# Patient Record
Sex: FEMALE | Race: WHITE | NOT HISPANIC OR LATINO | Employment: OTHER | URBAN - METROPOLITAN AREA
[De-identification: names, ages, dates, MRNs, and addresses within clinical notes are randomized per-mention and may not be internally consistent; named-entity substitution may affect disease eponyms.]

---

## 2017-01-03 ENCOUNTER — OFFICE VISIT (OUTPATIENT)
Dept: MEDICAL GROUP | Facility: MEDICAL CENTER | Age: 77
End: 2017-01-03
Payer: MEDICARE

## 2017-01-03 VITALS
WEIGHT: 168 LBS | DIASTOLIC BLOOD PRESSURE: 78 MMHG | SYSTOLIC BLOOD PRESSURE: 124 MMHG | BODY MASS INDEX: 31.72 KG/M2 | OXYGEN SATURATION: 92 % | HEIGHT: 61 IN | HEART RATE: 85 BPM | TEMPERATURE: 98.2 F

## 2017-01-03 DIAGNOSIS — J06.9 URI WITH COUGH AND CONGESTION: ICD-10-CM

## 2017-01-03 PROCEDURE — 99213 OFFICE O/P EST LOW 20 MIN: CPT | Performed by: NURSE PRACTITIONER

## 2017-01-03 RX ORDER — AZITHROMYCIN 250 MG/1
TABLET, FILM COATED ORAL
Qty: 6 TAB | Refills: 0 | Status: SHIPPED | OUTPATIENT
Start: 2017-01-03 | End: 2017-01-08

## 2017-01-03 NOTE — MR AVS SNAPSHOT
"        Sheyla Gauthier   1/3/2017 4:15 PM   Office Visit   MRN: [id number]    Department:  [facility]   Dept Phone:  [phone]    Description:  Female : 1940   Provider:  JANET Ford           Allergies as of 1/3/2017     Allergen Noted Reactions    Amoxicillin 2016   Rash    Facial rash    Nkda [No Known Drug Allergy] 10/21/2009         You were diagnosed with     URI with cough and congestion   [[id number]]   use MBX prn.  take zpak if not improved in next 4-5 days.       Vital Signs     Blood Pressure Pulse Temperature Height Weight Body Mass Index    124/78 mmHg 85 36.8 °C (98.2 °F) 1.549 m (5' 1\") 76.204 kg (168 lb) 31.76 kg/m2    Oxygen Saturation Last Menstrual Period Breastfeeding? 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   Unknown - Present    Acquired hypothyroidism E03.9   3/8/2011 - Present    Migraine syndrome G43.909   2013 - Present    Sinusitis J32.9   2013 - Present    White matter abnormality on MRI of brain R93.0   2013 - Present    Spinal stenosis of lumbar region M48.06   2016 - Present    Weight gain R63.5   2016 - Present    Prediabetes R73.03   2016 - Present    Abdominal bloating R14.0   2016 - Present    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   2016 - Present    Hx of colonic polyps Z86.010   2016 - Present    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   2016 - Present      Health Maintenance        Date Due Completion Dates    BONE DENSITY " 6/18/2015 6/18/2010    MAMMOGRAM 10/5/2016 10/5/2015, 8/20/2014, 8/20/2014, 7/18/2013, 7/2/2012, 8/10/2011, 6/23/2011, 6/20/2011, 6/18/2010, 6/18/2010, 5/23/2005, 5/22/2004    IMM PNEUMOCOCCAL 65+ (ADULT) LOW/MEDIUM RISK SERIES (2 of 2 - PPSV23) 12/14/2016 12/14/2015    PAP SMEAR 2/3/2019 2/3/2016, 6/8/2010    IMM DTaP/Tdap/Td Vaccine (2 - Td) 7/7/2024 7/7/2014    COLONOSCOPY 8/19/2026 8/19/2016, 1/12/2010            Current Immunizations     13-VALENT PCV PREVNAR 12/14/2015    Influenza Vaccine Adult HD 10/25/2016    SHINGLES VACCINE 10/26/2010  3:20 PM    Tdap Vaccine 7/7/2014 10:14 PM      Below and/or attached are the medications your provider expects you to take. Review all of your home medications and newly ordered medications with your provider and/or pharmacist. Follow medication instructions as directed by your provider and/or pharmacist. Please keep your medication list with you and share with your provider. Update the information when medications are discontinued, doses are changed, or new medications (including over-the-counter products) are added; and carry medication information at all times in the event of emergency situations     Allergies:  AMOXICILLIN - Rash     NKDA - (reactions not documented)               Medications  Valid as of: January 03, 2017 -  5:47 PM    Generic Name Brand Name Tablet Size Instructions for use    Alum & Mag Hydroxide-Simeth (MBX) Suspension (Suspension) MBX  Take 5 mL by mouth every 6 hours as needed.        AmLODIPine Besylate (Tab) NORVASC 5 MG Take 5 mg by mouth every day.        Aspirin (Chew Tab) ASA 81 MG Take 81 mg by mouth every day.        Azithromycin (Tab) ZITHROMAX 250 MG 2 tabs by mouth day 1, 1 tab by mouth days 2-5        Escitalopram Oxalate (Tab) LEXAPRO 10 MG Take 1 Tab by mouth every day.        Ezetimibe (Tab) ZETIA 10 MG Take 10 mg by mouth every day.        Irbesartan (Tab) AVAPRO 150 MG Take 1 Tab by mouth every day.        Levothyroxine Sodium  (Tab) SYNTHROID 75 MCG Take 75 mcg by mouth Every morning on an empty stomach.        .                 Medicines prescribed today were sent to:     [facility], [place] - [address] AT [facility] [address]    [address] [place] [address]    Phone: [phone] Fax: [phone]    Open 24 Hours?: No      Medication refill instructions:       If your prescription bottle indicates you have medication refills left, it is not necessary to call your provider’s office. Please contact your pharmacy and they will refill your medication.    If your prescription bottle indicates you do not have any refills left, you may request refills at any time through one of the following ways: The online [employer] system (except Urgent Care), by calling your provider’s office, or by asking your pharmacy to contact your provider’s office with a refill request. Medication refills are processed only during regular business hours and may not be available until the next business day. Your provider may request additional information or to have a follow-up visit with you prior to refilling your medication.   *Please Note: Medication refills are assigned a new Rx number when refilled electronically. Your pharmacy may indicate that no refills were authorized even though a new prescription for the same medication is available at the pharmacy. Please request the medicine by name with the pharmacy before contacting your provider for a refill.           [employer] Access Code: Activation code not generated  Current [employer] Status: Active

## 2017-01-04 ENCOUNTER — HOSPITAL ENCOUNTER (OUTPATIENT)
Dept: LAB | Facility: MEDICAL CENTER | Age: 77
End: 2017-01-04
Attending: INTERNAL MEDICINE
Payer: MEDICARE

## 2017-01-04 LAB
T4 FREE SERPL-MCNC: 0.93 NG/DL (ref 0.53–1.43)
TSH SERPL DL<=0.005 MIU/L-ACNC: 1.31 UIU/ML (ref 0.3–3.7)

## 2017-01-04 PROCEDURE — 84443 ASSAY THYROID STIM HORMONE: CPT

## 2017-01-04 PROCEDURE — 84439 ASSAY OF FREE THYROXINE: CPT

## 2017-01-04 PROCEDURE — 36415 COLL VENOUS BLD VENIPUNCTURE: CPT

## 2017-01-04 NOTE — PROGRESS NOTES
Subjective:      Sheyla Gauthier is a 76 y.o. female who presents with No chief complaint on file.            HPI  Seen in f/u for URI.  Sx started 14 days ago.  She is having wheezing in her lungs.  It has resolved but gotten better now.    But having rt ear pain.  Having sores in her mouth.    Nasal secretionis are clear.  Coughing up green secetions.  + headache but that is chronic.  No sinus pressure.    + sore throat - feels likes sores in hter throat.    No n/v,d.  + chills but no fever or sweating.    Just came home from [facility].  Today she used cough expectorant and pain meds for body aches that is not nsaid.  Over the last 3 days she has used sudafed.      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   • 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/08/2016   • Hx of colonic polyps 12/08/2016   • Spinal stenosis of lumbar region 11/14/2016   • Weight gain 11/14/2016   • Prediabetes 11/14/2016   • Abdominal bloating 11/14/2016   • Sinusitis 12/12/2013   • Migraine syndrome 02/11/2013   • White matter abnormality on MRI of brain 02/11/2013   • Acquired hypothyroidism 03/08/2011   • HTN (hypertension) 10/21/2009   • Postmenopausal hormone therapy 10/21/2009   • Osteopathia    • Mixed hyperlipidemia    • 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HCC)      Current Outpatient Prescriptions   Medication Sig Dispense Refill   • escitalopram (LEXAPRO) 10 MG Tab Take 1 Tab by mouth every day. 90 Tab 3   • levothyroxine (SYNTHROID) 75 MCG Tab Take 75 mcg by mouth Every morning on an empty stomach.     • ezetimibe (ZETIA) 10 MG Tab Take 10 mg by mouth every day.     • amlodipine (NORVASC) 5 MG Tab Take 5 mg by mouth every day.     • irbesartan (AVAPRO) 150 MG TABS Take 1 Tab by mouth every day. 90 Each 3   • aspirin (BABY ASPIRIN) 81 MG CHEW Take 81 mg by mouth every day.       No current facility-administered medications for this visit.     Allergies   Allergen Reactions   •  "Amoxicillin Rash     Facial rash   • Nkda [No Known Drug Allergy]        ROS    Review of Systems   Constitutional: Negative.  Negative for weight loss, malaise/fatigue and diaphoresis.   HENT: Negative.  Negative for hearing loss, ear pain, nosebleeds, neck pain, tinnitus and ear discharge.    Respiratory: Negative.  Negative for hemoptysis, and stridor.    Cardiovascular: Negative.  Negative for chest pain, palpitations, orthopnea, claudication, leg swelling and PND.   Gastrointestinal: denies nausea, vomiting, diarrhea, constipation, heartburn, melena or hematochezia.  Genitourinary: Denies dysuria, hematuria, urinary incontinence, frequency or urgency.           Objective:     /78 mmHg  Pulse 85  Temp(Src) 36.8 °C (98.2 °F)  Ht 1.549 m (5' 1\")  Wt 76.204 kg (168 lb)  BMI 31.76 kg/m2  SpO2 92%  LMP 11/01/1986  Breastfeeding? No     Physical Exam      Physical Exam   Vitals reviewed.  Constitutional: oriented to person, place, and time. appears well-developed and well-nourished. No distress.   HENT:  Head: Normocephalic and atraumatic. Right Ear: External ear normal. Left Ear: External ear normal. Nose: Nose normal. Mouth/Throat: Oropharynx is clear and moist. No oropharyngeal exudate.  alex tm wnl.  Eyes: Right eye exhibits no discharge. Left eye exhibits no discharge. No scleral icterus.  Neck: No JVD present.    Cardiovascular: Normal rate, regular rhythm, normal heart sounds and intact distal pulses.  Exam reveals no gallop and no friction rub.  No murmur heard.  No carotid bruits.   Pulmonary/Chest: Effort normal and breath sounds normal. No stridor. No respiratory distress. no wheezes or rales. exhibits no tenderness.   Musculoskeletal: Normal range of motion. exhibits no edema. alex pedal pulses 2+.  Lymphadenopathy: no cervical or supraclavicular adenopathy.   Neurological: alert and oriented to person, place, and time. exhibits normal muscle tone. Coordination normal.   Skin: Skin is warm and " dry. no diaphoresis.   Psychiatric: normal mood and affect. behavior is normal.            Assessment/Plan:     1. URI with cough and congestion  Alum & Mag Hydroxide-Simeth (MBX) Suspension    azithromycin (ZITHROMAX) 250 MG Tab    use MBX prn.  take zpak if not improved in next 4-5 days.      2.  F/u if sx not imrpoved with tx

## 2017-02-10 RX ORDER — EZETIMIBE 10 MG/1
10 TABLET ORAL DAILY
Qty: 30 TAB | Refills: 6 | Status: SHIPPED | OUTPATIENT
Start: 2017-02-10 | End: 2017-09-11 | Stop reason: SDUPTHER

## 2017-02-10 NOTE — TELEPHONE ENCOUNTER
1. Caller Name: Sheyla                      Call Back Number: [phone]    2. Message: Pt left message stating her Cardiologist retired and she needs a new refill for Zetia.     3. Patient approves office to leave a detailed voicemail/MyChart message: N\A        Was the patient seen in the last year in this department? Yes     Does patient have an active prescription for medications requested? No     Received Request Via: Patient

## 2017-04-24 ENCOUNTER — HOSPITAL ENCOUNTER (OUTPATIENT)
Dept: LAB | Facility: MEDICAL CENTER | Age: 77
End: 2017-04-24
Attending: INTERNAL MEDICINE
Payer: MEDICARE

## 2017-04-24 LAB — TSH SERPL DL<=0.005 MIU/L-ACNC: 0.59 UIU/ML (ref 0.3–3.7)

## 2017-04-24 PROCEDURE — 84443 ASSAY THYROID STIM HORMONE: CPT

## 2017-04-24 PROCEDURE — 84432 ASSAY OF THYROGLOBULIN: CPT

## 2017-04-24 PROCEDURE — 86800 THYROGLOBULIN ANTIBODY: CPT

## 2017-04-24 PROCEDURE — 36415 COLL VENOUS BLD VENIPUNCTURE: CPT

## 2017-04-26 LAB
THYROGLOB AB SERPL-ACNC: <0.9 IU/ML (ref 0–4)
THYROGLOB SERPL-MCNC: 6.9 NG/ML (ref 1.3–31.8)
THYROGLOB SERPL-MCNC: NORMAL NG/ML (ref 1.3–31.8)

## 2017-05-18 PROBLEM — C44.519 BASAL CELL CARCINOMA OF SKIN OF OTHER PART OF TRUNK: Status: ACTIVE | Noted: 2017-05-18

## 2017-06-01 PROBLEM — D49.2 NEOPLASM OF UNSPECIFIED BEHAVIOR OF BONE, SOFT TISSUE, AND SKIN: Status: RESOLVED | Noted: 2017-05-18 | Resolved: 2017-06-01

## 2017-06-05 ENCOUNTER — HOSPITAL ENCOUNTER (OUTPATIENT)
Dept: LAB | Facility: MEDICAL CENTER | Age: 77
End: 2017-06-05
Attending: INTERNAL MEDICINE
Payer: MEDICARE

## 2017-06-05 LAB
ALBUMIN SERPL BCP-MCNC: 4.2 G/DL (ref 3.2–4.9)
ALBUMIN/GLOB SERPL: 1.5 G/DL
ALP SERPL-CCNC: 69 U/L (ref 30–99)
ALT SERPL-CCNC: 13 U/L (ref 2–50)
ANION GAP SERPL CALC-SCNC: 7 MMOL/L (ref 0–11.9)
AST SERPL-CCNC: 15 U/L (ref 12–45)
BILIRUB SERPL-MCNC: 0.6 MG/DL (ref 0.1–1.5)
BUN SERPL-MCNC: 27 MG/DL (ref 8–22)
CALCIUM SERPL-MCNC: 9.4 MG/DL (ref 8.5–10.5)
CHLORIDE SERPL-SCNC: 108 MMOL/L (ref 96–112)
CHOLEST SERPL-MCNC: 187 MG/DL (ref 100–199)
CO2 SERPL-SCNC: 27 MMOL/L (ref 20–33)
CREAT SERPL-MCNC: 0.84 MG/DL (ref 0.5–1.4)
GFR SERPL CREATININE-BSD FRML MDRD: >60 ML/MIN/1.73 M 2
GLOBULIN SER CALC-MCNC: 2.8 G/DL (ref 1.9–3.5)
GLUCOSE SERPL-MCNC: 104 MG/DL (ref 65–99)
HDLC SERPL-MCNC: 54 MG/DL
LDLC SERPL CALC-MCNC: 113 MG/DL
POTASSIUM SERPL-SCNC: 4.3 MMOL/L (ref 3.6–5.5)
PROT SERPL-MCNC: 7 G/DL (ref 6–8.2)
SODIUM SERPL-SCNC: 142 MMOL/L (ref 135–145)
TRIGL SERPL-MCNC: 99 MG/DL (ref 0–149)

## 2017-06-05 PROCEDURE — 80053 COMPREHEN METABOLIC PANEL: CPT

## 2017-06-05 PROCEDURE — 36415 COLL VENOUS BLD VENIPUNCTURE: CPT

## 2017-06-05 PROCEDURE — 80061 LIPID PANEL: CPT

## 2017-06-21 ENCOUNTER — HOSPITAL ENCOUNTER (OUTPATIENT)
Dept: PAIN MANAGEMENT | Facility: REHABILITATION | Age: 77
End: 2017-06-21
Attending: PAIN MEDICINE
Payer: MEDICARE

## 2017-06-21 ENCOUNTER — APPOINTMENT (OUTPATIENT)
Dept: RADIOLOGY | Facility: REHABILITATION | Age: 77
End: 2017-06-21
Attending: PAIN MEDICINE
Payer: MEDICARE

## 2017-06-21 VITALS
TEMPERATURE: 99 F | RESPIRATION RATE: 18 BRPM | DIASTOLIC BLOOD PRESSURE: 89 MMHG | WEIGHT: 156.53 LBS | OXYGEN SATURATION: 91 % | SYSTOLIC BLOOD PRESSURE: 181 MMHG | HEART RATE: 59 BPM | HEIGHT: 62 IN | BODY MASS INDEX: 28.8 KG/M2

## 2017-06-21 PROCEDURE — 64633 DESTROY CERV/THOR FACET JNT: CPT

## 2017-06-21 PROCEDURE — 700111 HCHG RX REV CODE 636 W/ 250 OVERRIDE (IP)

## 2017-06-21 PROCEDURE — 64634 DESTROY C/TH FACET JNT ADDL: CPT

## 2017-06-21 PROCEDURE — 99152 MOD SED SAME PHYS/QHP 5/>YRS: CPT

## 2017-06-21 PROCEDURE — 99153 MOD SED SAME PHYS/QHP EA: CPT

## 2017-06-21 RX ORDER — TRIAMCINOLONE ACETONIDE 40 MG/ML
INJECTION, SUSPENSION INTRA-ARTICULAR; INTRAMUSCULAR
Status: COMPLETED
Start: 2017-06-21 | End: 2017-06-21

## 2017-06-21 RX ORDER — MIDAZOLAM HYDROCHLORIDE 1 MG/ML
INJECTION INTRAMUSCULAR; INTRAVENOUS
Status: COMPLETED
Start: 2017-06-21 | End: 2017-06-21

## 2017-06-21 RX ORDER — LIDOCAINE HYDROCHLORIDE 10 MG/ML
INJECTION, SOLUTION EPIDURAL; INFILTRATION; INTRACAUDAL; PERINEURAL
Status: COMPLETED
Start: 2017-06-21 | End: 2017-06-21

## 2017-06-21 RX ORDER — BUPIVACAINE HYDROCHLORIDE 2.5 MG/ML
INJECTION, SOLUTION EPIDURAL; INFILTRATION; INTRACAUDAL
Status: COMPLETED
Start: 2017-06-21 | End: 2017-06-21

## 2017-06-21 RX ADMIN — FENTANYL CITRATE 50 MCG: 50 INJECTION, SOLUTION INTRAMUSCULAR; INTRAVENOUS at 15:46

## 2017-06-21 RX ADMIN — LIDOCAINE HYDROCHLORIDE 20 ML: 10 INJECTION, SOLUTION EPIDURAL; INFILTRATION; INTRACAUDAL; PERINEURAL at 15:45

## 2017-06-21 RX ADMIN — MIDAZOLAM HYDROCHLORIDE 2 MG: 1 INJECTION, SOLUTION INTRAMUSCULAR; INTRAVENOUS at 15:43

## 2017-06-21 RX ADMIN — MIDAZOLAM HYDROCHLORIDE 1 MG: 1 INJECTION, SOLUTION INTRAMUSCULAR; INTRAVENOUS at 15:51

## 2017-06-21 RX ADMIN — FENTANYL CITRATE 50 MCG: 50 INJECTION, SOLUTION INTRAMUSCULAR; INTRAVENOUS at 15:52

## 2017-06-21 ASSESSMENT — PAIN SCALES - GENERAL
PAINLEVEL_OUTOF10: 0
PAINLEVEL_OUTOF10: 5

## 2017-06-21 NOTE — PROGRESS NOTES
Medication reconciliation reviewed with patient.Patient stated that she had Naproxen yesterday and Aspirin 81 mg. last night Discharge instruction given to patient and verbalized understanding. Patient  has ride home ( Justyn /friend  ). Dr. Benitez notified.

## 2017-06-28 NOTE — PROCEDURES
Surgeon:   Patricio Benitez MD  Patient name:   Sheyla Gauthier  YOB: 1940  Date Seen:   06/21/2017    Assistants: None    TON-C5 RF NEUROTOMY    ATTENDING:  Patricio Benitez MD    ASSISTANT:  None    SITE:  [facility]     Anesthesia time: 25 minutes    PREOPERATIVE DIAGNOSIS:  Cervical spondylosis with cervical facet arthropathy.    POSTOPERATIVE DIAGNOSIS:  Cervical spondylosis with cervical facet arthropathy.    PROCEDURE PERFORMED:  1. Radiofrequency ablation of the TON medial branch nerve on the right side.  2. Additional levels, medial branches of the C3, C4 and C5 on the right side.  3. Fluoroscopy for precise needle placement.    ANESTHESIA:  Local infiltration with monitored anesthesia care and conscious sedation.    MONITORS:   Automatic blood pressure cuff and pulse oximetry.    INDICATIONS:  2 consecutive successful blocks, right side.    REVIEW OF SYSTEMS:  Negative for fever, chills, chest pain, SOB, bleeding abnormalities, nausea, vomiting, diarrhea, worsening edema, or new rashes.    FOCUSED PHYSICAL EXAMINATION:  The patient is awake, alert and oriented, and is in no acute distress.  Vital signs are stable.  The patient is afebrile.  The rest of the PE is essentially unchanged from the patient's recent visit to our office.    We explained the procedure to the patient including the risks, benefits and alternatives to the procedure.  The patient verbalized understanding and was willing to proceed.    PROCEDURE IN DETAIL:  An informed consent was obtained.  The patient was taken to the procedure room and was positively identified by the staff and the attending physician.  The patient was positioned Left Lateral on the procedure bed.  Vital signs were monitored as above and remained stable throughout the procedure.  The skin was prepped and draped in the standard sterile fashion.  A surgical pause (time-out) was performed and was agreed upon by the members of the team. A fluoroscopic view of  the lumbar spine was obtained, and the area of interest was identified.  The skin was anesthetized using 1% lidocaine and 25-gauge 1-1/2-inch needle.    After that, a 17-gauge -5 cm conventional RF cannula with 2-mm active tip was advanced onto the facet line of C2-C3 lateral masses on the right side.  Full contact with the bone was established.  Additional RF cannulae were placed at the interception of 2 imaginary diagonal lines over the lateral masses of C3, C4 and C5 on the right.  (Each level was stimulated for sensory fibers at 50 Hz, and the patient's pain was reproduced at approximately 1.2 V.)  (The motor fiber recruitment was ruled out by stimulation at 2 Hz in the range between 1.5 to 2.5 V.)  (Only localized twitching of the multifidus muscle was elicited.)  Prior to lesioning, each nerve was anesthetized with 0.5 mL of Lidocaine 1%.  At each level, we then performed one lesion at 60 degrees centigrade with thermal tissue over 80 degrees ,  2.5 minutes in duration.      All needles were withdrawn.  The patient tolerated the procedure well.  The patient was transferred in stable condition to the recovery room.    COMPLICATIONS:  None.    PAIN SCORE BEFORE THE PROCEDURE:     PAIN SCORE AFTER THE PROCEDURE:      DISPOSITON:  1. Discharge to home when the discharge criteria are met.  2. Follow up in two weeks in the Pain Clinic.  3. (The patient will resume their medications.

## 2017-06-29 ENCOUNTER — TELEPHONE (OUTPATIENT)
Dept: MEDICAL GROUP | Age: 77
End: 2017-06-29

## 2017-06-29 ENCOUNTER — OFFICE VISIT (OUTPATIENT)
Dept: MEDICAL GROUP | Age: 77
End: 2017-06-29
Payer: MEDICARE

## 2017-06-29 VITALS
DIASTOLIC BLOOD PRESSURE: 80 MMHG | HEART RATE: 70 BPM | HEIGHT: 62 IN | SYSTOLIC BLOOD PRESSURE: 152 MMHG | TEMPERATURE: 99.3 F | BODY MASS INDEX: 29.15 KG/M2 | WEIGHT: 158.4 LBS | OXYGEN SATURATION: 97 %

## 2017-06-29 DIAGNOSIS — Z78.0 POSTMENOPAUSAL: ICD-10-CM

## 2017-06-29 DIAGNOSIS — E78.2 MIXED HYPERLIPIDEMIA: ICD-10-CM

## 2017-06-29 DIAGNOSIS — Z00.00 PREVENTATIVE HEALTH CARE: ICD-10-CM

## 2017-06-29 DIAGNOSIS — Z12.31 VISIT FOR SCREENING MAMMOGRAM: ICD-10-CM

## 2017-06-29 DIAGNOSIS — L57.0 AK (ACTINIC KERATOSIS): ICD-10-CM

## 2017-06-29 DIAGNOSIS — E03.9 ACQUIRED HYPOTHYROIDISM: ICD-10-CM

## 2017-06-29 DIAGNOSIS — F32.0 MILD SINGLE CURRENT EPISODE OF MAJOR DEPRESSIVE DISORDER (HCC): ICD-10-CM

## 2017-06-29 DIAGNOSIS — I10 ESSENTIAL HYPERTENSION: ICD-10-CM

## 2017-06-29 DIAGNOSIS — R63.5 WEIGHT GAIN: ICD-10-CM

## 2017-06-29 DIAGNOSIS — Z23 NEED FOR VACCINATION: ICD-10-CM

## 2017-06-29 DIAGNOSIS — Q76.49 CONGENITAL CERVICAL SPINE STENOSIS: ICD-10-CM

## 2017-06-29 PROBLEM — F32.9 MAJOR DEPRESSIVE DISORDER: Status: ACTIVE | Noted: 2017-06-29

## 2017-06-29 PROCEDURE — 17003 DESTRUCT PREMALG LES 2-14: CPT | Performed by: FAMILY MEDICINE

## 2017-06-29 PROCEDURE — G0009 ADMIN PNEUMOCOCCAL VACCINE: HCPCS | Performed by: FAMILY MEDICINE

## 2017-06-29 PROCEDURE — 90732 PPSV23 VACC 2 YRS+ SUBQ/IM: CPT | Performed by: FAMILY MEDICINE

## 2017-06-29 PROCEDURE — 99214 OFFICE O/P EST MOD 30 MIN: CPT | Mod: 25 | Performed by: FAMILY MEDICINE

## 2017-06-29 PROCEDURE — 17000 DESTRUCT PREMALG LESION: CPT | Mod: 59 | Performed by: FAMILY MEDICINE

## 2017-06-29 RX ORDER — LOSARTAN POTASSIUM 25 MG/1
TABLET ORAL
Refills: 3 | COMMUNITY
Start: 2017-06-13 | End: 2017-07-26

## 2017-06-29 RX ORDER — CALCIUM CARBONATE 300MG(750)
200 TABLET,CHEWABLE ORAL NIGHTLY
COMMUNITY
End: 2023-10-26

## 2017-06-29 RX ORDER — IRBESARTAN 150 MG/1
TABLET ORAL
Refills: 0 | COMMUNITY
Start: 2017-05-30 | End: 2017-07-26

## 2017-06-29 NOTE — ASSESSMENT & PLAN NOTE
Patient states that she's been gaining weight because she has not been as active. She has not been walking at all because of neck pain. She knows she can do better with diet and exercise.

## 2017-06-29 NOTE — PROGRESS NOTES
This medical record contains text that has been entered with the assistance of computer voice recognition and dictation software.  Therefore, it may contain unintended errors in text, spelling, punctuation, or grammar    Chief Complaint   Patient presents with   • Other     see reason for visit       Sheyla Gauthier is a 76 y.o. female here evaluation and management of: Hypertension, hyperlipidemia, establish care, depression, weight gain, DEXA scan, mammogram      HPI:     HTN (Hypertension)  Now only taking losartan    Patient has been diagnosed with essential hypertension for years. Has been compliant with medications and tolerating them with no mention of any adverse events.  The patient is  on a Baby ASPIRIN and a  STATIN.  The patient has been tollerating the BP meds without any issues. No tunnel vision, no cough, no changes in vision, no lightheadedness, no fatigue, no syncopal or presyncopal episodes, no edema, no new rashes. Patient also  denied chest pain, headache, change in vision, dyspnea on exertion, shortness of breath, PND, back pain, numbness or tingling anywhere. He is tolerating his medication well, he denies any lightheadedness, no tunnel vision, no syncopal episodes, no fatigue, no leg weakness no falls.      Mixed hyperlipidemia  Could not tollerate 3 different statins  Now taking zetia 10 mg by mouth daily    Results for SHEYLA GAUTHIER (MRN [id number]) as of 6/29/2017 16:47   Ref. Range 6/5/2017 09:47   Cholesterol,Tot Latest Ref Range: 100-199 mg/dL 187   Triglycerides Latest Ref Range: 0-149 mg/dL 99   HDL Latest Ref Range: >=40 mg/dL 54   LDL Latest Ref Range: <100 mg/dL 113 (H)       Acquired hypothyroidism  Patient states she's being managed by her endocrinologist  Currently on Levoxyl 75 mg by mouth daily    Results for SHEYLA GAUTHIER (MRN [id number]) as of 6/29/2017 16:29   Ref. Range 4/24/2017 12:53   TSH Latest Ref Range: 0.300-3.700 uIU/mL 0.590   Thyroglobulin by LC-MC/MS-S/P Latest Ref  Range: 1.3-31.8 ng/mL Not Applicable   Thyroglobulin Latest Ref Range: 1.3-31.8 ng/mL 6.9       Congenital cervical spine stenosis  Refused surgery as recomended by Dr. Salinas  Now is in [facility] managment clinic ([facility]  S/s cervical ablation and block 1 wk ago  She states she had some worsening pain and now it's improving      Major depressive disorder (CMS-HCC)  Since son  in  of which she states was acute bronchopneumonia  She became depressed and had prolonged grief  She was placed on Lexapro  Been stable on lexapro 10mg  She has good friends nearby who are very supportive  She denies any suicidal or homicidal ideations    Weight gain  Patient states that she's been gaining weight because she has not been as active. She has not been walking at all because of neck pain. She knows she can do better with diet and exercise.    Preventative health care  The patient is a very pleasant 76-year-old female who presents to clinic to establish care. She has a significant past medical history of cervical spinal stenosis, prolonged grief/depression, recent weight gain, hypertension, hyperlipidemia.   The patient denied any chest pain, no sob, no lewis, no  pnd, no orthopnea, no headache, no changes in vision, no numbness or tingling, no nausea, no diarrhea, no abdominal pain, no fevers, no chills, no bright red blood per rectum, no  difficulty urinating, no burning during micturition, no depressed mood, no other concerns.         Current medicines (including changes today)  Current Outpatient Prescriptions   Medication Sig Dispense Refill   • irbesartan (AVAPRO) 150 MG Tab TK 1 T PO D  0   • losartan (COZAAR) 25 MG Tab TK 1 T PO D  3   • Magnesium 400 MG Tab Take  by mouth.     • ezetimibe (ZETIA) 10 MG Tab Take 1 Tab by mouth every day. 30 Tab 6   • escitalopram (LEXAPRO) 10 MG Tab Take 1 Tab by mouth every day. 90 Tab 3   • levothyroxine (SYNTHROID) 75 MCG Tab Take 75 mcg by mouth Every morning on an  "empty stomach.     • aspirin (BABY ASPIRIN) 81 MG CHEW Take 81 mg by mouth every day.       No current facility-administered medications for this visit.     She  has a past medical history of HTN (hypertension); Osteopathia; Dyslipidemia; Post-menopause on HRT (hormone replacement therapy); MHA (microangiopathic hemolytic anemia); White matter abnormality on MRI of brain (2013); Migraine syndrome (2013); Herpes zoster (10/27/06); Anxiety; Arthritis; Cataract; Hypertension; Osteoporosis; and Thyroid disease.  She  has past surgical history that includes cholecystectomy (); abdominal hysterectomy total (1986); reduction of large breast (); foot surgery (); enlarge breast with implant; mammoplasty augmentation; bunionectomy (Left, ); and eye surgery.  Social History   Substance Use Topics   • Smoking status: Former Smoker   • Smokeless tobacco: Never Used   • Alcohol Use: 0.0 oz/week     0 Standard drinks or equivalent per week     Social History     Social History Narrative     Family History   Problem Relation Age of Onset   • Stroke Father    • Arthritis Father    • Hyperlipidemia Father    • Hypertension Father    • Cancer Sister      breast   • Arthritis Sister    • Cancer Daughter      breast   • Arthritis Mother    • Hypertension Mother    • Hyperlipidemia Mother    • Stroke Mother    • Arthritis Brother    • Cancer Brother    • Heart Disease Brother    • Hypertension Brother    • Hyperlipidemia Brother      Family Status   Relation Status Death Age   • Father     • Sister Alive    • Mother       tia   • Brother Alive          ROS   please see hpi    All other systems reviewed and are negative     Objective:     Blood pressure 152/80, pulse 70, temperature 37.4 °C (99.3 °F), height 1.575 m (5' 2.01\"), weight 71.85 kg (158 lb 6.4 oz), last menstrual period 1986, SpO2 97 %. Body mass index is 28.96 kg/(m^2).  Physical Exam:    Constitutional: Alert, no " distress.  Skin: Warm, dry, good turgor, no rashes in visible areas.    multiple lesions on bilateral forearms, hands, and chest, with evidence of of solar damage present , spotty hyperpigmentation, scattered telangiectasias, and  Xerosis      PROCEDURE: CRYOTHERAPY  Discussed risks and benefits of cryotherapy. Patient verbally agreed. 3 applications of cryotherapy were applied to all five lesions. Patient tolerated procedure well. Aftercare instructions given.      Eye: Equal, round and reactive, conjunctiva clear, lids normal.  ENMT: Lips without lesions, good dentition, oropharynx clear.  Neck: Trachea midline, no masses, no thyromegaly. No cervical or supraclavicular lymphadenopathy.  Respiratory: Unlabored respiratory effort, lungs clear to auscultation, no wheezes, no ronchi.  Cardiovascular: Normal S1, S2, no murmur, no edema.  Abdomen: Soft, non-tender, no masses, no hepatosplenomegaly.  Psych: Alert and oriented x3, normal affect and mood.          Assessment and Plan:   The following treatment plan was discussed      1. Essential hypertension  Patient was given instructions to make a two-week blood pressure log  To measure his blood pressure morning and evening same time  Then send results back to us  We will consider specific management at that time      2. Mixed hyperlipidemia  Patient has been stable with current management  We will make no changes for now      3. Acquired hypothyroidism  Managed by her endocrinologist    4. Congenital cervical spine stenosis  Managed by pain managment  No pain meds    5. Need for vaccination  Given today    - Pneumococal Polysaccharide Vaccine 23-Valent =>3yo SQ/IM    6. Postmenopausal    - DS-BONE DENSITY STUDY (DEXA); Future    7. Visit for screening mammogram    - MA-SCREEN MAMMO W/CAD-BILAT; Future    8. Mild single current episode of major depressive disorder (CMS-HCC)  Patient has been stable with current management  We will make no changes for now      9. Weight  gain    We discussed agressive lifestyle modifications with weight loss, aerobic exercise, and eating a prudent diet, which  included avoiding fried foods, using low-fat milk and avoiding simple carbohydrate, making a food diary. If you can't run because of pain do the stationary bike/elipticle or swim 30minutes 3 times per wk, in the beginning and then gradually increase.      10. Preventative health care  Care has been established  We need baseline labs to establish a clinical profile    We reviewed USPSTF guidelines      The current evidence is insufficient to assess the balance of benefits and harms of initiating aspirin use for the primary prevention of CVD and CRC in adults aged 70 years or older.    This patient is due for mammogram  Up to date on colonoscopy   Requested Medical records to be sent to us  Denies intimate partner viloence          HEALTH MAINTENANCE:    Instructed to Follow up in clinic or ER for worsening symptoms, difficulty breathing, lack of expected recovery, or should new symptoms or problems arise.    Followup: Return in about 4 weeks (around 7/27/2017) for Cryotherapy.       Over 40 minutes spent with patient face to face, greater than 50% time spent with plan/cordination of care as above in my A/P.     Once again this medical record contains text that has been entered with the assistance of computer voice recognition and dictation software.  Therefore, it may contain unintended errors in text, spelling, punctuation, or grammar

## 2017-06-29 NOTE — ASSESSMENT & PLAN NOTE
Refused surgery as recomended by Dr. Salinas  Now is in [facility] managment clinic ([facility])  S/s cervical ablation and block 1 wk ago  She states she had some worsening pain and now it's improving

## 2017-06-29 NOTE — ASSESSMENT & PLAN NOTE
Now only taking losartan    Patient has been diagnosed with essential hypertension for years. Has been compliant with medications and tolerating them with no mention of any adverse events.  The patient is  on a Baby ASPIRIN and a  STATIN.  The patient has been tollerating the BP meds without any issues. No tunnel vision, no cough, no changes in vision, no lightheadedness, no fatigue, no syncopal or presyncopal episodes, no edema, no new rashes. Patient also  denied chest pain, headache, change in vision, dyspnea on exertion, shortness of breath, PND, back pain, numbness or tingling anywhere. He is tolerating his medication well, he denies any lightheadedness, no tunnel vision, no syncopal episodes, no fatigue, no leg weakness no falls.

## 2017-06-29 NOTE — ASSESSMENT & PLAN NOTE
The patient is a very pleasant 76-year-old female who presents to clinic to establish care. She has a significant past medical history of cervical spinal stenosis, prolonged grief/depression, recent weight gain, hypertension, hyperlipidemia.   The patient denied any chest pain, no sob, no lewis, no  pnd, no orthopnea, no headache, no changes in vision, no numbness or tingling, no nausea, no diarrhea, no abdominal pain, no fevers, no chills, no bright red blood per rectum, no  difficulty urinating, no burning during micturition, no depressed mood, no other concerns.

## 2017-06-29 NOTE — MR AVS SNAPSHOT
"        Sheyla Gauthier   2017 4:20 PM   Office Visit   MRN: [id number]    Department:  [address] [place] [address]   Dept Phone:  [phone]    Description:  Female : 1940   Provider:  Jumana Morataya M.D.           Reason for Visit     Other see reason for visit      Allergies as of 2017     Allergen Noted Reactions    Amoxicillin 2016   Rash    Facial rash    Latex 2017   Hives      You were diagnosed with     Essential hypertension   [6031539]       Mixed hyperlipidemia   [272.2.ICD-9-CM]       Acquired hypothyroidism   [5305935]       Congenital cervical spine stenosis   [095750]       Need for vaccination   [382846]       Postmenopausal   [037100]       Visit for screening mammogram   [515293]       Mild single current episode of major depressive disorder (CMS-HCC)   [6399683]       Weight gain   [683874]       Preventative health care   [415535]       AK (actinic keratosis)   [334029]         Vital Signs     Blood Pressure Pulse Temperature Height Weight Body Mass Index    152/80 mmHg 70 37.4 °C (99.3 °F) 1.575 m (5' 2.01\") 71.85 kg (158 lb 6.4 oz) 28.96 kg/m2    Oxygen Saturation Last Menstrual Period Smoking Status             97% 1986 Former Smoker         Basic Information     Date Of Birth Sex Race Ethnicity Preferred Language    1940 Female White Non- English      Your appointments     2017  4:20 PM   Established Patient with Jumana Morataya M.D.   [address] [id number] [phone]           You will be receiving a confirmation call a few days before your appointment from our automated call confirmation system.              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   Unknown - Present    Acquired hypothyroidism E03.9   3/8/2011 - Present    Migraine syndrome G43.909   2/11/2013 - Present    Sinusitis J32.9   12/12/2013 - Present    White matter abnormality on MRI of brain R93.0   2/11/2013 - Present    Spinal stenosis of lumbar region M48.06   11/14/2016 - Present    Weight gain R63.5   11/14/2016 - Present    Prediabetes R73.03   11/14/2016 - Present    Abdominal bloating R14.0   11/14/2016 - Present    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   12/8/2016 - Present    Hx of colonic polyps Z86.010   12/8/2016 - Present    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   12/20/2016 - Present    Postmenopausal Z78.0   6/29/2017 - Present    Visit for screening mammogram Z12.31   6/29/2017 - Present    Major depressive disorder (CMS-HCC) F32.9   6/29/2017 - Present    Need for vaccination Z23   6/29/2017 - Present    Preventative health care Z00.00   6/29/2017 - Present    AK (actinic keratosis) L57.0   6/29/2017 - Present      Health Maintenance        Date Due Completion Dates    BONE DENSITY 6/18/2015 6/18/2010    MAMMOGRAM 10/5/2016 10/5/2015, 8/20/2014, 7/18/2013, 7/2/2012, 6/23/2011, 6/20/2011, 6/18/2010, 6/18/2010, 5/23/2005, 5/22/2004    PAP SMEAR 2/3/2019 2/3/2016, 6/8/2010    IMM DTaP/Tdap/Td Vaccine (2 - Td) 7/7/2024 7/7/2014    COLONOSCOPY 8/19/2026 8/19/2016, 1/12/2010            Current Immunizations     13-VALENT PCV PREVNAR 12/14/2015    Influenza Vaccine Adult HD 10/25/2016    Pneumococcal polysaccharide vaccine (PPSV-23) 6/29/2017    SHINGLES VACCINE 10/26/2010  3:20 PM    Tdap Vaccine 7/7/2014 10:14 PM      Below and/or attached are the medications your provider expects you to take. Review all of your home medications and newly ordered medications with your provider and/or pharmacist. Follow medication instructions as directed by your provider and/or pharmacist. Please keep your medication  list with you and share with your provider. Update the information when medications are discontinued, doses are changed, or new medications (including over-the-counter products) are added; and carry medication information at all times in the event of emergency situations     Allergies:  AMOXICILLIN - Rash     LATEX - Hives               Medications  Valid as of: June 29, 2017 -  4:57 PM    Generic Name Brand Name Tablet Size Instructions for use    Aspirin (Chew Tab) ASA 81 MG Take 81 mg by mouth every day.        Escitalopram Oxalate (Tab) LEXAPRO 10 MG Take 1 Tab by mouth every day.        Ezetimibe (Tab) ZETIA 10 MG Take 1 Tab by mouth every day.        Irbesartan (Tab) AVAPRO 150 MG TK 1 T PO D        Levothyroxine Sodium (Tab) SYNTHROID 75 MCG Take 75 mcg by mouth Every morning on an empty stomach.        Losartan Potassium (Tab) COZAAR 25 MG TK 1 T PO D        Magnesium (Tab) Magnesium 400 MG Take  by mouth.        .                 Medicines prescribed today were sent to:     [facility] [address] [place] [address] & [address] [address]    Phone: [phone] Fax: [phone]    Open 24 Hours?: No      Medication refill instructions:       If your prescription bottle indicates you have medication refills left, it is not necessary to call your provider’s office. Please contact your pharmacy and they will refill your medication.    If your prescription bottle indicates you do not have any refills left, you may request refills at any time through one of the following ways: The online [employer] system (except Urgent Care), by calling your provider’s office, or by asking your pharmacy to contact your provider’s office with a refill request. Medication refills are processed only during regular business hours and may not be available until the next business day. Your provider may request additional information or to have a follow-up visit with you prior  to refilling your medication.   *Please Note: Medication refills are assigned a new Rx number when refilled electronically. Your pharmacy may indicate that no refills were authorized even though a new prescription for the same medication is available at the pharmacy. Please request the medicine by name with the pharmacy before contacting your provider for a refill.        Your To Do List     Future Labs/Procedures Complete By Expires    DS-BONE DENSITY STUDY (DEXA)  As directed 6/29/2018    MA-SCREEN MAMMO W/CAD-BILAT  As directed 7/31/2018         [employer] Access Code: Activation code not generated  Current [employer] Status: Active

## 2017-06-29 NOTE — ASSESSMENT & PLAN NOTE
Could not tollerate 3 different statins  Now taking zetia 10 mg by mouth daily    Results for MELISSA PATEL (MRN [id number]) as of 6/29/2017 16:47   Ref. Range 6/5/2017 09:47   Cholesterol,Tot Latest Ref Range: 100-199 mg/dL 187   Triglycerides Latest Ref Range: 0-149 mg/dL 99   HDL Latest Ref Range: >=40 mg/dL 54   LDL Latest Ref Range: <100 mg/dL 113 (H)

## 2017-06-29 NOTE — ASSESSMENT & PLAN NOTE
Patient states she's being managed by her endocrinologist  Currently on Levoxyl 75 mg by mouth daily    Results for MELISSA PATEL (MRN [id number]) as of 6/29/2017 16:29   Ref. Range 4/24/2017 12:53   TSH Latest Ref Range: 0.300-3.700 uIU/mL 0.590   Thyroglobulin by LC-MC/MS-S/P Latest Ref Range: 1.3-31.8 ng/mL Not Applicable   Thyroglobulin Latest Ref Range: 1.3-31.8 ng/mL 6.9

## 2017-06-29 NOTE — TELEPHONE ENCOUNTER
ESTABLISHED PATIENT PRE-VISIT PLANNING     Note: Patient will not be contacted if there is no indication to call.     1.  Reviewed notes from the last few office visits within the medical group: Yes    2.  If any orders were placed at last visit or intended to be done for this visit (i.e. 6 mos follow-up), do we have Results/Consult Notes?        •  Labs - Labs ordered, completed and results are in chart.       •  Imaging - Imaging ordered, completed and results are in chart.       •  Referrals - No referrals were ordered at last office visit.    3. Is this appointment scheduled as a Hospital Follow-Up? No    4.  Immunizations were updated in Epic using WebIZ?: Epic matches WebIZ       •  Web Iz Recommendations: PNEUMOVAX (PPSV23) TD    5.  Patient is due for the following Health Maintenance Topics:   Health Maintenance Due   Topic Date Due   • Annual Wellness Visit  08/21/2014   • BONE DENSITY  06/18/2015   • MAMMOGRAM  10/05/2016   • IMM PNEUMOCOCCAL 65+ (ADULT) LOW/MEDIUM RISK SERIES (2 of 2 - PPSV23) 12/14/2016           6.  Patient was NOT informed to arrive 15 min prior to their scheduled appointment and bring in their medication bottles.

## 2017-06-29 NOTE — ASSESSMENT & PLAN NOTE
Since son  in  of which she states was acute bronchopneumonia  She became depressed and had prolonged grief  She was placed on Lexapro  Been stable on lexapro 10mg  She has good friends nearby who are very supportive  She denies any suicidal or homicidal ideations

## 2017-07-18 ENCOUNTER — HOSPITAL ENCOUNTER (OUTPATIENT)
Dept: RADIOLOGY | Facility: MEDICAL CENTER | Age: 77
End: 2017-07-18
Attending: FAMILY MEDICINE
Payer: MEDICARE

## 2017-07-18 DIAGNOSIS — Z78.0 POSTMENOPAUSAL: ICD-10-CM

## 2017-07-18 PROCEDURE — 77080 DXA BONE DENSITY AXIAL: CPT

## 2017-07-25 ENCOUNTER — TELEPHONE (OUTPATIENT)
Dept: MEDICAL GROUP | Age: 77
End: 2017-07-25

## 2017-07-26 ENCOUNTER — OFFICE VISIT (OUTPATIENT)
Dept: MEDICAL GROUP | Age: 77
End: 2017-07-26
Payer: MEDICARE

## 2017-07-26 VITALS
DIASTOLIC BLOOD PRESSURE: 90 MMHG | BODY MASS INDEX: 30.14 KG/M2 | WEIGHT: 163.8 LBS | HEIGHT: 62 IN | OXYGEN SATURATION: 98 % | SYSTOLIC BLOOD PRESSURE: 162 MMHG | TEMPERATURE: 99 F | HEART RATE: 64 BPM

## 2017-07-26 DIAGNOSIS — M80.00XA AGE-RELATED OSTEOPOROSIS WITH CURRENT PATHOLOGICAL FRACTURE, INITIAL ENCOUNTER: ICD-10-CM

## 2017-07-26 DIAGNOSIS — I10 ESSENTIAL HYPERTENSION: ICD-10-CM

## 2017-07-26 DIAGNOSIS — R29.6 FALLING: ICD-10-CM

## 2017-07-26 DIAGNOSIS — F32.0 MILD SINGLE CURRENT EPISODE OF MAJOR DEPRESSIVE DISORDER (HCC): ICD-10-CM

## 2017-07-26 DIAGNOSIS — R06.83 SNORING: ICD-10-CM

## 2017-07-26 PROBLEM — M81.0 AGE RELATED OSTEOPOROSIS: Status: ACTIVE | Noted: 2017-07-26

## 2017-07-26 PROCEDURE — 99214 OFFICE O/P EST MOD 30 MIN: CPT | Performed by: FAMILY MEDICINE

## 2017-07-26 RX ORDER — ALENDRONATE SODIUM 70 MG/1
70 TABLET ORAL
Qty: 4 TAB | Refills: 0 | Status: SHIPPED | OUTPATIENT
Start: 2017-07-26 | End: 2017-08-21 | Stop reason: SDUPTHER

## 2017-07-26 RX ORDER — ESCITALOPRAM OXALATE 20 MG/1
20 TABLET ORAL DAILY
Qty: 60 TAB | Refills: 0 | Status: SHIPPED | OUTPATIENT
Start: 2017-07-26 | End: 2017-09-23 | Stop reason: SDUPTHER

## 2017-07-26 RX ORDER — LOSARTAN POTASSIUM 50 MG/1
50 TABLET ORAL DAILY
Qty: 60 TAB | Refills: 0 | Status: SHIPPED | OUTPATIENT
Start: 2017-07-26 | End: 2017-08-23 | Stop reason: SDUPTHER

## 2017-07-26 NOTE — PROGRESS NOTES
This medical record contains text that has been entered with the assistance of computer voice recognition and dictation software.  Therefore, it may contain unintended errors in text, spelling, punctuation, or grammar    Chief Complaint   Patient presents with   • Other     see reason for visit       Sheyla Gauthier is a 76 y.o. female here evaluation and management of: HTN, snoring, osteoporosis, MDD      HPI:     Major depressive disorder (CMS-HCC)  She is having a hard time recently with cervical ablation  She feels like she is not concerned with things as much anymore  She is in a book club, she plays bridge  She enjoys it, not dating anyone yet  She denies any SI, no HI  She does have good support from friends and family    Snoring  Daughter from [place] came to visit with granddaughter--  They said she snores like a cartoon character  She has been having difficulty losing weight as well, and blood pressure has been hard to control    HTN (Hypertension)  Only taking Losartan 25mg daily  The patient has been tollerating the BP meds without any issues. No tunnel vision, no cough, no changes in vision, no lightheadedness, no fatigue, no syncopal or presyncopal episodes, no edema, no new rashes.     Age related osteoporosis  Patient states this is a new diagnosis for her. She has never taken medication for osteoporosis. She has not started the vitamin D that we recommended. She is not doing weightbearing exercises. She does not smoke cigarettes.          Current medicines (including changes today)  Current Outpatient Prescriptions   Medication Sig Dispense Refill   • losartan (COZAAR) 50 MG Tab Take 1 Tab by mouth every day. 60 Tab 0   • alendronate (FOSAMAX) 70 MG Tab Take 1 Tab by mouth every 7 days. 4 Tab 0   • escitalopram (LEXAPRO) 20 MG tablet Take 1 Tab by mouth every day. 60 Tab 0   • Magnesium 400 MG Tab Take  by mouth.     • ezetimibe (ZETIA) 10 MG Tab Take 1 Tab by mouth every day. 30 Tab 6   •  "levothyroxine (SYNTHROID) 75 MCG Tab Take 75 mcg by mouth Every morning on an empty stomach.     • aspirin (BABY ASPIRIN) 81 MG CHEW Take 81 mg by mouth every day.       No current facility-administered medications for this visit.     She  has a past medical history of HTN (hypertension); Osteopathia; Dyslipidemia; Post-menopause on HRT (hormone replacement therapy); MHA (microangiopathic hemolytic anemia); White matter abnormality on MRI of brain (2013); Migraine syndrome (2013); Herpes zoster (10/27/06); Anxiety; Arthritis; Cataract; Hypertension; Osteoporosis; and Thyroid disease.  She  has past surgical history that includes cholecystectomy (); abdominal hysterectomy total (1986); reduction of large breast (); foot surgery (); enlarge breast with implant; mammoplasty augmentation; bunionectomy (Left, ); and eye surgery.  Social History   Substance Use Topics   • Smoking status: Former Smoker   • Smokeless tobacco: Never Used   • Alcohol Use: 0.0 oz/week     0 Standard drinks or equivalent per week     Social History     Social History Narrative     Family History   Problem Relation Age of Onset   • Stroke Father    • Arthritis Father    • Hyperlipidemia Father    • Hypertension Father    • Cancer Sister      breast   • Arthritis Sister    • Cancer Daughter      breast   • Arthritis Mother    • Hypertension Mother    • Hyperlipidemia Mother    • Stroke Mother    • Arthritis Brother    • Cancer Brother    • Heart Disease Brother    • Hypertension Brother    • Hyperlipidemia Brother      Family Status   Relation Status Death Age   • Father     • Sister Alive    • Mother       tia   • Brother Alive          ROS    Please see hpi     All other systems reviewed and are negative     Objective:     Blood pressure 162/90, pulse 64, temperature 37.2 °C (99 °F), height 1.575 m (5' 2.01\"), weight 74.299 kg (163 lb 12.8 oz), last menstrual period 1986, SpO2 98 %. Body mass " index is 29.95 kg/(m^2).  Physical Exam:    Constitutional: Alert, no distress.  Skin: Warm, dry, good turgor, no rashes in visible areas.  Eye: Equal, round and reactive, conjunctiva clear, lids normal.  ENMT: Lips without lesions, good dentition, oropharynx clear.  Neck: Trachea midline, no masses, no thyromegaly. No cervical or supraclavicular lymphadenopathy.  Respiratory: Unlabored respiratory effort, lungs clear to auscultation, no wheezes, no ronchi.  Cardiovascular: Normal S1, S2, no murmur, no edema.  Abdomen: Soft, non-tender, no masses, no hepatosplenomegaly.  Psych: Alert and oriented x3, normal affect and mood.          Assessment and Plan:   The following treatment plan was discussed      1. Essential hypertension  Increase losartan to 50 mg by mouth daily  Return to clinic with two-week BP log    - losartan (COZAAR) 50 MG Tab; Take 1 Tab by mouth every day.  Dispense: 60 Tab; Refill: 0    2. Age-related osteoporosis with current pathological fracture, initial encounter    - alendronate (FOSAMAX) 70 MG Tab; Take 1 Tab by mouth every 7 days.  Dispense: 4 Tab; Refill: 0    3. Mild single current episode of major depressive disorder (CMS-HCC)  Increase lexapro to 20mg  RTC in 2wks  All side effects discussed    - escitalopram (LEXAPRO) 20 MG tablet; Take 1 Tab by mouth every day.  Dispense: 60 Tab; Refill: 0    4. Snoring  Clinically suspicious for obstructive sleep apnea  We will need to obtain a polysomnography  We discussed all pathologies related to untreated sleep apnea    - REFERRAL TO PULMONOLOGY    5. Falling  We need physical therapy for balance and coordination exercises  Also needs a home safety evaluation    - REFERRAL TO HOME HEALTH  - REFERRAL TO PHYSICAL THERAPY Reason for Therapy: Eval/Treat/Report        Instructed to Follow up in clinic or ER for worsening symptoms, difficulty breathing, lack of expected recovery, or should new symptoms or problems arise.    Followup: Return in about 2  weeks (around 8/9/2017) for BP Check.       Once again this medical record contains text that has been entered with the assistance of computer voice recognition and dictation software.  Therefore, it may contain unintended errors in text, spelling, punctuation, or grammar

## 2017-07-26 NOTE — ASSESSMENT & PLAN NOTE
Patient states this is a new diagnosis for her. She has never taken medication for osteoporosis. She has not started the vitamin D that we recommended. She is not doing weightbearing exercises. She does not smoke cigarettes.

## 2017-07-26 NOTE — ASSESSMENT & PLAN NOTE
She is having a hard time recently with cervical ablation  She feels like she is not concerned with things as much anymore  She is in a book club, she plays bridge  She enjoys it, not dating anyone yet  She denies any SI, no HI  She does have good support from friends and family

## 2017-07-26 NOTE — TELEPHONE ENCOUNTER
ESTABLISHED PATIENT PRE-VISIT PLANNING     Note: Patient will not be contacted if there is no indication to call.     1.  Reviewed notes from the last few office visits within the medical group: Yes    2.  If any orders were placed at last visit or intended to be done for this visit (i.e. 6 mos follow-up), do we have Results/Consult Notes?        •  Labs - Labs were not ordered at last office visit.       •  Imaging - Imaging ordered, completed and results are in chart.       •  Referrals - No referrals were ordered at last office visit.    3. Is this appointment scheduled as a Hospital Follow-Up? No    4.  Immunizations were updated in Epic using Web[employer]?: Epic matches Web[employer]       •  Web [employer] Recommendations:     5.  Patient is due for the following Health Maintenance Topics:   Health Maintenance Due   Topic Date Due   • Annual Wellness Visit  08/21/2014   • MAMMOGRAM  10/05/2016           6.  Patient was NOT informed to arrive 15 min prior to their scheduled appointment and bring in their medication bottles.

## 2017-07-26 NOTE — MR AVS SNAPSHOT
"        Sheyla Gauthier   2017 4:20 PM   Office Visit   MRN: [id number]    Department:  [address] [place] [address]   Dept Phone:  [phone]    Description:  Female : 1940   Provider:  Jumana Morataya M.D.           Reason for Visit     Other see reason for visit      Allergies as of 2017     Allergen Noted Reactions    Amoxicillin 2016   Rash    Facial rash    Latex 2017   Hives      You were diagnosed with     Essential hypertension   [4758485]       Age-related osteoporosis with current pathological fracture, initial encounter   [794604]       Mild single current episode of major depressive disorder (CMS-HCC)   [1296293]       Snoring   [163990]       Falling   [610826]         Vital Signs     Blood Pressure Pulse Temperature Height Weight Body Mass Index    162/90 mmHg 64 37.2 °C (99 °F) 1.575 m (5' 2.01\") 74.299 kg (163 lb 12.8 oz) 29.95 kg/m2    Oxygen Saturation Last Menstrual Period Smoking Status             98% 1986 Former Smoker         Basic Information     Date Of Birth Sex Race Ethnicity Preferred Language    1940 Female White Non- English      Your appointments     Aug 17, 2017  2:40 PM   Established Patient with Jumana Morataya M.D.   [address] [id number] [phone]           You will be receiving a confirmation call a few days before your appointment from our automated call confirmation system.              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   Unknown - Present    Acquired hypothyroidism E03.9   3/8/2011 - Present    Migraine syndrome G43.909   2013 - Present    Sinusitis " J32.9   12/12/2013 - Present    White matter abnormality on MRI of brain R93.0   2/11/2013 - Present    Spinal stenosis of lumbar region M48.06   11/14/2016 - Present    Weight gain R63.5   11/14/2016 - Present    Prediabetes R73.03   11/14/2016 - Present    Abdominal bloating R14.0   11/14/2016 - Present    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   12/8/2016 - Present    Hx of colonic polyps Z86.010   12/8/2016 - Present    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   12/20/2016 - Present    Postmenopausal Z78.0   6/29/2017 - Present    Visit for screening mammogram Z12.31   6/29/2017 - Present    Major depressive disorder (CMS-HCC) F32.9   6/29/2017 - Present    Need for vaccination Z23   6/29/2017 - Present    Preventative health care Z00.00   6/29/2017 - Present    AK (actinic keratosis) L57.0   6/29/2017 - Present    Age related osteoporosis M81.0   7/26/2017 - Present    Snoring R06.83   7/26/2017 - Present    Falling R29.6   7/26/2017 - Present      Health Maintenance        Date Due Completion Dates    MAMMOGRAM 10/5/2016 10/5/2015, 8/20/2014, 7/18/2013, 7/2/2012, 6/23/2011, 6/20/2011, 6/18/2010, 6/18/2010, 5/23/2005, 5/22/2004    IMM INFLUENZA (1) 9/1/2017 10/25/2016    PAP SMEAR 2/3/2019 2/3/2016, 6/8/2010    BONE DENSITY 7/18/2022 7/18/2017, 6/18/2010    IMM DTaP/Tdap/Td Vaccine (2 - Td) 7/7/2024 7/7/2014    COLONOSCOPY 8/19/2026 8/19/2016, 1/12/2010            Current Immunizations     13-VALENT PCV PREVNAR 12/14/2015    Influenza Vaccine Adult HD 10/25/2016    Pneumococcal polysaccharide vaccine (PPSV-23) 6/29/2017    SHINGLES VACCINE 10/26/2010  3:20 PM    Tdap Vaccine 7/7/2014 10:14 PM      Below and/or attached are the medications your provider expects you to take. Review all of your home medications and newly ordered medications with your provider and/or pharmacist. Follow medication instructions as directed by your provider and/or pharmacist. Please keep your medication list with you and share with your  provider. Update the information when medications are discontinued, doses are changed, or new medications (including over-the-counter products) are added; and carry medication information at all times in the event of emergency situations     Allergies:  AMOXICILLIN - Rash     LATEX - Hives               Medications  Valid as of: July 26, 2017 -  4:49 PM    Generic Name Brand Name Tablet Size Instructions for use    Alendronate Sodium (Tab) FOSAMAX 70 MG Take 1 Tab by mouth every 7 days.        Aspirin (Chew Tab) ASA 81 MG Take 81 mg by mouth every day.        Escitalopram Oxalate (Tab) LEXAPRO 20 MG Take 1 Tab by mouth every day.        Ezetimibe (Tab) ZETIA 10 MG Take 1 Tab by mouth every day.        Levothyroxine Sodium (Tab) SYNTHROID 75 MCG Take 75 mcg by mouth Every morning on an empty stomach.        Losartan Potassium (Tab) COZAAR 50 MG Take 1 Tab by mouth every day.        Magnesium (Tab) Magnesium 400 MG Take  by mouth.        .                 Medicines prescribed today were sent to:     [facility] [address] [place] [address] & [address] [address]    Phone: [phone] Fax: [phone]    Open 24 Hours?: No      Medication refill instructions:       If your prescription bottle indicates you have medication refills left, it is not necessary to call your provider’s office. Please contact your pharmacy and they will refill your medication.    If your prescription bottle indicates you do not have any refills left, you may request refills at any time through one of the following ways: The online [employer] system (except Urgent Care), by calling your provider’s office, or by asking your pharmacy to contact your provider’s office with a refill request. Medication refills are processed only during regular business hours and may not be available until the next business day. Your provider may request additional information or to have a follow-up visit  with you prior to refilling your medication.   *Please Note: Medication refills are assigned a new Rx number when refilled electronically. Your pharmacy may indicate that no refills were authorized even though a new prescription for the same medication is available at the pharmacy. Please request the medicine by name with the pharmacy before contacting your provider for a refill.        Referral     A referral request has been sent to our patient care coordination department. Please allow 3-5 business days for us to process this request and contact you either by phone or mail. If you do not hear from us by the 5th business day, please call us at [phone].           [email or web address] Access Code: Activation code not generated  Current [email or web address] Status: Active

## 2017-07-26 NOTE — ASSESSMENT & PLAN NOTE
Only taking Losartan 25mg daily  The patient has been tollerating the BP meds without any issues. No tunnel vision, no cough, no changes in vision, no lightheadedness, no fatigue, no syncopal or presyncopal episodes, no edema, no new rashes.

## 2017-07-26 NOTE — ASSESSMENT & PLAN NOTE
Daughter from [place] came to visit with granddaughter--  They said she snores like a cartoon character  She has been having difficulty losing weight as well, and blood pressure has been hard to control

## 2017-07-27 ENCOUNTER — HOME HEALTH ADMISSION (OUTPATIENT)
Dept: HOME HEALTH SERVICES | Facility: HOME HEALTHCARE | Age: 77
End: 2017-07-27
Payer: MEDICARE

## 2017-07-28 ENCOUNTER — HOME CARE VISIT (OUTPATIENT)
Dept: HOME HEALTH SERVICES | Facility: HOME HEALTHCARE | Age: 77
End: 2017-07-28

## 2017-07-29 ENCOUNTER — HOME CARE VISIT (OUTPATIENT)
Dept: HOME HEALTH SERVICES | Facility: HOME HEALTHCARE | Age: 77
End: 2017-07-29

## 2017-08-02 ENCOUNTER — OFFICE VISIT (OUTPATIENT)
Dept: MEDICAL GROUP | Age: 77
End: 2017-08-02
Payer: MEDICARE

## 2017-08-02 VITALS
BODY MASS INDEX: 30.43 KG/M2 | OXYGEN SATURATION: 92 % | DIASTOLIC BLOOD PRESSURE: 84 MMHG | TEMPERATURE: 99.3 F | HEART RATE: 73 BPM | SYSTOLIC BLOOD PRESSURE: 156 MMHG | HEIGHT: 61 IN | WEIGHT: 161.2 LBS

## 2017-08-02 DIAGNOSIS — E66.9 OBESITY (BMI 30.0-34.9): ICD-10-CM

## 2017-08-02 DIAGNOSIS — I10 ESSENTIAL HYPERTENSION: ICD-10-CM

## 2017-08-02 DIAGNOSIS — E78.2 MIXED HYPERLIPIDEMIA: ICD-10-CM

## 2017-08-02 DIAGNOSIS — E03.9 ACQUIRED HYPOTHYROIDISM: ICD-10-CM

## 2017-08-02 DIAGNOSIS — R73.01 IFG (IMPAIRED FASTING GLUCOSE): ICD-10-CM

## 2017-08-02 PROCEDURE — 99204 OFFICE O/P NEW MOD 45 MIN: CPT | Performed by: INTERNAL MEDICINE

## 2017-08-02 RX ORDER — LOSARTAN POTASSIUM 50 MG/1
50 TABLET ORAL DAILY
Qty: 90 TAB | Refills: 1 | Status: SHIPPED | OUTPATIENT
Start: 2017-08-02 | End: 2018-06-22 | Stop reason: SDUPTHER

## 2017-08-02 RX ORDER — AMLODIPINE BESYLATE 5 MG/1
5 TABLET ORAL DAILY
Qty: 90 TAB | Refills: 1 | Status: SHIPPED | OUTPATIENT
Start: 2017-08-02 | End: 2018-01-26 | Stop reason: SDUPTHER

## 2017-08-02 ASSESSMENT — ENCOUNTER SYMPTOMS
PSYCHIATRIC NEGATIVE: 1
MUSCULOSKELETAL NEGATIVE: 1
EYES NEGATIVE: 1
CONSTITUTIONAL NEGATIVE: 1
GASTROINTESTINAL NEGATIVE: 1
RESPIRATORY NEGATIVE: 1
HYPERTENSION: 1
HEADACHES: 1
CARDIOVASCULAR NEGATIVE: 1

## 2017-08-03 NOTE — PROGRESS NOTES
Subjective:      Sheyla Gauthier is a 76 y.o. female who presents with Hypertension and Headache   this is a new patient and they have comes in for valuation of BP out of control unable see her PCP today. And also because of worsening headaches she says are different from her usual ones. She recently had an ablation done on her back and has had some residual neck pain from that. Her home BPs are all consistently over 150 systolic. She is instructed to increase her losartan to 50 mg a day which has helped only slightly. She is not sure whether her headaches or due to the losartan or high blood pressure or the chronic neck pain she has status post ablation therapy. She's had no nausea or vomiting or seizures (visual disturbance of gait disturbance or speech disturbance.    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   • 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/08/2016   • Hx of colonic polyps 12/08/2016   • Spinal stenosis of lumbar region 11/14/2016   • Obesity (BMI 30.0-34.9) 11/14/2016   • IFG (impaired fasting glucose) 11/14/2016   • Abdominal bloating 11/14/2016   • Sinusitis 12/12/2013   • Migraine syndrome 02/11/2013   • White matter abnormality on MRI of brain 02/11/2013   • Acquired hypothyroidism 03/08/2011   • HTN (hypertension) 10/21/2009   • Postmenopausal hormone therapy 10/21/2009   • Osteopathia    • Mixed hyperlipidemia    • 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CMS-HCC)      Allergies   Allergen Reactions   • Amoxicillin Rash     Facial rash   • Latex Hives     Outpatient Prescriptions Prior to Visit   Medication Sig Dispense  "Refill   • alendronate (FOSAMAX) 70 MG Tab Take 1 Tab by mouth every 7 days. 4 Tab 0   • escitalopram (LEXAPRO) 20 MG tablet Take 1 Tab by mouth every day. 60 Tab 0   • Magnesium 400 MG Tab Take  by mouth.     • ezetimibe (ZETIA) 10 MG Tab Take 1 Tab by mouth every day. 30 Tab 6   • levothyroxine (SYNTHROID) 75 MCG Tab Take 75 mcg by mouth Every morning on an empty stomach.     • aspirin (BABY ASPIRIN) 81 MG CHEW Take 81 mg by mouth every day.     • losartan (COZAAR) 50 MG Tab Take 1 Tab by mouth every day. 60 Tab 0     No facility-administered medications prior to visit.     The patient is here for followup of chronic medical problems listed below. The patient is compliant with medications and having no side effects from them. Denies chest pain, abdominal pain, dyspnea, myalgias, or cough.             Hypertension  Associated symptoms include headaches.   Headache         Review of Systems   Constitutional: Negative.    Eyes: Negative.    Respiratory: Negative.    Cardiovascular: Negative.    Gastrointestinal: Negative.    Genitourinary: Negative.    Musculoskeletal: Negative.    Skin: Negative.    Neurological: Positive for headaches.   Endo/Heme/Allergies: Negative.    Psychiatric/Behavioral: Negative.           Objective:     /84 mmHg  Pulse 73  Temp(Src) 37.4 °C (99.3 °F)  Ht 1.556 m (5' 1.26\")  Wt 73.12 kg (161 lb 3.2 oz)  BMI 30.20 kg/m2  SpO2 92%  LMP 11/01/1986     Physical Exam   Constitutional: She is oriented to person, place, and time. She appears well-developed and well-nourished.   HENT:   Head: Normocephalic and atraumatic.   Right Ear: External ear normal.   Left Ear: External ear normal.   Nose: Nose normal.   Mouth/Throat: Oropharynx is clear and moist.   Eyes: Conjunctivae and EOM are normal. Pupils are equal, round, and reactive to light. Right eye exhibits no discharge. Left eye exhibits no discharge. No scleral icterus.   Neck: Normal range of motion. Neck supple. No JVD present. " No tracheal deviation present. No thyromegaly present.   Cardiovascular: Normal rate, regular rhythm, normal heart sounds and intact distal pulses.  Exam reveals no gallop and no friction rub.    Pulmonary/Chest: Effort normal and breath sounds normal. No stridor. No respiratory distress. She has no wheezes. She has no rales. She exhibits no tenderness.   Abdominal: Soft. Bowel sounds are normal. She exhibits no distension and no mass. There is no tenderness. There is no rebound and no guarding. No hernia.   Musculoskeletal: Normal range of motion. She exhibits no edema or tenderness.   Lymphadenopathy:     She has no cervical adenopathy.   Neurological: She is alert and oriented to person, place, and time. She has normal reflexes. She displays normal reflexes. No cranial nerve deficit. Coordination normal.   Skin: Skin is warm and dry. No rash noted. No erythema. No pallor.   Psychiatric: She has a normal mood and affect. Her behavior is normal. Judgment and thought content normal.   Nursing note and vitals reviewed.    No visits with results within 1 Month(s) from this visit.  Latest known visit with results is:    Hospital Outpatient Visit on 06/05/2017   Component Date Value   • Cholesterol,Tot 06/05/2017 187    • Triglycerides 06/05/2017 99    • HDL 06/05/2017 54    • LDL 06/05/2017 113*   • Sodium 06/05/2017 142    • Potassium 06/05/2017 4.3    • Chloride 06/05/2017 108    • Co2 06/05/2017 27    • Anion Gap 06/05/2017 7.0    • Glucose 06/05/2017 104*   • Bun 06/05/2017 27*   • Creatinine 06/05/2017 0.84    • Calcium 06/05/2017 9.4    • AST(SGOT) 06/05/2017 15    • ALT(SGPT) 06/05/2017 13    • Alkaline Phosphatase 06/05/2017 69    • Total Bilirubin 06/05/2017 0.6    • Albumin 06/05/2017 4.2    • Total Protein 06/05/2017 7.0    • Globulin 06/05/2017 2.8    • A-G Ratio 06/05/2017 1.5    • GFR If  06/05/2017 >60    • GFR If Non  Ameri* 06/05/2017 >60       Lab Results   Component Value  Date/Time    GLYCOHEMOGLOBIN 5.6 09/23/2016 10:56 AM    GLYCOHEMOGLOBIN 5.8* 02/11/2016 11:53 AM     Lab Results   Component Value Date/Time    SODIUM 142 06/05/2017 09:47 AM    POTASSIUM 4.3 06/05/2017 09:47 AM    CHLORIDE 108 06/05/2017 09:47 AM    CO2 27 06/05/2017 09:47 AM    GLUCOSE 104* 06/05/2017 09:47 AM    BUN 27* 06/05/2017 09:47 AM    CREATININE 0.84 06/05/2017 09:47 AM    ALKALINE PHOSPHATASE 69 06/05/2017 09:47 AM    AST(SGOT) 15 06/05/2017 09:47 AM    ALT(SGPT) 13 06/05/2017 09:47 AM    TOTAL BILIRUBIN 0.6 06/05/2017 09:47 AM     No results found for: INR  Lab Results   Component Value Date/Time    CHOLESTEROL, 06/05/2017 09:47 AM    * 06/05/2017 09:47 AM    HDL 54 06/05/2017 09:47 AM    TRIGLYCERIDES 99 06/05/2017 09:47 AM       No results found for: TESTOSTERONE  No results found for: TSH  Lab Results   Component Value Date/Time    FREE T-4 0.93 01/04/2017 01:10 PM    FREE T-4 0.92 09/23/2016 10:56 AM     Lab Results   Component Value Date/Time    URIC ACID 5.4 11/15/2013 11:42 AM     No components found for: VITB12  Lab Results   Component Value Date/Time    25-HYDROXY   VITAMIN D 25 40 07/22/2016 07:56 AM    25-HYDROXY   VITAMIN D 25 49 09/24/2010 07:27 AM                 Assessment/Plan:     1. Essential hypertension -not at goal. Begin amlodipine 5 mg daily in addition to her losartan 50 mg daily and continue to monitor her BP at home twice a day Humalog. Return in 2 weeks for follow-up with her PCP to discuss any further potential changes. Low-salt diet.       - amlodipine (NORVASC) 5 MG Tab; Take 1 Tab by mouth every day.  Dispense: 90 Tab; Refill: 1  - losartan (COZAAR) 50 MG Tab; Take 1 Tab by mouth every day.  Dispense: 90 Tab; Refill: 1    2. Mixed hyperlipidemiaUnder borderline control. Continue same regimen. Consider further adjustments in therapy once her BPs controlled    3. Acquired hypothyroidismUnder good control. Continue same regimen.       4. Obesity (BMI  30.0-34.9)   diet/exercise/lose 15 lbs.; patient counseled         5. IFG (impaired fasting glucose)   diet/exercise/lose 15 lbs.; patient counseled         45 minute face-to-face encounter took place today.  More than half of this time was spent in the coordination of care of the above problems, as well as counseling.

## 2017-08-08 ENCOUNTER — HOSPITAL ENCOUNTER (OUTPATIENT)
Dept: LAB | Facility: MEDICAL CENTER | Age: 77
End: 2017-08-08
Attending: INTERNAL MEDICINE
Payer: MEDICARE

## 2017-08-08 LAB
EST. AVERAGE GLUCOSE BLD GHB EST-MCNC: 117 MG/DL
HBA1C MFR BLD: 5.7 % (ref 0–5.6)
TSH SERPL DL<=0.005 MIU/L-ACNC: 0.54 UIU/ML (ref 0.3–3.7)

## 2017-08-08 PROCEDURE — 36415 COLL VENOUS BLD VENIPUNCTURE: CPT

## 2017-08-08 PROCEDURE — 84443 ASSAY THYROID STIM HORMONE: CPT

## 2017-08-08 PROCEDURE — 83036 HEMOGLOBIN GLYCOSYLATED A1C: CPT

## 2017-08-16 ENCOUNTER — APPOINTMENT (OUTPATIENT)
Dept: RADIOLOGY | Facility: REHABILITATION | Age: 77
End: 2017-08-16
Attending: PAIN MEDICINE
Payer: MEDICARE

## 2017-08-16 ENCOUNTER — HOSPITAL ENCOUNTER (OUTPATIENT)
Dept: PAIN MANAGEMENT | Facility: REHABILITATION | Age: 77
End: 2017-08-16
Attending: PAIN MEDICINE
Payer: MEDICARE

## 2017-08-16 VITALS
DIASTOLIC BLOOD PRESSURE: 70 MMHG | WEIGHT: 158.73 LBS | BODY MASS INDEX: 29.21 KG/M2 | HEIGHT: 62 IN | HEART RATE: 59 BPM | TEMPERATURE: 98.7 F | RESPIRATION RATE: 16 BRPM | OXYGEN SATURATION: 92 % | SYSTOLIC BLOOD PRESSURE: 121 MMHG

## 2017-08-16 PROCEDURE — 64633 DESTROY CERV/THOR FACET JNT: CPT

## 2017-08-16 PROCEDURE — 700111 HCHG RX REV CODE 636 W/ 250 OVERRIDE (IP)

## 2017-08-16 PROCEDURE — 99153 MOD SED SAME PHYS/QHP EA: CPT

## 2017-08-16 PROCEDURE — 64634 DESTROY C/TH FACET JNT ADDL: CPT

## 2017-08-16 PROCEDURE — 99152 MOD SED SAME PHYS/QHP 5/>YRS: CPT

## 2017-08-16 RX ORDER — MIDAZOLAM HYDROCHLORIDE 1 MG/ML
INJECTION INTRAMUSCULAR; INTRAVENOUS
Status: COMPLETED
Start: 2017-08-16 | End: 2017-08-16

## 2017-08-16 RX ORDER — LIDOCAINE HYDROCHLORIDE 10 MG/ML
INJECTION, SOLUTION EPIDURAL; INFILTRATION; INTRACAUDAL; PERINEURAL
Status: COMPLETED
Start: 2017-08-16 | End: 2017-08-16

## 2017-08-16 RX ADMIN — FENTANYL CITRATE 50 MCG: 50 INJECTION, SOLUTION INTRAMUSCULAR; INTRAVENOUS at 15:57

## 2017-08-16 RX ADMIN — MIDAZOLAM HYDROCHLORIDE 2 MG: 1 INJECTION, SOLUTION INTRAMUSCULAR; INTRAVENOUS at 15:45

## 2017-08-16 RX ADMIN — FENTANYL CITRATE 50 MCG: 50 INJECTION, SOLUTION INTRAMUSCULAR; INTRAVENOUS at 15:54

## 2017-08-16 RX ADMIN — LIDOCAINE HYDROCHLORIDE 15 ML: 10 INJECTION, SOLUTION EPIDURAL; INFILTRATION; INTRACAUDAL; PERINEURAL at 15:45

## 2017-08-16 RX ADMIN — FENTANYL CITRATE 50 MCG: 50 INJECTION, SOLUTION INTRAMUSCULAR; INTRAVENOUS at 15:50

## 2017-08-16 ASSESSMENT — PAIN SCALES - GENERAL
PAINLEVEL_OUTOF10: 0
PAINLEVEL_OUTOF10: 0

## 2017-08-16 NOTE — PROGRESS NOTES
Current meds. See medication reconciliation form. Reviewed with pt.Pt took ASA 81 mg last PM. Pt denies taking other blood thinners or anti-inflammatories. made aware pre-procedure. Pt has a ride post-procedure (friend is ). Printed and verbal discharge instructions given to pt who verbalized understanding.

## 2017-08-16 NOTE — OP REPORT
TON-C5 RF NEUROTOMY    ATTENDING:  Patricio Benitez MD    ASSISTANT:  None    Site:  [facility]     PREOPERATIVE DIAGNOSIS:  Cervical spondylosis with cervical facet arthropathy.    POSTOPERATIVE DIAGNOSIS:  Cervical spondylosis with cervical facet arthropathy.    PROCEDURE PERFORMED:  1. Radiofrequency ablation of the TON medial branch nerve on the left side.  2. Additional levels,  medial branches of the C3, C4 and C5 on the left side.  3. Fluoroscopy for precise needle placement.    ANESTHESIA:  Local infiltration with monitored anesthesia care and conscious sedation.    MONITORS:   Automatic blood pressure cuff and pulse oximetry.    INDICATIONS:  2 consecutive successful blocks, left side.    REVIEW OF SYSTEMS:  Negative for fever, chills, chest pain, SOB, bleeding abnormalities, nausea, vomiting, diarrhea, worsening edema, or new rashes.    FOCUSED PHYSICAL EXAMINATION:  The patient is awake, alert and oriented, and is in no acute distress.  Vital signs are stable.  The patient is afebrile.  The rest of the PE is essentially unchanged from the patient's recent visit to our office.    We explained the procedure to the patient including the risks, benefits and alternatives to the procedure.  The patient verbalized understanding and was willing to proceed.    PROCEDURE IN DETAIL:  An informed consent was obtained.  The patient was taken to the procedure room and was positively identified by the staff and the attending physician.  The patient was positioned Left Lateral on the procedure bed.  Vital signs were monitored as above and remained stable throughout the procedure.  The skin was prepped and draped in the standard sterile fashion.  A surgical pause (time-out) was performed and was agreed upon by the members of the team. A fluoroscopic view of the lumbar spine was obtained, and the area of interest was identified.  The skin was anesthetized using 1% lidocaine and 25-gauge 1-1/2-inch needle.    After that, a  17-gauge -5 cm conventional RF cannula with 2-mm active tip was advanced onto the facet line of C2-C3 lateral masses on the left side.  Full contact with the bone was established.  Additional RF cannulae were placed at the interception of 2 imaginary diagonal lines over the lateral masses of C3, C4 and C5 on the left.  (Each level was stimulated for sensory fibers at 50 Hz, and the patient's pain was reproduced at approximately 1.2 V.)  (The motor fiber recruitment was ruled out by stimulation at 2 Hz in the range between 1.5 to 2.5 V.)  (Only localized twitching of the multifidus muscle was elicited.)  Prior to lesioning, each nerve was anesthetized with 0.5 mL of Lidocaine 1%.  At each level, we then performed one lesion at 60 degrees centigrade, 2.5 minutes in duration.      All needles were withdrawn.  The patient tolerated the procedure well.  The patient was transferred in stable condition to the recovery room.    COMPLICATIONS:  None.    PAIN SCORE BEFORE THE PROCEDURE:     PAIN SCORE AFTER THE PROCEDURE:      DISPOSITON:  1. Discharge to home when the discharge criteria are met.  2. Follow up in two weeks in the Pain Clinic.  3. (The patient will resume their medications.

## 2017-08-17 ENCOUNTER — OFFICE VISIT (OUTPATIENT)
Dept: MEDICAL GROUP | Age: 77
End: 2017-08-17
Payer: MEDICARE

## 2017-08-17 VITALS
HEIGHT: 62 IN | OXYGEN SATURATION: 94 % | SYSTOLIC BLOOD PRESSURE: 136 MMHG | TEMPERATURE: 98.2 F | HEART RATE: 80 BPM | DIASTOLIC BLOOD PRESSURE: 80 MMHG | BODY MASS INDEX: 30.05 KG/M2 | WEIGHT: 163.3 LBS

## 2017-08-17 DIAGNOSIS — I10 ESSENTIAL HYPERTENSION: ICD-10-CM

## 2017-08-17 DIAGNOSIS — L82.0 INFLAMED SEBORRHEIC KERATOSIS: ICD-10-CM

## 2017-08-17 DIAGNOSIS — F32.1 MODERATE SINGLE CURRENT EPISODE OF MAJOR DEPRESSIVE DISORDER (HCC): ICD-10-CM

## 2017-08-17 PROCEDURE — 17000 DESTRUCT PREMALG LESION: CPT | Performed by: FAMILY MEDICINE

## 2017-08-17 PROCEDURE — 99214 OFFICE O/P EST MOD 30 MIN: CPT | Mod: 25 | Performed by: FAMILY MEDICINE

## 2017-08-17 NOTE — ASSESSMENT & PLAN NOTE
Patient has been complaining that these oval lesions have been irritating, flaky, causing discomfort

## 2017-08-17 NOTE — MR AVS SNAPSHOT
"        Sheyla Gauthier   2017 2:40 PM   Office Visit   MRN: [id number]    Department:  [address] [place] [address]   Dept Phone:  [phone]    Description:  Female : 1940   Provider:  Jumana Morataya M.D.           Reason for Visit     Other see reason for visit      Allergies as of 2017     Allergen Noted Reactions    Amoxicillin 2016   Rash    Facial rash    Latex 2017   Hives      You were diagnosed with     Essential hypertension   [0518222]       Moderate single current episode of major depressive disorder (CMS-HCC)   [7601200]         Vital Signs     Blood Pressure Pulse Temperature Height Weight Body Mass Index    136/80 mmHg 80 36.8 °C (98.2 °F) 1.575 m (5' 2.01\") 74.072 kg (163 lb 4.8 oz) 29.86 kg/m2    Oxygen Saturation Last Menstrual Period Smoking Status             94% 1986 Former Smoker         Basic Information     Date Of Birth Sex Race Ethnicity Preferred Language    1940 Female White Non- English      Your appointments     Sep 15, 2017  1:00 PM   Established Patient with Jumana Morataya M.D.   [address] [address] [phone]           You will be receiving a confirmation call a few days before your appointment from our automated call confirmation system.            Oct 26, 2017  2:20 PM   New Patient with Naomi Weathers M.D.   [facility] (--)    [address] [place] [address]  [employer] [place] [address] [phone]           Please bring enclosed paperwork completed along with your insurance card and photo ID.              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 "   Unknown - Present    Acquired hypothyroidism E03.9   3/8/2011 - Present    Migraine syndrome G43.909   2/11/2013 - Present    Sinusitis J32.9   12/12/2013 - Present    White matter abnormality on MRI of brain R93.0   2/11/2013 - Present    Spinal stenosis of lumbar region M48.06   11/14/2016 - Present    Obesity (BMI 30.0-34.9) E66.9   11/14/2016 - Present    IFG (impaired fasting glucose) R73.01   11/14/2016 - Present    Abdominal bloating R14.0   11/14/2016 - Present    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   12/8/2016 - Present    Hx of colonic polyps Z86.010   12/8/2016 - Present    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   12/20/2016 - Present    Postmenopausal Z78.0   6/29/2017 - Present    Visit for screening mammogram Z12.31   6/29/2017 - Present    Major depressive disorder (CMS-HCC) F32.9   6/29/2017 - Present    Need for vaccination Z23   6/29/2017 - Present    Preventative health care Z00.00   6/29/2017 - Present    AK (actinic keratosis) L57.0   6/29/2017 - Present    Age related osteoporosis M81.0   7/26/2017 - Present    Snoring R06.83   7/26/2017 - Present    Falling R29.6   7/26/2017 - Present      Health Maintenance        Date Due Completion Dates    MAMMOGRAM 10/5/2016 10/5/2015, 8/20/2014, 7/18/2013, 7/2/2012, 6/23/2011, 6/20/2011, 6/18/2010, 6/18/2010, 5/23/2005, 5/22/2004    IMM INFLUENZA (1) 9/1/2017 10/25/2016    PAP SMEAR 2/3/2019 2/3/2016, 6/8/2010    BONE DENSITY 7/18/2022 7/18/2017, 6/18/2010    IMM DTaP/Tdap/Td Vaccine (2 - Td) 7/7/2024 7/7/2014    COLONOSCOPY 8/19/2026 8/19/2016, 1/12/2010            Current Immunizations     13-VALENT PCV PREVNAR 12/14/2015    Influenza Vaccine Adult HD 10/25/2016    Pneumococcal polysaccharide vaccine (PPSV-23) 6/29/2017    SHINGLES VACCINE 10/26/2010  3:20 PM    Tdap Vaccine 7/7/2014 10:14 PM      Below and/or attached are the medications your provider expects you to take. Review all of your home medications and newly ordered medications with your  provider and/or pharmacist. Follow medication instructions as directed by your provider and/or pharmacist. Please keep your medication list with you and share with your provider. Update the information when medications are discontinued, doses are changed, or new medications (including over-the-counter products) are added; and carry medication information at all times in the event of emergency situations     Allergies:  AMOXICILLIN - Rash     LATEX - Hives               Medications  Valid as of: August 17, 2017 -  3:00 PM    Generic Name Brand Name Tablet Size Instructions for use    Alendronate Sodium (Tab) FOSAMAX 70 MG Take 1 Tab by mouth every 7 days.        AmLODIPine Besylate (Tab) NORVASC 5 MG Take 1 Tab by mouth every day.        Aspirin (Chew Tab) ASA 81 MG Take 81 mg by mouth every day.        Escitalopram Oxalate (Tab) LEXAPRO 20 MG Take 1 Tab by mouth every day.        Ezetimibe (Tab) ZETIA 10 MG Take 1 Tab by mouth every day.        Levothyroxine Sodium (Tab) SYNTHROID 75 MCG Take 75 mcg by mouth Every morning on an empty stomach.        Losartan Potassium (Tab) COZAAR 50 MG Take 1 Tab by mouth every day.        Magnesium (Tab) Magnesium 400 MG Take  by mouth.        .                 Medicines prescribed today were sent to:     [facility] [address] [place] [address] & [address] [address]    Phone: [phone] Fax: [phone]    Open 24 Hours?: No      Medication refill instructions:       If your prescription bottle indicates you have medication refills left, it is not necessary to call your provider’s office. Please contact your pharmacy and they will refill your medication.    If your prescription bottle indicates you do not have any refills left, you may request refills at any time through one of the following ways: The online Packetzoom system (except Urgent Care), by calling your provider’s office, or by asking your pharmacy to  contact your provider’s office with a refill request. Medication refills are processed only during regular business hours and may not be available until the next business day. Your provider may request additional information or to have a follow-up visit with you prior to refilling your medication.   *Please Note: Medication refills are assigned a new Rx number when refilled electronically. Your pharmacy may indicate that no refills were authorized even though a new prescription for the same medication is available at the pharmacy. Please request the medicine by name with the pharmacy before contacting your provider for a refill.           [employer] Access Code: Activation code not generated  Current [employer] Status: Active

## 2017-08-17 NOTE — ASSESSMENT & PLAN NOTE
She feels fine overall  She fells that she does not feel like herself on the medications  However, she doesn't feel depressed. Overall, she is more energetic  She continues to be social  She denies any suicidal or homicidal ideations.

## 2017-08-17 NOTE — ASSESSMENT & PLAN NOTE
We increase losartan to 50 mg by mouth daily because BP was not ago.  She returns to clinic and amlodipine was added 5 mg. Daily  Her home blood pressure logs were all in normal range.  She is on a statin as well as a baby aspirin.    The patient has been tollerating the BP meds without any issues. No tunnel vision, no cough, no changes in vision, no lightheadedness, no fatigue, no syncopal or presyncopal episodes, no edema, no new rashes.

## 2017-08-17 NOTE — PROGRESS NOTES
This medical record contains text that has been entered with the assistance of computer voice recognition and dictation software.  Therefore, it may contain unintended errors in text, spelling, punctuation, or grammar    Chief Complaint   Patient presents with   • Other     see reason for visit       Sheyla Gauthier is a 76 y.o. female here evaluation and management of: HTN, MDD, ISK      HPI:     Major depressive disorder (CMS-HCC)  She feels fine overall  She fells that she does not feel like herself on the medications  However, she doesn't feel depressed. Overall, she is more energetic  She continues to be social  She denies any suicidal or homicidal ideations.      HTN (Hypertension)  We increase losartan to 50 mg by mouth daily because BP was not ago.  She returns to clinic and amlodipine was added 5 mg. Daily  Her home blood pressure logs were all in normal range.  She is on a statin as well as a baby aspirin.    The patient has been tollerating the BP meds without any issues. No tunnel vision, no cough, no changes in vision, no lightheadedness, no fatigue, no syncopal or presyncopal episodes, no edema, no new rashes.     Inflamed seborrheic keratosis  Patient has been complaining that these oval lesions have been irritating, flaky, causing discomfort        Current medicines (including changes today)  Current Outpatient Prescriptions   Medication Sig Dispense Refill   • amlodipine (NORVASC) 5 MG Tab Take 1 Tab by mouth every day. 90 Tab 1   • losartan (COZAAR) 50 MG Tab Take 1 Tab by mouth every day. 90 Tab 1   • alendronate (FOSAMAX) 70 MG Tab Take 1 Tab by mouth every 7 days. 4 Tab 0   • escitalopram (LEXAPRO) 20 MG tablet Take 1 Tab by mouth every day. 60 Tab 0   • Magnesium 400 MG Tab Take  by mouth.     • ezetimibe (ZETIA) 10 MG Tab Take 1 Tab by mouth every day. 30 Tab 6   • levothyroxine (SYNTHROID) 75 MCG Tab Take 75 mcg by mouth Every morning on an empty stomach.     • aspirin (BABY ASPIRIN) 81 MG  "CHEW Take 81 mg by mouth every day.       No current facility-administered medications for this visit.     She  has a past medical history of HTN (hypertension); Osteopathia; Dyslipidemia; Post-menopause on HRT (hormone replacement therapy); MHA (microangiopathic hemolytic anemia); White matter abnormality on MRI of brain (2013); Migraine syndrome (2013); Herpes zoster (10/27/06); Anxiety; Arthritis; Cataract; Hypertension; Osteoporosis; and Thyroid disease.  She  has past surgical history that includes cholecystectomy (); abdominal hysterectomy total (1986); reduction of large breast (); foot surgery (); enlarge breast with implant; mammoplasty augmentation; bunionectomy (Left, ); and eye surgery.  Social History   Substance Use Topics   • Smoking status: Former Smoker   • Smokeless tobacco: Never Used   • Alcohol Use: 0.0 oz/week     0 Standard drinks or equivalent per week      Comment: occassional     Social History     Social History Narrative     Family History   Problem Relation Age of Onset   • Stroke Father    • Arthritis Father    • Hyperlipidemia Father    • Hypertension Father    • Cancer Sister      breast   • Arthritis Sister    • Cancer Daughter      breast   • Arthritis Mother    • Hypertension Mother    • Hyperlipidemia Mother    • Stroke Mother    • Arthritis Brother    • Cancer Brother    • Heart Disease Brother    • Hypertension Brother    • Hyperlipidemia Brother      Family Status   Relation Status Death Age   • Father     • Sister Alive    • Mother       tia   • Brother Alive          ROS    Please see hpi     All other systems reviewed and are negative     Objective:     Blood pressure 136/80, pulse 80, temperature 36.8 °C (98.2 °F), height 1.575 m (5' 2.01\"), weight 74.072 kg (163 lb 4.8 oz), last menstrual period 1986, SpO2 94 %. Body mass index is 29.86 kg/(m^2).  Physical Exam:        SKIN EXAM  1 well-demarcated, round/oval lesions with " a dull, verrucous surface and a typical stuck-on appearance on back         PROCEDURE: CRYOTHERAPY  Discussed risks and benefits of cryotherapy. Patient verbally agreed. 2  applications of cryotherapy were applied to all lesions1 SK's. Patient tolerated procedure well. Aftercare instructions given.      Eye: Equal, round and reactive, conjunctiva clear, lids normal.  ENMT: Lips without lesions, good dentition, oropharynx clear.  Neck: Trachea midline, no masses, no thyromegaly. No cervical or supraclavicular lymphadenopathy.  Respiratory: Unlabored respiratory effort, lungs clear to auscultation, no wheezes, no ronchi.  Cardiovascular: Normal S1, S2, no murmur, no edema.  Abdomen: Soft, non-tender, no masses, no hepatosplenomegaly.  Psych: Alert and oriented x3, normal affect and mood.          Assessment and Plan:   The following treatment plan was discussed      1. Essential hypertension  Stable with Losartan 50mg and amlodipine 5mg  Continue statin and baby aspirin    2. Moderate single current episode of major depressive disorder (CMS-HCC)  Patient has been stable with current management  We will make no changes for now      3. Inflamed seborrheic keratosis  Patient tollerrated procedure well  There were no adverse events  Patient was given post procedure precautions         HEALTH MAINTENANCE:     Instructed to Follow up in clinic or ER for worsening symptoms, difficulty breathing, lack of expected recovery, or should new symptoms or problems arise.    Followup: Return in about 4 weeks (around 9/14/2017) for punch biopsy.       Once again this medical record contains text that has been entered with the assistance of computer voice recognition and dictation software.  Therefore, it may contain unintended errors in text, spelling, punctuation, or grammar

## 2017-08-20 NOTE — PROCEDURES
Surgeon:   Patricio Benitez MD  Patient name:   Sheyla Gauthier  YOB: 1940  Date Seen:   08/16/2017    Assistants: None    TON-C5 RF NEUROTOMY    ATTENDING:  Patricio Benitez MD    ASSISTANT:  None    Site:  [facility]     PREOPERATIVE DIAGNOSIS:  Cervical spondylosis with cervical facet arthropathy.    POSTOPERATIVE DIAGNOSIS:  Cervical spondylosis with cervical facet arthropathy.    PROCEDURE PERFORMED:  1. Radiofrequency ablation of the TON medial branch nerve on the left side.  2. Additional levels,  medial branches of the C3, C4 and C5 on the left side.  3. Fluoroscopy for precise needle placement.    ANESTHESIA:  Local infiltration with monitored anesthesia care and conscious sedation.    MONITORS:   Automatic blood pressure cuff and pulse oximetry.    INDICATIONS:  2 consecutive successful blocks, left side.    REVIEW OF SYSTEMS:  Negative for fever, chills, chest pain, SOB, bleeding abnormalities, nausea, vomiting, diarrhea, worsening edema, or new rashes.    FOCUSED PHYSICAL EXAMINATION:  The patient is awake, alert and oriented, and is in no acute distress.  Vital signs are stable.  The patient is afebrile.  The rest of the PE is essentially unchanged from the patient's recent visit to our office.    We explained the procedure to the patient including the risks, benefits and alternatives to the procedure.  The patient verbalized understanding and was willing to proceed.    PROCEDURE IN DETAIL:  An informed consent was obtained.  The patient was taken to the procedure room and was positively identified by the staff and the attending physician.  The patient was positioned prone on the procedure bed.  Vital signs were monitored as above and remained stable throughout the procedure.  The skin was prepped and draped in the standard sterile fashion.  A surgical pause (time-out) was performed and was agreed upon by the members of the team. A fluoroscopic view of the lumbar spine was obtained, and the  area of interest was identified.  The skin was anesthetized using 1% lidocaine and 25-gauge 1-1/2-inch needle.    After that, a 20-gauge - 10 cm conventional RF cannula with 2-mm active tip was advanced onto the facet line of C2-C3 lateral masses on the left side.  Full contact with the bone was established.  Additional RF cannulae were placed at the interception of 2 imaginary diagonal lines over the lateral masses of C3, C4 and C5 on the left.  (Each level was stimulated for sensory fibers at 50 Hz, and the patient's pain was reproduced at approximately 1.2 V.)  (The motor fiber recruitment was ruled out by stimulation at 2 Hz in the range between 1.5 to 2.5 V.)  (Only localized twitching of the multifidus muscle was elicited.)  Prior to lesioning, each nerve was anesthetized with 0.5 mL of Lidocaine 1%.  At each level, we then performed one lesion at  80 degrees centigrade, 90 seconds  in duration.      All needles were withdrawn.  The patient tolerated the procedure well.  The patient was transferred in stable condition to the recovery room.    COMPLICATIONS:  None.    PAIN SCORE BEFORE THE PROCEDURE:     PAIN SCORE AFTER THE PROCEDURE:      DISPOSITON:  1. Discharge to home when the discharge criteria are met.  2. Follow up in two weeks in the Pain Clinic.  3. (The patient will resume their medications.

## 2017-08-21 ENCOUNTER — PATIENT MESSAGE (OUTPATIENT)
Dept: MEDICAL GROUP | Age: 77
End: 2017-08-21

## 2017-08-22 RX ORDER — ALENDRONATE SODIUM 70 MG/1
TABLET ORAL
Qty: 4 TAB | Refills: 0 | Status: SHIPPED | OUTPATIENT
Start: 2017-08-22 | End: 2017-09-16 | Stop reason: SDUPTHER

## 2017-08-24 RX ORDER — LOSARTAN POTASSIUM 50 MG/1
TABLET ORAL
Qty: 90 TAB | Refills: 0 | Status: SHIPPED | OUTPATIENT
Start: 2017-08-24 | End: 2018-03-12 | Stop reason: SDUPTHER

## 2017-09-19 RX ORDER — ALENDRONATE SODIUM 70 MG/1
TABLET ORAL
Qty: 12 TAB | Refills: 0 | Status: SHIPPED | OUTPATIENT
Start: 2017-09-19 | End: 2017-12-13 | Stop reason: SDUPTHER

## 2017-09-23 DIAGNOSIS — F32.0 MILD SINGLE CURRENT EPISODE OF MAJOR DEPRESSIVE DISORDER (HCC): ICD-10-CM

## 2017-09-26 RX ORDER — ESCITALOPRAM OXALATE 20 MG/1
TABLET ORAL
Qty: 90 TAB | Refills: 0 | Status: SHIPPED | OUTPATIENT
Start: 2017-09-26 | End: 2018-02-22

## 2017-09-27 ENCOUNTER — HOSPITAL ENCOUNTER (OUTPATIENT)
Dept: LAB | Facility: MEDICAL CENTER | Age: 77
End: 2017-09-27
Attending: OBSTETRICS & GYNECOLOGY
Payer: MEDICARE

## 2017-09-27 LAB
APPEARANCE UR: CLEAR
BILIRUB UR QL STRIP.AUTO: NEGATIVE
COLOR UR: YELLOW
GLUCOSE UR STRIP.AUTO-MCNC: NEGATIVE MG/DL
KETONES UR STRIP.AUTO-MCNC: NEGATIVE MG/DL
LEUKOCYTE ESTERASE UR QL STRIP.AUTO: NEGATIVE
MICRO URNS: NORMAL
NITRITE UR QL STRIP.AUTO: NEGATIVE
PH UR STRIP.AUTO: 5.5 [PH]
PROT UR QL STRIP: NEGATIVE MG/DL
RBC UR QL AUTO: NEGATIVE
SP GR UR STRIP.AUTO: 1.02
UROBILINOGEN UR STRIP.AUTO-MCNC: 0.2 MG/DL

## 2017-09-27 PROCEDURE — 87086 URINE CULTURE/COLONY COUNT: CPT

## 2017-09-27 PROCEDURE — 81003 URINALYSIS AUTO W/O SCOPE: CPT

## 2017-09-29 LAB
BACTERIA UR CULT: NORMAL
SIGNIFICANT IND 70042: NORMAL
SOURCE SOURCE: NORMAL

## 2017-10-25 RX ORDER — EZETIMIBE 10 MG/1
10 TABLET ORAL
Qty: 30 TAB | Refills: 0 | Status: SHIPPED | OUTPATIENT
Start: 2017-10-25 | End: 2017-11-13 | Stop reason: SDUPTHER

## 2017-10-26 ENCOUNTER — SLEEP CENTER VISIT (OUTPATIENT)
Dept: SLEEP MEDICINE | Facility: MEDICAL CENTER | Age: 77
End: 2017-10-26
Payer: MEDICARE

## 2017-10-26 VITALS
DIASTOLIC BLOOD PRESSURE: 70 MMHG | RESPIRATION RATE: 15 BRPM | HEART RATE: 77 BPM | TEMPERATURE: 98.1 F | HEIGHT: 62 IN | WEIGHT: 163 LBS | SYSTOLIC BLOOD PRESSURE: 112 MMHG | OXYGEN SATURATION: 95 % | BODY MASS INDEX: 30 KG/M2

## 2017-10-26 DIAGNOSIS — G47.33 OSA (OBSTRUCTIVE SLEEP APNEA): ICD-10-CM

## 2017-10-26 DIAGNOSIS — I10 ESSENTIAL HYPERTENSION: ICD-10-CM

## 2017-10-26 PROCEDURE — 99203 OFFICE O/P NEW LOW 30 MIN: CPT | Performed by: INTERNAL MEDICINE

## 2017-10-26 NOTE — PROGRESS NOTES
Chief Complaint   Patient presents with   • New Patient     REF BY DR VILLARREAL       HPI: This patient is a 76 y.o. Female who is referred for obstructive sleep apnea evaluation. Her daughter was recently visiting her and noted loud snoring and apneas. The patient herself has been unaware of symptoms however recorded herself at night and was alarmed to hear the extent of snoring. In terms of sleeping habits, she goes to bed by 10 PM, falling asleep within 20 minutes. She denies any significant awakenings, and gets up at 7 AM, frequently with a headache. She awakens feeling tired. Her Campbell sleepiness score is 7. Her BMI is 29. Denies tobacco or alcohol use. She wears a mouth guard for bruxism.  Her brother has JOHN and uses CPAP.      Past Medical History:   Diagnosis Date   • Anxiety    • Arthritis    • Cataract    • Dyslipidemia    • Herpes zoster 10/27/06   • HTN (hypertension)    • Hypertension    • MHA (microangiopathic hemolytic anemia)    • Migraine syndrome 2/11/2013   • Osteopathia    • Osteoporosis    • Post-menopause on HRT (hormone replacement therapy)    • Thyroid disease    • White matter abnormality on MRI of brain 2/11/2013       Social History     Social History   • Marital status:      Spouse name: N/A   • Number of children: N/A   • Years of education: N/A     Occupational History   • Not on file.     Social History Main Topics   • Smoking status: Former Smoker   • Smokeless tobacco: Never Used      Comment: 30 YRS AGO   • Alcohol use 0.0 oz/week      Comment: occassional   • Drug use: No   • Sexual activity: Not on file      Comment: , 3 kids, retired     Other Topics Concern   • Not on file     Social History Narrative   • No narrative on file       Family History   Problem Relation Age of Onset   • Stroke Father    • Arthritis Father    • Hyperlipidemia Father    • Hypertension Father    • Cancer Sister      breast   • Arthritis Sister    • Arthritis Mother    • Hypertension Mother  "   • Hyperlipidemia Mother    • Stroke Mother    • Arthritis Brother    • Cancer Brother    • Heart Disease Brother    • Hypertension Brother    • Hyperlipidemia Brother    • Cancer Daughter      breast       Current Outpatient Prescriptions on File Prior to Visit   Medication Sig Dispense Refill   • ezetimibe (ZETIA) 10 MG Tab Take 1 Tab by mouth every day. 30 Tab 0   • escitalopram (LEXAPRO) 20 MG tablet TAKE 1 TABLET BY MOUTH EVERY DAY 90 Tab 0   • losartan (COZAAR) 50 MG Tab TAKE 1 TABLET BY MOUTH EVERY DAY 90 Tab 0   • amlodipine (NORVASC) 5 MG Tab Take 1 Tab by mouth every day. 90 Tab 1   • Magnesium 400 MG Tab Take  by mouth.     • levothyroxine (SYNTHROID) 75 MCG Tab Take 75 mcg by mouth Every morning on an empty stomach.     • aspirin (BABY ASPIRIN) 81 MG CHEW Take 81 mg by mouth every day.     • alendronate (FOSAMAX) 70 MG Tab TAKE 1 TABLET BY MOUTH EVERY 7 DAYS 12 Tab 0   • losartan (COZAAR) 50 MG Tab Take 1 Tab by mouth every day. 90 Tab 1     No current facility-administered medications on file prior to visit.        Allergies: Amoxicillin and Latex    ROS:   Constitutional: Denies fevers, chills, night sweats, fatigue or weight loss  Eyes: Denies vision loss, pain, drainage, double vision  Ears, Nose, Throat: Denies earache, difficulty hearing, tinnitus, nasal congestion, hoarseness  Cardiovascular: Denies chest pain, tightness, palpitations, orthopnea or edema  Respiratory: Denies shortness of breath, cough, wheezing, hemoptysis  Sleep:As in history of present illness  GI: Denies heartburn, dysphagia, nausea, abdominal pain, diarrhea or constipation  : Denies frequent urination, hematuria, discharge or painful urination  Musculoskeletal: Denies back pain, painful joints, sore muscles  Neurological: Denies weakness or headaches  Skin: No rashes    Blood pressure 112/70, pulse 77, temperature 36.7 °C (98.1 °F), resp. rate 15, height 1.575 m (5' 2\"), weight 73.9 kg (163 lb), last menstrual period " 11/01/1986, SpO2 95 %.    Physical Exam:  Appearance: Well-nourished, well-developed, in no acute distress  HEENT: Normocephalic, atraumatic, white sclera, PERRLA, oropharynx clear, Mallampati 3  Neck: No adenopathy or masses  Respiratory: no intercostal retractions or accessory muscle use  Lungs auscultation: Clear to auscultation bilaterally  Cardiovascular: Regular rate rhythm. No murmurs, rubs or gallops.  No LE edema  Abdomen: soft, nondistended  Gait: Normal  Digits: No clubbing, cyanosis  Motor: No focal deficits  Orientation: Oriented to time, person and place    Diagnosis:  1. JOHN (obstructive sleep apnea)  OVERNIGHT HOME SLEEP STUDY   2. BMI 29.0-29.9,adult     3. Essential hypertension         Plan:  The patient has loud snoring, witnessed apneas, morning headaches and nonrestorative sleep, in the setting of medically significant excessive weight and a crowded airway, with high clinical suspicion for obstructive sleep apnea syndrome. We discussed the pathophysiology of sleep apnea, the correlation of sleep apnea with other medical comorbidities including cardiovascular disease, and benefits of treatment.  We will arrange for home polysomnography for assessment with close follow-up to discuss appropriate treatment options.  Return for after sleep study.

## 2017-11-07 ENCOUNTER — PATIENT OUTREACH (OUTPATIENT)
Dept: HEALTH INFORMATION MANAGEMENT | Facility: OTHER | Age: 77
End: 2017-11-07

## 2017-11-07 NOTE — PROGRESS NOTES
Attempt #:1    WebIZ Checked & Epic Updated: Yes  · WebIZ Recommendations: FLU  · Is patient due for Tdap? NO  · Is patient due for Shingles? NO  [facility] Verified: yes  Verify PCP: yes    Communication Preference Obtained: yes     Review Care Team: yes    Annual Wellness Visit Scheduling  1. Scheduling Status:Scheduled       Care Gap Scheduling (Attempt to Schedule EACH Overdue Care Gap!)     Health Maintenance Due   Topic Date Due   • Annual Wellness Visit  08/21/2014   • IMM INFLUENZA (1) 09/01/2017        Scheduled patient for Annual Wellness Visit and Immunizations: FLU       [employer] Activation: already active  Bizzby Radha: no  Virtual Visits: no  Opt In to Text Messages: no

## 2017-11-13 ENCOUNTER — HOME STUDY (OUTPATIENT)
Dept: SLEEP MEDICINE | Facility: MEDICAL CENTER | Age: 77
End: 2017-11-13
Attending: INTERNAL MEDICINE
Payer: MEDICARE

## 2017-11-13 DIAGNOSIS — G47.33 OSA (OBSTRUCTIVE SLEEP APNEA): ICD-10-CM

## 2017-11-13 PROCEDURE — G0399 HOME SLEEP TEST/TYPE 3 PORTA: HCPCS | Performed by: INTERNAL MEDICINE

## 2017-11-14 RX ORDER — EZETIMIBE 10 MG/1
TABLET ORAL
Qty: 90 TAB | Refills: 0 | Status: SHIPPED | OUTPATIENT
Start: 2017-11-14 | End: 2018-02-22 | Stop reason: SDUPTHER

## 2017-11-29 ENCOUNTER — SLEEP CENTER VISIT (OUTPATIENT)
Dept: SLEEP MEDICINE | Facility: MEDICAL CENTER | Age: 77
End: 2017-11-29
Payer: MEDICARE

## 2017-11-29 VITALS
WEIGHT: 163 LBS | RESPIRATION RATE: 16 BRPM | HEART RATE: 67 BPM | SYSTOLIC BLOOD PRESSURE: 124 MMHG | DIASTOLIC BLOOD PRESSURE: 84 MMHG | HEIGHT: 62 IN | OXYGEN SATURATION: 91 % | BODY MASS INDEX: 30 KG/M2

## 2017-11-29 DIAGNOSIS — G47.33 OBSTRUCTIVE SLEEP APNEA HYPOPNEA, SEVERE: ICD-10-CM

## 2017-11-29 PROCEDURE — 99213 OFFICE O/P EST LOW 20 MIN: CPT | Performed by: INTERNAL MEDICINE

## 2017-11-29 NOTE — PROGRESS NOTES
Sheyla Gauthier is a 77 y.o. female here for obstructive sleep apnea.    History of Present Illness:    The patient's a 77-year-old female who has hypertension and a history of headaches. She has had daytime somnolence associated with snoring she underwent a home sleep study which showed an apnea hypopnea index of 30 and an hour and a low oxygen saturation of 75%.    Constitutional:  Negative for fever, chills, sweats, and fatigue.  Eyes:  Negative for eye pain and visual changes.  HENT:  Negative for tinnitus and hoarse voice.  Cardiovascular:  Negative for chest pain, leg swelling, syncope and orthopnea.  Respiratory:  See HPI for pertinent negatives  Sleep:  Negative for somnolence, loud snoring, sleep disturbance due to breathing, insomnia.  Gastrointestinal:  Negative for dysphagia, nausea and abdominal pain.  Heme/lymph:  Denies easy bruising, blood clots.  Musculoskeletal:  Negative for arthralgias, sore muscles and back pain.  Skin:  Negative for rash and color change.  Neurological:  Negative for headaches, lightheadedness and weakness.  Psychiatric:  Denies depression.    Current Outpatient Prescriptions   Medication Sig Dispense Refill   • ezetimibe (ZETIA) 10 MG Tab TAKE 1 TABLET BY MOUTH EVERY DAY 90 Tab 0   • escitalopram (LEXAPRO) 20 MG tablet TAKE 1 TABLET BY MOUTH EVERY DAY 90 Tab 0   • amlodipine (NORVASC) 5 MG Tab Take 1 Tab by mouth every day. 90 Tab 1   • losartan (COZAAR) 50 MG Tab Take 1 Tab by mouth every day. 90 Tab 1   • Magnesium 400 MG Tab Take  by mouth.     • levothyroxine (SYNTHROID) 75 MCG Tab Take 75 mcg by mouth Every morning on an empty stomach.     • aspirin (BABY ASPIRIN) 81 MG CHEW Take 81 mg by mouth every day.     • alendronate (FOSAMAX) 70 MG Tab TAKE 1 TABLET BY MOUTH EVERY 7 DAYS 12 Tab 0   • losartan (COZAAR) 50 MG Tab TAKE 1 TABLET BY MOUTH EVERY DAY 90 Tab 0     No current facility-administered medications for this visit.        Social History   Substance Use Topics   •  "Smoking status: Former Smoker   • Smokeless tobacco: Never Used      Comment: 30 YRS AGO   • Alcohol use 0.0 oz/week      Comment: occassional       Past Medical History:   Diagnosis Date   • Anxiety    • Arthritis    • Cataract    • Dyslipidemia    • Herpes zoster 10/27/06   • HTN (hypertension)    • Hypertension    • MHA (microangiopathic hemolytic anemia)    • Migraine syndrome 2/11/2013   • Osteopathia    • Osteoporosis    • Post-menopause on HRT (hormone replacement therapy)    • Thyroid disease    • White matter abnormality on MRI of brain 2/11/2013       Past Surgical History:   Procedure Laterality Date   • BUNIONECTOMY Left 2003   • FOOT SURGERY  2002   • PB REDUCTION OF LARGE BREAST  1997   • CHOLECYSTECTOMY  1996   • ABDOMINAL HYSTERECTOMY TOTAL  11/1986   • EYE SURGERY      cataracts   • MAMMOPLASTY AUGMENTATION     • PB ENLARGE BREAST WITH IMPLANT         Allergies:  Amoxicillin and Latex    Family History   Problem Relation Age of Onset   • Stroke Father    • Arthritis Father    • Hyperlipidemia Father    • Hypertension Father    • Cancer Sister      breast   • Arthritis Sister    • Arthritis Mother    • Hypertension Mother    • Hyperlipidemia Mother    • Stroke Mother    • Arthritis Brother    • Cancer Brother    • Heart Disease Brother    • Hypertension Brother    • Hyperlipidemia Brother    • Cancer Daughter      breast       Physical Examination    Vitals:    11/29/17 1029   Height: 1.575 m (5' 2\")   Weight: 73.9 kg (163 lb)   Weight % change since last entry.: 0 %   BP: 124/84   Pulse: 67   BMI (Calculated): 29.81   Resp: 16   O2 sat % room air: 91 %       Physical Exam:  Constitutional:  Well developed and well nourished.  Head:  Normocephalic and atraumatic.  Nose:  Nose normal.  Mouth/Throat:  Oropharynx is clear and moist, no lesions.    Neck:  Normal range of motion.  Supple.  No JVD.  Cardiovascular:  Normal rate, regular rhythm, normal heart sounds. No edema  Pulmonary/Chest: No wheezing, " rales or rhonchi.  Respiratory effort non labored  Musculoskeletal.  No muscular atrophy.  Lymphadenopathy:  No cervical or supraclavicular adenopathy  Neurological:  Alert and oriented.  Cranial nerves intact.  No focal deficits  Skin:  No rashes or ulcers.  Psyciatric:  Normal mood and affect.      Assessment and Plan:  1. Obstructive sleep apnea hypopnea, severe  I have reviewed the home sleep study with the patient. She would like to get started on treatment as soon as possible. For this reason we will start her on AutoPap therapy between 5 and 15 cm of water. We'll plan to see her back in 2-3 months she is doing clinically. I've also ordered an overnight oximetry to be done on CPAP therapy.  - DME CPAP      Followup Return in about 3 months (around 2/28/2018) for follow up visit with MICAELA.

## 2017-12-06 NOTE — PROCEDURES
Interpretation:    This was a home sleep study performed on 11/13/2017 using an apnea link air type III device. The recording duration was 6 hours and 11 minutes, the monitored  time for flow duration was 5 hours and 57 minutes, and the oxygen saturation evaluation duration was 6 hours.    The respiratory event index was elevated at 26.2 consistent with moderate obstructive sleep apnea hypopnea. The patient did not experience any central events. The desaturation index was 26.9 and the patient experienced 162 oxygen desaturations. The oxygen saturation baseline was 91%, the oxygen saturation averages 88%, and the oxygen saturation minimum saturation was 74%. Saturations were less than or equal to 90% for 90% of the recording. The patient spent 3 hours and 20 minutes with saturations less than or equal to 88%. The minimum heart rate was 54 bpm and the maximum heart rate 93 bpm. The patient slept primarily in the supine position. Cheyne-Levin respirations were noted near the end of the recording. Snoring was noted throughout.    Recommendation:    Recommend a positive airway pressure titration.

## 2017-12-12 ENCOUNTER — PATIENT MESSAGE (OUTPATIENT)
Dept: MEDICAL GROUP | Age: 77
End: 2017-12-12

## 2017-12-14 RX ORDER — ALENDRONATE SODIUM 70 MG/1
TABLET ORAL
Qty: 12 TAB | Refills: 0 | Status: SHIPPED | OUTPATIENT
Start: 2017-12-14 | End: 2018-03-05 | Stop reason: SDUPTHER

## 2017-12-19 ENCOUNTER — TELEPHONE (OUTPATIENT)
Dept: MEDICAL GROUP | Age: 77
End: 2017-12-19

## 2017-12-19 NOTE — TELEPHONE ENCOUNTER
ESTABLISHED PATIENT PRE-VISIT PLANNING     Note: Patient will not be contacted if there is no indication to call.     1.  Reviewed notes from the last few office visits within the medical group: Yes    2.  If any orders were placed at last visit or intended to be done for this visit (i.e. 6 mos follow-up), do we have Results/Consult Notes?        •  Labs - Labs were not ordered at last office visit.   Note: If patient appointment is for lab review and patient did not complete labs, check with provider if OK to reschedule patient until labs completed.       •  Imaging - Imaging was not ordered at last office visit.       •  Referrals - No referrals were ordered at last office visit.    3. Is this appointment scheduled as a Hospital Follow-Up? No    4.  Immunizations were updated in Epic using WebIZ?: Epic matches WebIZ       •  Web Iz Recommendations: Patient is up to date on all vaccines    5.  Patient is due for the following Health Maintenance Topics:   Health Maintenance Due   Topic Date Due   • Annual Wellness Visit  08/21/2014       - Patient is up-to-date on all Health Maintenance topics. No records have been requested at this time.    6.  Patient was NOT informed to arrive 15 min prior to their scheduled appointment and bring in their medication bottles.

## 2017-12-20 ENCOUNTER — OFFICE VISIT (OUTPATIENT)
Dept: MEDICAL GROUP | Age: 77
End: 2017-12-20
Payer: MEDICARE

## 2017-12-20 VITALS
TEMPERATURE: 98.6 F | HEART RATE: 86 BPM | BODY MASS INDEX: 30.88 KG/M2 | HEIGHT: 62 IN | SYSTOLIC BLOOD PRESSURE: 120 MMHG | DIASTOLIC BLOOD PRESSURE: 72 MMHG | OXYGEN SATURATION: 95 % | WEIGHT: 167.8 LBS

## 2017-12-20 DIAGNOSIS — M65.4 DE QUERVAIN'S TENOSYNOVITIS, RIGHT: ICD-10-CM

## 2017-12-20 PROCEDURE — 20605 DRAIN/INJ JOINT/BURSA W/O US: CPT | Performed by: FAMILY MEDICINE

## 2017-12-20 RX ORDER — IBUPROFEN 600 MG/1
600 TABLET ORAL EVERY 8 HOURS PRN
Qty: 30 TAB | Refills: 0 | Status: SHIPPED | OUTPATIENT
Start: 2017-12-20 | End: 2018-07-18 | Stop reason: SDUPTHER

## 2017-12-20 RX ORDER — TRIAMCINOLONE ACETONIDE 40 MG/ML
40 INJECTION, SUSPENSION INTRA-ARTICULAR; INTRAMUSCULAR ONCE
Status: COMPLETED | OUTPATIENT
Start: 2017-12-20 | End: 2017-12-20

## 2017-12-20 RX ADMIN — TRIAMCINOLONE ACETONIDE 40 MG: 40 INJECTION, SUSPENSION INTRA-ARTICULAR; INTRAMUSCULAR at 14:50

## 2017-12-20 NOTE — LETTER
[facility]  Darius Denney M.D.  [address] [place] [address] [place] [address]  Fax: [phone]   Authorization for Release/Disclosure of   Protected Health Information   Name: SHEYLA PATEL : 1940 SSN: [id number]   Address: [address] [place] [address] [place] [address] Phone:    [phone] (home)    I authorize the entity listed below to release/disclose the PHI below to:   [facility]/Darius Denney M.D. and Darius Denney M.D.   Provider or Entity Name:  Jose Roberto Nunes M.D.   Address   City, State, [facility]   Phone:      Fax:     Reason for request: continuity of care   Information to be released:    [  ] LAST COLONOSCOPY,  including any PATH REPORT and follow-up  [  ] LAST FIT/COLOGUARD RESULT [  ] LAST DEXA  [ XXXX ] LAST MAMMOGRAM  [  ] LAST PAP  [  ] LAST LABS [  ] RETINA EXAM REPORT  [  ] IMMUNIZATION RECORDS  [  ] Release all info  ( XXXX) 3D      [  ] Check here and initial the line next to each item to release ALL health information INCLUDING  _____ Care and treatment for drug and / or alcohol abuse  _____ HIV testing, infection status, or AIDS  _____ Genetic Testing    DATES OF SERVICE OR TIME PERIOD TO BE DISCLOSED: _____________  I understand and acknowledge that:  * This Authorization may be revoked at any time by you in writing, except if your health information has already been used or disclosed.  * Your health information that will be used or disclosed as a result of you signing this authorization could be re-disclosed by the recipient. If this occurs, your re-disclosed health information may no longer be protected by State or Federal laws.  * You may refuse to sign this Authorization. Your refusal will not affect your ability to obtain treatment.  * This Authorization becomes effective upon signing and will  on (date) __________.      If no date is indicated, this Authorization will  one (1) year from the signature date.    Name: Sheyla  Disha    Signature:   Date:     12/20/2017       PLEASE FAX REQUESTED RECORDS BACK TO: [phone]

## 2017-12-20 NOTE — ASSESSMENT & PLAN NOTE
The patient is a very pleasant 77-year-old female who presents to clinic with a chief complaint of right wrist pain which started about 3 weeks ago. She states that she's been using a wrist brace that she bought at the pharmacy with little improvement. She wants to know if she has gout or arthritis. She denies any weakness in any extremity. She denies any new rashes. She denies any fevers.

## 2017-12-20 NOTE — PROGRESS NOTES
This medical record contains text that has been entered with the assistance of computer voice recognition and dictation software.  Therefore, it may contain unintended errors in text, spelling, punctuation, or grammar    Chief Complaint   Patient presents with   • Other     see reason for visit       Sheyla Gauthier is a 77 y.o. female here evaluation and management of: wrist  pain      HPI:     De Quervain's tenosynovitis, right  The patient is a very pleasant 77-year-old female who presents to clinic with a chief complaint of right wrist pain which started about 3 weeks ago. She states that she's been using a wrist brace that she bought at the pharmacy with little improvement. She wants to know if she has gout or arthritis. She denies any weakness in any extremity. She denies any new rashes. She denies any fevers.    Current medicines (including changes today)  Current Outpatient Prescriptions   Medication Sig Dispense Refill   • Cholecalciferol (VITAMIN D PO) Take  by mouth.     • alendronate (FOSAMAX) 70 MG Tab TAKE 1 TABLET BY MOUTH ONCE EVERY 7 DAYS 12 Tab 0   • ezetimibe (ZETIA) 10 MG Tab TAKE 1 TABLET BY MOUTH EVERY DAY 90 Tab 0   • amlodipine (NORVASC) 5 MG Tab Take 1 Tab by mouth every day. 90 Tab 1   • losartan (COZAAR) 50 MG Tab Take 1 Tab by mouth every day. 90 Tab 1   • Magnesium 400 MG Tab Take  by mouth.     • levothyroxine (SYNTHROID) 75 MCG Tab Take 75 mcg by mouth Every morning on an empty stomach.     • aspirin (BABY ASPIRIN) 81 MG CHEW Take 81 mg by mouth every day.     • escitalopram (LEXAPRO) 20 MG tablet TAKE 1 TABLET BY MOUTH EVERY DAY (Patient not taking: Reported on 12/20/2017) 90 Tab 0   • losartan (COZAAR) 50 MG Tab TAKE 1 TABLET BY MOUTH EVERY DAY 90 Tab 0     No current facility-administered medications for this visit.      She  has a past medical history of Anxiety; Arthritis; Cataract; Dyslipidemia; Herpes zoster (10/27/06); HTN (hypertension); Hypertension; MHA (microangiopathic  "hemolytic anemia); Migraine syndrome (2013); Osteopathia; Osteoporosis; Post-menopause on HRT (hormone replacement therapy); Thyroid disease; and White matter abnormality on MRI of brain (2013).  She  has a past surgical history that includes cholecystectomy (); abdominal hysterectomy total (1986); pr reduction of large breast (); foot surgery (); pr enlarge breast with implant; mammoplasty augmentation; bunionectomy (Left, ); and eye surgery.  Social History   Substance Use Topics   • Smoking status: Former Smoker   • Smokeless tobacco: Never Used      Comment: 30 YRS AGO   • Alcohol use 0.0 oz/week      Comment: occassional     Social History     Social History Narrative   • No narrative on file     Family History   Problem Relation Age of Onset   • Stroke Father    • Arthritis Father    • Hyperlipidemia Father    • Hypertension Father    • Cancer Sister      breast   • Arthritis Sister    • Arthritis Mother    • Hypertension Mother    • Hyperlipidemia Mother    • Stroke Mother    • Arthritis Brother    • Cancer Brother    • Heart Disease Brother    • Hypertension Brother    • Hyperlipidemia Brother    • Cancer Daughter      breast     Family Status   Relation Status   • Father    • Sister Alive   • Mother     tia   • Brother Alive   • Daughter          ROS    Please see hpi     All other systems reviewed and are negative     Objective:     Blood pressure 120/72, pulse 86, temperature 37 °C (98.6 °F), height 1.575 m (5' 2.01\"), weight 76.1 kg (167 lb 12.8 oz), last menstrual period 1986, SpO2 95 %. Body mass index is 30.68 kg/m².  Physical Exam:    Constitutional: Alert, no distress.  Skin: Warm, dry, good turgor, no rashes in visible areas.  Eye: Equal, round and reactive, conjunctiva clear, lids normal.  ENMT: Lips without lesions, good dentition, oropharynx clear.  Neck: Trachea midline, no masses, no thyromegaly. No cervical or supraclavicular " lymphadenopathy.  Respiratory: Unlabored respiratory effort, lungs clear to auscultation, no wheezes, no ronchi.  Cardiovascular: Normal S1, S2, no murmur, no edema.  Abdomen: Soft, non-tender, no masses, no hepatosplenomegaly.  Psych: Alert and oriented x3, normal affect and mood.  Wrist--Positive Finkelstein test, normal squeeze,              Normal flexion/extension, normal ROM,               Negative tinnel, negative phalen    A steroid injection was performed at right wrist using 2% plain Lidocaine and 40 mg of Kenalog. This was well tolerated.          Assessment and Plan:   The following treatment plan was discussed      1. De Quervain's tenosynovitis, right    Patient was instructed on activity modification ×2 weeks  Use the brace that  was given in clinic for 2 weeks with activity  NSAIDs when necessary  Ice when necessary and compression    Patient tollerrated procedure well  There were no adverse events  Patient was given post procedure precautions     - triamcinolone acetonide (KENALOG-40) injection 40 mg; 1 mL by Intramuscular route Once.        HEALTH MAINTENANCE:    Instructed to Follow up in clinic or ER for worsening symptoms, difficulty breathing, lack of expected recovery, or should new symptoms or problems arise.    Followup: Return in about 2 weeks (around 1/3/2018) for Reevaluation.       Once again this medical record contains text that has been entered with the assistance of computer voice recognition and dictation software.  Therefore, it may contain unintended errors in text, spelling, punctuation, or grammar

## 2018-01-04 ENCOUNTER — HOSPITAL ENCOUNTER (OUTPATIENT)
Dept: LAB | Facility: MEDICAL CENTER | Age: 78
End: 2018-01-04
Attending: EMERGENCY MEDICINE
Payer: MEDICARE

## 2018-01-04 LAB
25(OH)D3 SERPL-MCNC: 34 NG/ML (ref 30–100)
ALBUMIN SERPL BCP-MCNC: 3.9 G/DL (ref 3.2–4.9)
ALBUMIN/GLOB SERPL: 1.4 G/DL
ALP SERPL-CCNC: 37 U/L (ref 30–99)
ALT SERPL-CCNC: 15 U/L (ref 2–50)
ANION GAP SERPL CALC-SCNC: 6 MMOL/L (ref 0–11.9)
APPEARANCE UR: ABNORMAL
AST SERPL-CCNC: 14 U/L (ref 12–45)
BACTERIA #/AREA URNS HPF: ABNORMAL /HPF
BASOPHILS # BLD AUTO: 1.1 % (ref 0–1.8)
BASOPHILS # BLD: 0.05 K/UL (ref 0–0.12)
BILIRUB SERPL-MCNC: 0.6 MG/DL (ref 0.1–1.5)
BILIRUB UR QL STRIP.AUTO: NEGATIVE
BUN SERPL-MCNC: 23 MG/DL (ref 8–22)
CALCIUM SERPL-MCNC: 9.3 MG/DL (ref 8.5–10.5)
CHLORIDE SERPL-SCNC: 111 MMOL/L (ref 96–112)
CHOLEST SERPL-MCNC: 209 MG/DL (ref 100–199)
CO2 SERPL-SCNC: 23 MMOL/L (ref 20–33)
COLOR UR: YELLOW
CORTIS SERPL-MCNC: 7.6 UG/DL (ref 0–23)
CREAT SERPL-MCNC: 0.59 MG/DL (ref 0.5–1.4)
CRP SERPL HS-MCNC: 1.5 MG/L (ref 0–7.5)
DHEA-S SERPL-MCNC: 5.6 UG/DL (ref 12–154)
EOSINOPHIL # BLD AUTO: 0.13 K/UL (ref 0–0.51)
EOSINOPHIL NFR BLD: 2.8 % (ref 0–6.9)
EPI CELLS #/AREA URNS HPF: NEGATIVE /HPF
ERYTHROCYTE [DISTWIDTH] IN BLOOD BY AUTOMATED COUNT: 43.7 FL (ref 35.9–50)
EST. AVERAGE GLUCOSE BLD GHB EST-MCNC: 111 MG/DL
ESTRADIOL SERPL-MCNC: <20 PG/ML
FSH SERPL-ACNC: 95.3 MIU/ML
GFR SERPL CREATININE-BSD FRML MDRD: >60 ML/MIN/1.73 M 2
GGT SERPL-CCNC: 24 U/L (ref 7–34)
GLOBULIN SER CALC-MCNC: 2.7 G/DL (ref 1.9–3.5)
GLUCOSE SERPL-MCNC: 90 MG/DL (ref 65–99)
GLUCOSE UR STRIP.AUTO-MCNC: NEGATIVE MG/DL
HBA1C MFR BLD: 5.5 % (ref 0–5.6)
HCT VFR BLD AUTO: 42.9 % (ref 37–47)
HCYS SERPL-SCNC: 12.41 UMOL/L
HDLC SERPL-MCNC: 70 MG/DL
HGB BLD-MCNC: 14.1 G/DL (ref 12–16)
HYALINE CASTS #/AREA URNS LPF: ABNORMAL /LPF
IMM GRANULOCYTES # BLD AUTO: 0.02 K/UL (ref 0–0.11)
IMM GRANULOCYTES NFR BLD AUTO: 0.4 % (ref 0–0.9)
KETONES UR STRIP.AUTO-MCNC: NEGATIVE MG/DL
LDH SERPL L TO P-CCNC: 163 U/L (ref 107–266)
LDLC SERPL CALC-MCNC: 122 MG/DL
LEUKOCYTE ESTERASE UR QL STRIP.AUTO: ABNORMAL
LH SERPL-ACNC: 29 IU/L
LYMPHOCYTES # BLD AUTO: 1.32 K/UL (ref 1–4.8)
LYMPHOCYTES NFR BLD: 28.6 % (ref 22–41)
MCH RBC QN AUTO: 29.3 PG (ref 27–33)
MCHC RBC AUTO-ENTMCNC: 32.9 G/DL (ref 33.6–35)
MCV RBC AUTO: 89 FL (ref 81.4–97.8)
MICRO URNS: ABNORMAL
MONOCYTES # BLD AUTO: 0.33 K/UL (ref 0–0.85)
MONOCYTES NFR BLD AUTO: 7.2 % (ref 0–13.4)
NEUTROPHILS # BLD AUTO: 2.76 K/UL (ref 2–7.15)
NEUTROPHILS NFR BLD: 59.9 % (ref 44–72)
NITRITE UR QL STRIP.AUTO: POSITIVE
NRBC # BLD AUTO: 0 K/UL
NRBC BLD-RTO: 0 /100 WBC
PH UR STRIP.AUTO: 5.5 [PH]
PHOSPHATE SERPL-MCNC: 2.5 MG/DL (ref 2.5–4.5)
PLATELET # BLD AUTO: 248 K/UL (ref 164–446)
PMV BLD AUTO: 10.4 FL (ref 9–12.9)
POTASSIUM SERPL-SCNC: 4 MMOL/L (ref 3.6–5.5)
PROGEST SERPL-MCNC: 0.15 NG/ML
PROT SERPL-MCNC: 6.6 G/DL (ref 6–8.2)
PROT UR QL STRIP: NEGATIVE MG/DL
RBC # BLD AUTO: 4.82 M/UL (ref 4.2–5.4)
RBC # URNS HPF: ABNORMAL /HPF
RBC UR QL AUTO: NEGATIVE
SODIUM SERPL-SCNC: 140 MMOL/L (ref 135–145)
SP GR UR STRIP.AUTO: 1.01
T3FREE SERPL-MCNC: 2.83 PG/ML (ref 2.4–4.2)
T4 FREE SERPL-MCNC: 1.12 NG/DL (ref 0.53–1.43)
TRIGL SERPL-MCNC: 87 MG/DL (ref 0–149)
TSH SERPL DL<=0.005 MIU/L-ACNC: 0.87 UIU/ML (ref 0.38–5.33)
URATE SERPL-MCNC: 4.4 MG/DL (ref 1.9–8.2)
UROBILINOGEN UR STRIP.AUTO-MCNC: 0.2 MG/DL
WBC # BLD AUTO: 4.6 K/UL (ref 4.8–10.8)
WBC #/AREA URNS HPF: ABNORMAL /HPF

## 2018-01-04 PROCEDURE — 83036 HEMOGLOBIN GLYCOSYLATED A1C: CPT | Mod: GA

## 2018-01-04 PROCEDURE — 84305 ASSAY OF SOMATOMEDIN: CPT

## 2018-01-04 PROCEDURE — 83525 ASSAY OF INSULIN: CPT

## 2018-01-04 PROCEDURE — 81001 URINALYSIS AUTO W/SCOPE: CPT

## 2018-01-04 PROCEDURE — 80061 LIPID PANEL: CPT | Mod: GA

## 2018-01-04 PROCEDURE — 83615 LACTATE (LD) (LDH) ENZYME: CPT

## 2018-01-04 PROCEDURE — 83001 ASSAY OF GONADOTROPIN (FSH): CPT

## 2018-01-04 PROCEDURE — 83002 ASSAY OF GONADOTROPIN (LH): CPT

## 2018-01-04 PROCEDURE — 84144 ASSAY OF PROGESTERONE: CPT

## 2018-01-04 PROCEDURE — 83090 ASSAY OF HOMOCYSTEINE: CPT | Mod: GA

## 2018-01-04 PROCEDURE — 80053 COMPREHEN METABOLIC PANEL: CPT

## 2018-01-04 PROCEDURE — 85025 COMPLETE CBC W/AUTO DIFF WBC: CPT

## 2018-01-04 PROCEDURE — 84439 ASSAY OF FREE THYROXINE: CPT

## 2018-01-04 PROCEDURE — 84481 FREE ASSAY (FT-3): CPT

## 2018-01-04 PROCEDURE — 84550 ASSAY OF BLOOD/URIC ACID: CPT

## 2018-01-04 PROCEDURE — 82670 ASSAY OF TOTAL ESTRADIOL: CPT

## 2018-01-04 PROCEDURE — 84100 ASSAY OF PHOSPHORUS: CPT

## 2018-01-04 PROCEDURE — 82627 DEHYDROEPIANDROSTERONE: CPT

## 2018-01-04 PROCEDURE — 82977 ASSAY OF GGT: CPT | Mod: GA

## 2018-01-04 PROCEDURE — 82533 TOTAL CORTISOL: CPT

## 2018-01-04 PROCEDURE — 84403 ASSAY OF TOTAL TESTOSTERONE: CPT

## 2018-01-04 PROCEDURE — 84270 ASSAY OF SEX HORMONE GLOBUL: CPT

## 2018-01-04 PROCEDURE — 82306 VITAMIN D 25 HYDROXY: CPT | Mod: GA

## 2018-01-04 PROCEDURE — 36415 COLL VENOUS BLD VENIPUNCTURE: CPT

## 2018-01-04 PROCEDURE — 84443 ASSAY THYROID STIM HORMONE: CPT

## 2018-01-04 PROCEDURE — 86141 C-REACTIVE PROTEIN HS: CPT | Mod: GA

## 2018-01-05 LAB
IGF-I SERPL-MCNC: 138 NG/ML (ref 20–216)
IGF-I Z-SCORE SERPL: 0.9

## 2018-01-06 LAB — INSULIN P FAST SERPL-ACNC: 10 UIU/ML (ref 3–19)

## 2018-01-07 LAB
SHBG SERPL-SCNC: 40 NMOL/L (ref 30–135)
TESTOST FREE SERPL-MCNC: 2.8 PG/ML (ref 0.6–3.8)
TESTOST SERPL-MCNC: 19 NG/DL (ref 5–32)

## 2018-01-26 DIAGNOSIS — I10 ESSENTIAL HYPERTENSION: ICD-10-CM

## 2018-01-26 RX ORDER — AMLODIPINE BESYLATE 5 MG/1
TABLET ORAL
Qty: 90 TAB | Refills: 0 | Status: SHIPPED | OUTPATIENT
Start: 2018-01-26 | End: 2018-04-30 | Stop reason: SDUPTHER

## 2018-02-01 ENCOUNTER — TELEPHONE (OUTPATIENT)
Dept: PULMONOLOGY | Facility: HOSPICE | Age: 78
End: 2018-02-01

## 2018-02-01 DIAGNOSIS — G47.33 OSA (OBSTRUCTIVE SLEEP APNEA): ICD-10-CM

## 2018-02-01 NOTE — TELEPHONE ENCOUNTER
Patient called and states the pressure is way to high for her to sleep.  She states it starts out that way immediately and she is unable to sleep at all and finds it very hard to use the machine.  Patient was set up on [employer] 5-15    Please advise    [place] patient

## 2018-02-22 ENCOUNTER — SLEEP CENTER VISIT (OUTPATIENT)
Dept: SLEEP MEDICINE | Facility: MEDICAL CENTER | Age: 78
End: 2018-02-22
Payer: MEDICARE

## 2018-02-22 VITALS
DIASTOLIC BLOOD PRESSURE: 70 MMHG | HEART RATE: 70 BPM | HEIGHT: 62 IN | WEIGHT: 167 LBS | RESPIRATION RATE: 15 BRPM | BODY MASS INDEX: 30.73 KG/M2 | OXYGEN SATURATION: 94 % | SYSTOLIC BLOOD PRESSURE: 117 MMHG

## 2018-02-22 DIAGNOSIS — G47.33 OSA (OBSTRUCTIVE SLEEP APNEA): ICD-10-CM

## 2018-02-22 PROCEDURE — 99213 OFFICE O/P EST LOW 20 MIN: CPT | Performed by: NURSE PRACTITIONER

## 2018-02-22 NOTE — PROGRESS NOTES
Chief Complaint   Patient presents with   • Follow-Up     3 M          HPI: This patient is a 77 y.o. female, who presents for follow-up of obstructive sleep apnea with first compliance check.     She was last seen with Dr. Ramirez in November, at which time CPAP was ordered.    Sleep symptoms included daytime somnolence, loud snoring, a.m. headache. Home sleep study indicates severe obstructive sleep apnea with an AHI of 30, minimum oxygen saturation of 75 %. She was initiated on auto CPAP 5-10 cm H2O. Overnight oximetry on auto CPAP showed adequate nocturnal saturation. Compliance download over the past 30 days indicates 77 % compliance, average use of 6 hours 58 minutes per night, AHI of 0.6. Patient reports she has been intolerant to CPAP. She reports her sleep is more disturbed now than it was before. She reports worsening fatigue. She's tried several different masks. Mask continues to leak. She would like to discuss alternative options.    Past Medical History:   Diagnosis Date   • Anxiety    • Arthritis    • Cataract    • Dyslipidemia    • Herpes zoster 10/27/06   • HTN (hypertension)    • Hypertension    • MHA (microangiopathic hemolytic anemia)    • Migraine syndrome 2/11/2013   • Osteopathia    • Osteoporosis    • Post-menopause on HRT (hormone replacement therapy)    • Thyroid disease    • White matter abnormality on MRI of brain 2/11/2013       Social History   Substance Use Topics   • Smoking status: Former Smoker   • Smokeless tobacco: Never Used      Comment: 30 YRS AGO   • Alcohol use 0.0 oz/week      Comment: occassional       Family History   Problem Relation Age of Onset   • Stroke Father    • Arthritis Father    • Hyperlipidemia Father    • Hypertension Father    • Cancer Sister      breast   • Arthritis Sister    • Arthritis Mother    • Hypertension Mother    • Hyperlipidemia Mother    • Stroke Mother    • Arthritis Brother    • Cancer Brother    • Heart Disease Brother    • Hypertension Brother   "  • Hyperlipidemia Brother    • Cancer Daughter      breast       Current medications as of today   Current Outpatient Prescriptions   Medication Sig Dispense Refill   • amlodipine (NORVASC) 5 MG Tab TAKE 1 TABLET BY MOUTH EVERY DAY 90 Tab 0   • Cholecalciferol (VITAMIN D PO) Take  by mouth.     • ibuprofen (MOTRIN) 600 MG Tab Take 1 Tab by mouth every 8 hours as needed. 30 Tab 0   • alendronate (FOSAMAX) 70 MG Tab TAKE 1 TABLET BY MOUTH ONCE EVERY 7 DAYS 12 Tab 0   • ezetimibe (ZETIA) 10 MG Tab TAKE 1 TABLET BY MOUTH EVERY DAY 90 Tab 0   • losartan (COZAAR) 50 MG Tab TAKE 1 TABLET BY MOUTH EVERY DAY 90 Tab 0   • losartan (COZAAR) 50 MG Tab Take 1 Tab by mouth every day. 90 Tab 1   • Magnesium 400 MG Tab Take  by mouth.     • levothyroxine (SYNTHROID) 75 MCG Tab Take 75 mcg by mouth Every morning on an empty stomach.     • aspirin (BABY ASPIRIN) 81 MG CHEW Take 81 mg by mouth every day.       No current facility-administered medications for this visit.        Allergies: Amoxicillin and Latex    Blood pressure 117/70, pulse 70, resp. rate 15, height 1.575 m (5' 2\"), weight 75.8 kg (167 lb), last menstrual period 11/01/1986, SpO2 94 %.      ROS:   Constitutional: Denies fevers, chills, night sweats, weight loss. Positive fatigue.  Eyes: Denies pain, discharge/drainage  ENT: Denies hearing loss, sinusitis, hoarseness.  Allergic: Denies Allergic rhinitis or hayfever  Respiratory: Denies cough, wheeze, dyspnea, hemoptysis  Cardiovascular: Denies chest pain, tightness, palpitations, orthopnea or edema  Sleep: See HPI  GI/: Denies abdominal pain, heartburn, nausea, vomiting  Musculoskeletal: Denies back pain, painful joints, sore muscles  Neurological: Denies vertigo or headaches  Skin: Denies rashes, lesions  Psychiatric: Denies depression or anxiety    Physical exam:   Constitutional: Well-nourished, well-developed, in no acute distress  Eyes: PERRL  Neck: supple, trachea midline  Respiratory: no intercostal " retractions or accessory muscle use   Lungs auscultation: Clear to auscultation bilaterally  Cardiovascular: Regular rate rhythm no murmurs, rubs or gallops  Musculoskeletal: no clubbing or cyanosis  Skin: No rashes or lesions noted on exposed skin  Neuro: No focal deficit noted  Psychiatric: Oriented to time, person and place.     Diagnosis:  1. JOHN (obstructive sleep apnea)  REFERRAL TO OTHER   2. BMI 30.0-30.9,adult         Plan:    I reviewed with the patient the pathophysiology of obstructive sleep apnea, as well as potential cardiac and neurologic risks associated with untreated sleep apnea. We reviewed treatment options including CPAP/BiPAP therapy, ENT referral, dental appliance. We also discussed referral for consultation for new inspire implantable device for JOHN. Patient would be a candidate for this. Would like to pursue this option and dental appliance. Will refer to Dr. Elizabeth in [place] for dental appliance and [facility] for evaluation for inspire device. Patient would like to return her machine to [facility].

## 2018-02-23 RX ORDER — EZETIMIBE 10 MG/1
TABLET ORAL
Qty: 90 TAB | Refills: 0 | Status: SHIPPED | OUTPATIENT
Start: 2018-02-23 | End: 2018-03-07

## 2018-03-06 RX ORDER — ALENDRONATE SODIUM 70 MG/1
TABLET ORAL
Qty: 12 TAB | Refills: 0 | Status: SHIPPED | OUTPATIENT
Start: 2018-03-06 | End: 2018-05-28 | Stop reason: SDUPTHER

## 2018-03-07 ENCOUNTER — OFFICE VISIT (OUTPATIENT)
Dept: MEDICAL GROUP | Age: 78
End: 2018-03-07
Payer: MEDICARE

## 2018-03-07 VITALS
OXYGEN SATURATION: 98 % | DIASTOLIC BLOOD PRESSURE: 70 MMHG | RESPIRATION RATE: 14 BRPM | WEIGHT: 171.4 LBS | HEART RATE: 78 BPM | HEIGHT: 62 IN | BODY MASS INDEX: 31.54 KG/M2 | SYSTOLIC BLOOD PRESSURE: 120 MMHG | TEMPERATURE: 98 F

## 2018-03-07 DIAGNOSIS — G47.33 OSA (OBSTRUCTIVE SLEEP APNEA): ICD-10-CM

## 2018-03-07 DIAGNOSIS — E78.2 MIXED HYPERLIPIDEMIA: ICD-10-CM

## 2018-03-07 DIAGNOSIS — N81.10 BLADDER PROLAPSE, FEMALE, ACQUIRED: ICD-10-CM

## 2018-03-07 DIAGNOSIS — E66.9 OBESITY (BMI 30-39.9): ICD-10-CM

## 2018-03-07 DIAGNOSIS — E78.00 PURE HYPERCHOLESTEROLEMIA: ICD-10-CM

## 2018-03-07 DIAGNOSIS — I10 ESSENTIAL HYPERTENSION: ICD-10-CM

## 2018-03-07 PROCEDURE — 99214 OFFICE O/P EST MOD 30 MIN: CPT | Performed by: FAMILY MEDICINE

## 2018-03-07 RX ORDER — PRAVASTATIN SODIUM 40 MG
40 TABLET ORAL
Qty: 90 TAB | Refills: 1 | Status: SHIPPED | OUTPATIENT
Start: 2018-03-07 | End: 2018-08-28 | Stop reason: SDUPTHER

## 2018-03-07 ASSESSMENT — PAIN SCALES - GENERAL: PAINLEVEL: NO PAIN

## 2018-03-08 NOTE — PROGRESS NOTES
This medical record contains text that has been entered with the assistance of computer voice recognition and dictation software.  Therefore, it may contain unintended errors in text, spelling, punctuation, or grammar    Chief Complaint   Patient presents with   • Follow-Up     sleep apnea       Sheyla Gauthier is a 77 y.o. female here evaluation and management of: lei, bladder prolapse, hld, htn      HPI:     LEI (obstructive sleep apnea)  They are considering a  dental mouthpiece since she could not tollerrate any masks. She experienced 30 apneic episodes during her sleep study.    Bladder prolapse, female, acquired      Pessary placed  Her symptoms have improved decreases urgency and fullness     Mixed hyperlipidemia  The patient states that after she received her calcium score she would like to start another statin and stop Zetia.      Results for SHEYLA GAUTHIER (MRN [id number]) as of 3/8/2018 07:46   Ref. Range 1/4/2018 09:48   Cholesterol,Tot Latest Ref Range: 100 - 199 mg/dL 209 (H)   Triglycerides Latest Ref Range: 0 - 149 mg/dL 87   HDL Latest Ref Range: >=40 mg/dL 70   LDL Latest Ref Range: <100 mg/dL 122 (H)         HTN (Hypertension)  Losartan 50 mg by mouth daily  Amlodipine 5 mg by mouth daily  She is on a baby aspirin but not taking a statin.    The patient has been tollerating the BP meds without any issues. No tunnel vision, no cough, no changes in vision, no lightheadedness, no fatigue, no syncopal or presyncopal episodes, no edema, no new rashes.       Current medicines (including changes today)  Current Outpatient Prescriptions   Medication Sig Dispense Refill   • pravastatin (PRAVACHOL) 40 MG tablet Take 1 Tab by mouth every bedtime. 90 Tab 1   • alendronate (FOSAMAX) 70 MG Tab TAKE 1 TABLET BY MOUTH 1 TIME EVERY 7 DAYS 12 Tab 0   • amlodipine (NORVASC) 5 MG Tab TAKE 1 TABLET BY MOUTH EVERY DAY 90 Tab 0   • Cholecalciferol (VITAMIN D PO) Take  by mouth.     • ibuprofen (MOTRIN) 600 MG Tab Take 1  Tab by mouth every 8 hours as needed. 30 Tab 0   • losartan (COZAAR) 50 MG Tab TAKE 1 TABLET BY MOUTH EVERY DAY 90 Tab 0   • losartan (COZAAR) 50 MG Tab Take 1 Tab by mouth every day. 90 Tab 1   • Magnesium 400 MG Tab Take  by mouth.     • levothyroxine (SYNTHROID) 75 MCG Tab Take 75 mcg by mouth Every morning on an empty stomach.     • aspirin (BABY ASPIRIN) 81 MG CHEW Take 81 mg by mouth every day.       No current facility-administered medications for this visit.      She  has a past medical history of Anxiety; Arthritis; Cataract; Dyslipidemia; Herpes zoster (10/27/06); HTN (hypertension); Hypertension; MHA (microangiopathic hemolytic anemia); Migraine syndrome (2013); Osteopathia; Osteoporosis; Post-menopause on HRT (hormone replacement therapy); Thyroid disease; and White matter abnormality on MRI of brain (2013).  She  has a past surgical history that includes cholecystectomy (); abdominal hysterectomy total (1986); pr reduction of large breast (); foot surgery (); pr enlarge breast with implant; mammoplasty augmentation; bunionectomy (Left, ); and eye surgery.  Social History   Substance Use Topics   • Smoking status: Former Smoker   • Smokeless tobacco: Never Used      Comment: 30 YRS AGO   • Alcohol use 0.0 oz/week      Comment: occassional     Social History     Social History Narrative   • No narrative on file     Family History   Problem Relation Age of Onset   • Stroke Father    • Arthritis Father    • Hyperlipidemia Father    • Hypertension Father    • Cancer Sister      breast   • Arthritis Sister    • Arthritis Mother    • Hypertension Mother    • Hyperlipidemia Mother    • Stroke Mother    • Arthritis Brother    • Cancer Brother    • Heart Disease Brother    • Hypertension Brother    • Hyperlipidemia Brother    • Cancer Daughter      breast     Family Status   Relation Status   • Father    • Sister Alive   • Mother     tia   • Brother Alive   • Daughter   "        ROS    Please see hpi     All other systems reviewed and are negative     Objective:     Blood pressure 120/70, pulse 78, temperature 36.7 °C (98 °F), resp. rate 14, height 1.575 m (5' 2\"), weight 77.7 kg (171 lb 6.4 oz), last menstrual period 11/01/1986, SpO2 98 %. Body mass index is 31.35 kg/m².  Physical Exam:    Constitutional: Alert, no distress.  Skin: Warm, dry, good turgor, no rashes in visible areas.  Eye: Equal, round and reactive, conjunctiva clear, lids normal.  ENMT: Lips without lesions, good dentition, oropharynx clear.  Neck: Trachea midline, no masses, no thyromegaly. No cervical or supraclavicular lymphadenopathy.  Respiratory: Unlabored respiratory effort, lungs clear to auscultation, no wheezes, no ronchi.  Cardiovascular: Normal S1, S2, no murmur, no edema.  Abdomen: Soft, non-tender, no masses, no hepatosplenomegaly.  Psych: Alert and oriented x3, normal affect and mood.          Assessment and Plan:   The following treatment plan was discussed      1. JOHN (obstructive sleep apnea)    We discussed all pathologies related to untreated sleep apnea  Including but not limited to pulmonary hypertension, stroke, cardiovascular disease, difficulty losing weight, headaches, daytime somnolence, etc...      2. Essential hypertension  Patient has been stable with current management  We will make no changes for now      3. Obesity (BMI 30-39.9)    - REFERRAL TO [facility] IMPROVEMENT PROGRAMS (HIP) Services Requested: Registered Dietitian for Medical Nutrition Therapy; Reason for Visit: Overweight/Obesity, Medical Condition Requiring Nutrition Counseling    4. Bladder prolapse, female, acquired  Symptoms improved    5. Pure hypercholesterolemia  Change zetia to pravastatin (which she has not tried)  Repeat labs in 2 months    - pravastatin (PRAVACHOL) 40 MG tablet; Take 1 Tab by mouth every bedtime.  Dispense: 90 Tab; Refill: 1            HEALTH MAINTENANCE:    Instructed to Follow up in clinic " or ER for worsening symptoms, difficulty breathing, lack of expected recovery, or should new symptoms or problems arise.    Followup: Return in about 2 months (around 5/7/2018) for labs.       Once again this medical record contains text that has been entered with the assistance of computer voice recognition and dictation software.  Therefore, it may contain unintended errors in text, spelling, punctuation, or grammar

## 2018-03-08 NOTE — ASSESSMENT & PLAN NOTE
They are considering a  dental mouthpiece since she could not tollerrate any masks. She experienced 30 apneic episodes during her sleep study.

## 2018-03-08 NOTE — ASSESSMENT & PLAN NOTE
The patient states that after she received her calcium score she would like to start another statin and stop Zetia.      Results for MELISSA PATEL (MRN [id number]) as of 3/8/2018 07:46   Ref. Range 1/4/2018 09:48   Cholesterol,Tot Latest Ref Range: 100 - 199 mg/dL 209 (H)   Triglycerides Latest Ref Range: 0 - 149 mg/dL 87   HDL Latest Ref Range: >=40 mg/dL 70   LDL Latest Ref Range: <100 mg/dL 122 (H)

## 2018-03-08 NOTE — ASSESSMENT & PLAN NOTE
Losartan 50 mg by mouth daily  Amlodipine 5 mg by mouth daily  She is on a baby aspirin but not taking a statin.    The patient has been tollerating the BP meds without any issues. No tunnel vision, no cough, no changes in vision, no lightheadedness, no fatigue, no syncopal or presyncopal episodes, no edema, no new rashes.

## 2018-03-13 RX ORDER — LOSARTAN POTASSIUM 50 MG/1
TABLET ORAL
Qty: 90 TAB | Refills: 0 | Status: SHIPPED | OUTPATIENT
Start: 2018-03-13 | End: 2018-04-24

## 2018-03-22 ENCOUNTER — OFFICE VISIT (OUTPATIENT)
Dept: HEALTH INFORMATION MANAGEMENT | Facility: MEDICAL CENTER | Age: 78
End: 2018-03-22
Payer: MEDICARE

## 2018-03-22 VITALS
HEIGHT: 62 IN | DIASTOLIC BLOOD PRESSURE: 65 MMHG | BODY MASS INDEX: 31.76 KG/M2 | OXYGEN SATURATION: 95 % | SYSTOLIC BLOOD PRESSURE: 125 MMHG | WEIGHT: 172.6 LBS | HEART RATE: 65 BPM

## 2018-03-22 DIAGNOSIS — R73.01 IFG (IMPAIRED FASTING GLUCOSE): ICD-10-CM

## 2018-03-22 DIAGNOSIS — I10 ESSENTIAL HYPERTENSION: ICD-10-CM

## 2018-03-22 DIAGNOSIS — G47.33 OSA (OBSTRUCTIVE SLEEP APNEA): ICD-10-CM

## 2018-03-22 DIAGNOSIS — E66.9 OBESITY (BMI 30-39.9): ICD-10-CM

## 2018-03-22 DIAGNOSIS — E78.2 MIXED HYPERLIPIDEMIA: ICD-10-CM

## 2018-03-22 PROCEDURE — 99205 OFFICE O/P NEW HI 60 MIN: CPT | Performed by: INTERNAL MEDICINE

## 2018-03-22 NOTE — LETTER
Medical Weight Management  Patient Consent Form For Contrave (Naltrexone/buproprion)    I hereby authorize the physician and their staff to assist me in my weight reduction efforts. I understand that my treatment program consists of a balanced diet, a regular exercise program, instruction in behavior carlos?cation techniques, meeting with a registered dietician, and the use of the appetite suppressant medication Contrave. I also understand that regular medical visits will be necessary while on the medication and that Contrave must be used with caution and under direct supervision of the physician.    Risk of Proposed Treatment: I understand that any medical treatment may involve risks as well as the proposed bene?ts of weight loss. I understand that this authorization is given with the knowledge that the use of Contrave involves risk. Risks of Contrave include but are not limited to suicidal thoughts, sudden decrease in vision, glaucoma, birth defects in your unborn child if you take this medication while pregnant, hypoglycemia.  Risks can also include nervousness, diarrhea, constipation, sleeplessness, headache, tremor, fever, fainting, dry mouth, rash, change in libido, difficulty urinating, shortness of breath, swelling of feet or ankles, tiredness, dizziness, weakness, allergic reactions, psychological imbalances, hallucinations, stomach cramps, high blood pressure, palpitations, arrhythmias, rapid heart rate, hepatitis, arabella, depression, insomnia. In case of serious side effects or if you become pregnant, stop taking the Contrave and seek immediate medical assistance. I also understand that there are certain health risks associated with remaining overweight or obese, including high blood pressure, diabetes, heart attack and heart disease, arthritis of the joints, sleep apnea, and sudden death. Do not use with alcohol.    I further understand that Contrave should not be used by people who suffer from  glaucoma, seizures, anorexia or bulimia, those with a history of drug dependency, opioid dependency, alcoholism, psychotic illness, uncontrolled hypertension, pregnancy, on MAOI’s, serotonin migraine medications, Wellbutrin or other buproprion-containing medication, or lithium.    While taking Contrave, avoid taking the following medications: Decongestant medications (Sudafed/Pseudoephedrine, Tylenol Sinus, Claritin D, Zyrtec D and Allegra D), Stimulant medications, high doses of caffeine, other weight loss medications, ephedrine MAO inhibitions and alcohol.    Patient Responsibility:   As the patient, I understand it is my responsibility to follow instructions carefully, and to report to the physician any significant medical problems that I think may be related to my weight control program as soon as possible. I agree to notify the physician of any medical problems that I may have or any results of labs/tests ordered and reviewed by any other physician. I further acknowledge that I enter into this program in full knowledge and understanding that no physician, provider, or staff of the weight loss physician has prior knowledge as to whether I would or would not have adverse effects due to the fact that each individual has a different biological and chemical make -up. I understand the purpose of this treatment is to assist me in my desire to decrease my body weight and to maintain this weight loss. I understand my continuing to receive Contrave will be dependent on my progress in weight reduction and weight maintenance.  If I am female, I will need to have a monthly negative pregnancy test in order to receive the next month’s prescription. I understand that a balanced caloric counting program combined with regular exercise without the use of Contrave may likely prove successful if followed, even though I would be hungrier than without the suppressant.    I am also in full understanding that Contrave will be used no  longer than 3 consecutive months. After 3 months of use the medication will be discontinued.     If you and the physician agree to use the medication longer than 3 months or if your BMI has decreased below the Federal Drug Administration's recommended value you will be using the medication in an off-label manner.  Contrave may result in lethargy or depression with abrupt discontinuation and I understand that during the program, medications will be discontinued if:  1.) I become pregnant, try to become pregnant, or suspect that I am pregnant.  2.) I develop a contraindication or serious side effect of the medication.  3.) I do not comply with medical requirements, i.e. visits, med doses, etc.  4.) I fail to lose and/or maintain weight appropriately.  5.) I have a planned surgery. Medications are to be stopped at least 2 weeks prior to any surgical procedure requiring general anesthesia.    Women Only: I understand Contrave should not be taken during pregnancy, due to the chance of damage to the fetus. This has been explained to me fully, and I am aware of the risks involved. To the best of my knowledge, I am not pregnant. I am aware of the precautions that should be taken to avoid pregnancy while I am on the medication. If I become pregnant, I will advise both the physician and my OB/GYN immediately. In addition, Contrave is not to be used while breast feeding.    No Guarantee:  I understand that much of the success of the program will depend on my effort, and that there is no guarantee that the program will be successful.  I understand that I will have to continue with sensible and nutritional eating habits and regular exercise all my life, if I am able to be successful long-term.     Patient Consent/Waiver:   I have read and fully understand this document and authorize and accept the proposed care regardless of the risk. I affirm that my questions have been satisfactorily answered at this time. I realize that I  should not sign this form if all items are not understood by me or if questions have not been answered to my satisfaction. I hereby release the physician and [facility] from any liability associated and connected with my participation in this weight loss program.  I accept the risks as discussed above, in hopes of obtaining desired bene?cial results of weight loss treatment. I understand it is my responsibility to give the physician the name of my primary care physician where labs and/or EKG can be obtained for follow through and interpretation, if need be.     WARNING: If you have any questions as to the risks or hazards of the proposed treatment, or any questions whatsoever concerning the proposed treatment or other possible treatments, ask the physician now before signing this consent form. To conclude, by signing this document you are agreeing to the risks associated with Contrave. You are agreeing that to be successful in your weight loss goals you must alter your lifestyle and adopt healthy eating and exercise patterns. You are agreeing that you are not pregnant or breast feeding. You are agreeing that you understand Contrave may, in rare instances, be addictive. You are agreeing that you must notify the physician of any medical conditions current or that develop while taking Contrave and you are agreeing that this document has been adequately explained to you and that you understand the document in its entirety.    Alternatives to treatment, and no treatment, including the risks and benefits thereof have been discussed with me.      _____________________________________  Patient / Authorized Legal Representative    _____________________________________  Relationship to Patient (if not patient)    _____________________________________  Date/Time    Provider Declaration:   I have explained the contents of this document to the patient and have answered all the patient’s related questions. To the best of my  knowledge, I feel the patient has been adequately informed concerning the bene?ts and risks associated with the use of Contrave, the bene?ts and risks associated with alternative therapies, and the risks concerning an overweight status. After being adequately informed, the patient has consented.      _____________________________________  Physician Signature    _____________________________________  Physician Name (printed)     _____________________________________  Date/Time

## 2018-03-22 NOTE — PROGRESS NOTES
Bariatric Medicine H&P  Chief Complaint   Patient presents with   • Weight Gain       Referred by:  Dane Gunter*    History of Present Illness:   Sheyla Gauthier is a 77 y.o.  female who presents for weight management and to help address co-morbidities related to overweight, including JOHN, hypertension, impaired fasting glucose, hypercholesterolemia.    The patient is frustrated by ongoing obesity.  She was diagnosed with JOHN late last year, was told to lose weight.  She struggles with the CPAP mask.  She thinks she eats out of boredom, needs structure in a program, which has worked in the past.  Paleo diet has helped keep her on track but found it boring after a while, especially for one person.  She lives alone.  Has been depressed since  and son , would consider Wellbutrin in Contrave.  Thinks meal replacement shake in am, learning again what to eat and what to avoid and weight loss medication would help her most with weight loss.    Did not feel effect of SSRI in the past for depression, stopped taking it.    Blood pressure controlled on current antihypertensives.  Not requiring statin or metformin at this time.    Currently having grains, yogurt for breakfast or a bagel with coffee and creamer. Lunch includes a sandwich or salad, often eating out. For dinner she has a protein, veggies with pasta. She snacks on crackers a lot. She has popcorn, nuts and sweets and snacks as well. She tries to eat small portion sizes. She mostly drinks water. She does not know her current calorie intake but thinks it is higher than 1400 gabriela per day.     Behavior-Related History:  Binge eating screen: Positive  Overeating, does not feel she always binges however.     Exercise:   None.    Life Style Changes:   and son passed away several years ago, lives alone, tries to socialize with others but often feels bored that she is no longer working and lives by herself.     Review of Systems  "  Fatigue, headaches, shortness of breath at times with exertion. Anxiety. Intermittent constipation. Fatigue and sleepy during waking hours at times.  Sleep apnea screen: Positive, considering mouthpiece b/c she does not tolerate the mask  All other ROS were reviewed with patient today and are negative.      PMH/PSH:  I have reviewed the patient's medical, social and family history, allergies, and medications today.  Prior records reviewed.  Personal Hx of Bariatric Surgery: No  Used to work in sales and marketing      Physical Exam:   /65   Pulse 65   Ht 1.575 m (5' 2\")   Wt 78.3 kg (172 lb 9.6 oz)   LMP 11/01/1986   SpO2 95%   BMI 31.57 kg/m²    Waist: 42 in  Body fat % 50  REE 1370 kcal/day    Constitutional: Oriented to person, place, and time and well-developed, well-nourished, and in no distress.    HENT: No facial plethora.  No Cushingoid features.  No scalloped tongue.  No dental erosions.  No swollen parotids.  Head: Normocephalic.   Eyes: EOM are normal. Pupils are equal, round, and reactive to light. No periorbital edema.  No lateral thinning of eyebrows.  No vertical nystagmus.  Neck: Normal range of motion. Neck supple. No thyromegaly present. No buffalo hump.  Cardiovascular: Normal rate and regular rhythm.  No murmur heard.  Pulmonary/Chest: Effort normal and breath sounds normal. No wheezes.   Abdominal: Soft. Bowel sounds are normal. No pannus.  No ascites.  No hepatosplenomegaly.  No red striae.  Musculoskeletal: Normal range of motion. No edema.   Neurological: Alert and oriented to person, place, and time. Normal reflexes. No cranial nerve deficit. No muscle weakness.  Gait normal.   Skin: Warm and dry. Not diaphoretic. No hirsuitism.  No acanthosis nigricans.  Not excessively dry, scaly.  No acne.  No bruising/ecchymosis.  No hyperpigmentation.  No xanthomas or acrochordon.  No violaceous striae.  No keratosis pilaris.  Psychiatric: Mood, memory, affect and judgment normal. "     Laboratory:   Prior labs reviewed. 1/4/18 ,   EKG: 3/13/18 Pickett's sinus rhythm, left atrial enlargement, rate 64, corrected QT 0.414      ASSESSMENT/PLAN:  Body mass index is 31.57 kg/m².   Obesity Stage ([place]) 2; Class 1    1. Obesity (BMI 30-39.9)  Naltrexone-Bupropion HCl ER 8-90 MG TABLET SR 12 HR   2. IFG (impaired fasting glucose)     3. Essential hypertension     4. Mixed hyperlipidemia     5. JOHN (obstructive sleep apnea)  Naltrexone-Bupropion HCl ER 8-90 MG TABLET SR 12 HR     The patient has struggled with obesity, now has significant obstructive sleep apnea. Stimulus narrowing with meal replacement should help, along with weight loss medication. Wellbutrin may help with depressive symptoms.  We will continue to monitor her blood glucoses, lipids via lab testing. Hypertension has been controlled.    The patient and I have discussed at length and agree to the following recommendations, which are all addressing the above diagnoses:    Weight Goal: 5% wt loss at one month after start (pt goal weight is 130 lb)  Diet: One Robard meal replacement for breakfast, consider 2  High-protein/low-carb snacks and meals as described in handout  Review carbs to avoid  64+ ounces of water per day  Physical Activity: Plans to join a gym and get a   Risk level for moderate/vigorous exercise program: Moderate  New Rx:   Contrave  Side Effects: Consent reviewed, signed, in chart.  Behavior change: Stimulus narrowing, mindful eating  Consider counseling for depression and emotional eating  Follow-up: 2 weeks    Face to face time spent 60 minutes,  with >50% of time devoted to one on one counseling on weight management issues, as documented above.      Thank you for your referral!

## 2018-03-26 ENCOUNTER — NON-PROVIDER VISIT (OUTPATIENT)
Dept: HEALTH INFORMATION MANAGEMENT | Facility: MEDICAL CENTER | Age: 78
End: 2018-03-26
Payer: MEDICARE

## 2018-03-26 VITALS — HEIGHT: 62 IN | WEIGHT: 168.2 LBS | BODY MASS INDEX: 30.95 KG/M2

## 2018-03-26 DIAGNOSIS — E66.9 OBESITY (BMI 30-39.9): ICD-10-CM

## 2018-03-26 PROCEDURE — 97804 MEDICAL NUTRITION GROUP: CPT | Performed by: DIETITIAN, REGISTERED

## 2018-03-26 NOTE — PROGRESS NOTES
"3/26/2018   Referring Provider: Darius Denney M.D.       Time in/out: 10:15-10:50am     Anthropometrics/Objective  Vitals:    03/26/18 1320   Weight: 76.3 kg (168 lb 3.2 oz)   Height: 1.575 m (5' 2\")     BMI: Body mass index is 30.76 kg/m².  Stated Goal Weight:  130lb   Previous weight: 172.6lb   Weight change: -4.4lb  See comprehensive patient history form for further information     Subjective:  Pt is here today for the initial nutrition class as part of the medical weight management program.   Pt states she did not tolerate the lemon pudding or the snack bars; causing incredible GI discomfort in which she couldn't leave the house or eat anything for a couple days.   She reports that in her past she was been told to avoid whey and soy due to allergy vs intolerance.   She is keeping a written food journal   Has been measuring her food and using a food scale  Is avoiding carbs from the carbs to avoid list given to her by Dr. Martinez   See Medical Questionnaire for more detailed diet history     Nutrition Diagnosis (PES Statement)  · Obesity related to excessive energy intake and inadequate energy expenditure as evidenced by BMI >30     Client history:  Condition(s) associated with a diagnosis or treatment (specify) IFG, HTN, HLD, JOHN    Biochemical data, medical test and procedures  Lab Results   Component Value Date/Time    HBA1C 5.5 01/04/2018 09:48 AM   @  No results found for: POCGLUCOSE  Lab Results   Component Value Date/Time    CHOLSTRLTOT 209 (H) 01/04/2018 09:48 AM     (H) 01/04/2018 09:48 AM    HDL 70 01/04/2018 09:48 AM    TRIGLYCERIDE 87 01/04/2018 09:48 AM         Nutrition Intervention  Nutrition Prescription  Recommended Daily Kcals:  <1400Kcal based on REE of 1370kcal    Recommended Daily Protein: ~100g      Meal Plan Recommendation   Low Calorie Diet with 1 meal replacements per day   Try a plant based protein powder for meal replacement (Orgain, Evolve or Wyatt One) "     Comprehensive Nutrition Education Instruction or training leading to in-depth nutrition related knowledge about:  Benefits to following meal plan, Combine carb, protein and fat at each meal, Meal timing and spacing, Portion control/Plate method, Healthy Snack guidelines, Sweets and alcohol in moderation   Handouts provided regarding topics discussed     Monitoring & Evaluation Plan    Behavioral-Environmental:  Behavior: Keep a food journal and bring to next appointment   Physical activity: As discussed with Dr. Martinez at previous visit     Food / Nutrient Intake:  Food intake: Follow meal plan as discussed (see above). Avoid concentrated sweets and processed carbs.  Use the plate method to balance macronutrient intake. Limit carbohydrate portion to up to 1/2 cup per meal. Consume a healthy high protein snack between meals as needed or if going >4 hours between meals.   Fluid intake: Consume at least 64 oz water per day. Avoid all unsweetened beverages. Limit coffee to 1 cup a day (ideally no sweetened creamer or sugar). Avoid alcohol.     Physical Signs / Symptoms:  Weight change: -1-2lb per week; 5% within 3 months ideally.       Assessment Notes:  Pt will need to find a new protein drink to use as a meal replacement. She either has an intolerance to whey or soy, or she did not tolerate the sugar alcohols in the bars and the inulin in the lemon pudding. Recommended she stick with the vanilla Robard and if still not tolerating that then to find a plant based protein powder as an alternative. Pt does not tolerate eggs either so breakfast is challenging for her.     She will bring her food journal in to show Dr. Martinez . Also recommended she try My Fitness Pal     Pt's referral from Dr. Morataya was for a dietitian only. She qualifies for IBT so I am not sure why she was scheduled in the medical weight management program. However, pt is happy to meet with Dr. Martinez again, and I am assuming she was  given the option to see her or not. She qualifies for IBT up until her BMI is <30    Follow up class to be scheduled after next visit with Dr. Martinez in ~2-4 weeks.

## 2018-04-05 ENCOUNTER — OFFICE VISIT (OUTPATIENT)
Dept: HEALTH INFORMATION MANAGEMENT | Facility: MEDICAL CENTER | Age: 78
End: 2018-04-05
Payer: MEDICARE

## 2018-04-05 VITALS
HEART RATE: 68 BPM | DIASTOLIC BLOOD PRESSURE: 82 MMHG | WEIGHT: 168.9 LBS | SYSTOLIC BLOOD PRESSURE: 122 MMHG | HEIGHT: 62 IN | BODY MASS INDEX: 31.08 KG/M2 | OXYGEN SATURATION: 97 %

## 2018-04-05 DIAGNOSIS — G47.33 OSA (OBSTRUCTIVE SLEEP APNEA): ICD-10-CM

## 2018-04-05 DIAGNOSIS — R73.01 IFG (IMPAIRED FASTING GLUCOSE): ICD-10-CM

## 2018-04-05 DIAGNOSIS — E66.9 OBESITY (BMI 30-39.9): ICD-10-CM

## 2018-04-05 DIAGNOSIS — E78.00 PURE HYPERCHOLESTEROLEMIA: ICD-10-CM

## 2018-04-05 DIAGNOSIS — I10 ESSENTIAL HYPERTENSION: ICD-10-CM

## 2018-04-05 PROCEDURE — 99213 OFFICE O/P EST LOW 20 MIN: CPT | Performed by: INTERNAL MEDICINE

## 2018-04-05 NOTE — PROGRESS NOTES
"Bariatric Medicine Follow Up  Chief Complaint   Patient presents with   • Hyperglycemia   • Weight Gain       History of Present Illness:   Sheyla Gauthier is a 77 y.o. female who presents for weight management follow-up.    During the patient's last visit, the following were discussed and recommended:  Weight Goal: 5% wt loss at one month after start (pt goal weight is 130 lb)  Diet: One Robard meal replacement for breakfast, consider 2  High-protein/low-carb snacks and meals as described in handout  Review carbs to avoid  64+ ounces of water per day  Physical Activity: Plans to join a gym and get a   Risk level for moderate/vigorous exercise program: Moderate  New Rx:   Contrave  Side Effects: Consent reviewed, signed, in chart.  Behavior change: Stimulus narrowing, mindful eating  Consider counseling for depression and emotional eating  Follow-up: 2 weeks    The patient is doing well. She has lost 4 pounds in 2 inches off her waist since starting the program. She has been on Contrave about one week. She is using a meal replacement shake, not with soy. She finds the MNT class helpful, has learned some new things. She does find breakfast a challenge, would love to have other recipes for what to make for breakfast.    Exercise:   Still considering joining a gym and getting a      Review of Systems   Denies lightheadedness, fatigue. Did feel week one day, but had a small amount of honey and felt much better.  All other ROS were reviewed and are otherwise unchanged from my previous visit with patient.    Physical Exam:    /82   Pulse 68   Ht 1.575 m (5' 2\")   Wt 76.6 kg (168 lb 14.4 oz)   LMP 11/01/1986   SpO2 97%   BMI 30.89 kg/m²    Waist: 40 in  Body fat % 51.4  REE 1354 kcal/day    Weight change since last visit: -4 lb   Waist Circum change since last visit: -2 in     Constitutional: Oriented to person, place, and time and well-developed, well-nourished, and in no " distress.    Head: Normocephalic.   Musculoskeletal: Normal range of motion. No edema.   Neurological: Alert and oriented to person, place, and time. No muscle weakness.  Gait normal.   Skin: Warm and dry. Not diaphoretic.   Psychiatric: Mood, memory, affect and judgment normal.     Laboratory:   None new.         ASSESSMENT/PLAN:  Body mass index is 30.89 kg/m².    Obesity Stage (Lamar):  2; Class 1    1. JOHN (obstructive sleep apnea)     2. Pure hypercholesterolemia     3. Essential hypertension     4. Obesity (BMI 30-39.9)     5. IFG (impaired fasting glucose)       The patient has made some great first changes. She is monitoring her intake, reducing carbohydrates, measuring her food. She has been tolerating the Contrave well. Meal replacement for dinner has helped. Will continue to monitor her sleep, cholesterol, hypertension and glucose levels as she progresses through the program.    The patient and I have discussed at length and agree to the following recommendations, which are all addressing the above diagnoses:    Weight Goal: 3-5% wt loss each month (pt goal weight is 130 lb)  Diet: One high-protein/low-carb meal replacement shake per day  Breast high-protein/low-carb meals and snacks as previously prescribed  64+ ounces of day of water encouraged  Physical Activity: Strongly consider exercise program, joining a gym,  as previously discussed  Risk level for moderate/vigorous exercise program: Low  New Rx: Continue Contrave month one  Side Effects: Consent in chart.  Behavior change: Stimulus narrowing, mindful eating  Consider counseling for depression, emotional eating  Follow-up: 3 weeks  MNT class next week

## 2018-04-10 DIAGNOSIS — R53.81 PHYSICAL DECONDITIONING: ICD-10-CM

## 2018-04-12 ENCOUNTER — NON-PROVIDER VISIT (OUTPATIENT)
Dept: HEALTH INFORMATION MANAGEMENT | Facility: MEDICAL CENTER | Age: 78
End: 2018-04-12
Payer: MEDICARE

## 2018-04-12 VITALS — HEIGHT: 62 IN | BODY MASS INDEX: 30.57 KG/M2 | WEIGHT: 166.1 LBS

## 2018-04-12 DIAGNOSIS — E66.9 OBESITY (BMI 30-39.9): ICD-10-CM

## 2018-04-12 PROCEDURE — 97804 MEDICAL NUTRITION GROUP: CPT | Performed by: DIETITIAN, REGISTERED

## 2018-04-12 NOTE — PROGRESS NOTES
"Nutrition Reassess: Medical Weight Management   Today's date: 4/12/2018  Referring Provider: Hannah Gunter      Time: in/out 1005 - 9920    Subjective:  -Here for MNT class #2 for medical weight management program     Anthropometrics/Objective  Today's weight:    Vitals:    04/12/18 1124   Weight: 75.3 kg (166 lb 1.6 oz)   Height: 1.575 m (5' 2\")     BMI:  Body mass index is 30.38 kg/m².  Starting weight/date 172.6#     Total weight change : - 6.5#            Meal Plan:   LCD with 1 meal replacements per day     ReAssesment/Notes:    We reviewed low CHO meal ideas today as well as the food label.  Sheyla has agreed to stay under their “budget” of < 100 grams CHO daily.  We also discussed where Sheyla is struggling with her eating; she states she is mostly struggling with craving bread at this time and is working on improving her habits.  She knows that she cannot limit herself to just one slice of bread so she is going to continue to eliminate bread from her diet    I wanted to discuss where Sheyla feels that she is successful at this time and she states that tracking her food intake is going very well for her.  She likes keeping a food journal and is finding it quite helpful for her as well.  She will contact me with any questions she may have.      Follow-up: F/u class after next appointment with Dr. Martinez.    "

## 2018-04-24 ENCOUNTER — OFFICE VISIT (OUTPATIENT)
Dept: MEDICAL GROUP | Age: 78
End: 2018-04-24
Payer: MEDICARE

## 2018-04-24 VITALS
WEIGHT: 170 LBS | SYSTOLIC BLOOD PRESSURE: 138 MMHG | OXYGEN SATURATION: 91 % | TEMPERATURE: 101 F | RESPIRATION RATE: 16 BRPM | HEART RATE: 87 BPM | HEIGHT: 62 IN | BODY MASS INDEX: 31.28 KG/M2 | DIASTOLIC BLOOD PRESSURE: 76 MMHG

## 2018-04-24 DIAGNOSIS — R05.9 COUGH: ICD-10-CM

## 2018-04-24 DIAGNOSIS — R50.9 FEVER AND CHILLS: ICD-10-CM

## 2018-04-24 DIAGNOSIS — J00 ACUTE NASOPHARYNGITIS: ICD-10-CM

## 2018-04-24 LAB
FLUAV+FLUBV AG SPEC QL IA: NORMAL
INT CON NEG: NEGATIVE
INT CON POS: POSITIVE

## 2018-04-24 PROCEDURE — 99214 OFFICE O/P EST MOD 30 MIN: CPT | Performed by: INTERNAL MEDICINE

## 2018-04-24 PROCEDURE — 87804 INFLUENZA ASSAY W/OPTIC: CPT | Performed by: INTERNAL MEDICINE

## 2018-04-24 RX ORDER — DOXYCYCLINE HYCLATE 100 MG
100 TABLET ORAL 2 TIMES DAILY
Qty: 14 TAB | Refills: 0 | Status: SHIPPED | OUTPATIENT
Start: 2018-04-24 | End: 2018-05-01

## 2018-04-24 RX ORDER — FLUTICASONE PROPIONATE 50 MCG
2 SPRAY, SUSPENSION (ML) NASAL DAILY
Qty: 16 G | Refills: 3 | Status: SHIPPED | OUTPATIENT
Start: 2018-04-24 | End: 2019-03-26

## 2018-04-25 NOTE — ASSESSMENT & PLAN NOTE
This is a new problem. Patient stated that she has sick contact in the airplane when she flew from [place] back to [place]. She has mild runny nose, sinus congestion, postnasal drip, sore throat and cough for 2 days. She reported that she had fever at home but she did not check her temperature. She has fever with body temperature 101° Fahrenheit in clinic today. She denied shortness of breath or chest pain or wheezing.  Patient reported having generalized body ache as well. She concerned that she might have influenza and requested to test for it.

## 2018-04-25 NOTE — PROGRESS NOTES
Subjective:   Sheyla Gauthier is a 77 y.o. female here today for evaluation and management of:      Acute nasopharyngitis  This is a new problem. Patient stated that she has sick contact in the airplane when she flew from [place] back to [place]. She has mild runny nose, sinus congestion, postnasal drip, sore throat and cough for 2 days. She reported that she had fever at home but she did not check her temperature. She has fever with body temperature 101° Fahrenheit in clinic today. She denied shortness of breath or chest pain or wheezing.  Patient reported having generalized body ache as well. She concerned that she might have influenza and requested to test for it.         Current medicines (including changes today)  Current Outpatient Prescriptions   Medication Sig Dispense Refill   • doxycycline (VIBRAMYCIN) 100 MG Tab Take 1 Tab by mouth 2 times a day for 7 days. 14 Tab 0   • fluticasone (FLONASE) 50 MCG/ACT nasal spray Spray 2 Sprays in nose every day. 16 g 3   • Naltrexone-Bupropion HCl ER 8-90 MG TABLET SR 12 HR Take 8-90 mg by mouth See Admin Instructions. Week 1 one tab po QD  Week 2 one tab po BID  Week 3 two tab po QAM, one tab po QPM  Week 4 two tab po BID 70 Tab 0   • pravastatin (PRAVACHOL) 40 MG tablet Take 1 Tab by mouth every bedtime. 90 Tab 1   • alendronate (FOSAMAX) 70 MG Tab TAKE 1 TABLET BY MOUTH 1 TIME EVERY 7 DAYS 12 Tab 0   • amlodipine (NORVASC) 5 MG Tab TAKE 1 TABLET BY MOUTH EVERY DAY 90 Tab 0   • Cholecalciferol (VITAMIN D PO) Take  by mouth.     • losartan (COZAAR) 50 MG Tab Take 1 Tab by mouth every day. 90 Tab 1   • Magnesium 400 MG Tab Take  by mouth.     • levothyroxine (SYNTHROID) 75 MCG Tab Take 75 mcg by mouth Every morning on an empty stomach.     • aspirin (BABY ASPIRIN) 81 MG CHEW Take 81 mg by mouth every day.     • ibuprofen (MOTRIN) 600 MG Tab Take 1 Tab by mouth every 8 hours as needed. 30 Tab 0     No current facility-administered medications for this visit.      She  has  "a past medical history of Anxiety; Arthritis; Cataract; Dyslipidemia; Herpes zoster (10/27/06); HTN (hypertension); Hypertension; MHA (microangiopathic hemolytic anemia); Migraine syndrome (2/11/2013); Osteopathia; Osteoporosis; Post-menopause on HRT (hormone replacement therapy); Thyroid disease; and White matter abnormality on MRI of brain (2/11/2013).    ROS   No chest pain, no shortness of breath, no abdominal pain       Objective:     Blood pressure 138/76, pulse 87, temperature (!) 38.3 °C (101 °F), resp. rate 16, height 1.575 m (5' 2\"), weight 77.1 kg (170 lb), last menstrual period 11/01/1986, SpO2 91 %. Body mass index is 31.09 kg/m².   Physical Exam:  General: Alert, oriented and no acute distress.  Eye contact is good, speech goal directed, affect calm  HEENT: conjunctiva non-injected, sclera non-icteric.  Oral mucous membranes pink and moist with no lesions.  Pinna normal. TM pearly gray.   Neck No supraclavicular, submandibular, submental lymphadenopathy or masses in the neck or supraclavicular regions.  Lungs: Normal respiratory effort, clear to auscultation bilaterally with good excursion.  CV: regular rate and rhythm. No murmurs.   Abdomen: soft, non distended, nontender, Bowel sound normal.  Ext: no edema, color normal, vascularity normal, temperature normal        Assessment and Plan:   The following treatment plan was discussed     1. Acute nasopharyngitis  - POCT influenza test was negative in clinic.  - Prescribed doxycycline to take 100 mg one tablet twice a day for 7 days. Reviewed potential side effects of doxycycline with patient. Advised to take probiotics to prevent diarrhea. Advised patient to stop taking doxycycline if she has any side effects. Patient is allergic to amoxicillin.  - Discussed at length to rinse sinuses with over the counter nasal wash or Netti pot once or twice a day and then use flonase nasal spray once or twice a day after rinsing sinuses  - Advised to try nasal steam " therapy. Showed picture and discussed with patient how to do nasal steam therapy.   - POCT Influenza A/B  - doxycycline (VIBRAMYCIN) 100 MG Tab; Take 1 Tab by mouth 2 times a day for 7 days.  Dispense: 14 Tab; Refill: 0  - fluticasone (FLONASE) 50 MCG/ACT nasal spray; Spray 2 Sprays in nose every day.  Dispense: 16 g; Refill: 3    2. Cough  - Discussed to take over-the-counter Robitussin, cough drop, discussed to increase water intake and to gargle her throat with warm salt water twice a day.  - Order chest x-ray for further evaluation. Will advise for chest x-ray.   - DX-CHEST-2 VIEWS; Future    3. Fever and chills  - Influenza test was negative. Prescribed doxycycline and Flonase nasal spray. See above discussion for details.  - POCT Influenza A/B  - doxycycline (VIBRAMYCIN) 100 MG Tab; Take 1 Tab by mouth 2 times a day for 7 days.  Dispense: 14 Tab; Refill: 0  - fluticasone (FLONASE) 50 MCG/ACT nasal spray; Spray 2 Sprays in nose every day.  Dispense: 16 g; Refill: 3  - DX-CHEST-2 VIEWS; Future    - Discussed ED precautions with patient: seek emergency evaluation for symptoms including but not limited to: worsening symptoms or developing any new symptoms, crushing chest pain, chest pain associated with difficulty breathing, nausea, or sweats, heart rate irregular or too fast to count.   - Any change or worsening of signs or symptoms, patient encouraged to follow-up or report to emergency room for further evaluation. Patient understands and agrees    Followup: Return if symptoms worsen or fail to improve.      Please note that this dictation was created using voice recognition software. I have made every reasonable attempt to correct obvious errors, but I expect that there may have unintended errors in text, spelling, punctuation, or grammar that I did not discover.

## 2018-04-26 ENCOUNTER — APPOINTMENT (OUTPATIENT)
Dept: HEALTH INFORMATION MANAGEMENT | Facility: MEDICAL CENTER | Age: 78
End: 2018-04-26
Payer: MEDICARE

## 2018-04-30 ENCOUNTER — HOSPITAL ENCOUNTER (OUTPATIENT)
Dept: RADIOLOGY | Facility: MEDICAL CENTER | Age: 78
End: 2018-04-30
Attending: INTERNAL MEDICINE
Payer: MEDICARE

## 2018-04-30 DIAGNOSIS — R50.9 FEVER AND CHILLS: ICD-10-CM

## 2018-04-30 DIAGNOSIS — R05.9 COUGH: ICD-10-CM

## 2018-04-30 DIAGNOSIS — I10 ESSENTIAL HYPERTENSION: ICD-10-CM

## 2018-04-30 PROCEDURE — 71046 X-RAY EXAM CHEST 2 VIEWS: CPT

## 2018-05-01 ENCOUNTER — TELEPHONE (OUTPATIENT)
Dept: MEDICAL GROUP | Age: 78
End: 2018-05-01

## 2018-05-01 RX ORDER — AMLODIPINE BESYLATE 5 MG/1
TABLET ORAL
Qty: 90 TAB | Refills: 0 | Status: SHIPPED | OUTPATIENT
Start: 2018-05-01 | End: 2018-07-31 | Stop reason: SDUPTHER

## 2018-05-01 NOTE — TELEPHONE ENCOUNTER
Phone Number Called: [phone] (home)     Message: Left message for the patient to call us back regarding the note below.    Left Message for patient to call back: yes

## 2018-05-01 NOTE — TELEPHONE ENCOUNTER
----- Message from Katarzyna Ghosh M.D. sent at 4/30/2018 12:59 PM PDT -----  Please call to inform patient that her recent chest x-ray does not show pneumonia.  Please advise patient to continue to follow the instructions as we discussed in office visit on 4/24/18.    Thanks!  Katarzyna Ghosh M.D.

## 2018-05-02 ENCOUNTER — PHYSICAL THERAPY (OUTPATIENT)
Dept: PHYSICAL THERAPY | Facility: MEDICAL CENTER | Age: 78
End: 2018-05-02
Attending: FAMILY MEDICINE
Payer: MEDICARE

## 2018-05-02 ENCOUNTER — TELEPHONE (OUTPATIENT)
Dept: SLEEP MEDICINE | Facility: MEDICAL CENTER | Age: 78
End: 2018-05-02

## 2018-05-02 DIAGNOSIS — R53.81 DEBILITY: ICD-10-CM

## 2018-05-02 PROCEDURE — G8979 MOBILITY GOAL STATUS: HCPCS | Mod: CI

## 2018-05-02 PROCEDURE — G8978 MOBILITY CURRENT STATUS: HCPCS | Mod: CJ

## 2018-05-02 PROCEDURE — 97162 PT EVAL MOD COMPLEX 30 MIN: CPT

## 2018-05-02 NOTE — TELEPHONE ENCOUNTER
"[facility]. She was inquiring about the status of her referral to [facility] for the \"Inspire device\". Please advise.   "

## 2018-05-02 NOTE — TELEPHONE ENCOUNTER
Phone Number Called: [phone] (home)     Message: Pt informed of Dr. Ghosh's note.    Left Message for patient to call back: no

## 2018-05-03 PROCEDURE — G8978 MOBILITY CURRENT STATUS: HCPCS | Mod: CJ

## 2018-05-03 PROCEDURE — G8979 MOBILITY GOAL STATUS: HCPCS | Mod: CI

## 2018-05-03 NOTE — OP THERAPY EVALUATION
Outpatient Physical Therapy  INITIAL EVALUATION    [facility]  [address]  [place] [address]  Phone:  [phone]  Fax:  [phone]    Date of Evaluation: 05/02/2018    Patient: Sheyla Gauthier  YOB: 1940  MRN: [id number]     Referring Provider: [place] [address] [address]   Referring Diagnosis Physical deconditioning [R53.81]     Time Calculation  Start time: 0200  Stop time: 0300 Time Calculation (min): 60 minutes     Physical Therapy Occurrence Codes    Date of onset of impairment:  5/3/17   Date physical therapy care plan established or reviewed:  5/3/18   Date physical therapy treatment started:  5/3/18          Chief Complaint: Back Problem    Visit Diagnoses     ICD-10-CM   1. Debility R53.81         Subjective  Pt has a chronic history of cervical and lumbar pain,facet joint ablation cervical spine in June 2017  Pt feels like she her functional level is decreasing and balance is decreasing  Pain across lower lumbar spine and into lat hips,occasional tingling in feet  Sitting is limited to 30 mins and ambulation 1 mile   Pain decreased with ice or stretching  Her goals for therapy is to get back to the gym and increase activity  Past Medical History:   Diagnosis Date   • Anxiety    • Arthritis    • Cataract    • Dyslipidemia    • Herpes zoster 10/27/06   • HTN (hypertension)    • Hypertension    • MHA (microangiopathic hemolytic anemia)    • Migraine syndrome 2/11/2013   • Osteopathia    • Osteoporosis    • Post-menopause on HRT (hormone replacement therapy)    • Thyroid disease    • White matter abnormality on MRI of brain 2/11/2013     Past Surgical History:   Procedure Laterality Date   • BUNIONECTOMY Left 2003   • FOOT SURGERY  2002   • PB REDUCTION OF LARGE BREAST  1997   • CHOLECYSTECTOMY  1996   • ABDOMINAL HYSTERECTOMY TOTAL  11/1986   • EYE SURGERY      cataracts   • MAMMOPLASTY  AUGMENTATION     • PB ENLARGE BREAST WITH IMPLANT       Social History   Substance Use Topics   • Smoking status: Former Smoker   • Smokeless tobacco: Never Used      Comment: 30 YRS AGO   • Alcohol use 0.0 oz/week      Comment: occassional     Family and Occupational History     Social History   • Marital status:      Spouse name: N/A   • Number of children: N/A   • Years of education: N/A       Objective  Alignment is normal,increased BMI  Lumbar ROM is WNL 5/5 strength all groups  Hip ROM normal Bi lat  On palaption very tender Bi lat gluteals  Pt demonstrates poor stabilisation  Gait is normal    Exercises/Treatment  Time-based treatments/modalities:  Manual therapy minutes (CPT 84852): 15 minutesHEP for stabilisation  DN bi lat gluteals       Assessment, Response and Plan:   Prognosis: good    Goals:   Short Term Goals:   Increase sitting [place] > 1 hr  Return to gym  Increase ambulation to 2 miles  Short term goal time span:  4-6 weeks      Long Term Goals:    Same as STG  Long term goal time span:  4-6 weeks    Plan:   Planned therapy interventions:  Gait Training (CPT 87554), Manual Therapy (CPT 47305), Therapeutic Activities (CPT 24989) and Therapeutic Exercise (CPT 39302)  Frequency:  2x week  Duration in weeks:  6  Duration in visits:  12      Functional Limitation G-Codes and Severity Modifiers      Current: Mobility: Current (): CJ   Goal: Mobility: Goal (): CI     Referring provider co-signature:  I have reviewed this plan of care and my co-signature certifies the need for services.  Certification Dates:   From 05/03/18     To 06/17/18    Physician Signature: ________________________________ Date: ______________

## 2018-05-07 ENCOUNTER — PHYSICAL THERAPY (OUTPATIENT)
Dept: PHYSICAL THERAPY | Facility: MEDICAL CENTER | Age: 78
End: 2018-05-07
Attending: FAMILY MEDICINE
Payer: MEDICARE

## 2018-05-07 DIAGNOSIS — R53.81 DEBILITY: ICD-10-CM

## 2018-05-07 PROCEDURE — 97110 THERAPEUTIC EXERCISES: CPT

## 2018-05-07 PROCEDURE — 97140 MANUAL THERAPY 1/> REGIONS: CPT

## 2018-05-07 NOTE — OP THERAPY DAILY TREATMENT
Outpatient Physical Therapy  DAILY TREATMENT     [facility] Therapy  [address]  [place] [address]  Phone:  [phone]  Fax:  [phone]    Date: 05/07/2018    Patient: Sheyla Gauthier  YOB: 1940  MRN: [id number]     Time Calculation  Start time: 0130  Stop time: 0200 Time Calculation (min): 30 minutes     Chief Complaint: No chief complaint on file.    Visit #: 2    SUBJECTIVE:  Less pain this week    OBJECTIVE:  Current objective measures:         Exercises/Treatment  Time-based treatments/modalities:  Manual therapy minutes (CPT 02968): 15 minutes  Therapeutic exercise minutes (CPT 98775): 15 minutes    STM gluteals  Sacral release  P/A L1 -5  Stabilisation exc  nustep x 5mins level 5    Pain rating before treatment: 1  Pain rating after treatment: 0    ASSESSMENT:   Response to treatment:   Improved STM gluteals    PLAN/RECOMMENDATIONS:   Plan for treatment: therapy treatment to continue next visit.  Planned interventions for next visit: continue with current treatment.

## 2018-05-09 ENCOUNTER — APPOINTMENT (OUTPATIENT)
Dept: PHYSICAL THERAPY | Facility: MEDICAL CENTER | Age: 78
End: 2018-05-09
Attending: FAMILY MEDICINE
Payer: MEDICARE

## 2018-05-09 DIAGNOSIS — R53.81 DEBILITY: ICD-10-CM

## 2018-05-09 PROCEDURE — 97110 THERAPEUTIC EXERCISES: CPT

## 2018-05-09 PROCEDURE — 97140 MANUAL THERAPY 1/> REGIONS: CPT

## 2018-05-09 NOTE — OP THERAPY DAILY TREATMENT
Outpatient Physical Therapy  DAILY TREATMENT     [facility] Out[facility] Therapy  [address]  [place] [address]  Phone:  [phone]  Fax:  [phone]    Date: 05/09/2018    Patient: Sheyla Gauthier  YOB: 1940  MRN: [id number]     Time Calculation  Start time: 1100  Stop time: 1130 Time Calculation (min): 30 minutes     Chief Complaint: No chief complaint on file.    Visit #: 3    SUBJECTIVE:  Back is feeling a lot better    OBJECTIVE:  Current objective measures:         Exercises/Treatment  Time-based treatments/modalities:  Manual therapy minutes (CPT 23275): 15 minutes  Therapeutic exercise minutes (CPT 16130): 15 minutes   STM and stretches bi lat gluteals  stabilisation exc  Nustep 5/5    Pain rating before treatment: 0  Pain rating after treatment: 0    ASSESSMENT:   Response to treatment:   Improved STM gluteals    PLAN/RECOMMENDATIONS:   Plan for treatment: therapy treatment to continue next visit.  Planned interventions for next visit: continue with current treatment.

## 2018-05-14 ENCOUNTER — APPOINTMENT (OUTPATIENT)
Dept: PHYSICAL THERAPY | Facility: MEDICAL CENTER | Age: 78
End: 2018-05-14
Attending: FAMILY MEDICINE
Payer: MEDICARE

## 2018-05-16 ENCOUNTER — APPOINTMENT (OUTPATIENT)
Dept: PHYSICAL THERAPY | Facility: MEDICAL CENTER | Age: 78
End: 2018-05-16
Attending: FAMILY MEDICINE
Payer: MEDICARE

## 2018-05-21 ENCOUNTER — PHYSICAL THERAPY (OUTPATIENT)
Dept: PHYSICAL THERAPY | Facility: MEDICAL CENTER | Age: 78
End: 2018-05-21
Attending: FAMILY MEDICINE
Payer: MEDICARE

## 2018-05-21 DIAGNOSIS — R53.81 DEBILITY: ICD-10-CM

## 2018-05-21 PROCEDURE — 97110 THERAPEUTIC EXERCISES: CPT

## 2018-05-21 PROCEDURE — 97140 MANUAL THERAPY 1/> REGIONS: CPT

## 2018-05-21 NOTE — OP THERAPY DAILY TREATMENT
Outpatient Physical Therapy  DAILY TREATMENT     [facility] Out[facility] Therapy  [address]  [place] [address]  Phone:  [phone]  Fax:  [phone]    Date: 05/21/2018    Patient: Sheyla Gauthier  YOB: 1940  MRN: [id number]     Time Calculation  Start time: 1030  Stop time: 1100 Time Calculation (min): 30 minutes     Chief Complaint: No chief complaint on file.    Visit #: 4    SUBJECTIVE:  Feels like she has no endurance stress test tomorrow,back is feeling a lot better    OBJECTIVE:  Current objective measures:         Exercises/Treatment  Time-based treatments/modalities:  Manual therapy minutes (CPT 43559): 15 minutes  Therapeutic exercise minutes (CPT 57360): 15 minutes   Posture correction   scs ant thorasic 6  STM gluteals  stabilisation exc    Pain rating before treatment: 1  Pain rating after treatment: 0    ASSESSMENT:   Response to treatment:   Improved STM gluteals     PLAN/RECOMMENDATIONS:   Plan for treatment: therapy treatment to continue next visit.  Planned interventions for next visit: continue with current treatment.

## 2018-05-23 ENCOUNTER — PHYSICAL THERAPY (OUTPATIENT)
Dept: PHYSICAL THERAPY | Facility: MEDICAL CENTER | Age: 78
End: 2018-05-23
Attending: FAMILY MEDICINE
Payer: MEDICARE

## 2018-05-23 DIAGNOSIS — R53.81 DEBILITY: ICD-10-CM

## 2018-05-23 PROCEDURE — 97140 MANUAL THERAPY 1/> REGIONS: CPT

## 2018-05-23 PROCEDURE — 97110 THERAPEUTIC EXERCISES: CPT

## 2018-05-23 NOTE — OP THERAPY DAILY TREATMENT
Outpatient Physical Therapy  DAILY TREATMENT     [facility] Outpatient Physical Therapy  [address]  [place] [address]  Phone:  [phone]  Fax:  [phone]    Date: 05/23/2018    Patient: Sheyla Gauthier  YOB: 1940  MRN: [id number]     Time Calculation  Start time: 1100  Stop time: 1130 Time Calculation (min): 30 minutes     Chief Complaint: No chief complaint on file.    Visit #: 5    SUBJECTIVE:  C/o headache today Back is doing better    OBJECTIVE:  Current objective measures:         Exercises/Treatment  Time-based treatments/modalities:  Manual therapy minutes (CPT 08915): 15 minutes  Therapeutic exercise minutes (CPT 44760): 15 minutes   Stabilisation exc  stm gluteals  Suboccipital telease   Headache snags    Pain rating before treatment: 2  Pain rating after treatment: 1    ASSESSMENT:   Response to treatment:   No headache following treatment    PLAN/RECOMMENDATIONS:   Plan for treatment: therapy treatment to continue next visit.  Planned interventions for next visit: continue with current treatment.

## 2018-05-28 DIAGNOSIS — M89.9 OSTEOPATHIA: ICD-10-CM

## 2018-05-29 RX ORDER — ALENDRONATE SODIUM 70 MG/1
TABLET ORAL
Qty: 12 TAB | Refills: 0 | Status: SHIPPED | OUTPATIENT
Start: 2018-05-29 | End: 2018-08-24 | Stop reason: SDUPTHER

## 2018-05-30 ENCOUNTER — PHYSICAL THERAPY (OUTPATIENT)
Dept: PHYSICAL THERAPY | Facility: MEDICAL CENTER | Age: 78
End: 2018-05-30
Attending: FAMILY MEDICINE
Payer: MEDICARE

## 2018-05-30 DIAGNOSIS — R53.81 DEBILITY: ICD-10-CM

## 2018-05-30 PROCEDURE — 97110 THERAPEUTIC EXERCISES: CPT

## 2018-05-30 PROCEDURE — 97140 MANUAL THERAPY 1/> REGIONS: CPT

## 2018-05-30 NOTE — OP THERAPY DAILY TREATMENT
Outpatient Physical Therapy  DAILY TREATMENT     [facility] Outpatient Physical Therapy  [address]  [place] [address]  Phone:  [phone]  Fax:  [phone]    Date: 05/30/2018    Patient: Sheyla Gauthier  YOB: 1940  MRN: [id number]     Time Calculation  Start time: 0100  Stop time: 0130 Time Calculation (min): 30 minutes     Chief Complaint: No chief complaint on file.    Visit #: 6    SUBJECTIVE:  Doing quite well Pt joined the gym     OBJECTIVE:  Current objective measures:         Exercises/Treatment  Time-based treatments/modalities:  Manual therapy minutes (CPT 43696): 15 minutes  Therapeutic exercise minutes (CPT 17744): 15 minutes   STM and stretches bi lat gluteals  stabilisation exc  Sacral release    Pain rating before treatment: 0  Pain rating after treatment: 0    ASSESSMENT:   Response to treatment:   Continues to show progress    PLAN/RECOMMENDATIONS:   Plan for treatment: therapy treatment to continue next visit.  Planned interventions for next visit: continue with current treatment.

## 2018-06-01 ENCOUNTER — OFFICE VISIT (OUTPATIENT)
Dept: MEDICAL GROUP | Age: 78
End: 2018-06-01
Payer: MEDICARE

## 2018-06-01 VITALS
TEMPERATURE: 98.4 F | HEIGHT: 62 IN | DIASTOLIC BLOOD PRESSURE: 80 MMHG | OXYGEN SATURATION: 92 % | HEART RATE: 84 BPM | BODY MASS INDEX: 31.65 KG/M2 | SYSTOLIC BLOOD PRESSURE: 140 MMHG | WEIGHT: 172 LBS

## 2018-06-01 DIAGNOSIS — R06.02 SOB (SHORTNESS OF BREATH): ICD-10-CM

## 2018-06-01 PROCEDURE — 99214 OFFICE O/P EST MOD 30 MIN: CPT | Performed by: FAMILY MEDICINE

## 2018-06-01 RX ORDER — LEVOFLOXACIN 500 MG/1
TABLET, FILM COATED ORAL
COMMUNITY
End: 2019-03-26

## 2018-06-01 RX ORDER — LORAZEPAM 0.5 MG/1
TABLET ORAL
COMMUNITY
End: 2019-03-26

## 2018-06-01 ASSESSMENT — PATIENT HEALTH QUESTIONNAIRE - PHQ9: CLINICAL INTERPRETATION OF PHQ2 SCORE: 0

## 2018-06-02 NOTE — ASSESSMENT & PLAN NOTE
NEW PROBLEM    The patient is a 77-year-old female who presents to clinic with chief complaint of shortness of breath.  She states that she recently had a stress echocardiogram at [facility] which was considered normal.  No records available yet.  She states that she still does not know why she still gets short of breath denies any chest pain he does have mild dyspnea on exertion.  She denies any recent long travel, no prolonged periods of immobilization, no trauma.

## 2018-06-02 NOTE — PROGRESS NOTES
This medical record contains text that has been entered with the assistance of computer voice recognition and dictation software.  Therefore, it may contain unintended errors in text, spelling, punctuation, or grammar    Chief Complaint   Patient presents with   • Follow-Up         Sheyla Gauthier is a 77 y.o. female here evaluation and management of: sob      HPI:     SOB (shortness of breath)  NEW PROBLEM    The patient is a 77-year-old female who presents to clinic with chief complaint of shortness of breath.  She states that she recently had a stress echocardiogram at [facility] which was considered normal.  No records available yet.  She states that she still does not know why she still gets short of breath denies any chest pain he does have mild dyspnea on exertion.  She denies any recent long travel, no prolonged periods of immobilization, no trauma.    Current medicines (including changes today)  Current Outpatient Prescriptions   Medication Sig Dispense Refill   • alendronate (FOSAMAX) 70 MG Tab TAKE 1 TABLET BY MOUTH 1 TIME EVERY 7 DAYS 12 Tab 0   • amLODIPine (NORVASC) 5 MG Tab TAKE 1 TABLET BY MOUTH EVERY DAY 90 Tab 0   • pravastatin (PRAVACHOL) 40 MG tablet Take 1 Tab by mouth every bedtime. 90 Tab 1   • Cholecalciferol (VITAMIN D PO) Take  by mouth.     • losartan (COZAAR) 50 MG Tab Take 1 Tab by mouth every day. 90 Tab 1   • aspirin (BABY ASPIRIN) 81 MG CHEW Take 81 mg by mouth every day.     • levoFLOXacin (LEVAQUIN) 500 MG tablet levofloxacin 500 mg tablet   TAKE 1 TAB BY MOUTH EVERY DAY.     • LORazepam (ATIVAN) 0.5 MG Tab lorazepam 0.5 mg tablet     • fluticasone (FLONASE) 50 MCG/ACT nasal spray Spray 2 Sprays in nose every day. 16 g 3   • ibuprofen (MOTRIN) 600 MG Tab Take 1 Tab by mouth every 8 hours as needed. 30 Tab 0   • Magnesium 400 MG Tab Take  by mouth.     • levothyroxine (SYNTHROID) 75 MCG Tab Take 75 mcg by mouth Every morning on an empty stomach.       No current  "facility-administered medications for this visit.      She  has a past medical history of Anxiety; Arthritis; Cataract; Dyslipidemia; Herpes zoster (10/27/06); HTN (hypertension); Hypertension; MHA (microangiopathic hemolytic anemia); Migraine syndrome (2013); Osteopathia; Osteoporosis; Post-menopause on HRT (hormone replacement therapy); Thyroid disease; and White matter abnormality on MRI of brain (2013).  She  has a past surgical history that includes cholecystectomy (); abdominal hysterectomy total (1986); pr reduction of large breast (); foot surgery (); pr enlarge breast with implant; mammoplasty augmentation; bunionectomy (Left, ); and eye surgery.  Social History   Substance Use Topics   • Smoking status: Former Smoker   • Smokeless tobacco: Never Used      Comment: 30 YRS AGO   • Alcohol use 0.0 oz/week      Comment: occassional     Social History     Social History Narrative   • No narrative on file     Family History   Problem Relation Age of Onset   • Stroke Father    • Arthritis Father    • Hyperlipidemia Father    • Hypertension Father    • Cancer Sister      breast   • Arthritis Sister    • Arthritis Mother    • Hypertension Mother    • Hyperlipidemia Mother    • Stroke Mother    • Arthritis Brother    • Cancer Brother    • Heart Disease Brother    • Hypertension Brother    • Hyperlipidemia Brother    • Cancer Daughter      breast     Family Status   Relation Status   • Father    • Sister Alive   • Mother     tia   • Brother Alive   • Daughter          ROS    Please see hpi     All other systems reviewed and are negative     Objective:     Blood pressure 140/80, pulse 84, temperature 36.9 °C (98.4 °F), height 1.575 m (5' 2\"), weight 78 kg (172 lb), last menstrual period 1986, SpO2 92 %, not currently breastfeeding. Body mass index is 31.46 kg/m².  Physical Exam:    Constitutional: Alert, no distress.  Skin: Warm, dry, good turgor, no rashes in visible " areas.  Eye: Equal, round and reactive, conjunctiva clear, lids normal.  ENMT: Lips without lesions, good dentition, oropharynx clear.  Neck: Trachea midline, no masses, no thyromegaly. No cervical or supraclavicular lymphadenopathy.  Respiratory: Unlabored respiratory effort, lungs clear to auscultation, no wheezes, no ronchi.  Cardiovascular: Normal S1, S2, no murmur, no edema.  Abdomen: Soft, non-tender, no masses, no hepatosplenomegaly.  Psych: Alert and oriented x3, normal affect and mood.          Assessment and Plan:   The following treatment plan was discussed      1. SOB (shortness of breath)    Will obtain CTA to evaluate for PE  As stress echo was normal at [facility]  perpatient    - CT-CTA CHEST PULMONARY ARTERY W/ RECONS; Future        HEALTH MAINTENANCE:    Instructed to Follow up in clinic or ER for worsening symptoms, difficulty breathing, lack of expected recovery, or should new symptoms or problems arise.    Followup: Return in about 2 months (around 8/1/2018) for Reevaluation.       Once again this medical record contains text that has been entered with the assistance of computer voice recognition and dictation software.  Therefore, it may contain unintended errors in text, spelling, punctuation, or grammar

## 2018-06-06 ENCOUNTER — HOSPITAL ENCOUNTER (OUTPATIENT)
Dept: LAB | Facility: MEDICAL CENTER | Age: 78
End: 2018-06-06
Attending: FAMILY MEDICINE
Payer: MEDICARE

## 2018-06-06 DIAGNOSIS — I10 ESSENTIAL HYPERTENSION: ICD-10-CM

## 2018-06-06 LAB
ANION GAP SERPL CALC-SCNC: 11 MMOL/L (ref 0–11.9)
BUN SERPL-MCNC: 22 MG/DL (ref 8–22)
CALCIUM SERPL-MCNC: 10 MG/DL (ref 8.5–10.5)
CHLORIDE SERPL-SCNC: 107 MMOL/L (ref 96–112)
CO2 SERPL-SCNC: 24 MMOL/L (ref 20–33)
CREAT SERPL-MCNC: 0.79 MG/DL (ref 0.5–1.4)
GLUCOSE SERPL-MCNC: 81 MG/DL (ref 65–99)
POTASSIUM SERPL-SCNC: 4.1 MMOL/L (ref 3.6–5.5)
SODIUM SERPL-SCNC: 142 MMOL/L (ref 135–145)

## 2018-06-06 PROCEDURE — 80048 BASIC METABOLIC PNL TOTAL CA: CPT

## 2018-06-06 PROCEDURE — 36415 COLL VENOUS BLD VENIPUNCTURE: CPT

## 2018-06-08 ENCOUNTER — HOSPITAL ENCOUNTER (OUTPATIENT)
Dept: RADIOLOGY | Facility: MEDICAL CENTER | Age: 78
End: 2018-06-08
Attending: FAMILY MEDICINE
Payer: MEDICARE

## 2018-06-08 DIAGNOSIS — R06.02 SOB (SHORTNESS OF BREATH): ICD-10-CM

## 2018-06-08 PROCEDURE — 700117 HCHG RX CONTRAST REV CODE 255: Performed by: FAMILY MEDICINE

## 2018-06-08 PROCEDURE — 71275 CT ANGIOGRAPHY CHEST: CPT

## 2018-06-08 RX ADMIN — IOHEXOL 80 ML: 350 INJECTION, SOLUTION INTRAVENOUS at 17:57

## 2018-06-11 ENCOUNTER — PHYSICAL THERAPY (OUTPATIENT)
Dept: PHYSICAL THERAPY | Facility: MEDICAL CENTER | Age: 78
End: 2018-06-11
Attending: FAMILY MEDICINE
Payer: MEDICARE

## 2018-06-11 DIAGNOSIS — R53.81 DEBILITY: ICD-10-CM

## 2018-06-11 PROCEDURE — 97110 THERAPEUTIC EXERCISES: CPT

## 2018-06-11 PROCEDURE — 97140 MANUAL THERAPY 1/> REGIONS: CPT

## 2018-06-11 NOTE — OP THERAPY DAILY TREATMENT
Outpatient Physical Therapy  DAILY TREATMENT     [facility] Outpatient Physical Therapy  [address]  [place] [address]  Phone:  [phone]  Fax:  [phone]    Date: 06/11/2018    Patient: Sheyla Gauthier  YOB: 1940  MRN: [id number]     Time Calculation  Start time: 0100  Stop time: 0130 Time Calculation (min): 30 minutes     Chief Complaint: No chief complaint on file.    Visit #: 7    SUBJECTIVE:  Pt was on feet a lot with increased pain    OBJECTIVE:  Current objective measures:     alignment looks good    Exercises/Treatment  Time-based treatments/modalities:   Stretches gluteals  stabilisation exc  STM glut medius  Reviewed HEP   sacral release    Pain rating before treatment: 1  Pain rating after treatment: 0    ASSESSMENT:   Response to treatment:   Improving    PLAN/RECOMMENDATIONS:   Plan for treatment: therapy treatment to continue next visit.  Planned interventions for next visit: continue with current treatment.

## 2018-06-14 NOTE — PROGRESS NOTES
I discussed results and plan with patient, who stated understanding.       Darius Morataya MD  [employer], [address] [address]

## 2018-06-20 ENCOUNTER — APPOINTMENT (OUTPATIENT)
Dept: PHYSICAL THERAPY | Facility: MEDICAL CENTER | Age: 78
End: 2018-06-20
Attending: FAMILY MEDICINE
Payer: MEDICARE

## 2018-06-22 ENCOUNTER — SLEEP CENTER VISIT (OUTPATIENT)
Dept: SLEEP MEDICINE | Facility: MEDICAL CENTER | Age: 78
End: 2018-06-22
Payer: MEDICARE

## 2018-06-22 VITALS
BODY MASS INDEX: 30 KG/M2 | HEIGHT: 62 IN | DIASTOLIC BLOOD PRESSURE: 70 MMHG | RESPIRATION RATE: 15 BRPM | OXYGEN SATURATION: 97 % | HEART RATE: 67 BPM | WEIGHT: 163 LBS | SYSTOLIC BLOOD PRESSURE: 116 MMHG

## 2018-06-22 DIAGNOSIS — I10 ESSENTIAL HYPERTENSION: ICD-10-CM

## 2018-06-22 DIAGNOSIS — G47.33 OSA (OBSTRUCTIVE SLEEP APNEA): ICD-10-CM

## 2018-06-22 DIAGNOSIS — R06.83 SNORING: ICD-10-CM

## 2018-06-22 PROCEDURE — 99213 OFFICE O/P EST LOW 20 MIN: CPT | Performed by: NURSE PRACTITIONER

## 2018-06-22 NOTE — PROGRESS NOTES
Chief Complaint   Patient presents with   • Follow-Up     Got a dental appliance   • Apnea         HPI: This patient is a 77 y.o. female, who presents for follow-up of JOHN.      Sleep symptoms included daytime somnolence, loud snoring, a.m. headache. Home sleep study indicates severe obstructive sleep apnea with an AHI of 30, minimum oxygen saturation of 75 %. She was initiated on auto CPAP 5-10 cm H2O with excellent compliance but she says she was unable to tolerate mask or pressure.  She reported it was more disruptive to her sleep.  When she was seen in February alternatives were discussed including dental appliance.  She requested a referral to be evaluated for the new inspire device.  Unfortunately she is not heard anything regarding this referral yet.  She has obtained a dental appliance with improvement in her symptoms.  Her snoring is greatly improved, she has more energy during the day.      She reports dyspnea but says her primary care physician has been doing a thorough cardiopulmonary workup which thus far has been unrevealing per patient report.  She has a follow-up appointment with her PCP.    Past Medical History:   Diagnosis Date   • Anxiety    • Arthritis    • Cataract    • Dyslipidemia    • Herpes zoster 10/27/06   • HTN (hypertension)    • Hypertension    • MHA (microangiopathic hemolytic anemia)    • Migraine syndrome 2/11/2013   • Osteopathia    • Osteoporosis    • Post-menopause on HRT (hormone replacement therapy)    • Thyroid disease    • White matter abnormality on MRI of brain 2/11/2013       Social History   Substance Use Topics   • Smoking status: Former Smoker   • Smokeless tobacco: Never Used      Comment: 30 YRS AGO   • Alcohol use 0.0 oz/week      Comment: occassional       Family History   Problem Relation Age of Onset   • Stroke Father    • Arthritis Father    • Hyperlipidemia Father    • Hypertension Father    • Cancer Sister      breast   • Arthritis Sister    • Arthritis Mother   "  • Hypertension Mother    • Hyperlipidemia Mother    • Stroke Mother    • Arthritis Brother    • Cancer Brother    • Heart Disease Brother    • Hypertension Brother    • Hyperlipidemia Brother    • Cancer Daughter      breast       Current medications as of today   Current Outpatient Prescriptions   Medication Sig Dispense Refill   • levoFLOXacin (LEVAQUIN) 500 MG tablet levofloxacin 500 mg tablet   TAKE 1 TAB BY MOUTH EVERY DAY.     • LORazepam (ATIVAN) 0.5 MG Tab lorazepam 0.5 mg tablet     • alendronate (FOSAMAX) 70 MG Tab TAKE 1 TABLET BY MOUTH 1 TIME EVERY 7 DAYS 12 Tab 0   • amLODIPine (NORVASC) 5 MG Tab TAKE 1 TABLET BY MOUTH EVERY DAY 90 Tab 0   • fluticasone (FLONASE) 50 MCG/ACT nasal spray Spray 2 Sprays in nose every day. 16 g 3   • pravastatin (PRAVACHOL) 40 MG tablet Take 1 Tab by mouth every bedtime. 90 Tab 1   • Cholecalciferol (VITAMIN D PO) Take  by mouth.     • ibuprofen (MOTRIN) 600 MG Tab Take 1 Tab by mouth every 8 hours as needed. 30 Tab 0   • losartan (COZAAR) 50 MG Tab Take 1 Tab by mouth every day. 90 Tab 1   • Magnesium 400 MG Tab Take  by mouth.     • levothyroxine (SYNTHROID) 75 MCG Tab Take 75 mcg by mouth Every morning on an empty stomach.     • aspirin (BABY ASPIRIN) 81 MG CHEW Take 81 mg by mouth every day.       No current facility-administered medications for this visit.        Allergies: Amoxicillin and Latex    Blood pressure 116/70, pulse 67, resp. rate 15, height 1.575 m (5' 2\"), weight 73.9 kg (163 lb), last menstrual period 11/01/1986, SpO2 97 %.      ROS: As per HPI and otherwise negative if not stated.      Physical exam:   Constitutional: Well-nourished, well-developed, in no acute distress  Eyes: PERRL  Neck: supple, trachea midline  Respiratory: no intercostal retractions or accessory muscle use   Lungs auscultation: Clear to auscultation bilaterally  Cardiovascular: Regular rate rhythm no murmurs, rubs or gallops  Musculoskeletal: no clubbing or cyanosis  Skin: No " rashes or lesions noted on exposed skin  Neuro: No focal deficit noted  Psychiatric: Oriented to time, person and place.     Diagnosis:  1. Snoring  OVERNIGHT HOME SLEEP STUDY   2. JOHN (obstructive sleep apnea)  OVERNIGHT HOME SLEEP STUDY    REFERRAL TO OTHER       Plan:  1.  Referral to [facility] for evaluation of inspired device  2.  Continue dental appliance nightly.  Patient is scheduled for a few more adjustments, after which we will obtain a home sleep study.  This is ordered for approximately 6 weeks from now.    3.  Patient will follow-up in 3-4 months to review compliance, sooner if needed

## 2018-06-26 DIAGNOSIS — R91.1 PULMONARY NODULE: ICD-10-CM

## 2018-06-26 RX ORDER — LOSARTAN POTASSIUM 50 MG/1
TABLET ORAL
Qty: 90 TAB | Refills: 0 | Status: SHIPPED | OUTPATIENT
Start: 2018-06-26 | End: 2018-09-18 | Stop reason: SDUPTHER

## 2018-06-26 RX ORDER — LOSARTAN POTASSIUM 50 MG/1
50 TABLET ORAL DAILY
Qty: 90 TAB | Refills: 0 | Status: SHIPPED | OUTPATIENT
Start: 2018-06-26 | End: 2019-03-20

## 2018-06-28 ENCOUNTER — TELEPHONE (OUTPATIENT)
Dept: MEDICAL GROUP | Age: 78
End: 2018-06-28

## 2018-06-28 NOTE — TELEPHONE ENCOUNTER
Phone Number Called: [phone] (home)       Message: LVM for patient regarding fluticasone recall to check if it work well for her.     Left Message for patient to call back: no

## 2018-07-02 ENCOUNTER — APPOINTMENT (OUTPATIENT)
Dept: PHYSICAL THERAPY | Facility: MEDICAL CENTER | Age: 78
End: 2018-07-02
Attending: FAMILY MEDICINE
Payer: MEDICARE

## 2018-07-17 ENCOUNTER — OFFICE VISIT (OUTPATIENT)
Dept: PULMONOLOGY | Facility: HOSPICE | Age: 78
End: 2018-07-17
Payer: MEDICARE

## 2018-07-17 VITALS
TEMPERATURE: 98 F | HEIGHT: 62 IN | BODY MASS INDEX: 30.36 KG/M2 | DIASTOLIC BLOOD PRESSURE: 78 MMHG | HEART RATE: 70 BPM | RESPIRATION RATE: 14 BRPM | SYSTOLIC BLOOD PRESSURE: 110 MMHG | WEIGHT: 165 LBS | OXYGEN SATURATION: 97 %

## 2018-07-17 DIAGNOSIS — R06.02 SOB (SHORTNESS OF BREATH): ICD-10-CM

## 2018-07-17 DIAGNOSIS — R91.8 PULMONARY NODULES: ICD-10-CM

## 2018-07-17 DIAGNOSIS — K44.9 DIAPHRAGMATIC HERNIA WITHOUT OBSTRUCTION AND WITHOUT GANGRENE: ICD-10-CM

## 2018-07-17 DIAGNOSIS — G47.33 OSA (OBSTRUCTIVE SLEEP APNEA): ICD-10-CM

## 2018-07-17 PROCEDURE — 99214 OFFICE O/P EST MOD 30 MIN: CPT | Performed by: INTERNAL MEDICINE

## 2018-07-17 RX ORDER — HYDROCODONE BITARTRATE AND ACETAMINOPHEN 10; 325 MG/1; MG/1
TABLET ORAL
COMMUNITY
End: 2019-01-04

## 2018-07-17 RX ORDER — TRETINOIN 0.5 MG/G
CREAM TOPICAL
COMMUNITY
End: 2019-03-26

## 2018-07-17 RX ORDER — MINOCYCLINE HYDROCHLORIDE 100 MG/1
CAPSULE ORAL
COMMUNITY
End: 2019-03-26

## 2018-07-17 RX ORDER — LEVOTHYROXINE SODIUM 0.07 MG/1
TABLET ORAL
COMMUNITY
End: 2019-03-26

## 2018-07-17 RX ORDER — LEVOTHYROXINE SODIUM 0.05 MG/1
TABLET ORAL
COMMUNITY
End: 2019-03-26

## 2018-07-17 RX ORDER — CHLORTHALIDONE 25 MG/1
TABLET ORAL
COMMUNITY
End: 2019-03-26

## 2018-07-17 RX ORDER — IBUPROFEN 800 MG/1
TABLET ORAL
COMMUNITY
End: 2019-03-26

## 2018-07-17 RX ORDER — ASPIRIN 81 MG/1
TABLET, CHEWABLE ORAL
COMMUNITY
End: 2019-08-26 | Stop reason: SDUPTHER

## 2018-07-17 RX ORDER — EZETIMIBE 10 MG/1
TABLET ORAL
COMMUNITY
End: 2019-03-26

## 2018-07-17 RX ORDER — ROSUVASTATIN CALCIUM 5 MG/1
TABLET, COATED ORAL
COMMUNITY
End: 2019-03-26

## 2018-07-17 RX ORDER — AZITHROMYCIN 250 MG/1
TABLET, FILM COATED ORAL
COMMUNITY
End: 2018-08-09

## 2018-07-17 RX ORDER — LOSARTAN POTASSIUM 50 MG/1
TABLET ORAL
COMMUNITY
End: 2019-03-20

## 2018-07-17 RX ORDER — METHYLPREDNISOLONE 4 MG/1
TABLET ORAL
COMMUNITY
End: 2019-03-26

## 2018-07-17 RX ORDER — OXYCODONE HYDROCHLORIDE AND ACETAMINOPHEN 5; 325 MG/1; MG/1
TABLET ORAL
COMMUNITY
End: 2019-01-04

## 2018-07-17 RX ORDER — AMLODIPINE BESYLATE 5 MG/1
TABLET ORAL
COMMUNITY
End: 2019-02-14 | Stop reason: SDUPTHER

## 2018-07-17 RX ORDER — AMLODIPINE BESYLATE 5 MG/1
TABLET ORAL
COMMUNITY
End: 2019-01-31 | Stop reason: SDUPTHER

## 2018-07-17 RX ORDER — CITALOPRAM 20 MG/1
TABLET ORAL
COMMUNITY
End: 2019-03-26

## 2018-07-17 RX ORDER — CEPHALEXIN 500 MG/1
CAPSULE ORAL
COMMUNITY
End: 2018-08-09

## 2018-07-17 RX ORDER — IRBESARTAN 150 MG/1
TABLET ORAL
COMMUNITY
End: 2019-03-20

## 2018-07-17 RX ORDER — ALBUTEROL SULFATE 90 UG/1
AEROSOL, METERED RESPIRATORY (INHALATION)
COMMUNITY
End: 2019-03-26

## 2018-07-17 RX ORDER — ESCITALOPRAM OXALATE 20 MG/1
TABLET ORAL
COMMUNITY
End: 2019-03-26

## 2018-07-17 RX ORDER — ALENDRONATE SODIUM 70 MG/1
TABLET ORAL
COMMUNITY
End: 2018-08-09

## 2018-07-17 RX ORDER — LEVOTHYROXINE SODIUM 0.12 MG/1
TABLET ORAL
COMMUNITY
End: 2019-03-26

## 2018-07-17 RX ORDER — DOXYCYCLINE HYCLATE 100 MG/1
CAPSULE ORAL
COMMUNITY
End: 2019-03-26

## 2018-07-17 RX ORDER — SULFAMETHOXAZOLE AND TRIMETHOPRIM 800; 160 MG/1; MG/1
TABLET ORAL
COMMUNITY
End: 2019-03-26

## 2018-07-17 RX ORDER — TOPIRAMATE 25 MG/1
TABLET ORAL
COMMUNITY
End: 2019-03-26

## 2018-07-17 RX ORDER — FLUTICASONE PROPIONATE 50 MCG
SPRAY, SUSPENSION (ML) NASAL
COMMUNITY
End: 2019-03-26

## 2018-07-17 NOTE — PROGRESS NOTES
"Chief Complaint   Patient presents with   • New Patient     Pulmonary Nodule       HPI: This patient is a 77 y.o. Female who is referred for lung nodules.  She had a chest CAT scan with contrast on June 8, 2018 for workup of shortness of breath.  Images were personally reviewed and show a noncalcified, 3.3 mm pulmonary nodule in the left lower lobe and a groundglass opacity in the left lower lobe.  Of incidental finding was a large hernia.  She was an \"on and off\" smoker for 10 years, quit in 1981.  She denies cough, wheezing, hemoptysis, or weight loss.  She has in fact gained 30 pounds over the past year.  She denies history of pneumonia or TB exposure as well as any occupational exposures to pulmonary toxins.      Past Medical History:   Diagnosis Date   • Anxiety    • Arthritis    • Back pain    • Cataract    • Daytime sleepiness    • Depression    • Dyslipidemia    • Fatigue    • Frequent headaches    • Gasping for breath    • Herpes zoster 10/27/06   • HTN (hypertension)    • Hypertension    • MHA (microangiopathic hemolytic anemia)    • Migraine syndrome 2/11/2013   • Morning headache    • Mumps    • Nasal drainage    • Osteopathia    • Osteoporosis    • Painful joint    • Post-menopause on HRT (hormone replacement therapy)    • Shortness of breath    • Sleep apnea    • Sore muscles    • Swelling of lower extremity    • Thyroid disease    • Tonsillitis    • White matter abnormality on MRI of brain 2/11/2013       Social History     Social History   • Marital status:      Spouse name: N/A   • Number of children: N/A   • Years of education: N/A     Occupational History   • Not on file.     Social History Main Topics   • Smoking status: Former Smoker   • Smokeless tobacco: Never Used      Comment: smoked off & on for 10 yrs   • Alcohol use 0.0 oz/week      Comment: occassional   • Drug use: No   • Sexual activity: Not Currently      Comment: , 3 kids, retired     Other Topics Concern   • Not on " file     Social History Narrative   • No narrative on file       Family History   Problem Relation Age of Onset   • Stroke Father    • Arthritis Father    • Hyperlipidemia Father    • Hypertension Father    • Cancer Sister      breast   • Arthritis Sister    • Arthritis Mother    • Hypertension Mother    • Hyperlipidemia Mother    • Stroke Mother    • Arthritis Brother    • Cancer Brother    • Heart Disease Brother    • Hypertension Brother    • Hyperlipidemia Brother    • Cancer Daughter      breast       Current Outpatient Prescriptions on File Prior to Visit   Medication Sig Dispense Refill   • losartan (COZAAR) 50 MG Tab Take 1 Tab by mouth every day. 90 Tab 0   • alendronate (FOSAMAX) 70 MG Tab TAKE 1 TABLET BY MOUTH 1 TIME EVERY 7 DAYS 12 Tab 0   • amLODIPine (NORVASC) 5 MG Tab TAKE 1 TABLET BY MOUTH EVERY DAY 90 Tab 0   • pravastatin (PRAVACHOL) 40 MG tablet Take 1 Tab by mouth every bedtime. 90 Tab 1   • ibuprofen (MOTRIN) 600 MG Tab Take 1 Tab by mouth every 8 hours as needed. 30 Tab 0   • losartan (COZAAR) 50 MG Tab TAKE 1 TABLET BY MOUTH EVERY DAY 90 Tab 0   • levoFLOXacin (LEVAQUIN) 500 MG tablet levofloxacin 500 mg tablet   TAKE 1 TAB BY MOUTH EVERY DAY.     • LORazepam (ATIVAN) 0.5 MG Tab lorazepam 0.5 mg tablet     • fluticasone (FLONASE) 50 MCG/ACT nasal spray Spray 2 Sprays in nose every day. 16 g 3   • Cholecalciferol (VITAMIN D PO) Take  by mouth.     • Magnesium 400 MG Tab Take  by mouth.     • levothyroxine (SYNTHROID) 75 MCG Tab Take 75 mcg by mouth Every morning on an empty stomach.     • aspirin (BABY ASPIRIN) 81 MG CHEW Take 81 mg by mouth every day.       No current facility-administered medications on file prior to visit.        Allergies: Amoxicillin and Latex    ROS:   Constitutional: Denies fevers, chills, night sweats, +fatigue, denies weight loss  Eyes: Denies vision loss, pain, drainage, double vision  Ears, Nose, Throat: Denies earache, difficulty hearing, tinnitus, nasal  "congestion, hoarseness  Cardiovascular: Denies chest pain, tightness, palpitations, orthopnea or edema  Respiratory: As in HPI  Sleep: Denies daytime sleepiness, snoring, apneas, insomnia, morning headaches  GI: Denies heartburn, dysphagia, nausea, abdominal pain, diarrhea or constipation  : Denies frequent urination, hematuria, discharge or painful urination  Musculoskeletal: Denies back pain, painful joints, sore muscles  Neurological: Denies weakness or headaches  Skin: No rashes    Blood pressure 110/78, pulse 70, temperature 36.7 °C (98 °F), resp. rate 14, height 1.575 m (5' 2\"), weight 74.8 kg (165 lb), last menstrual period 11/01/1986, SpO2 97 %.    Physical Exam:  Appearance: Well-nourished, well-developed, in no acute distress  HEENT: Normocephalic, atraumatic, white sclera, PERRLA, oropharynx clear  Neck: No adenopathy or masses  Respiratory: no intercostal retractions or accessory muscle use  Lungs auscultation: Clear to auscultation bilaterally  Cardiovascular: Regular rate rhythm. No murmurs, rubs or gallops.  No LE edema  Abdomen: soft, nondistended  Gait: Normal  Digits: No clubbing, cyanosis  Motor: No focal deficits  Orientation: Oriented to time, person and place    Diagnosis:  1. Pulmonary nodules  CT-CHEST (THORAX) W/O   2. SOB (shortness of breath)     3. Diaphragmatic hernia without obstruction and without gangrene  REFERRAL TO GENERAL SURGERY   4. JOHN (obstructive sleep apnea)     5. BMI 30.0-30.9,adult  OBESITY COUNSELING (No Charge): Patient identified as having weight management issue.  Appropriate orders and counseling given.       Plan:  The patient has a pulmonary micronodule noted on chest imaging, of clinical insignificance.  She has minimal smoking history.  We discussed that the nodule is likely postinflammatory, however follow-up chest CAT scan without contrast could be considered in 1 year to document stability.  She was interested in surveillance of the nodule and we will " arrange for follow-up chest CT in 1 year.  In terms of her shortness of breath, this may be exacerbated by her large diaphragmatic hernia/atelectasis and weight gain.  She would like to speak to a surgeon regarding treatment of her hernia, and is referred to Dr. Ganser accordingly.  Patient's body mass index is 30.18 kg/m². Exercise and nutrition counseling were performed at this visit.  Return in about 1 year (around 7/17/2019) for after CT scan, at lung nodule clinic.

## 2018-07-18 ENCOUNTER — HOSPITAL ENCOUNTER (OUTPATIENT)
Dept: LAB | Facility: MEDICAL CENTER | Age: 78
End: 2018-07-18
Attending: INTERNAL MEDICINE
Payer: MEDICARE

## 2018-07-18 LAB
T4 FREE SERPL-MCNC: 1.03 NG/DL (ref 0.53–1.43)
TSH SERPL DL<=0.005 MIU/L-ACNC: 0.7 UIU/ML (ref 0.38–5.33)

## 2018-07-18 PROCEDURE — 84439 ASSAY OF FREE THYROXINE: CPT

## 2018-07-18 PROCEDURE — 84443 ASSAY THYROID STIM HORMONE: CPT

## 2018-07-18 PROCEDURE — 36415 COLL VENOUS BLD VENIPUNCTURE: CPT

## 2018-07-19 RX ORDER — IBUPROFEN 600 MG/1
600 TABLET ORAL EVERY 8 HOURS PRN
Qty: 30 TAB | Refills: 0 | Status: SHIPPED | OUTPATIENT
Start: 2018-07-19 | End: 2018-08-23 | Stop reason: SDUPTHER

## 2018-07-19 NOTE — TELEPHONE ENCOUNTER
From: Sheyla Gauthier  Sent: 7/18/2018 12:51 PM PDT  Subject: Medication Renewal Request    Sheyla Gauthier would like a refill of the following medications:     ibuprofen (MOTRIN) 600 MG Tab [Darius Denney M.D.]   Patient Comment: need doctor approval    Preferred pharmacy: [facility] [address] [place] [address] - [address] [place] [address] AT [email or web address]

## 2018-07-31 DIAGNOSIS — I10 ESSENTIAL HYPERTENSION: ICD-10-CM

## 2018-07-31 RX ORDER — AMLODIPINE BESYLATE 5 MG/1
TABLET ORAL
Qty: 90 TAB | Refills: 1 | Status: SHIPPED | OUTPATIENT
Start: 2018-07-31 | End: 2019-03-20

## 2018-08-08 ENCOUNTER — TELEPHONE (OUTPATIENT)
Dept: MEDICAL GROUP | Age: 78
End: 2018-08-08

## 2018-08-08 NOTE — TELEPHONE ENCOUNTER
Future Appointments       Provider Department Center    8/9/2018 2:20 PM Darius Denney M.D. [facility] [address]    8/13/2018 2:10 PM SLEEP CLINIC [facility] Sleep Medicine     11/1/2018 1:40 PM RINA Soto [facility] Sleep Medicine         ESTABLISHED PATIENT PRE-VISIT PLANNING     Note: Patient will not be contacted if there is no indication to call.     1.  Reviewed notes from the last few office visits within the medical group: Yes    2.  If any orders were placed at last visit or intended to be done for this visit (i.e. 6 mos follow-up), do we have Results/Consult Notes?        •  Labs - Labs ordered, completed on 6/6/18 and results are in chart.   Note: If patient appointment is for lab review and patient did not complete labs, check with provider if OK to reschedule patient until labs completed.       •  Imaging - Imaging was not ordered at last office visit.       •  Referrals - Referral ordered, patient has NOT been seen.    3. Is this appointment scheduled as a Hospital Follow-Up? No    4.  Immunizations were updated in Epic using WebIZ?: Epic matches WebIZ       •  Web Iz Recommendations: Patient is up to date on all vaccines    5.  Patient is due for the following Health Maintenance Topics:   Health Maintenance Due   Topic Date Due   • IMM ZOSTER VACCINES (2 of 3) 12/21/2010   • Annual Wellness Visit  08/21/2014       - Patient is up-to-date on all Health Maintenance topics. No records have been requested at this time.    6.  MDX printed for Provider? NO    7.  Patient was NOT informed to arrive 15 min prior to their scheduled appointment and bring in their medication bottles.

## 2018-08-09 ENCOUNTER — OFFICE VISIT (OUTPATIENT)
Dept: MEDICAL GROUP | Age: 78
End: 2018-08-09
Payer: MEDICARE

## 2018-08-09 VITALS
HEIGHT: 62 IN | BODY MASS INDEX: 30.55 KG/M2 | HEART RATE: 78 BPM | SYSTOLIC BLOOD PRESSURE: 120 MMHG | DIASTOLIC BLOOD PRESSURE: 78 MMHG | TEMPERATURE: 99.2 F | OXYGEN SATURATION: 95 % | WEIGHT: 166 LBS

## 2018-08-09 DIAGNOSIS — K44.9 HIATAL HERNIA: ICD-10-CM

## 2018-08-09 DIAGNOSIS — R06.02 SOB (SHORTNESS OF BREATH): ICD-10-CM

## 2018-08-09 PROCEDURE — 99214 OFFICE O/P EST MOD 30 MIN: CPT | Performed by: FAMILY MEDICINE

## 2018-08-09 RX ORDER — THYROID 30 MG/1
30 TABLET ORAL DAILY
COMMUNITY
End: 2020-10-01

## 2018-08-09 NOTE — PROGRESS NOTES
This medical record contains text that has been entered with the assistance of computer voice recognition and dictation software.  Therefore, it may contain unintended errors in text, spelling, punctuation, or grammar    Chief Complaint   Patient presents with   • Results     lab results         Sheyla Gauthier is a 77 y.o. female here evaluation and management of: Shortness of breath      HPI:     SOB (shortness of breath)  The patient continues to complain of shortness of breath.  She was evaluated stress echocardiogram at [facility] which was considered normal.  She was also evaluated in pulmonology and believed that her hiatal hernia could be contributing to the shortness of breath.  Overall she does not complain of chest pain, she does have dyspnea on exertion or recent prolonged travel.  No periods of immobility per patient.    CT angiogram revealed the below findings on 6/8/2018    Minimal ground glass opacity in the left lower lobe may be infectious or inflammatory.    3.3 mm left lower lobe pulmonary nodule.    Current medicines (including changes today)  Current Outpatient Prescriptions   Medication Sig Dispense Refill   • thyroid (ARMOUR THYROID) 30 MG Tab Take 30 mg by mouth every day.     • coenzyme Q-10 30 MG capsule Take 60 mg by mouth every day.     • Estriol 10 % Cream c-estriol 0.5mg gm cream     • ESTERIFIED ESTROGENS PO      • albuterol (PROAIR HFA) 108 (90 Base) MCG/ACT Aero Soln inhalation aerosol ProAir HFA 90 mcg/actuation aerosol inhaler   INHALE 2 PUFFS BY MOUTH EVERY 6 HOURS AS NEEDED FOR SHORTNESS OF BREATH.     • aspirin (ASA) 81 MG Chew Tab chewable tablet 1     • losartan (COZAAR) 50 MG Tab Take 1 Tab by mouth every day. 90 Tab 0   • levoFLOXacin (LEVAQUIN) 500 MG tablet levofloxacin 500 mg tablet   TAKE 1 TAB BY MOUTH EVERY DAY.     • LORazepam (ATIVAN) 0.5 MG Tab lorazepam 0.5 mg tablet     • alendronate (FOSAMAX) 70 MG Tab TAKE 1 TABLET BY MOUTH 1 TIME EVERY 7 DAYS 12 Tab 0   •  fluticasone (FLONASE) 50 MCG/ACT nasal spray Spray 2 Sprays in nose every day. 16 g 3   • pravastatin (PRAVACHOL) 40 MG tablet Take 1 Tab by mouth every bedtime. 90 Tab 1   • Cholecalciferol (VITAMIN D PO) Take  by mouth.     • Magnesium 400 MG Tab Take  by mouth.     • amLODIPine (NORVASC) 5 MG Tab TAKE 1 TABLET BY MOUTH EVERY DAY 90 Tab 1   • ibuprofen (MOTRIN) 600 MG Tab Take 1 Tab by mouth every 8 hours as needed. 30 Tab 0   • Estriol 10 % Cream   6   • Estriol 10 % Cream c-estriol 0.5mg/gm cream     • chlorthalidone (HYGROTON) 25 MG Tab chlorthalidone 25 mg tablet   TAKE 1 TABLET ORALLY DAILY     • citalopram (CELEXA) 20 MG Tab citalopram 20 mg tablet   TAKE 1 TABLET ORALLY DAILY     • rosuvastatin (CRESTOR) 5 MG Tab Crestor 5 mg tablet   TAKE 1 TABLET BY MOUTH ONCE NIGHTLY     • doxycycline (VIBRAMYCIN) 100 MG Cap doxycycline hyclate 100 mg capsule     • escitalopram (LEXAPRO) 20 MG tablet escitalopram 20 mg tablet   TAKE 1 TABLET BY MOUTH EVERY DAY     • Influenza Vac Typ A&B Surf Ant (FLUVIRIN IM) Fluvirin 5018-0034(PF) 45 mcg (15 mcg x3)/0.5 mL intramuscular syringe   TO BE ADMINISTERED BY PHARMACIST FOR IMMUNIZATION     • Influenza Vaccine High-Dose pf 0.5 ML Suspension Prefilled Syringe injection Fluzone High-Dose 2015-16 (PF) 180 mcg/0.5 mL intramuscular syringe   TO BE ADMINISTERED BY PHARMACIST FOR IMMUNIZATION     • HYDROcodone/acetaminophen (NORCO)  MG Tab hydrocodone 10 mg-acetaminophen 325 mg tablet     • irbesartan (AVAPRO) 150 MG Tab irbesartan 150 mg tablet     • levothyroxine (LEVOXYL) 75 MCG Tab 1 FOR 6 DAYS AND 1 1/2 FOR 1 DAY Q WEEK ORALLY     • methylPREDNISolone (MEDROL) 4 MG Tab methylprednisolone 4 mg tablets in a dose pack     • metronidazole (METROCREAM) 0.75 % cream metronidazole 0.75 % topical cream     • minocycline (MINOCIN) 100 MG Cap minocycline 100 mg capsule     • pneumococcal 13-Zeina Conj Vacc (PREVNAR 13) syringe Prevnar 13 (PF) 0.5 mL intramuscular syringe   TO BE  ADMINISTERED BY PHARMACIST FOR IMMUNIZATION     • oxyCODONE-acetaminophen (ROXICET) 5-325 MG Tab Roxicet 5 mg-325 mg tablet   TAKE 1 TO 2 TABS BY MOUTH EVERY 6 HOURS AS NEEDED     • sulfamethoxazole-trimethoprim (BACTRIM DS) 800-160 MG tablet sulfamethoxazole 800 mg-trimethoprim 160 mg tablet   TAKE 1 TAB BY MOUTH 2 TIMES A DAY FOR 7 DAYS. WITH FOOD     • topiramate (TOPAMAX) 25 MG Tab topiramate 25 mg tablet   TAKE 2 TABS BY MOUTH EVERY DAY.     • tretinoin (RETIN-A) 0.05 % cream tretinoin 0.05 % topical cream   APPLY AT BEDTIME     • ezetimibe (ZETIA) 10 MG Tab 1     • ezetimibe (ZETIA) 10 MG Tab Zetia 10 mg tablet     • amLODIPine (NORVASC) 5 MG Tab amlodipine 5 mg tablet     • amLODIPine (NORVASC) 5 MG Tab      • fluticasone (FLONASE) 50 MCG/ACT nasal spray fluticasone 50 mcg/actuation nasal spray,suspension     • ibuprofen (MOTRIN) 800 MG Tab ibuprofen 800 mg tablet   TAKE 1 TABLET BY MOUTH 3 TIMES A DAY     • levothyroxine (LEVOXYL) 50 MCG Tab Levoxyl 50 mcg tablet     • levothyroxine (LEVOXYL) 75 MCG Tab Levoxyl 75 mcg tablet     • losartan (COZAAR) 50 MG Tab 1 tablet     • levothyroxine (SYNTHROID) 125 MCG Tab Synthroid 125 mcg tablet     • losartan (COZAAR) 50 MG Tab TAKE 1 TABLET BY MOUTH EVERY DAY 90 Tab 0   • levothyroxine (SYNTHROID) 75 MCG Tab Take 75 mcg by mouth Every morning on an empty stomach.       No current facility-administered medications for this visit.      She  has a past medical history of Anxiety; Arthritis; Back pain; Cataract; Daytime sleepiness; Depression; Dyslipidemia; Fatigue; Frequent headaches; Gasping for breath; Herpes zoster (10/27/06); HTN (hypertension); Hypertension; MHA (microangiopathic hemolytic anemia); Migraine syndrome (2/11/2013); Morning headache; Mumps; Nasal drainage; Osteopathia; Osteoporosis; Painful joint; Post-menopause on HRT (hormone replacement therapy); Shortness of breath; Sleep apnea; Sore muscles; Swelling of lower extremity; Thyroid disease;  "Tonsillitis; and White matter abnormality on MRI of brain (2013).  She  has a past surgical history that includes cholecystectomy (); abdominal hysterectomy total (1986); pr reduction of large breast (); foot surgery (); pr enlarge breast with implant; mammoplasty augmentation; bunionectomy (Left, ); eye surgery; hysterectomy laparoscopy; and arthroscopy, knee.  Social History   Substance Use Topics   • Smoking status: Former Smoker   • Smokeless tobacco: Never Used      Comment: smoked off & on for 10 yrs   • Alcohol use 0.0 oz/week      Comment: occassional     Social History     Social History Narrative   • No narrative on file     Family History   Problem Relation Age of Onset   • Stroke Father    • Arthritis Father    • Hyperlipidemia Father    • Hypertension Father    • Cancer Sister         breast   • Arthritis Sister    • Arthritis Mother    • Hypertension Mother    • Hyperlipidemia Mother    • Stroke Mother    • Arthritis Brother    • Cancer Brother    • Heart Disease Brother    • Hypertension Brother    • Hyperlipidemia Brother    • Cancer Daughter         breast     Family Status   Relation Status   • Fa    • Sis Alive   • Mo         tia   • Bro Alive   • Ayesha (Not Specified)         ROS    Please see hpi     All other systems reviewed and are negative     Objective:     Blood pressure 120/78, pulse 78, temperature 37.3 °C (99.2 °F), height 1.575 m (5' 2\"), weight 75.3 kg (166 lb), last menstrual period 1986, SpO2 95 %, not currently breastfeeding. Body mass index is 30.36 kg/m².  Physical Exam:    Constitutional: Alert, no distress.  Skin: Warm, dry, good turgor, no rashes in visible areas.  Eye: Equal, round and reactive, conjunctiva clear, lids normal.  ENMT: Lips without lesions, good dentition, oropharynx clear.  Neck: Trachea midline, no masses, no thyromegaly. No cervical or supraclavicular lymphadenopathy.  Respiratory: Unlabored respiratory effort, " lungs clear to auscultation, no wheezes, no ronchi.  Cardiovascular: Normal S1, S2, no murmur, no edema.  Abdomen: Soft, non-tender, no masses, no hepatosplenomegaly.  Psych: Alert and oriented x3, normal affect and mood.          Assessment and Plan:   The following treatment plan was discussed      1. Hiatal hernia  The patient would like to get a second opinion from another surgeon  - REFERRAL TO GENERAL SURGERY    2. SOB (shortness of breath)  The patient would also like to get a second cardiology opinion from [facility]    - REFERRAL TO CARDIOLOGY  - ECHO-REST/STRESS W/ CONTRAST; Future        HEALTH MAINTENANCE:    Instructed to Follow up in clinic or ER for worsening symptoms, difficulty breathing, lack of expected recovery, or should new symptoms or problems arise.    Followup: Return in about 3 months (around 11/9/2018) for Reevaluation, labs.       Once again this medical record contains text that has been entered with the assistance of computer voice recognition and dictation software.  Therefore, it may contain unintended errors in text, spelling, punctuation, or grammar

## 2018-08-09 NOTE — ASSESSMENT & PLAN NOTE
The patient continues to complain of shortness of breath.  She was evaluated stress echocardiogram at [place] which was considered normal.  She was also evaluated in pulmonology and believed that her hiatal hernia could be contributing to the shortness of breath.  Overall she does not complain of chest pain, she does have dyspnea on exertion or recent prolonged travel.  No periods of immobility per patient.    CT angiogram revealed the below findings on 6/8/2018    Minimal ground glass opacity in the left lower lobe may be infectious or inflammatory.    3.3 mm left lower lobe pulmonary nodule.

## 2018-08-13 ENCOUNTER — APPOINTMENT (OUTPATIENT)
Dept: SLEEP MEDICINE | Facility: MEDICAL CENTER | Age: 78
End: 2018-08-13
Attending: NURSE PRACTITIONER
Payer: MEDICARE

## 2018-08-24 DIAGNOSIS — M89.9 OSTEOPATHIA: ICD-10-CM

## 2018-08-24 RX ORDER — ALENDRONATE SODIUM 70 MG/1
TABLET ORAL
Qty: 12 TAB | Refills: 0 | Status: SHIPPED | OUTPATIENT
Start: 2018-08-24 | End: 2018-09-11 | Stop reason: SDUPTHER

## 2018-08-28 ENCOUNTER — HOSPITAL ENCOUNTER (OUTPATIENT)
Dept: LAB | Facility: MEDICAL CENTER | Age: 78
End: 2018-08-28
Attending: INTERNAL MEDICINE
Payer: MEDICARE

## 2018-08-28 DIAGNOSIS — E78.00 PURE HYPERCHOLESTEROLEMIA: ICD-10-CM

## 2018-08-28 LAB
ALBUMIN SERPL BCP-MCNC: 3.8 G/DL (ref 3.2–4.9)
ALBUMIN/GLOB SERPL: 1.4 G/DL
ALP SERPL-CCNC: 39 U/L (ref 30–99)
ALT SERPL-CCNC: 41 U/L (ref 2–50)
ANION GAP SERPL CALC-SCNC: 9 MMOL/L (ref 0–11.9)
AST SERPL-CCNC: 36 U/L (ref 12–45)
BILIRUB SERPL-MCNC: 0.5 MG/DL (ref 0.1–1.5)
BNP SERPL-MCNC: 32 PG/ML (ref 0–100)
BUN SERPL-MCNC: 21 MG/DL (ref 8–22)
CALCIUM SERPL-MCNC: 9.4 MG/DL (ref 8.5–10.5)
CHLORIDE SERPL-SCNC: 107 MMOL/L (ref 96–112)
CO2 SERPL-SCNC: 26 MMOL/L (ref 20–33)
CREAT SERPL-MCNC: 0.8 MG/DL (ref 0.5–1.4)
ERYTHROCYTE [DISTWIDTH] IN BLOOD BY AUTOMATED COUNT: 43.8 FL (ref 35.9–50)
GLOBULIN SER CALC-MCNC: 2.7 G/DL (ref 1.9–3.5)
GLUCOSE SERPL-MCNC: 82 MG/DL (ref 65–99)
HCT VFR BLD AUTO: 42.8 % (ref 37–47)
HGB BLD-MCNC: 14.4 G/DL (ref 12–16)
MCH RBC QN AUTO: 29.2 PG (ref 27–33)
MCHC RBC AUTO-ENTMCNC: 33.6 G/DL (ref 33.6–35)
MCV RBC AUTO: 86.8 FL (ref 81.4–97.8)
PLATELET # BLD AUTO: 256 K/UL (ref 164–446)
PMV BLD AUTO: 10.1 FL (ref 9–12.9)
POTASSIUM SERPL-SCNC: 3.8 MMOL/L (ref 3.6–5.5)
PROT SERPL-MCNC: 6.5 G/DL (ref 6–8.2)
RBC # BLD AUTO: 4.93 M/UL (ref 4.2–5.4)
SODIUM SERPL-SCNC: 142 MMOL/L (ref 135–145)
WBC # BLD AUTO: 4.5 K/UL (ref 4.8–10.8)

## 2018-08-28 PROCEDURE — 83880 ASSAY OF NATRIURETIC PEPTIDE: CPT

## 2018-08-28 PROCEDURE — 36415 COLL VENOUS BLD VENIPUNCTURE: CPT

## 2018-08-28 PROCEDURE — 85027 COMPLETE CBC AUTOMATED: CPT

## 2018-08-28 PROCEDURE — 80053 COMPREHEN METABOLIC PANEL: CPT

## 2018-08-29 RX ORDER — PRAVASTATIN SODIUM 40 MG
TABLET ORAL
Qty: 90 TAB | Refills: 0 | Status: SHIPPED | OUTPATIENT
Start: 2018-08-29 | End: 2018-12-02 | Stop reason: SDUPTHER

## 2018-09-04 DIAGNOSIS — M48.061 SPINAL STENOSIS OF LUMBAR REGION, UNSPECIFIED WHETHER NEUROGENIC CLAUDICATION PRESENT: ICD-10-CM

## 2018-09-04 RX ORDER — IBUPROFEN 600 MG/1
TABLET ORAL
Qty: 30 TAB | Refills: 0 | Status: SHIPPED | OUTPATIENT
Start: 2018-09-04 | End: 2018-09-13 | Stop reason: SDUPTHER

## 2018-09-06 ENCOUNTER — HOSPITAL ENCOUNTER (OUTPATIENT)
Dept: LAB | Facility: MEDICAL CENTER | Age: 78
End: 2018-09-06
Attending: INTERNAL MEDICINE
Payer: MEDICARE

## 2018-09-06 LAB
T4 FREE SERPL-MCNC: 0.69 NG/DL (ref 0.53–1.43)
TSH SERPL DL<=0.005 MIU/L-ACNC: 0.86 UIU/ML (ref 0.38–5.33)

## 2018-09-06 PROCEDURE — 84439 ASSAY OF FREE THYROXINE: CPT

## 2018-09-06 PROCEDURE — 36415 COLL VENOUS BLD VENIPUNCTURE: CPT

## 2018-09-06 PROCEDURE — 84443 ASSAY THYROID STIM HORMONE: CPT

## 2018-09-11 DIAGNOSIS — M89.9 OSTEOPATHIA: ICD-10-CM

## 2018-09-12 ENCOUNTER — HOME STUDY (OUTPATIENT)
Dept: SLEEP MEDICINE | Facility: MEDICAL CENTER | Age: 78
End: 2018-09-12
Attending: NURSE PRACTITIONER
Payer: MEDICARE

## 2018-09-12 DIAGNOSIS — M89.9 OSTEOPATHIA: ICD-10-CM

## 2018-09-12 DIAGNOSIS — G47.33 OSA (OBSTRUCTIVE SLEEP APNEA): ICD-10-CM

## 2018-09-12 DIAGNOSIS — R06.83 SNORING: ICD-10-CM

## 2018-09-12 PROCEDURE — G0399 HOME SLEEP TEST/TYPE 3 PORTA: HCPCS | Performed by: INTERNAL MEDICINE

## 2018-09-12 RX ORDER — ALENDRONATE SODIUM 70 MG/1
70 TABLET ORAL
Qty: 12 TAB | Refills: 0 | Status: SHIPPED | OUTPATIENT
Start: 2018-09-12 | End: 2018-09-24 | Stop reason: SDUPTHER

## 2018-09-12 NOTE — TELEPHONE ENCOUNTER
Was the patient seen in the last year in this department? Yes    Does patient have an active prescription for medications requested? Yes    Received Request Via: Patient     Pharmacy did not receive last request

## 2018-09-13 ENCOUNTER — TELEPHONE (OUTPATIENT)
Dept: SLEEP MEDICINE | Facility: MEDICAL CENTER | Age: 78
End: 2018-09-13

## 2018-09-13 DIAGNOSIS — M48.061 SPINAL STENOSIS OF LUMBAR REGION, UNSPECIFIED WHETHER NEUROGENIC CLAUDICATION PRESENT: ICD-10-CM

## 2018-09-14 ENCOUNTER — TELEPHONE (OUTPATIENT)
Dept: MEDICAL GROUP | Age: 78
End: 2018-09-14

## 2018-09-14 ENCOUNTER — SLEEP CENTER VISIT (OUTPATIENT)
Dept: SLEEP MEDICINE | Facility: MEDICAL CENTER | Age: 78
End: 2018-09-14
Payer: MEDICARE

## 2018-09-14 VITALS
OXYGEN SATURATION: 93 % | SYSTOLIC BLOOD PRESSURE: 110 MMHG | WEIGHT: 165 LBS | DIASTOLIC BLOOD PRESSURE: 70 MMHG | HEART RATE: 77 BPM | HEIGHT: 62 IN | BODY MASS INDEX: 30.36 KG/M2

## 2018-09-14 DIAGNOSIS — G47.34 SLEEP RELATED HYPOXIA: ICD-10-CM

## 2018-09-14 DIAGNOSIS — G47.33 OSA (OBSTRUCTIVE SLEEP APNEA): ICD-10-CM

## 2018-09-14 DIAGNOSIS — Z23 NEED FOR VACCINATION: ICD-10-CM

## 2018-09-14 PROCEDURE — 99213 OFFICE O/P EST LOW 20 MIN: CPT | Performed by: FAMILY MEDICINE

## 2018-09-14 NOTE — TELEPHONE ENCOUNTER
----- Message from RINA Soto sent at 9/13/2018  4:54 PM PDT -----  Please get patient scheduled with sleep MD for results

## 2018-09-14 NOTE — TELEPHONE ENCOUNTER
1. Caller Name: Sheyla Gauthier                                           Call Back Number: [phone] (home)         Patient approves a detailed voicemail message: yes    Pt's fosamax is not available at [facility].  Pt was advised to find a different pharmacy through her insurance but was wondering if there is an alternative available that she can get through [employer].

## 2018-09-14 NOTE — PROGRESS NOTES
Subjective:      Sheyla Gauthier is a 77 y.o. female who presents with Follow-Up (3 Month FV)               [facility] Follow Up Note     Date: 9/14/2018 / Time: 11:15 AM    Patient ID:   Name:             Sheyla Gauthier   YOB: 1940  Age:                 77 y.o.  female   MRN:               [id number]      Thank you for requesting a sleep medicine consultation on Sheyla Gauthier at the sleep center. She presents today with the chief complaints of JOHN follow up.     HISTORY OF PRESENT ILLNESS:       Pt is currently on oral appliance. She was initially on ACPAP however she was unable to tolerate the CPAP. The referral to [facility] has also been done in past for inspire however she has not heard back from them. She goes to sleep around 11 pm and wakes up around 6 am. She is getting about 7 hrs of sleep on a good night and about 4 hr of sleep on a bad night. The bad nights are about few per week.     Since her last visit she had HST which showed The Respiratory Event Index (JEN) was 9.3 which is consistent with mild sleep apnea, the AI was 0.6, the HI was 8.6, and the Central Index was 0.0.  The supine index was 9.2.. Oxygen Desaturation Index MICHELINE)  was 9.5. The baseline saturation was 94 %, the average saturation was 88 %, and the jose guadalupe saturation was 83 %.  The patient spent 90 % of the recording with saturations less than or equal to 90%.  The patient spent 59 %  with saturations less than or equal to 88%.       SLEEP HISTORY   Home sleep study indicates severe obstructive sleep apnea with an AHI of 30, minimum oxygen saturation of 75 %.        PMH:  has a past medical history of Anxiety; Arthritis; Back pain; Cataract; Daytime sleepiness; Depression; Dyslipidemia; Fatigue; Frequent headaches; Gasping for breath; Herpes zoster (10/27/06); HTN (hypertension); Hypertension; MHA (microangiopathic hemolytic anemia); Migraine syndrome (2/11/2013); Morning headache; Mumps; Nasal  drainage; Osteopathia; Osteoporosis; Painful joint; Post-menopause on HRT (hormone replacement therapy); Shortness of breath; Sleep apnea; Sore muscles; Swelling of lower extremity; Thyroid disease; Tonsillitis; and White matter abnormality on MRI of brain (2/11/2013).  MEDS: Current Outpatient Prescriptions: •  alendronate (FOSAMAX) 70 MG Tab, Take 1 Tab by mouth every 7 days., Disp: 12 Tab, Rfl: 0•  ibuprofen (MOTRIN) 600 MG Tab, TAKE 1 TABLET BY MOUTH EVERY 8 HOURS AS NEEDED, Disp: 30 Tab, Rfl: 0•  pravastatin (PRAVACHOL) 40 MG tablet, TAKE 1 TABLET BY MOUTH EVERY NIGHT AT BEDTIME, Disp: 90 Tab, Rfl: 0•  coenzyme Q-10 30 MG capsule, Take 60 mg by mouth every day., Disp: , Rfl: •  amLODIPine (NORVASC) 5 MG Tab, TAKE 1 TABLET BY MOUTH EVERY DAY, Disp: 90 Tab, Rfl: 1•  ESTERIFIED ESTROGENS PO, , Disp: , Rfl: •  aspirin (ASA) 81 MG Chew Tab chewable tablet, 1, Disp: , Rfl: •  losartan (COZAAR) 50 MG Tab, TAKE 1 TABLET BY MOUTH EVERY DAY, Disp: 90 Tab, Rfl: 0•  Cholecalciferol (VITAMIN D PO), Take  by mouth., Disp: , Rfl: •  Magnesium 400 MG Tab, Take  by mouth., Disp: , Rfl: •  thyroid (ARMOUR THYROID) 30 MG Tab, Take 30 mg by mouth every day., Disp: , Rfl: •  Estriol 10 % Cream, , Disp: , Rfl: 6•  Estriol 10 % Cream, c-estriol 0.5mg gm cream, Disp: , Rfl: •  Estriol 10 % Cream, c-estriol 0.5mg/gm cream, Disp: , Rfl: •  chlorthalidone (HYGROTON) 25 MG Tab, chlorthalidone 25 mg tablet  TAKE 1 TABLET ORALLY DAILY, Disp: , Rfl: •  citalopram (CELEXA) 20 MG Tab, citalopram 20 mg tablet  TAKE 1 TABLET ORALLY DAILY, Disp: , Rfl: •  rosuvastatin (CRESTOR) 5 MG Tab, Crestor 5 mg tablet  TAKE 1 TABLET BY MOUTH ONCE NIGHTLY, Disp: , Rfl: •  doxycycline (VIBRAMYCIN) 100 MG Cap, doxycycline hyclate 100 mg capsule, Disp: , Rfl: •  escitalopram (LEXAPRO) 20 MG tablet, escitalopram 20 mg tablet  TAKE 1 TABLET BY MOUTH EVERY DAY, Disp: , Rfl: •  Influenza Vac Typ A&B Surf Ant (FLUVIRIN IM), Fluvirin 7404-2300(PF) 45 mcg (15 mcg  x3)/0.5 mL intramuscular syringe  TO BE ADMINISTERED BY PHARMACIST FOR IMMUNIZATION, Disp: , Rfl: •  Influenza Vaccine High-Dose pf 0.5 ML Suspension Prefilled Syringe injection, Fluzone High-Dose 2015-16 (PF) 180 mcg/0.5 mL intramuscular syringe  TO BE ADMINISTERED BY PHARMACIST FOR IMMUNIZATION, Disp: , Rfl: •  HYDROcodone/acetaminophen (NORCO)  MG Tab, hydrocodone 10 mg-acetaminophen 325 mg tablet, Disp: , Rfl: •  irbesartan (AVAPRO) 150 MG Tab, irbesartan 150 mg tablet, Disp: , Rfl: •  levothyroxine (LEVOXYL) 75 MCG Tab, 1 FOR 6 DAYS AND 1 1/2 FOR 1 DAY Q WEEK ORALLY, Disp: , Rfl: •  methylPREDNISolone (MEDROL) 4 MG Tab, methylprednisolone 4 mg tablets in a dose pack, Disp: , Rfl: •  metronidazole (METROCREAM) 0.75 % cream, metronidazole 0.75 % topical cream, Disp: , Rfl: •  minocycline (MINOCIN) 100 MG Cap, minocycline 100 mg capsule, Disp: , Rfl: •  pneumococcal 13-Zeina Conj Vacc (PREVNAR 13) syringe, Prevnar 13 (PF) 0.5 mL intramuscular syringe  TO BE ADMINISTERED BY PHARMACIST FOR IMMUNIZATION, Disp: , Rfl: •  albuterol (PROAIR HFA) 108 (90 Base) MCG/ACT Aero Soln inhalation aerosol, ProAir HFA 90 mcg/actuation aerosol inhaler  INHALE 2 PUFFS BY MOUTH EVERY 6 HOURS AS NEEDED FOR SHORTNESS OF BREATH., Disp: , Rfl: •  oxyCODONE-acetaminophen (ROXICET) 5-325 MG Tab, Roxicet 5 mg-325 mg tablet  TAKE 1 TO 2 TABS BY MOUTH EVERY 6 HOURS AS NEEDED, Disp: , Rfl: •  sulfamethoxazole-trimethoprim (BACTRIM DS) 800-160 MG tablet, sulfamethoxazole 800 mg-trimethoprim 160 mg tablet  TAKE 1 TAB BY MOUTH 2 TIMES A DAY FOR 7 DAYS. WITH FOOD, Disp: , Rfl: •  topiramate (TOPAMAX) 25 MG Tab, topiramate 25 mg tablet  TAKE 2 TABS BY MOUTH EVERY DAY., Disp: , Rfl:   •  tretinoin (RETIN-A) 0.05 % cream, tretinoin 0.05 % topical cream  APPLY AT BEDTIME, Disp: , Rfl: •  ezetimibe (ZETIA) 10 MG Tab, 1, Disp: , Rfl: •  ezetimibe (ZETIA) 10 MG Tab, Zetia 10 mg tablet, Disp: , Rfl: •  amLODIPine (NORVASC) 5 MG Tab, amlodipine 5 mg  tablet, Disp: , Rfl: •  amLODIPine (NORVASC) 5 MG Tab, , Disp: , Rfl: •  fluticasone (FLONASE) 50 MCG/ACT nasal spray, fluticasone 50 mcg/actuation nasal spray,suspension, Disp: , Rfl: •  ibuprofen (MOTRIN) 800 MG Tab, ibuprofen 800 mg tablet  TAKE 1 TABLET BY MOUTH 3 TIMES A DAY, Disp: , Rfl: •  levothyroxine (LEVOXYL) 50 MCG Tab, Levoxyl 50 mcg tablet, Disp: , Rfl: •  levothyroxine (LEVOXYL) 75 MCG Tab, Levoxyl 75 mcg tablet, Disp: , Rfl: •  losartan (COZAAR) 50 MG Tab, 1 tablet, Disp: , Rfl: •  levothyroxine (SYNTHROID) 125 MCG Tab, Synthroid 125 mcg tablet, Disp: , Rfl:  •  losartan (COZAAR) 50 MG Tab, Take 1 Tab by mouth every day., Disp: 90 Tab, Rfl: 0•  levoFLOXacin (LEVAQUIN) 500 MG tablet, levofloxacin 500 mg tablet  TAKE 1 TAB BY MOUTH EVERY DAY., Disp: , Rfl: •  LORazepam (ATIVAN) 0.5 MG Tab, lorazepam 0.5 mg tablet, Disp: , Rfl: •  fluticasone (FLONASE) 50 MCG/ACT nasal spray, Spray 2 Sprays in nose every day., Disp: 16 g, Rfl: 3•  levothyroxine (SYNTHROID) 75 MCG Tab, Take 75 mcg by mouth Every morning on an empty stomach., Disp: , Rfl:   ALLERGIES:  -- Amoxicillin -- Rash    --  Facial rash   -- Latex -- Hives  SURGHX: Past Surgical History:  2003: BUNIONECTOMY; Left  2002: FOOT SURGERY  1997: PB REDUCTION OF LARGE BREAST  1996: CHOLECYSTECTOMY  11/1986: ABDOMINAL HYSTERECTOMY TOTAL  No date: ARTHROSCOPY, KNEE  No date: EYE SURGERY      Comment:  cataracts  No date: HYSTERECTOMY LAPAROSCOPY  No date: MAMMOPLASTY AUGMENTATION  No date: PB ENLARGE BREAST WITH IMPLANT  SOCHX:  reports that she has quit smoking. She has never used smokeless tobacco. She reports that she drinks alcohol. She reports that she does not use drugs..  FH: Review of patient's family history indicates:  Problem: Stroke      Relation: Father       Age of Onset: (Not Specified)   Problem: Arthritis      Relation: Father       Age of Onset: (Not Specified)   Problem: Hyperlipidemia      Relation: Father       Age of Onset: (Not  "Specified)   Problem: Hypertension      Relation: Father       Age of Onset: (Not Specified)   Problem: Cancer      Relation: Sister       Age of Onset: (Not Specified)       Comment: breast  Problem: Arthritis      Relation: Sister       Age of Onset: (Not Specified)   Problem: Arthritis      Relation: Mother       Age of Onset: (Not Specified)   Problem: Hypertension      Relation: Mother       Age of Onset: (Not Specified)   Problem: Hyperlipidemia      Relation: Mother       Age of Onset: (Not Specified)   Problem: Stroke      Relation: Mother       Age of Onset: (Not Specified)   Problem: Arthritis      Relation: Brother       Age of Onset: (Not Specified)   Problem: Cancer      Relation: Brother       Age of Onset: (Not Specified)   Problem: Heart Disease      Relation: Brother       Age of Onset: (Not Specified)   Problem: Hypertension      Relation: Brother       Age of Onset: (Not Specified)   Problem: Hyperlipidemia      Relation: Brother       Age of Onset: (Not Specified)   Problem: Cancer      Relation: Daughter       Age of Onset: (Not Specified)       Comment: breast                      Review of Systems       Constitutional: Denies fevers, Denies weight changes  Eyes: Denies changes in vision, no eye pain  Ears/Nose/Throat/Mouth: Denies nasal congestion or sore throat   Cardiovascular: Denies chest pain or palpitations   Respiratory: Denies shortness of breath , Denies cough  Gastrointestinal/Hepatic: Denies abdominal pain, nausea, vomiting, diarrhea, constipation or GI bleeding   Genitourinary: Denies bladder dysfunction, dysuria or frequency  Psychiatric: denies mood disorder   Sleep: denies snoring      Objective:     Vitals:    09/14/18 1106   Height: 1.575 m (5' 2\")   Weight: 74.8 kg (165 lb)   Weight % change since last entry.: 0 %   Pulse: 77   BMI (Calculated): 30.18       Physical Exam       1. Head: Atraumatic, normocephalic.   2. Ears: Normal tympanic membrane and no discharge  3. Nose: " No inferior turbinate hypertophy, no septal deviation, no polyp.   4. Throat: Oropharynx appears crowded in that the palate is overhanging   5. Neck: Supple. No thyromegaly  6. Chest: Trachea central, no spine deformity   7. Lungs auscultation: B/L good air entry, vesicular breath sounds, no adventitious sounds  8. Heart auscultation: 1st and 2nd heart sounds normal, regular rhythm. No appreciable murmur.  9. NEUROLOGICAL EXAMINATION: On neurological exam, the patient was alert and oriented x3. speech was clear and fluent without dysarthria.      Assessment/Plan:     1. Sleep Apnea (JOHN).      The pathophysiology of sleep anea and the increased risk of cardiovascular morbidity from untreated sleep apnea is discussed in detail with the patient. She  also has HTN which can be worsened by her JOHN.     She is urged to avoid supine sleep, weight gain and alcoholic beverages since all of these can worsen sleep apnea. She is cautioned against drowsy driving. If She feels sleepy while driving, She must pull over for a break/nap, rather than persist on the road, in the interest of She own safety and that of others on the road.   Plan   - Continue mendibular advance,ment device    - residual sleep hypoxia on OAT. Ne O2 set up ordered at 2L/min   - HST was reviewed and discussed with the pt    2. Regarding treatment of other past medical problems and general health maintenance,  She is urged to follow up with PCP.

## 2018-09-14 NOTE — TELEPHONE ENCOUNTER
Patient is on the MA Schedule 9/19/18 for Flu HD vaccine/injection.    SPECIFIC Action To Be Taken: Orders pending, please sign.

## 2018-09-18 DIAGNOSIS — I10 HYPERTENSION, UNSPECIFIED TYPE: ICD-10-CM

## 2018-09-18 RX ORDER — IBUPROFEN 600 MG/1
TABLET ORAL
Qty: 30 TAB | Refills: 0 | Status: SHIPPED | OUTPATIENT
Start: 2018-09-18 | End: 2019-03-26

## 2018-09-18 RX ORDER — LOSARTAN POTASSIUM 50 MG/1
50 TABLET ORAL
Qty: 90 TAB | Refills: 0 | Status: SHIPPED | OUTPATIENT
Start: 2018-09-18 | End: 2018-12-15 | Stop reason: SDUPTHER

## 2018-09-19 ENCOUNTER — NON-PROVIDER VISIT (OUTPATIENT)
Dept: MEDICAL GROUP | Age: 78
End: 2018-09-19
Payer: MEDICARE

## 2018-09-19 DIAGNOSIS — Z23 NEED FOR VACCINATION: ICD-10-CM

## 2018-09-19 PROCEDURE — 90662 IIV NO PRSV INCREASED AG IM: CPT | Performed by: FAMILY MEDICINE

## 2018-09-19 PROCEDURE — G0008 ADMIN INFLUENZA VIRUS VAC: HCPCS | Performed by: FAMILY MEDICINE

## 2018-09-19 NOTE — NON-PROVIDER
"Sheyla Gauthier is a 77 y.o. female here for a non-provider visit for:   FLU    Reason for immunization: Annual Flu Vaccine  Immunization records indicate need for vaccine: Yes, confirmed with Epic  Minimum interval has been met for this vaccine: Yes  ABN completed: Not Indicated    Order and dose verified by: Chichi CARR Dated  8/7/15 was given to patient: Yes  All IAC Questionnaire questions were answered \"No.\"    Patient tolerated injection and no adverse effects were observed or reported: Yes    Pt scheduled for next dose in series: No  "

## 2018-09-24 RX ORDER — ALENDRONATE SODIUM 70 MG/1
70 TABLET ORAL
Qty: 12 TAB | Refills: 1 | Status: SHIPPED
Start: 2018-09-24 | End: 2018-10-03 | Stop reason: SDUPTHER

## 2018-09-25 ENCOUNTER — TELEPHONE (OUTPATIENT)
Dept: MEDICAL GROUP | Age: 78
End: 2018-09-25

## 2018-09-25 NOTE — TELEPHONE ENCOUNTER
Alendronate faxed to:  [id number] - [place], [place] - [address] AT [facility] & [facility]  [address] [id number]  Phone: [phone] Fax: [phone]

## 2018-10-02 ENCOUNTER — HOSPITAL ENCOUNTER (OUTPATIENT)
Dept: LAB | Facility: MEDICAL CENTER | Age: 78
End: 2018-10-02
Attending: INTERNAL MEDICINE
Payer: MEDICARE

## 2018-10-02 LAB
ALBUMIN SERPL BCP-MCNC: 4.1 G/DL (ref 3.2–4.9)
ALBUMIN/GLOB SERPL: 1.3 G/DL
ALP SERPL-CCNC: 33 U/L (ref 30–99)
ALT SERPL-CCNC: 33 U/L (ref 2–50)
ANION GAP SERPL CALC-SCNC: 8 MMOL/L (ref 0–11.9)
AST SERPL-CCNC: 24 U/L (ref 12–45)
BASOPHILS # BLD AUTO: 1.2 % (ref 0–1.8)
BASOPHILS # BLD: 0.07 K/UL (ref 0–0.12)
BILIRUB SERPL-MCNC: 0.6 MG/DL (ref 0.1–1.5)
BUN SERPL-MCNC: 32 MG/DL (ref 8–22)
CALCIUM SERPL-MCNC: 10.4 MG/DL (ref 8.5–10.5)
CHLORIDE SERPL-SCNC: 106 MMOL/L (ref 96–112)
CO2 SERPL-SCNC: 27 MMOL/L (ref 20–33)
CREAT SERPL-MCNC: 1.16 MG/DL (ref 0.5–1.4)
EOSINOPHIL # BLD AUTO: 0.11 K/UL (ref 0–0.51)
EOSINOPHIL NFR BLD: 1.8 % (ref 0–6.9)
ERYTHROCYTE [DISTWIDTH] IN BLOOD BY AUTOMATED COUNT: 44 FL (ref 35.9–50)
GLOBULIN SER CALC-MCNC: 3.2 G/DL (ref 1.9–3.5)
GLUCOSE SERPL-MCNC: 86 MG/DL (ref 65–99)
HCT VFR BLD AUTO: 44.6 % (ref 37–47)
HGB BLD-MCNC: 14 G/DL (ref 12–16)
IMM GRANULOCYTES # BLD AUTO: 0.01 K/UL (ref 0–0.11)
IMM GRANULOCYTES NFR BLD AUTO: 0.2 % (ref 0–0.9)
LYMPHOCYTES # BLD AUTO: 1.6 K/UL (ref 1–4.8)
LYMPHOCYTES NFR BLD: 26.5 % (ref 22–41)
MCH RBC QN AUTO: 27.8 PG (ref 27–33)
MCHC RBC AUTO-ENTMCNC: 31.4 G/DL (ref 33.6–35)
MCV RBC AUTO: 88.5 FL (ref 81.4–97.8)
MONOCYTES # BLD AUTO: 0.44 K/UL (ref 0–0.85)
MONOCYTES NFR BLD AUTO: 7.3 % (ref 0–13.4)
NEUTROPHILS # BLD AUTO: 3.8 K/UL (ref 2–7.15)
NEUTROPHILS NFR BLD: 63 % (ref 44–72)
NRBC # BLD AUTO: 0 K/UL
NRBC BLD-RTO: 0 /100 WBC
PLATELET # BLD AUTO: 275 K/UL (ref 164–446)
PMV BLD AUTO: 10.7 FL (ref 9–12.9)
POTASSIUM SERPL-SCNC: 4.4 MMOL/L (ref 3.6–5.5)
PROT SERPL-MCNC: 7.3 G/DL (ref 6–8.2)
RBC # BLD AUTO: 5.04 M/UL (ref 4.2–5.4)
SODIUM SERPL-SCNC: 141 MMOL/L (ref 135–145)
WBC # BLD AUTO: 6 K/UL (ref 4.8–10.8)

## 2018-10-02 PROCEDURE — 85025 COMPLETE CBC W/AUTO DIFF WBC: CPT

## 2018-10-02 PROCEDURE — 36415 COLL VENOUS BLD VENIPUNCTURE: CPT

## 2018-10-02 PROCEDURE — 80053 COMPREHEN METABOLIC PANEL: CPT

## 2018-10-03 DIAGNOSIS — M89.9 OSTEOPATHIA: ICD-10-CM

## 2018-10-03 NOTE — TELEPHONE ENCOUNTER
Was the patient seen in the last year in this department? Yes    Does patient have an active prescription for medications requested? Yes    Received Request Via: Patient             Patient requested medication be sent to [facility] I changed pharmacy in the system

## 2018-10-05 RX ORDER — ALENDRONATE SODIUM 70 MG/1
70 TABLET ORAL
Qty: 12 TAB | Refills: 1 | Status: SHIPPED | OUTPATIENT
Start: 2018-10-05 | End: 2019-03-19 | Stop reason: SDUPTHER

## 2018-10-12 ENCOUNTER — TELEPHONE (OUTPATIENT)
Dept: MEDICAL GROUP | Age: 78
End: 2018-10-12

## 2018-10-12 NOTE — TELEPHONE ENCOUNTER
1. Caller Name: Sheyla Gauthier                                           Call Back Number: [phone] (home)  Or [facility]      Patient approves a detailed voicemail message: yes    Pt received a call from [facility] stating she should have a diagnostic mammogram preformed due to the results of the CT she had on 6/8/18.  Pt is wondering if this needs to be done.

## 2018-12-02 DIAGNOSIS — E78.00 PURE HYPERCHOLESTEROLEMIA: ICD-10-CM

## 2018-12-04 RX ORDER — PRAVASTATIN SODIUM 40 MG
TABLET ORAL
Qty: 90 TAB | Refills: 0 | Status: SHIPPED | OUTPATIENT
Start: 2018-12-04 | End: 2019-05-30 | Stop reason: SDUPTHER

## 2018-12-05 ENCOUNTER — HOSPITAL ENCOUNTER (OUTPATIENT)
Dept: LAB | Facility: MEDICAL CENTER | Age: 78
End: 2018-12-05
Attending: PHYSICIAN ASSISTANT
Payer: MEDICARE

## 2018-12-05 LAB
EST. AVERAGE GLUCOSE BLD GHB EST-MCNC: 128 MG/DL
HBA1C MFR BLD: 6.1 % (ref 0–5.6)
T4 FREE SERPL-MCNC: 0.66 NG/DL (ref 0.53–1.43)
TSH SERPL DL<=0.005 MIU/L-ACNC: 0.52 UIU/ML (ref 0.38–5.33)

## 2018-12-05 PROCEDURE — 83036 HEMOGLOBIN GLYCOSYLATED A1C: CPT | Mod: GA

## 2018-12-05 PROCEDURE — 84443 ASSAY THYROID STIM HORMONE: CPT

## 2018-12-05 PROCEDURE — 84439 ASSAY OF FREE THYROXINE: CPT

## 2018-12-05 PROCEDURE — 36415 COLL VENOUS BLD VENIPUNCTURE: CPT

## 2018-12-15 DIAGNOSIS — I10 HYPERTENSION, UNSPECIFIED TYPE: ICD-10-CM

## 2018-12-18 RX ORDER — LOSARTAN POTASSIUM 50 MG/1
TABLET ORAL
Qty: 90 TAB | Refills: 0 | Status: SHIPPED | OUTPATIENT
Start: 2018-12-18 | End: 2019-03-16 | Stop reason: SDUPTHER

## 2018-12-19 ENCOUNTER — HOSPITAL ENCOUNTER (OUTPATIENT)
Dept: LAB | Facility: MEDICAL CENTER | Age: 78
End: 2018-12-19
Attending: INTERNAL MEDICINE
Payer: MEDICARE

## 2018-12-19 LAB
ALBUMIN SERPL BCP-MCNC: 3.9 G/DL (ref 3.2–4.9)
ALBUMIN/GLOB SERPL: 1.4 G/DL
ALP SERPL-CCNC: 43 U/L (ref 30–99)
ALT SERPL-CCNC: 29 U/L (ref 2–50)
ANION GAP SERPL CALC-SCNC: 6 MMOL/L (ref 0–11.9)
AST SERPL-CCNC: 21 U/L (ref 12–45)
BILIRUB SERPL-MCNC: 0.6 MG/DL (ref 0.1–1.5)
BUN SERPL-MCNC: 24 MG/DL (ref 8–22)
CALCIUM SERPL-MCNC: 9.7 MG/DL (ref 8.5–10.5)
CHLORIDE SERPL-SCNC: 109 MMOL/L (ref 96–112)
CHOLEST SERPL-MCNC: 166 MG/DL (ref 100–199)
CO2 SERPL-SCNC: 25 MMOL/L (ref 20–33)
CREAT SERPL-MCNC: 0.79 MG/DL (ref 0.5–1.4)
ERYTHROCYTE [DISTWIDTH] IN BLOOD BY AUTOMATED COUNT: 47.8 FL (ref 35.9–50)
FASTING STATUS PATIENT QL REPORTED: NORMAL
GLOBULIN SER CALC-MCNC: 2.7 G/DL (ref 1.9–3.5)
GLUCOSE SERPL-MCNC: 90 MG/DL (ref 65–99)
HCT VFR BLD AUTO: 41.8 % (ref 37–47)
HDLC SERPL-MCNC: 57 MG/DL
HGB BLD-MCNC: 13.4 G/DL (ref 12–16)
LDLC SERPL CALC-MCNC: 88 MG/DL
MCH RBC QN AUTO: 28.5 PG (ref 27–33)
MCHC RBC AUTO-ENTMCNC: 32.1 G/DL (ref 33.6–35)
MCV RBC AUTO: 88.9 FL (ref 81.4–97.8)
POTASSIUM SERPL-SCNC: 4.2 MMOL/L (ref 3.6–5.5)
PROT SERPL-MCNC: 6.6 G/DL (ref 6–8.2)
RBC # BLD AUTO: 4.7 M/UL (ref 4.2–5.4)
SODIUM SERPL-SCNC: 140 MMOL/L (ref 135–145)
TRIGL SERPL-MCNC: 106 MG/DL (ref 0–149)
WBC # BLD AUTO: 4.4 K/UL (ref 4.8–10.8)

## 2018-12-19 PROCEDURE — 36415 COLL VENOUS BLD VENIPUNCTURE: CPT

## 2018-12-19 PROCEDURE — 85048 AUTOMATED LEUKOCYTE COUNT: CPT

## 2018-12-19 PROCEDURE — 80053 COMPREHEN METABOLIC PANEL: CPT

## 2018-12-19 PROCEDURE — 85041 AUTOMATED RBC COUNT: CPT

## 2018-12-19 PROCEDURE — 85018 HEMOGLOBIN: CPT

## 2018-12-19 PROCEDURE — 85014 HEMATOCRIT: CPT

## 2018-12-19 PROCEDURE — 80061 LIPID PANEL: CPT

## 2018-12-19 NOTE — TELEPHONE ENCOUNTER
Patient requests change in pharmacy. Chart updated and prescription called into   [facility]/pharmacy #[id number] - [place] - [address]  [address] [place] [address]  Phone: [phone] Fax: [phone]

## 2018-12-21 ENCOUNTER — SLEEP CENTER VISIT (OUTPATIENT)
Dept: SLEEP MEDICINE | Facility: MEDICAL CENTER | Age: 78
End: 2018-12-21
Payer: MEDICARE

## 2018-12-21 VITALS
DIASTOLIC BLOOD PRESSURE: 80 MMHG | RESPIRATION RATE: 15 BRPM | BODY MASS INDEX: 29.44 KG/M2 | HEART RATE: 78 BPM | WEIGHT: 160 LBS | HEIGHT: 62 IN | OXYGEN SATURATION: 97 % | SYSTOLIC BLOOD PRESSURE: 138 MMHG

## 2018-12-21 DIAGNOSIS — G47.33 OSA (OBSTRUCTIVE SLEEP APNEA): ICD-10-CM

## 2018-12-21 PROCEDURE — 99213 OFFICE O/P EST LOW 20 MIN: CPT | Performed by: FAMILY MEDICINE

## 2018-12-21 RX ORDER — TICAGRELOR 90 MG/1
90 TABLET ORAL 2 TIMES DAILY
Refills: 11 | COMMUNITY
Start: 2018-12-01 | End: 2019-03-26

## 2018-12-21 NOTE — PROGRESS NOTES
[facility] Follow Up Note     Date: 12/21/2018 / Time: 11:00 AM    Patient ID:   Name:             Sheyla Gauthier   YOB: 1940  Age:                 78 y.o.  female   MRN:               [id number]      Thank you for requesting a sleep medicine consultation on Sheyla Gauthier at the sleep center. She presents today with the chief complaints of JOHN follow up.     HISTORY OF PRESENT ILLNESS:       Pt is currently on mandibular advancement device with supplemental O2 at 2 L/min. However she is having issues with tolerating the dental appliance due to dental pain and slight over bite.  as She tried ACPAP in past however she was unable to tolerate the pressure and the mask. HoweverShe goes to sleep around 11 pm and wakes up around 5-6 am. She is getting about 6 hrs of sleep on a good night.  The symptoms of excessive daytime, snoring and gasping has improved when she uses the dental appliance with the O2..         SLEEP HISTORY   HST which showed The Respiratory Event Index (JEN) was 9.3 which is consistent with mild sleep apnea, the AI was 0.6, the HI was 8.6, and the Central Index was 0.0.  The supine index was 9.2.. Oxygen Desaturation Index MICHELINE)  was 9.5. The baseline saturation was 94 %, the average saturation was 88 %, and the jose guadalupe saturation was 83 %.  The patient spent 90 % of the recording with saturations less than or equal to 90%.  The patient spent 59 %  with saturations less than or equal to 88%    Home sleep study indicates severe obstructive sleep apnea with an AHI of 30, minimum oxygen saturation of 75 %.  REVIEW OF SYSTEMS:       Constitutional: Denies fevers, Denies weight changes  Eyes: Denies changes in vision, no eye pain  Ears/Nose/Throat/Mouth: Denies nasal congestion or sore throat   Cardiovascular: Denies chest pain or palpitations   Respiratory: Denies shortness of breath , Denies cough  Gastrointestinal/Hepatic: Denies abdominal pain, nausea, vomiting,  diarrhea, constipation or GI bleeding   Genitourinary: Denies bladder dysfunction, dysuria or frequency  Musculoskeletal/Rheum: Denies  joint pain and swelling   Skin/Breast: Denies rash,   Neurological: Denies headache, confusion, memory loss or focal weakness/parasthesias  Psychiatric: denies mood disorder     Comprehensive review of systems form is reviewed with the patient and is attached in the EMR.     PMH:  has a past medical history of Anxiety; Arthritis; Back pain; Cataract; Daytime sleepiness; Depression; Dyslipidemia; Fatigue; Frequent headaches; Gasping for breath; Herpes zoster (10/27/06); HTN (hypertension); Hypertension; MHA (microangiopathic hemolytic anemia); Migraine syndrome (2/11/2013); Morning headache; Mumps; Nasal drainage; Osteopathia; Osteoporosis; Painful joint; Post-menopause on HRT (hormone replacement therapy); Shortness of breath; Sleep apnea; Sore muscles; Swelling of lower extremity; Thyroid disease; Tonsillitis; and White matter abnormality on MRI of brain (2/11/2013).  MEDS:   Current Outpatient Prescriptions:   •  losartan (COZAAR) 50 MG Tab, TAKE 1 TABLET BY MOUTH EVERY DAY, Disp: 90 Tab, Rfl: 0  •  pravastatin (PRAVACHOL) 40 MG tablet, TAKE 1 TABLET BY MOUTH EVERY NIGHT AT BEDTIME, Disp: 90 Tab, Rfl: 0  •  alendronate (FOSAMAX) 70 MG Tab, Take 1 Tab by mouth every 7 days., Disp: 12 Tab, Rfl: 1  •  thyroid (ARMOUR THYROID) 30 MG Tab, Take 30 mg by mouth every day., Disp: , Rfl:   •  amLODIPine (NORVASC) 5 MG Tab, TAKE 1 TABLET BY MOUTH EVERY DAY, Disp: 90 Tab, Rfl: 1  •  Estriol 10 % Cream, , Disp: , Rfl: 6  •  citalopram (CELEXA) 20 MG Tab, citalopram 20 mg tablet  TAKE 1 TABLET ORALLY DAILY, Disp: , Rfl:   •  Cholecalciferol (VITAMIN D PO), Take  by mouth., Disp: , Rfl:   •  Magnesium 400 MG Tab, Take  by mouth., Disp: , Rfl:   •  BRILINTA 90 MG Tab tablet, Take 90 mg by mouth 2 times a day., Disp: , Rfl: 11  •  ibuprofen (MOTRIN) 600 MG Tab, TAKE 1 TABLET BY MOUTH EVERY 8 HOURS  AS NEEDED, Disp: 30 Tab, Rfl: 0  •  coenzyme Q-10 30 MG capsule, Take 60 mg by mouth every day., Disp: , Rfl:   •  Estriol 10 % Cream, c-estriol 0.5mg gm cream, Disp: , Rfl:   •  Estriol 10 % Cream, c-estriol 0.5mg/gm cream, Disp: , Rfl:   •  chlorthalidone (HYGROTON) 25 MG Tab, chlorthalidone 25 mg tablet  TAKE 1 TABLET ORALLY DAILY, Disp: , Rfl:   •  rosuvastatin (CRESTOR) 5 MG Tab, Crestor 5 mg tablet  TAKE 1 TABLET BY MOUTH ONCE NIGHTLY, Disp: , Rfl:   •  doxycycline (VIBRAMYCIN) 100 MG Cap, doxycycline hyclate 100 mg capsule, Disp: , Rfl:   •  escitalopram (LEXAPRO) 20 MG tablet, escitalopram 20 mg tablet  TAKE 1 TABLET BY MOUTH EVERY DAY, Disp: , Rfl:   •  ESTERIFIED ESTROGENS PO, , Disp: , Rfl:   •  Influenza Vac Typ A&B Surf Ant (FLUVIRIN IM), Fluvirin 3932-7805(PF) 45 mcg (15 mcg x3)/0.5 mL intramuscular syringe  TO BE ADMINISTERED BY PHARMACIST FOR IMMUNIZATION, Disp: , Rfl:   •  Influenza Vaccine High-Dose pf 0.5 ML Suspension Prefilled Syringe injection, Fluzone High-Dose 2015-16 (PF) 180 mcg/0.5 mL intramuscular syringe  TO BE ADMINISTERED BY PHARMACIST FOR IMMUNIZATION, Disp: , Rfl:   •  HYDROcodone/acetaminophen (NORCO)  MG Tab, hydrocodone 10 mg-acetaminophen 325 mg tablet, Disp: , Rfl:   •  irbesartan (AVAPRO) 150 MG Tab, irbesartan 150 mg tablet, Disp: , Rfl:   •  levothyroxine (LEVOXYL) 75 MCG Tab, 1 FOR 6 DAYS AND 1 1/2 FOR 1 DAY Q WEEK ORALLY, Disp: , Rfl:   •  methylPREDNISolone (MEDROL) 4 MG Tab, methylprednisolone 4 mg tablets in a dose pack, Disp: , Rfl:   •  metronidazole (METROCREAM) 0.75 % cream, metronidazole 0.75 % topical cream, Disp: , Rfl:   •  minocycline (MINOCIN) 100 MG Cap, minocycline 100 mg capsule, Disp: , Rfl:   •  pneumococcal 13-Zeina Conj Vacc (PREVNAR 13) syringe, Prevnar 13 (PF) 0.5 mL intramuscular syringe  TO BE ADMINISTERED BY PHARMACIST FOR IMMUNIZATION, Disp: , Rfl:   •  albuterol (PROAIR HFA) 108 (90 Base) MCG/ACT Aero Soln inhalation aerosol, ProAir HFA 90  mcg/actuation aerosol inhaler  INHALE 2 PUFFS BY MOUTH EVERY 6 HOURS AS NEEDED FOR SHORTNESS OF BREATH., Disp: , Rfl:   •  oxyCODONE-acetaminophen (ROXICET) 5-325 MG Tab, Roxicet 5 mg-325 mg tablet  TAKE 1 TO 2 TABS BY MOUTH EVERY 6 HOURS AS NEEDED, Disp: , Rfl:   •  sulfamethoxazole-trimethoprim (BACTRIM DS) 800-160 MG tablet, sulfamethoxazole 800 mg-trimethoprim 160 mg tablet  TAKE 1 TAB BY MOUTH 2 TIMES A DAY FOR 7 DAYS. WITH FOOD, Disp: , Rfl:   •  topiramate (TOPAMAX) 25 MG Tab, topiramate 25 mg tablet  TAKE 2 TABS BY MOUTH EVERY DAY., Disp: , Rfl:   •  tretinoin (RETIN-A) 0.05 % cream, tretinoin 0.05 % topical cream  APPLY AT BEDTIME, Disp: , Rfl:   •  ezetimibe (ZETIA) 10 MG Tab, 1, Disp: , Rfl:   •  ezetimibe (ZETIA) 10 MG Tab, Zetia 10 mg tablet, Disp: , Rfl:   •  amLODIPine (NORVASC) 5 MG Tab, amlodipine 5 mg tablet, Disp: , Rfl:   •  amLODIPine (NORVASC) 5 MG Tab, , Disp: , Rfl:   •  aspirin (ASA) 81 MG Chew Tab chewable tablet, 1, Disp: , Rfl:   •  fluticasone (FLONASE) 50 MCG/ACT nasal spray, fluticasone 50 mcg/actuation nasal spray,suspension, Disp: , Rfl:   •  ibuprofen (MOTRIN) 800 MG Tab, ibuprofen 800 mg tablet  TAKE 1 TABLET BY MOUTH 3 TIMES A DAY, Disp: , Rfl:   •  levothyroxine (LEVOXYL) 50 MCG Tab, Levoxyl 50 mcg tablet, Disp: , Rfl:   •  levothyroxine (LEVOXYL) 75 MCG Tab, Levoxyl 75 mcg tablet, Disp: , Rfl:   •  losartan (COZAAR) 50 MG Tab, 1 tablet, Disp: , Rfl:   •  levothyroxine (SYNTHROID) 125 MCG Tab, Synthroid 125 mcg tablet, Disp: , Rfl:   •  losartan (COZAAR) 50 MG Tab, Take 1 Tab by mouth every day., Disp: 90 Tab, Rfl: 0  •  levoFLOXacin (LEVAQUIN) 500 MG tablet, levofloxacin 500 mg tablet  TAKE 1 TAB BY MOUTH EVERY DAY., Disp: , Rfl:   •  LORazepam (ATIVAN) 0.5 MG Tab, lorazepam 0.5 mg tablet, Disp: , Rfl:   •  fluticasone (FLONASE) 50 MCG/ACT nasal spray, Spray 2 Sprays in nose every day., Disp: 16 g, Rfl: 3  •  levothyroxine (SYNTHROID) 75 MCG Tab, Take 75 mcg by mouth Every  "morning on an empty stomach., Disp: , Rfl:   ALLERGIES:   Allergies   Allergen Reactions   • Amoxicillin Rash     Facial rash   • Latex Hives     SURGHX:   Past Surgical History:   Procedure Laterality Date   • BUNIONECTOMY Left 2003   • FOOT SURGERY  2002   • PB REDUCTION OF LARGE BREAST  1997   • CHOLECYSTECTOMY  1996   • ABDOMINAL HYSTERECTOMY TOTAL  11/1986   • ARTHROSCOPY, KNEE     • EYE SURGERY      cataracts   • HYSTERECTOMY LAPAROSCOPY     • MAMMOPLASTY AUGMENTATION     • PB ENLARGE BREAST WITH IMPLANT       SOCHX:  reports that she has quit smoking. She has never used smokeless tobacco. She reports that she drinks alcohol. She reports that she does not use drugs..  FH:   Family History   Problem Relation Age of Onset   • Stroke Father    • Arthritis Father    • Hyperlipidemia Father    • Hypertension Father    • Cancer Sister         breast   • Arthritis Sister    • Arthritis Mother    • Hypertension Mother    • Hyperlipidemia Mother    • Stroke Mother    • Arthritis Brother    • Cancer Brother    • Heart Disease Brother    • Hypertension Brother    • Hyperlipidemia Brother    • Cancer Daughter         breast         Physical Exam:  Vitals/ General Appearance:   Weight/BMI: Body mass index is 29.26 kg/m².  Blood pressure 138/80, pulse 78, resp. rate 15, height 1.575 m (5' 2\"), weight 72.6 kg (160 lb), last menstrual period 11/01/1986, SpO2 97 %, not currently breastfeeding.  Vitals:    12/21/18 1101   BP: 138/80   BP Location: Left arm   Patient Position: Sitting   BP Cuff Size: Adult   Pulse: 78   Resp: 15   SpO2: 97%   Weight: 72.6 kg (160 lb)   Height: 1.575 m (5' 2\")       Pt. is alert and oriented to time, place and person. Cooperative and in no apparent distress.       1. Head: Atraumatic, normocephalic.   2. Ears: Normal tympanic membrane and no discharge  3. Nose: No inferior turbinate hypertophy, 4. Throat: Oropharynx appears crowded in that the palate is overhanging   5. Neck: Supple. No " thyromegaly  6. Chest: Trachea central, no spine deformity   7. Lungs auscultation: B/L good air entry, vesicular breath sounds, no wheezing  8. Heart auscultation: 1st and 2nd heart sounds normal, regular rhythm. No appreciable murmur.  9. NEUROLOGICAL EXAMINATION: On neurological exam, the patient was alert and oriented x3. speech was clear and fluent without dysarthria.      INVESTIGATIONS:           ASSESSMENT AND PLAN     1. Sleep Apnea (JOHN).    The pathophysiology of sleep anea and the increased risk of cardiovascular morbidity from untreated sleep apnea is discussed in detail with the patient.    She is urged to avoid supine sleep, weight gain and alcoholic beverages since all of these can worsen sleep apnea. She is cautioned against drowsy driving. If She feels sleepy while driving, She must pull over for a break/nap, rather than persist on the road, in the interest of She own safety and that of others on the road.   Plan   - Continue dental appliance with supplemental O2 at 2L/min   - compliance was reinforced     . Regarding treatment of other past medical problems and general health maintenance,  She is urged to follow up with PCP.

## 2018-12-24 DIAGNOSIS — J00 ACUTE NASOPHARYNGITIS: ICD-10-CM

## 2018-12-24 DIAGNOSIS — R50.9 FEVER AND CHILLS: ICD-10-CM

## 2018-12-24 RX ORDER — FLUTICASONE PROPIONATE 50 MCG
2 SPRAY, SUSPENSION (ML) NASAL DAILY
Qty: 16 G | Refills: 0 | OUTPATIENT
Start: 2018-12-24

## 2019-01-04 ENCOUNTER — HOSPITAL ENCOUNTER (OUTPATIENT)
Dept: RADIOLOGY | Facility: MEDICAL CENTER | Age: 79
End: 2019-01-04
Attending: PHYSICIAN ASSISTANT
Payer: MEDICARE

## 2019-01-04 ENCOUNTER — OFFICE VISIT (OUTPATIENT)
Dept: URGENT CARE | Facility: MEDICAL CENTER | Age: 79
End: 2019-01-04
Payer: MEDICARE

## 2019-01-04 VITALS
HEIGHT: 62 IN | BODY MASS INDEX: 30.18 KG/M2 | DIASTOLIC BLOOD PRESSURE: 78 MMHG | TEMPERATURE: 98.1 F | SYSTOLIC BLOOD PRESSURE: 126 MMHG | WEIGHT: 164 LBS | HEART RATE: 71 BPM | OXYGEN SATURATION: 96 % | RESPIRATION RATE: 16 BRPM

## 2019-01-04 DIAGNOSIS — M54.50 ACUTE BILATERAL LOW BACK PAIN WITHOUT SCIATICA: ICD-10-CM

## 2019-01-04 DIAGNOSIS — S39.012A ACUTE MYOFASCIAL STRAIN OF LUMBOSACRAL REGION, INITIAL ENCOUNTER: Primary | ICD-10-CM

## 2019-01-04 LAB
APPEARANCE UR: NORMAL
BILIRUB UR STRIP-MCNC: NEGATIVE MG/DL
COLOR UR AUTO: YELLOW
GLUCOSE UR STRIP.AUTO-MCNC: NEGATIVE MG/DL
KETONES UR STRIP.AUTO-MCNC: NEGATIVE MG/DL
LEUKOCYTE ESTERASE UR QL STRIP.AUTO: NEGATIVE
NITRITE UR QL STRIP.AUTO: NEGATIVE
PH UR STRIP.AUTO: 5 [PH] (ref 5–8)
PROT UR QL STRIP: NEGATIVE MG/DL
RBC UR QL AUTO: NORMAL
SP GR UR STRIP.AUTO: 1.02
UROBILINOGEN UR STRIP-MCNC: 0.2 MG/DL

## 2019-01-04 PROCEDURE — 74018 RADEX ABDOMEN 1 VIEW: CPT

## 2019-01-04 PROCEDURE — 81002 URINALYSIS NONAUTO W/O SCOPE: CPT | Performed by: PHYSICIAN ASSISTANT

## 2019-01-04 PROCEDURE — 99214 OFFICE O/P EST MOD 30 MIN: CPT | Performed by: PHYSICIAN ASSISTANT

## 2019-01-04 RX ORDER — HYDROCODONE BITARTRATE AND ACETAMINOPHEN 5; 325 MG/1; MG/1
1-2 TABLET ORAL EVERY 4 HOURS PRN
Qty: 20 TAB | Refills: 0 | Status: SHIPPED | OUTPATIENT
Start: 2019-01-04 | End: 2019-01-07

## 2019-01-04 NOTE — PROGRESS NOTES
Subjective:      Pt is a 78 y.o. female who presents with UTI            HPI  This is a new problem. Pt notes B/L flank and right sided groin pain x 3-5 days after lifting and carrying many things during the holidays but she is concerned for UTI. Pt denies sciatica.  Pt has not taken any Rx medications for this condition. Pt denies hx of nephrolithiasis. Pt states the pain is a 7/10, aching in nature and worse at night. Pt denies CP, SOB, NVD, paresthesias, headaches, dizziness, change in vision, hives, or other joint pain. The pt's medication list, problem list, and allergies have been evaluated and reviewed during today's visit.    PMH:  Past Medical History:   Diagnosis Date   • Anxiety    • Arthritis    • Back pain    • Cataract    • Daytime sleepiness    • Depression    • Dyslipidemia    • Fatigue    • Frequent headaches    • Gasping for breath    • Herpes zoster 10/27/06   • HTN (hypertension)    • Hypertension    • MHA (microangiopathic hemolytic anemia)    • Migraine syndrome 2/11/2013   • Morning headache    • Mumps    • Nasal drainage    • Osteopathia    • Osteoporosis    • Painful joint    • Post-menopause on HRT (hormone replacement therapy)    • Shortness of breath    • Sleep apnea    • Sore muscles    • Swelling of lower extremity    • Thyroid disease    • Tonsillitis    • White matter abnormality on MRI of brain 2/11/2013       PSH:  Past Surgical History:   Procedure Laterality Date   • BUNIONECTOMY Left 2003   • FOOT SURGERY  2002   • PB REDUCTION OF LARGE BREAST  1997   • CHOLECYSTECTOMY  1996   • ABDOMINAL HYSTERECTOMY TOTAL  11/1986   • ARTHROSCOPY, KNEE     • EYE SURGERY      cataracts   • HYSTERECTOMY LAPAROSCOPY     • MAMMOPLASTY AUGMENTATION     • PB ENLARGE BREAST WITH IMPLANT         Fam Hx:    family history includes Arthritis in her brother, father, mother, and sister; Cancer in her brother, daughter, and sister; Heart Disease in her brother; Hyperlipidemia in her brother, father, and  mother; Hypertension in her brother, father, and mother; Stroke in her father and mother.  Family Status   Relation Status   • Fa    • Sis Alive   • Mo         tia   • Bro Alive   • Ayesha (Not Specified)       Soc HX:  Social History     Social History   • Marital status:      Spouse name: N/A   • Number of children: N/A   • Years of education: N/A     Occupational History   • Not on file.     Social History Main Topics   • Smoking status: Former Smoker   • Smokeless tobacco: Never Used      Comment: smoked off & on for 10 yrs   • Alcohol use 0.0 oz/week      Comment: occassional   • Drug use: No   • Sexual activity: Not Currently      Comment: , 3 kids, retired     Other Topics Concern   • Not on file     Social History Narrative   • No narrative on file         Medications:    Current Outpatient Prescriptions:   •  BRILINTA 90 MG Tab tablet, Take 90 mg by mouth 2 times a day., Disp: , Rfl: 11  •  losartan (COZAAR) 50 MG Tab, TAKE 1 TABLET BY MOUTH EVERY DAY, Disp: 90 Tab, Rfl: 0  •  pravastatin (PRAVACHOL) 40 MG tablet, TAKE 1 TABLET BY MOUTH EVERY NIGHT AT BEDTIME, Disp: 90 Tab, Rfl: 0  •  alendronate (FOSAMAX) 70 MG Tab, Take 1 Tab by mouth every 7 days., Disp: 12 Tab, Rfl: 1  •  ibuprofen (MOTRIN) 600 MG Tab, TAKE 1 TABLET BY MOUTH EVERY 8 HOURS AS NEEDED, Disp: 30 Tab, Rfl: 0  •  thyroid (ARMOUR THYROID) 30 MG Tab, Take 30 mg by mouth every day., Disp: , Rfl:   •  coenzyme Q-10 30 MG capsule, Take 60 mg by mouth every day., Disp: , Rfl:   •  amLODIPine (NORVASC) 5 MG Tab, TAKE 1 TABLET BY MOUTH EVERY DAY, Disp: 90 Tab, Rfl: 1  •  Estriol 10 % Cream, , Disp: , Rfl: 6  •  Estriol 10 % Cream, c-estriol 0.5mg gm cream, Disp: , Rfl:   •  Estriol 10 % Cream, c-estriol 0.5mg/gm cream, Disp: , Rfl:   •  chlorthalidone (HYGROTON) 25 MG Tab, chlorthalidone 25 mg tablet  TAKE 1 TABLET ORALLY DAILY, Disp: , Rfl:   •  citalopram (CELEXA) 20 MG Tab, citalopram 20 mg tablet  TAKE 1 TABLET ORALLY  DAILY, Disp: , Rfl:   •  rosuvastatin (CRESTOR) 5 MG Tab, Crestor 5 mg tablet  TAKE 1 TABLET BY MOUTH ONCE NIGHTLY, Disp: , Rfl:   •  doxycycline (VIBRAMYCIN) 100 MG Cap, doxycycline hyclate 100 mg capsule, Disp: , Rfl:   •  escitalopram (LEXAPRO) 20 MG tablet, escitalopram 20 mg tablet  TAKE 1 TABLET BY MOUTH EVERY DAY, Disp: , Rfl:   •  ESTERIFIED ESTROGENS PO, , Disp: , Rfl:   •  Influenza Vac Typ A&B Surf Ant (FLUVIRIN IM), Fluvirin 6802-6796(PF) 45 mcg (15 mcg x3)/0.5 mL intramuscular syringe  TO BE ADMINISTERED BY PHARMACIST FOR IMMUNIZATION, Disp: , Rfl:   •  Influenza Vaccine High-Dose pf 0.5 ML Suspension Prefilled Syringe injection, Fluzone High-Dose 2015-16 (PF) 180 mcg/0.5 mL intramuscular syringe  TO BE ADMINISTERED BY PHARMACIST FOR IMMUNIZATION, Disp: , Rfl:   •  HYDROcodone/acetaminophen (NORCO)  MG Tab, hydrocodone 10 mg-acetaminophen 325 mg tablet, Disp: , Rfl:   •  irbesartan (AVAPRO) 150 MG Tab, irbesartan 150 mg tablet, Disp: , Rfl:   •  levothyroxine (LEVOXYL) 75 MCG Tab, 1 FOR 6 DAYS AND 1 1/2 FOR 1 DAY Q WEEK ORALLY, Disp: , Rfl:   •  methylPREDNISolone (MEDROL) 4 MG Tab, methylprednisolone 4 mg tablets in a dose pack, Disp: , Rfl:   •  metronidazole (METROCREAM) 0.75 % cream, metronidazole 0.75 % topical cream, Disp: , Rfl:   •  minocycline (MINOCIN) 100 MG Cap, minocycline 100 mg capsule, Disp: , Rfl:   •  pneumococcal 13-Zeina Conj Vacc (PREVNAR 13) syringe, Prevnar 13 (PF) 0.5 mL intramuscular syringe  TO BE ADMINISTERED BY PHARMACIST FOR IMMUNIZATION, Disp: , Rfl:   •  albuterol (PROAIR HFA) 108 (90 Base) MCG/ACT Aero Soln inhalation aerosol, ProAir HFA 90 mcg/actuation aerosol inhaler  INHALE 2 PUFFS BY MOUTH EVERY 6 HOURS AS NEEDED FOR SHORTNESS OF BREATH., Disp: , Rfl:   •  oxyCODONE-acetaminophen (ROXICET) 5-325 MG Tab, Roxicet 5 mg-325 mg tablet  TAKE 1 TO 2 TABS BY MOUTH EVERY 6 HOURS AS NEEDED, Disp: , Rfl:   •  sulfamethoxazole-trimethoprim (BACTRIM DS) 800-160 MG tablet,  sulfamethoxazole 800 mg-trimethoprim 160 mg tablet  TAKE 1 TAB BY MOUTH 2 TIMES A DAY FOR 7 DAYS. WITH FOOD, Disp: , Rfl:   •  topiramate (TOPAMAX) 25 MG Tab, topiramate 25 mg tablet  TAKE 2 TABS BY MOUTH EVERY DAY., Disp: , Rfl:   •  tretinoin (RETIN-A) 0.05 % cream, tretinoin 0.05 % topical cream  APPLY AT BEDTIME, Disp: , Rfl:   •  ezetimibe (ZETIA) 10 MG Tab, 1, Disp: , Rfl:   •  ezetimibe (ZETIA) 10 MG Tab, Zetia 10 mg tablet, Disp: , Rfl:   •  amLODIPine (NORVASC) 5 MG Tab, amlodipine 5 mg tablet, Disp: , Rfl:   •  amLODIPine (NORVASC) 5 MG Tab, , Disp: , Rfl:   •  aspirin (ASA) 81 MG Chew Tab chewable tablet, 1, Disp: , Rfl:   •  fluticasone (FLONASE) 50 MCG/ACT nasal spray, fluticasone 50 mcg/actuation nasal spray,suspension, Disp: , Rfl:   •  ibuprofen (MOTRIN) 800 MG Tab, ibuprofen 800 mg tablet  TAKE 1 TABLET BY MOUTH 3 TIMES A DAY, Disp: , Rfl:   •  levothyroxine (LEVOXYL) 50 MCG Tab, Levoxyl 50 mcg tablet, Disp: , Rfl:   •  levothyroxine (LEVOXYL) 75 MCG Tab, Levoxyl 75 mcg tablet, Disp: , Rfl:   •  losartan (COZAAR) 50 MG Tab, 1 tablet, Disp: , Rfl:   •  levothyroxine (SYNTHROID) 125 MCG Tab, Synthroid 125 mcg tablet, Disp: , Rfl:   •  losartan (COZAAR) 50 MG Tab, Take 1 Tab by mouth every day., Disp: 90 Tab, Rfl: 0  •  levoFLOXacin (LEVAQUIN) 500 MG tablet, levofloxacin 500 mg tablet  TAKE 1 TAB BY MOUTH EVERY DAY., Disp: , Rfl:   •  LORazepam (ATIVAN) 0.5 MG Tab, lorazepam 0.5 mg tablet, Disp: , Rfl:   •  fluticasone (FLONASE) 50 MCG/ACT nasal spray, Spray 2 Sprays in nose every day., Disp: 16 g, Rfl: 3  •  Cholecalciferol (VITAMIN D PO), Take  by mouth., Disp: , Rfl:   •  Magnesium 400 MG Tab, Take  by mouth., Disp: , Rfl:   •  levothyroxine (SYNTHROID) 75 MCG Tab, Take 75 mcg by mouth Every morning on an empty stomach., Disp: , Rfl:       Allergies:  Amoxicillin and Latex    ROS  Constitutional: Negative for fever, chills and malaise/fatigue.   HENT: Negative for congestion and sore throat.     "  Eyes: Negative for blurred vision, double vision and photophobia.   Respiratory: Negative for cough and shortness of breath.  Cardiovascular: Negative for chest pain and palpitations.   Gastrointestinal: Negative for heartburn, nausea, vomiting, abdominal pain, diarrhea and constipation.   Genitourinary: Negative for dysuria and flank pain.   Musculoskeletal: POS for B/L flank pain and mild right sided groin pain.    Skin: Negative for itching and rash.   Neurological: Negative for dizziness, tingling and headaches.   Endo/Heme/Allergies: Does not bruise/bleed easily.   Psychiatric/Behavioral: Negative for depression. The patient is not nervous/anxious.           Objective:     /78 (BP Location: Left arm, Patient Position: Sitting, BP Cuff Size: Adult)   Pulse 71   Temp 36.7 °C (98.1 °F) (Temporal)   Resp 16   Ht 1.575 m (5' 2.01\")   Wt 74.4 kg (164 lb)   LMP 11/01/1986   SpO2 96%   BMI 29.99 kg/m²      Physical Exam   Musculoskeletal:        Lumbar back: She exhibits decreased range of motion, tenderness, pain and spasm. She exhibits no bony tenderness, no swelling, no edema, no deformity, no laceration and normal pulse.        Back:         Legs:    Constitutional: PT is oriented to person, place, and time. PT appears well-developed and well-nourished. No distress.   HENT:   Head: Normocephalic and atraumatic.   Mouth/Throat: Oropharynx is clear and moist. No oropharyngeal exudate.   Eyes: Conjunctivae normal and EOM are normal. Pupils are equal, round, and reactive to light.   Neck: Normal range of motion. Neck supple. No thyromegaly present.   Cardiovascular: Normal rate, regular rhythm, normal heart sounds and intact distal pulses.  Exam reveals no gallop and no friction rub.    No murmur heard.  Pulmonary/Chest: Effort normal and breath sounds normal. No respiratory distress. PT has no wheezes. PT has no rales. Pt exhibits no tenderness.   Abdominal: Soft. Bowel sounds are normal. PT exhibits " no distension and no mass. There is no tenderness. There is no rebound and no guarding.   Neurological: PT is alert and oriented to person, place, and time. PT has normal reflexes. No cranial nerve deficit.   Skin: Skin is warm and dry. No rash noted. PT is not diaphoretic. No erythema.       Psychiatric: PT has a normal mood and affect. PT behavior is normal. Judgment and thought content normal.        RADS:  Narrative       1/4/2019 2:38 PM    HISTORY/REASON FOR EXAM:  Right lower quadrant abdominal pain.    TECHNIQUE/EXAM DESCRIPTION AND NUMBER OF VIEWS: 2 supine views of the abdomen.    COMPARISON: None    FINDINGS:  There is no evidence of bowel obstruction.  There are surgical clips within the right upper quadrant.  There are vascular calcifications.   Impression       No evidence of bowel obstruction.   Reading Provider Reading Date   Earnest Hernandez M.D. Jan 4, 2019   Signing Provider Signing Date Signing Time   Earnest Hernandez M.D. Jan 4, 2019  2:53 PM          Assessment/Plan:     1. Acute myofascial strain of lumbosacral region, initial encounter      2. Acute bilateral low back pain without sciatica    - POCT Urinalysis-->tr blood otherwise WNL  - [id number] VIEW; Future    [place]  Aware web site evaluation: I have obtained and reviewed patient utilization report from [facility] database prior to writing prescription for controlled substance II, III or IV per [place] bill . Based on the report and my clinical assessment the prescription is medically necessary.   NSAIDs for pain 1-5, Norco for pain 6-10 or to help get to sleep.  Opiate consent signed and in chart  Due to coronary angioplasty pt advised by CARDS to avoid NSAIDs  RICE therapy discussed  Gentle ROM exercises discussed  WBAT BUE/BLE  Ice/heat therapy discussed  Rest, fluids encouraged.  AVS with medical info given.  Pt was in full understanding and agreement with the plan.  Follow-up as needed if symptoms worsen or fail to  improve.

## 2019-01-04 NOTE — PATIENT INSTRUCTIONS
Lumbosacral Strain  Lumbosacral strain is an injury that causes pain in the lower back (lumbosacral spine). This injury usually occurs from overstretching the muscles or ligaments along your spine. A strain can affect one or more muscles or cord-like tissues that connect bones to other bones (ligaments).  What are the causes?  This condition may be caused by:  · A hard, direct hit (blow) to the back.  · Excessive stretching of the lower back muscles. This may result from:  ¨ A fall.  ¨ Lifting something heavy.  ¨ Repetitive movements such as bending or crouching.  What increases the risk?  The following factors may increase your risk of getting this condition:  · Participating in sports or activities that involve:  ¨ A sudden twist of the back.  ¨ Pushing or pulling motions.  · Being overweight or obese.  · Having poor strength and flexibility, especially tight hamstrings or weak muscles in the back or abdomen.  · Having too much of a curve in the lower back.  · Having a pelvis that is tilted forward.  What are the signs or symptoms?  The main symptom of this condition is pain in the lower back, at the site of the strain. Pain may extend (radiate) down one or both legs.  How is this diagnosed?  This condition is diagnosed based on:  · Your symptoms.  · Your medical history.  · A physical exam.  ¨ Your health care provider may push on certain areas of your back to determine the source of your pain.  ¨ You may be asked to bend forward, backward, and side to side to assess the severity of your pain and your range of motion.  · Imaging tests, such as:  ¨ X-rays.  ¨ MRI.  How is this treated?  Treatment for this condition may include:  · Putting heat and cold on the affected area.  · Medicines to help relieve pain and relax your muscles (muscle relaxants).  · NSAIDs to help reduce swelling and discomfort.  When your symptoms improve, it is important to gradually return to your normal routine as soon as possible to reduce  pain, avoid stiffness, and avoid loss of muscle strength. Generally, symptoms should improve within 6 weeks of treatment. However, recovery time varies.  Follow these instructions at home:  Managing pain, stiffness, and swelling  · If directed, put ice on the injured area during the first 24 hours after your strain.  ¨ Put ice in a plastic bag.  ¨ Place a towel between your skin and the bag.  ¨ Leave the ice on for 20 minutes, 2-3 times a day.  · If directed, put heat on the affected area as often as told by your health care provider. Use the heat source that your health care provider recommends, such as a moist heat pack or a heating pad.  ¨ Place a towel between your skin and the heat source.  ¨ Leave the heat on for 20-30 minutes.  ¨ Remove the heat if your skin turns bright red. This is especially important if you are unable to feel pain, heat, or cold. You may have a greater risk of getting burned.  Activity  · Rest and return to your normal activities as told by your health care provider. Ask your health care provider what activities are safe for you.  · Avoid activities that take a lot of energy for as long as told by your health care provider.  General instructions  · Take over-the-counter and prescription medicines only as told by your health care provider.  · Do not drive or use heavy machinery while taking prescription pain medicine.  · Do not use any products that contain nicotine or tobacco, such as cigarettes and e-cigarettes. If you need help quitting, ask your health care provider.  · Keep all follow-up visits as told by your health care provider. This is important.  How is this prevented?  · Use correct form when playing sports and lifting heavy objects.  · Use good posture when sitting and standing.  · Maintain a healthy weight.  · Sleep on a mattress with medium firmness to support your back.  · Be safe and responsible while being active to avoid falls.  · Do at least 150 minutes of  moderate-intensity exercise each week, such as brisk walking or water aerobics. Try a form of exercise that takes stress off your back, such as swimming or stationary cycling.  · Maintain physical fitness, including:  ¨ Strength.  ¨ Flexibility.  ¨ Cardiovascular fitness.  ¨ Endurance.  Contact a health care provider if:  · Your back pain does not improve after 6 weeks of treatment.  · Your symptoms get worse.  Get help right away if:  · Your back pain is severe.  · You cannot stand or walk.  · You have difficulty controlling when you urinate or when you have a bowel movement.  · You feel nauseous or you vomit.  · Your feet get very cold.  · You have numbness, tingling, weakness, or problems using your arms or legs.  · You develop any of the following:  ¨ Shortness of breath.  ¨ Dizziness.  ¨ Pain in your legs.  ¨ Weakness in your buttocks or legs.  ¨ Discoloration of the skin on your toes or legs.  This information is not intended to replace advice given to you by your health care provider. Make sure you discuss any questions you have with your health care provider.  Document Released: 09/27/2006 Document Revised: 07/07/2017 Document Reviewed: 05/21/2017  [employer] Interactive Patient Education © 2017 [employer]

## 2019-01-31 DIAGNOSIS — I10 HYPERTENSION, UNSPECIFIED TYPE: ICD-10-CM

## 2019-02-01 RX ORDER — AMLODIPINE BESYLATE 5 MG/1
TABLET ORAL
Qty: 90 TAB | Refills: 2 | Status: SHIPPED | OUTPATIENT
Start: 2019-02-01 | End: 2019-03-20

## 2019-02-10 NOTE — TELEPHONE ENCOUNTER
Was the patient seen in the last year in this department? Yes    Does patient have an active prescription for medications requested? Yes    Received Request Via: Patient     Full range of motion of upper and lower extremities, no joint tenderness/swelling.

## 2019-02-14 ENCOUNTER — PATIENT MESSAGE (OUTPATIENT)
Dept: MEDICAL GROUP | Age: 79
End: 2019-02-14

## 2019-02-14 RX ORDER — AMLODIPINE BESYLATE 5 MG/1
5 TABLET ORAL DAILY
Qty: 90 TAB | Refills: 0 | Status: SHIPPED | OUTPATIENT
Start: 2019-02-14 | End: 2019-05-11 | Stop reason: SDUPTHER

## 2019-02-20 ENCOUNTER — APPOINTMENT (RX ONLY)
Dept: URBAN - METROPOLITAN AREA CLINIC 4 | Facility: CLINIC | Age: 79
Setting detail: DERMATOLOGY
End: 2019-02-20

## 2019-02-20 DIAGNOSIS — Z71.89 OTHER SPECIFIED COUNSELING: ICD-10-CM

## 2019-02-20 DIAGNOSIS — L82.1 OTHER SEBORRHEIC KERATOSIS: ICD-10-CM

## 2019-02-20 PROCEDURE — 99212 OFFICE O/P EST SF 10 MIN: CPT

## 2019-02-20 PROCEDURE — ? COUNSELING

## 2019-02-20 PROCEDURE — ? ADDITIONAL NOTES

## 2019-02-20 ASSESSMENT — LOCATION SIMPLE DESCRIPTION DERM
LOCATION SIMPLE: LEFT EAR
LOCATION SIMPLE: LEFT CHEEK
LOCATION SIMPLE: RIGHT CHEEK

## 2019-02-20 ASSESSMENT — LOCATION DETAILED DESCRIPTION DERM
LOCATION DETAILED: RIGHT SUPERIOR CENTRAL BUCCAL CHEEK
LOCATION DETAILED: LEFT SUPERIOR POSTERIOR HELIX
LOCATION DETAILED: LEFT INFERIOR CENTRAL MALAR CHEEK

## 2019-02-20 ASSESSMENT — LOCATION ZONE DERM
LOCATION ZONE: FACE
LOCATION ZONE: EAR

## 2019-02-20 NOTE — PROCEDURE: ADDITIONAL NOTES
Additional Notes: Includes spots of concern mentioned on intake on cheek and ear. \\nOffered biopsy if patient is worried. \\nPatient opts for no treatment. \\nReassure and observe for changes\\nSeborrheic Keratoses handout given to patient.
Detail Level: Simple

## 2019-03-06 ENCOUNTER — TELEPHONE (OUTPATIENT)
Dept: PHYSICAL THERAPY | Facility: MEDICAL CENTER | Age: 79
End: 2019-03-06

## 2019-03-06 DIAGNOSIS — R06.02 SOB (SHORTNESS OF BREATH): ICD-10-CM

## 2019-03-06 NOTE — OP THERAPY DISCHARGE SUMMARY
Outpatient Physical Therapy  DISCHARGE SUMMARY NOTE      [facility]  [address]  [place] [address]  Phone:  [phone]  Fax:  [phone]    Date of Visit: 03/06/2019    Patient: Sheyla Gauthier  YOB: 1940  MRN: [id number]     Referring Provider: Darius Morataya   Referring Diagnosis Debility     Physical Therapy Occurrence Codes    Date of onset of impairment:  5/3/17   Date physical therapy care plan established or reviewed:  5/3/18   Date physical therapy treatment started:  5/3/18          Functional Limitations and Severity Modifiers      Goal:     Discharge:         Your patient is being discharged from Physical Therapy with the following comments:   · Goals partially met    Comments:  Back is feeling a lot better she feels like she has no endurance  Pt joined the gym and will continue HEP there    Limitations Remaining:      Recommendations:  D/C on HEP    Chavez Mitchell, PT    Date: 3/6/2019

## 2019-03-13 ENCOUNTER — HOSPITAL ENCOUNTER (OUTPATIENT)
Dept: LAB | Facility: MEDICAL CENTER | Age: 79
End: 2019-03-13
Attending: PHYSICIAN ASSISTANT
Payer: MEDICARE

## 2019-03-13 LAB
EST. AVERAGE GLUCOSE BLD GHB EST-MCNC: 123 MG/DL
HBA1C MFR BLD: 5.9 % (ref 0–5.6)
T4 FREE SERPL-MCNC: 0.66 NG/DL (ref 0.53–1.43)
TSH SERPL DL<=0.005 MIU/L-ACNC: 0.95 UIU/ML (ref 0.38–5.33)

## 2019-03-13 PROCEDURE — 84439 ASSAY OF FREE THYROXINE: CPT

## 2019-03-13 PROCEDURE — 83036 HEMOGLOBIN GLYCOSYLATED A1C: CPT | Mod: GA

## 2019-03-13 PROCEDURE — 36415 COLL VENOUS BLD VENIPUNCTURE: CPT

## 2019-03-13 PROCEDURE — 84443 ASSAY THYROID STIM HORMONE: CPT

## 2019-03-16 DIAGNOSIS — I10 HYPERTENSION, UNSPECIFIED TYPE: ICD-10-CM

## 2019-03-19 DIAGNOSIS — M89.9 OSTEOPATHIA: ICD-10-CM

## 2019-03-20 RX ORDER — LOSARTAN POTASSIUM 50 MG/1
TABLET ORAL
Qty: 90 TAB | Refills: 3 | Status: SHIPPED | OUTPATIENT
Start: 2019-03-20 | End: 2019-10-01

## 2019-03-21 RX ORDER — ALENDRONATE SODIUM 70 MG/1
70 TABLET ORAL
Qty: 12 TAB | Refills: 0 | Status: SHIPPED | OUTPATIENT
Start: 2019-03-21 | End: 2019-06-30 | Stop reason: SDUPTHER

## 2019-03-26 ENCOUNTER — OFFICE VISIT (OUTPATIENT)
Dept: CARDIOLOGY | Facility: MEDICAL CENTER | Age: 79
End: 2019-03-26
Payer: MEDICARE

## 2019-03-26 VITALS
OXYGEN SATURATION: 95 % | BODY MASS INDEX: 29.63 KG/M2 | DIASTOLIC BLOOD PRESSURE: 82 MMHG | HEART RATE: 86 BPM | SYSTOLIC BLOOD PRESSURE: 116 MMHG | HEIGHT: 62 IN | WEIGHT: 161 LBS

## 2019-03-26 DIAGNOSIS — I25.10 CORONARY ARTERY DISEASE INVOLVING NATIVE CORONARY ARTERY OF NATIVE HEART WITHOUT ANGINA PECTORIS: ICD-10-CM

## 2019-03-26 DIAGNOSIS — I15.0 RENOVASCULAR HYPERTENSION: ICD-10-CM

## 2019-03-26 PROBLEM — R05.9 COUGH: Status: RESOLVED | Noted: 2018-04-24 | Resolved: 2019-03-26

## 2019-03-26 PROBLEM — J00 ACUTE NASOPHARYNGITIS: Status: RESOLVED | Noted: 2018-04-24 | Resolved: 2019-03-26

## 2019-03-26 PROBLEM — Z12.31 VISIT FOR SCREENING MAMMOGRAM: Status: RESOLVED | Noted: 2017-06-29 | Resolved: 2019-03-26

## 2019-03-26 PROBLEM — M65.4 DE QUERVAIN'S TENOSYNOVITIS, RIGHT: Status: RESOLVED | Noted: 2017-12-20 | Resolved: 2019-03-26

## 2019-03-26 PROBLEM — R29.6 FALLING: Status: RESOLVED | Noted: 2017-07-26 | Resolved: 2019-03-26

## 2019-03-26 PROBLEM — Z00.00 PREVENTATIVE HEALTH CARE: Status: RESOLVED | Noted: 2017-06-29 | Resolved: 2019-03-26

## 2019-03-26 PROBLEM — K44.9 HIATAL HERNIA: Status: RESOLVED | Noted: 2018-08-09 | Resolved: 2019-03-26

## 2019-03-26 PROBLEM — Z23 NEED FOR VACCINATION: Status: RESOLVED | Noted: 2017-06-29 | Resolved: 2019-03-26

## 2019-03-26 PROBLEM — R06.02 SOB (SHORTNESS OF BREATH): Status: RESOLVED | Noted: 2018-06-01 | Resolved: 2019-03-26

## 2019-03-26 PROBLEM — Z78.0 POSTMENOPAUSAL: Status: RESOLVED | Noted: 2017-06-29 | Resolved: 2019-03-26

## 2019-03-26 PROBLEM — R06.83 SNORING: Status: RESOLVED | Noted: 2017-07-26 | Resolved: 2019-03-26

## 2019-03-26 PROBLEM — L57.0 AK (ACTINIC KERATOSIS): Status: RESOLVED | Noted: 2017-06-29 | Resolved: 2019-03-26

## 2019-03-26 PROBLEM — L82.0 INFLAMED SEBORRHEIC KERATOSIS: Status: RESOLVED | Noted: 2017-08-17 | Resolved: 2019-03-26

## 2019-03-26 PROCEDURE — 99204 OFFICE O/P NEW MOD 45 MIN: CPT | Performed by: INTERNAL MEDICINE

## 2019-03-26 ASSESSMENT — ENCOUNTER SYMPTOMS
FEVER: 0
EYE PAIN: 0
NERVOUS/ANXIOUS: 0
NAUSEA: 0
DEPRESSION: 0
BLURRED VISION: 0
HEMOPTYSIS: 0
WHEEZING: 0
BRUISES/BLEEDS EASILY: 0
PALPITATIONS: 0
HEARTBURN: 0
LOSS OF CONSCIOUSNESS: 0
COUGH: 0
VOMITING: 0
ABDOMINAL PAIN: 0
EYE DISCHARGE: 0
CHILLS: 0
DIZZINESS: 0
MYALGIAS: 0
SPEECH CHANGE: 0

## 2019-03-26 NOTE — PROGRESS NOTES
Chief Complaint   Patient presents with   • Shortness of Breath       Subjective:   Sheyla Gauthier is a 78 y.o. female who presents today as a new patient.  She is sent in consultation by Dr. Morataya in regards to her history of coronary disease    She is a friend of Ag Krishnan.  Very healthy and active.  Dr. Jean is her neighbor.  Seen by Dr. Vázquez for many months.  Had a coronary calcium score that was abnormal, was breathless.  Underwent angiography which was stressful for her got a stent to the LAD no other significant disease.  Records were reviewed    Still breathless seeing pulmonology.  Diagnosed with sleep apnea  Struggling with her new equipment but optimistic    Also is short of breath and Brilinta but not sure if stopping it helped  Struggles with anxiety and wonders about benzo diazepam    Past Medical History:   Diagnosis Date   • Anxiety    • Arthritis    • Back pain    • Cataract    • Daytime sleepiness    • Depression    • Dyslipidemia    • Fatigue    • Frequent headaches    • Gasping for breath    • Herpes zoster 10/27/06   • HTN (hypertension)    • Hypertension    • MHA (microangiopathic hemolytic anemia)    • Migraine syndrome 2/11/2013   • Morning headache    • Mumps    • Nasal drainage    • Osteopathia    • Osteoporosis    • Painful joint    • Post-menopause on HRT (hormone replacement therapy)    • Shortness of breath    • Sleep apnea    • Sore muscles    • Swelling of lower extremity    • Thyroid disease    • Tonsillitis    • White matter abnormality on MRI of brain 2/11/2013     Past Surgical History:   Procedure Laterality Date   • BUNIONECTOMY Left 2003   • FOOT SURGERY  2002   • PB REDUCTION OF LARGE BREAST  1997   • CHOLECYSTECTOMY  1996   • ABDOMINAL HYSTERECTOMY TOTAL  11/1986   • ARTHROSCOPY, KNEE     • EYE SURGERY      cataracts   • HYSTERECTOMY LAPAROSCOPY     • MAMMOPLASTY AUGMENTATION     • PB ENLARGE BREAST WITH IMPLANT       Family History   Problem Relation Age of  Onset   • Stroke Father    • Arthritis Father    • Hyperlipidemia Father    • Hypertension Father    • Cancer Sister         breast   • Arthritis Sister    • Arthritis Mother    • Hypertension Mother    • Hyperlipidemia Mother    • Stroke Mother    • Arthritis Brother    • Cancer Brother    • Heart Disease Brother    • Hypertension Brother    • Hyperlipidemia Brother    • Cancer Daughter         breast     Social History     Social History   • Marital status:      Spouse name: N/A   • Number of children: N/A   • Years of education: N/A     Occupational History   • Not on file.     Social History Main Topics   • Smoking status: Former Smoker   • Smokeless tobacco: Never Used      Comment: smoked off & on for 10 yrs   • Alcohol use 0.0 oz/week      Comment: occassional   • Drug use: No   • Sexual activity: Not Currently      Comment: , 3 kids, retired     Other Topics Concern   • Not on file     Social History Narrative   • No narrative on file     Allergies   Allergen Reactions   • Amoxicillin Rash     Facial rash   • Latex Hives     Outpatient Encounter Prescriptions as of 3/26/2019   Medication Sig Dispense Refill   • alendronate (FOSAMAX) 70 MG Tab TAKE 1 TAB BY MOUTH EVERY 7 DAYS. 12 Tab 0   • losartan (COZAAR) 50 MG Tab TAKE 1 TABLET BY MOUTH EVERY DAY 90 Tab 3   • amLODIPine (NORVASC) 5 MG Tab Take 1 Tab by mouth every day. 90 Tab 0   • pravastatin (PRAVACHOL) 40 MG tablet TAKE 1 TABLET BY MOUTH EVERY NIGHT AT BEDTIME 90 Tab 0   • thyroid (ARMOUR THYROID) 30 MG Tab Take 30 mg by mouth every day.     • coenzyme Q-10 30 MG capsule Take 60 mg by mouth every day.     • Estriol 10 % Cream c-estriol 0.5mg gm cream     • aspirin (ASA) 81 MG Chew Tab chewable tablet 1     • Cholecalciferol (VITAMIN D PO) Take  by mouth.     • Magnesium 400 MG Tab Take  by mouth.     • [DISCONTINUED] BRILINTA 90 MG Tab tablet Take 90 mg by mouth 2 times a day.  11   • [DISCONTINUED] ibuprofen (MOTRIN) 600 MG Tab TAKE 1  TABLET BY MOUTH EVERY 8 HOURS AS NEEDED (Patient not taking: Reported on 3/26/2019) 30 Tab 0   • [DISCONTINUED] Estriol 10 % Cream   6   • [DISCONTINUED] Estriol 10 % Cream c-estriol 0.5mg/gm cream     • [DISCONTINUED] chlorthalidone (HYGROTON) 25 MG Tab chlorthalidone 25 mg tablet   TAKE 1 TABLET ORALLY DAILY     • [DISCONTINUED] citalopram (CELEXA) 20 MG Tab citalopram 20 mg tablet   TAKE 1 TABLET ORALLY DAILY     • [DISCONTINUED] rosuvastatin (CRESTOR) 5 MG Tab Crestor 5 mg tablet   TAKE 1 TABLET BY MOUTH ONCE NIGHTLY     • [DISCONTINUED] doxycycline (VIBRAMYCIN) 100 MG Cap doxycycline hyclate 100 mg capsule     • [DISCONTINUED] escitalopram (LEXAPRO) 20 MG tablet escitalopram 20 mg tablet   TAKE 1 TABLET BY MOUTH EVERY DAY     • [DISCONTINUED] ESTERIFIED ESTROGENS PO      • [DISCONTINUED] Influenza Vac Typ A&B Surf Ant (FLUVIRIN IM) Fluvirin 8621-7382(PF) 45 mcg (15 mcg x3)/0.5 mL intramuscular syringe   TO BE ADMINISTERED BY PHARMACIST FOR IMMUNIZATION     • [DISCONTINUED] Influenza Vaccine High-Dose pf 0.5 ML Suspension Prefilled Syringe injection Fluzone High-Dose 2015-16 (PF) 180 mcg/0.5 mL intramuscular syringe   TO BE ADMINISTERED BY PHARMACIST FOR IMMUNIZATION     • [DISCONTINUED] levothyroxine (LEVOXYL) 75 MCG Tab 1 FOR 6 DAYS AND 1 1/2 FOR 1 DAY Q WEEK ORALLY     • [DISCONTINUED] methylPREDNISolone (MEDROL) 4 MG Tab methylprednisolone 4 mg tablets in a dose pack     • [DISCONTINUED] metronidazole (METROCREAM) 0.75 % cream metronidazole 0.75 % topical cream     • [DISCONTINUED] minocycline (MINOCIN) 100 MG Cap minocycline 100 mg capsule     • [DISCONTINUED] pneumococcal 13-Zeina Conj Vacc (PREVNAR 13) syringe Prevnar 13 (PF) 0.5 mL intramuscular syringe   TO BE ADMINISTERED BY PHARMACIST FOR IMMUNIZATION     • [DISCONTINUED] albuterol (PROAIR HFA) 108 (90 Base) MCG/ACT Aero Soln inhalation aerosol ProAir HFA 90 mcg/actuation aerosol inhaler   INHALE 2 PUFFS BY MOUTH EVERY 6 HOURS AS NEEDED FOR SHORTNESS OF  BREATH.     • [DISCONTINUED] sulfamethoxazole-trimethoprim (BACTRIM DS) 800-160 MG tablet sulfamethoxazole 800 mg-trimethoprim 160 mg tablet   TAKE 1 TAB BY MOUTH 2 TIMES A DAY FOR 7 DAYS. WITH FOOD     • [DISCONTINUED] topiramate (TOPAMAX) 25 MG Tab topiramate 25 mg tablet   TAKE 2 TABS BY MOUTH EVERY DAY.     • [DISCONTINUED] tretinoin (RETIN-A) 0.05 % cream tretinoin 0.05 % topical cream   APPLY AT BEDTIME     • [DISCONTINUED] ezetimibe (ZETIA) 10 MG Tab 1     • [DISCONTINUED] ezetimibe (ZETIA) 10 MG Tab Zetia 10 mg tablet     • [DISCONTINUED] fluticasone (FLONASE) 50 MCG/ACT nasal spray fluticasone 50 mcg/actuation nasal spray,suspension     • [DISCONTINUED] ibuprofen (MOTRIN) 800 MG Tab ibuprofen 800 mg tablet   TAKE 1 TABLET BY MOUTH 3 TIMES A DAY     • [DISCONTINUED] levothyroxine (LEVOXYL) 50 MCG Tab Levoxyl 50 mcg tablet     • [DISCONTINUED] levothyroxine (LEVOXYL) 75 MCG Tab Levoxyl 75 mcg tablet     • [DISCONTINUED] levothyroxine (SYNTHROID) 125 MCG Tab Synthroid 125 mcg tablet     • [DISCONTINUED] levoFLOXacin (LEVAQUIN) 500 MG tablet levofloxacin 500 mg tablet   TAKE 1 TAB BY MOUTH EVERY DAY.     • [DISCONTINUED] LORazepam (ATIVAN) 0.5 MG Tab lorazepam 0.5 mg tablet     • [DISCONTINUED] fluticasone (FLONASE) 50 MCG/ACT nasal spray Spray 2 Sprays in nose every day. (Patient not taking: Reported on 3/26/2019) 16 g 3   • [DISCONTINUED] levothyroxine (SYNTHROID) 75 MCG Tab Take 75 mcg by mouth Every morning on an empty stomach.       No facility-administered encounter medications on file as of 3/26/2019.      Review of Systems   Constitutional: Negative for chills and fever.   HENT: Negative for congestion.    Eyes: Negative for blurred vision, pain and discharge.   Respiratory: Negative for cough, hemoptysis and wheezing.    Cardiovascular: Negative for chest pain and palpitations.   Gastrointestinal: Negative for abdominal pain, heartburn, nausea and vomiting.   Musculoskeletal: Negative for joint pain  "and myalgias.   Skin: Negative for itching and rash.   Neurological: Negative for dizziness, speech change and loss of consciousness.   Endo/Heme/Allergies: Negative for environmental allergies. Does not bruise/bleed easily.   Psychiatric/Behavioral: Negative for depression. The patient is not nervous/anxious.    All other systems reviewed and are negative.       Objective:   /82 (BP Location: Left arm, Patient Position: Sitting, BP Cuff Size: Adult)   Pulse 86   Ht 1.575 m (5' 2.01\")   Wt 73 kg (161 lb)   LMP 11/01/1986   SpO2 95%   BMI 29.44 kg/m²     Physical Exam   Constitutional: She is oriented to person, place, and time. She appears well-developed and well-nourished.   HENT:   Head: Normocephalic and atraumatic.   Eyes: Pupils are equal, round, and reactive to light. EOM are normal.   Neck: No JVD present. No thyromegaly present.   Cardiovascular: Normal rate, regular rhythm and intact distal pulses.    No murmur heard.  Pulmonary/Chest: Breath sounds normal. No respiratory distress.   Abdominal: Bowel sounds are normal. She exhibits no distension.   Musculoskeletal: She exhibits no edema or tenderness.   Lymphadenopathy:     She has no cervical adenopathy.   Neurological: She is alert and oriented to person, place, and time.   Skin: Skin is warm and dry.   Psychiatric: She has a normal mood and affect. Her behavior is normal.       Assessment:     1. Renovascular hypertension     2. Coronary artery disease involving native coronary artery of native heart without angina pectoris         Medical Decision Making:  Today's Assessment / Status / Plan:       Coronary disease  We reviewed her medical records  We looked at her stress test from last fall normal function questionable small defect  Likely false positive discussed at length  Statin, dual antiplatelet therapy for 1 year discussed this as well  Reviewed lab work from December repeat in 1 year    Hyperlipidemia discussed diet and " exercise  Talked about 5-year plan and national guidelines  Talked about changes in therapy    Breathlessness  No evidence of cardiac etiology  Mild aortic regurgitation on echo last year, repeat in one calendar year or as needed based on symptoms    Spent more than 45 minutes discussing her medical history and a leaving her concerns, she will readdress anxiety with her PCP discussed this with her to  RTC one year sooner with concerns

## 2019-04-10 ENCOUNTER — OFFICE VISIT (OUTPATIENT)
Dept: PULMONOLOGY | Facility: HOSPICE | Age: 79
End: 2019-04-10
Payer: MEDICARE

## 2019-04-10 ENCOUNTER — TELEPHONE (OUTPATIENT)
Dept: PULMONOLOGY | Facility: HOSPICE | Age: 79
End: 2019-04-10

## 2019-04-10 VITALS
DIASTOLIC BLOOD PRESSURE: 82 MMHG | RESPIRATION RATE: 16 BRPM | WEIGHT: 161 LBS | BODY MASS INDEX: 29.63 KG/M2 | HEART RATE: 83 BPM | HEIGHT: 62 IN | OXYGEN SATURATION: 95 % | SYSTOLIC BLOOD PRESSURE: 122 MMHG

## 2019-04-10 DIAGNOSIS — G47.33 OSA (OBSTRUCTIVE SLEEP APNEA): ICD-10-CM

## 2019-04-10 DIAGNOSIS — R06.02 SOB (SHORTNESS OF BREATH) ON EXERTION: ICD-10-CM

## 2019-04-10 PROCEDURE — 99214 OFFICE O/P EST MOD 30 MIN: CPT | Performed by: INTERNAL MEDICINE

## 2019-04-10 PROCEDURE — 94761 N-INVAS EAR/PLS OXIMETRY MLT: CPT | Performed by: INTERNAL MEDICINE

## 2019-04-10 RX ORDER — CLOPIDOGREL BISULFATE 75 MG/1
75 TABLET ORAL
Refills: 5 | COMMUNITY
Start: 2019-01-25 | End: 2019-08-26 | Stop reason: SDUPTHER

## 2019-04-10 NOTE — PROCEDURES
Multi-Ox Readings  Multi Ox #1 Room air   O2 sat % at rest 95   O2 sat % on exertion 93   O2 sat average on exertion 94   Multi Ox #2     O2 sat % at rest     O2 sat % on exertion     O2 sat average on exertion       Oxygen Use     Oxygen Frequency     Duration of need     Is the patient mobile within the home?     CPAP Use?     BIPAP Use?     Servo Titration

## 2019-04-10 NOTE — TELEPHONE ENCOUNTER
Pt called regarding her SOB with exertion. She is on 1lpm O2 Noc. She states any time she try's to exercise or exert herself she become SOB. 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 She recently saw cardiology who told her to callback and be seen earlier. Pt is scheduled with Dr Connor at 4:30. Do you want a multi ox added on prior to visit?

## 2019-04-10 NOTE — LETTER
Quyen Connor  [facility] Pulmonary Medicine   [address] [place] [address] [place] [address]  Phone: [phone] - Fax: [phone]           Encounter Date: 4/10/2019  Provider: Quyen Connor  Location of Care: [facility] PULMONARY MEDICINE      Patient:   Sheyla Gauthier   MR Number: [id number]   YOB: 1940     PROGRESS NOTE:  Chief Complaint   Patient presents with   • Shortness of Breath     SOB w/ exertion          HPI: This patient is a 78 y.o. female whom is followed in our clinic for SOB and in sleep clinic for JOHN last seen by Dr. Weathers for initial eval of SOB in July of 2018.  The pt pmhx is signifcant for HTN well controlled on 2 medications, dyslipidemia on statin, osteoporosis on therapy and CAD s/p PCI for lesion found on LHC done to evaluate small defect on myocardial perfusion study las year which was originally pursued in an attempt to evaluate her ongoing SOB.  The pt was dx with JOHN in late 2017 and started on CPAP therapy which was effective from the standpoint of eliminating nocturnal hypoxia and apneic events however she did not tolerate the mask and has since been changed to oral device.  She continued to have nocturnal desaturation with oral device based on home nocturnal oximetry and was later started on supplemental O2 with her oral device at night.  With regards to her SOB, this has been present and stable to mildly progressive over the past year.  The pt previously was very active and exercising daily.  When probed further she feels that she is fatigued and notes that she gets depressed about going to sleep at night with mouth piece and oxygen for sleep.  She has to sleep on her back, does not sleep well and wakes up after only 5 hours (confirmed on nocturnal oximetry).  She denies cough, wheezing, chest pain, edema.  No f/c.  It was originally thought that her hiatal hernia was contributing to SOB and there was no e/o pulmonary  pathology on CT other than small, sub-cm pulmonary nodules and she was referred to surgery.  It was not felt that surgical intervention would help her significantly given likely chronicity of her hernia.  She is feeling very discouraged.  Of note her original sleep study was done at home and RDI was 27.  Her repeat nocturnal oximetry study on CPAP showed adequate therapy however the pt denies sxs of excessive daytime sleepiness (at least to me) prior to the dx study which she states was done because of her hx of htn and snoring.      Past Medical History:   Diagnosis Date   • Anxiety    • Arthritis    • Back pain    • Cataract    • Daytime sleepiness    • Depression    • Dyslipidemia    • Fatigue    • Frequent headaches    • Gasping for breath    • Herpes zoster 10/27/06   • HTN (hypertension)    • Hypertension    • MHA (microangiopathic hemolytic anemia)    • Migraine syndrome 2/11/2013   • Morning headache    • Mumps    • Nasal drainage    • Osteopathia    • Osteoporosis    • Painful joint    • Post-menopause on HRT (hormone replacement therapy)    • Shortness of breath    • Sleep apnea    • Sore muscles    • Swelling of lower extremity    • Thyroid disease    • Tonsillitis    • White matter abnormality on MRI of brain 2/11/2013       Social History     Social History   • Marital status:      Spouse name: N/A   • Number of children: N/A   • Years of education: N/A     Occupational History   • Not on file.     Social History Main Topics   • Smoking status: Former Smoker   • Smokeless tobacco: Never Used      Comment: smoked off & on for 10 yrs   • Alcohol use 0.0 oz/week      Comment: occassional   • Drug use: No   • Sexual activity: Not Currently      Comment: , 3 kids, retired     Other Topics Concern   • Not on file     Social History Narrative   • No narrative on file       Family History   Problem Relation Age of Onset   • Stroke Father    • Arthritis Father    • Hyperlipidemia Father    •  Hypertension Father    • Cancer Sister         breast   • Arthritis Sister    • Arthritis Mother    • Hypertension Mother    • Hyperlipidemia Mother    • Stroke Mother    • Arthritis Brother    • Cancer Brother    • Heart Disease Brother    • Hypertension Brother    • Hyperlipidemia Brother    • Cancer Daughter         breast       Current Outpatient Prescriptions on File Prior to Visit   Medication Sig Dispense Refill   • alendronate (FOSAMAX) 70 MG Tab TAKE 1 TAB BY MOUTH EVERY 7 DAYS. 12 Tab 0   • losartan (COZAAR) 50 MG Tab TAKE 1 TABLET BY MOUTH EVERY DAY 90 Tab 3   • amLODIPine (NORVASC) 5 MG Tab Take 1 Tab by mouth every day. 90 Tab 0   • pravastatin (PRAVACHOL) 40 MG tablet TAKE 1 TABLET BY MOUTH EVERY NIGHT AT BEDTIME 90 Tab 0   • thyroid (ARMOUR THYROID) 30 MG Tab Take 30 mg by mouth every day.     • coenzyme Q-10 30 MG capsule Take 60 mg by mouth every day.     • Estriol 10 % Cream c-estriol 0.5mg gm cream     • aspirin (ASA) 81 MG Chew Tab chewable tablet 1     • Cholecalciferol (VITAMIN D PO) Take  by mouth.     • Magnesium 400 MG Tab Take  by mouth.       No current facility-administered medications on file prior to visit.        Amoxicillin and Latex      ROS:   Review of Systems   Constitutional: Positive for malaise/fatigue. Negative for chills, diaphoresis, fever and weight loss.   HENT: Negative for congestion, ear discharge, ear pain, hearing loss, nosebleeds, sinus pain and tinnitus.    Eyes: Negative for blurred vision, double vision, photophobia and pain.   Respiratory: Positive for shortness of breath. Negative for cough, hemoptysis, sputum production and wheezing.    Cardiovascular: Negative for chest pain, palpitations, orthopnea, claudication, leg swelling and PND.   Gastrointestinal: Negative for abdominal pain, diarrhea, heartburn, nausea and vomiting.   Genitourinary: Negative for dysuria and urgency.   Musculoskeletal: Negative for back pain, joint pain, myalgias and neck pain.   Skin:  "Negative for itching and rash.   Neurological: Negative for dizziness, focal weakness, weakness and headaches.   Psychiatric/Behavioral: Positive for depression. The patient has insomnia.        /82   Pulse 83   Resp 16   Ht 1.575 m (5' 2\")   Wt 73 kg (161 lb)   SpO2 95%   Physical Exam   Constitutional: She is oriented to person, place, and time. She appears well-developed and well-nourished. No distress.   HENT:   Head: Normocephalic and atraumatic.   Mouth/Throat: Oropharynx is clear and moist. No oropharyngeal exudate.   Eyes: Pupils are equal, round, and reactive to light. Conjunctivae and EOM are normal. No scleral icterus.   Neck: Normal range of motion. Neck supple. No JVD present. No tracheal deviation present.   Cardiovascular: Normal rate, regular rhythm and normal heart sounds.  Exam reveals no gallop and no friction rub.    No murmur heard.  Pulmonary/Chest: Effort normal and breath sounds normal. No stridor. No respiratory distress. She has no wheezes. She has no rales. She exhibits no tenderness.   Abdominal: Soft. She exhibits no distension.   Musculoskeletal: Normal range of motion. She exhibits no edema or deformity.   Neurological: She is alert and oriented to person, place, and time. No cranial nerve deficit.   Skin: Skin is warm and dry. No rash noted.   Psychiatric: She has a normal mood and affect.       PFTs as reviewed by me personally: No PFTs for review    Imagaing as reviewed by me personally:  CT chest from 6/2018 reviewed and as per HPI    Assessment:  1. SOB (shortness of breath) on exertion  Multiple Oximetry    PULMONARY FUNCTION TESTS -Test requested: Complete Pulmonary Function Test   2. JOHN (obstructive sleep apnea)         Plan:  1.  This has been attribute it to her diaphragmatic hernia, however patient has been evaluated by surgery and it was not felt that surgical correction would improve her symptoms.  Upon further questioning the patient's symptoms are more " consistent with fatigue.  She has reduced her activity over the past year and particularly since initiation of CPAP therapy which was changed in February 2018 to an oral device due to intolerance of facemask.  She is roughly the same weight over the past 2 years but does endorse weight gain since the death of her  of roughly 30 pounds.  She denies cough, wheezing, chest pain, lower extremity edema.  She has been evaluated by cardiology and reportedly had a normal echocardiogram last year, a follow-up is scheduled.  She has not yet been evaluated for pulmonary function tests.  Her CT chest in June of last year was fairly unremarkable other than some subcentimeter pulmonary nodules and a lifelong non-smoker.  A follow-up CAT scan is scheduled for July of this year.  Patient did not desaturate on multi-ox in clinic today.  Review of overnight oximetries done in the past year to evaluate effectiveness of her oral mouthpiece do show desaturation however the patient tells me she routinely wears gel nail polish which she has on in clinic today.  She does report not wearing it for her initial home sleep study in 2017 but subsequently has not removed it for further testing.  Additionally her nocturnal oximetry test are rarely more than 5 hours of sleep suggesting chronic lack of sleep.  It is unclear to me if her symptoms are related to lack of restful sleep given that she has poorly tolerated sleep apnea interventions, versus deconditioning, versus underlying pulmonary disease.  I suspect the former 2.  I am recommending we obtain full pulmonary function tests and I encouraged her to continue to be active and try to resume her exercise at the gym paying attention to symptoms and stopping for things such as chest pain or lightheadedness.  I will ReachOut to our sleep colleagues to determine if a in lab formal sleep study could be performed to reassess for significant obstructive sleep apnea see discussion below.  2.   This was diagnosed in 2017 based on a home sleep study.  The patient did have a respiratory disturbance index of around 27 in addition to significant desaturation on original study.  This was alleviated with CPAP which she did not tolerate and now on oral device and supplemental O2.  Based on repeat nocturnal oximetry studies the patient does have significant nocturnal desaturation but appears to be alleviated with CPAP therapy although her tolerance has been poor which has affected the quality and amount of sleep she is currently getting.  It is not entirely clear to me whether or not she was symptomatic prior to her diagnostic study and I wonder if she would benefit from an in lab formal repeat polysomnogram to determine the true severity of her sleep apnea.  I will reach out to Dr. Javed to see if this would be a reasonable request given that the majority of her symptoms with regards to shortness of breath appear to have occurred since starting CPAP therapy which as stated above she has been intolerant to.  Return in about 3 months (around 7/10/2019) for f/u PFTs and repeat CT chest.          Electronically signed by Quyen Connor  on 04/11/19    Darius Denney M.D.  [address] [place] [id number]  VIA In Basket     Lisette Javed M.D.  [address]  [address]  [place] [id number]  VIA In Basket

## 2019-04-11 ASSESSMENT — ENCOUNTER SYMPTOMS
DOUBLE VISION: 0
MYALGIAS: 0
WHEEZING: 0
SHORTNESS OF BREATH: 1
SPUTUM PRODUCTION: 0
WEIGHT LOSS: 0
WEAKNESS: 0
BLURRED VISION: 0
HEARTBURN: 0
DEPRESSION: 1
ORTHOPNEA: 0
FEVER: 0
EYE PAIN: 0
VOMITING: 0
CLAUDICATION: 0
SINUS PAIN: 0
PHOTOPHOBIA: 0
HEADACHES: 0
COUGH: 0
CHILLS: 0
ABDOMINAL PAIN: 0
HEMOPTYSIS: 0
INSOMNIA: 1
PALPITATIONS: 0
DIZZINESS: 0
FOCAL WEAKNESS: 0
DIAPHORESIS: 0
BACK PAIN: 0
NAUSEA: 0
DIARRHEA: 0
PND: 0
NECK PAIN: 0

## 2019-04-11 NOTE — PROGRESS NOTES
Chief Complaint   Patient presents with   • Shortness of Breath     SOB w/ exertion          HPI: This patient is a 78 y.o. female whom is followed in our clinic for SOB and in sleep clinic for JOHN last seen by Dr. Weathers for initial eval of SOB in July of 2018.  The pt pmhx is signifcant for HTN well controlled on 2 medications, dyslipidemia on statin, osteoporosis on therapy and CAD s/p PCI for lesion found on LHC done to evaluate small defect on myocardial perfusion study las year which was originally pursued in an attempt to evaluate her ongoing SOB.  The pt was dx with JOHN in late 2017 and started on CPAP therapy which was effective from the standpoint of eliminating nocturnal hypoxia and apneic events however she did not tolerate the mask and has since been changed to oral device.  She continued to have nocturnal desaturation with oral device based on home nocturnal oximetry and was later started on supplemental O2 with her oral device at night.  With regards to her SOB, this has been present and stable to mildly progressive over the past year.  The pt previously was very active and exercising daily.  When probed further she feels that she is fatigued and notes that she gets depressed about going to sleep at night with mouth piece and oxygen for sleep.  She has to sleep on her back, does not sleep well and wakes up after only 5 hours (confirmed on nocturnal oximetry).  She denies cough, wheezing, chest pain, edema.  No f/c.  It was originally thought that her hiatal hernia was contributing to SOB and there was no e/o pulmonary pathology on CT other than small, sub-cm pulmonary nodules and she was referred to surgery.  It was not felt that surgical intervention would help her significantly given likely chronicity of her hernia.  She is feeling very discouraged.  Of note her original sleep study was done at home and RDI was 27.  Her repeat nocturnal oximetry study on CPAP showed adequate therapy however the pt  denies sxs of excessive daytime sleepiness (at least to me) prior to the dx study which she states was done because of her hx of htn and snoring.      Past Medical History:   Diagnosis Date   • Anxiety    • Arthritis    • Back pain    • Cataract    • Daytime sleepiness    • Depression    • Dyslipidemia    • Fatigue    • Frequent headaches    • Gasping for breath    • Herpes zoster 10/27/06   • HTN (hypertension)    • Hypertension    • MHA (microangiopathic hemolytic anemia)    • Migraine syndrome 2/11/2013   • Morning headache    • Mumps    • Nasal drainage    • Osteopathia    • Osteoporosis    • Painful joint    • Post-menopause on HRT (hormone replacement therapy)    • Shortness of breath    • Sleep apnea    • Sore muscles    • Swelling of lower extremity    • Thyroid disease    • Tonsillitis    • White matter abnormality on MRI of brain 2/11/2013       Social History     Social History   • Marital status:      Spouse name: N/A   • Number of children: N/A   • Years of education: N/A     Occupational History   • Not on file.     Social History Main Topics   • Smoking status: Former Smoker   • Smokeless tobacco: Never Used      Comment: smoked off & on for 10 yrs   • Alcohol use 0.0 oz/week      Comment: occassional   • Drug use: No   • Sexual activity: Not Currently      Comment: , 3 kids, retired     Other Topics Concern   • Not on file     Social History Narrative   • No narrative on file       Family History   Problem Relation Age of Onset   • Stroke Father    • Arthritis Father    • Hyperlipidemia Father    • Hypertension Father    • Cancer Sister         breast   • Arthritis Sister    • Arthritis Mother    • Hypertension Mother    • Hyperlipidemia Mother    • Stroke Mother    • Arthritis Brother    • Cancer Brother    • Heart Disease Brother    • Hypertension Brother    • Hyperlipidemia Brother    • Cancer Daughter         breast       Current Outpatient Prescriptions on File Prior to Visit  "  Medication Sig Dispense Refill   • alendronate (FOSAMAX) 70 MG Tab TAKE 1 TAB BY MOUTH EVERY 7 DAYS. 12 Tab 0   • losartan (COZAAR) 50 MG Tab TAKE 1 TABLET BY MOUTH EVERY DAY 90 Tab 3   • amLODIPine (NORVASC) 5 MG Tab Take 1 Tab by mouth every day. 90 Tab 0   • pravastatin (PRAVACHOL) 40 MG tablet TAKE 1 TABLET BY MOUTH EVERY NIGHT AT BEDTIME 90 Tab 0   • thyroid (ARMOUR THYROID) 30 MG Tab Take 30 mg by mouth every day.     • coenzyme Q-10 30 MG capsule Take 60 mg by mouth every day.     • Estriol 10 % Cream c-estriol 0.5mg gm cream     • aspirin (ASA) 81 MG Chew Tab chewable tablet 1     • Cholecalciferol (VITAMIN D PO) Take  by mouth.     • Magnesium 400 MG Tab Take  by mouth.       No current facility-administered medications on file prior to visit.        Amoxicillin and Latex      ROS:   Review of Systems   Constitutional: Positive for malaise/fatigue. Negative for chills, diaphoresis, fever and weight loss.   HENT: Negative for congestion, ear discharge, ear pain, hearing loss, nosebleeds, sinus pain and tinnitus.    Eyes: Negative for blurred vision, double vision, photophobia and pain.   Respiratory: Positive for shortness of breath. Negative for cough, hemoptysis, sputum production and wheezing.    Cardiovascular: Negative for chest pain, palpitations, orthopnea, claudication, leg swelling and PND.   Gastrointestinal: Negative for abdominal pain, diarrhea, heartburn, nausea and vomiting.   Genitourinary: Negative for dysuria and urgency.   Musculoskeletal: Negative for back pain, joint pain, myalgias and neck pain.   Skin: Negative for itching and rash.   Neurological: Negative for dizziness, focal weakness, weakness and headaches.   Psychiatric/Behavioral: Positive for depression. The patient has insomnia.        /82   Pulse 83   Resp 16   Ht 1.575 m (5' 2\")   Wt 73 kg (161 lb)   SpO2 95%   Physical Exam   Constitutional: She is oriented to person, place, and time. She appears well-developed " and well-nourished. No distress.   HENT:   Head: Normocephalic and atraumatic.   Mouth/Throat: Oropharynx is clear and moist. No oropharyngeal exudate.   Eyes: Pupils are equal, round, and reactive to light. Conjunctivae and EOM are normal. No scleral icterus.   Neck: Normal range of motion. Neck supple. No JVD present. No tracheal deviation present.   Cardiovascular: Normal rate, regular rhythm and normal heart sounds.  Exam reveals no gallop and no friction rub.    No murmur heard.  Pulmonary/Chest: Effort normal and breath sounds normal. No stridor. No respiratory distress. She has no wheezes. She has no rales. She exhibits no tenderness.   Abdominal: Soft. She exhibits no distension.   Musculoskeletal: Normal range of motion. She exhibits no edema or deformity.   Neurological: She is alert and oriented to person, place, and time. No cranial nerve deficit.   Skin: Skin is warm and dry. No rash noted.   Psychiatric: She has a normal mood and affect.       PFTs as reviewed by me personally: No PFTs for review    Imagaing as reviewed by me personally:  CT chest from 6/2018 reviewed and as per HPI    Assessment:  1. SOB (shortness of breath) on exertion  Multiple Oximetry    PULMONARY FUNCTION TESTS -Test requested: Complete Pulmonary Function Test   2. JOHN (obstructive sleep apnea)         Plan:  1.  This has been attribute it to her diaphragmatic hernia, however patient has been evaluated by surgery and it was not felt that surgical correction would improve her symptoms.  Upon further questioning the patient's symptoms are more consistent with fatigue.  She has reduced her activity over the past year and particularly since initiation of CPAP therapy which was changed in February 2018 to an oral device due to intolerance of facemask.  She is roughly the same weight over the past 2 years but does endorse weight gain since the death of her  of roughly 30 pounds.  She denies cough, wheezing, chest pain, lower  extremity edema.  She has been evaluated by cardiology and reportedly had a normal echocardiogram last year, a follow-up is scheduled.  She has not yet been evaluated for pulmonary function tests.  Her CT chest in June of last year was fairly unremarkable other than some subcentimeter pulmonary nodules and a lifelong non-smoker.  A follow-up CAT scan is scheduled for July of this year.  Patient did not desaturate on multi-ox in clinic today.  Review of overnight oximetries done in the past year to evaluate effectiveness of her oral mouthpiece do show desaturation however the patient tells me she routinely wears gel nail polish which she has on in clinic today.  She does report not wearing it for her initial home sleep study in 2017 but subsequently has not removed it for further testing.  Additionally her nocturnal oximetry test are rarely more than 5 hours of sleep suggesting chronic lack of sleep.  It is unclear to me if her symptoms are related to lack of restful sleep given that she has poorly tolerated sleep apnea interventions, versus deconditioning, versus underlying pulmonary disease.  I suspect the former 2.  I am recommending we obtain full pulmonary function tests and I encouraged her to continue to be active and try to resume her exercise at the gym paying attention to symptoms and stopping for things such as chest pain or lightheadedness.  I will ReachOut to our sleep colleagues to determine if a in lab formal sleep study could be performed to reassess for significant obstructive sleep apnea see discussion below.  2.  This was diagnosed in 2017 based on a home sleep study.  The patient did have a respiratory disturbance index of around 27 in addition to significant desaturation on original study.  This was alleviated with CPAP which she did not tolerate and now on oral device and supplemental O2.  Based on repeat nocturnal oximetry studies the patient does have significant nocturnal desaturation but  appears to be alleviated with CPAP therapy although her tolerance has been poor which has affected the quality and amount of sleep she is currently getting.  It is not entirely clear to me whether or not she was symptomatic prior to her diagnostic study and I wonder if she would benefit from an in lab formal repeat polysomnogram to determine the true severity of her sleep apnea.  I will reach out to Dr. Javed to see if this would be a reasonable request given that the majority of her symptoms with regards to shortness of breath appear to have occurred since starting CPAP therapy which as stated above she has been intolerant to.  Return in about 3 months (around 7/10/2019) for f/u PFTs and repeat CT chest.

## 2019-05-11 DIAGNOSIS — I10 HYPERTENSION, UNSPECIFIED TYPE: ICD-10-CM

## 2019-05-14 RX ORDER — AMLODIPINE BESYLATE 5 MG/1
TABLET ORAL
Qty: 90 TAB | Refills: 1 | Status: SHIPPED | OUTPATIENT
Start: 2019-05-14 | End: 2019-10-01

## 2019-05-30 DIAGNOSIS — E78.00 PURE HYPERCHOLESTEROLEMIA: ICD-10-CM

## 2019-05-31 RX ORDER — PRAVASTATIN SODIUM 40 MG
TABLET ORAL
Qty: 90 TAB | Refills: 0 | Status: SHIPPED | OUTPATIENT
Start: 2019-05-31 | End: 2019-09-06 | Stop reason: SDUPTHER

## 2019-06-10 ENCOUNTER — TELEPHONE (OUTPATIENT)
Dept: SLEEP MEDICINE | Facility: MEDICAL CENTER | Age: 79
End: 2019-06-10

## 2019-06-11 ENCOUNTER — SLEEP CENTER VISIT (OUTPATIENT)
Dept: SLEEP MEDICINE | Facility: MEDICAL CENTER | Age: 79
End: 2019-06-11
Payer: MEDICARE

## 2019-06-11 VITALS
WEIGHT: 162 LBS | HEIGHT: 62 IN | HEART RATE: 69 BPM | OXYGEN SATURATION: 95 % | SYSTOLIC BLOOD PRESSURE: 130 MMHG | DIASTOLIC BLOOD PRESSURE: 64 MMHG | RESPIRATION RATE: 16 BRPM | BODY MASS INDEX: 29.81 KG/M2

## 2019-06-11 DIAGNOSIS — R06.02 SOB (SHORTNESS OF BREATH): ICD-10-CM

## 2019-06-11 DIAGNOSIS — R93.89 ABNORMAL CT OF THE CHEST: ICD-10-CM

## 2019-06-11 DIAGNOSIS — G47.33 OSA (OBSTRUCTIVE SLEEP APNEA): ICD-10-CM

## 2019-06-11 PROCEDURE — 99214 OFFICE O/P EST MOD 30 MIN: CPT | Performed by: NURSE PRACTITIONER

## 2019-06-11 NOTE — TELEPHONE ENCOUNTER
Called pt to see exactly what the reason was for this early visit. Pt stated she is having issues with her mandibular device and is wanting to come in to see what other options she has. She stated in the past she was intolerant to CPAP due to her not being able to adjust to any masks that [facility] gave her. She did also advised that they didn't have much to choose from though. She advised that she is willing to try CPAP again her dental device is making her gums bleed and chipping her teeth. She said she reached out to Alex's office and they said they could not help her as she is the only one who has experienced these issues with a dental device they have supplied.

## 2019-06-11 NOTE — PROGRESS NOTES
Chief Complaint   Patient presents with   • Apnea     pt having comfort issues with Dental Device        HPI:  Sheyla Gauthier is a 78 y.o. year old female here today for follow-up on her obstructive sleep apnea. Initial home sleep study indicated an AHI of 30 with a low 02 saturation of 75%. She had tolerance issues with CPAP. She felt tolerance issues were due to mask comfort issues. She had requested a referral to [facility] for evaluation for Inspire. She has since been fit for for a dental appliance. Follow up HST 9/12/2018 with dental appliance showed a respiratory event index of 9.3 with a low 02 saturation of 83%. She was ordered nocturnal 02 in addition to her dental appliance given her persistent 02 desaturations. She states she has had significant comfort issues with her current dental appliance. She had been referred to an endodontist for bleeding gums and mouth pain.     She has been seen at our [facility] for an abnormal CT chest and shortness of breath. CT in the past indicated a 3.3 mm nodule in the left lower lobe with groundglass opacity. She is scheduled for a repeat CT chest this month with follow up with Dr. Connor in July.       Past Medical History:   Diagnosis Date   • Anxiety    • Arthritis    • Back pain    • Cataract    • Daytime sleepiness    • Depression    • Dyslipidemia    • Fatigue    • Frequent headaches    • Gasping for breath    • Herpes zoster 10/27/06   • HTN (hypertension)    • Hypertension    • MHA (microangiopathic hemolytic anemia)    • Migraine syndrome 2/11/2013   • Morning headache    • Mumps    • Nasal drainage    • Osteopathia    • Osteoporosis    • Painful joint    • Post-menopause on HRT (hormone replacement therapy)    • Shortness of breath    • Sleep apnea    • Sore muscles    • Swelling of lower extremity    • Thyroid disease    • Tonsillitis    • White matter abnormality on MRI of brain 2/11/2013       Past Surgical History:   Procedure Laterality Date   •  BUNIONECTOMY Left 2003   • FOOT SURGERY  2002   • PB REDUCTION OF LARGE BREAST  1997   • CHOLECYSTECTOMY  1996   • ABDOMINAL HYSTERECTOMY TOTAL  11/1986   • ARTHROSCOPY, KNEE     • EYE SURGERY      cataracts   • HYSTERECTOMY LAPAROSCOPY     • MAMMOPLASTY AUGMENTATION     • PB ENLARGE BREAST WITH IMPLANT         Family History   Problem Relation Age of Onset   • Stroke Father    • Arthritis Father    • Hyperlipidemia Father    • Hypertension Father    • Cancer Sister         breast   • Arthritis Sister    • Arthritis Mother    • Hypertension Mother    • Hyperlipidemia Mother    • Stroke Mother    • Arthritis Brother    • Cancer Brother    • Heart Disease Brother    • Hypertension Brother    • Hyperlipidemia Brother    • Cancer Daughter         breast       Social History     Social History   • Marital status:      Spouse name: N/A   • Number of children: N/A   • Years of education: N/A     Occupational History   • Not on file.     Social History Main Topics   • Smoking status: Former Smoker   • Smokeless tobacco: Never Used      Comment: smoked off & on for 10 yrs   • Alcohol use 0.0 oz/week      Comment: occassional   • Drug use: No   • Sexual activity: Not Currently      Comment: , 3 kids, retired     Other Topics Concern   • Not on file     Social History Narrative   • No narrative on file       ROS:  Constitutional: Denies fevers, chills, sweats, weight loss  Eyes: Denies glasses, vision loss, pain, drainage, double vision  Ears/Nose/Mouth/Throat: Denies rhinitis, nasal congestion, ear ache, difficulty hearing, sore throat, persistent hoarseness, decayed teeth/toothache  Cardiovascular: Denies chest pain, tightness, palpitations, swelling in feet/legs, fainting, difficulty breathing when laying down  Respiratory: Denies cough, sputum, wheezing, painful breathing, coughing up blood  GI: Denies heartburn, difficulty swallowing, nausea, vomiting, abdominal pain, diarrhea, constipation  : Denies  "frequent urination, painful urination  Integumentary: Denies rashes, lumps or color changes  MSK: Denies painful joints, sore muscles, and back pain.   Neurological: Denies frequent headaches, dizziness, weakness  Sleep: See HPI     Current Outpatient Prescriptions   Medication Sig Dispense Refill   • pravastatin (PRAVACHOL) 40 MG tablet TAKE 1 TABLET BY MOUTH AT BEDTIME 90 Tab 0   • amLODIPine (NORVASC) 5 MG Tab TAKE 1 TABLET BY MOUTH EVERY DAY 90 Tab 1   • clopidogrel (PLAVIX) 75 MG Tab Take 75 mg by mouth every day.  5   • alendronate (FOSAMAX) 70 MG Tab TAKE 1 TAB BY MOUTH EVERY 7 DAYS. 12 Tab 0   • losartan (COZAAR) 50 MG Tab TAKE 1 TABLET BY MOUTH EVERY DAY 90 Tab 3   • thyroid (ARMOUR THYROID) 30 MG Tab Take 30 mg by mouth every day.     • coenzyme Q-10 30 MG capsule Take 60 mg by mouth every day.     • Estriol 10 % Cream c-estriol 0.5mg gm cream     • aspirin (ASA) 81 MG Chew Tab chewable tablet 1     • Cholecalciferol (VITAMIN D PO) Take  by mouth.     • Magnesium 400 MG Tab Take  by mouth.       No current facility-administered medications for this visit.        Allergies   Allergen Reactions   • Amoxicillin Rash     Facial rash   • Latex Hives       /64 (BP Location: Left arm, Patient Position: Sitting, BP Cuff Size: Adult)   Pulse 69   Resp 16   Ht 1.575 m (5' 2\")   Wt 73.5 kg (162 lb)   SpO2 95%     PE:   Appearance: Well developed, well nourished, no acute distress  Eyes: PERRL, EOM intact, sclera white, conjunctiva moist  Ears: no lesions or deformities  Hearing: grossly intact  Nose: no lesions or deformities  Oropharynx: tongue normal, posterior pharynx without erythema or exudate  Mallampati Classification: Class 3   Neck: supple, trachea midline, no masses   Respiratory effort: no intercostal retractions or use of accessory muscles  Lung auscultation: no rales, rhonchi or wheezes  Heart auscultation: no murmur rub or gallop  Extremities: no cyanosis or edema  Abdomen: soft ,non tender, " no masses  Gait and Station: normal  Digits and nails: no clubbing, cyanosis, petechiae or nodes.  Cranial nerves: grossly intact  Skin: no rashes, lesions or ulcers noted  Orientation: Oriented to time, person and place  Mood and affect: mood and affect appropriate, normal interaction with examiner  Judgement: Intact          Assessment:    1. JOHN (obstructive sleep apnea)  Polysomnography, 4 or More   2. BMI 29.0-29.9,adult     3. Abnormal CT of the chest     4. SOB (shortness of breath)           Plan:    1) She has had significant tolerance issues with the dental appliance. She has also required supplemental 02 in addition to the dental appliance. She states she would be willing to give CPAP another try if she could find a comfortable mask. Recommend in lab split night sleep study now to re assess severity of her sleep apnea and determine pressure needs.   2) Sleep hygiene discussed.   3) Pending follow up CT imaging with a results appointment at our Pulmonary Clinic.   4) Follow up after Polysomnogram, sooner OV if needed.

## 2019-06-12 ENCOUNTER — SLEEP STUDY (OUTPATIENT)
Dept: SLEEP MEDICINE | Facility: MEDICAL CENTER | Age: 79
End: 2019-06-12
Attending: NURSE PRACTITIONER
Payer: MEDICARE

## 2019-06-12 DIAGNOSIS — G47.33 OSA (OBSTRUCTIVE SLEEP APNEA): ICD-10-CM

## 2019-06-12 PROCEDURE — 95810 POLYSOM 6/> YRS 4/> PARAM: CPT | Performed by: FAMILY MEDICINE

## 2019-06-13 NOTE — PROCEDURES
Technical summary:The patient underwent a diagnostic polysomnogram. This was a 16 channel montage study to include a 6 channel EEG, a 2 channel EOG, and chin EMG, left and right leg EMG, a snore channel, a nasal pressure transducer, and a nasal oral airflow thermistor.   Respiratory effort was assessed with the use of a thoracic and abdominal monitor and overnight oximetry was obtained. Audio and video recordings were reviewed. This was a fully attended study and sleep stage scoring was performed. The test was technically adequate.       Scoring Criteria: A modification of the the AASM Manual for the Scoring of Sleep and Associated Events, 2012, was used.   Obstructive apnea was scored by cessation of airflow for at least 10 seconds with continuing respiratory effort.  Central apnea was scored by cessation of airflow for at least 10 seconds with no effort.  Hypopnea was scored by a 30% or more reduction in airflow for at least 10 seconds accompanied by an arterial oxygen desaturation of 3% or more.  (For Medicare patients, hypopneas were scored by a 30% or more reduction in airflow for at least 10 seconds accompanied by an arterial oxygen saturation of 4% or more, as required by their insurance, CMS.    General sleep summary:  General sleep summary:  During the overnight study, the sleep latency was 41 min which is prolonged. The REM latency was 37 min which is decreased. The total sleep time was 207 min and sleep efficiency was 50 % which is decreased.  Sleep stage proportions showed increased WASO of 159 min otherwise normal sleep architecture.  In regards to sleep quality there was a moderate degree of sleep fragmentation as shown by the arousal index of 19 an hour. The arousals were due to spontaneous arousal and respiratory events.    Respiratory summary: During the overnight study, the patient demonstrated moderate complex sleep apnea as shown by the apnea hypopnea index of 21.7/hr. There was a total of 55  apneas and 20 hypopneas.36% of the apneas were central sleep apnea. In the supine position the respiratory disturbance index was 122 an hour and in the non-supine position the respiratory disturbance index was 8.8 per hour. The respiratory events were associated with O2 jose guadalupe of 84% and average O2 saturation of 90%. She spent 20 min of sleep time below 89% O2 saturation. There was snoring. The patient demonstrated a NSR with an average heartbeat of 55 bpm.     Periodic limb movement summary: The patient demonstrated an mild degree of periodic limb movements as shown by the PLM index of 21 per hour.      Impression:  1.  Moderate complex sleep apnea with AHI of 2.1/hr and O2 jose guadalupe 84 %  2.  PLMS      Recommendations:  Recommend the patient return for a CPAP titration. In some cases alternative treatment options may prove effective in resolving sleep apnea and these options include upper airway surgery, the use of a dental orthotic or weight loss and positional therapy. Clinical correlation is required. In general patients with sleep apnea are advised to avoid alcohol and sedatives and to not operate a motor vehicle while drowsy and are at a greater risk for cardiovascular disease.

## 2019-06-14 ENCOUNTER — SLEEP CENTER VISIT (OUTPATIENT)
Dept: SLEEP MEDICINE | Facility: MEDICAL CENTER | Age: 79
End: 2019-06-14
Payer: MEDICARE

## 2019-06-14 VITALS
RESPIRATION RATE: 16 BRPM | SYSTOLIC BLOOD PRESSURE: 112 MMHG | OXYGEN SATURATION: 95 % | BODY MASS INDEX: 29.81 KG/M2 | WEIGHT: 162 LBS | HEIGHT: 62 IN | HEART RATE: 75 BPM | DIASTOLIC BLOOD PRESSURE: 60 MMHG

## 2019-06-14 DIAGNOSIS — G47.09 OTHER INSOMNIA: ICD-10-CM

## 2019-06-14 DIAGNOSIS — G47.33 OSA (OBSTRUCTIVE SLEEP APNEA): ICD-10-CM

## 2019-06-14 PROCEDURE — 99213 OFFICE O/P EST LOW 20 MIN: CPT | Performed by: NURSE PRACTITIONER

## 2019-06-14 RX ORDER — ZOLPIDEM TARTRATE 5 MG/1
5 TABLET ORAL NIGHTLY PRN
Qty: 2 TAB | Refills: 0 | Status: SHIPPED | OUTPATIENT
Start: 2019-06-14 | End: 2019-06-16

## 2019-06-14 ASSESSMENT — ENCOUNTER SYMPTOMS
WEAKNESS: 0
EYE DISCHARGE: 0
CHILLS: 0
MUSCULOSKELETAL NEGATIVE: 1
EYE PAIN: 0
BRUISES/BLEEDS EASILY: 0
DIAPHORESIS: 0
CARDIOVASCULAR NEGATIVE: 1
RESPIRATORY NEGATIVE: 1
WEIGHT LOSS: 0
FEVER: 0

## 2019-06-14 NOTE — PROGRESS NOTES
Chief Complaint   Patient presents with   • Apnea     Last Seen 06/11/19   • Results     Sleep Study 06/12/19         HPI: This patient is a 78 y.o. female, who presents for sleep study results.  Her daughter accompanies her today.   Initial home sleep study indicated an AHI of 30 with a low 02 saturation of 75%. She had tolerance issues with CPAP 5-10. She felt tolerance issues were due to mask comfort issues. She had requested a referral to [facility] for evaluation for Inspire but nothing came of this. She has since been fit for for a dental appliance.   Follow up HST 9/12/2018 with dental appliance showed a respiratory event index of 9.3 with a low 02 saturation of 83%. She was ordered nocturnal 02 in addition to her dental appliance. She states she has had significant comfort issues with her current dental appliance. She had been referred to an endodontist for bleeding gums and mouth pain.  She stopped using her dental appliance and now uses oxygen nocturnally at 1 L.  At the time of her last visit with MICAELA Trimble on June 11 she decided she wanted to retry CPAP therapy.    PSG June 12 demonstrated moderate complex sleep apnea with an AHI of 21.7 and a minimum saturation of 84%.  Is recommended she return for a titration.  She is agreeable to this.  She had reduced sleep efficiency of 50% and is encouraged to use Ambien for her upcoming study.     She has been seen at our Pulmonary Clinic for an abnormal CT chest and shortness of breath. CT in the past indicated a 3.3 mm nodule in the left lower lobe with groundglass opacity. She is scheduled for a repeat CT chest this month with follow up with Dr. Connor in July.       Past Medical History:   Diagnosis Date   • Anxiety    • Arthritis    • Back pain    • Cataract    • Daytime sleepiness    • Depression    • Dyslipidemia    • Fatigue    • Frequent headaches    • Gasping for breath    • Herpes zoster 10/27/06   • HTN (hypertension)    • Hypertension     • MHA (microangiopathic hemolytic anemia)    • Migraine syndrome 2/11/2013   • Morning headache    • Mumps    • Nasal drainage    • Osteopathia    • Osteoporosis    • Painful joint    • Post-menopause on HRT (hormone replacement therapy)    • Shortness of breath    • Sleep apnea    • Sore muscles    • Swelling of lower extremity    • Thyroid disease    • Tonsillitis    • White matter abnormality on MRI of brain 2/11/2013       Social History   Substance Use Topics   • Smoking status: Former Smoker   • Smokeless tobacco: Never Used      Comment: smoked off & on for 10 yrs   • Alcohol use 0.0 oz/week      Comment: occassional       Family History   Problem Relation Age of Onset   • Stroke Father    • Arthritis Father    • Hyperlipidemia Father    • Hypertension Father    • Cancer Sister         breast   • Arthritis Sister    • Arthritis Mother    • Hypertension Mother    • Hyperlipidemia Mother    • Stroke Mother    • Arthritis Brother    • Cancer Brother    • Heart Disease Brother    • Hypertension Brother    • Hyperlipidemia Brother    • Cancer Daughter         breast       Immunization History   Administered Date(s) Administered   • Influenza Seasonal Injectable 11/29/2011, 11/02/2012   • Influenza Vaccine Adult HD 10/09/2010, 12/08/2015, 10/25/2016, 11/01/2017, 11/13/2017, 09/19/2018   • Influenza Vaccine Pediatric Split 11/29/2011, 11/02/2012   • Pneumococcal Conjugate Vaccine (Prevnar/PCV-13) 12/08/2015, 12/14/2015   • Pneumococcal polysaccharide vaccine (PPSV-23) 06/29/2017   • Tdap Vaccine 07/07/2014   • Zoster Vaccine Live (ZVL) (Zostavax) 10/26/2010       Current medications as of today   Current Outpatient Prescriptions   Medication Sig Dispense Refill   • zolpidem (AMBIEN) 5 MG Tab Take 1 Tab by mouth at bedtime as needed for Sleep for up to 2 days. 2 Tab 0   • pravastatin (PRAVACHOL) 40 MG tablet TAKE 1 TABLET BY MOUTH AT BEDTIME 90 Tab 0   • amLODIPine (NORVASC) 5 MG Tab TAKE 1 TABLET BY MOUTH EVERY  "DAY 90 Tab 1   • clopidogrel (PLAVIX) 75 MG Tab Take 75 mg by mouth every day.  5   • alendronate (FOSAMAX) 70 MG Tab TAKE 1 TAB BY MOUTH EVERY 7 DAYS. 12 Tab 0   • losartan (COZAAR) 50 MG Tab TAKE 1 TABLET BY MOUTH EVERY DAY 90 Tab 3   • thyroid (ARMOUR THYROID) 30 MG Tab Take 30 mg by mouth every day.     • coenzyme Q-10 30 MG capsule Take 60 mg by mouth every day.     • Estriol 10 % Cream c-estriol 0.5mg gm cream     • aspirin (ASA) 81 MG Chew Tab chewable tablet 1     • Cholecalciferol (VITAMIN D PO) Take  by mouth.     • Magnesium 400 MG Tab Take  by mouth.       No current facility-administered medications for this visit.        Allergies: Amoxicillin and Latex    /60 (BP Location: Left arm, Patient Position: Sitting, BP Cuff Size: Adult)   Pulse 75   Resp 16   Ht 1.575 m (5' 2\")   Wt 73.5 kg (162 lb)   SpO2 95%       Review of Systems   Constitutional: Positive for malaise/fatigue. Negative for chills, diaphoresis, fever and weight loss.   HENT: Negative.    Eyes: Negative for pain and discharge.   Respiratory: Negative.    Cardiovascular: Negative.    Musculoskeletal: Negative.    Neurological: Negative for weakness.   Endo/Heme/Allergies: Negative for environmental allergies. Does not bruise/bleed easily.       Physical Exam   Constitutional: She is oriented to person, place, and time and well-developed, well-nourished, and in no distress.   HENT:   Head: Normocephalic and atraumatic.   Eyes: Pupils are equal, round, and reactive to light.   Neck: Normal range of motion. Neck supple. No tracheal deviation present.   Pulmonary/Chest: Effort normal.   Neurological: She is alert and oriented to person, place, and time.   Skin: Skin is warm and dry.   Psychiatric: Mood, memory, affect and judgment normal.   Vitals reviewed.      Diagnoses/Plan:    1. JOHN (obstructive sleep apnea)  Sleep testing reviewed with the patient and her daughter in great detail today.  Questions answered to their " satisfaction.  Patient will return for a dedicated night titration.  She did have evidence of complex sleep apnea she is encouraged to try several different masks during the night of her test to find one that is comfortable for her.  - Polysomnography Titration; Future    2. Other insomnia  Rx for Ambien provided for the night of her study.  - zolpidem (AMBIEN) 5 MG Tab; Take 1 Tab by mouth at bedtime as needed for Sleep for up to 2 days.  Dispense: 2 Tab; Refill: 0    She will follow-up with next available provider for results    This dictation was created using voice recognition software. The accuracy of the dictation is limited to the abilities of the software. I expect there may be some errors of grammar and possibly content.

## 2019-06-19 ENCOUNTER — HOSPITAL ENCOUNTER (OUTPATIENT)
Dept: RADIOLOGY | Facility: MEDICAL CENTER | Age: 79
End: 2019-06-19
Attending: INTERNAL MEDICINE
Payer: MEDICARE

## 2019-06-19 DIAGNOSIS — R91.8 PULMONARY NODULES: ICD-10-CM

## 2019-06-19 PROCEDURE — 71250 CT THORAX DX C-: CPT

## 2019-06-30 ENCOUNTER — SLEEP STUDY (OUTPATIENT)
Dept: SLEEP MEDICINE | Facility: MEDICAL CENTER | Age: 79
End: 2019-06-30
Attending: NURSE PRACTITIONER
Payer: MEDICARE

## 2019-06-30 DIAGNOSIS — M89.9 OSTEOPATHIA: ICD-10-CM

## 2019-06-30 DIAGNOSIS — G47.33 OSA (OBSTRUCTIVE SLEEP APNEA): ICD-10-CM

## 2019-06-30 PROCEDURE — 95811 POLYSOM 6/>YRS CPAP 4/> PARM: CPT | Performed by: INTERNAL MEDICINE

## 2019-07-01 NOTE — PROCEDURES
Clinical Comments:    The patient underwent a split night polysomnogram with a CPAP titration using the standard montage for measurement of paramaters of sleep, respiratory events, movement abnormalities, heart rate and rhythm.  A Microphone was used to monitior snoring.  Interpretation:  Study start time was 10:46:06 PM.  Total recording time was 0h 0.5m (00 minutes) with a total sleep time of 0h 0.0m (00 minutes) resulting in a sleep efficiency of 0.00%.  Sleep latency from the start fo the study was 27 minutes minutes and REM latency from sleep onset was 00 minutes minutes.  Respiratory:   There were 0 apneas in total consisting of 0 obstructive apneas, 0 mixed apneas, and 0 central apneas.  There were 0 hypopneas in total.  The apnea index was 0.00 per hour and the hypopnea index was 0.00 per hour.  The overall AHI was 0.0, with a REM AHI of 0.00, and a supine AHI of 0.00.  Limb Movements:  There were a total of 0 periodic leg movements, of which 0 were PLMS arousals.  This resulted in a PLMS index of 0.0 and a PLMS arousal index of 0.0  Oximetry:  The mean SaO2 was --% for the diagnostic portion of the study, with a minimum SaO2 of --%.    Treatment:  Interpretation:  Treatment recording time was 7h 2.5m (422 minutes) with a total sleep time of 5h 59.5m (359 minutes) resulting in a sleep efficiency of 85.1%.    Sleep latency from the start of treatment was 26 minutes minutes and REM latency from sleep onset was 4h 57.5m minutes.    The patient had 99 arousals in total for an arousal index of 16.5.  Respiratory:   There were 0 apneas in total consisting of  0 obstructive apneas, 0 central apneas, and 0 mixed apneas for an apnea index of 0.00.    The patient had 3 hypopneas in total, which resulted in a hypopnea index of 0.50.    The overall AHI was 0.50, with a REM AHI of 0.00, and a supine AHI of 0.50.     Limb Movements:  There were a total of 544 periodic leg movements, of which 42 were PLMS arousals.  This  resulted in a PLMS index of 90.8 and a PLMS arousal index of 7.0.  Oximetry:  The mean SaO2 during treatment was 92.0%, with a minimum oxygen saturation of 87.0%.    RECORDING TECHNIQUE:       After the scalp was prepared, gold plated electrodes were applied to the scalp according to the International 10-20 System. EEG (electroencephalogram) was continuously monitored from the O1-M2, O2-M1, C3-M2, C4-M1, F3-M2, and F4-M1.   EOGs (electrooculograms) were monitored by electrodes placed at the left and right outer canthi.  Chin EMG (electromyogram) was monitored by electrodes placed on the mentalis and sub-mentalis muscles.  Nasal and oral airflow were monitored using a triple port thermocouple as well as oronasal pressure transducer.  Respiratory effort was measured by inductive plethysmography technology employing abdominal and thoracic belts.  Blood oxygen saturation and pulse were monitored by pulse oximetry.  Heart rhythm was monitored by surface electrocardiogram.  Leg EMG was studied using surface electrodes placed on left and right anterior tibialis.  A microphone was used to monitor tracheal sounds and snoring.  Body position was monitored and documented by technician observation      SUMMARY:    This was an overnight positive airway pressure titration polysomnogram.  The patient chose to use a DreamWear nasal pillow mask and heated humidification.  Ambien 10 mg was taken at lights out.    The total recording time was 423 minutes, the sleep period time was 391 minutes, and the total sleep time was 359 minutes.  The patient's sleep efficiency was 84.99 % which is reduced.  The patient experienced 1 REM period(s).    The sleep stage durations revealed 32.0 minutes of wake after sleep onset (WASO), 35.5 minutes of N1 sleep, to 71.5 minutes of N2 sleep, 31.0 minutes of N3 sleep, and 21.5 minutes of REM.    The latency to sleep was 27 minutes which is somewhat prolonged.  The latency to REM was 4 hours 57.5 minutes  which is prolonged.  Mild sleep fragmentation occurred.  The arousal index was 14.7.  The Total Limb Movement (isolated) Index was 5.7, the Limb Movement with Arousal Index was 7.5, and the PLM Series Index was 88.6.    The patient experienced 0 apneas, 0 central and 3 obstructive hypopneas, 3 total apneas and hypopneas, and 0 RERAS.  The apnea hypopnea index was 0.5, the RDI was 0.5, the mean event duration was 32.8 seconds, and the longest event lasted 51.1 seconds.  The REM index was 0 and the supine index was 0.5.  The apnea hypopnea index represents very mild sleep apnea hypopnea syndrome during the PAP titration.    The jose guadalupe saturation during sleep was 87 % and the patient spent 19.9 % of the recording with saturations less than or equal to 90%.    During sleep, the minimum heart rate was 52 bpm, the mean heart rate was 61 bpm, and the maximum heart rate was 80 bpm.    The technician initiated treatment with 5 cmH2O and increased the pressure to a maximum of 8 cmH2O.    The patient did best on CPAP 8 cm H2O with a resultant AHI of 0.0, a minimum saturation of 89 %, and a mean saturation of 93 %.  The patient achieved supine REM sleep on CPAP at 8 cm H2O.      ASSESSMENT:    Optimal CPAP titration of best pressure of 8 cm H2O with a resultant AHI 0.0, jose guadalupe saturation of 89%, mean saturation of 93%, and the achievement of supine REM.      RECOMMENDATION:    Recommend CPAP 8 cmH2O using a DreamWear nasal pillow mask and heated humidification followed by data card and clinical review in 6-8 weeks.

## 2019-07-02 PROBLEM — E66.3 OVERWEIGHT (BMI 25.0-29.9): Status: ACTIVE | Noted: 2019-07-02

## 2019-07-02 PROBLEM — Z87.891 FORMER SMOKER: Status: ACTIVE | Noted: 2019-07-02

## 2019-07-02 RX ORDER — ALENDRONATE SODIUM 70 MG/1
70 TABLET ORAL
Qty: 12 TAB | Refills: 0 | Status: SHIPPED | OUTPATIENT
Start: 2019-07-02 | End: 2019-09-18 | Stop reason: SDUPTHER

## 2019-07-02 NOTE — PROGRESS NOTES
CC: Follow up for PSG results    HPI:  Ms. Shyela Gauthier is a 78-year old female who returns for PSG results.  Her prior home showed an AHI of 26.2/jose guadalupe saturation 74%.  She had difficulty tolerating auto titrating CPAP 5 to 10 cm H2O and  then was fit with a dental device.  Her follow-up study 9/12/2018 with her dental device in place showed an AHI of 9.3 with a jose guadalupe saturation of 83%.  Ultimately she stopped using her dental device and continued with nocturnal oxygen.  She then decided she wanted to retry CPAP and her PSG done 6/12/2019 showed an AHI of 21.7/jose guadalupe saturation 84%.    The patient's CPAP titration was performed 6/30/2019.  She did best on CPAP at 8 cm H2O with a resultant AHI of 0.0, jose guadalupe saturation 89%, mean saturation 93%, and the achievement of supine REM using a DreamWear nasal pillow mask.    Significant comorbidities and modifying factors include abnormal CT chest, former smoker, overweight, dyslipidemia, hypertension, and hypothyroidism.      Patient Active Problem List    Diagnosis Date Noted   • Former smoker 07/02/2019   • Overweight (BMI 25.0-29.9) 07/02/2019   • Other insomnia 06/14/2019   • Coronary artery disease involving native coronary artery of native heart without angina pectoris 03/26/2019   • Bladder prolapse, female, acquired 03/07/2018   • Pure hypercholesterolemia 03/07/2018   • JOHN (obstructive sleep apnea) 02/22/2018   • Age related osteoporosis 07/26/2017   • Major depressive disorder 06/29/2017   • 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/08/2016   • Spinal stenosis of lumbar region 11/14/2016   • Migraine syndrome 02/11/2013   • Acquired hypothyroidism 03/08/2011   • HTN (hypertension) 10/21/2009   • Postmenopausal hormone therapy 10/21/2009   • Mixed hyperlipidemia    • 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[facility])        Past Medical History:   Diagnosis Date   • Anxiety    • Arthritis    • Back pain    • Cataract    • Daytime sleepiness    • Depression    •  Dyslipidemia    • Fatigue    • Frequent headaches    • Gasping for breath    • Herpes zoster 10/27/06   • HTN (hypertension)    • Hypertension    • MHA (microangiopathic hemolytic anemia)    • Migraine syndrome 2/11/2013   • Morning headache    • Mumps    • Nasal drainage    • Osteopathia    • Osteoporosis    • Painful joint    • Post-menopause on HRT (hormone replacement therapy)    • Shortness of breath    • Sleep apnea    • Sore muscles    • Swelling of lower extremity    • Thyroid disease    • Tonsillitis    • White matter abnormality on MRI of brain 2/11/2013        Past Surgical History:   Procedure Laterality Date   • BUNIONECTOMY Left 2003   • FOOT SURGERY  2002   • PB REDUCTION OF LARGE BREAST  1997   • CHOLECYSTECTOMY  1996   • ABDOMINAL HYSTERECTOMY TOTAL  11/1986   • ARTHROSCOPY, KNEE     • EYE SURGERY      cataracts   • HYSTERECTOMY LAPAROSCOPY     • MAMMOPLASTY AUGMENTATION     • PB ENLARGE BREAST WITH IMPLANT         Family History   Problem Relation Age of Onset   • Stroke Father    • Arthritis Father    • Hyperlipidemia Father    • Hypertension Father    • Cancer Sister         breast   • Arthritis Sister    • Arthritis Mother    • Hypertension Mother    • Hyperlipidemia Mother    • Stroke Mother    • Arthritis Brother    • Cancer Brother    • Heart Disease Brother    • Hypertension Brother    • Hyperlipidemia Brother    • Cancer Daughter         breast       Social History     Social History   • Marital status:      Spouse name: N/A   • Number of children: N/A   • Years of education: N/A     Occupational History   • Not on file.     Social History Main Topics   • Smoking status: Former Smoker   • Smokeless tobacco: Never Used      Comment: smoked off & on for 10 yrs   • Alcohol use 0.0 oz/week      Comment: occassional   • Drug use: No   • Sexual activity: Not Currently      Comment: , 3 kids, retired     Other Topics Concern   • Not on file     Social History Narrative   • No  "narrative on file       Current Outpatient Prescriptions   Medication Sig Dispense Refill   • alendronate (FOSAMAX) 70 MG Tab TAKE 1 TAB BY MOUTH EVERY 7 DAYS. 12 Tab 0   • pravastatin (PRAVACHOL) 40 MG tablet TAKE 1 TABLET BY MOUTH AT BEDTIME 90 Tab 0   • amLODIPine (NORVASC) 5 MG Tab TAKE 1 TABLET BY MOUTH EVERY DAY 90 Tab 1   • clopidogrel (PLAVIX) 75 MG Tab Take 75 mg by mouth every day.  5   • losartan (COZAAR) 50 MG Tab TAKE 1 TABLET BY MOUTH EVERY DAY 90 Tab 3   • thyroid (ARMOUR THYROID) 30 MG Tab Take 30 mg by mouth every day.     • coenzyme Q-10 30 MG capsule Take 60 mg by mouth every day.     • Estriol 10 % Cream c-estriol 0.5mg gm cream     • aspirin (ASA) 81 MG Chew Tab chewable tablet 1     • Cholecalciferol (VITAMIN D PO) Take  by mouth.     • Magnesium 400 MG Tab Take  by mouth.       No current facility-administered medications for this visit.     \"CURRENT RX\"    ALLERGIES: Amoxicillin and Latex    ROS    Constitutional: Denies fever, chills, sweats,  weight loss, fatigue  Cardiovascular: Denies chest pain, tightness, palpitations, swelling in legs/feet, fainting, difficulty breathing when lying down but gets better when sitting up.   Respiratory: Denies shortness of breath, cough, sputum, wheezing, painful breathing, coughing up blood.   Sleep: per HPI  Gastrointestinal: Denies  difficulty swallowing, nausea, abdominal pain, diarrhea, constipation, heartburn.  Musculoskeletal: Denies painful joints, sore muscles, back pain.        PHYSICAL EXAM    Overweight but not obese  /68 (BP Location: Right arm, Patient Position: Sitting, BP Cuff Size: Adult)   Pulse 71   Resp 14   Ht 1.575 m (5' 2\")   Wt 71.7 kg (158 lb)   LMP 11/01/1986   SpO2 95%   BMI 28.90 kg/m²   Appearance: Well-nourished, well-developed, no acute distress  Eyes:  PERRLA, EOMI  ENMT: without lesions, deformities;hearing grossly intact; tongue normal, posterior pharynx without erythema or exudate; Mallampati classification: " 3  Neck: Supple, trachea midline, no masses  Respiratory effort:  No intercostal retractions or use of accessory muscles  Lung auscultation:  No wheezes rhonchi rubs or rales  Cardiac: No murmurs, rubs, or gallops; regular rhythm, normal rate; no edema  Abdomen:  No tenderness, no organomegaly.  Overweight but not obese  Musculoskeletal:  Grossly normal; gait and station normal; digits and nails normal  Skin:  No rashes, petechiae, cyanosis  Neurologic: without focal signs; oriented to person, time, place, and purpose; judgement intact  Psychiatric:  No depression, anxiety, agitation        PROBLEMS:  1. JOHN (obstructive sleep apnea)    - DME CPAP    2. Mixed hyperlipidemia      3. Renovascular hypertension      4. Former smoker      5. Acquired hypothyroidism      6. Overweight (BMI 25.0-29.9)          PLAN:   The patient's CPAP titration was performed 6/30/2019.  She did best on CPAP at 8 cm H2O with a resultant AHI of 0.0, jose guadalupe saturation 89%, mean saturation 93%, and the achievement of supine REM using a DreamWear nasal pillow mask.    Will order CPAP at 8 cm H2O using a DreamWear nasal ask with heated humidification followed by compliance and clinical review.      Will do a cnox on pap if all goes well.    Return in about 10 weeks (around 9/11/2019).

## 2019-07-03 ENCOUNTER — SLEEP CENTER VISIT (OUTPATIENT)
Dept: SLEEP MEDICINE | Facility: MEDICAL CENTER | Age: 79
End: 2019-07-03
Payer: MEDICARE

## 2019-07-03 VITALS
DIASTOLIC BLOOD PRESSURE: 68 MMHG | OXYGEN SATURATION: 95 % | RESPIRATION RATE: 14 BRPM | BODY MASS INDEX: 29.08 KG/M2 | HEIGHT: 62 IN | HEART RATE: 71 BPM | SYSTOLIC BLOOD PRESSURE: 128 MMHG | WEIGHT: 158 LBS

## 2019-07-03 DIAGNOSIS — E78.2 MIXED HYPERLIPIDEMIA: ICD-10-CM

## 2019-07-03 DIAGNOSIS — E03.9 ACQUIRED HYPOTHYROIDISM: ICD-10-CM

## 2019-07-03 DIAGNOSIS — I15.0 RENOVASCULAR HYPERTENSION: ICD-10-CM

## 2019-07-03 DIAGNOSIS — G47.33 OSA (OBSTRUCTIVE SLEEP APNEA): ICD-10-CM

## 2019-07-03 DIAGNOSIS — E66.3 OVERWEIGHT (BMI 25.0-29.9): ICD-10-CM

## 2019-07-03 DIAGNOSIS — Z87.891 FORMER SMOKER: ICD-10-CM

## 2019-07-03 PROCEDURE — 99214 OFFICE O/P EST MOD 30 MIN: CPT | Performed by: INTERNAL MEDICINE

## 2019-07-16 ENCOUNTER — TELEPHONE (OUTPATIENT)
Dept: SLEEP MEDICINE | Facility: MEDICAL CENTER | Age: 79
End: 2019-07-16

## 2019-07-16 DIAGNOSIS — G47.33 OSA (OBSTRUCTIVE SLEEP APNEA): ICD-10-CM

## 2019-07-16 NOTE — TELEPHONE ENCOUNTER
Pt would like D/C order for her noc o2 with [facility].    She is going to be starting CPAP and per SS no longer needs o2.    We will reassess o2 with opo when she comes in for compliance.    I did advise her that if she ends up needing o2 in the furture it will require an in lab ss.

## 2019-07-24 ENCOUNTER — NON-PROVIDER VISIT (OUTPATIENT)
Dept: PULMONOLOGY | Facility: HOSPICE | Age: 79
End: 2019-07-24
Attending: INTERNAL MEDICINE
Payer: MEDICARE

## 2019-07-24 ENCOUNTER — OFFICE VISIT (OUTPATIENT)
Dept: PULMONOLOGY | Facility: HOSPICE | Age: 79
End: 2019-07-24
Payer: MEDICARE

## 2019-07-24 VITALS — HEIGHT: 61 IN | BODY MASS INDEX: 29.64 KG/M2 | WEIGHT: 157 LBS

## 2019-07-24 VITALS
OXYGEN SATURATION: 98 % | HEIGHT: 61 IN | HEART RATE: 68 BPM | SYSTOLIC BLOOD PRESSURE: 126 MMHG | BODY MASS INDEX: 29.64 KG/M2 | RESPIRATION RATE: 16 BRPM | TEMPERATURE: 97.9 F | DIASTOLIC BLOOD PRESSURE: 80 MMHG | WEIGHT: 157 LBS

## 2019-07-24 DIAGNOSIS — R06.02 SOB (SHORTNESS OF BREATH) ON EXERTION: ICD-10-CM

## 2019-07-24 DIAGNOSIS — R91.8 PULMONARY NODULES: ICD-10-CM

## 2019-07-24 DIAGNOSIS — G47.33 OSA (OBSTRUCTIVE SLEEP APNEA): ICD-10-CM

## 2019-07-24 PROCEDURE — 94060 EVALUATION OF WHEEZING: CPT | Performed by: INTERNAL MEDICINE

## 2019-07-24 PROCEDURE — 94729 DIFFUSING CAPACITY: CPT | Performed by: INTERNAL MEDICINE

## 2019-07-24 PROCEDURE — 94726 PLETHYSMOGRAPHY LUNG VOLUMES: CPT | Performed by: INTERNAL MEDICINE

## 2019-07-24 PROCEDURE — 99213 OFFICE O/P EST LOW 20 MIN: CPT | Mod: 25 | Performed by: INTERNAL MEDICINE

## 2019-07-24 RX ORDER — MULTIVIT WITH MINERALS/LUTEIN
TABLET ORAL
COMMUNITY
End: 2022-08-03

## 2019-07-24 ASSESSMENT — PULMONARY FUNCTION TESTS
FEV1/FVC_PERCENT_PREDICTED: 119
FEV1/FVC_PERCENT_CHANGE: 1
FEV1_LLN: 1.45
FVC_PERCENT_PREDICTED: 101
FEV1/FVC: 87
FEV1_PERCENT_CHANGE: 4
FEV1: 2.08
FVC: 2.39
FEV1_PERCENT_PREDICTED: 119
FEV1/FVC_PERCENT_PREDICTED: 117
FVC: 2.5
FVC_PERCENT_PREDICTED: 106
FEV1/FVC_PERCENT_LLN: 62
FEV1: 2.21
FEV1/FVC_PERCENT_PREDICTED: 120
FVC_PREDICTED: 2.35
FEV1/FVC: 89
FEV1_PERCENT_CHANGE: 6
FEV1/FVC: 87
FEV1_PERCENT_PREDICTED: 127
FVC_LLN: 1.96
FEV1/FVC_PERCENT_PREDICTED: 74
FEV1/FVC_PREDICTED: 74
FEV1/FVC_PERCENT_PREDICTED: 118
FEV1_PREDICTED: 1.74
FEV1/FVC: 88.4
FEV1/FVC_PERCENT_CHANGE: 150

## 2019-07-24 ASSESSMENT — ENCOUNTER SYMPTOMS
WHEEZING: 0
NAUSEA: 0
INSOMNIA: 1
PALPITATIONS: 0
ABDOMINAL PAIN: 0
VOMITING: 0
BLURRED VISION: 0
DIZZINESS: 0
SINUS PAIN: 0
CHILLS: 0
DIARRHEA: 0
SPEECH CHANGE: 0
BACK PAIN: 0
WEIGHT LOSS: 0
PHOTOPHOBIA: 0
EYE REDNESS: 0
NECK PAIN: 0
HEARTBURN: 0
HEMOPTYSIS: 0
STRIDOR: 0
HEADACHES: 0
MYALGIAS: 0
FOCAL WEAKNESS: 0
PND: 0
CLAUDICATION: 0
ORTHOPNEA: 0
DIAPHORESIS: 0
DEPRESSION: 0
EYE PAIN: 0
SHORTNESS OF BREATH: 1
EYE DISCHARGE: 0
DOUBLE VISION: 0
COUGH: 0
SORE THROAT: 0
CONSTIPATION: 0
SPUTUM PRODUCTION: 0
WEAKNESS: 0
FEVER: 0
TREMORS: 0
FALLS: 0

## 2019-07-24 NOTE — PROGRESS NOTES
Chief Complaint   Patient presents with   • Shortness of Breath     last seen 4/10/19    • Apnea     last seen 7/3/19 Dr. Orlando    • Results     ct chest 6/19/19, pft 60 7/24/19         HPI: This patient is a 78 y.o. female whom is followed in our clinic for SOB last seen by me on 4/10/19. The pt pmhx is signifcant for HTN well controlled on 2 medications, dyslipidemia on statin, osteoporosis on therapy and CAD s/p PCI for lesion found on LHC done to evaluate small defect on myocardial perfusion study las year which was originally pursued in an attempt to evaluate her ongoing SOB.  The pt was dx with JOHN in late 2017 and started on CPAP therapy which was effective from the standpoint of eliminating nocturnal hypoxia and apneic events however she did not tolerate the mask and was changed to oral device.  She continued to have nocturnal desaturation with oral device based on home nocturnal oximetry and was later started on supplemental O2 with her oral device at night but ultimately stopped using her oral device.  With regards to her SOB, this has been present and stable to mildly progressive over the past year.  The pt previously was very active and exercising daily.  There was some thought that her hiatal hernia was the cause but surgical intervention was not felt to be helpful or indicated.  At our initial appointment I felt that her symptoms were more consistent with fatigue and possibly related to ongoing obstructive sleep apnea.  She has since been seen in our sleep clinic and undergone repeat sleep study with AHI of around 20.  She has resumed CPAP therapy with nasal pillows in the last 3 days which she is currently tolerating.  She presents today with pulmonary function tests which show normal pulmonary function test including airflows, lung volumes and DLCO.  She continues to have some shortness of breath with activity but tells me she is occasionally able to push through.  She has also tried to improve her  diet and has seen a few pounds of weight loss.  She denies cough, chest pain, fevers, chills, night sweats.    Past Medical History:   Diagnosis Date   • Anxiety    • Arthritis    • Back pain    • Cataract    • Daytime sleepiness    • Depression    • Dyslipidemia    • Fatigue    • Frequent headaches    • Gasping for breath    • Herpes zoster 10/27/06   • HTN (hypertension)    • Hypertension    • MHA (microangiopathic hemolytic anemia)    • Migraine syndrome 2/11/2013   • Morning headache    • Mumps    • Nasal drainage    • Osteopathia    • Osteoporosis    • Painful joint    • Post-menopause on HRT (hormone replacement therapy)    • Shortness of breath    • Sleep apnea    • Sore muscles    • Swelling of lower extremity    • Thyroid disease    • Tonsillitis    • White matter abnormality on MRI of brain 2/11/2013       Social History     Social History   • Marital status:      Spouse name: N/A   • Number of children: N/A   • Years of education: N/A     Occupational History   • Not on file.     Social History Main Topics   • Smoking status: Former Smoker     Packs/day: 0.02     Types: Cigarettes     Quit date: 7/24/1979   • Smokeless tobacco: Never Used      Comment: smoked off & on for 10 yrs   • Alcohol use 0.0 oz/week      Comment: occassional   • Drug use: No   • Sexual activity: Not Currently      Comment: , 3 kids, retired     Other Topics Concern   • Not on file     Social History Narrative   • No narrative on file       Family History   Problem Relation Age of Onset   • Stroke Father    • Arthritis Father    • Hyperlipidemia Father    • Hypertension Father    • Cancer Sister         breast   • Arthritis Sister    • Arthritis Mother    • Hypertension Mother    • Hyperlipidemia Mother    • Stroke Mother    • Arthritis Brother    • Cancer Brother    • Heart Disease Brother    • Hypertension Brother    • Hyperlipidemia Brother    • Cancer Daughter         breast       Current Outpatient Prescriptions  on File Prior to Visit   Medication Sig Dispense Refill   • alendronate (FOSAMAX) 70 MG Tab TAKE 1 TAB BY MOUTH EVERY 7 DAYS. 12 Tab 0   • pravastatin (PRAVACHOL) 40 MG tablet TAKE 1 TABLET BY MOUTH AT BEDTIME 90 Tab 0   • amLODIPine (NORVASC) 5 MG Tab TAKE 1 TABLET BY MOUTH EVERY DAY 90 Tab 1   • clopidogrel (PLAVIX) 75 MG Tab Take 75 mg by mouth every day.  5   • losartan (COZAAR) 50 MG Tab TAKE 1 TABLET BY MOUTH EVERY DAY 90 Tab 3   • thyroid (ARMOUR THYROID) 30 MG Tab Take 30 mg by mouth every day.     • coenzyme Q-10 30 MG capsule Take 60 mg by mouth every day.     • Estriol 10 % Cream c-estriol 0.5mg gm cream     • aspirin (ASA) 81 MG Chew Tab chewable tablet 1     • Cholecalciferol (VITAMIN D PO) Take 4,000 Units by mouth every day.     • Magnesium 400 MG Tab Take  by mouth.       No current facility-administered medications on file prior to visit.        Patient has no known allergies.      ROS:   Review of Systems   Constitutional: Negative for chills, diaphoresis, fever, malaise/fatigue and weight loss.   HENT: Negative for congestion, ear discharge, ear pain, hearing loss, nosebleeds, sinus pain, sore throat and tinnitus.    Eyes: Negative for blurred vision, double vision, photophobia, pain, discharge and redness.   Respiratory: Positive for shortness of breath. Negative for cough, hemoptysis, sputum production, wheezing and stridor.    Cardiovascular: Negative for chest pain, palpitations, orthopnea, claudication, leg swelling and PND.   Gastrointestinal: Negative for abdominal pain, constipation, diarrhea, heartburn, nausea and vomiting.   Genitourinary: Negative for dysuria and urgency.   Musculoskeletal: Negative for back pain, falls, joint pain, myalgias and neck pain.   Skin: Negative for itching and rash.   Neurological: Negative for dizziness, tremors, speech change, focal weakness, weakness and headaches.   Endo/Heme/Allergies: Negative for environmental allergies.   Psychiatric/Behavioral:  "Negative for depression. The patient has insomnia.        /80 (BP Location: Left arm, Patient Position: Sitting, BP Cuff Size: Adult)   Pulse 68   Temp 36.6 °C (97.9 °F) (Temporal)   Resp 16   Ht 1.549 m (5' 1\")   Wt 71.2 kg (157 lb)   SpO2 98%   Physical Exam   Constitutional: She is oriented to person, place, and time. She appears well-developed and well-nourished.   HENT:   Head: Normocephalic and atraumatic.   Left Ear: External ear normal.   Mouth/Throat: Oropharynx is clear and moist. No oropharyngeal exudate.   Eyes: Pupils are equal, round, and reactive to light. Conjunctivae and EOM are normal. No scleral icterus.   Neck: Neck supple. No JVD present. No tracheal deviation present.   Cardiovascular: Normal rate, regular rhythm and normal heart sounds.  Exam reveals no gallop and no friction rub.    No murmur heard.  Pulmonary/Chest: Breath sounds normal. No accessory muscle usage. No respiratory distress. She has no wheezes. She has no rales.   Abdominal: Soft. She exhibits no distension. There is no tenderness.   Musculoskeletal: Normal range of motion. She exhibits no edema, tenderness or deformity.   Lymphadenopathy:     She has no cervical adenopathy.   Neurological: She is alert and oriented to person, place, and time. No cranial nerve deficit. Gait normal.   Skin: Skin is warm and dry. No rash noted. No cyanosis. Nails show no clubbing.   Psychiatric: She has a normal mood and affect.       PFTs as reviewed by me personally: normal PFTs    Imaging as reviewed by me personally:  As per HPI    Assessment:  1. SOB (shortness of breath) on exertion     2. JOHN (obstructive sleep apnea)     3. Pulmonary nodules         Plan:  1.  I suspect that this is mainly due to deconditioning.  There may be some element of diastolic dysfunction in the setting of untreated obstructive sleep apnea.  Lastly her hiatal hernia may contribute.  We did discuss how increasing activity as tolerated to improve aerobic " conditioning could be helpful and is certainly not contraindicated in the setting of normal lung function.  I suspect that after a couple weeks of treatment of her sleep apnea her symptoms will improve as well.  Patient will follow-up with us on an as-needed basis and continue to follow with sleep.  2.  Patient with moderate sleep apnea and followed in sleep clinic.  She has recently resumed CPAP therapy and is tolerating at least for the past 3 nights.  I encouraged her to continue to try to tolerate the machine with hopes that her daytime fatigue will improve once she has had adequate therapy.  3.  These are stable and low risk patient therefore no indication for routine follow-up at this point.  Return if symptoms worsen or fail to improve.

## 2019-07-24 NOTE — PROCEDURES
Technician: JAMESON Abarca    Technician Comment:  Good patient effort & cooperation.  The results of this test meet the ATS/ERS standards for acceptability & reproducibility.  Test was performed on the [employer] Body Plethysmograph-Elite DX system.  Predicted values were [facility] for spirometry, [facility] for DLCO, ITS for Lung Volumes.  The DLCO was uncorrected for Hgb.  A bronchodilator of Ventolin HFA -2puffs via spacer administered.  DLCO performed during dilation period.    Interpretation:  1.  Baseline spirometry shows normal air flows.  2.  There is no significant bronchodilator response.  3.  Lung volumes are within normal limits.  4.  Diffusion capacity is within normal limits.  Normal pulmonary function test.

## 2019-07-30 ENCOUNTER — HOSPITAL ENCOUNTER (OUTPATIENT)
Dept: LAB | Facility: MEDICAL CENTER | Age: 79
End: 2019-07-30
Attending: FAMILY MEDICINE
Payer: MEDICARE

## 2019-07-30 LAB
ALBUMIN SERPL BCP-MCNC: 4.2 G/DL (ref 3.2–4.9)
ALBUMIN/GLOB SERPL: 1.6 G/DL
ALP SERPL-CCNC: 30 U/L (ref 30–99)
ALT SERPL-CCNC: 21 U/L (ref 2–50)
ANION GAP SERPL CALC-SCNC: 8 MMOL/L (ref 0–11.9)
AST SERPL-CCNC: 19 U/L (ref 12–45)
BASOPHILS # BLD AUTO: 1.3 % (ref 0–1.8)
BASOPHILS # BLD: 0.05 K/UL (ref 0–0.12)
BILIRUB SERPL-MCNC: 0.5 MG/DL (ref 0.1–1.5)
BUN SERPL-MCNC: 23 MG/DL (ref 8–22)
CALCIUM SERPL-MCNC: 9.5 MG/DL (ref 8.5–10.5)
CHLORIDE SERPL-SCNC: 108 MMOL/L (ref 96–112)
CHOLEST SERPL-MCNC: 162 MG/DL (ref 100–199)
CO2 SERPL-SCNC: 27 MMOL/L (ref 20–33)
CREAT SERPL-MCNC: 0.79 MG/DL (ref 0.5–1.4)
EOSINOPHIL # BLD AUTO: 0.08 K/UL (ref 0–0.51)
EOSINOPHIL NFR BLD: 2 % (ref 0–6.9)
ERYTHROCYTE [DISTWIDTH] IN BLOOD BY AUTOMATED COUNT: 46.8 FL (ref 35.9–50)
FASTING STATUS PATIENT QL REPORTED: NORMAL
GLOBULIN SER CALC-MCNC: 2.6 G/DL (ref 1.9–3.5)
GLUCOSE SERPL-MCNC: 81 MG/DL (ref 65–99)
HCT VFR BLD AUTO: 43.1 % (ref 37–47)
HDLC SERPL-MCNC: 52 MG/DL
HGB BLD-MCNC: 13.5 G/DL (ref 12–16)
IMM GRANULOCYTES # BLD AUTO: 0.01 K/UL (ref 0–0.11)
IMM GRANULOCYTES NFR BLD AUTO: 0.3 % (ref 0–0.9)
LDLC SERPL CALC-MCNC: 87 MG/DL
LYMPHOCYTES # BLD AUTO: 1.16 K/UL (ref 1–4.8)
LYMPHOCYTES NFR BLD: 29.2 % (ref 22–41)
MCH RBC QN AUTO: 28.4 PG (ref 27–33)
MCHC RBC AUTO-ENTMCNC: 31.3 G/DL (ref 33.6–35)
MCV RBC AUTO: 90.5 FL (ref 81.4–97.8)
MONOCYTES # BLD AUTO: 0.36 K/UL (ref 0–0.85)
MONOCYTES NFR BLD AUTO: 9.1 % (ref 0–13.4)
NEUTROPHILS # BLD AUTO: 2.31 K/UL (ref 2–7.15)
NEUTROPHILS NFR BLD: 58.1 % (ref 44–72)
NRBC # BLD AUTO: 0 K/UL
NRBC BLD-RTO: 0 /100 WBC
PLATELET # BLD AUTO: 273 K/UL (ref 164–446)
PMV BLD AUTO: 10.6 FL (ref 9–12.9)
POTASSIUM SERPL-SCNC: 4.1 MMOL/L (ref 3.6–5.5)
PROT SERPL-MCNC: 6.8 G/DL (ref 6–8.2)
RBC # BLD AUTO: 4.76 M/UL (ref 4.2–5.4)
SODIUM SERPL-SCNC: 143 MMOL/L (ref 135–145)
TRIGL SERPL-MCNC: 116 MG/DL (ref 0–149)
TSH SERPL DL<=0.005 MIU/L-ACNC: 0.97 UIU/ML (ref 0.38–5.33)
WBC # BLD AUTO: 4 K/UL (ref 4.8–10.8)

## 2019-07-30 PROCEDURE — 85025 COMPLETE CBC W/AUTO DIFF WBC: CPT

## 2019-07-30 PROCEDURE — 80053 COMPREHEN METABOLIC PANEL: CPT

## 2019-07-30 PROCEDURE — 80061 LIPID PANEL: CPT | Mod: GA

## 2019-07-30 PROCEDURE — 36415 COLL VENOUS BLD VENIPUNCTURE: CPT

## 2019-07-30 PROCEDURE — 84443 ASSAY THYROID STIM HORMONE: CPT | Mod: GA

## 2019-08-15 ENCOUNTER — TELEPHONE (OUTPATIENT)
Dept: SLEEP MEDICINE | Facility: MEDICAL CENTER | Age: 79
End: 2019-08-15

## 2019-08-15 DIAGNOSIS — G47.33 OSA (OBSTRUCTIVE SLEEP APNEA): ICD-10-CM

## 2019-08-15 NOTE — TELEPHONE ENCOUNTER
Patient came in for a walk in mask fit. Patient has skin breakdown on the inside of her nose potentially being caused by Dreamwear nasal pillows mask.     Patient was fitted with an Airfit N30i nasal mask with a small headgear and medium cushion. Needs orders. Patient likely is just outside the 30 day window and may not be up for a new mask. Patient was given a Dreamwear M Cushion to use temporarily. If patient is not due for a new mask recommend continuing with Dreamwear using cushion and not pillows.

## 2019-08-26 DIAGNOSIS — I25.10 CORONARY ARTERY DISEASE INVOLVING NATIVE CORONARY ARTERY OF NATIVE HEART WITHOUT ANGINA PECTORIS: Primary | ICD-10-CM

## 2019-08-27 ENCOUNTER — OFFICE VISIT (OUTPATIENT)
Dept: MEDICAL GROUP | Age: 79
End: 2019-08-27
Payer: MEDICARE

## 2019-08-27 VITALS
SYSTOLIC BLOOD PRESSURE: 138 MMHG | HEART RATE: 83 BPM | TEMPERATURE: 98.9 F | BODY MASS INDEX: 29.04 KG/M2 | DIASTOLIC BLOOD PRESSURE: 80 MMHG | HEIGHT: 61 IN | WEIGHT: 153.8 LBS

## 2019-08-27 DIAGNOSIS — E66.3 OVERWEIGHT (BMI 25.0-29.9): ICD-10-CM

## 2019-08-27 DIAGNOSIS — E78.2 MIXED HYPERLIPIDEMIA: ICD-10-CM

## 2019-08-27 DIAGNOSIS — Z23 NEED FOR VACCINATION: ICD-10-CM

## 2019-08-27 DIAGNOSIS — I10 ESSENTIAL HYPERTENSION: ICD-10-CM

## 2019-08-27 PROCEDURE — 99214 OFFICE O/P EST MOD 30 MIN: CPT | Performed by: FAMILY MEDICINE

## 2019-08-27 RX ORDER — ASPIRIN 81 MG/1
81 TABLET, CHEWABLE ORAL DAILY
Qty: 100 TAB | Refills: 3 | Status: SHIPPED | OUTPATIENT
Start: 2019-08-27 | End: 2023-01-30

## 2019-08-27 RX ORDER — CLOPIDOGREL BISULFATE 75 MG/1
75 TABLET ORAL
Qty: 90 TAB | Refills: 1 | Status: SHIPPED | OUTPATIENT
Start: 2019-08-27 | End: 2019-09-26

## 2019-08-27 RX ORDER — AMLODIPINE BESYLATE 2.5 MG/1
2.5 TABLET ORAL DAILY
Qty: 90 TAB | Refills: 0 | Status: SHIPPED | OUTPATIENT
Start: 2019-08-27 | End: 2019-10-01

## 2019-08-27 ASSESSMENT — PATIENT HEALTH QUESTIONNAIRE - PHQ9
1. LITTLE INTEREST OR PLEASURE IN DOING THINGS: NOT AT ALL
4. FEELING TIRED OR HAVING LITTLE ENERGY: NOT AT ALL
9. THOUGHTS THAT YOU WOULD BE BETTER OFF DEAD, OR OF HURTING YOURSELF: NOT AT ALL
2. FEELING DOWN, DEPRESSED, IRRITABLE, OR HOPELESS: NOT AT ALL
8. MOVING OR SPEAKING SO SLOWLY THAT OTHER PEOPLE COULD HAVE NOTICED. OR THE OPPOSITE, BEING SO FIGETY OR RESTLESS THAT YOU HAVE BEEN MOVING AROUND A LOT MORE THAN USUAL: NOT AT ALL
SUM OF ALL RESPONSES TO PHQ QUESTIONS 1-9: 0
SUM OF ALL RESPONSES TO PHQ9 QUESTIONS 1 AND 2: 0
7. TROUBLE CONCENTRATING ON THINGS, SUCH AS READING THE NEWSPAPER OR WATCHING TELEVISION: NOT AT ALL
3. TROUBLE FALLING OR STAYING ASLEEP OR SLEEPING TOO MUCH: NOT AT ALL
6. FEELING BAD ABOUT YOURSELF - OR THAT YOU ARE A FAILURE OR HAVE LET YOURSELF OR YOUR FAMILY DOWN: NOT AL ALL
5. POOR APPETITE OR OVEREATING: NOT AT ALL

## 2019-08-27 NOTE — TELEPHONE ENCOUNTER
"Upon chart review, medication was never prescribed by this office.  However, per MD last OV note, \"dual antiplatelet therapy for 1 year discussed this as well\"    Medication refilled.  ---------------------  Prescription Question   Tavares Phillips Ass't  You 34 minutes ago (10:58 AM)     RX pending in your basket    Routing comment    Sheyla Gauthier Roxanne R, Med Ass't 3 hours ago (8:09 AM)      Thank you. Today is the first day I'm out of the 75mg of clopidogrel tablets.    Judy Haynes, Med Ass't  Sheyla Gauthier 19 hours ago (4:21 PM)      Good afternoon,   I will forward your request to Dr. Azar for review/approval   Thank you    Sheyla Gauthier Letitia L, M.D. 23 hours ago (11:48 AM)      Good Morning Dr Jef Meza filled my prescription for clopidogrel 75mg and 0 refills remain. If you feel it is important for me to remain on plavix and low-aspirin, please call [facility] @[phone] Store #[id number] for a prescription.  Thank you.   Sheyla Gauthier        "

## 2019-08-27 NOTE — PROGRESS NOTES
This medical record contains text that has been entered with the assistance of computer voice recognition and dictation software.  Therefore, it may contain unintended errors in text, spelling, punctuation, or grammar    Chief Complaint   Patient presents with   • Hypertension       Sheyla Gauthier is a 78 y.o. female here evaluation and management of: htn, weight managment    HPI:   78 year old with history of hypertension, hyperlipidemia who presents with chief complaint of hypertension.       1. Overweight (BMI 25.0-29.9)  Chronic problem.     Patient has lost 11 lbs of weight in the last 6 months. Patient has been working with Dr. Savage, [facility], and has been happy with results.     2. Mixed hyperlipidemia  Chronic problem.     Patient has a chronic history of hyperlipidemia currently taking Pravastatin 40 mg daily. Most recent lipid panel on 7/30/19 was normal. Patient has tolerated Pravastatin well and denies any side effects.   The patient denies any muscle aches, no abdominal pain and no history of elevated liver enzymes.    3. Essential hypertension  Chronic problem.     Patient has a history of hypertension, currently taking Amlodipine 5 mg daily and Losartan 50 mg daily. Patient has been measuring her blood pressure readings at home and states readings were low this morning.   She measured her blood pressure to be 111/79 this morning and skipped her dose of Amlodipine today. Patient presents with a BP of 138/80 today.  No tunnel vision, no cough, no changes in vision, no lightheadedness, no fatigue, no syncopal or presyncopal episodes, no edema, no new rashes. The patient also denies chest pain, no dyspnea on exertion, no headaches, no numbness or tingling, no changes in vision, no weakness in any extremity, no back pain no changes in micturition.     4. Need for vaccination  Due for Zoster vaccine.       Current medicines (including changes today)  Current Outpatient Medications    Medication Sig Dispense Refill   • amLODIPine (NORVASC) 2.5 MG Tab Take 1 Tab by mouth every day. 90 Tab 0   • aspirin (ASA) 81 MG Chew Tab chewable tablet Take 1 Tab by mouth every day. 100 Tab 3   • clopidogrel (PLAVIX) 75 MG Tab Take 1 Tab by mouth every day. 90 Tab 1   • Ascorbic Acid (VITAMIN C) 1000 MG Tab Take  by mouth.     • Probiotic Product (PROBIOTIC DAILY PO) Take  by mouth.     • alendronate (FOSAMAX) 70 MG Tab TAKE 1 TAB BY MOUTH EVERY 7 DAYS. 12 Tab 0   • pravastatin (PRAVACHOL) 40 MG tablet TAKE 1 TABLET BY MOUTH AT BEDTIME 90 Tab 0   • amLODIPine (NORVASC) 5 MG Tab TAKE 1 TABLET BY MOUTH EVERY DAY 90 Tab 1   • losartan (COZAAR) 50 MG Tab TAKE 1 TABLET BY MOUTH EVERY DAY 90 Tab 3   • thyroid (ARMOUR THYROID) 30 MG Tab Take 30 mg by mouth every day.     • coenzyme Q-10 30 MG capsule Take 60 mg by mouth every day.     • Estriol 10 % Cream c-estriol 0.5mg gm cream     • Cholecalciferol (VITAMIN D PO) Take 4,000 Units by mouth every day.     • Magnesium 400 MG Tab Take  by mouth.       No current facility-administered medications for this visit.      She  has a past medical history of Anxiety, Arthritis, Back pain, Cataract, Daytime sleepiness, Depression, Dyslipidemia, Fatigue, Frequent headaches, Gasping for breath, Herpes zoster (10/27/06), HTN (hypertension), Hypertension, MHA (microangiopathic hemolytic anemia), Migraine syndrome (2/11/2013), Morning headache, Mumps, Nasal drainage, Osteopathia, Osteoporosis, Painful joint, Post-menopause on HRT (hormone replacement therapy), Shortness of breath, Sleep apnea, Sore muscles, Swelling of lower extremity, Thyroid disease, Tonsillitis, and White matter abnormality on MRI of brain (2/11/2013).  She  has a past surgical history that includes cholecystectomy (1996); abdominal hysterectomy total (11/1986); pr reduction of large breast (1997); foot surgery (2002); pr enlarge breast with implant; mammoplasty augmentation; bunionectomy (Left, 2003); eye  "surgery; hysterectomy laparoscopy; and arthroscopy, knee.  Social History     Tobacco Use   • Smoking status: Former Smoker     Packs/day: 0.02     Types: Cigarettes     Last attempt to quit: 1979     Years since quittin.1   • Smokeless tobacco: Never Used   • Tobacco comment: smoked off & on for 10 yrs   Substance Use Topics   • Alcohol use: Yes     Alcohol/week: 0.0 oz     Comment: occassional   • Drug use: No     Social History     Social History Narrative   • Not on file     Family History   Problem Relation Age of Onset   • Stroke Father    • Arthritis Father    • Hyperlipidemia Father    • Hypertension Father    • Cancer Sister         breast   • Arthritis Sister    • Arthritis Mother    • Hypertension Mother    • Hyperlipidemia Mother    • Stroke Mother    • Arthritis Brother    • Cancer Brother    • Heart Disease Brother    • Hypertension Brother    • Hyperlipidemia Brother    • Cancer Daughter         breast     Family Status   Relation Name Status   • Fa     • Sis  Alive   • Mo          tia   • Bro  Alive   • Ayesha  (Not Specified)         ROS    Please see hpi     All other systems reviewed and are negative     Objective:     /80   Pulse 83   Temp 37.2 °C (98.9 °F) (Temporal)   Ht 1.549 m (5' 1\")   Wt 69.8 kg (153 lb 12.8 oz)  Body mass index is 29.06 kg/m².  Physical Exam:    Constitutional: Alert, no distress.  Skin: Warm, dry, good turgor, no rashes in visible areas.  Eye: Equal, round and reactive, conjunctiva clear, lids normal.  ENMT: Lips without lesions, good dentition, oropharynx clear.  Neck: Trachea midline, no masses, no thyromegaly. No cervical or supraclavicular lymphadenopathy.  Respiratory: Unlabored respiratory effort, lungs clear to auscultation, no wheezes, no ronchi.  Cardiovascular: Normal S1, S2, no murmur, no edema.  Psych: Alert and oriented x3, normal affect and mood.      Assessment and Plan:   The following treatment plan was discussed    1. " Overweight (BMI 25.0-29.9)     Chronic, followed by [facility]. Patient has lost 11 lbs in 6 months. Continue with current regimen.     2. Mixed hyperlipidemia    Chronic, well controlled with Pravastatin 40 mg daily. Lipid profile on 7/30/19 was normal. Continue current regimen.     3. Essential hypertension    Chronic problem. BP was 138/80 today. Patient states BP readings at home have been low around 111/79. Patient instructed to decrease Amlodipine from 5 mg daily to 2.5 mg daily. Continue taking Losartan 50 mg as directed.     - amLODIPine (NORVASC) 2.5 MG Tab; Take 1 Tab by mouth every day.  Dispense: 90 Tab; Refill: 0    4. Need for vaccination    RX given for shingrix    - Zoster Vac Recomb Adjuvanted (SHINGRIX) 50 MCG/0.5ML Recon Susp; 0.5 mL by Intramuscular route Once for 1 dose.  Dispense: 0.5 mL; Refill: 0           Instructed to Follow up in clinic or ER for worsening symptoms, difficulty breathing, lack of expected recovery, or should new symptoms or problems arise.    Followup: Return in about 3 months (around 11/27/2019) for Reevaluation, labs.      Once again this medical record contains text that has been entered with the assistance of computer voice recognition, dictation software, and medical scribes.  Therefore, it may contain unintended errors in text, spelling, punctuation, or grammar.    Luther MITTAL (Scribe), am scribing for, and in the presence of, Darius Morataya M.D.    Electronically signed by: Luther Jones (Jagibe), 8/27/2019     Darius MITTAL M.D. personally performed the services described in this documentation, as scribed by Luther Jones in my presence, and it is both accurate and complete.

## 2019-08-28 ENCOUNTER — APPOINTMENT (RX ONLY)
Dept: URBAN - METROPOLITAN AREA CLINIC 4 | Facility: CLINIC | Age: 79
Setting detail: DERMATOLOGY
End: 2019-08-28

## 2019-08-28 DIAGNOSIS — Q828 OTHER SPECIFIED ANOMALIES OF SKIN: ICD-10-CM

## 2019-08-28 DIAGNOSIS — Q826 OTHER SPECIFIED ANOMALIES OF SKIN: ICD-10-CM

## 2019-08-28 DIAGNOSIS — Q819 OTHER SPECIFIED ANOMALIES OF SKIN: ICD-10-CM

## 2019-08-28 DIAGNOSIS — Z71.89 OTHER SPECIFIED COUNSELING: ICD-10-CM

## 2019-08-28 DIAGNOSIS — D18.0 HEMANGIOMA: ICD-10-CM

## 2019-08-28 DIAGNOSIS — L82.1 OTHER SEBORRHEIC KERATOSIS: ICD-10-CM

## 2019-08-28 DIAGNOSIS — L81.4 OTHER MELANIN HYPERPIGMENTATION: ICD-10-CM

## 2019-08-28 DIAGNOSIS — L72.0 EPIDERMAL CYST: ICD-10-CM

## 2019-08-28 DIAGNOSIS — D22 MELANOCYTIC NEVI: ICD-10-CM

## 2019-08-28 PROBLEM — D18.01 HEMANGIOMA OF SKIN AND SUBCUTANEOUS TISSUE: Status: ACTIVE | Noted: 2019-08-28

## 2019-08-28 PROBLEM — D22.72 MELANOCYTIC NEVI OF LEFT LOWER LIMB, INCLUDING HIP: Status: ACTIVE | Noted: 2019-08-28

## 2019-08-28 PROBLEM — D22.61 MELANOCYTIC NEVI OF RIGHT UPPER LIMB, INCLUDING SHOULDER: Status: ACTIVE | Noted: 2019-08-28

## 2019-08-28 PROBLEM — D22.5 MELANOCYTIC NEVI OF TRUNK: Status: ACTIVE | Noted: 2019-08-28

## 2019-08-28 PROBLEM — Q82.8 OTHER SPECIFIED CONGENITAL MALFORMATIONS OF SKIN: Status: ACTIVE | Noted: 2019-08-28

## 2019-08-28 PROBLEM — D22.62 MELANOCYTIC NEVI OF LEFT UPPER LIMB, INCLUDING SHOULDER: Status: ACTIVE | Noted: 2019-08-28

## 2019-08-28 PROBLEM — D22.71 MELANOCYTIC NEVI OF RIGHT LOWER LIMB, INCLUDING HIP: Status: ACTIVE | Noted: 2019-08-28

## 2019-08-28 PROCEDURE — ? ADDITIONAL NOTES

## 2019-08-28 PROCEDURE — 99214 OFFICE O/P EST MOD 30 MIN: CPT

## 2019-08-28 PROCEDURE — ? COUNSELING

## 2019-08-28 ASSESSMENT — LOCATION ZONE DERM
LOCATION ZONE: ARM
LOCATION ZONE: FACE
LOCATION ZONE: TRUNK
LOCATION ZONE: SCALP
LOCATION ZONE: LEG

## 2019-08-28 ASSESSMENT — LOCATION SIMPLE DESCRIPTION DERM
LOCATION SIMPLE: LEFT UPPER ARM
LOCATION SIMPLE: RIGHT UPPER ARM
LOCATION SIMPLE: RIGHT POSTERIOR THIGH
LOCATION SIMPLE: RIGHT UPPER BACK
LOCATION SIMPLE: SCALP
LOCATION SIMPLE: RIGHT SHOULDER
LOCATION SIMPLE: LEFT POSTERIOR THIGH
LOCATION SIMPLE: CHEST
LOCATION SIMPLE: ABDOMEN
LOCATION SIMPLE: LEFT UPPER BACK
LOCATION SIMPLE: LEFT CHEEK
LOCATION SIMPLE: LEFT POSTERIOR UPPER ARM
LOCATION SIMPLE: RIGHT POSTERIOR UPPER ARM
LOCATION SIMPLE: LEFT FOREHEAD
LOCATION SIMPLE: UPPER BACK

## 2019-08-28 ASSESSMENT — LOCATION DETAILED DESCRIPTION DERM
LOCATION DETAILED: LEFT PROXIMAL POSTERIOR THIGH
LOCATION DETAILED: RIGHT DISTAL POSTERIOR THIGH
LOCATION DETAILED: LEFT ANTERIOR DISTAL UPPER ARM
LOCATION DETAILED: RIGHT PROXIMAL LATERAL POSTERIOR UPPER ARM
LOCATION DETAILED: PERIUMBILICAL SKIN
LOCATION DETAILED: LEFT INFERIOR MEDIAL FOREHEAD
LOCATION DETAILED: LEFT SUPERIOR MEDIAL UPPER BACK
LOCATION DETAILED: LEFT PROXIMAL POSTERIOR UPPER ARM
LOCATION DETAILED: RIGHT PROXIMAL POSTERIOR THIGH
LOCATION DETAILED: RIGHT INFERIOR MEDIAL UPPER BACK
LOCATION DETAILED: LEFT DISTAL POSTERIOR UPPER ARM
LOCATION DETAILED: SUPERIOR THORACIC SPINE
LOCATION DETAILED: LEFT SUPERIOR PARIETAL SCALP
LOCATION DETAILED: LEFT CENTRAL FRONTAL SCALP
LOCATION DETAILED: LEFT INFERIOR CENTRAL MALAR CHEEK
LOCATION DETAILED: EPIGASTRIC SKIN
LOCATION DETAILED: LOWER STERNUM
LOCATION DETAILED: LEFT DISTAL POSTERIOR THIGH
LOCATION DETAILED: LEFT LATERAL UPPER BACK
LOCATION DETAILED: RIGHT POSTERIOR SHOULDER
LOCATION DETAILED: RIGHT ANTERIOR DISTAL UPPER ARM
LOCATION DETAILED: INFERIOR THORACIC SPINE
LOCATION DETAILED: LEFT MEDIAL UPPER BACK
LOCATION DETAILED: MIDDLE STERNUM
LOCATION DETAILED: RIGHT PROXIMAL POSTERIOR UPPER ARM

## 2019-08-28 NOTE — PROCEDURE: ADDITIONAL NOTES
Detail Level: Zone
Additional Notes: Recommend CeraVe SA or Amlactin Soothing cream apply to affected areas bid.

## 2019-09-06 DIAGNOSIS — E78.00 PURE HYPERCHOLESTEROLEMIA: ICD-10-CM

## 2019-09-06 RX ORDER — PRAVASTATIN SODIUM 40 MG
TABLET ORAL
Qty: 90 TAB | Refills: 0 | Status: SHIPPED | OUTPATIENT
Start: 2019-09-06 | End: 2019-10-01

## 2019-09-11 ENCOUNTER — SLEEP CENTER VISIT (OUTPATIENT)
Dept: SLEEP MEDICINE | Facility: MEDICAL CENTER | Age: 79
End: 2019-09-11
Payer: MEDICARE

## 2019-09-11 VITALS
WEIGHT: 152 LBS | HEIGHT: 62 IN | RESPIRATION RATE: 15 BRPM | OXYGEN SATURATION: 95 % | BODY MASS INDEX: 27.97 KG/M2 | HEART RATE: 61 BPM | SYSTOLIC BLOOD PRESSURE: 120 MMHG | DIASTOLIC BLOOD PRESSURE: 72 MMHG

## 2019-09-11 DIAGNOSIS — G47.33 OSA (OBSTRUCTIVE SLEEP APNEA): ICD-10-CM

## 2019-09-11 PROCEDURE — 99213 OFFICE O/P EST LOW 20 MIN: CPT | Performed by: FAMILY MEDICINE

## 2019-09-11 NOTE — PROGRESS NOTES
[facility] Follow Up Note     Date: 9/11/2019 / Time: 11:28 AM    Patient ID:   Name:             Sheyla Gauthier   YOB: 1940  Age:                 78 y.o.  female   MRN:               [id number]      Thank you for requesting a sleep medicine consultation on Sheyla Gauthier at the sleep center. She presents today with the chief complaints of JOHN follow up.     HISTORY OF PRESENT ILLNESS:       Pt is currently on CPAP 8 cm. She goes to sleep around 11 pm-1 am and wakes up around 8 am. She is getting about 7 hrs of sleep on a good night and about 5-6 hr of sleep on a bad night. The bad nights has improved with the usage of CPAP.    She is using CPAP most days of the week. Pt reports 7 hrs of average nightly use of CPAP. Pt denies snoring, gasping,choking.Pt also denies significant mask leak that is interfering with sleep. The 30 day compliance was downloaded which shows adequate compliance with more that 4 hr usage about 97%. The AHI is has improved to 6/hr. The mask leak is normal. The symptoms of excessive daytime, snoring and gasping has improved.     SLEEP HISTORY   Her prior home showed an AHI of 26.2/jose guadalupe saturation 74%. Her follow-up study 9/12/2018 with her dental device in place showed an AHI of 9.3 with a jose guadalupe saturation of 83%. He had another PSG done 6/12/2019 showed an AHI of 21.7/jose guadalupe saturation 84%. CPAP titration was performed 6/30/2019.  She did best on CPAP at 8 cm H2O with a resultant AHI of 0.0, jose guadalupe saturation 89%, mean saturation 93%, and the achievement of supine REM using a DreamWear nasal pillow mask.      REVIEW OF SYSTEMS:       Constitutional: Denies fevers, Denies weight changes  Eyes: Denies changes in vision, no eye pain  Ears/Nose/Throat/Mouth: Denies nasal congestion or sore throat   Cardiovascular: Denies chest pain or palpitations   Respiratory: Denies shortness of breath , Denies cough  Gastrointestinal/Hepatic: Denies abdominal pain,  nausea, vomiting, diarrhea, constipation or GI bleeding   Genitourinary: Deniesdysuria or frequency  Musculoskeletal/Rheum: Denies  joint pain and swelling   Skin/Breast: Denies rash,   Neurological: Denies headache, confusion, memory loss or focal weakness/parasthesias  Psychiatric: denies mood disorder   Sleep: + snoring     Comprehensive review of systems form is reviewed with the patient and is attached in the EMR.     PMH:  has a past medical history of Anxiety, Arthritis, Back pain, Cataract, Daytime sleepiness, Depression, Dyslipidemia, Fatigue, Frequent headaches, Gasping for breath, Herpes zoster (10/27/06), HTN (hypertension), Hypertension, MHA (microangiopathic hemolytic anemia), Migraine syndrome (2/11/2013), Morning headache, Mumps, Nasal drainage, Osteopathia, Osteoporosis, Painful joint, Post-menopause on HRT (hormone replacement therapy), Shortness of breath, Sleep apnea, Sore muscles, Swelling of lower extremity, Thyroid disease, Tonsillitis, and White matter abnormality on MRI of brain (2/11/2013).  MEDS:   Current Outpatient Medications:   •  pravastatin (PRAVACHOL) 40 MG tablet, TAKE 1 TABLET BY MOUTH AT BEDTIME, Disp: 90 Tab, Rfl: 0  •  aspirin (ASA) 81 MG Chew Tab chewable tablet, Take 1 Tab by mouth every day., Disp: 100 Tab, Rfl: 3  •  clopidogrel (PLAVIX) 75 MG Tab, Take 1 Tab by mouth every day., Disp: 90 Tab, Rfl: 1  •  amLODIPine (NORVASC) 2.5 MG Tab, Take 1 Tab by mouth every day., Disp: 90 Tab, Rfl: 0  •  Ascorbic Acid (VITAMIN C) 1000 MG Tab, Take  by mouth., Disp: , Rfl:   •  Probiotic Product (PROBIOTIC DAILY PO), Take  by mouth., Disp: , Rfl:   •  alendronate (FOSAMAX) 70 MG Tab, TAKE 1 TAB BY MOUTH EVERY 7 DAYS., Disp: 12 Tab, Rfl: 0  •  losartan (COZAAR) 50 MG Tab, TAKE 1 TABLET BY MOUTH EVERY DAY (Patient taking differently: 25 mg.), Disp: 90 Tab, Rfl: 3  •  thyroid (ARMOUR THYROID) 30 MG Tab, Take 30 mg by mouth every day., Disp: , Rfl:   •  coenzyme Q-10 30 MG capsule, Take 60  "mg by mouth every day., Disp: , Rfl:   •  Estriol 10 % Cream, c-estriol 0.5mg gm cream, Disp: , Rfl:   •  Cholecalciferol (VITAMIN D PO), Take 4,000 Units by mouth every day., Disp: , Rfl:   •  Magnesium 400 MG Tab, Take  by mouth., Disp: , Rfl:   •  amLODIPine (NORVASC) 5 MG Tab, TAKE 1 TABLET BY MOUTH EVERY DAY (Patient not taking: Reported on 9/11/2019), Disp: 90 Tab, Rfl: 1  ALLERGIES: No Known Allergies  SURGHX:   Past Surgical History:   Procedure Laterality Date   • BUNIONECTOMY Left 2003   • FOOT SURGERY  2002   • PB REDUCTION OF LARGE BREAST  1997   • CHOLECYSTECTOMY  1996   • ABDOMINAL HYSTERECTOMY TOTAL  11/1986   • ARTHROSCOPY, KNEE     • EYE SURGERY      cataracts   • HYSTERECTOMY LAPAROSCOPY     • MAMMOPLASTY AUGMENTATION     • PB ENLARGE BREAST WITH IMPLANT       SOCHX:  reports that she quit smoking about 40 years ago. Her smoking use included cigarettes. She smoked 0.02 packs per day. She has never used smokeless tobacco. She reports that she drinks alcohol. She reports that she does not use drugs..  FH:   Family History   Problem Relation Age of Onset   • Stroke Father    • Arthritis Father    • Hyperlipidemia Father    • Hypertension Father    • Cancer Sister         breast   • Arthritis Sister    • Arthritis Mother    • Hypertension Mother    • Hyperlipidemia Mother    • Stroke Mother    • Arthritis Brother    • Cancer Brother    • Heart Disease Brother    • Hypertension Brother    • Hyperlipidemia Brother    • Cancer Daughter         breast         Physical Exam:  Vitals/ General Appearance:   Weight/BMI: Body mass index is 27.8 kg/m².  /72 (BP Location: Right arm, Patient Position: Sitting, BP Cuff Size: Adult)   Pulse 61   Resp 15   Ht 1.575 m (5' 2\")   Wt 68.9 kg (152 lb)   SpO2 95%   Vitals:    09/11/19 1100   BP: 120/72   BP Location: Right arm   Patient Position: Sitting   BP Cuff Size: Adult   Pulse: 61   Resp: 15   SpO2: 95%   Weight: 68.9 kg (152 lb)   Height: 1.575 m (5' 2\") "       Pt. is alert and oriented to time, place and person. Cooperative and in no apparent distress.       -Head: Atraumatic, normocephalic.   -. Nose: No inferior turbinate hypertophy,  -. Throat: Oropharynx appears crowded in that the palate is overhanging (Malam Kaylah scale 3-4)  -. Neck: Supple. No thyromegaly  -. Chest: Trachea central, no spine deformity   -. Lungs auscultation: B/L good air entry, vesicular breath sounds, no adventitious sounds  -. Heart auscultation: 1st and 2nd heart sounds normal, regular rhythm. No appreciable murmur.  - Extremities: no clubbing, no pedal edema.  - Skin: No rash  - NEUROLOGICAL EXAMINATION: On neurological exam, the patient was alert and oriented x3. speech was clear and fluent without dysarthria.      ASSESSMENT AND PLAN     1. Sleep Apnea .     The pathophysiology of sleep anea and the increased risk of cardiovascular morbidity from untreated sleep apnea is discussed in detail with the patient.   She is urged to avoid supine sleep, weight gain and alcoholic beverages since all of these can worsen sleep apnea. She is cautioned against drowsy driving. If She feels sleepy while driving, She must pull over for a break/nap, rather than persist on the road, in the interest of She own safety and that of others on the road.   Plan   - Pressure is changed to CPAP 9 cm     - compliance download was reviewed and discussed with the pt   - compliance was reinforced     2. Regarding treatment of other past medical problems and general health maintenance,  She is urged to follow up with PCP.

## 2019-09-18 DIAGNOSIS — M89.9 OSTEOPATHIA: ICD-10-CM

## 2019-09-18 RX ORDER — ALENDRONATE SODIUM 70 MG/1
70 TABLET ORAL
Qty: 12 TAB | Refills: 4 | Status: SHIPPED | OUTPATIENT
Start: 2019-09-18 | End: 2020-01-24

## 2019-09-25 ENCOUNTER — PATIENT MESSAGE (OUTPATIENT)
Dept: MEDICAL GROUP | Age: 79
End: 2019-09-25

## 2019-09-26 ENCOUNTER — TELEPHONE (OUTPATIENT)
Dept: CARDIOLOGY | Facility: MEDICAL CENTER | Age: 79
End: 2019-09-26

## 2019-09-26 NOTE — TELEPHONE ENCOUNTER
Sheyla Gauthier Letitia L, M.D. 15 hours ago (4:15 PM)         Giles Azar,   I had an angioplasty done on 10/05/18 and doctor put me on plavix for one year. Can I stop taking this drug as of 10/05/19 and continue with daily small dose aspirin. Brusing as been a problem.   Thank you,   Kaitlin Green M.D. Sheehan, Marsha Carol 14 hours ago (5:15 PM)           Dear Sheyla,     I cannot believe it is been a year!   Okay to stop Plavix continue baby aspirin     We hope that helps, thinking of you     Dr MEDINA and team      This FaceBuzzt message has not been read.         MAR updated

## 2019-10-01 ENCOUNTER — OFFICE VISIT (OUTPATIENT)
Dept: MEDICAL GROUP | Age: 79
End: 2019-10-01
Payer: MEDICARE

## 2019-10-01 VITALS
BODY MASS INDEX: 23.95 KG/M2 | OXYGEN SATURATION: 98 % | TEMPERATURE: 98.8 F | HEIGHT: 66 IN | DIASTOLIC BLOOD PRESSURE: 72 MMHG | WEIGHT: 149 LBS | SYSTOLIC BLOOD PRESSURE: 146 MMHG | HEART RATE: 68 BPM

## 2019-10-01 DIAGNOSIS — I10 ESSENTIAL HYPERTENSION: ICD-10-CM

## 2019-10-01 DIAGNOSIS — M79.10 MYALGIA: ICD-10-CM

## 2019-10-01 PROCEDURE — 99214 OFFICE O/P EST MOD 30 MIN: CPT | Performed by: FAMILY MEDICINE

## 2019-10-01 RX ORDER — SIMVASTATIN 20 MG
TABLET ORAL
Qty: 90 TAB | Refills: 0 | Status: SHIPPED | OUTPATIENT
Start: 2019-10-01 | End: 2019-12-26 | Stop reason: SDUPTHER

## 2019-10-01 RX ORDER — AMLODIPINE BESYLATE 5 MG/1
5 TABLET ORAL DAILY
Qty: 90 TAB | Refills: 0 | Status: SHIPPED | OUTPATIENT
Start: 2019-10-01 | End: 2019-10-16

## 2019-10-01 NOTE — PROGRESS NOTES
This medical record contains text that has been entered with the assistance of computer voice recognition and dictation software.  Therefore, it may contain unintended errors in text, spelling, punctuation, or grammar    Chief Complaint   Patient presents with   • Other     body ache x 2 weeks    • Medication Management     change blood pressure medication        HPI:     Sheyla Gauthier is a 78 y.o. female here evaluation and management of:        Essential hypertension  Chronic history.    Due to Losartan being recalled by her pharmacy, she is interested in modifying her current regimen of:  half a tablet of Losartan 50 mg once daily and Amlodipine 5 mg once daily. She reportedly monitors her blood pressure regularly at home. Blood pressure is elevated today at 146/72.  She reports following a low sodium diet and denies any related complaints including chronic cough, chest pain, headaches or dizziness.     Myalgia  Chronic history.    Patient is currently taking Pravastatin 40 mg once daily and is taking a daily Aspirin. She complains of frequent myalgias and is concerned this is a medication side effect. She is interested in modifying her statin medication. No acute complaints of dizziness, claudication or chest pain. Lipid panel was last completed on 07/30/19 which was within normal limits and liver enzymes are normal. She is followed by Dr. Kaitlin Azar, Cardiology, for a history of coronary artery disease with a stent placement to the LAD. She recently discontinued Plavix.      Current medicines (including changes today)  Current Outpatient Medications   Medication Sig Dispense Refill   • amLODIPine (NORVASC) 5 MG Tab Take 1 Tab by mouth every day. 90 Tab 0   • simvastatin (ZOCOR) 20 MG Tab Take one tab po qhs (stop pravastatin) 90 Tab 0   • alendronate (FOSAMAX) 70 MG Tab TAKE 1 TAB BY MOUTH EVERY 7 DAYS. 12 Tab 4   • aspirin (ASA) 81 MG Chew Tab chewable tablet Take 1 Tab by mouth every day. 100 Tab  3   • Ascorbic Acid (VITAMIN C) 1000 MG Tab Take  by mouth.     • Probiotic Product (PROBIOTIC DAILY PO) Take  by mouth.     • thyroid (ARMOUR THYROID) 30 MG Tab Take 30 mg by mouth every day.     • coenzyme Q-10 30 MG capsule Take 60 mg by mouth every day.     • Estriol 10 % Cream c-estriol 0.5mg gm cream     • Magnesium 400 MG Tab Take  by mouth.       No current facility-administered medications for this visit.      She  has a past medical history of Anxiety, Arthritis, Back pain, Cataract, Daytime sleepiness, Depression, Dyslipidemia, Fatigue, Frequent headaches, Gasping for breath, Herpes zoster (10/27/06), HTN (hypertension), Hypertension, MHA (microangiopathic hemolytic anemia), Migraine syndrome (2013), Morning headache, Mumps, Nasal drainage, Osteopathia, Osteoporosis, Painful joint, Post-menopause on HRT (hormone replacement therapy), Shortness of breath, Sleep apnea, Sore muscles, Swelling of lower extremity, Thyroid disease, Tonsillitis, and White matter abnormality on MRI of brain (2013).  She  has a past surgical history that includes cholecystectomy (); abdominal hysterectomy total (1986); pr reduction of large breast (); foot surgery (); pr enlarge breast with implant; mammoplasty augmentation; bunionectomy (Left, ); eye surgery; hysterectomy laparoscopy; and arthroscopy, knee.  Social History     Tobacco Use   • Smoking status: Former Smoker     Packs/day: 0.02     Types: Cigarettes     Last attempt to quit: 1979     Years since quittin.2   • Smokeless tobacco: Never Used   • Tobacco comment: smoked off & on for 10 yrs   Substance Use Topics   • Alcohol use: Yes     Alcohol/week: 0.0 oz     Comment: occassional   • Drug use: No     Social History     Social History Narrative   • Not on file     Family History   Problem Relation Age of Onset   • Stroke Father    • Arthritis Father    • Hyperlipidemia Father    • Hypertension Father    • Cancer Sister          "breast   • Arthritis Sister    • Arthritis Mother    • Hypertension Mother    • Hyperlipidemia Mother    • Stroke Mother    • Arthritis Brother    • Cancer Brother    • Heart Disease Brother    • Hypertension Brother    • Hyperlipidemia Brother    • Cancer Daughter         breast     Family Status   Relation Name Status   • Fa     • Sis  Alive   • Mo          tia   • Bro  Alive   • Ayesha  (Not Specified)         ROS    Please see HPI for further details.    All other systems reviewed and are negative.     Objective:     /72 (BP Location: Left arm, Patient Position: Sitting, BP Cuff Size: Adult)   Pulse 68   Temp 37.1 °C (98.8 °F) (Temporal)   Ht 1.676 m (5' 6\")   Wt 67.6 kg (149 lb)   SpO2 98%  Body mass index is 24.05 kg/m².     Physical Exam:    Constitutional: Alert, no distress.  Skin: Warm, dry, good turgor, no rashes in visible areas.  Eye: Equal, round and reactive, conjunctiva clear, lids normal.  ENMT: Lips without lesions, good dentition, oropharynx clear.  Neck: Trachea midline, no masses, no thyromegaly. No cervical or supraclavicular lymphadenopathy.  Respiratory: Unlabored respiratory effort, lungs clear to auscultation, no wheezes, no ronchi.  Cardiovascular: Normal S1, S2, no murmur, no edema.  Psych: Alert and oriented x3, normal affect and mood.      Assessment and Plan:   The following treatment plan was discussed:    1. Essential hypertension    Plan to discontinue Losartan.  We will increase amlodipine to 5 mg once day. She was asked to create a two week blood pressure log for me to review. She was encouraged to follow a low sodium diet.     2. Myalgia    Well-controlled with current medication regimen of Pravastatin 40 mg once daily. Plan to discontinue Pravastatin secondary to myalgias. She will be prescribed Simvastatin 20 mg once in the evening.  - Obtained and reviewed lipid panel dated 19 which is within normal limits. Liver enzymes were normal.  - " Recommended she follow low fat, low carbohydrate and high fiber diet. Additionally, patient was asked to exercise regularly including frequent cardio.       HEALTH MAINTENANCE: Up to date.    Instructed to follow up in clinic or ER for worsening symptoms, difficulty breathing, lack of expected recovery or should new symptoms or problems arise.    Follow up in two weeks for a blood pressure reevaluation.      Once again this medical record contains text that has been entered with the assistance of computer voice recognition, dictation software, and medical scribes.  Therefore, it may contain unintended errors in text, spelling, punctuation or grammar.    Taisha MITTAL (Scribe), am scribing for, and in the presence of, Darius Morataya M.D.    Electronically signed by: Taisha Jensen (Jagibe), 10/1/2019     Darius MITTAL M.D. personally performed the services described in this documentation, as scribed by Taisha Jensen in my presence, and it is both accurate and complete.

## 2019-10-16 ENCOUNTER — OFFICE VISIT (OUTPATIENT)
Dept: MEDICAL GROUP | Age: 79
End: 2019-10-16
Payer: MEDICARE

## 2019-10-16 VITALS
SYSTOLIC BLOOD PRESSURE: 122 MMHG | WEIGHT: 150 LBS | OXYGEN SATURATION: 94 % | DIASTOLIC BLOOD PRESSURE: 68 MMHG | TEMPERATURE: 98.6 F | BODY MASS INDEX: 24.11 KG/M2 | HEART RATE: 65 BPM | HEIGHT: 66 IN

## 2019-10-16 DIAGNOSIS — G47.09 OTHER INSOMNIA: ICD-10-CM

## 2019-10-16 DIAGNOSIS — I10 ESSENTIAL HYPERTENSION: ICD-10-CM

## 2019-10-16 DIAGNOSIS — E78.2 MIXED HYPERLIPIDEMIA: ICD-10-CM

## 2019-10-16 DIAGNOSIS — M79.10 MYALGIA: ICD-10-CM

## 2019-10-16 PROCEDURE — 99214 OFFICE O/P EST MOD 30 MIN: CPT | Performed by: FAMILY MEDICINE

## 2019-10-16 RX ORDER — AMLODIPINE BESYLATE 2.5 MG/1
2.5 TABLET ORAL DAILY
Qty: 180 TAB | Refills: 0 | Status: SHIPPED | OUTPATIENT
Start: 2019-10-16 | End: 2019-12-26 | Stop reason: SDUPTHER

## 2019-10-16 ASSESSMENT — PAIN SCALES - GENERAL: PAINLEVEL: NO PAIN
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
## 2019-11-29 DIAGNOSIS — E78.00 PURE HYPERCHOLESTEROLEMIA: ICD-10-CM

## 2019-12-02 RX ORDER — PRAVASTATIN SODIUM 40 MG
TABLET ORAL
Refills: 0 | OUTPATIENT
Start: 2019-12-02

## 2019-12-02 NOTE — TELEPHONE ENCOUNTER
Phone Number Called:   [id number] - [place] - [address]  [address] [place] [address]  Phone: [phone] Fax: [phone]    Call outcome: Spoke with the pharmacy    Message: Per Office Visit note on 10/01/2019 patient is no longer taking Pravastatin and was switched to Simvastatin.

## 2019-12-02 NOTE — TELEPHONE ENCOUNTER
Per Office Visit note on 10/01/2019 patient is no longer taking Pravastatin and was switched to Simvastatin.

## 2019-12-10 ENCOUNTER — HOSPITAL ENCOUNTER (OUTPATIENT)
Dept: LAB | Facility: MEDICAL CENTER | Age: 79
End: 2019-12-10
Attending: INTERNAL MEDICINE
Payer: MEDICARE

## 2019-12-10 LAB
T4 FREE SERPL-MCNC: 0.77 NG/DL (ref 0.53–1.43)
TSH SERPL DL<=0.005 MIU/L-ACNC: 0.71 UIU/ML (ref 0.38–5.33)

## 2019-12-10 PROCEDURE — 84439 ASSAY OF FREE THYROXINE: CPT

## 2019-12-10 PROCEDURE — 84443 ASSAY THYROID STIM HORMONE: CPT

## 2019-12-10 PROCEDURE — 36415 COLL VENOUS BLD VENIPUNCTURE: CPT

## 2019-12-11 ENCOUNTER — APPOINTMENT (OUTPATIENT)
Dept: SLEEP MEDICINE | Facility: MEDICAL CENTER | Age: 79
End: 2019-12-11
Payer: MEDICARE

## 2019-12-12 ENCOUNTER — SLEEP CENTER VISIT (OUTPATIENT)
Dept: SLEEP MEDICINE | Facility: MEDICAL CENTER | Age: 79
End: 2019-12-12
Payer: MEDICARE

## 2019-12-12 VITALS
HEIGHT: 62 IN | RESPIRATION RATE: 16 BRPM | HEART RATE: 73 BPM | BODY MASS INDEX: 26.5 KG/M2 | SYSTOLIC BLOOD PRESSURE: 140 MMHG | DIASTOLIC BLOOD PRESSURE: 80 MMHG | OXYGEN SATURATION: 94 % | WEIGHT: 144 LBS

## 2019-12-12 DIAGNOSIS — Z78.9 DIFFICULTY WITH CPAP NASAL MASK USE: ICD-10-CM

## 2019-12-12 DIAGNOSIS — G47.09 OTHER INSOMNIA: ICD-10-CM

## 2019-12-12 DIAGNOSIS — G47.33 OSA (OBSTRUCTIVE SLEEP APNEA): ICD-10-CM

## 2019-12-12 PROCEDURE — 99214 OFFICE O/P EST MOD 30 MIN: CPT | Performed by: NURSE PRACTITIONER

## 2019-12-12 RX ORDER — TRAZODONE HYDROCHLORIDE 50 MG/1
50 TABLET ORAL
Qty: 30 TAB | Refills: 0 | Status: SHIPPED
Start: 2019-12-12 | End: 2020-01-10 | Stop reason: SDUPTHER

## 2019-12-12 ASSESSMENT — ENCOUNTER SYMPTOMS
NEUROLOGICAL NEGATIVE: 1
WEIGHT LOSS: 0
DEPRESSION: 0
FEVER: 0
MUSCULOSKELETAL NEGATIVE: 1
NERVOUS/ANXIOUS: 0
HALLUCINATIONS: 0
INSOMNIA: 1
MEMORY LOSS: 0
BRUISES/BLEEDS EASILY: 0
RESPIRATORY NEGATIVE: 1
CARDIOVASCULAR NEGATIVE: 1
EYE PAIN: 0
DIAPHORESIS: 0
CHILLS: 0
EYE DISCHARGE: 0

## 2019-12-12 ASSESSMENT — LIFESTYLE VARIABLES: SUBSTANCE_ABUSE: 0

## 2019-12-12 NOTE — PROGRESS NOTES
"Chief Complaint   Patient presents with   • Apnea     Last Seen 09/11/19         HPI: This patient is a 79 y.o. female, who presents for follow-up of JOHN with compliance check.     She was also followed at our pulmonary clinic with Dr. Connor for shortness of breath thought related to deconditioning.  She has a history of pulmonary nodules that were stable on imaging thus no follow-up indicated.  She has been discharged from our pulmonary clinic.    In regards to JOHN, Home showed an AHI of 26.2, jose guadalupe saturation 74%.  She opted for dental appliance.  Her follow-up study 9/12/2018 with her dental device in place showed an AHI of 9.3 with a jose guadalupe saturation of 83%. She had another PSG done 6/12/2019 showed an AHI of 21.7/jose guadalupe saturation 84%. CPAP titration was performed 6/30/2019. She did best on CPAP at 8 cm H2O with a resultant AHI of 0.0, jose guadalupe saturation 89%, mean saturation 93%, and the achievement of supine REM.    Pressures have been empirically increased he is currently using CPAP 9 cm H2O. Compliance download over the past 30 days indicates 100 % compliance, average use of 5 hours 20 minutes per night, AHI of 1.2. Patient reports having adjusted to therapy.  She still feels she is not getting enough pressure at times.  She reports sensation of \"suffocating\".  She reports chronic insomnia difficulty initiating and maintaining sleep.  She is only sleeping about 5 hours per night and consequently feels tired during the day.  She talked to her sister-in-law who uses amitriptyline with subjective benefit.  She is inquiring about sleep aids today.  She is tried melatonin and edible marijuana with no improvement in insomnia.      Past Medical History:   Diagnosis Date   • Anxiety    • Arthritis    • Back pain    • Cataract    • Daytime sleepiness    • Depression    • Dyslipidemia    • Fatigue    • Frequent headaches    • Gasping for breath    • Herpes zoster 10/27/06   • HTN (hypertension)    • Hypertension    • " MHA (microangiopathic hemolytic anemia)    • Migraine syndrome 2013   • Morning headache    • Mumps    • Nasal drainage    • Osteopathia    • Osteoporosis    • Painful joint    • Post-menopause on HRT (hormone replacement therapy)    • Shortness of breath    • Sleep apnea    • Sore muscles    • Swelling of lower extremity    • Thyroid disease    • Tonsillitis    • White matter abnormality on MRI of brain 2013       Social History     Tobacco Use   • Smoking status: Former Smoker     Packs/day: 0.02     Types: Cigarettes     Last attempt to quit: 1979     Years since quittin.4   • Smokeless tobacco: Never Used   • Tobacco comment: smoked off & on for 10 yrs   Substance Use Topics   • Alcohol use: Yes     Alcohol/week: 0.0 oz     Comment: occassional   • Drug use: No       Family History   Problem Relation Age of Onset   • Stroke Father    • Arthritis Father    • Hyperlipidemia Father    • Hypertension Father    • Cancer Sister         breast   • Arthritis Sister    • Arthritis Mother    • Hypertension Mother    • Hyperlipidemia Mother    • Stroke Mother    • Arthritis Brother    • Cancer Brother    • Heart Disease Brother    • Hypertension Brother    • Hyperlipidemia Brother    • Cancer Daughter         breast       Immunization History   Administered Date(s) Administered   • Influenza (IM) Preservative Free 2014   • Influenza Seasonal Injectable 2011, 2012   • Influenza Vaccine Adult HD 10/09/2010, 2015, 10/25/2016, 2017, 2017, 2018, 2019   • Influenza Vaccine Pediatric Split 2011, 2012   • Pneumococcal Conjugate Vaccine (Prevnar/PCV-13) 2015, 2015   • Pneumococcal polysaccharide vaccine (PPSV-23) 2017   • Recombinant Zoster Vaccine (RZV) (Shingrix) 10/11/2018, 2019   • Tdap Vaccine 2014   • Zoster Vaccine Live (ZVL) (Zostavax) 10/26/2010       Current medications as of today   Current Outpatient Medications  "  Medication Sig Dispense Refill   • amLODIPine (NORVASC) 2.5 MG Tab Take 1 Tab by mouth every day. 180 Tab 0   • simvastatin (ZOCOR) 20 MG Tab Take one tab po qhs (stop pravastatin) 90 Tab 0   • alendronate (FOSAMAX) 70 MG Tab TAKE 1 TAB BY MOUTH EVERY 7 DAYS. 12 Tab 4   • aspirin (ASA) 81 MG Chew Tab chewable tablet Take 1 Tab by mouth every day. 100 Tab 3   • Ascorbic Acid (VITAMIN C) 1000 MG Tab Take  by mouth.     • Probiotic Product (PROBIOTIC DAILY PO) Take  by mouth.     • thyroid (ARMOUR THYROID) 30 MG Tab Take 30 mg by mouth every day.     • coenzyme Q-10 30 MG capsule Take 60 mg by mouth every day.     • Estriol 10 % Cream c-estriol 0.5mg gm cream     • Magnesium 400 MG Tab Take  by mouth.       No current facility-administered medications for this visit.        Allergies: Patient has no known allergies.    /80 (BP Location: Right arm, Patient Position: Sitting, BP Cuff Size: Adult)   Pulse 73   Resp 16   Ht 1.575 m (5' 2\")   Wt 65.3 kg (144 lb)   SpO2 94%       Review of Systems   Constitutional: Positive for malaise/fatigue. Negative for chills, diaphoresis, fever and weight loss.   HENT: Negative.    Eyes: Negative for pain and discharge.   Respiratory: Negative.    Cardiovascular: Negative.    Musculoskeletal: Negative.    Neurological: Negative.    Endo/Heme/Allergies: Negative for environmental allergies. Does not bruise/bleed easily.   Psychiatric/Behavioral: Negative for depression, hallucinations, memory loss, substance abuse and suicidal ideas. The patient has insomnia. The patient is not nervous/anxious.        Physical Exam   Constitutional: She is oriented to person, place, and time and well-developed, well-nourished, and in no distress.   HENT:   Head: Normocephalic and atraumatic.   Eyes: Pupils are equal, round, and reactive to light.   Neck: Normal range of motion. Neck supple. No tracheal deviation present.   Cardiovascular: Normal rate, regular rhythm and normal heart sounds. " "  Pulmonary/Chest: Effort normal and breath sounds normal.   Musculoskeletal: Normal range of motion.   Neurological: She is alert and oriented to person, place, and time.   Skin: Skin is warm and dry.   Psychiatric: Mood, memory, affect and judgment normal.   Vitals reviewed.      Diagnoses/Plan:    1. JOHN (obstructive sleep apnea)   Continue CPAP 9 cm H2O nightly, Clean mask & tubing weekly, Replace supplies as insurance will allow, RX for new supplies to DME.  She feels she is not getting enough pressure at night.  She reports feeling of \"suffocating\".  Recommended trying a full facemask.  Pressures have been empirically increased with no improvement in sensation and her AHI is normal.  She had a recent titration study showing successful titration to CPAP.  - DME Mask and Supplies    2. Other insomnia  She is tried melatonin and marijuana edibles with no improvement in insomnia.  Sleep hygiene was reviewed in detail.  We discussed trial of trazodone.  She is agreeable to this.  She will discontinue if any side effects.  She will call if effective for refill.  Consider referral to Dr. munoz if insomnia persists.  - traZODone (DESYREL) 50 MG Tab; Take 1 Tab by mouth at bedtime as needed for Sleep.  Dispense: 30 Tab; Refill: 0    3. Difficulty with CPAP nasal mask use  Mask fitting performed today.  We will send a order for air fit F 30 small to her DME.  - DME Mask Fitting; Future    F/U in 2 months to review compliance and symptoms.     This dictation was created using voice recognition software. The accuracy of the dictation is limited to the abilities of the software. I expect there may be some errors of grammar and possibly content.     "

## 2019-12-19 ENCOUNTER — OFFICE VISIT (OUTPATIENT)
Dept: MEDICAL GROUP | Age: 79
End: 2019-12-19
Payer: MEDICARE

## 2019-12-19 VITALS
BODY MASS INDEX: 24.11 KG/M2 | WEIGHT: 150 LBS | OXYGEN SATURATION: 94 % | DIASTOLIC BLOOD PRESSURE: 86 MMHG | HEART RATE: 60 BPM | SYSTOLIC BLOOD PRESSURE: 140 MMHG | TEMPERATURE: 97.5 F | HEIGHT: 66 IN

## 2019-12-19 DIAGNOSIS — M25.532 LEFT WRIST PAIN: ICD-10-CM

## 2019-12-19 DIAGNOSIS — M81.0 AGE-RELATED OSTEOPOROSIS WITHOUT CURRENT PATHOLOGICAL FRACTURE: ICD-10-CM

## 2019-12-19 PROCEDURE — 99214 OFFICE O/P EST MOD 30 MIN: CPT | Performed by: FAMILY MEDICINE

## 2019-12-19 RX ORDER — IBUPROFEN 200 MG
200 TABLET ORAL EVERY 6 HOURS PRN
COMMUNITY
End: 2020-10-01

## 2019-12-19 NOTE — PROGRESS NOTES
This medical record contains text that has been entered with the assistance of computer voice recognition and dictation software.  Therefore, it may contain unintended errors in text, spelling, punctuation, or grammar    Chief Complaint   Patient presents with   • Wrist Pain     pain in L. wrist and thumb X 2 weeks        HPI:   Sheyla Gauthier is a 79 y.o. female here evaluation and management of:     1. Left wrist pain   NEW PROBLEM    Patient is a 79-year-old female who presents clinic with chief complaint of left wrist pain.  She states the pain is sharp and it radiates from her wrist up to her thumb, sharp in nature 4 out of 10 worse with flexing the wrist and in the morning when she wakes up.  She states it has been there since Thanksgiving.  Patient states she is on the computer daily for several hours.  She is tried ibuprofen which does help with the pain.  She denies any traumatic injuries which could explain this.  She denies any new rashes, no fevers, no chills no nausea no vomiting.    2. Age-related osteoporosis without current pathological fracture    Patient has been on Fosamax for her osteoporosis, she would like to change to an intramuscular or IV medication.    Current medicines (including changes today)  Current Outpatient Medications   Medication Sig Dispense Refill   • ibuprofen (MOTRIN) 200 MG Tab Take 200 mg by mouth every 6 hours as needed.     • amLODIPine (NORVASC) 2.5 MG Tab Take 1 Tab by mouth every day. 180 Tab 0   • simvastatin (ZOCOR) 20 MG Tab Take one tab po qhs (stop pravastatin) 90 Tab 0   • alendronate (FOSAMAX) 70 MG Tab TAKE 1 TAB BY MOUTH EVERY 7 DAYS. 12 Tab 4   • aspirin (ASA) 81 MG Chew Tab chewable tablet Take 1 Tab by mouth every day. 100 Tab 3   • Ascorbic Acid (VITAMIN C) 1000 MG Tab Take  by mouth.     • Probiotic Product (PROBIOTIC DAILY PO) Take  by mouth.     • thyroid (ARMOUR THYROID) 30 MG Tab Take 30 mg by mouth every day.     • coenzyme Q-10 30 MG capsule  Take 60 mg by mouth every day.     • Estriol 10 % Cream c-estriol 0.5mg gm cream     • Magnesium 400 MG Tab Take  by mouth.     • traZODone (DESYREL) 50 MG Tab Take 1 Tab by mouth at bedtime as needed for Sleep. (Patient not taking: Reported on 2019) 30 Tab 0     No current facility-administered medications for this visit.      She  has a past medical history of Anxiety, Arthritis, Back pain, Cataract, Daytime sleepiness, Depression, Dyslipidemia, Fatigue, Frequent headaches, Gasping for breath, Herpes zoster (10/27/06), HTN (hypertension), Hypertension, MHA (microangiopathic hemolytic anemia), Migraine syndrome (2013), Morning headache, Mumps, Nasal drainage, Osteopathia, Osteoporosis, Painful joint, Post-menopause on HRT (hormone replacement therapy), Shortness of breath, Sleep apnea, Sore muscles, Swelling of lower extremity, Thyroid disease, Tonsillitis, and White matter abnormality on MRI of brain (2013).  She  has a past surgical history that includes cholecystectomy (); abdominal hysterectomy total (1986); pr reduction of large breast (); foot surgery (); pr enlarge breast with implant; mammoplasty augmentation; bunionectomy (Left, ); eye surgery; hysterectomy laparoscopy; and arthroscopy, knee.  Social History     Tobacco Use   • Smoking status: Former Smoker     Packs/day: 0.02     Types: Cigarettes     Last attempt to quit: 1979     Years since quittin.4   • Smokeless tobacco: Never Used   • Tobacco comment: smoked off & on for 10 yrs   Substance Use Topics   • Alcohol use: Not Currently     Alcohol/week: 0.0 oz     Comment: occassional   • Drug use: No     Social History     Patient does not qualify to have social determinant information on file (likely too young).   Social History Narrative   • Not on file     Family History   Problem Relation Age of Onset   • Stroke Father    • Arthritis Father    • Hyperlipidemia Father    • Hypertension Father    • Cancer  "Sister         breast   • Arthritis Sister    • Arthritis Mother    • Hypertension Mother    • Hyperlipidemia Mother    • Stroke Mother    • Arthritis Brother    • Cancer Brother    • Heart Disease Brother    • Hypertension Brother    • Hyperlipidemia Brother    • Cancer Daughter         breast     Family Status   Relation Name Status   • Fa     • Sis  Alive   • Mo          tia   • Bro  Alive   • Ayesha  (Not Specified)         ROS    Please see hpi     All other systems reviewed and are negative     Objective:     /86 (BP Location: Left arm, Patient Position: Sitting, BP Cuff Size: Adult)   Pulse 60   Temp 36.4 °C (97.5 °F) (Temporal)   Ht 1.676 m (5' 6\")   Wt 68 kg (150 lb)   SpO2 94%  Body mass index is 24.21 kg/m².  Physical Exam:    Constitutional: Alert, no distress.  Skin: Warm, dry, good turgor, no rashes in visible areas.  Eye: Equal, round and reactive, conjunctiva clear, lids normal.  ENMT: Lips without lesions, good dentition, oropharynx clear.  Neck: Trachea midline, no masses, no thyromegaly. No cervical or supraclavicular lymphadenopathy.  Respiratory: Unlabored respiratory effort, lungs clear to auscultation, no wheezes, no ronchi.  Cardiovascular: Normal S1, S2, no murmur, no edema.  Psych: Alert and oriented x3, normal affect and mood.  Wrist--pain with flexion, positive Tinel sign, negative Phalen sign, normal extension, normal hand squeeze, there is a pea-sized ganglion on cyst in the anatomical snuffbox.    Assessment and Plan:   The following treatment plan was discussed    1. Left wrist pain    We will obtain and x-ray of the patients wrist to further evaluate her symptoms. Additionally I would like her to wear a wrist splint while sleeping and while doing activities with repetitive wrist movements.    - DX-WRIST-COMPLETE 3+ LEFT; Future    2. Age-related osteoporosis without current pathological fracture    We will obtain a new Dexa scan to update clinical " profile.  Then we will adjust therapy as needed.       - DS-BONE DENSITY STUDY (DEXA); Future      HEALTH MAINTENANCE: Due for AWV.    Instructed to Follow up in clinic or ER for worsening symptoms, difficulty breathing, lack of expected recovery, or should new symptoms or problems arise.    Followup: Return in about 3 months (around 3/19/2020) for Reevaluation, labs.      Once again this medical record contains text that has been entered with the assistance of computer voice recognition, dictation software, and medical scribes.  Therefore, it may contain unintended errors in text, spelling, punctuation, or grammar.    Yeimi MITTAL (Scribe), am scribing for, and in the presence of, Darius Morataya M.D.    Electronically signed by: Yeimi Villeda (Jagibselam), 12/19/2019     IDarius M.D. personally performed the services described in this documentation, as scribed by Yeimi Villeda in my presence, and it is both accurate and complete.

## 2019-12-20 ENCOUNTER — HOSPITAL ENCOUNTER (OUTPATIENT)
Dept: RADIOLOGY | Facility: MEDICAL CENTER | Age: 79
End: 2019-12-20
Attending: FAMILY MEDICINE
Payer: MEDICARE

## 2019-12-20 DIAGNOSIS — M25.532 LEFT WRIST PAIN: ICD-10-CM

## 2019-12-20 PROCEDURE — 73110 X-RAY EXAM OF WRIST: CPT | Mod: LT

## 2019-12-26 DIAGNOSIS — I10 ESSENTIAL HYPERTENSION: ICD-10-CM

## 2019-12-27 RX ORDER — AMLODIPINE BESYLATE 2.5 MG/1
2.5 TABLET ORAL DAILY
Qty: 90 TAB | Refills: 0 | Status: SHIPPED | OUTPATIENT
Start: 2019-12-27 | End: 2020-01-28 | Stop reason: SDUPTHER

## 2019-12-27 RX ORDER — SIMVASTATIN 20 MG
TABLET ORAL
Qty: 90 TAB | Refills: 0 | Status: SHIPPED | OUTPATIENT
Start: 2019-12-27 | End: 2020-03-17 | Stop reason: SDUPTHER

## 2020-01-02 ENCOUNTER — PATIENT MESSAGE (OUTPATIENT)
Dept: SLEEP MEDICINE | Facility: MEDICAL CENTER | Age: 80
End: 2020-01-02

## 2020-01-02 NOTE — TELEPHONE ENCOUNTER
From: Sheyla Gauthier  To: RINA Soto  Sent: 1/2/2020 11:13 AM PST  Subject: Prescription Question    Good Morning Kezia Shaye, During our recent visit in Dec you prescribed Trazodone 50mg to help me sleep while wearing the Cpap. Using a full dosage then 1/2 dosage (due to feeling slugish in the am I developed severe swelling in my foot, ankle and fingers. I stopped using the drug about 7 days ago and still have difficulty sleeping. Last lelo I took a dose of Benadryl concerned about not using the Cpap and wanting to get some needed sleep. Can u reccoment another aid. I know we ordered another mask for me and I realize its a difficult time of year to expect delivery given insurance, etc. Thank you, Sheyla Gauthier

## 2020-01-03 ENCOUNTER — HOSPITAL ENCOUNTER (OUTPATIENT)
Dept: RADIOLOGY | Facility: MEDICAL CENTER | Age: 80
End: 2020-01-03
Attending: FAMILY MEDICINE
Payer: MEDICARE

## 2020-01-03 DIAGNOSIS — M81.0 AGE-RELATED OSTEOPOROSIS WITHOUT CURRENT PATHOLOGICAL FRACTURE: ICD-10-CM

## 2020-01-03 PROCEDURE — 77080 DXA BONE DENSITY AXIAL: CPT

## 2020-01-06 ENCOUNTER — PATIENT MESSAGE (OUTPATIENT)
Dept: SLEEP MEDICINE | Facility: MEDICAL CENTER | Age: 80
End: 2020-01-06

## 2020-01-06 NOTE — TELEPHONE ENCOUNTER
From: Sheyla Gauthier  To: RINA Soto  Sent: 1/6/2020 10:56 AM PST  Subject: Prescription Question    Hello Ms Cr,  I recently received a letter re your departure from [facility]. I'm sorry to hear the news you have been a great help to me re Cpap use. I corresponded w/ you & your team last wk re a severe swelling in my legs, ankles, fingers and feet after 5 days use of Trazodone even after reducing it in half. I have never used sleeping pills how about something for anxiety. Lack of sleep again last lelo removed the Cpap twice during the night. Thank you, Sheyla Gauthier

## 2020-01-09 ENCOUNTER — PATIENT MESSAGE (OUTPATIENT)
Dept: SLEEP MEDICINE | Facility: MEDICAL CENTER | Age: 80
End: 2020-01-09

## 2020-01-09 NOTE — TELEPHONE ENCOUNTER
From: Sheyla Gauthier  To: RINA Soto  Sent: 1/9/2020 8:48 AM PST  Subject: Prescription Question    Good Morning  I purchased Remfresh didn't sleep again last lelo and removed the mask about 3am. I called [facility] Mon 1/6 about the other mask you ordered for me but have not heard back on the status of that order. Needless to say, I'm at a standstill.   Sheyla Gauthier

## 2020-01-10 DIAGNOSIS — G47.09 OTHER INSOMNIA: ICD-10-CM

## 2020-01-10 NOTE — TELEPHONE ENCOUNTER
Have we ever prescribed this med? Yes.  If yes, what date? 12/12/2019    Last OV: 012/12/2019 - Kezia Cr    Next OV: none scheduled; was to follow up in 2 months       DX: Insomnia    Medications: Trazodone

## 2020-01-14 RX ORDER — TRAZODONE HYDROCHLORIDE 50 MG/1
50 TABLET ORAL
Qty: 30 TAB | Refills: 0 | Status: SHIPPED
Start: 2020-01-14 | End: 2020-01-24

## 2020-01-14 NOTE — TELEPHONE ENCOUNTER
RX has been faxed to   [id number] - [place] - [address]  [address] [place] [address]  Phone: [phone] Fax: [phone]

## 2020-01-15 NOTE — PATIENT COMMUNICATION
Order and notes handed off to Nolberto pope [facility] directly with note to contact pt and advise that if she is not yet eligible to for a new mask, to let her know specific date she will be

## 2020-01-16 ENCOUNTER — PATIENT MESSAGE (OUTPATIENT)
Dept: SLEEP MEDICINE | Facility: MEDICAL CENTER | Age: 80
End: 2020-01-16

## 2020-01-16 NOTE — TELEPHONE ENCOUNTER
From: Sheyla Gauthier  To: RINA Soto  Sent: 1/16/2020 10:13 AM PST  Subject: Procedure Question    Kezia Cr,  I believe I just talked w Anuja in your office and she suggested that I let u know what is happening. I went to [facility] yesterday to  the mask ordered for me back in Dec. It is not the same mask I tried on @ your office in Dec. it has the same model # but it doesn't fit or have the same design as the one I tried on @ my Dec appt. I havn't used a Cpap in over a week, sleeping ok w/o one. I do not what to do. [facility] didn't wk for me. Anuja told me to deal w [facility]. I will call them this am. Thank you.  Sheyla Gauthier

## 2020-01-17 ENCOUNTER — PATIENT MESSAGE (OUTPATIENT)
Dept: MEDICAL GROUP | Age: 80
End: 2020-01-17

## 2020-01-17 ENCOUNTER — PATIENT MESSAGE (OUTPATIENT)
Dept: SLEEP MEDICINE | Facility: MEDICAL CENTER | Age: 80
End: 2020-01-17

## 2020-01-17 DIAGNOSIS — J00 ACUTE NASOPHARYNGITIS: ICD-10-CM

## 2020-01-17 RX ORDER — FLUTICASONE PROPIONATE 50 MCG
SPRAY, SUSPENSION (ML) NASAL
Qty: 16 G | Refills: 0 | Status: SHIPPED | OUTPATIENT
Start: 2020-01-17 | End: 2020-01-22 | Stop reason: SDUPTHER

## 2020-01-17 NOTE — PATIENT COMMUNICATION
Was the patient seen in the last year in this department? Yes    Does patient have an active prescription for medications requested? No     Received Request Via: Patient       Pt requesting refill of Flonase, please advise.

## 2020-01-17 NOTE — PATIENT COMMUNICATION
refaxed the order for the Airfit F30 small to Angelita Arvizu or [facility] with cover sheet requesting they get with pt directly to correct issues

## 2020-01-22 DIAGNOSIS — J00 ACUTE NASOPHARYNGITIS: ICD-10-CM

## 2020-01-22 RX ORDER — FLUTICASONE PROPIONATE 50 MCG
SPRAY, SUSPENSION (ML) NASAL
Qty: 16 G | Refills: 0 | Status: SHIPPED | OUTPATIENT
Start: 2020-01-22 | End: 2020-02-13

## 2020-01-24 ENCOUNTER — OUTPATIENT INFUSION SERVICES (OUTPATIENT)
Dept: ONCOLOGY | Facility: MEDICAL CENTER | Age: 80
End: 2020-01-24
Attending: FAMILY MEDICINE
Payer: MEDICARE

## 2020-01-24 VITALS
DIASTOLIC BLOOD PRESSURE: 67 MMHG | OXYGEN SATURATION: 97 % | RESPIRATION RATE: 18 BRPM | HEART RATE: 71 BPM | SYSTOLIC BLOOD PRESSURE: 135 MMHG | WEIGHT: 151.24 LBS | TEMPERATURE: 98.9 F | BODY MASS INDEX: 29.69 KG/M2 | HEIGHT: 60 IN

## 2020-01-24 LAB
CA-I BLD ISE-SCNC: 1.21 MMOL/L (ref 1.1–1.3)
CREAT BLD-MCNC: 0.8 MG/DL (ref 0.5–1.4)

## 2020-01-24 PROCEDURE — 96372 THER/PROPH/DIAG INJ SC/IM: CPT

## 2020-01-24 PROCEDURE — 36415 COLL VENOUS BLD VENIPUNCTURE: CPT

## 2020-01-24 PROCEDURE — 82565 ASSAY OF CREATININE: CPT

## 2020-01-24 PROCEDURE — 82330 ASSAY OF CALCIUM: CPT

## 2020-01-24 PROCEDURE — 700111 HCHG RX REV CODE 636 W/ 250 OVERRIDE (IP): Mod: JG | Performed by: FAMILY MEDICINE

## 2020-01-24 RX ADMIN — DENOSUMAB 60 MG: 60 INJECTION SUBCUTANEOUS at 14:05

## 2020-01-24 NOTE — PROGRESS NOTES
Pt arrived to [facility], ambulatory, for prolia injection. Pt voices no complaints. Pt denies any recent or upcoming dental surgeries. Labs drawn and reviewed, pt within parameters to treat today. Prolia injected into the R-back arm with no s/sx of adverse reaction. Pt left [facility] with no s/sx of distress. Follow up appointment confirmed.

## 2020-01-24 NOTE — PROGRESS NOTES
Pharmacy note  Cr = 0.8, CrCl ~ 62.5 mL/min    Ionized Ca = 1.21  OK for denosumab (Prolia) 60 mg SQ today    Lyric Cuellar, PharmD, BCOP

## 2020-01-28 DIAGNOSIS — I10 ESSENTIAL HYPERTENSION: ICD-10-CM

## 2020-01-28 NOTE — TELEPHONE ENCOUNTER
Was the patient seen in the last year in this department? Yes    Does patient have an active prescription for medications requested? Yes    Received Request Via: Pharmacy     Pt stated she takes medication 2x day AM and PM.

## 2020-01-29 RX ORDER — AMLODIPINE BESYLATE 2.5 MG/1
2.5 TABLET ORAL DAILY
Qty: 180 TAB | Refills: 2 | Status: SHIPPED | OUTPATIENT
Start: 2020-01-29 | End: 2020-03-05 | Stop reason: SDUPTHER

## 2020-02-10 ENCOUNTER — SLEEP CENTER VISIT (OUTPATIENT)
Dept: SLEEP MEDICINE | Facility: MEDICAL CENTER | Age: 80
End: 2020-02-10
Payer: MEDICARE

## 2020-02-10 VITALS
WEIGHT: 146 LBS | OXYGEN SATURATION: 97 % | SYSTOLIC BLOOD PRESSURE: 122 MMHG | BODY MASS INDEX: 26.87 KG/M2 | HEIGHT: 62 IN | HEART RATE: 70 BPM | DIASTOLIC BLOOD PRESSURE: 80 MMHG | RESPIRATION RATE: 14 BRPM

## 2020-02-10 DIAGNOSIS — F51.04 CHRONIC INSOMNIA: ICD-10-CM

## 2020-02-10 DIAGNOSIS — G47.33 OSA (OBSTRUCTIVE SLEEP APNEA): ICD-10-CM

## 2020-02-10 PROCEDURE — 99214 OFFICE O/P EST MOD 30 MIN: CPT | Performed by: FAMILY MEDICINE

## 2020-02-10 RX ORDER — DOXEPIN HYDROCHLORIDE 10 MG/1
10 CAPSULE ORAL NIGHTLY PRN
Qty: 30 CAP | Refills: 2 | Status: SHIPPED | OUTPATIENT
Start: 2020-02-10 | End: 2020-02-13

## 2020-02-10 NOTE — PATIENT INSTRUCTIONS
Sleep hygiene (ref: UpToDate)  ?Sleep as long as necessary to feel rested (usually seven to eight hours for adults) and then get out of bed  ?Maintain a regular sleep schedule, particularly a regular wake-up time in the morning  ?Try not to force sleep  ?Avoid caffeinated beverages after lunch  ?Avoid alcohol near bedtime (eg, late afternoon and evening)  ?Avoid smoking or other nicotine intake, particularly during the evening  ?Adjust the bedroom environment as needed to decrease stimuli (eg, reduce ambient light, turn off the television or radio)  ?Avoid use of light-emitting screens  (laptops, tablets, smartphones, [employer]ooks) 2 hours before bedtime   ?Resolve concerns or worries before bedtime  ?Exercise regularly for at least 20 minutes, preferably more than four to five hours prior to bedtime.  ?Avoid daytime naps, especially if they are longer than 20 to 30 minutes or occur late in the day    Stimulus control (Ref: UpToDate)    Patients with insomnia may associate their bed and bedroom with the fear of not sleeping or other arousing events, rather than the more pleasurable anticipation of sleep. The longer one stays in bed trying to sleep, the stronger the association becomes. This perpetuates the difficulty falling asleep.Stimulus control therapy is a strategy whose purpose is to disrupt this association by enhancing the likelihood of sleep . Patients should not go to bed until they are sleepy and should use the bed primarily for sleep (and not for reading, watching television, eating, or worrying). They should not spend more than 20 minutes in bed awake. If they are awake after 20 minutes, they should leave the bedroom and engage in a relaxing activity, such as reading or listening to soothing music. Patients should not engage in activities that stimulate them or reward them for being awake in the middle of the night, such as eating or watching television. In addition, they should not return to bed until they  are tired and feel ready to sleep. If they return to bed and still cannot sleep within 20 minutes, the process should be repeated. An alarm should be set to wake the patient at the same time every morning, including weekends. Daytime naps are not allowed.    Doxepin capsules  What is this medicine?  DOXEPIN (DOX e pin) is used to treat depression and anxiety.  This medicine may be used for other purposes; ask your health care provider or pharmacist if you have questions.  COMMON BRAND NAME(S): Radha  What should I tell my health care provider before I take this medicine?  They need to know if you have any of these conditions:  -bipolar disorder  -difficulty passing urine  -glaucoma  -heart disease  -if you frequently drink alcohol containing drinks  -liver disease  -lung or breathing disease, like asthma or sleep apnea  -prostate trouble  -schizophrenia  -seizures  -suicidal thoughts, plans, or attempt; a previous suicide attempt by you or a family member  -an unusual or allergic reaction to doxepin, other medicines, foods, dyes, or preservatives  -pregnant or trying to get pregnant  -breast-feeding  How should I use this medicine?  Take this medicine by mouth with a glass of water. Follow the directions on the prescription label. Take your doses at regular intervals. Do not take your medicine more often than directed. Do not stop taking this medicine suddenly except upon the advice of your doctor. Stopping this medicine too quickly may cause serious side effects or your condition may worsen.  A special MedGuide will be given to you by the pharmacist with each prescription and refill. Be sure to read this information carefully each time.  Talk to your pediatrician regarding the use of this medicine in children. While this drug may be prescribed for children as young as 12 years for selected conditions, precautions do apply.  Overdosage: If you think you have taken too much of this medicine contact a poison  control center or emergency room at once.  NOTE: This medicine is only for you. Do not share this medicine with others.  What if I miss a dose?  If you miss a dose, take it as soon as you can. If it is almost time for your next dose, take only that dose. Do not take double or extra doses.  What may interact with this medicine?  Do not take this medicine with any of the following medications:  -arsenic trioxide  -certain medicines used to regulate abnormal heartbeat or to treat other heart conditions  -cisapride  -halofantrine  -levomethadyl  -linezolid  -MAOIs like Carbex, Eldepryl, Marplan, Nardil, and Parnate  -methylene blue  -other medicines for mental depression  -phenothiazines like perphenazine, thioridazine and chlorpromazine  -pimozide  -procarbazine  -sparfloxacin  -Minor's Wort  -ziprasidone  This medicine may also interact with the following medications:  -cimetidine  -tolazamide  This list may not describe all possible interactions. Give your health care provider a list of all the medicines, herbs, non-prescription drugs, or dietary supplements you use. Also tell them if you smoke, drink alcohol, or use illegal drugs. Some items may interact with your medicine.  What should I watch for while using this medicine?  Visit your doctor or health care professional for regular checks on your progress. It can take several days before you feel the full effect of this medicine. If you have been taking this medicine regularly for some time, do not suddenly stop taking it. You must gradually reduce the dose or you may get severe side effects. Ask your doctor or health care professional for advice. Even after you stop taking this medicine it can still affect your body for several days.  Patients and their families should watch out for new or worsening thoughts of suicide or depression. Also watch out for sudden changes in feelings such as feeling anxious, agitated, panicky, irritable, hostile, aggressive,  impulsive, severely restless, overly excited and hyperactive, or not being able to sleep. If this happens, especially at the beginning of treatment or after a change in dose, call your health care professional.  You may get drowsy or dizzy. Do not drive, use machinery, or do anything that needs mental alertness until you know how this medicine affects you. Do not stand or sit up quickly, especially if you are an older patient. This reduces the risk of dizzy or fainting spells. Alcohol may increase dizziness and drowsiness. Avoid alcoholic drinks.  Do not treat yourself for coughs, colds, or allergies without asking your doctor or health care professional for advice. Some ingredients can increase possible side effects.  Your mouth may get dry. Chewing sugarless gum or sucking hard candy, and drinking plenty of water may help. Contact your doctor if the problem does not go away or is severe.  This medicine may cause dry eyes and blurred vision. If you wear contact lenses you may feel some discomfort. Lubricating drops may help. See your eye doctor if the problem does not go away or is severe.  This medicine can make you more sensitive to the sun. Keep out of the sun. If you cannot avoid being in the sun, wear protective clothing and use sunscreen. Do not use sun lamps or tanning beds/booths.  What side effects may I notice from receiving this medicine?  Side effects that you should report to your doctor or health care professional as soon as possible:  -allergic reactions like skin rash, itching or hives, swelling of the face, lips, or tongue  -anxious  -breathing problems  -changes in vision  -confusion  -elevated mood, decreased need for sleep, racing thoughts, impulsive behavior  -eye pain  -fast, irregular heartbeat  -feeling faint or lightheaded, falls  -feeling agitated, angry, or irritable  -fever with increased sweating  -hallucination, loss of contact with reality  -seizures  -stiff muscles  -suicidal  thoughts or other mood changes  -tingling, pain, or numbness in the feet or hands  -trouble passing urine or change in the amount of urine  -trouble sleeping  -unusually weak or tired  -vomiting  -yellowing of the eyes or skin  Side effects that usually do not require medical attention (report to your doctor or health care professional if they continue or are bothersome):  -change in sex drive or performance  -change in appetite or weight  -constipation  -dizziness  -dry mouth  -nausea  -tired  -tremors  -upset stomach  This list may not describe all possible side effects. Call your doctor for medical advice about side effects. You may report side effects to FDA at 4-561-GQP-0130.  Where should I keep my medicine?  Keep out of the reach of children.  Store at room temperature between 15 and 30 degrees C (59 and 86 degrees F). Throw away any unused medicine after the expiration date.  NOTE: This sheet is a summary. It may not cover all possible information. If you have questions about this medicine, talk to your doctor, pharmacist, or health care provider.  © 2018 [employer]vier/Gold Standard (2017-05-19 12:35:05)

## 2020-02-10 NOTE — PROGRESS NOTES
[facility] Follow Up Note     Date: 2/10/2020 / Time: 10:44 AM    Patient ID:   Name:             Sheyla Gauthier   YOB: 1940  Age:                 79 y.o.  female   MRN:               [id number]      Thank you for requesting a sleep medicine consultation on Sheyla Gauthier at the sleep center. She presents today with the chief complaints of JOHN follow up.     HISTORY OF PRESENT ILLNESS:       Pt is currently on CPAP 9 cm. She goes to sleep around 11 pm and wakes up around 6 am. She is getting about 7 hrs of sleep on a good night and about 5.5-6.5 hr of sleep on a bad night. The bad nights are on most nights per week. She occasionally takes a nap and occasionally in the evenings as well. She has mostly trouble falling as sleep. She gets in bed at 9 pm and reads for 2 hours. On most nights she is not sleepy when she initially gets in bed. She also has anxiety going to bed knowing that she will have to use the CPAP. Overall,she does finds her sleep refreshing if she gets enough hours of sleep. She has tried trazodone in past and as per pt it cause her hand and feet swell up, Denied rash or trouble breathing.        She is using CPAP most days of the week. Pt reports 4 hrs of average nightly use of CPAP. Pt denies snoring, gasping,choking. The 30 day compliance was downloaded which shows inadequate compliance with more that 4 hr usage about 52%. The AHI is has improved to 1.6/hr. The mask leak is normal. The JOHN has improved.     SLEEP HISTORY   Her prior home showed an AHI of 26.2/jose guadalupe saturation 74%. Her follow-up study 9/12/2018 with her dental device in place showed an AHI of 9.3 with a jose guadalupe saturation of 83%. He had another PSG done 6/12/2019 showed an AHI of 21.7/jose guadalupe saturation 84%. CPAP titration was performed 6/30/2019.  She did best on CPAP at 8 cm H2O with a resultant AHI of 0.0, jose guadalupe saturation 89%, mean saturation 93%, and the achievement of supine REM using a  DreamWear nasal pillow mask.      REVIEW OF SYSTEMS:       Constitutional: Denies fevers, Denies weight changes  Eyes: Denies changes in vision, no eye pain  Ears/Nose/Throat/Mouth: Denies nasal congestion or sore throat   Cardiovascular: Denies chest pain or palpitations   Respiratory: Denies shortness of breath , Denies cough  Gastrointestinal/Hepatic: Denies abdominal pain, nausea,  Skin/Breast: Denies rash,   Neurological: Denies headache, confusion, memory loss or focal weakness/parasthesias  Psychiatric: denies mood disorder   Sleep: + insomnia     Comprehensive review of systems form is reviewed with the patient and is attached in the EMR.     PMH:  has a past medical history of Anxiety, Arthritis, Back pain, Cataract, Daytime sleepiness, Depression, Dyslipidemia, Fatigue, Frequent headaches, Gasping for breath, Herpes zoster (10/27/06), HTN (hypertension), Hypertension, MHA (microangiopathic hemolytic anemia), Migraine syndrome (2/11/2013), Morning headache, Mumps, Nasal drainage, Osteopathia, Osteoporosis, Painful joint, Post-menopause on HRT (hormone replacement therapy), Shortness of breath, Sleep apnea, Sore muscles, Swelling of lower extremity, Thyroid disease, Tonsillitis, and White matter abnormality on MRI of brain (2/11/2013).  MEDS:   Current Outpatient Medications:   •  amLODIPine (NORVASC) 2.5 MG Tab, Take 1 Tab by mouth every day., Disp: 180 Tab, Rfl: 2  •  vitamin D (CHOLECALCIFEROL) 1000 UNIT Tab, Take 1,000 Units by mouth every day., Disp: , Rfl:   •  fluticasone (FLONASE) 50 MCG/ACT nasal spray, Spray intranasally q24h prn, Disp: 16 g, Rfl: 0  •  simvastatin (ZOCOR) 20 MG Tab, Take one tab po qhs (stop pravastatin), Disp: 90 Tab, Rfl: 0  •  ibuprofen (MOTRIN) 200 MG Tab, Take 200 mg by mouth every 6 hours as needed., Disp: , Rfl:   •  aspirin (ASA) 81 MG Chew Tab chewable tablet, Take 1 Tab by mouth every day., Disp: 100 Tab, Rfl: 3  •  Ascorbic Acid (VITAMIN C) 1000 MG Tab, Take  by mouth.,  "Disp: , Rfl:   •  Probiotic Product (PROBIOTIC DAILY PO), Take  by mouth., Disp: , Rfl:   •  thyroid (ARMOUR THYROID) 30 MG Tab, Take 30 mg by mouth every day., Disp: , Rfl:   •  coenzyme Q-10 30 MG capsule, Take 60 mg by mouth every day., Disp: , Rfl:   •  Estriol 10 % Cream, c-estriol 0.5mg gm cream, Disp: , Rfl:   •  Magnesium 400 MG Tab, Take  by mouth., Disp: , Rfl:   ALLERGIES:   Allergies   Allergen Reactions   • Trazodone Swelling     Pt stated that she had swelling on her hands and feet's      SURGHX:   Past Surgical History:   Procedure Laterality Date   • BUNIONECTOMY Left 2003   • FOOT SURGERY  2002   • PB REDUCTION OF LARGE BREAST  1997   • CHOLECYSTECTOMY  1996   • ABDOMINAL HYSTERECTOMY TOTAL  11/1986   • ARTHROSCOPY, KNEE     • EYE SURGERY      cataracts   • HYSTERECTOMY LAPAROSCOPY     • MAMMOPLASTY AUGMENTATION     • PB ENLARGE BREAST WITH IMPLANT       SOCHX:  reports that she quit smoking about 40 years ago. Her smoking use included cigarettes. She smoked 0.02 packs per day. She has never used smokeless tobacco. She reports previous alcohol use. She reports that she does not use drugs..  FH:   Family History   Problem Relation Age of Onset   • Stroke Father    • Arthritis Father    • Hyperlipidemia Father    • Hypertension Father    • Cancer Sister         breast   • Arthritis Sister    • Arthritis Mother    • Hypertension Mother    • Hyperlipidemia Mother    • Stroke Mother    • Arthritis Brother    • Cancer Brother    • Heart Disease Brother    • Hypertension Brother    • Hyperlipidemia Brother    • Cancer Daughter         breast         Physical Exam:  Vitals/ General Appearance:   Weight/BMI: Body mass index is 26.7 kg/m².  /80 (BP Location: Right arm, Patient Position: Sitting, BP Cuff Size: Adult)   Pulse 70   Resp 14   Ht 1.575 m (5' 2\")   Wt 66.2 kg (146 lb)   SpO2 97%   Vitals:    02/10/20 1035   BP: 122/80   BP Location: Right arm   Patient Position: Sitting   BP Cuff Size: " "Adult   Pulse: 70   Resp: 14   SpO2: 97%   Weight: 66.2 kg (146 lb)   Height: 1.575 m (5' 2\")       Pt. is alert and oriented to time, place and person. Cooperative and in no apparent distress.       -Head: Atraumatic, normocephalic.   -. Nose: No inferior turbinate hypertophy  -. Throat: Oropharynx appears crowded in that the palate is overhanging pharynx not inflamed.   -. Neck: Supple.   -. Chest: Trachea central,  -. Lungs auscultation: B/L good air entry, vesicular breath sounds, no wheezing   -. Heart auscultation: 1st and 2nd heart sounds normal, regular rhythm. No appreciable murmur.  - Extremities: no pedal edema.  - Skin: No rash  - NEUROLOGICAL EXAMINATION: On neurological exam, the patient was alert and oriented x3. speech was clear and fluent without dysarthria.      ASSESSMENT AND PLAN     1. Sleep Apnea .   She is urged to avoid supine sleep, weight gain and alcoholic beverages since all of these can worsen sleep apnea. She is cautioned against drowsy driving. If She feels sleepy while driving, She must pull over for a break/nap, rather than persist on the road, in the interest of She own safety and that of others on the road.   Plan   - Continue CPAP 9 cm with nasal pillow mask    - compliance download was reviewed and discussed with the pt   - compliance was reinforced     2. Insomnia: it is multifactorial due to poor sleep hygiene and eversion towards the CPAP. The importance of cognitive restructuring and behavior modification therapy is emphasized for long-term improvement rather than the use of hypnotic agents, which may offer short term relief but may lead to the development of tolerance and side effects with prolonged use. Evening exercise, wind-down before bedtime, and stimulus control (leaving the bed when unable to fall back asleep at night) are explained.        Plan   -Handouts on stimulus control and sleep hygiene are given and a couple of titles of books on insomnia are recommended, " written by behavioral psychologists.    - Recommended against evening naps and going to bed for reading    - Starting on doxepin 10 mg QHS PRN because of anxiety and insomnia. Side effects were discussed  in detail    - Referring to Dr. Vyas for mask/CPAP desensitization

## 2020-02-13 ENCOUNTER — OFFICE VISIT (OUTPATIENT)
Dept: CARDIOLOGY | Facility: MEDICAL CENTER | Age: 80
End: 2020-02-13
Payer: MEDICARE

## 2020-02-13 VITALS
WEIGHT: 148 LBS | HEIGHT: 62 IN | HEART RATE: 70 BPM | OXYGEN SATURATION: 95 % | SYSTOLIC BLOOD PRESSURE: 130 MMHG | DIASTOLIC BLOOD PRESSURE: 70 MMHG | BODY MASS INDEX: 27.23 KG/M2

## 2020-02-13 DIAGNOSIS — D59.4: ICD-10-CM

## 2020-02-13 DIAGNOSIS — I25.10 CORONARY ARTERY DISEASE INVOLVING NATIVE CORONARY ARTERY OF NATIVE HEART WITHOUT ANGINA PECTORIS: ICD-10-CM

## 2020-02-13 DIAGNOSIS — Z95.5 STENTED CORONARY ARTERY: ICD-10-CM

## 2020-02-13 DIAGNOSIS — Z82.49 FAMILY HISTORY OF CORONARY ARTERY DISEASE IN BROTHER: ICD-10-CM

## 2020-02-13 DIAGNOSIS — J00 ACUTE NASOPHARYNGITIS: ICD-10-CM

## 2020-02-13 DIAGNOSIS — I10 ESSENTIAL HYPERTENSION: ICD-10-CM

## 2020-02-13 DIAGNOSIS — R73.01 IFG (IMPAIRED FASTING GLUCOSE): ICD-10-CM

## 2020-02-13 DIAGNOSIS — G47.33 OSA (OBSTRUCTIVE SLEEP APNEA): ICD-10-CM

## 2020-02-13 DIAGNOSIS — R06.09 DOE (DYSPNEA ON EXERTION): ICD-10-CM

## 2020-02-13 DIAGNOSIS — Z78.9 STATIN INTOLERANCE: ICD-10-CM

## 2020-02-13 DIAGNOSIS — Z87.891 FORMER TOBACCO USE: ICD-10-CM

## 2020-02-13 DIAGNOSIS — E78.2 MIXED HYPERLIPIDEMIA: ICD-10-CM

## 2020-02-13 DIAGNOSIS — I35.2 NONRHEUMATIC AORTIC INSUFFICIENCY WITH AORTIC STENOSIS: ICD-10-CM

## 2020-02-13 PROCEDURE — 99214 OFFICE O/P EST MOD 30 MIN: CPT | Performed by: INTERNAL MEDICINE

## 2020-02-13 NOTE — LETTER
"     [facility]   [address]   [address]  [place] [address]  Phone: [phone]  Fax: [phone]              Sheyla Gauthier  1940    Encounter Date: 2/13/2020    Maame Rivers M.D.          PROGRESS NOTE:  Subjective:   Chief Complaint:   Chief Complaint   Patient presents with   • HTN (Controlled)       \"Sheyla\" Christin Gauthier is a 79 y.o. female who returns for CAD, prior stent to LAD. She was having shortness of breath at the time.    Previously followed by Dr. Jean then Dr. Juan Carlos Vergara.  Had an abnormal calcium score which led to left heart angiogram and had a drug-eluting stent placed to the LAD on October 5, 2018 at [place]. 2.5 x 22 mm [place] drug-eluting stent to the mid LAD after IFR abnormal, no other disease. Normal EF by echo 2018 in [place].  She did have a nuclear stress test was stated fixed inferolateral defect but no ischemia and normal EF of 74%.  Has trouble with right radial access, recommended groin approach thereafter.  Was taken off Brilinta, for plavix, finished this.    Has some dyspnea on exertion, follows with pulmonology.  Was on Brilinta but this was changed.  Had normal PFTs.  Has obstructive sleep apnea, this may have helped.    Has hyperlipidemia, LDL 87, only on simvastatin 20 mg.  Has hypertension, blood pressure controlled with amlodipine.  Not on beta-blocker due to fatigue.  Remains on aspirin.  Has mild to moderate aortic insufficiency.  Taken off of losartan in the past due to fatigue.    Has been using tylenol for headaches.  Uses Ib sparingly.    Has been having fatigue, was on another BP med, probably BB.  Changed cholesterol meds from pravastatin, now simvastatin.  Recalls being on crestor, had muscle aches and atropy.    I did review the office note from Dr. Juan Carlos Vergara dated 12/28/2018.    No family history of premature coronary artery disease.  Brother has spine problems and " vascular dementia.  Brother has CAD, had prox LAD, CABG.  She is worried about vascular dementia, seems to run in her family.  Mother had TIAs,  around 86.  Father  at 88, he had CVA.  Former tobacco, quit around .  No history of diabetes, prior IFG, under control.  No history of autoimmune disease such as lupus or rheumatoid arthritis.  No chronic kidney disease.  Has headaches and spine problems.    She is not limited by chest pain, pressure or tightness with activity.  Has had some chest discomfort after spicy food.  Has a hernia, has lost 17 pounds.  No significant dyspnea on exertion, orthopnea.   Chronic mild lower extremity swelling.   No lightheadedness, or presyncope/syncope.   Very minor palpitations, wore monitor at [place]  No symptoms of leg claudication.   No stroke/TIA like symptoms.  Limited by some back pain.    Her  was close friends with Dr. Jean.    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  She is from [facility],  her second , who was a friend of her brother.  Lives in [place]. Has friends here.  Brother in [facility].    DATA REVIEWED by me:  ECG 10/6/2018  Sinus, rate 71, delayed R wave progression    Echo [place] 2018  EF 65 to 70%, mild aortic valve sclerosis with mild to moderate AI    Left heart catheterization [place] 10/5/2018 Dr. Jaime [place]  Aortic pressure 110/70, LV pressure 110/14, EF 70%, RCA large and dominant, diffuse intimal irregularities, plaque to the mid and distal RCA but no significant stenosis, PDA and PL branches widely free of disease, left main widely patent, proximal 80 widely patent, mid LAD 80% stenosis, distal and apical LAD free of disease, frequent circumflex small, no significant stenosis, IFR of 0.79%, 2.5 x 22 mm [place] drug-eluting stent to the mid LAD.  Has trouble with right radial access, recommended groin approach thereafter.    Nuclear stress test [place] 2018  Mild fixed  inferolateral defect, no ischemia, EF 74%    PFTs 7/24/2019  Interpretation:  1.  Baseline spirometry shows normal air flows.  2.  There is no significant bronchodilator response.  3.  Lung volumes are within normal limits.  4.  Diffusion capacity is within normal limits.  Normal pulmonary function test.    Most recent labs:         Lab Results   Component Value Date/Time    HEMOGLOBIN 13.5 07/30/2019 10:42 AM    HEMATOCRIT 43.1 07/30/2019 10:42 AM    MCV 90.5 07/30/2019 10:42 AM      Lab Results   Component Value Date/Time    SODIUM 143 07/30/2019 10:42 AM    POTASSIUM 4.1 07/30/2019 10:42 AM    CHLORIDE 108 07/30/2019 10:42 AM    CO2 27 07/30/2019 10:42 AM    GLUCOSE 81 07/30/2019 10:42 AM    BUN 23 (H) 07/30/2019 10:42 AM    CREATININE 0.79 07/30/2019 10:42 AM      Lab Results   Component Value Date/Time    ASTSGOT 19 07/30/2019 10:42 AM    ALTSGPT 21 07/30/2019 10:42 AM    ALBUMIN 4.2 07/30/2019 10:42 AM    ALBUMIN 4.28 11/15/2013 11:42 AM      Lab Results   Component Value Date/Time    CHOLSTRLTOT 162 07/30/2019 10:42 AM    LDL 87 07/30/2019 10:42 AM    HDL 52 07/30/2019 10:42 AM    TRIGLYCERIDE 116 07/30/2019 10:42 AM         Past Medical History:   Diagnosis Date   • Anxiety    • Arthritis    • Back pain    • Cataract    • Daytime sleepiness    • Depression    • Dyslipidemia    • Fatigue    • Frequent headaches    • Gasping for breath    • Herpes zoster 10/27/06   • HTN (hypertension)    • Hypertension    • MHA (microangiopathic hemolytic anemia)    • Migraine syndrome 2/11/2013   • Morning headache    • Mumps    • Nasal drainage    • Osteopathia    • Osteoporosis    • Painful joint    • Post-menopause on HRT (hormone replacement therapy)    • Shortness of breath    • Sleep apnea    • Sore muscles    • Swelling of lower extremity    • Thyroid disease    • Tonsillitis    • White matter abnormality on MRI of brain 2/11/2013     Past Surgical History:   Procedure Laterality Date   • BUNIONECTOMY Left 2003   •  FOOT SURGERY     • PB REDUCTION OF LARGE BREAST     • CHOLECYSTECTOMY     • ABDOMINAL HYSTERECTOMY TOTAL  1986   • ARTHROSCOPY, KNEE     • EYE SURGERY      cataracts   • HYSTERECTOMY LAPAROSCOPY     • MAMMOPLASTY AUGMENTATION     • PB ENLARGE BREAST WITH IMPLANT       Family History   Problem Relation Age of Onset   • Stroke Father    • Arthritis Father    • Hyperlipidemia Father    • Hypertension Father    • Cancer Sister         breast   • Arthritis Sister    • Arthritis Mother    • Hypertension Mother    • Hyperlipidemia Mother    • Stroke Mother    • Arthritis Brother    • Cancer Brother    • Hypertension Brother    • Hyperlipidemia Brother    • Heart Disease Brother         CABG for prox LAD in late 40s   • Cancer Daughter         breast     Social History     Socioeconomic History   • Marital status:      Spouse name: Not on file   • Number of children: Not on file   • Years of education: Not on file   • Highest education level: Not on file   Occupational History   • Not on file   Social Needs   • Financial resource strain: Not on file   • Food insecurity     Worry: Not on file     Inability: Not on file   • Transportation needs     Medical: Not on file     Non-medical: Not on file   Tobacco Use   • Smoking status: Former Smoker     Packs/day: 0.02     Types: Cigarettes     Last attempt to quit: 1979     Years since quittin.5   • Smokeless tobacco: Never Used   • Tobacco comment: smoked off & on for 10 yrs   Substance and Sexual Activity   • Alcohol use: Not Currently     Alcohol/week: 0.0 oz     Comment: occassional   • Drug use: No   • Sexual activity: Not Currently     Comment: , 3 kids, retired   Lifestyle   • Physical activity     Days per week: Not on file     Minutes per session: Not on file   • Stress: Not on file   Relationships   • Social connections     Talks on phone: Not on file     Gets together: Not on file     Attends [employer]orationist service: Not on file      "Active member of club or organization: Not on file     Attends meetings of clubs or organizations: Not on file     Relationship status: Not on file   • Intimate partner violence     Fear of current or ex partner: Not on file     Emotionally abused: Not on file     Physically abused: Not on file     Forced sexual activity: Not on file   Other Topics Concern   • Not on file   Social History Narrative   • Not on file     Allergies   Allergen Reactions   • Trazodone Swelling     Pt stated that she had swelling on her hands and feet's        Current Outpatient Medications   Medication Sig Dispense Refill   • Misc Natural Products (GLUCOS-CHONDROIT-MSM COMPLEX PO) Take  by mouth.     • amLODIPine (NORVASC) 2.5 MG Tab Take 1 Tab by mouth every day. 180 Tab 2   • vitamin D (CHOLECALCIFEROL) 1000 UNIT Tab Take 1,000 Units by mouth every day.     • fluticasone (FLONASE) 50 MCG/ACT nasal spray Spray intranasally q24h prn 16 g 0   • simvastatin (ZOCOR) 20 MG Tab Take one tab po qhs (stop pravastatin) 90 Tab 0   • ibuprofen (MOTRIN) 200 MG Tab Take 200 mg by mouth every 6 hours as needed.     • aspirin (ASA) 81 MG Chew Tab chewable tablet Take 1 Tab by mouth every day. 100 Tab 3   • Ascorbic Acid (VITAMIN C) 1000 MG Tab Take  by mouth.     • Probiotic Product (PROBIOTIC DAILY PO) Take  by mouth.     • thyroid (ARMOUR THYROID) 30 MG Tab Take 30 mg by mouth every day.     • coenzyme Q-10 30 MG capsule Take 60 mg by mouth every day.     • Estriol 10 % Cream c-estriol 0.5mg gm cream     • Magnesium 400 MG Tab Take  by mouth.       No current facility-administered medications for this visit.        ROS  All others systems reviewed and negative.     Objective:     /70 (BP Location: Right arm, Patient Position: Sitting)   Pulse 70   Ht 1.575 m (5' 2\")   Wt 67.1 kg (148 lb)   SpO2 95%  Body mass index is 27.07 kg/m².    Physical Exam   General: No acute distress. Well nourished.  HEENT: EOM grossly intact, no scleral icterus, " no pharyngeal erythema.   Neck:  No JVD, no bruits, trachea midline  CVS: RRR. Normal S1, S2. No M/R/G. No LE edema.  2+ radial pulses, 2+ PT pulses  Resp: CTAB. No wheezing or crackles/rhonchi. Normal respiratory effort.  Abdomen: Soft, NT, no nanda hepatomegaly.  MSK/Ext: No clubbing or cyanosis.  Skin: Warm and dry, no rashes.  Neurological: CN III-XII grossly intact. No focal deficits.   Psych: A&O x 3, appropriate affect, good judgement      Assessment:     1. Coronary artery disease involving native coronary artery of native heart without angina pectoris     2. Stented coronary artery     3. JOHN (obstructive sleep apnea)     4. Essential hypertension     5. Mixed hyperlipidemia     6. 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HCC)     7. MOSQUERA (dyspnea on exertion)     8. Nonrheumatic aortic insufficiency with aortic stenosis     9. Family history of coronary artery disease in brother     10. Former tobacco use     11. IFG (impaired fasting glucose)     12. Statin intolerance         Medical Decision Making:  Today's Assessment / Status / Plan:     -Con secondary prevention ASA and statin  -Consider updated statin but reports prior problems with pravstatin and crestor.  -Cont BP control  -Not on BB, recalls being on this but stopped due to fatigue  -reports fatigue with amlodipine 2.5 mg BID  -Not sure why not on losartan, if AI ever progresses would need this.  -I will see her back in 6 months then yearly, monitor for side effects from meds.    Written instructions given today:    We now know that lack of exercise is as risky as smoking or having diabetes in terms of your heart health.  Try to perform a moderate intensity exercise which includes risk walking at least 150 minutes a week or 30 minutes for 5 days in the week.  If you are able to perform more vigorous exercise, 70 minutes/week is sufficient.      Return in about 6 months (around 8/13/2020).    It is my pleasure to participate in the care of Ms.  Disha.  Please do not hesitate to contact me with questions or concerns.    Maame Rivers MD, [facility]  Cardiologist [facility]    Please note that this dictation was created using voice recognition software. I have made every reasonable attempt to correct obvious errors, but it is possible there are errors of grammar and possibly content that I did not discover before finalizing the note.      Darius Morataya M.D.  [address] [place] [address] [place] [address]  VIA In Basket

## 2020-02-13 NOTE — PATIENT INSTRUCTIONS
We now know that lack of exercise is as risky as smoking or having diabetes in terms of your heart health.  Try to perform a moderate intensity exercise which includes risk walking at least 150 minutes a week or 30 minutes for 5 days in the week.  If you are able to perform more vigorous exercise, 70 minutes/week is sufficient.

## 2020-02-13 NOTE — PROGRESS NOTES
"Subjective:   Chief Complaint:   Chief Complaint   Patient presents with   • HTN (Controlled)       \"Sheyla\" Christin Gauthier is a 79 y.o. female who returns for CAD, prior stent to LAD. She was having shortness of breath at the time.    Previously followed by Dr. Jean then Dr. Juan Carlos Vergara.  Had an abnormal calcium score which led to left heart angiogram and had a drug-eluting stent placed to the LAD on 2018 at [place]. 2.5 x 22 mm [place] drug-eluting stent to the mid LAD after IFR abnormal, no other disease. Normal EF by echo  in [place].  She did have a nuclear stress test was stated fixed inferolateral defect but no ischemia and normal EF of 74%.  Has trouble with right radial access, recommended groin approach thereafter.  Was taken off Brilinta, for plavix, finished this.    Has some dyspnea on exertion, follows with pulmonology.  Was on Brilinta but this was changed.  Had normal PFTs.  Has obstructive sleep apnea, this may have helped.    Has hyperlipidemia, LDL 87, only on simvastatin 20 mg.  Has hypertension, blood pressure controlled with amlodipine.  Not on beta-blocker due to fatigue.  Remains on aspirin.  Has mild to moderate aortic insufficiency.  Taken off of losartan in the past due to fatigue.    Has been using tylenol for headaches.  Uses Ib sparingly.    Has been having fatigue, was on another BP med, probably BB.  Changed cholesterol meds from pravastatin, now simvastatin.  Recalls being on crestor, had muscle aches and atropy.    I did review the office note from Dr. Juan Carlos Vergara dated 2018.    No family history of premature coronary artery disease.  Brother has spine problems and vascular dementia.  Brother has CAD, had prox LAD, CABG.  She is worried about vascular dementia, seems to run in her family.  Mother had TIAs,  around 86.  Father  at 88, he had CVA.  Former tobacco, quit around .  No history of diabetes, prior IFG, under control.  No history of " autoimmune disease such as lupus or rheumatoid arthritis.  No chronic kidney disease.  Has headaches and spine problems.    She is not limited by chest pain, pressure or tightness with activity.  Has had some chest discomfort after spicy food.  Has a hernia, has lost 17 pounds.  No significant dyspnea on exertion, orthopnea.   Chronic mild lower extremity swelling.   No lightheadedness, or presyncope/syncope.   Very minor palpitations, wore monitor at [facility]  No symptoms of leg claudication.   No stroke/TIA like symptoms.  Limited by some back pain.    Her  was close friends with Dr. Jean.    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  She is from [facility],  her second , who was a friend of her brother.  Lives in [place]. Has friends here.  Brother in [facility].    DATA REVIEWED by me:  ECG 10/6/2018  Sinus, rate 71, delayed R wave progression    Echo [place] 2018  EF 65 to 70%, mild aortic valve sclerosis with mild to moderate AI    Left heart catheterization [place] 10/5/2018 Dr. Jaime [place]  Aortic pressure 110/70, LV pressure 110/14, EF 70%, RCA large and dominant, diffuse intimal irregularities, plaque to the mid and distal RCA but no significant stenosis, PDA and PL branches widely free of disease, left main widely patent, proximal 80 widely patent, mid LAD 80% stenosis, distal and apical LAD free of disease, frequent circumflex small, no significant stenosis, IFR of 0.79%, 2.5 x 22 mm [place] drug-eluting stent to the mid LAD.  Has trouble with right radial access, recommended groin approach thereafter.    Nuclear stress test [place] 2018  Mild fixed inferolateral defect, no ischemia, EF 74%    PFTs 2019  Interpretation:  1.  Baseline spirometry shows normal air flows.  2.  There is no significant bronchodilator response.  3.  Lung volumes are within normal limits.  4.  Diffusion capacity is within normal limits.  Normal pulmonary  function test.    Most recent labs:         Lab Results   Component Value Date/Time    HEMOGLOBIN 13.5 07/30/2019 10:42 AM    HEMATOCRIT 43.1 07/30/2019 10:42 AM    MCV 90.5 07/30/2019 10:42 AM      Lab Results   Component Value Date/Time    SODIUM 143 07/30/2019 10:42 AM    POTASSIUM 4.1 07/30/2019 10:42 AM    CHLORIDE 108 07/30/2019 10:42 AM    CO2 27 07/30/2019 10:42 AM    GLUCOSE 81 07/30/2019 10:42 AM    BUN 23 (H) 07/30/2019 10:42 AM    CREATININE 0.79 07/30/2019 10:42 AM      Lab Results   Component Value Date/Time    ASTSGOT 19 07/30/2019 10:42 AM    ALTSGPT 21 07/30/2019 10:42 AM    ALBUMIN 4.2 07/30/2019 10:42 AM    ALBUMIN 4.28 11/15/2013 11:42 AM      Lab Results   Component Value Date/Time    CHOLSTRLTOT 162 07/30/2019 10:42 AM    LDL 87 07/30/2019 10:42 AM    HDL 52 07/30/2019 10:42 AM    TRIGLYCERIDE 116 07/30/2019 10:42 AM         Past Medical History:   Diagnosis Date   • Anxiety    • Arthritis    • Back pain    • Cataract    • Daytime sleepiness    • Depression    • Dyslipidemia    • Fatigue    • Frequent headaches    • Gasping for breath    • Herpes zoster 10/27/06   • HTN (hypertension)    • Hypertension    • MHA (microangiopathic hemolytic anemia)    • Migraine syndrome 2/11/2013   • Morning headache    • Mumps    • Nasal drainage    • Osteopathia    • Osteoporosis    • Painful joint    • Post-menopause on HRT (hormone replacement therapy)    • Shortness of breath    • Sleep apnea    • Sore muscles    • Swelling of lower extremity    • Thyroid disease    • Tonsillitis    • White matter abnormality on MRI of brain 2/11/2013     Past Surgical History:   Procedure Laterality Date   • BUNIONECTOMY Left 2003   • FOOT SURGERY  2002   • PB REDUCTION OF LARGE BREAST  1997   • CHOLECYSTECTOMY  1996   • ABDOMINAL HYSTERECTOMY TOTAL  11/1986   • ARTHROSCOPY, KNEE     • EYE SURGERY      cataracts   • HYSTERECTOMY LAPAROSCOPY     • MAMMOPLASTY AUGMENTATION     • PB ENLARGE BREAST WITH IMPLANT       Family  History   Problem Relation Age of Onset   • Stroke Father    • Arthritis Father    • Hyperlipidemia Father    • Hypertension Father    • Cancer Sister         breast   • Arthritis Sister    • Arthritis Mother    • Hypertension Mother    • Hyperlipidemia Mother    • Stroke Mother    • Arthritis Brother    • Cancer Brother    • Hypertension Brother    • Hyperlipidemia Brother    • Heart Disease Brother         CABG for prox LAD in late 40s   • Cancer Daughter         breast     Social History     Socioeconomic History   • Marital status:      Spouse name: Not on file   • Number of children: Not on file   • Years of education: Not on file   • Highest education level: Not on file   Occupational History   • Not on file   Social Needs   • Financial resource strain: Not on file   • Food insecurity     Worry: Not on file     Inability: Not on file   • Transportation needs     Medical: Not on file     Non-medical: Not on file   Tobacco Use   • Smoking status: Former Smoker     Packs/day: 0.02     Types: Cigarettes     Last attempt to quit: 1979     Years since quittin.5   • Smokeless tobacco: Never Used   • Tobacco comment: smoked off & on for 10 yrs   Substance and Sexual Activity   • Alcohol use: Not Currently     Alcohol/week: 0.0 oz     Comment: occassional   • Drug use: No   • Sexual activity: Not Currently     Comment: , 3 kids, retired   Lifestyle   • Physical activity     Days per week: Not on file     Minutes per session: Not on file   • Stress: Not on file   Relationships   • Social connections     Talks on phone: Not on file     Gets together: Not on file     Attends Zoroastrian service: Not on file     Active member of club or organization: Not on file     Attends meetings of clubs or organizations: Not on file     Relationship status: Not on file   • Intimate partner violence     Fear of current or ex partner: Not on file     Emotionally abused: Not on file     Physically abused: Not on  "file     Forced sexual activity: Not on file   Other Topics Concern   • Not on file   Social History Narrative   • Not on file     Allergies   Allergen Reactions   • Trazodone Swelling     Pt stated that she had swelling on her hands and feet's        Current Outpatient Medications   Medication Sig Dispense Refill   • Misc Natural Products (GLUCOS-CHONDROIT-MSM COMPLEX PO) Take  by mouth.     • amLODIPine (NORVASC) 2.5 MG Tab Take 1 Tab by mouth every day. 180 Tab 2   • vitamin D (CHOLECALCIFEROL) 1000 UNIT Tab Take 1,000 Units by mouth every day.     • fluticasone (FLONASE) 50 MCG/ACT nasal spray Spray intranasally q24h prn 16 g 0   • simvastatin (ZOCOR) 20 MG Tab Take one tab po qhs (stop pravastatin) 90 Tab 0   • ibuprofen (MOTRIN) 200 MG Tab Take 200 mg by mouth every 6 hours as needed.     • aspirin (ASA) 81 MG Chew Tab chewable tablet Take 1 Tab by mouth every day. 100 Tab 3   • Ascorbic Acid (VITAMIN C) 1000 MG Tab Take  by mouth.     • Probiotic Product (PROBIOTIC DAILY PO) Take  by mouth.     • thyroid (ARMOUR THYROID) 30 MG Tab Take 30 mg by mouth every day.     • coenzyme Q-10 30 MG capsule Take 60 mg by mouth every day.     • Estriol 10 % Cream c-estriol 0.5mg gm cream     • Magnesium 400 MG Tab Take  by mouth.       No current facility-administered medications for this visit.        ROS  All others systems reviewed and negative.     Objective:     /70 (BP Location: Right arm, Patient Position: Sitting)   Pulse 70   Ht 1.575 m (5' 2\")   Wt 67.1 kg (148 lb)   SpO2 95%  Body mass index is 27.07 kg/m².    Physical Exam   General: No acute distress. Well nourished.  HEENT: EOM grossly intact, no scleral icterus, no pharyngeal erythema.   Neck:  No JVD, no bruits, trachea midline  CVS: RRR. Normal S1, S2. No M/R/G. No LE edema.  2+ radial pulses, 2+ PT pulses  Resp: CTAB. No wheezing or crackles/rhonchi. Normal respiratory effort.  Abdomen: Soft, NT, no nanda hepatomegaly.  MSK/Ext: No clubbing or " cyanosis.  Skin: Warm and dry, no rashes.  Neurological: CN III-XII grossly intact. No focal deficits.   Psych: A&O x 3, appropriate affect, good judgement      Assessment:     1. Coronary artery disease involving native coronary artery of native heart without angina pectoris     2. Stented coronary artery     3. JOHN (obstructive sleep apnea)     4. Essential hypertension     5. Mixed hyperlipidemia     6. 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HCC)     7. MOSQUERA (dyspnea on exertion)     8. Nonrheumatic aortic insufficiency with aortic stenosis     9. Family history of coronary artery disease in brother     10. Former tobacco use     11. IFG (impaired fasting glucose)     12. Statin intolerance         Medical Decision Making:  Today's Assessment / Status / Plan:     -Con secondary prevention ASA and statin  -Consider updated statin but reports prior problems with pravstatin and crestor.  -Cont BP control  -Not on BB, recalls being on this but stopped due to fatigue  -reports fatigue with amlodipine 2.5 mg BID  -Not sure why not on losartan, if AI ever progresses would need this.  -I will see her back in 6 months then yearly, monitor for side effects from meds.    Written instructions given today:    We now know that lack of exercise is as risky as smoking or having diabetes in terms of your heart health.  Try to perform a moderate intensity exercise which includes risk walking at least 150 minutes a week or 30 minutes for 5 days in the week.  If you are able to perform more vigorous exercise, 70 minutes/week is sufficient.      Return in about 6 months (around 8/13/2020).    It is my pleasure to participate in the care of Ms. Guathier.  Please do not hesitate to contact me with questions or concerns.    Maame Rivers MD, [facility]  Cardiologist [facility]    Please note that this dictation was created using voice recognition software. I have made every reasonable attempt to correct  obvious errors, but it is possible there are errors of grammar and possibly content that I did not discover before finalizing the note.

## 2020-02-14 RX ORDER — FLUTICASONE PROPIONATE 50 MCG
SPRAY, SUSPENSION (ML) NASAL
Qty: 16 ML | Refills: 0 | Status: SHIPPED | OUTPATIENT
Start: 2020-02-14 | End: 2020-03-16

## 2020-02-20 ENCOUNTER — PATIENT MESSAGE (OUTPATIENT)
Dept: SLEEP MEDICINE | Facility: MEDICAL CENTER | Age: 80
End: 2020-02-20

## 2020-02-20 DIAGNOSIS — G47.01 INSOMNIA DUE TO MEDICAL CONDITION: ICD-10-CM

## 2020-02-20 DIAGNOSIS — F41.9 ANXIETY: ICD-10-CM

## 2020-02-20 RX ORDER — DOXEPIN HYDROCHLORIDE 10 MG/1
10 CAPSULE ORAL NIGHTLY PRN
Qty: 30 CAP | Refills: 2 | Status: SHIPPED | OUTPATIENT
Start: 2020-02-20 | End: 2020-03-21

## 2020-03-04 ENCOUNTER — HOSPITAL ENCOUNTER (OUTPATIENT)
Dept: LAB | Facility: MEDICAL CENTER | Age: 80
End: 2020-03-04
Attending: INTERNAL MEDICINE
Payer: MEDICARE

## 2020-03-04 LAB
EST. AVERAGE GLUCOSE BLD GHB EST-MCNC: 120 MG/DL
HBA1C MFR BLD: 5.8 % (ref 0–5.6)
T4 FREE SERPL-MCNC: 0.77 NG/DL (ref 0.53–1.43)
TSH SERPL DL<=0.005 MIU/L-ACNC: 0.99 UIU/ML (ref 0.38–5.33)

## 2020-03-04 PROCEDURE — 84443 ASSAY THYROID STIM HORMONE: CPT

## 2020-03-04 PROCEDURE — 84439 ASSAY OF FREE THYROXINE: CPT

## 2020-03-04 PROCEDURE — 83036 HEMOGLOBIN GLYCOSYLATED A1C: CPT | Mod: GA

## 2020-03-04 PROCEDURE — 36415 COLL VENOUS BLD VENIPUNCTURE: CPT

## 2020-03-05 DIAGNOSIS — I10 ESSENTIAL HYPERTENSION: ICD-10-CM

## 2020-03-05 RX ORDER — AMLODIPINE BESYLATE 2.5 MG/1
2.5 TABLET ORAL 2 TIMES DAILY
Qty: 180 TAB | Refills: 2 | Status: SHIPPED | OUTPATIENT
Start: 2020-03-05 | End: 2020-10-01

## 2020-03-14 DIAGNOSIS — J00 ACUTE NASOPHARYNGITIS: ICD-10-CM

## 2020-03-18 RX ORDER — FLUTICASONE PROPIONATE 50 MCG
SPRAY, SUSPENSION (ML) NASAL
Qty: 16 ML | Refills: 0 | Status: SHIPPED | OUTPATIENT
Start: 2020-03-18 | End: 2020-03-25

## 2020-03-18 RX ORDER — SIMVASTATIN 20 MG
TABLET ORAL
Qty: 90 TAB | Refills: 0 | Status: SHIPPED | OUTPATIENT
Start: 2020-03-18 | End: 2020-06-16

## 2020-03-24 DIAGNOSIS — J00 ACUTE NASOPHARYNGITIS: ICD-10-CM

## 2020-03-25 RX ORDER — FLUTICASONE PROPIONATE 50 MCG
SPRAY, SUSPENSION (ML) NASAL
Qty: 48 ML | Refills: 1 | Status: SHIPPED
Start: 2020-03-25 | End: 2020-10-13 | Stop reason: SDUPTHER

## 2020-04-13 ENCOUNTER — TELEPHONE (OUTPATIENT)
Dept: CARDIOLOGY | Facility: MEDICAL CENTER | Age: 80
End: 2020-04-13

## 2020-04-13 DIAGNOSIS — I10 ESSENTIAL HYPERTENSION: ICD-10-CM

## 2020-04-13 NOTE — TELEPHONE ENCOUNTER
"LS    Patient would like to talk about other medications they can take. They are currently taking the \"AML\" and would like to see about having the BP medication changed.    Pt takes the Losartan 50 MG in the morning started on 4/10/2020.    BP at 8 am before taking Losartion 50 MG medication 158/86. Last night at 8 pm they took the Amlodipine 2.5 MG right around the same time. BP [id number]/86. [facility]    Please call the Pt back at [phone].    Thank you,  Celi RAMIREZ   "

## 2020-04-17 RX ORDER — LOSARTAN POTASSIUM 50 MG/1
50 TABLET ORAL DAILY
Qty: 30 TAB | Refills: 6 | Status: SHIPPED | OUTPATIENT
Start: 2020-04-17 | End: 2020-10-01 | Stop reason: SDUPTHER

## 2020-04-17 NOTE — TELEPHONE ENCOUNTER
Contacted patient.  She states she was on Losartan 50 mg but went off it due to fatigue.  Patient BP has been increasing to 150s.  Patient started taking Losartan 50 mg again in AM and Amlodipine 2.5 mg in PM.  Patient wanted to inform LS.     Per LS OV note, patient should be on Losartan.     Losartan 50 mg ordered.     Advised patient to monitor BP 1x day.  Contact clinic if SBP sustains >140

## 2020-05-19 ENCOUNTER — SLEEP CENTER VISIT (OUTPATIENT)
Dept: SLEEP MEDICINE | Facility: MEDICAL CENTER | Age: 80
End: 2020-05-19
Payer: MEDICARE

## 2020-05-19 VITALS
DIASTOLIC BLOOD PRESSURE: 70 MMHG | SYSTOLIC BLOOD PRESSURE: 122 MMHG | HEART RATE: 66 BPM | HEIGHT: 62 IN | WEIGHT: 163 LBS | BODY MASS INDEX: 30 KG/M2 | OXYGEN SATURATION: 96 %

## 2020-05-19 DIAGNOSIS — G47.09 OTHER INSOMNIA: ICD-10-CM

## 2020-05-19 DIAGNOSIS — I10 ESSENTIAL HYPERTENSION: ICD-10-CM

## 2020-05-19 DIAGNOSIS — Z87.891 FORMER SMOKER: ICD-10-CM

## 2020-05-19 DIAGNOSIS — G47.33 OSA (OBSTRUCTIVE SLEEP APNEA): ICD-10-CM

## 2020-05-19 PROBLEM — E66.3 OVERWEIGHT (BMI 25.0-29.9): Status: RESOLVED | Noted: 2019-07-02 | Resolved: 2020-05-19

## 2020-05-19 PROCEDURE — 99214 OFFICE O/P EST MOD 30 MIN: CPT | Performed by: NURSE PRACTITIONER

## 2020-05-19 ASSESSMENT — FIBROSIS 4 INDEX: FIB4 SCORE: 1.184611404138244355

## 2020-05-19 NOTE — PROGRESS NOTES
Chief Complaint   Patient presents with   • Follow-Up     Last seen on 02/10/2020       HPI:  Sheyla Gauthier is a 79 y.o. year old female here today for follow-up on JOHN.  Last OV 2/10/2020 with Dr. Javed.    Prior home sleep study indicated moderate sleep apnea with an AHI of 26.2/h and O2 jose guadalupe 74%.  She appears to use dental appliance and follow-up study 9/12/2018 with device in place noted an AHI of 9.3/h with an O2 jose guadalupe of 83%.  She had a repeat PSG 6/12/2019 noting AHI of 12.7/h with an O2 jose guadalupe of 84%.  CPAP from 6/30/2019 with best result CPAP 8 cm with reduced AHI of 0.0/h and O2 jose guadalupe of 89% with a mean SPO2 93%.  She also achieve supine REM sleep with a nasal pillow mask.  She is currently using CPAP 9 cm nightly.  Compliance report 4/19/2020 through 5/18/2020 indicates 100% compliance, average nightly use of 7 hours, minimal to moderate mask leak with a new stage of 0.9/h.  I reviewed finds with patient.  She tolerates mask and pressure well.  She does have a history of migraines and headaches which continue to go on but are improved.  She notes a good response to Tylenol to relieve them.  She notes her sleep improved and sleeping through the night.    She also has a history of insomnia.  Stimulus control sleep hygiene previously given to patient discussed.  Avoidance of napping reviewed.  She is also referred to Dr. munoz for CPAP acclimation.  She did not pursue consult.  She notes making herself to put her on every night and she has gotten used to it.  She is now comfortable with the mask and pressure.  She is not using any sleep aids and fall asleep quickly.  She feels overall her sleep is improved on therapy.    History of anxiety, hypertension, dyslipidemia, hypothyroidism, vitamin D deficiency and BMI 29.  Former smoker.    She denies cardiac or respiratory symptoms today.  She does have a Fitbit and notes some fluctuations in her oxygen level at night and would like to undergo overnight  oximetry to verify they are adequate.    ROS: As per HPI and otherwise negative if not stated.    Past Medical History:   Diagnosis Date   • Anxiety    • Arthritis    • Back pain    • Cataract    • Daytime sleepiness    • Depression    • Dyslipidemia    • Fatigue    • Frequent headaches    • Gasping for breath    • Herpes zoster 10/27/06   • HTN (hypertension)    • Hypertension    • MHA (microangiopathic hemolytic anemia)    • Migraine syndrome 2/11/2013   • Morning headache    • Mumps    • Nasal drainage    • Osteopathia    • Osteoporosis    • Painful joint    • Post-menopause on HRT (hormone replacement therapy)    • Shortness of breath    • Sleep apnea    • Sore muscles    • Swelling of lower extremity    • Thyroid disease    • Tonsillitis    • White matter abnormality on MRI of brain 2/11/2013       Past Surgical History:   Procedure Laterality Date   • BUNIONECTOMY Left 2003   • FOOT SURGERY  2002   • PB REDUCTION OF LARGE BREAST  1997   • CHOLECYSTECTOMY  1996   • ABDOMINAL HYSTERECTOMY TOTAL  11/1986   • ARTHROSCOPY, KNEE     • EYE SURGERY      cataracts   • HYSTERECTOMY LAPAROSCOPY     • MAMMOPLASTY AUGMENTATION     • PB ENLARGE BREAST WITH IMPLANT         Family History   Problem Relation Age of Onset   • Stroke Father    • Arthritis Father    • Hyperlipidemia Father    • Hypertension Father    • Cancer Sister         breast   • Arthritis Sister    • Arthritis Mother    • Hypertension Mother    • Hyperlipidemia Mother    • Stroke Mother    • Arthritis Brother    • Cancer Brother    • Hypertension Brother    • Hyperlipidemia Brother    • Heart Disease Brother         CABG for prox LAD in late 40s   • Cancer Daughter         breast       Social History     Socioeconomic History   • Marital status:      Spouse name: Not on file   • Number of children: Not on file   • Years of education: Not on file   • Highest education level: Not on file   Occupational History   • Not on file   Social Needs   •  "Financial resource strain: Not on file   • Food insecurity     Worry: Not on file     Inability: Not on file   • Transportation needs     Medical: Not on file     Non-medical: Not on file   Tobacco Use   • Smoking status: Former Smoker     Packs/day: 0.02     Types: Cigarettes     Last attempt to quit: 1979     Years since quittin.8   • Smokeless tobacco: Never Used   • Tobacco comment: smoked off & on for 10 yrs   Substance and Sexual Activity   • Alcohol use: Not Currently     Alcohol/week: 0.0 oz     Comment: occassional   • Drug use: No   • Sexual activity: Not Currently     Comment: , 3 kids, retired   Lifestyle   • Physical activity     Days per week: Not on file     Minutes per session: Not on file   • Stress: Not on file   Relationships   • Social connections     Talks on phone: Not on file     Gets together: Not on file     Attends Jehovah's witness service: Not on file     Active member of club or organization: Not on file     Attends meetings of clubs or organizations: Not on file     Relationship status: Not on file   • Intimate partner violence     Fear of current or ex partner: Not on file     Emotionally abused: Not on file     Physically abused: Not on file     Forced sexual activity: Not on file   Other Topics Concern   • Not on file   Social History Narrative   • Not on file       Allergies as of 2020 - Reviewed 2020   Allergen Reaction Noted   • Trazodone Swelling 02/10/2020        Vitals:  /70 (BP Location: Left arm, Patient Position: Sitting, BP Cuff Size: Adult)   Pulse 66   Ht 1.575 m (5' 2\")   Wt 73.9 kg (163 lb)   SpO2 96%     Current medications as of today   Current Outpatient Medications   Medication Sig Dispense Refill   • losartan (COZAAR) 50 MG Tab Take 1 Tab by mouth every day. 30 Tab 6   • fluticasone (FLONASE) 50 MCG/ACT nasal spray USE 1 SPAY INTRANASSALLY EVERY 24 HOURS AS NEEDED 48 mL 1   • simvastatin (ZOCOR) 20 MG Tab Take one tab po qhs (stop " pravastatin) 90 Tab 0   • amLODIPine (NORVASC) 2.5 MG Tab Take 1 Tab by mouth 2 Times a Day. 180 Tab 2   • Misc Natural Products (GLUCOS-CHONDROIT-MSM COMPLEX PO) Take  by mouth.     • vitamin D (CHOLECALCIFEROL) 1000 UNIT Tab Take 1,000 Units by mouth every day.     • aspirin (ASA) 81 MG Chew Tab chewable tablet Take 1 Tab by mouth every day. 100 Tab 3   • Ascorbic Acid (VITAMIN C) 1000 MG Tab Take  by mouth.     • Probiotic Product (PROBIOTIC DAILY PO) Take  by mouth.     • thyroid (ARMOUR THYROID) 30 MG Tab Take 30 mg by mouth every day.     • coenzyme Q-10 30 MG capsule Take 60 mg by mouth every day.     • Magnesium 400 MG Tab Take  by mouth.     • ibuprofen (MOTRIN) 200 MG Tab Take 200 mg by mouth every 6 hours as needed.     • Estriol 10 % Cream c-estriol 0.5mg gm cream       No current facility-administered medications for this visit.          Physical Exam:   Gen:           Alert and oriented, No apparent distress. Mood and affect appropriate, normal interaction with examiner.  Eyes:          PERRL, EOM intact, sclere white, conjunctive moist.  Ears:          Not examined.   Hearing:     Grossly intact.  Nose:          Normal, no lesions or deformities.  Dentition:    Good dentition.  Oropharynx:   Tongue normal, posterior pharynx without erythema or exudate.  Mallampati Classification: not examined; mask  Neck:        Supple, trachea midline, no masses.  Respiratory Effort: No intercostal retractions or use of accessory muscles.   Lung Auscultation:      Clear to auscultation bilaterally; no rales, rhonchi or wheezing.  CV:            Regular rate and rhythm. No murmurs, rubs or gallops.  Abd:           Not examined.   Lymphadenopathy: Not examined.  Gait and Station: Normal.  Digits and Nails: No clubbing, cyanosis, petechiae, or nodes.   Cranial Nerves: II-XII grossly intact.  Skin:        No rashes, lesions or ulcers noted.               Ext:           No cyanosis or edema.      Assessment:  1. JOHN  (obstructive sleep apnea)     2. Other insomnia     3. Essential hypertension     4. BMI 29.0-29.9,adult     5. Former smoker         Immunizations:    Flu:9/2019  Pneumovax 23:6/2017  Prevnar 13:12/2015    Plan:  1.  States clinically stable and patient is well acclimated to therapy.  She will continue CPAP 9 cm nightly.  DME mask/supplies.  CNOX on Pap now; may send results via MyChart or call.  2.  Discussed sleep hygiene.  3.  Follow-up primary care for the health concerns.  4.  Follow-up in 1 year with compliance report, sooner if needed.    Please note that this dictation was created using voice recognition software. I have made every reasonable attempt to correct obvious errors, but it is possible there are errors of grammar and possibly content that I did not discover before finalizing the note.

## 2020-05-26 ENCOUNTER — HOME STUDY (OUTPATIENT)
Dept: SLEEP MEDICINE | Facility: MEDICAL CENTER | Age: 80
End: 2020-05-26
Attending: NURSE PRACTITIONER
Payer: MEDICARE

## 2020-05-26 DIAGNOSIS — G47.33 OSA (OBSTRUCTIVE SLEEP APNEA): ICD-10-CM

## 2020-05-26 PROCEDURE — 94762 N-INVAS EAR/PLS OXIMTRY CONT: CPT | Performed by: FAMILY MEDICINE

## 2020-05-28 ENCOUNTER — TELEPHONE (OUTPATIENT)
Dept: PULMONOLOGY | Facility: HOSPICE | Age: 80
End: 2020-05-28

## 2020-05-28 DIAGNOSIS — G47.09 OTHER INSOMNIA: ICD-10-CM

## 2020-05-28 DIAGNOSIS — G47.33 OSA (OBSTRUCTIVE SLEEP APNEA): ICD-10-CM

## 2020-05-28 NOTE — TELEPHONE ENCOUNTER
Pt notified of message below. Pressure adjusted wirelessly informed pt if the change did not stick to bring her machine in for us to adjust manually. Pt relayed understanding.

## 2020-05-28 NOTE — PROCEDURES
Over Night Pulse Oximetry     Indication:To assess the efficacy of the current pressure .       Impression:   The study was done on CPAP 9 cm. The total analyzed time was 7 hrs 0 min. O2 Sat. jose guadalupe was 84% and mean O2 sat was 88 % and baseline O2 at 89%. O2 sat was below 88% for 21.7 min of the flow evaluation time. Oxygen Desaturation (>=3%) Index was elevated at 9.6/hr.      Recommendation:  Consider increasing EPAP pressure due to cyclic desaturation and low O2 jose guadalupe. Clinical correlation recommended.

## 2020-05-28 NOTE — TELEPHONE ENCOUNTER
----- Message from RINA Garcia sent at 5/28/2020 12:22 PM PDT -----  Patient is having some low oxygen levels at night. Recommend slight pressure increased. Order signed. Please inform patient.

## 2020-06-01 ENCOUNTER — PATIENT MESSAGE (OUTPATIENT)
Dept: SLEEP MEDICINE | Facility: MEDICAL CENTER | Age: 80
End: 2020-06-01

## 2020-06-01 NOTE — TELEPHONE ENCOUNTER
From: Sheyla Gauthier  To: RINA Garcia  Sent: 6/1/2020 10:18 AM PDT  Subject: Test Result Question    Good Morning Kezia,  I read the results of the overnight oximetry test and I am very concerned about the low oxygen results. Do you think by increasing my machine to 10 it will resolved this matter. Should I consult my pulmonologist and/or cardiologist? And if not, when will we know if increasing to 10 made a difference.   Thank you,  Sheyla Gauthier

## 2020-06-02 ENCOUNTER — PATIENT MESSAGE (OUTPATIENT)
Dept: SLEEP MEDICINE | Facility: MEDICAL CENTER | Age: 80
End: 2020-06-02

## 2020-06-02 DIAGNOSIS — G47.33 OSA (OBSTRUCTIVE SLEEP APNEA): ICD-10-CM

## 2020-06-02 NOTE — TELEPHONE ENCOUNTER
From: Sheyla Gauthier  To: DANE GarciaRBUCKY  Sent: 6/2/2020 9:54 AM PDT  Subject: Test Result Question    Richard Mast,  I appreciate you getting back to me with this information. When will it be an appropriate time to recheck my oxygern levels?  Thank you,  Sheyla Gauthier      ----- Message -----   From:DANE GarciaRBUCKY   Sent:6/2/2020 7:47 AM PDT   To:Sheyla Gauthier   Subject:RE: Test Result Question    I am your pulmonology/sleep provider.   I increased it to 10 to resolve the low oxygen levels.  We can repeat oximetry on the new setting to verify it has resolved.      ----- Message -----   From:Sheyla Gauthier   Sent:6/1/2020 4:53 PM PDT   To:DANE GarciaRBUCKY   Subject:RE: Test Result Question    Love Mast I didn't make myself clear the machine is set to 10 and I have been using it since you office reset it. Please answer my original questions.  Thank you,  Sheyla Gauthier      ----- Message -----   From:DANE GarciaRBUCKY   Sent:6/1/2020 3:55 PM PDT   To:Sheyla Gauthier   Subject:RE: Test Result Question    I placed order to have your device pressure increased. Please check with medical equipment company for adjustment or bring device to our office.      ----- Message -----   From:Sheyla Gauthier   Sent:6/1/2020 10:18 AM PDT   To:DANE GarciaRBUCKY   Subject:Test Result Question    Good Morning Kezia,  I read the results of the overnight oximetry test and I am very concerned about the low oxygen results. Do you think by increasing my machine to 10 it will resolved this matter. Should I consult my pulmonologist and/or cardiologist? And if not, when will we know if increasing to 10 made a difference.   Thank you,  Sheyla Gauthier

## 2020-06-09 ENCOUNTER — HOME STUDY (OUTPATIENT)
Dept: SLEEP MEDICINE | Facility: MEDICAL CENTER | Age: 80
End: 2020-06-09
Attending: NURSE PRACTITIONER
Payer: MEDICARE

## 2020-06-09 DIAGNOSIS — G47.33 OSA (OBSTRUCTIVE SLEEP APNEA): ICD-10-CM

## 2020-06-09 DIAGNOSIS — G51.39 FACIAL SPASM: ICD-10-CM

## 2020-06-09 PROCEDURE — 94762 N-INVAS EAR/PLS OXIMTRY CONT: CPT | Performed by: FAMILY MEDICINE

## 2020-06-11 NOTE — PROCEDURES
Over Night Pulse Oximetry     Indication:To assess the efficacy of the current pressure.       Impression:   The study was done on CPAP 10 cm. The total analyzed time was 7 hrs 0 min. O2 Sat. jose guadalupe was 88% and mean O2 sat was 89 % and baseline O2 at 90%. O2 sat was below 88% for 0 min of the flow evaluation time. Oxygen Desaturation (>=3%) Index was 4.6/hr.      Recommendation:  Unremarkable OPO, continue CPAP at the current pressure. Clinical correlation recommended.

## 2020-06-16 RX ORDER — SIMVASTATIN 20 MG
TABLET ORAL
Qty: 90 TAB | Refills: 0 | Status: SHIPPED | OUTPATIENT
Start: 2020-06-16 | End: 2020-09-15 | Stop reason: SDUPTHER

## 2020-07-27 ENCOUNTER — OUTPATIENT INFUSION SERVICES (OUTPATIENT)
Dept: ONCOLOGY | Facility: MEDICAL CENTER | Age: 80
End: 2020-07-27
Attending: FAMILY MEDICINE
Payer: MEDICARE

## 2020-07-27 VITALS
RESPIRATION RATE: 18 BRPM | BODY MASS INDEX: 30.8 KG/M2 | SYSTOLIC BLOOD PRESSURE: 127 MMHG | TEMPERATURE: 97 F | HEART RATE: 74 BPM | OXYGEN SATURATION: 94 % | WEIGHT: 163.14 LBS | DIASTOLIC BLOOD PRESSURE: 71 MMHG | HEIGHT: 61 IN

## 2020-07-27 DIAGNOSIS — M81.0 AGE-RELATED OSTEOPOROSIS WITHOUT CURRENT PATHOLOGICAL FRACTURE: ICD-10-CM

## 2020-07-27 LAB
CA-I BLD ISE-SCNC: 1.3 MMOL/L (ref 1.1–1.3)
CREAT BLD-MCNC: 0.9 MG/DL (ref 0.5–1.4)

## 2020-07-27 PROCEDURE — 82565 ASSAY OF CREATININE: CPT

## 2020-07-27 PROCEDURE — 82330 ASSAY OF CALCIUM: CPT

## 2020-07-27 PROCEDURE — 700111 HCHG RX REV CODE 636 W/ 250 OVERRIDE (IP): Mod: JG | Performed by: FAMILY MEDICINE

## 2020-07-27 PROCEDURE — 36415 COLL VENOUS BLD VENIPUNCTURE: CPT

## 2020-07-27 PROCEDURE — 96372 THER/PROPH/DIAG INJ SC/IM: CPT

## 2020-07-27 RX ADMIN — DENOSUMAB 60 MG: 60 INJECTION SUBCUTANEOUS at 11:18

## 2020-07-27 ASSESSMENT — FIBROSIS 4 INDEX: FIB4 SCORE: 1.184611404138244355

## 2020-07-27 NOTE — PROGRESS NOTES
Pharmacy note  Cr = 0.9, CrCl ~ 60 ml/min  Ionized Ca = 1.3  OK for denosumab (Prolia) 60 mg SQ today

## 2020-07-27 NOTE — PROGRESS NOTES
[facility] for a Prolia injection for osteoporosis.  Pt denied having any new complaints, acute infections, or dental procedures in the last month or scheduled for the next month. RN reviewed medication handout on Prolia with Pt, including potential side effects and S/S of when to follow-up with MD or ED, Pt acknowledged understanding. 25G butterfly needle used to draw blood from LAC, bleeding controlled with gauze and coban after. Ionized calcium/creatinine tested, WNL, pharmacist notified that Pt within parameters to treat.  Sheyla aware to continue taking vitamin D and calcium supplements. Prolia injection given in LLQ of abdomen SQ. Pt tolerated well, band-aid applied to injection site. Patient to scheduled future appointments, card with schedulers' number provided. Sheyla discharged home to self care.

## 2020-08-03 ENCOUNTER — PATIENT MESSAGE (OUTPATIENT)
Dept: MEDICAL GROUP | Age: 80
End: 2020-08-03

## 2020-08-20 ENCOUNTER — TELEMEDICINE (OUTPATIENT)
Dept: MEDICAL GROUP | Age: 80
End: 2020-08-20
Payer: MEDICARE

## 2020-08-20 VITALS — WEIGHT: 160 LBS | RESPIRATION RATE: 12 BRPM | HEIGHT: 60 IN | BODY MASS INDEX: 31.41 KG/M2

## 2020-08-20 DIAGNOSIS — H90.11 CONDUCTIVE HEARING LOSS OF RIGHT EAR WITH UNRESTRICTED HEARING OF LEFT EAR: ICD-10-CM

## 2020-08-20 DIAGNOSIS — R41.3 MEMORY LOSS: ICD-10-CM

## 2020-08-20 DIAGNOSIS — M48.061 SPINAL STENOSIS OF LUMBAR REGION, UNSPECIFIED WHETHER NEUROGENIC CLAUDICATION PRESENT: ICD-10-CM

## 2020-08-20 PROCEDURE — 99214 OFFICE O/P EST MOD 30 MIN: CPT | Mod: 95,CR | Performed by: FAMILY MEDICINE

## 2020-08-20 RX ORDER — DENOSUMAB 60 MG/ML
60 INJECTION SUBCUTANEOUS ONCE
COMMUNITY
End: 2022-01-25 | Stop reason: SDUPTHER

## 2020-08-20 ASSESSMENT — FIBROSIS 4 INDEX: FIB4 SCORE: 1.184611404138244355

## 2020-08-20 NOTE — PROGRESS NOTES
Virtual Visit: Established Patient   This visit was conducted via [employer] using secure and encrypted videoconferencing technology. The patient was in a private location in the state of [place].    The patient's identity was confirmed and verbal consent was obtained for this virtual visit.    The patient requested [employer], as she had not other options    Subjective:   CC:   Chief Complaint   Patient presents with   • Headache     X 8 months    • Requesting Labs   • Other     Prolia orders needed        Sheyla Gauthier is a 79 y.o. female presenting for evaluation and management of:    1. Memory loss    NEW PROBLEM  The patient states she has a family history of dementia specifically her older brother who is 84 currently and has been diagnosed with vascular dementia per patient.  She states that she can sometimes go into a room and forget what she came in therefore, she denies any visual hallucinations, no auditory loose Nations, no tactile sensations.  She denies any changes in mood no increased irritability no depressed mood no suicidal homicidal ideations.  She states that she just wants to be proactive and discuss with neurology all the things she can do.      2. Conductive hearing loss of right ear with unrestricted hearing of left ear  NEW PROBLEM    The patient states that she is noticed some swishing in her right ear sometimes ringing in that right ear.  She states she uses her CPAP machine which stopped and she is wondering if she has ear damage and hearing loss.  She would like to be tested for hearing loss.  She denies any ear pain, no balance problems, no dizziness, no recent falls.  She denies any head trauma no changes in vision no nausea or vomiting.    ROS   Denies any recent fevers or chills. No nausea or vomiting. No chest pains or shortness of breath.     Allergies   Allergen Reactions   • Trazodone Swelling     Pt stated that she had swelling on her hands and feet's        Current medicines  (including changes today)  Current Outpatient Medications   Medication Sig Dispense Refill   • denosumab (PROLIA) 60 MG/ML Solution Prefilled Syringe Inject 60 mg as instructed Once.     • simvastatin (ZOCOR) 20 MG Tab TAKE 1 TABLET BY MOUTH AT BEDTIME 90 Tab 0   • losartan (COZAAR) 50 MG Tab Take 1 Tab by mouth every day. 30 Tab 6   • fluticasone (FLONASE) 50 MCG/ACT nasal spray USE 1 SPAY INTRANASSALLY EVERY 24 HOURS AS NEEDED 48 mL 1   • amLODIPine (NORVASC) 2.5 MG Tab Take 1 Tab by mouth 2 Times a Day. (Patient taking differently: Take 2.5 mg by mouth every day.) 180 Tab 2   • Misc Natural Products (GLUCOS-CHONDROIT-MSM COMPLEX PO) Take  by mouth.     • vitamin D (CHOLECALCIFEROL) 1000 UNIT Tab Take 1,000 Units by mouth every day.     • ibuprofen (MOTRIN) 200 MG Tab Take 200 mg by mouth every 6 hours as needed.     • aspirin (ASA) 81 MG Chew Tab chewable tablet Take 1 Tab by mouth every day. 100 Tab 3   • Ascorbic Acid (VITAMIN C) 1000 MG Tab Take  by mouth.     • Probiotic Product (PROBIOTIC DAILY PO) Take  by mouth.     • thyroid (ARMOUR THYROID) 30 MG Tab Take 30 mg by mouth every day.     • coenzyme Q-10 30 MG capsule Take 60 mg by mouth every day.     • Estriol 10 % Cream c-estriol 0.5mg gm cream     • Magnesium 400 MG Tab Take  by mouth.       No current facility-administered medications for this visit.        Patient Active Problem List    Diagnosis Date Noted   • Memory loss 08/20/2020   • Conductive hearing loss of right ear 08/20/2020   • Left wrist pain 12/19/2019   • Myalgia 10/01/2019   • Former smoker 07/02/2019   • Other insomnia 06/14/2019   • Coronary artery disease involving native coronary artery of native heart without angina pectoris 03/26/2019   • Bladder prolapse, female, acquired 03/07/2018   • Pure hypercholesterolemia 03/07/2018   • JOHN (obstructive sleep apnea) 02/22/2018   • Age related osteoporosis 07/26/2017   • Major depressive disorder 06/29/2017   • Congenital cervical spine  stenosis 12/08/2016   • Spinal stenosis of lumbar region 11/14/2016   • Migraine syndrome 02/11/2013   • Acquired hypothyroidism 03/08/2011   • HTN (hypertension) 10/21/2009   • Postmenopausal hormone therapy 10/21/2009   • Mixed hyperlipidemia    • 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HCC)        Family History   Problem Relation Age of Onset   • Stroke Father    • Arthritis Father    • Hyperlipidemia Father    • Hypertension Father    • Cancer Sister         breast   • Arthritis Sister    • Arthritis Mother    • Hypertension Mother    • Hyperlipidemia Mother    • Stroke Mother    • Arthritis Brother    • Cancer Brother    • Hypertension Brother    • Hyperlipidemia Brother    • Heart Disease Brother         CABG for prox LAD in late 40s   • Cancer Daughter         breast       She  has a past medical history of Anxiety, Arthritis, Back pain, Cataract, Daytime sleepiness, Depression, Dyslipidemia, Fatigue, Frequent headaches, Gasping for breath, Herpes zoster (10/27/06), HTN (hypertension), Hypertension, MHA (microangiopathic hemolytic anemia), Migraine syndrome (2/11/2013), Morning headache, Mumps, Nasal drainage, Osteopathia, Osteoporosis, Painful joint, Post-menopause on HRT (hormone replacement therapy), Shortness of breath, Sleep apnea, Sore muscles, Swelling of lower extremity, Thyroid disease, Tonsillitis, and White matter abnormality on MRI of brain (2/11/2013).  She  has a past surgical history that includes cholecystectomy (1996); abdominal hysterectomy total (11/1986); pr reduction of large breast (1997); foot surgery (2002); pr enlarge breast with implant; mammoplasty augmentation; bunionectomy (Left, 2003); eye surgery; hysterectomy laparoscopy; and arthroscopy, knee.       Objective:   Resp 12   Ht 1.524 m (5') Comment: Pt.stated  Wt 72.6 kg (160 lb)   LMP 11/01/1986   BMI 31.25 kg/m²     Physical Exam:  Constitutional: Alert, no distress, well-groomed.  Skin: No rashes in visible areas.  Eye:  Round. Conjunctiva clear, lids normal. No icterus.   ENMT: Lips pink without lesions, good dentition, moist mucous membranes. Phonation normal.  Neck: No masses, no thyromegaly. Moves freely without pain.  Respiratory: Unlabored respiratory effort, no cough or audible wheeze  Psych: Alert and oriented x3, normal affect and mood.       Assessment and Plan:   The following treatment plan was discussed:     1. Memory loss    I asked her to come in so we can perform a [place] cognitive assessment exam  I will also refer to neurology as she requested.  We also discussed things she could be doing to be proactive such as exercising, maintaining blood pressure control, cholesterol control, maintaining a healthy weight, avoiding smoking and playing brain games.    - REFERRAL TO NEUROLOGY    2. Conductive hearing loss of right ear with unrestricted hearing of left ear  - REFERRAL TO ENT    Other orders  - denosumab (PROLIA) 60 MG/ML Solution Prefilled Syringe; Inject 60 mg as instructed Once.        Follow-up: Return in about 3 months (around 11/20/2020) for Reevaluation with [facility].

## 2020-09-03 ENCOUNTER — HOSPITAL ENCOUNTER (OUTPATIENT)
Dept: LAB | Facility: MEDICAL CENTER | Age: 80
End: 2020-09-03
Attending: INTERNAL MEDICINE
Payer: MEDICARE

## 2020-09-03 LAB
T4 FREE SERPL-MCNC: 1.03 NG/DL (ref 0.93–1.7)
TSH SERPL DL<=0.005 MIU/L-ACNC: 1.56 UIU/ML (ref 0.38–5.33)

## 2020-09-03 PROCEDURE — 84439 ASSAY OF FREE THYROXINE: CPT

## 2020-09-03 PROCEDURE — 84443 ASSAY THYROID STIM HORMONE: CPT

## 2020-09-03 PROCEDURE — 36415 COLL VENOUS BLD VENIPUNCTURE: CPT

## 2020-09-15 ENCOUNTER — PATIENT MESSAGE (OUTPATIENT)
Dept: MEDICAL GROUP | Age: 80
End: 2020-09-15

## 2020-09-15 RX ORDER — SIMVASTATIN 20 MG
TABLET ORAL
Qty: 90 TAB | Refills: 0 | Status: SHIPPED | OUTPATIENT
Start: 2020-09-15 | End: 2020-11-02

## 2020-10-01 ENCOUNTER — OFFICE VISIT (OUTPATIENT)
Dept: CARDIOLOGY | Facility: MEDICAL CENTER | Age: 80
End: 2020-10-01
Payer: MEDICARE

## 2020-10-01 VITALS
HEIGHT: 62 IN | SYSTOLIC BLOOD PRESSURE: 130 MMHG | DIASTOLIC BLOOD PRESSURE: 60 MMHG | WEIGHT: 166 LBS | HEART RATE: 64 BPM | OXYGEN SATURATION: 95 % | BODY MASS INDEX: 30.55 KG/M2

## 2020-10-01 DIAGNOSIS — E55.9 VITAMIN D DEFICIENCY: ICD-10-CM

## 2020-10-01 DIAGNOSIS — Z95.5 STENTED CORONARY ARTERY: ICD-10-CM

## 2020-10-01 DIAGNOSIS — D59.4: ICD-10-CM

## 2020-10-01 DIAGNOSIS — I10 ESSENTIAL HYPERTENSION: ICD-10-CM

## 2020-10-01 DIAGNOSIS — R73.01 IFG (IMPAIRED FASTING GLUCOSE): ICD-10-CM

## 2020-10-01 DIAGNOSIS — G47.33 OSA (OBSTRUCTIVE SLEEP APNEA): ICD-10-CM

## 2020-10-01 DIAGNOSIS — Z82.49 FAMILY HISTORY OF CORONARY ARTERY DISEASE IN BROTHER: ICD-10-CM

## 2020-10-01 DIAGNOSIS — E78.2 MIXED HYPERLIPIDEMIA: ICD-10-CM

## 2020-10-01 DIAGNOSIS — Z78.9 STATIN INTOLERANCE: ICD-10-CM

## 2020-10-01 DIAGNOSIS — Z87.891 FORMER TOBACCO USE: ICD-10-CM

## 2020-10-01 DIAGNOSIS — I25.10 CORONARY ARTERY DISEASE INVOLVING NATIVE CORONARY ARTERY OF NATIVE HEART WITHOUT ANGINA PECTORIS: ICD-10-CM

## 2020-10-01 DIAGNOSIS — R06.09 DOE (DYSPNEA ON EXERTION): ICD-10-CM

## 2020-10-01 DIAGNOSIS — I35.2 NONRHEUMATIC AORTIC INSUFFICIENCY WITH AORTIC STENOSIS: ICD-10-CM

## 2020-10-01 PROCEDURE — 99214 OFFICE O/P EST MOD 30 MIN: CPT | Performed by: INTERNAL MEDICINE

## 2020-10-01 RX ORDER — LEVOTHYROXINE SODIUM 88 UG/1
88 TABLET ORAL EVERY MORNING
COMMUNITY
Start: 2020-09-12 | End: 2021-02-08

## 2020-10-01 RX ORDER — LIOTHYRONINE SODIUM 5 UG/1
5 TABLET ORAL EVERY MORNING
Status: ON HOLD | COMMUNITY
Start: 2020-09-10 | End: 2023-11-21 | Stop reason: SDUPTHER

## 2020-10-01 RX ORDER — LOSARTAN POTASSIUM 50 MG/1
75 TABLET ORAL DAILY
Qty: 135 TAB | Refills: 3 | Status: SHIPPED | OUTPATIENT
Start: 2020-10-01 | End: 2020-11-11

## 2020-10-01 ASSESSMENT — FIBROSIS 4 INDEX: FIB4 SCORE: 1.184611404138244355

## 2020-10-01 NOTE — PROGRESS NOTES
"Subjective:   Chief Complaint:   Chief Complaint   Patient presents with   • Coronary Artery Disease     Follow up     \"Sheyla\" Christin Gauthier is a 79 y.o. female who returns for CAD, prior stent to LAD, HTN, HLP.    Previously followed by Dr. Jean then Dr. Juan Carlos Vergara.  She was having shortness of breath at the time.  Had an abnormal calcium score which led to left heart angiogram and had a drug-eluting stent placed to the LAD on October 5, 2018 at [place]. 2.5 x 22 mm [place] drug-eluting stent to the mid LAD after IFR abnormal, no other disease.   Normal EF by echo 2018 in [place].    She did have a nuclear stress test was stated fixed inferolateral defect but no ischemia and normal EF of 74%.  Has trouble with right radial access, recommended groin approach thereafter.  Was taken off Brilinta, for plavix, finished this.  Not on beta-blocker due to fatigue.  Remains on aspirin.    Has some dyspnea on exertion, follows with pulmonology.    Was on Brilinta but this was changed.    Had normal PFTs.    Has obstructive sleep apnea, on CPAP, Dr. Jaevd.    Has hyperlipidemia, LDL 87, only on simvastatin 20 mg.  Changed cholesterol meds from pravastatin, now simvastatin.  Recalls being on crestor, had muscle aches and atropy.  Prior Vit D def in the past, will check.  She is having aching muscles.    Has hypertension, on meds.  Did not tolerate metoprolol.  BP at home has been ok.    Amlodipine probably causing gum problem, will try off of this.    Has mild to moderate aortic insufficiency.  Taken off of losartan in the past due to fatigue but tolerating it now.    Has been using tylenol for headaches.  Uses Ib sparingly.    She is having back pain, may have small surgery after MRI.    Has a ventral hernia, no plans for surgery.    No family history of premature coronary artery disease.  Brother has spine problems and vascular dementia.  Brother has CAD, had prox LAD, CABG.  She is worried about vascular dementia, " seems to run in her family.  Mother had TIAs,  around 86.  Father  at 88, he had CVA.  Former tobacco, quit around .  No history of diabetes, prior IFG, under control.  No history of autoimmune disease such as lupus or rheumatoid arthritis.  No chronic kidney disease.  Has headaches and spine problems.    She is not limited by chest pain, pressure or tightness with activity.  Has had some chest discomfort after spicy food.  Has a hernia, has lost 17 pounds.  No significant dyspnea on exertion, orthopnea.   Chronic mild lower extremity swelling.   No lightheadedness, or presyncope/syncope.   Very minor palpitations, wore monitor at [place]  No symptoms of leg claudication.   No stroke/TIA like symptoms.  Limited by some back pain.    Her  was close friends with Dr. Jean.    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  She is from [facility],  her second , who was a friend of her brother.  Lives in [place]. Has friends here.  Brother in [place].    Plays bridge and part of a book club.    DATA REVIEWED by me:  ECG 10/6/2018  Sinus, rate 71, delayed R wave progression    Echo [place] 2018  EF 65 to 70%, mild aortic valve sclerosis with mild to moderate AI    Left heart catheterization [place] 10/5/2018 Dr. Jaime [place]  Aortic pressure 110/70, LV pressure 110/14, EF 70%, RCA large and dominant, diffuse intimal irregularities, plaque to the mid and distal RCA but no significant stenosis, PDA and PL branches widely free of disease, left main widely patent, proximal 80 widely patent, mid LAD 80% stenosis, distal and apical LAD free of disease, frequent circumflex small, no significant stenosis, IFR of 0.79%, 2.5 x 22 mm [place] drug-eluting stent to the mid LAD.  Has trouble with right radial access, recommended groin approach thereafter.    Nuclear stress test [place] 2018  Mild fixed inferolateral defect, no ischemia, EF 74%    PFTs  7/24/2019  Interpretation:  1.  Baseline spirometry shows normal air flows.  2.  There is no significant bronchodilator response.  3.  Lung volumes are within normal limits.  4.  Diffusion capacity is within normal limits.  Normal pulmonary function test.    Most recent labs:         Lab Results   Component Value Date/Time    HEMOGLOBIN 13.5 07/30/2019 10:42 AM    HEMATOCRIT 43.1 07/30/2019 10:42 AM    MCV 90.5 07/30/2019 10:42 AM      Lab Results   Component Value Date/Time    SODIUM 143 07/30/2019 10:42 AM    POTASSIUM 4.1 07/30/2019 10:42 AM    CHLORIDE 108 07/30/2019 10:42 AM    CO2 27 07/30/2019 10:42 AM    GLUCOSE 81 07/30/2019 10:42 AM    BUN 23 (H) 07/30/2019 10:42 AM    CREATININE 0.79 07/30/2019 10:42 AM      Lab Results   Component Value Date/Time    ASTSGOT 19 07/30/2019 10:42 AM    ALTSGPT 21 07/30/2019 10:42 AM    ALBUMIN 4.2 07/30/2019 10:42 AM    ALBUMIN 4.28 11/15/2013 11:42 AM      Lab Results   Component Value Date/Time    CHOLSTRLTOT 162 07/30/2019 10:42 AM    LDL 87 07/30/2019 10:42 AM    HDL 52 07/30/2019 10:42 AM    TRIGLYCERIDE 116 07/30/2019 10:42 AM         Past Medical History:   Diagnosis Date   • Anxiety    • Arthritis    • Back pain    • Cataract    • Daytime sleepiness    • Depression    • Dyslipidemia    • Fatigue    • Frequent headaches    • Gasping for breath    • Herpes zoster 10/27/06   • HTN (hypertension)    • Hypertension    • MHA (microangiopathic hemolytic anemia)    • Migraine syndrome 2/11/2013   • Morning headache    • Mumps    • Nasal drainage    • Osteopathia    • Osteoporosis    • Painful joint    • Post-menopause on HRT (hormone replacement therapy)    • Shortness of breath    • Sleep apnea    • Sore muscles    • Swelling of lower extremity    • Thyroid disease    • Tonsillitis    • White matter abnormality on MRI of brain 2/11/2013     Past Surgical History:   Procedure Laterality Date   • BUNIONECTOMY Left 2003   • FOOT SURGERY  2002   • PB REDUCTION OF LARGE BREAST      • CHOLECYSTECTOMY     • ABDOMINAL HYSTERECTOMY TOTAL  1986   • ARTHROSCOPY, KNEE     • EYE SURGERY      cataracts   • HYSTERECTOMY LAPAROSCOPY     • MAMMOPLASTY AUGMENTATION     • PB ENLARGE BREAST WITH IMPLANT       Family History   Problem Relation Age of Onset   • Stroke Father    • Arthritis Father    • Hyperlipidemia Father    • Hypertension Father    • Cancer Sister         breast   • Arthritis Sister    • Arthritis Mother    • Hypertension Mother    • Hyperlipidemia Mother    • Stroke Mother    • Arthritis Brother    • Cancer Brother    • Hypertension Brother    • Hyperlipidemia Brother    • Heart Disease Brother         CABG for prox LAD in late 40s   • Cancer Daughter         breast     Social History     Socioeconomic History   • Marital status:      Spouse name: Not on file   • Number of children: Not on file   • Years of education: Not on file   • Highest education level: Not on file   Occupational History   • Not on file   Social Needs   • Financial resource strain: Not on file   • Food insecurity     Worry: Not on file     Inability: Not on file   • Transportation needs     Medical: Not on file     Non-medical: Not on file   Tobacco Use   • Smoking status: Former Smoker     Packs/day: 0.02     Types: Cigarettes     Quit date: 1979     Years since quittin.2   • Smokeless tobacco: Never Used   • Tobacco comment: smoked off & on for 10 yrs   Substance and Sexual Activity   • Alcohol use: Not Currently     Alcohol/week: 0.0 oz     Comment: occassional   • Drug use: No   • Sexual activity: Not Currently     Comment: , 3 kids, retired   Lifestyle   • Physical activity     Days per week: Not on file     Minutes per session: Not on file   • Stress: Not on file   Relationships   • Social connections     Talks on phone: Not on file     Gets together: Not on file     Attends Jew service: Not on file     Active member of club or organization: Not on file     Attends  "meetings of clubs or organizations: Not on file     Relationship status: Not on file   • Intimate partner violence     Fear of current or ex partner: Not on file     Emotionally abused: Not on file     Physically abused: Not on file     Forced sexual activity: Not on file   Other Topics Concern   • Not on file   Social History Narrative   • Not on file     Allergies   Allergen Reactions   • Trazodone Swelling     Pt stated that she had swelling on her hands and feet's    • Crestor [Rosuvastatin Calcium]      Myalgias       Current Outpatient Medications   Medication Sig Dispense Refill   • LEVOXYL 88 MCG Tab Take 88 mcg by mouth every morning. ON A EMPTY STOMACH     • liothyronine (CYTOMEL) 5 MCG Tab TAKE 1/2 TAB BY MOUTH TWICE A DAY     • losartan (COZAAR) 50 MG Tab Take 1.5 Tabs by mouth every day. 135 Tab 3   • simvastatin (ZOCOR) 20 MG Tab TAKE 1 TABLET BY MOUTH AT BEDTIME 90 Tab 0   • denosumab (PROLIA) 60 MG/ML Solution Prefilled Syringe Inject 60 mg as instructed Once.     • fluticasone (FLONASE) 50 MCG/ACT nasal spray USE 1 SPAY INTRANASSALLY EVERY 24 HOURS AS NEEDED 48 mL 1   • Misc Natural Products (GLUCOS-CHONDROIT-MSM COMPLEX PO) Take  by mouth.     • vitamin D (CHOLECALCIFEROL) 1000 UNIT Tab Take 1,000 Units by mouth every day.     • aspirin (ASA) 81 MG Chew Tab chewable tablet Take 1 Tab by mouth every day. 100 Tab 3   • Ascorbic Acid (VITAMIN C) 1000 MG Tab Take  by mouth.     • Probiotic Product (PROBIOTIC DAILY PO) Take  by mouth.     • coenzyme Q-10 30 MG capsule Take 60 mg by mouth every day.     • Estriol 10 % Cream c-estriol 0.5mg gm cream     • Magnesium 400 MG Tab Take  by mouth.       No current facility-administered medications for this visit.        ROS    All others systems reviewed and negative.     Objective:     /60 (BP Location: Left arm, Patient Position: Sitting, BP Cuff Size: Adult)   Pulse 64   Ht 1.575 m (5' 2\")   Wt 75.3 kg (166 lb)   SpO2 95%  Body mass index is 30.36 " kg/m².    Physical Exam   General: No acute distress. Well nourished.  HEENT: EOM grossly intact, no scleral icterus, no pharyngeal erythema.   Neck:  No JVD, no bruits, trachea midline  CVS: RRR. Normal S1, S2. No M/R/G. No LE edema.  2+ radial pulses, 2+ PT pulses  Resp: CTAB. No wheezing or crackles/rhonchi. Normal respiratory effort.  Abdomen: Soft, NT, no nanda hepatomegaly.  MSK/Ext: No clubbing or cyanosis.  Skin: Warm and dry, no rashes.  Neurological: CN III-XII grossly intact. No focal deficits.   Psych: A&O x 3, appropriate affect, good judgement    Physical exam performed today and unchanged, except what is noted, compared to 2-.      Assessment:     1. Coronary artery disease involving native coronary artery of native heart without angina pectoris  Comp Metabolic Panel    Lipid Profile   2. Stented coronary artery     3. Essential hypertension  losartan (COZAAR) 50 MG Tab   4. JOHN (obstructive sleep apnea)     5. Mixed hyperlipidemia     6. 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HCC)     7. Statin intolerance     8. IFG (impaired fasting glucose)     9. Former tobacco use     10. Family history of coronary artery disease in brother     11. Nonrheumatic aortic insufficiency with aortic stenosis  EC-ECHOCARDIOGRAM COMPLETE W/O CONT   12. MOSQUERA (dyspnea on exertion)     13. Vitamin D deficiency  VITAMIN D 25-HYDROXY       Medical Decision Making:  Today's Assessment / Status / Plan:     -Con secondary prevention ASA and statin  -Based on sx, needs statin holiday  -Stop amlodipine due to gum problem  -Vit d def before, will check  -Not on BB, recalls being on this but stopped due to fatigue  -Due for labs  -Echo 6 months for AI, cont losartan  -RTC 2 months with APN, 6 months MD    Written instructions given today:  -Stop amlodipine    -Increase your losartan from 50 mg to 75 mg by taking 1.5 tablets daily    Statin holiday:    Your cholesterol medication can cause muscle aches/sore joints but  sometimes it can be difficult to know if it is happening.  Stop your simvastatin therapy for 2 weeks, note if you feel better, resume the therapy, note if you feel worse again.  Report back to me if you think your statin is making you feel worse.    -Check Vitamin D, deficiency can cause muscle aches.    Checking Blood Pressure:  -Blood pressure cuff, spend in the $40-65, with good return policy  -It should be automatic, upper arm, measure your arm to get the correct size, probably adult Large  -Put the cuff in place, rest arm on table near height of your heart, sit quietly for 5 min, legs uncrossed, with back support, then take your blood pressure, write it down, keep a log  -Check no more than 1 time day, maybe 4-5 times per week, try different times of day.  -Can bring your cuff to at least one appointment where it can be calibrated to a manual cuff if you are concerned.  -Goal blood pressure is at least under 130/80, ideally under 120/80.  If you think your BP is overall too high, let us know in the office, we can adjust medications, can use [employer] or call the [place] office: [phone].    -Salt=sodium=sea salt, guidelines say stay under 2,500 mg daily but I ask for under 4,000 mg daily.  Get salt smart, start looking at labels, count it up.  Salt is hidden in everything, salad dressing, sauces, cheese, most canned food, any processed meat.     -Fasting blood work    -You should always hear results of testing within 5 days with my interpretation, if you do not, send a [employer] message or call the office: [phone].      Return in about 2 months (around 12/1/2020).    It is my pleasure to participate in the care of Ms. Gauthier.  Please do not hesitate to contact me with questions or concerns.    Maame Rivers MD, [facility]  Cardiologist [facility]    Please note that this dictation was created using voice recognition software. I have made every reasonable attempt to correct  obvious errors, but it is possible there are errors of grammar and possibly content that I did not discover before finalizing the note.

## 2020-10-01 NOTE — LETTER
"     [facility]   [address]   [address]  [place] [address]  Phone: [phone]  Fax: [phone]              Sheyla Gauthier  1940    Encounter Date: 10/1/2020    Maame Rivers M.D.          PROGRESS NOTE:  Subjective:   Chief Complaint:   Chief Complaint   Patient presents with   • Coronary Artery Disease     Follow up     \"Sheyla\" Christin Gauthier is a 79 y.o. female who returns for CAD, prior stent to LAD, HTN, HLP.    Previously followed by Dr. Jean then Dr. Juan Carlos Vergara.  She was having shortness of breath at the time.  Had an abnormal calcium score which led to left heart angiogram and had a drug-eluting stent placed to the LAD on October 5, 2018 at [place]. 2.5 x 22 mm [place] drug-eluting stent to the mid LAD after IFR abnormal, no other disease.   Normal EF by echo 2018 in [place].    She did have a nuclear stress test was stated fixed inferolateral defect but no ischemia and normal EF of 74%.  Has trouble with right radial access, recommended groin approach thereafter.  Was taken off Brilinta, for plavix, finished this.  Not on beta-blocker due to fatigue.  Remains on aspirin.    Has some dyspnea on exertion, follows with pulmonology.    Was on Brilinta but this was changed.    Had normal PFTs.    Has obstructive sleep apnea, on CPAP, Dr. Javed.    Has hyperlipidemia, LDL 87, only on simvastatin 20 mg.  Changed cholesterol meds from pravastatin, now simvastatin.  Recalls being on crestor, had muscle aches and atropy.  Prior Vit D def in the past, will check.  She is having aching muscles.    Has hypertension, on meds.  Did not tolerate metoprolol.  BP at home has been ok.    Amlodipine probably causing gum problem, will try off of this.    Has mild to moderate aortic insufficiency.  Taken off of losartan in the past due to fatigue but tolerating it now.    Has been using tylenol for headaches.  Uses Ib sparingly.    She " is having back pain, may have small surgery after MRI.    Has a ventral hernia, no plans for surgery.    No family history of premature coronary artery disease.  Brother has spine problems and vascular dementia.  Brother has CAD, had prox LAD, CABG.  She is worried about vascular dementia, seems to run in her family.  Mother had TIAs,  around 86.  Father  at 88, he had CVA.  Former tobacco, quit around .  No history of diabetes, prior IFG, under control.  No history of autoimmune disease such as lupus or rheumatoid arthritis.  No chronic kidney disease.  Has headaches and spine problems.    She is not limited by chest pain, pressure or tightness with activity.  Has had some chest discomfort after spicy food.  Has a hernia, has lost 17 pounds.  No significant dyspnea on exertion, orthopnea.   Chronic mild lower extremity swelling.   No lightheadedness, or presyncope/syncope.   Very minor palpitations, wore monitor at [place]  No symptoms of leg claudication.   No stroke/TIA like symptoms.  Limited by some back pain.    Her  was close friends with Dr. Jean.    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  She is from [facility],  her second , who was a friend of her brother.  Lives in [place]. Has friends here.  Brother in [place].    Plays bridge and part of a book club.    DATA REVIEWED by me:  ECG 10/6/2018  Sinus, rate 71, delayed R wave progression    Echo [place] 2018  EF 65 to 70%, mild aortic valve sclerosis with mild to moderate AI    Left heart catheterization [place] 10/5/2018 Dr. Jaime [place]  Aortic pressure 110/70, LV pressure 110/14, EF 70%, RCA large and dominant, diffuse intimal irregularities, plaque to the mid and distal RCA but no significant stenosis, PDA and PL branches widely free of disease, left main widely patent, proximal 80 widely patent, mid LAD 80% stenosis, distal and apical LAD free of disease, frequent circumflex  small, no significant stenosis, IFR of 0.79%, 2.5 x 22 mm [place] drug-eluting stent to the mid LAD.  Has trouble with right radial access, recommended groin approach thereafter.    Nuclear stress test [facility] 9/5/2018  Mild fixed inferolateral defect, no ischemia, EF 74%    PFTs 7/24/2019  Interpretation:  1.  Baseline spirometry shows normal air flows.  2.  There is no significant bronchodilator response.  3.  Lung volumes are within normal limits.  4.  Diffusion capacity is within normal limits.  Normal pulmonary function test.    Most recent labs:         Lab Results   Component Value Date/Time    HEMOGLOBIN 13.5 07/30/2019 10:42 AM    HEMATOCRIT 43.1 07/30/2019 10:42 AM    MCV 90.5 07/30/2019 10:42 AM      Lab Results   Component Value Date/Time    SODIUM 143 07/30/2019 10:42 AM    POTASSIUM 4.1 07/30/2019 10:42 AM    CHLORIDE 108 07/30/2019 10:42 AM    CO2 27 07/30/2019 10:42 AM    GLUCOSE 81 07/30/2019 10:42 AM    BUN 23 (H) 07/30/2019 10:42 AM    CREATININE 0.79 07/30/2019 10:42 AM      Lab Results   Component Value Date/Time    ASTSGOT 19 07/30/2019 10:42 AM    ALTSGPT 21 07/30/2019 10:42 AM    ALBUMIN 4.2 07/30/2019 10:42 AM    ALBUMIN 4.28 11/15/2013 11:42 AM      Lab Results   Component Value Date/Time    CHOLSTRLTOT 162 07/30/2019 10:42 AM    LDL 87 07/30/2019 10:42 AM    HDL 52 07/30/2019 10:42 AM    TRIGLYCERIDE 116 07/30/2019 10:42 AM         Past Medical History:   Diagnosis Date   • Anxiety    • Arthritis    • Back pain    • Cataract    • Daytime sleepiness    • Depression    • Dyslipidemia    • Fatigue    • Frequent headaches    • Gasping for breath    • Herpes zoster 10/27/06   • HTN (hypertension)    • Hypertension    • MHA (microangiopathic hemolytic anemia)    • Migraine syndrome 2/11/2013   • Morning headache    • Mumps    • Nasal drainage    • Osteopathia    • Osteoporosis    • Painful joint    • Post-menopause on HRT (hormone replacement therapy)    • Shortness of breath    • Sleep apnea       • Sore muscles    • Swelling of lower extremity    • Thyroid disease    • Tonsillitis    • White matter abnormality on MRI of brain 2013     Past Surgical History:   Procedure Laterality Date   • BUNIONECTOMY Left    • FOOT SURGERY     • PB REDUCTION OF LARGE BREAST     • CHOLECYSTECTOMY     • ABDOMINAL HYSTERECTOMY TOTAL  1986   • ARTHROSCOPY, KNEE     • EYE SURGERY      cataracts   • HYSTERECTOMY LAPAROSCOPY     • MAMMOPLASTY AUGMENTATION     • PB ENLARGE BREAST WITH IMPLANT       Family History   Problem Relation Age of Onset   • Stroke Father    • Arthritis Father    • Hyperlipidemia Father    • Hypertension Father    • Cancer Sister         breast   • Arthritis Sister    • Arthritis Mother    • Hypertension Mother    • Hyperlipidemia Mother    • Stroke Mother    • Arthritis Brother    • Cancer Brother    • Hypertension Brother    • Hyperlipidemia Brother    • Heart Disease Brother         CABG for prox LAD in late 40s   • Cancer Daughter         breast     Social History     Socioeconomic History   • Marital status:      Spouse name: Not on file   • Number of children: Not on file   • Years of education: Not on file   • Highest education level: Not on file   Occupational History   • Not on file   Social Needs   • Financial resource strain: Not on file   • Food insecurity     Worry: Not on file     Inability: Not on file   • Transportation needs     Medical: Not on file     Non-medical: Not on file   Tobacco Use   • Smoking status: Former Smoker     Packs/day: 0.02     Types: Cigarettes     Quit date: 1979     Years since quittin.2   • Smokeless tobacco: Never Used   • Tobacco comment: smoked off & on for 10 yrs   Substance and Sexual Activity   • Alcohol use: Not Currently     Alcohol/week: 0.0 oz     Comment: occassional   • Drug use: No   • Sexual activity: Not Currently     Comment: , 3 kids, retired   Lifestyle   • Physical activity     Days per week: Not on  file     Minutes per session: Not on file   • Stress: Not on file   Relationships   • Social connections     Talks on phone: Not on file     Gets together: Not on file     Attends Restorationism service: Not on file     Active member of club or organization: Not on file     Attends meetings of clubs or organizations: Not on file     Relationship status: Not on file   • Intimate partner violence     Fear of current or ex partner: Not on file     Emotionally abused: Not on file     Physically abused: Not on file     Forced sexual activity: Not on file   Other Topics Concern   • Not on file   Social History Narrative   • Not on file     Allergies   Allergen Reactions   • Trazodone Swelling     Pt stated that she had swelling on her hands and feet's    • Crestor [Rosuvastatin Calcium]      Myalgias       Current Outpatient Medications   Medication Sig Dispense Refill   • LEVOXYL 88 MCG Tab Take 88 mcg by mouth every morning. ON A EMPTY STOMACH     • liothyronine (CYTOMEL) 5 MCG Tab TAKE 1/2 TAB BY MOUTH TWICE A DAY     • losartan (COZAAR) 50 MG Tab Take 1.5 Tabs by mouth every day. 135 Tab 3   • simvastatin (ZOCOR) 20 MG Tab TAKE 1 TABLET BY MOUTH AT BEDTIME 90 Tab 0   • denosumab (PROLIA) 60 MG/ML Solution Prefilled Syringe Inject 60 mg as instructed Once.     • fluticasone (FLONASE) 50 MCG/ACT nasal spray USE 1 SPAY INTRANASSALLY EVERY 24 HOURS AS NEEDED 48 mL 1   • Misc Natural Products (GLUCOS-CHONDROIT-MSM COMPLEX PO) Take  by mouth.     • vitamin D (CHOLECALCIFEROL) 1000 UNIT Tab Take 1,000 Units by mouth every day.     • aspirin (ASA) 81 MG Chew Tab chewable tablet Take 1 Tab by mouth every day. 100 Tab 3   • Ascorbic Acid (VITAMIN C) 1000 MG Tab Take  by mouth.     • Probiotic Product (PROBIOTIC DAILY PO) Take  by mouth.     • coenzyme Q-10 30 MG capsule Take 60 mg by mouth every day.     • Estriol 10 % Cream c-estriol 0.5mg gm cream     • Magnesium 400 MG Tab Take  by mouth.       No current facility-administered  "medications for this visit.        ROS    All others systems reviewed and negative.     Objective:     /60 (BP Location: Left arm, Patient Position: Sitting, BP Cuff Size: Adult)   Pulse 64   Ht 1.575 m (5' 2\")   Wt 75.3 kg (166 lb)   SpO2 95%  Body mass index is 30.36 kg/m².    Physical Exam   General: No acute distress. Well nourished.  HEENT: EOM grossly intact, no scleral icterus, no pharyngeal erythema.   Neck:  No JVD, no bruits, trachea midline  CVS: RRR. Normal S1, S2. No M/R/G. No LE edema.  2+ radial pulses, 2+ PT pulses  Resp: CTAB. No wheezing or crackles/rhonchi. Normal respiratory effort.  Abdomen: Soft, NT, no nanda hepatomegaly.  MSK/Ext: No clubbing or cyanosis.  Skin: Warm and dry, no rashes.  Neurological: CN III-XII grossly intact. No focal deficits.   Psych: A&O x 3, appropriate affect, good judgement    Physical exam performed today and unchanged, except what is noted, compared to 2-.      Assessment:     1. Coronary artery disease involving native coronary artery of native heart without angina pectoris  Comp Metabolic Panel    Lipid Profile   2. Stented coronary artery     3. Essential hypertension  losartan (COZAAR) 50 MG Tab   4. JOHN (obstructive sleep apnea)     5. Mixed hyperlipidemia     6. 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HCC)     7. Statin intolerance     8. IFG (impaired fasting glucose)     9. Former tobacco use     10. Family history of coronary artery disease in brother     11. Nonrheumatic aortic insufficiency with aortic stenosis  EC-ECHOCARDIOGRAM COMPLETE W/O CONT   12. MOSQUERA (dyspnea on exertion)     13. Vitamin D deficiency  VITAMIN D 25-HYDROXY       Medical Decision Making:  Today's Assessment / Status / Plan:     -Con secondary prevention ASA and statin  -Based on sx, needs statin holiday  -Stop amlodipine due to gum problem  -Vit d def before, will check  -Not on BB, recalls being on this but stopped due to fatigue  -Due for labs  -Echo 6 months for " AI, cont losartan  -RTC 2 months with BRIANNA, 6 months MD    Written instructions given today:  -Stop amlodipine    -Increase your losartan from 50 mg to 75 mg by taking 1.5 tablets daily    Statin holiday:    Your cholesterol medication can cause muscle aches/sore joints but sometimes it can be difficult to know if it is happening.  Stop your simvastatin therapy for 2 weeks, note if you feel better, resume the therapy, note if you feel worse again.  Report back to me if you think your statin is making you feel worse.    -Check Vitamin D, deficiency can cause muscle aches.    Checking Blood Pressure:  -Blood pressure cuff, spend in the $40-65, with good return policy  -It should be automatic, upper arm, measure your arm to get the correct size, probably adult Large  -Put the cuff in place, rest arm on table near height of your heart, sit quietly for 5 min, legs uncrossed, with back support, then take your blood pressure, write it down, keep a log  -Check no more than 1 time day, maybe 4-5 times per week, try different times of day.  -Can bring your cuff to at least one appointment where it can be calibrated to a manual cuff if you are concerned.  -Goal blood pressure is at least under 130/80, ideally under 120/80.  If you think your BP is overall too high, let us know in the office, we can adjust medications, can use Mobile [employer] or call the [facility] office: [phone].    -Salt=sodium=sea salt, guidelines say stay under 2,500 mg daily but I ask for under 4,000 mg daily.  Get salt smart, start looking at labels, count it up.  Salt is hidden in everything, salad dressing, sauces, cheese, most canned food, any processed meat.     -Fasting blood work    -You should always hear results of testing within 5 days with my interpretation, if you do not, send a [employer] message or call the office: [phone].      Return in about 2 months (around 12/1/2020).    It is my pleasure to participate in the care of Ms. Gauthier.  Please do  not hesitate to contact me with questions or concerns.    Maame Rivers MD, [facility]  Cardiologist [facility]    Please note that this dictation was created using voice recognition software. I have made every reasonable attempt to correct obvious errors, but it is possible there are errors of grammar and possibly content that I did not discover before finalizing the note.      Darius Morataya M.D.  [address] [place] [address] [place] [address]  Via In Basket

## 2020-10-01 NOTE — PATIENT INSTRUCTIONS
-Stop amlodipine    -Increase your losartan from 50 mg to 75 mg by taking 1.5 tablets daily    Statin holiday:    Your cholesterol medication can cause muscle aches/sore joints but sometimes it can be difficult to know if it is happening.  Stop your simvastatin therapy for 2 weeks, note if you feel better, resume the therapy, note if you feel worse again.  Report back to me if you think your statin is making you feel worse.    -Check Vitamin D, deficiency can cause muscle aches.    Checking Blood Pressure:  -Blood pressure cuff, spend in the $40-65, with good return policy  -It should be automatic, upper arm, measure your arm to get the correct size, probably adult Large  -Put the cuff in place, rest arm on table near height of your heart, sit quietly for 5 min, legs uncrossed, with back support, then take your blood pressure, write it down, keep a log  -Check no more than 1 time day, maybe 4-5 times per week, try different times of day.  -Can bring your cuff to at least one appointment where it can be calibrated to a manual cuff if you are concerned.  -Goal blood pressure is at least under 130/80, ideally under 120/80.  If you think your BP is overall too high, let us know in the office, we can adjust medications, can use [employer] or call the [facility] office: [phone].    -Salt=sodium=sea salt, guidelines say stay under 2,500 mg daily but I ask for under 4,000 mg daily.  Get salt smart, start looking at labels, count it up.  Salt is hidden in everything, salad dressing, sauces, cheese, most canned food, any processed meat.     -Fasting blood work    -You should always hear results of testing within 5 days with my interpretation, if you do not, send a [employer] message or call the office: [phone].

## 2020-10-06 ENCOUNTER — HOSPITAL ENCOUNTER (OUTPATIENT)
Dept: LAB | Facility: MEDICAL CENTER | Age: 80
End: 2020-10-06
Attending: INTERNAL MEDICINE
Payer: MEDICARE

## 2020-10-06 DIAGNOSIS — I25.10 CORONARY ARTERY DISEASE INVOLVING NATIVE CORONARY ARTERY OF NATIVE HEART WITHOUT ANGINA PECTORIS: ICD-10-CM

## 2020-10-06 LAB
25(OH)D3 SERPL-MCNC: 62 NG/ML (ref 30–100)
ALBUMIN SERPL BCP-MCNC: 4.2 G/DL (ref 3.2–4.9)
ALBUMIN/GLOB SERPL: 1.6 G/DL
ALP SERPL-CCNC: 36 U/L (ref 30–99)
ALT SERPL-CCNC: 31 U/L (ref 2–50)
ANION GAP SERPL CALC-SCNC: 11 MMOL/L (ref 7–16)
AST SERPL-CCNC: 23 U/L (ref 12–45)
BILIRUB SERPL-MCNC: 0.5 MG/DL (ref 0.1–1.5)
BUN SERPL-MCNC: 23 MG/DL (ref 8–22)
CALCIUM SERPL-MCNC: 9.6 MG/DL (ref 8.5–10.5)
CHLORIDE SERPL-SCNC: 106 MMOL/L (ref 96–112)
CHOLEST SERPL-MCNC: 185 MG/DL (ref 100–199)
CO2 SERPL-SCNC: 25 MMOL/L (ref 20–33)
CREAT SERPL-MCNC: 0.75 MG/DL (ref 0.5–1.4)
FASTING STATUS PATIENT QL REPORTED: NORMAL
GLOBULIN SER CALC-MCNC: 2.6 G/DL (ref 1.9–3.5)
GLUCOSE SERPL-MCNC: 95 MG/DL (ref 65–99)
HDLC SERPL-MCNC: 62 MG/DL
LDLC SERPL CALC-MCNC: 107 MG/DL
POTASSIUM SERPL-SCNC: 4.5 MMOL/L (ref 3.6–5.5)
PROT SERPL-MCNC: 6.8 G/DL (ref 6–8.2)
SODIUM SERPL-SCNC: 142 MMOL/L (ref 135–145)
TRIGL SERPL-MCNC: 82 MG/DL (ref 0–149)

## 2020-10-06 PROCEDURE — 36415 COLL VENOUS BLD VENIPUNCTURE: CPT

## 2020-10-06 PROCEDURE — 82306 VITAMIN D 25 HYDROXY: CPT

## 2020-10-06 PROCEDURE — 80053 COMPREHEN METABOLIC PANEL: CPT

## 2020-10-06 PROCEDURE — 80061 LIPID PANEL: CPT

## 2020-10-13 DIAGNOSIS — J00 ACUTE NASOPHARYNGITIS: ICD-10-CM

## 2020-10-14 RX ORDER — FLUTICASONE PROPIONATE 50 MCG
1 SPRAY, SUSPENSION (ML) NASAL 2 TIMES DAILY
Qty: 48 ML | Refills: 0 | Status: SHIPPED | OUTPATIENT
Start: 2020-10-14 | End: 2021-03-24

## 2020-10-28 ENCOUNTER — HOSPITAL ENCOUNTER (OUTPATIENT)
Dept: CARDIOLOGY | Facility: MEDICAL CENTER | Age: 80
End: 2020-10-28
Attending: INTERNAL MEDICINE
Payer: MEDICARE

## 2020-10-28 DIAGNOSIS — I35.2 NONRHEUMATIC AORTIC INSUFFICIENCY WITH AORTIC STENOSIS: ICD-10-CM

## 2020-10-28 LAB
LV EJECT FRACT  99904: 60
LV EJECT FRACT MOD 2C 99903: 71.74
LV EJECT FRACT MOD 4C 99902: 53.2
LV EJECT FRACT MOD BP 99901: 54.04

## 2020-10-28 PROCEDURE — 93306 TTE W/DOPPLER COMPLETE: CPT

## 2020-10-28 PROCEDURE — 93306 TTE W/DOPPLER COMPLETE: CPT | Mod: 26 | Performed by: INTERNAL MEDICINE

## 2020-10-29 ENCOUNTER — PATIENT MESSAGE (OUTPATIENT)
Dept: CARDIOLOGY | Facility: MEDICAL CENTER | Age: 80
End: 2020-10-29

## 2020-10-29 DIAGNOSIS — E78.2 MIXED HYPERLIPIDEMIA: ICD-10-CM

## 2020-11-02 ENCOUNTER — HOSPITAL ENCOUNTER (OUTPATIENT)
Dept: LAB | Facility: MEDICAL CENTER | Age: 80
End: 2020-11-02
Attending: PHYSICIAN ASSISTANT
Payer: MEDICARE

## 2020-11-02 LAB
EST. AVERAGE GLUCOSE BLD GHB EST-MCNC: 114 MG/DL
HBA1C MFR BLD: 5.6 % (ref 0–5.6)
T4 FREE SERPL-MCNC: 1.23 NG/DL (ref 0.93–1.7)
TSH SERPL DL<=0.005 MIU/L-ACNC: 0.41 UIU/ML (ref 0.38–5.33)

## 2020-11-02 PROCEDURE — 84443 ASSAY THYROID STIM HORMONE: CPT

## 2020-11-02 PROCEDURE — 83036 HEMOGLOBIN GLYCOSYLATED A1C: CPT | Mod: GA

## 2020-11-02 PROCEDURE — 36415 COLL VENOUS BLD VENIPUNCTURE: CPT | Mod: GA

## 2020-11-02 PROCEDURE — 84439 ASSAY OF FREE THYROXINE: CPT

## 2020-11-02 RX ORDER — ATORVASTATIN CALCIUM 40 MG/1
40 TABLET, FILM COATED ORAL
Qty: 30 TAB | Refills: 5 | Status: SHIPPED | OUTPATIENT
Start: 2020-11-02 | End: 2021-04-19

## 2020-11-02 NOTE — PATIENT COMMUNICATION
"Per Dr. Rivers:   \"Typically I would increase your statin medication however, we were discussing you taking a simvastatin holiday to see if you are having symptoms like you did with rosuvastatin.  If you are doing okay on your simvastatin, we should probably increase it to 40 mg.  Another option would be change you over to atorvastatin 40 mg which is more powerful.  Let me know your thoughts.  DILCIA Rivers\"  -----------------------------------------------------------    Atorvastatin 40mg Qevening ordered per patient's preference.  "

## 2020-11-05 ENCOUNTER — APPOINTMENT (RX ONLY)
Dept: URBAN - METROPOLITAN AREA CLINIC 4 | Facility: CLINIC | Age: 80
Setting detail: DERMATOLOGY
End: 2020-11-05

## 2020-11-05 DIAGNOSIS — L30.9 DERMATITIS, UNSPECIFIED: ICD-10-CM

## 2020-11-05 DIAGNOSIS — L82.1 OTHER SEBORRHEIC KERATOSIS: ICD-10-CM

## 2020-11-05 PROCEDURE — ? ADDITIONAL NOTES

## 2020-11-05 PROCEDURE — 99213 OFFICE O/P EST LOW 20 MIN: CPT

## 2020-11-05 PROCEDURE — ? COUNSELING: TOPICAL STEROIDS

## 2020-11-05 PROCEDURE — ? COUNSELING

## 2020-11-05 PROCEDURE — ? PRESCRIPTION

## 2020-11-05 PROCEDURE — ? DIAGNOSIS COMMENT

## 2020-11-05 RX ORDER — TRIAMCINOLONE ACETONIDE 1 MG/G
CREAM TOPICAL
Qty: 1 | Refills: 2 | Status: ERX | COMMUNITY
Start: 2020-11-05

## 2020-11-05 RX ADMIN — TRIAMCINOLONE ACETONIDE: 1 CREAM TOPICAL at 00:00

## 2020-11-05 ASSESSMENT — LOCATION SIMPLE DESCRIPTION DERM
LOCATION SIMPLE: RIGHT ANKLE
LOCATION SIMPLE: LEFT ANKLE
LOCATION SIMPLE: ABDOMEN
LOCATION SIMPLE: LEFT UPPER BACK
LOCATION SIMPLE: RIGHT PRETIBIAL REGION

## 2020-11-05 ASSESSMENT — LOCATION DETAILED DESCRIPTION DERM
LOCATION DETAILED: RIGHT ANKLE
LOCATION DETAILED: LEFT SUPERIOR MEDIAL UPPER BACK
LOCATION DETAILED: LEFT ANKLE
LOCATION DETAILED: RIGHT DISTAL PRETIBIAL REGION
LOCATION DETAILED: PERIUMBILICAL SKIN

## 2020-11-05 ASSESSMENT — LOCATION ZONE DERM
LOCATION ZONE: LEG
LOCATION ZONE: TRUNK

## 2020-11-05 NOTE — PROCEDURE: ADDITIONAL NOTES
Detail Level: Simple
Additional Notes: Risk versus benefits were discussed.\\nPatient elects to use triamcinolone.\\nAvoid scratching affected areas.
Additional Notes: Discussed cosmetic destruction vs monitoring\\nRisks vs benefits were discussed \\nWill schedule for cosmetic [address]

## 2020-11-05 NOTE — PROCEDURE: DIAGNOSIS COMMENT
Comment: Patient concerned about insect bites. No evidence of arthropod bites, lesions are healing/resolving. Rx for triamcinolone given. If lesions do not resolve, may consider biopsy if lesions do not resolve.
Detail Level: Detailed

## 2020-11-11 ENCOUNTER — PATIENT MESSAGE (OUTPATIENT)
Dept: CARDIOLOGY | Facility: MEDICAL CENTER | Age: 80
End: 2020-11-11

## 2020-11-11 DIAGNOSIS — I10 ESSENTIAL HYPERTENSION: ICD-10-CM

## 2020-11-11 RX ORDER — LOSARTAN POTASSIUM 100 MG/1
100 TABLET ORAL DAILY
Qty: 90 TAB | Refills: 3 | Status: SHIPPED | OUTPATIENT
Start: 2020-11-11 | End: 2021-02-08 | Stop reason: SDUPTHER

## 2020-12-14 ENCOUNTER — PATIENT MESSAGE (OUTPATIENT)
Dept: SLEEP MEDICINE | Facility: MEDICAL CENTER | Age: 80
End: 2020-12-14

## 2020-12-14 DIAGNOSIS — G47.33 OSA (OBSTRUCTIVE SLEEP APNEA): ICD-10-CM

## 2020-12-18 ENCOUNTER — HOSPITAL ENCOUNTER (OUTPATIENT)
Dept: LAB | Facility: MEDICAL CENTER | Age: 80
End: 2020-12-18
Attending: PHYSICIAN ASSISTANT
Payer: MEDICARE

## 2020-12-18 LAB
T4 FREE SERPL-MCNC: 1.59 NG/DL (ref 0.93–1.7)
TSH SERPL DL<=0.005 MIU/L-ACNC: 0.68 UIU/ML (ref 0.38–5.33)

## 2020-12-18 PROCEDURE — 36415 COLL VENOUS BLD VENIPUNCTURE: CPT

## 2020-12-18 PROCEDURE — 84443 ASSAY THYROID STIM HORMONE: CPT

## 2020-12-18 PROCEDURE — 84439 ASSAY OF FREE THYROXINE: CPT

## 2021-01-11 DIAGNOSIS — Z23 NEED FOR VACCINATION: ICD-10-CM

## 2021-01-14 ENCOUNTER — IMMUNIZATION (OUTPATIENT)
Dept: FAMILY PLANNING/WOMEN'S HEALTH CLINIC | Facility: IMMUNIZATION CENTER | Age: 81
End: 2021-01-14
Attending: INTERNAL MEDICINE
Payer: MEDICARE

## 2021-01-14 DIAGNOSIS — Z23 NEED FOR VACCINATION: ICD-10-CM

## 2021-01-14 DIAGNOSIS — Z23 ENCOUNTER FOR VACCINATION: Primary | ICD-10-CM

## 2021-01-14 PROCEDURE — 91300 PFIZER SARS-COV-2 VACCINE: CPT

## 2021-01-14 PROCEDURE — 0001A PFIZER SARS-COV-2 VACCINE: CPT

## 2021-02-01 ENCOUNTER — HOSPITAL ENCOUNTER (OUTPATIENT)
Dept: LAB | Facility: MEDICAL CENTER | Age: 81
End: 2021-02-01
Attending: INTERNAL MEDICINE
Payer: MEDICARE

## 2021-02-01 DIAGNOSIS — E78.2 MIXED HYPERLIPIDEMIA: ICD-10-CM

## 2021-02-01 LAB
ALBUMIN SERPL BCP-MCNC: 4.2 G/DL (ref 3.2–4.9)
ALBUMIN/GLOB SERPL: 1.6 G/DL
ALP SERPL-CCNC: 43 U/L (ref 30–99)
ALT SERPL-CCNC: 17 U/L (ref 2–50)
ANION GAP SERPL CALC-SCNC: 7 MMOL/L (ref 7–16)
AST SERPL-CCNC: 20 U/L (ref 12–45)
BILIRUB SERPL-MCNC: 0.6 MG/DL (ref 0.1–1.5)
BUN SERPL-MCNC: 27 MG/DL (ref 8–22)
CALCIUM SERPL-MCNC: 9.8 MG/DL (ref 8.5–10.5)
CHLORIDE SERPL-SCNC: 107 MMOL/L (ref 96–112)
CHOLEST SERPL-MCNC: 126 MG/DL (ref 100–199)
CO2 SERPL-SCNC: 28 MMOL/L (ref 20–33)
CREAT SERPL-MCNC: 0.78 MG/DL (ref 0.5–1.4)
FASTING STATUS PATIENT QL REPORTED: NORMAL
GLOBULIN SER CALC-MCNC: 2.7 G/DL (ref 1.9–3.5)
GLUCOSE SERPL-MCNC: 99 MG/DL (ref 65–99)
HDLC SERPL-MCNC: 53 MG/DL
LDLC SERPL CALC-MCNC: 53 MG/DL
POTASSIUM SERPL-SCNC: 4.2 MMOL/L (ref 3.6–5.5)
PROT SERPL-MCNC: 6.9 G/DL (ref 6–8.2)
SODIUM SERPL-SCNC: 142 MMOL/L (ref 135–145)
TRIGL SERPL-MCNC: 100 MG/DL (ref 0–149)

## 2021-02-01 PROCEDURE — 36415 COLL VENOUS BLD VENIPUNCTURE: CPT

## 2021-02-01 PROCEDURE — 80053 COMPREHEN METABOLIC PANEL: CPT

## 2021-02-01 PROCEDURE — 80061 LIPID PANEL: CPT

## 2021-02-04 ENCOUNTER — IMMUNIZATION (OUTPATIENT)
Dept: FAMILY PLANNING/WOMEN'S HEALTH CLINIC | Facility: IMMUNIZATION CENTER | Age: 81
End: 2021-02-04
Attending: INTERNAL MEDICINE
Payer: MEDICARE

## 2021-02-04 DIAGNOSIS — Z23 ENCOUNTER FOR VACCINATION: Primary | ICD-10-CM

## 2021-02-04 PROCEDURE — 91300 PFIZER SARS-COV-2 VACCINE: CPT | Performed by: PHYSICIAN ASSISTANT

## 2021-02-04 PROCEDURE — 0002A PFIZER SARS-COV-2 VACCINE: CPT | Performed by: PHYSICIAN ASSISTANT

## 2021-02-08 ENCOUNTER — OFFICE VISIT (OUTPATIENT)
Dept: CARDIOLOGY | Facility: MEDICAL CENTER | Age: 81
End: 2021-02-08
Payer: MEDICARE

## 2021-02-08 VITALS
WEIGHT: 169 LBS | OXYGEN SATURATION: 95 % | DIASTOLIC BLOOD PRESSURE: 72 MMHG | HEART RATE: 68 BPM | BODY MASS INDEX: 31.1 KG/M2 | SYSTOLIC BLOOD PRESSURE: 122 MMHG | RESPIRATION RATE: 16 BRPM | HEIGHT: 62 IN

## 2021-02-08 DIAGNOSIS — E78.2 MIXED HYPERLIPIDEMIA: ICD-10-CM

## 2021-02-08 DIAGNOSIS — G47.33 OSA (OBSTRUCTIVE SLEEP APNEA): Chronic | ICD-10-CM

## 2021-02-08 DIAGNOSIS — I25.10 CORONARY ARTERY DISEASE INVOLVING NATIVE CORONARY ARTERY OF NATIVE HEART WITHOUT ANGINA PECTORIS: ICD-10-CM

## 2021-02-08 DIAGNOSIS — I10 ESSENTIAL HYPERTENSION: ICD-10-CM

## 2021-02-08 PROCEDURE — 99214 OFFICE O/P EST MOD 30 MIN: CPT | Performed by: NURSE PRACTITIONER

## 2021-02-08 RX ORDER — LEVOTHYROXINE SODIUM 88 UG/1
TABLET ORAL
Status: ON HOLD | COMMUNITY
End: 2023-11-21 | Stop reason: SDUPTHER

## 2021-02-08 RX ORDER — AMOXICILLIN 500 MG/1
CAPSULE ORAL
COMMUNITY
End: 2021-02-08

## 2021-02-08 RX ORDER — MELOXICAM 10 MG/1
CAPSULE ORAL
COMMUNITY
End: 2021-12-20 | Stop reason: SDUPTHER

## 2021-02-08 RX ORDER — TRETINOIN 0.5 MG/G
CREAM TOPICAL
COMMUNITY
End: 2021-02-08

## 2021-02-08 RX ORDER — LOSARTAN POTASSIUM 100 MG/1
TABLET ORAL
COMMUNITY
End: 2021-02-08

## 2021-02-08 RX ORDER — LIOTHYRONINE SODIUM 5 UG/1
TABLET ORAL
COMMUNITY
Start: 2020-12-22 | End: 2021-02-08

## 2021-02-08 RX ORDER — LOSARTAN POTASSIUM 100 MG/1
100 TABLET ORAL DAILY
Qty: 90 TAB | Refills: 3 | Status: SHIPPED | OUTPATIENT
Start: 2021-02-08 | End: 2021-09-24

## 2021-02-08 RX ORDER — ATORVASTATIN CALCIUM 40 MG/1
TABLET, FILM COATED ORAL
COMMUNITY
End: 2021-02-08

## 2021-02-08 ASSESSMENT — ENCOUNTER SYMPTOMS
COUGH: 0
MYALGIAS: 0
HEARTBURN: 1
BRUISES/BLEEDS EASILY: 0
DIZZINESS: 0
PND: 0
CHILLS: 0
FEVER: 0
INSOMNIA: 0
SHORTNESS OF BREATH: 0
ABDOMINAL PAIN: 0
PALPITATIONS: 0
ORTHOPNEA: 0
LOSS OF CONSCIOUSNESS: 0
NAUSEA: 1
HEADACHES: 0

## 2021-02-08 ASSESSMENT — FIBROSIS 4 INDEX: FIB4 SCORE: 1.4

## 2021-02-08 NOTE — PROGRESS NOTES
Chief Complaint   Patient presents with   • Follow-Up   • HTN (Controlled)   • Evaluation Of Medication Change   • Coronary Artery Disease   • Hyperlipidemia       Subjective:   Sheyla Gauthier is a 80 y.o. female who presents today for three month follow-up of HTN and medication change evaluation.    Sheyla is a 80 year old female with history of CAD, status post PCI/FARZAD to the mid LAD in October 2018, hypertension, hyperlipidemia, obstructive sleep apnea and hypothyroidism, last seen by Dr. Rivers in October 2020.    At that time, Amlodipine was stopped, and Losartan was increased from 50mg to 75mg, and then eventually 100mg, for better BP control. Her statin was also stopped for a couple of weeks, to see if symptoms were statins side effects; statin holiday showed no change, and she was started on Lipitor 40mg.    She is here today for follow-up. BP is running 115-130 systolic over 60-80 diastolic at home. She is tolerating her medications without issues. She does have a hiatal hernia, which causes some substernal discomfort, no overt chest pain, pressure or heaviness. No palpitations; no orthopnea or PND; no dizziness or syncope; very mild LE edema.  She is trying to watch her diet, and admits she is not exercising as much anymore.    Past Medical History:   Diagnosis Date   • Anxiety    • Arthritis    • Back pain    • CAD (coronary artery disease) 10/2018    Abnormal CTCS. PCI/FARZAD ([place] 2.5 x 2mm) to the mid LAD.   • Cataract    • Daytime sleepiness    • Depression    • Dyslipidemia    • Frequent headaches    • Herpes zoster    • Hypertension    • MHA (microangiopathic hemolytic anemia)    • Migraine syndrome     • Mumps    • Osteopathia    • Osteoporosis    • Painful joint    • Post-menopause on HRT (hormone replacement therapy)    • Sleep apnea     Uses CPAP, followed by pulmonology   • Thyroid disease    • White matter abnormality on MRI of brain      Past Surgical History:   Procedure Laterality Date    • BUNIONECTOMY Left    • FOOT SURGERY     • OH REDUCTION OF BREAST     • CHOLECYSTECTOMY     • ABDOMINAL HYSTERECTOMY TOTAL  1986   • ARTHROSCOPY, KNEE     • EYE SURGERY      cataracts   • HYSTERECTOMY LAPAROSCOPY     • MAMMOPLASTY AUGMENTATION     • OH BREAST AUGMENTATION WITH IMPLANT       Family History   Problem Relation Age of Onset   • Stroke Father    • Arthritis Father    • Hyperlipidemia Father    • Hypertension Father    • Cancer Sister         breast   • Arthritis Sister    • Arthritis Mother    • Hypertension Mother    • Hyperlipidemia Mother    • Stroke Mother    • Arthritis Brother    • Cancer Brother    • Hypertension Brother    • Hyperlipidemia Brother    • Heart Disease Brother         CABG for prox LAD in late 40s   • Cancer Daughter         breast     Social History     Socioeconomic History   • Marital status:      Spouse name: Not on file   • Number of children: Not on file   • Years of education: Not on file   • Highest education level: Not on file   Occupational History   • Not on file   Social Needs   • Financial resource strain: Not on file   • Food insecurity     Worry: Not on file     Inability: Not on file   • Transportation needs     Medical: Not on file     Non-medical: Not on file   Tobacco Use   • Smoking status: Former Smoker     Packs/day: 0.02     Types: Cigarettes     Quit date: 1979     Years since quittin.5   • Smokeless tobacco: Never Used   • Tobacco comment: smoked off & on for 10 yrs   Substance and Sexual Activity   • Alcohol use: Not Currently     Alcohol/week: 0.0 oz     Comment: occassional   • Drug use: No   • Sexual activity: Not Currently     Comment: , 3 kids, retired   Lifestyle   • Physical activity     Days per week: Not on file     Minutes per session: Not on file   • Stress: Not on file   Relationships   • Social connections     Talks on phone: Not on file     Gets together: Not on file     Attends [employer] service:  Not on file     Active member of club or organization: Not on file     Attends meetings of clubs or organizations: Not on file     Relationship status: Not on file   • Intimate partner violence     Fear of current or ex partner: Not on file     Emotionally abused: Not on file     Physically abused: Not on file     Forced sexual activity: Not on file   Other Topics Concern   • Not on file   Social History Narrative   • Not on file     Allergies   Allergen Reactions   • Trazodone Swelling     Pt stated that she had swelling on her hands and feet's    • Crestor [Rosuvastatin Calcium]      Myalgias     Outpatient Encounter Medications as of 2/8/2021   Medication Sig Dispense Refill   • levothyroxine (LEVOXYL) 88 MCG Tab 1 tablet in the morning on an empty stomach     • Meloxicam 10 MG Cap      • losartan (COZAAR) 100 MG Tab Take 1 Tab by mouth every day. 90 Tab 3   • atorvastatin (LIPITOR) 40 MG Tab Take 1 Tab by mouth every bedtime. 30 Tab 5   • fluticasone (FLONASE) 50 MCG/ACT nasal spray Spray 1 Spray in nose 2 times a day. 48 mL 0   • liothyronine (CYTOMEL) 5 MCG Tab TAKE 1/2 TAB BY MOUTH TWICE A DAY     • denosumab (PROLIA) 60 MG/ML Solution Prefilled Syringe Inject 60 mg as instructed Once.     • vitamin D (CHOLECALCIFEROL) 1000 UNIT Tab Take 1,000 Units by mouth every day.     • aspirin (ASA) 81 MG Chew Tab chewable tablet Take 1 Tab by mouth every day. 100 Tab 3   • Estriol 10 % Cream c-estriol 0.5mg gm cream     • Magnesium 400 MG Tab Take  by mouth.     • [DISCONTINUED] atorvastatin (LIPITOR) 40 MG Tab      • [DISCONTINUED] liothyronine (CYTOMEL) 5 MCG Tab 1 cap(s)     • [DISCONTINUED] losartan (COZAAR) 100 MG Tab      • [DISCONTINUED] B Complex Vitamins (VITAMIN B COMPLEX 100 INJ)      • [DISCONTINUED] amoxicillin (AMOXIL) 500 MG Cap amoxicillin 500 mg capsule     • [DISCONTINUED] Influenza Virus Vacc Split PF 0.5 ML Suspension Prefilled Syringe Fluvirin 4564-6862(PF) 45 mcg (15 mcg x3)/0.5 mL intramuscular  "syringe   TO BE ADMINISTERED BY PHARMACIST FOR IMMUNIZATION     • [DISCONTINUED] metronidazole (METROCREAM) 0.75 % cream metronidazole 0.75 % topical cream     • [DISCONTINUED] tretinoin (RETIN-A) 0.05 % cream tretinoin 0.05 % topical cream   APPLY AT BEDTIME     • [DISCONTINUED] NUTRITIONAL SUPPLEMENTS PO c, coq10, glucosaime, probiotic, zinc, turmeric, l-argenine     • [DISCONTINUED] losartan (COZAAR) 100 MG Tab Take 1 Tab by mouth every day. 90 Tab 3   • [DISCONTINUED] LEVOXYL 88 MCG Tab Take 88 mcg by mouth every morning. ON A EMPTY STOMACH     • [DISCONTINUED] Misc Natural Products (GLUCOS-CHONDROIT-MSM COMPLEX PO) Take  by mouth.     • Ascorbic Acid (VITAMIN C) 1000 MG Tab Take  by mouth.     • [DISCONTINUED] Probiotic Product (PROBIOTIC DAILY PO) Take  by mouth.     • [DISCONTINUED] coenzyme Q-10 30 MG capsule Take 60 mg by mouth every day.       No facility-administered encounter medications on file as of 2/8/2021.      Review of Systems   Constitutional: Negative for chills and fever.   HENT: Negative for congestion.    Respiratory: Negative for cough and shortness of breath.    Cardiovascular: Negative for chest pain, palpitations, orthopnea, leg swelling and PND.   Gastrointestinal: Positive for heartburn and nausea. Negative for abdominal pain.   Musculoskeletal: Positive for joint pain. Negative for myalgias.   Skin: Negative for rash.   Neurological: Negative for dizziness, loss of consciousness and headaches.   Endo/Heme/Allergies: Does not bruise/bleed easily.   Psychiatric/Behavioral: The patient does not have insomnia.         Objective:   /72 (BP Location: Left arm, Patient Position: Sitting, BP Cuff Size: Adult)   Pulse 68   Resp 16   Ht 1.575 m (5' 2\")   Wt 76.7 kg (169 lb)   LMP 11/01/1986   SpO2 95%   BMI 30.91 kg/m²     Physical Exam   Constitutional: She is oriented to person, place, and time. She appears well-developed and well-nourished.   HENT:   Head: Normocephalic.   Eyes: " EOM are normal.   Neck: Normal range of motion. Neck supple. No JVD present.   Cardiovascular: Normal rate, regular rhythm and normal heart sounds.   Pulmonary/Chest: Effort normal and breath sounds normal. No respiratory distress. She has no wheezes. She has no rales.   Abdominal: Soft. Bowel sounds are normal. She exhibits no distension. There is no abdominal tenderness.   Musculoskeletal: Normal range of motion.         General: No edema.   Neurological: She is alert and oriented to person, place, and time.   Skin: Skin is warm and dry. No rash noted.   Psychiatric: She has a normal mood and affect.     CONCLUSIONS OF ECHOCARDIOGRAM OF 10/28/2020:  Left ventricular ejection fraction is visually estimated to be 60%.  Mild to moderate aortic insufficiency.  Mild tricuspid regurgitation.  Estimated right ventricular systolic pressure is  23 mmHg.    Lab Results   Component Value Date/Time    CHOLSTRLTOT 126 02/01/2021 08:54 AM    LDL 53 02/01/2021 08:54 AM    HDL 53 02/01/2021 08:54 AM    TRIGLYCERIDE 100 02/01/2021 08:54 AM       Lab Results   Component Value Date/Time    SODIUM 142 02/01/2021 08:54 AM    POTASSIUM 4.2 02/01/2021 08:54 AM    CHLORIDE 107 02/01/2021 08:54 AM    CO2 28 02/01/2021 08:54 AM    GLUCOSE 99 02/01/2021 08:54 AM    BUN 27 (H) 02/01/2021 08:54 AM    CREATININE 0.78 02/01/2021 08:54 AM     Lab Results   Component Value Date/Time    ALKPHOSPHAT 43 02/01/2021 08:54 AM    ASTSGOT 20 02/01/2021 08:54 AM    ALTSGPT 17 02/01/2021 08:54 AM    TBILIRUBIN 0.6 02/01/2021 08:54 AM        Assessment:     1. Essential hypertension  losartan (COZAAR) 100 MG Tab   2. Coronary artery disease involving native coronary artery of native heart without angina pectoris     3. Mixed hyperlipidemia     4. JOHN (obstructive sleep apnea)         Medical Decision Making:  Today's Assessment / Status / Plan:     1. Hypertension, well controlled on Losartan 100mg once daily. Continue same dose. Periodically check BP at  home.    2. CAD, status post PCI/FARZAD to the mid LAD in October 2018, on ASA, ARB and statin. Recent echocardiogram was normal.    3. Hyperlipidemia, treated with Lipitor 40mg. Recent LDL was 53 (at goal). Keep working on diet and trying to increase activity/exercise.    4. Obstructive sleep apnea, uses CPAP, recently refitted for mask, doing well.    She remains stable at this time. Same medications for now. FU with Dr. Rivers in October 2021; FU sooner if clinical condition changes.

## 2021-03-18 ENCOUNTER — HOSPITAL ENCOUNTER (EMERGENCY)
Facility: MEDICAL CENTER | Age: 81
End: 2021-03-18
Attending: EMERGENCY MEDICINE
Payer: MEDICARE

## 2021-03-18 ENCOUNTER — TELEPHONE (OUTPATIENT)
Dept: CARDIOLOGY | Facility: MEDICAL CENTER | Age: 81
End: 2021-03-18

## 2021-03-18 VITALS
OXYGEN SATURATION: 97 % | RESPIRATION RATE: 16 BRPM | HEIGHT: 62 IN | HEART RATE: 60 BPM | TEMPERATURE: 98.5 F | DIASTOLIC BLOOD PRESSURE: 86 MMHG | BODY MASS INDEX: 31.28 KG/M2 | WEIGHT: 169.97 LBS | SYSTOLIC BLOOD PRESSURE: 195 MMHG

## 2021-03-18 DIAGNOSIS — I16.0 HYPERTENSIVE URGENCY: ICD-10-CM

## 2021-03-18 LAB
ANION GAP SERPL CALC-SCNC: 10 MMOL/L (ref 7–16)
BASOPHILS # BLD AUTO: 0.9 % (ref 0–1.8)
BASOPHILS # BLD: 0.05 K/UL (ref 0–0.12)
BUN SERPL-MCNC: 19 MG/DL (ref 8–22)
CALCIUM SERPL-MCNC: 10.1 MG/DL (ref 8.4–10.2)
CHLORIDE SERPL-SCNC: 106 MMOL/L (ref 96–112)
CO2 SERPL-SCNC: 24 MMOL/L (ref 20–33)
CREAT SERPL-MCNC: 0.82 MG/DL (ref 0.5–1.4)
EKG IMPRESSION: NORMAL
EOSINOPHIL # BLD AUTO: 0.12 K/UL (ref 0–0.51)
EOSINOPHIL NFR BLD: 2.2 % (ref 0–6.9)
ERYTHROCYTE [DISTWIDTH] IN BLOOD BY AUTOMATED COUNT: 46 FL (ref 35.9–50)
GLUCOSE SERPL-MCNC: 95 MG/DL (ref 65–99)
HCT VFR BLD AUTO: 43.8 % (ref 37–47)
HGB BLD-MCNC: 14 G/DL (ref 12–16)
IMM GRANULOCYTES # BLD AUTO: 0.01 K/UL (ref 0–0.11)
IMM GRANULOCYTES NFR BLD AUTO: 0.2 % (ref 0–0.9)
LYMPHOCYTES # BLD AUTO: 1.5 K/UL (ref 1–4.8)
LYMPHOCYTES NFR BLD: 27.8 % (ref 22–41)
MCH RBC QN AUTO: 27.9 PG (ref 27–33)
MCHC RBC AUTO-ENTMCNC: 32 G/DL (ref 33.6–35)
MCV RBC AUTO: 87.4 FL (ref 81.4–97.8)
MONOCYTES # BLD AUTO: 0.36 K/UL (ref 0–0.85)
MONOCYTES NFR BLD AUTO: 6.7 % (ref 0–13.4)
NEUTROPHILS # BLD AUTO: 3.36 K/UL (ref 2–7.15)
NEUTROPHILS NFR BLD: 62.2 % (ref 44–72)
NRBC # BLD AUTO: 0 K/UL
NRBC BLD-RTO: 0 /100 WBC
PLATELET # BLD AUTO: 252 K/UL (ref 164–446)
PMV BLD AUTO: 10.4 FL (ref 9–12.9)
POTASSIUM SERPL-SCNC: 4 MMOL/L (ref 3.6–5.5)
RBC # BLD AUTO: 5.01 M/UL (ref 4.2–5.4)
SODIUM SERPL-SCNC: 140 MMOL/L (ref 135–145)
TROPONIN T SERPL-MCNC: 8 NG/L (ref 6–19)
WBC # BLD AUTO: 5.4 K/UL (ref 4.8–10.8)

## 2021-03-18 PROCEDURE — 700102 HCHG RX REV CODE 250 W/ 637 OVERRIDE(OP): Performed by: EMERGENCY MEDICINE

## 2021-03-18 PROCEDURE — 80048 BASIC METABOLIC PNL TOTAL CA: CPT

## 2021-03-18 PROCEDURE — 93005 ELECTROCARDIOGRAM TRACING: CPT

## 2021-03-18 PROCEDURE — 99283 EMERGENCY DEPT VISIT LOW MDM: CPT

## 2021-03-18 PROCEDURE — 36415 COLL VENOUS BLD VENIPUNCTURE: CPT

## 2021-03-18 PROCEDURE — A9270 NON-COVERED ITEM OR SERVICE: HCPCS | Performed by: EMERGENCY MEDICINE

## 2021-03-18 PROCEDURE — 84484 ASSAY OF TROPONIN QUANT: CPT

## 2021-03-18 PROCEDURE — 85025 COMPLETE CBC W/AUTO DIFF WBC: CPT

## 2021-03-18 RX ORDER — HYDRALAZINE HYDROCHLORIDE 25 MG/1
25 TABLET, FILM COATED ORAL ONCE
Status: COMPLETED | OUTPATIENT
Start: 2021-03-18 | End: 2021-03-18

## 2021-03-18 RX ADMIN — HYDRALAZINE HYDROCHLORIDE 25 MG: 25 TABLET, FILM COATED ORAL at 16:58

## 2021-03-18 ASSESSMENT — ENCOUNTER SYMPTOMS
ABDOMINAL PAIN: 0
BACK PAIN: 0
EYE REDNESS: 0
SORE THROAT: 0
NECK PAIN: 0
CHILLS: 0
FOCAL WEAKNESS: 0
FEVER: 0
HEADACHES: 0
COUGH: 0
VOMITING: 0
BLURRED VISION: 0
SEIZURES: 0
SHORTNESS OF BREATH: 0

## 2021-03-18 ASSESSMENT — PAIN SCALES - WONG BAKER: WONGBAKER_NUMERICALRESPONSE: HURTS JUST A LITTLE BIT

## 2021-03-18 ASSESSMENT — FIBROSIS 4 INDEX: FIB4 SCORE: 1.4

## 2021-03-18 NOTE — DISCHARGE INSTRUCTIONS
You were seen in the Emergency Department for high blood pressure.    EKG, labs were completed without significant acute abnormalities.    Please use 1,000mg of tylenol or 600mg of ibuprofen every 6 hours as needed for pain.  Continue taking blood pressure medication as directed.  Take a daily log over the next week and follow-up with your cardiologist.    Please follow up with your primary care physician.    Return to the Emergency Department with severe headaches, chest pain, trouble breathing, or other concerns.

## 2021-03-18 NOTE — TELEPHONE ENCOUNTER
AB    Patient called and was seen at another providers office and was told her BP was 215/90. She is leaving the appointment now. She feels no other symptoms, she is just a bit scared with that number, I did advise to go to ER if needed. Please call [phone].    Thank you,  Radha BLANCO

## 2021-03-18 NOTE — TELEPHONE ENCOUNTER
I spoke with her, but she is in the ER at this time.  I asked her to call tomorrow if needed, or if not, call back after monitoring her BP over the weekend so we can see how she is doing.  She will comply.

## 2021-03-18 NOTE — ED PROVIDER NOTES
"ED Provider Note      CHIEF COMPLAINT  Chief Complaint   Patient presents with   • Hypertension       HPI  Sheyla Gauthier is a 80 y.o. female with history of coronary artery disease, hypertension, sleep apnea who presents with high blood pressure.  The patient was seen for follow-up with her back doctor today and was found to have high blood pressure therefore was sent here for further evaluation.  She states she had an episode of lightheadedness or dizziness yesterday that occurred upon standing after bending over however is otherwise been asymptomatic.  She possibly has \"fullness\" in her head however denies any severe headaches.  No chest pain or shortness of breath.  She has been going to the bathroom normally.  The patient takes 100 mg of losartan daily for blood pressure and has been compliant with this medication.  This was last recently increased about 6 months ago by her cardiologist Dr. Rivers.  No other recent medication changes.    REVIEW OF SYSTEMS  See HPI for further details.   Review of Systems   Constitutional: Negative for chills and fever.   HENT: Negative for sore throat.    Eyes: Negative for blurred vision and redness.   Respiratory: Negative for cough and shortness of breath.    Cardiovascular: Negative for chest pain and leg swelling.   Gastrointestinal: Negative for abdominal pain and vomiting.   Genitourinary: Negative for dysuria and urgency.   Musculoskeletal: Negative for back pain and neck pain.   Skin: Negative for rash.   Neurological: Negative for focal weakness, seizures and headaches.   Psychiatric/Behavioral: Negative for suicidal ideas.         PAST MEDICAL HISTORY   has a past medical history of Anxiety, Arthritis, Back pain, CAD (coronary artery disease) (10/2018), Cataract, Daytime sleepiness, Depression, Dyslipidemia, Frequent headaches, Herpes zoster, Hypertension, MHA (microangiopathic hemolytic anemia), Migraine syndrome ( ), Mumps, Osteopathia, Osteoporosis, " "Painful joint, Post-menopause on HRT (hormone replacement therapy), Sleep apnea, Thyroid disease, and White matter abnormality on MRI of brain.    SOCIAL HISTORY  Social History     Tobacco Use   • Smoking status: Former Smoker     Packs/day: 0.02     Types: Cigarettes     Quit date: 1979     Years since quittin.6   • Smokeless tobacco: Never Used   • Tobacco comment: smoked off & on for 10 yrs   Substance and Sexual Activity   • Alcohol use: Not Currently     Alcohol/week: 0.0 oz     Comment: occassional   • Drug use: No   • Sexual activity: Not Currently     Comment: , 3 kids, retired       SURGICAL HISTORY   has a past surgical history that includes cholecystectomy (); abdominal hysterectomy total (1986); breast reduction (); foot surgery (); breast augmentation with implant; mammoplasty augmentation; bunionectomy (Left, ); eye surgery; hysterectomy laparoscopy; and arthroscopy, knee.    CURRENT MEDICATIONS  Home Medications    **Home medications have not yet been reviewed for this encounter**         ALLERGIES  Allergies   Allergen Reactions   • Trazodone Swelling     Pt stated that she had swelling on her hands and feet's    • Crestor [Rosuvastatin Calcium]      Myalgias       PHYSICAL EXAM   VITAL SIGNS: BP (!) 195/86   Pulse 60   Temp 36.9 °C (98.5 °F) (Temporal)   Resp 16   Ht 1.575 m (5' 2\")   Wt 77.1 kg (169 lb 15.6 oz)   LMP 1986   SpO2 97%   BMI 31.09 kg/m²      Physical Exam   Constitutional: She is oriented to person, place, and time and well-developed, well-nourished, and in no distress. No distress.   Well-appearing older female   HENT:   Head: Normocephalic and atraumatic.   Eyes: Pupils are equal, round, and reactive to light. Conjunctivae are normal.   Cardiovascular: Normal rate, regular rhythm and normal heart sounds.   Pulmonary/Chest: Effort normal and breath sounds normal. No respiratory distress.   Abdominal: Soft. She exhibits no distension. " There is no abdominal tenderness.   Musculoskeletal:         General: No tenderness or edema. Normal range of motion.      Cervical back: Normal range of motion and neck supple.   Neurological: She is alert and oriented to person, place, and time.   Moving all extremities spontaneously   Skin: Skin is warm and dry.   Psychiatric: Affect normal.         DIAGNOSTIC STUDIES    EKG  Results for orders placed or performed during the hospital encounter of 21   EKG   Result Value Ref Range    Report       [facility] Emergency Dept.    Test Date:  2021  Pt Name:    MELISSA PATEL               Department: [facility]  MRN:        [id number]                      [facility]  Gender:     Female                       Technician: ELENA  :        [id number]                   Requested By:ER TRIAGE PROTOCOL  Order #:    [id number]                    Reading MD: Alexa Nash MD    Measurements  Intervals                                Axis  Rate:       64                           P:          52  SD:         137                          QRS:        -17  QRSD:       90                           T:          27  QT:         398  QTc:        411    Interpretive Statements  Sinus rhythm  Normal axis and intervals  Left ventricular hypertrophy  No ST or T wave change  No previous ECG available for comparison  Electronically Signed On 3- 16:45:47 PDT by Alexa Nash MD             LABS  Personally reviewed by me  Labs Reviewed   CBC WITH DIFFERENTIAL - Abnormal; Notable for the following components:       Result Value    MCHC 32.0 (*)     All other components within normal limits    Narrative:     Release to patient->Immediate   BASIC METABOLIC PANEL    Narrative:     Release to patient->Immediate   TROPONIN    Narrative:     Release to patient->Immediate   ESTIMATED GFR    Narrative:     Release to patient->Immediate           ED COURSE  Vitals:    21 1638 21 1641 21 1711  03/18/21 1718   BP: (!) 216/97 (!) 196/89 (!) 191/92 (!) 195/86   Pulse: 62 60 (!) 57 60   Resp:    16   Temp:    36.9 °C (98.5 °F)   TempSrc:    Temporal   SpO2: 95% 95% 94% 97%   Weight:       Height:             Medications administered:  Medications   hydrALAZINE (APRESOLINE) tablet 25 mg (25 mg Oral Given 3/18/21 1658)         Old records personally reviewed:  Reviewed prior cardiology records.        MEDICAL DECISION MAKING  Patient with history of CAD and hypertension who presents after being referred from clinic for elevated blood pressure.  She is completely asymptomatic at this time.  She is hypertensive with otherwise reassuring vital signs.  She has no neurologic deficits concerning for hypertensive emergency or signs of endorgan damage.  Her EKG is reassuring without ischemia or arrhythmia.  Labs are normal without renal dysfunction and her troponin is negative.  Patient has been compliant with her blood pressure medication.  She will be given an additional dose of hydralazine here and discharged home with close outpatient follow-up.  She understands importance of taking her blood pressure daily for the next week and maintaining a log until she is able to see her cardiologist.  Patient understands plan of care and strict return precautions for changing or worsening symptoms.          IMPRESSION  (I16.0) Hypertensive urgency    Disposition: Discharge home, stable condition  Results, diagnoses, and treatment options were discussed with the patient and/or family. Patient verbalized understanding of plan of care.    Patient referred to primary care provider for monitoring and treatment of blood pressure.      Discharge Medication List as of 3/18/2021  5:15 PM              Electronically signed by: Alexa Nash M.D., 3/18/2021 4:47 PM

## 2021-03-18 NOTE — ED TRIAGE NOTES
Pt amb to triage c/o hypertension; hx of. Pt states bp 'higher than usual'. Pt seen at pain management clinic today for an MRI of her back, bp was high at the clinic office and pt instr to go to er. Pt c/o slight headache; denies dizziness.

## 2021-03-19 NOTE — ED NOTES
Discharge instructions given to pt with verbalized understanding.  Pt getting dressed at this time.

## 2021-03-23 ENCOUNTER — TELEPHONE (OUTPATIENT)
Dept: CARDIOLOGY | Facility: MEDICAL CENTER | Age: 81
End: 2021-03-23

## 2021-03-23 NOTE — TELEPHONE ENCOUNTER
AB    Patient would like a call back regarding her BP. She did go to the ER on Thursday. She wanted to talk about what could be raising her BP as she does have spine issues. She is scheduled for an Epidural on Friday. Her BP has been:    Highest on 219/93 on 3/17/2021    8:30 This Morning  169/91 P 65    Thank you,    Radha BLANCO

## 2021-03-23 NOTE — TELEPHONE ENCOUNTER
Spoke with pt. She went to the ER last THURS. Pt. Was released after being treated with Hydralazine. Pt. Takes Losartan 100mg daily.   She then contacted PCP, Dr. Morataya, who told her to add Amlodipine 2.5mg BID to her meds; pt. Started this yesterday. Pt has appointment scheduled with Dr. Morataya tomorrow.  This am UO=428/91 at 8:15am before meds.  BP fbj=840/94, p=68. Offered pt. An appointment with APRN here today, but she decided to call Dr. Morataya's office for recommendations since she is seeing him tomorrow.

## 2021-03-24 ENCOUNTER — OFFICE VISIT (OUTPATIENT)
Dept: MEDICAL GROUP | Age: 81
End: 2021-03-24
Payer: MEDICARE

## 2021-03-24 VITALS
SYSTOLIC BLOOD PRESSURE: 115 MMHG | HEIGHT: 62 IN | HEART RATE: 67 BPM | OXYGEN SATURATION: 96 % | DIASTOLIC BLOOD PRESSURE: 70 MMHG | WEIGHT: 165 LBS | TEMPERATURE: 98 F | BODY MASS INDEX: 30.36 KG/M2

## 2021-03-24 DIAGNOSIS — I10 ESSENTIAL HYPERTENSION: ICD-10-CM

## 2021-03-24 PROCEDURE — 99214 OFFICE O/P EST MOD 30 MIN: CPT | Performed by: FAMILY MEDICINE

## 2021-03-24 RX ORDER — AMLODIPINE BESYLATE 5 MG/1
5 TABLET ORAL 2 TIMES DAILY
Qty: 180 TABLET | Refills: 0 | Status: SHIPPED | OUTPATIENT
Start: 2021-03-24 | End: 2021-04-20 | Stop reason: SDUPTHER

## 2021-03-24 ASSESSMENT — PATIENT HEALTH QUESTIONNAIRE - PHQ9
7. TROUBLE CONCENTRATING ON THINGS, SUCH AS READING THE NEWSPAPER OR WATCHING TELEVISION: NOT AT ALL
SUM OF ALL RESPONSES TO PHQ9 QUESTIONS 1 AND 2: 0
SUM OF ALL RESPONSES TO PHQ QUESTIONS 1-9: 3
5. POOR APPETITE OR OVEREATING: NOT AT ALL
4. FEELING TIRED OR HAVING LITTLE ENERGY: NEARLY EVERY DAY
3. TROUBLE FALLING OR STAYING ASLEEP OR SLEEPING TOO MUCH: NOT AT ALL
9. THOUGHTS THAT YOU WOULD BE BETTER OFF DEAD, OR OF HURTING YOURSELF: NOT AT ALL
1. LITTLE INTEREST OR PLEASURE IN DOING THINGS: NOT AT ALL
2. FEELING DOWN, DEPRESSED, IRRITABLE, OR HOPELESS: NOT AT ALL
8. MOVING OR SPEAKING SO SLOWLY THAT OTHER PEOPLE COULD HAVE NOTICED. OR THE OPPOSITE, BEING SO FIGETY OR RESTLESS THAT YOU HAVE BEEN MOVING AROUND A LOT MORE THAN USUAL: NOT AT ALL
6. FEELING BAD ABOUT YOURSELF - OR THAT YOU ARE A FAILURE OR HAVE LET YOURSELF OR YOUR FAMILY DOWN: NOT AL ALL

## 2021-03-24 ASSESSMENT — FIBROSIS 4 INDEX: FIB4 SCORE: 1.52

## 2021-03-24 NOTE — PROGRESS NOTES
This medical record contains text that has been entered with the assistance of computer voice recognition and dictation software.  Therefore, it may contain unintended errors in text, spelling, punctuation, or grammar      Chief Complaint   Patient presents with   • Hypertension Follow-up         Sheyla Gauthier is a 80 y.o. female here evaluation and management of: Hypertension      HPI:     1. Essential hypertension  UNCONTROLLED CONDITION    Patient is a very pleasant 80-year-old female who was recently seen in emergency room for elevated blood pressure.  The patient called her cardiologist and stated that her blood pressure was 215/90 and she was asymptomatic.  This is why she was seen in the emergency room she had been compliant with her losartan 100 mg p.o. daily.  We started the patient on amlodipine 5 mg, while we continue the losartan but her blood pressure was still elevated she stated 165/95 at home.  As result through our messaging via [employer] I increased her amlodipine to 10 mg she was taking it 5 mg twice daily.  This has appeared to control her blood pressure.  The patient states she does have some swelling in the bilateral lower extremities but it is not new.  She denies any chest pain, no presyncopal syncopal episodes, no headaches no changes in vision.    Current medicines (including changes today)  Current Outpatient Medications   Medication Sig Dispense Refill   • amLODIPine (NORVASC) 5 MG Tab Take 1 tablet by mouth 2 Times a Day. 180 tablet 0   • levothyroxine (LEVOXYL) 88 MCG Tab 1 tablet in the morning on an empty stomach     • Meloxicam 10 MG Cap      • losartan (COZAAR) 100 MG Tab Take 1 Tab by mouth every day. 90 Tab 3   • atorvastatin (LIPITOR) 40 MG Tab Take 1 Tab by mouth every bedtime. 30 Tab 5   • liothyronine (CYTOMEL) 5 MCG Tab TAKE 1/2 TAB BY MOUTH TWICE A DAY     • denosumab (PROLIA) 60 MG/ML Solution Prefilled Syringe Inject 60 mg as instructed Once.     • vitamin D  (CHOLECALCIFEROL) 1000 UNIT Tab Take 1,000 Units by mouth every day.     • aspirin (ASA) 81 MG Chew Tab chewable tablet Take 1 Tab by mouth every day. 100 Tab 3   • Ascorbic Acid (VITAMIN C) 1000 MG Tab Take  by mouth.     • Estriol 10 % Cream c-estriol 0.5mg gm cream     • Magnesium 400 MG Tab Take  by mouth.       No current facility-administered medications for this visit.     She  has a past medical history of Anxiety, Arthritis, Back pain, CAD (coronary artery disease) (10/2018), Cataract, Daytime sleepiness, Depression, Dyslipidemia, Frequent headaches, Herpes zoster, Hypertension, MHA (microangiopathic hemolytic anemia), Migraine syndrome ( ), Mumps, Osteopathia, Osteoporosis, Painful joint, Post-menopause on HRT (hormone replacement therapy), Sleep apnea, Thyroid disease, and White matter abnormality on MRI of brain.  She  has a past surgical history that includes cholecystectomy (); abdominal hysterectomy total (1986); pr breast reduction (); foot surgery (); pr breast augmentation with implant; mammoplasty augmentation; bunionectomy (Left, ); eye surgery; hysterectomy laparoscopy; and arthroscopy, knee.  Social History     Tobacco Use   • Smoking status: Former Smoker     Packs/day: 0.02     Types: Cigarettes     Quit date: 1979     Years since quittin.6   • Smokeless tobacco: Never Used   • Tobacco comment: smoked off & on for 10 yrs   Substance Use Topics   • Alcohol use: Not Currently     Alcohol/week: 0.0 oz     Comment: occassional   • Drug use: No     Social History     Social History Narrative   • Not on file     Family History   Problem Relation Age of Onset   • Stroke Father    • Arthritis Father    • Hyperlipidemia Father    • Hypertension Father    • Cancer Sister         breast   • Arthritis Sister    • Arthritis Mother    • Hypertension Mother    • Hyperlipidemia Mother    • Stroke Mother    • Arthritis Brother    • Cancer Brother    • Hypertension Brother    •  "Hyperlipidemia Brother    • Heart Disease Brother         CABG for prox LAD in late 40s   • Cancer Daughter         breast     Family Status   Relation Name Status   • Fa     • Sis  Alive   • Mo          tia   • Bro  Alive   • Ayesha  (Not Specified)         ROS    The pertinent  ROS findings can be seen in the HPI above.     All other systems reviewed and are negative     Objective:     /70 (BP Location: Right arm, Patient Position: Sitting, BP Cuff Size: Adult)   Pulse 67   Temp 36.7 °C (98 °F) (Temporal)   Ht 1.575 m (5' 2\")   Wt 74.8 kg (165 lb)   SpO2 96%  Body mass index is 30.18 kg/m².      Physical Exam:    Constitutional: Alert, no distress.  Skin: No rashes  Eye: Equal, round and reactive, conjunctiva clear, lids normal.  ENMT: Lips without lesions, good dentition, oropharynx clear.  Neck: Trachea midline, no masses, no thyromegaly. No cervical or supraclavicular lymphadenopathy.  Respiratory: Unlabored respiratory effort, lungs clear to auscultation, no wheezes, no ronchi.  Cardiovascular: Normal S1, S2, no murmur, 2+ nonpitting edema bilaterally up to knees  Abdomen: Soft, non-tender, no masses, no hepatosplenomegaly.        Assessment and Plan:   The following treatment plan was discussed      1. Essential hypertension  Appears to be better controlled with the current regimen.  She is not complaining of any presyncopal or syncopal episodes no weakness.    We will continue current regimen unless cardiology would like to change any meds.    - amLODIPine (NORVASC) 5 MG Tab; Take 1 tablet by mouth 2 Times a Day.  Dispense: 180 tablet; Refill: 0            Instructed to Follow up in clinic or ER for worsening symptoms, difficulty breathing, lack of expected recovery, or should new symptoms or problems arise.    Followup: Return in about 4 weeks (around 2021) for Reevaluation cardiology.             "

## 2021-03-25 NOTE — TELEPHONE ENCOUNTER
She got a low reading at PCP's office yesterday, but didn't believe it. She is now taking Amlodipine 5 mg BID per Dr Morataya.  Was told to watch for tiredness and ankle swelling.      Sheyla informs me that she has been taking Losartan in the am, AND that she takes her BP prior to her dose.  She is going to change her routine to taking her BP at least 2 hours after her dose and report those back to us via MY CHART.

## 2021-04-19 ENCOUNTER — TELEPHONE (OUTPATIENT)
Dept: CARDIOLOGY | Facility: MEDICAL CENTER | Age: 81
End: 2021-04-19

## 2021-04-19 DIAGNOSIS — E78.2 MIXED HYPERLIPIDEMIA: ICD-10-CM

## 2021-04-19 DIAGNOSIS — I25.10 CORONARY ARTERY DISEASE INVOLVING NATIVE CORONARY ARTERY OF NATIVE HEART WITHOUT ANGINA PECTORIS: ICD-10-CM

## 2021-04-19 DIAGNOSIS — I10 ESSENTIAL HYPERTENSION: ICD-10-CM

## 2021-04-19 RX ORDER — ATORVASTATIN CALCIUM 40 MG/1
TABLET, FILM COATED ORAL
Qty: 90 TABLET | Refills: 3 | Status: SHIPPED | OUTPATIENT
Start: 2021-04-19 | End: 2022-03-28 | Stop reason: SDUPTHER

## 2021-04-19 NOTE — TELEPHONE ENCOUNTER
"Pt. Has reduced Amlodipine to 2.5mg in the evening only. Initially Dr. Morataya RX'd it when BP's were elevated on Losartan 100mg QD.   Pt. Has been taking various doses. BP's became lower  - 102-95/60-70's - and pt. Noted she felt \"exhausted\" with the low BP's. Now NE=501/75 on Amlodipine 2.5mg Q PM.  Pt. Was cautioned about getting recommendations from multiple providers.  She wants to get refills from [facility]. Okay to refill?    Sheyla farias Amy, A.P.N. 4 hours ago (12:05 PM)   MS  Giles [facility],  Please refill Amlodipine 2.5mg tabs. I currently take one 2.5 @ bedtime.   Thank you.  Sheyla Gauthier    "

## 2021-04-20 RX ORDER — AMLODIPINE BESYLATE 2.5 MG/1
2.5 TABLET ORAL DAILY
Qty: 90 TABLET | Refills: 3 | Status: SHIPPED | OUTPATIENT
Start: 2021-04-20 | End: 2021-09-24

## 2021-05-05 ENCOUNTER — PATIENT MESSAGE (OUTPATIENT)
Dept: SLEEP MEDICINE | Facility: MEDICAL CENTER | Age: 81
End: 2021-05-05

## 2021-05-05 NOTE — TELEPHONE ENCOUNTER
From: Sheyla Gauthier  To: Physician Lisette Javed  Sent: 5/5/2021 1:44 PM PDT  Subject: Prescription Question    Hello Dr Burroughs,  I just completed a visit w/ my dentist re swollen gums. He's very concerned about the Capap I'm using and also the air pressure that is causing irritation above the gum line. I would like to make an appt so you or someone can take look @ my gums and Cpap unit.   I could not make an appt today, schedulers were not available.  Thank you,  Sheyla Gauthier

## 2021-05-07 NOTE — PATIENT COMMUNICATION
Per Dr. Javed Please schedule her a f/u.  Called pt and schedule OV with Cherelle HINOJOSA on 5/14/2021 2 10:40 am

## 2021-05-25 ENCOUNTER — PATIENT MESSAGE (OUTPATIENT)
Dept: SLEEP MEDICINE | Facility: MEDICAL CENTER | Age: 81
End: 2021-05-25

## 2021-05-25 NOTE — TELEPHONE ENCOUNTER
From: Sheyla Gauthier  To: Physician Lisette Javed  Sent: 5/25/2021 12:29 PM PDT  Subject: Non-Urgent Medical Question    Hello Dr. Javed,    I have an appointment July 12 and look forward to seeing you then.  In the meantime, I would like to understand how my current CPAP is working and what are my numbers. I know there is a chip in my unit but who reads that? How is that information retrieved and shared with me?Also, I am interested in using a mouthguard by [employer] instead of the CPAP. I am in the process scheduling a meeting witn a local dentist but wanted to consult with you first.    I look forward to hearing from you soon.    Thank you,  Sheyla Gauthier  [phone]

## 2021-05-28 ENCOUNTER — HOSPITAL ENCOUNTER (OUTPATIENT)
Facility: MEDICAL CENTER | Age: 81
End: 2021-05-28
Attending: OBSTETRICS & GYNECOLOGY
Payer: MEDICARE

## 2021-05-28 LAB
APPEARANCE UR: ABNORMAL
BACTERIA #/AREA URNS HPF: ABNORMAL /HPF
BILIRUB UR QL STRIP.AUTO: NEGATIVE
COLOR UR: YELLOW
EPI CELLS #/AREA URNS HPF: NEGATIVE /HPF
GLUCOSE UR STRIP.AUTO-MCNC: NEGATIVE MG/DL
HYALINE CASTS #/AREA URNS LPF: ABNORMAL /LPF
KETONES UR STRIP.AUTO-MCNC: NEGATIVE MG/DL
LEUKOCYTE ESTERASE UR QL STRIP.AUTO: ABNORMAL
MICRO URNS: ABNORMAL
NITRITE UR QL STRIP.AUTO: POSITIVE
PH UR STRIP.AUTO: 5.5 [PH] (ref 5–8)
PROT UR QL STRIP: NEGATIVE MG/DL
RBC # URNS HPF: ABNORMAL /HPF
RBC UR QL AUTO: NEGATIVE
SP GR UR STRIP.AUTO: 1.02
UROBILINOGEN UR STRIP.AUTO-MCNC: 0.2 MG/DL
WBC #/AREA URNS HPF: ABNORMAL /HPF

## 2021-05-28 PROCEDURE — 81001 URINALYSIS AUTO W/SCOPE: CPT

## 2021-05-28 PROCEDURE — 87186 SC STD MICRODIL/AGAR DIL: CPT

## 2021-05-28 PROCEDURE — 87077 CULTURE AEROBIC IDENTIFY: CPT | Mod: 91

## 2021-05-28 PROCEDURE — 87086 URINE CULTURE/COLONY COUNT: CPT

## 2021-05-30 LAB
BACTERIA UR CULT: ABNORMAL
BACTERIA UR CULT: ABNORMAL
SIGNIFICANT IND 70042: ABNORMAL
SITE SITE: ABNORMAL
SOURCE SOURCE: ABNORMAL

## 2021-06-10 ENCOUNTER — HOSPITAL ENCOUNTER (OUTPATIENT)
Dept: LAB | Facility: MEDICAL CENTER | Age: 81
End: 2021-06-10
Attending: INTERNAL MEDICINE
Payer: MEDICARE

## 2021-06-10 ENCOUNTER — PATIENT MESSAGE (OUTPATIENT)
Dept: SLEEP MEDICINE | Facility: MEDICAL CENTER | Age: 81
End: 2021-06-10

## 2021-06-10 LAB
EST. AVERAGE GLUCOSE BLD GHB EST-MCNC: 114 MG/DL
HBA1C MFR BLD: 5.6 % (ref 4–5.6)
T4 FREE SERPL-MCNC: 1.3 NG/DL (ref 0.93–1.7)
TSH SERPL DL<=0.005 MIU/L-ACNC: 0.4 UIU/ML (ref 0.38–5.33)

## 2021-06-10 PROCEDURE — 84443 ASSAY THYROID STIM HORMONE: CPT

## 2021-06-10 PROCEDURE — 36415 COLL VENOUS BLD VENIPUNCTURE: CPT

## 2021-06-10 PROCEDURE — 83036 HEMOGLOBIN GLYCOSYLATED A1C: CPT | Mod: GA

## 2021-06-10 PROCEDURE — 84439 ASSAY OF FREE THYROXINE: CPT

## 2021-06-10 NOTE — TELEPHONE ENCOUNTER
From: Sheyla Gauthier  To: Physician Lisette Javed  Sent: 6/10/2021 9:17 AM PDT  Subject: Non-Urgent Medical Question    Good Morning Dr Burroughs,  I called Medicare yesterday regarding the use of a mouthguard vs the current use of the Cpap unit for sleep apnea.   In order for Medicare to cover charges for the [employer] mouthguard used for sleep apnea my doctor knowing the criteria Medicare requires must submit for medical approval of payment.   Based on my history, do I qualify based on the criteria required by Medicare?   Thank you again for your help in this matter.  Sheyla Gauthier

## 2021-07-02 ENCOUNTER — OUTPATIENT INFUSION SERVICES (OUTPATIENT)
Dept: ONCOLOGY | Facility: MEDICAL CENTER | Age: 81
End: 2021-07-02
Attending: FAMILY MEDICINE
Payer: MEDICARE

## 2021-07-02 VITALS
HEART RATE: 79 BPM | SYSTOLIC BLOOD PRESSURE: 152 MMHG | WEIGHT: 164.9 LBS | OXYGEN SATURATION: 98 % | RESPIRATION RATE: 18 BRPM | BODY MASS INDEX: 31.13 KG/M2 | TEMPERATURE: 98.4 F | DIASTOLIC BLOOD PRESSURE: 74 MMHG | HEIGHT: 61 IN

## 2021-07-02 DIAGNOSIS — M81.0 AGE-RELATED OSTEOPOROSIS WITHOUT CURRENT PATHOLOGICAL FRACTURE: ICD-10-CM

## 2021-07-02 LAB
CA-I BLD ISE-SCNC: 1.26 MMOL/L (ref 1.1–1.3)
CREAT BLD-MCNC: 0.9 MG/DL (ref 0.5–1.4)

## 2021-07-02 PROCEDURE — 82565 ASSAY OF CREATININE: CPT

## 2021-07-02 PROCEDURE — 36415 COLL VENOUS BLD VENIPUNCTURE: CPT

## 2021-07-02 PROCEDURE — 96372 THER/PROPH/DIAG INJ SC/IM: CPT

## 2021-07-02 PROCEDURE — 700111 HCHG RX REV CODE 636 W/ 250 OVERRIDE (IP): Mod: JG | Performed by: FAMILY MEDICINE

## 2021-07-02 PROCEDURE — 82330 ASSAY OF CALCIUM: CPT

## 2021-07-02 RX ADMIN — DENOSUMAB 60 MG: 60 INJECTION SUBCUTANEOUS at 08:23

## 2021-07-02 ASSESSMENT — FIBROSIS 4 INDEX: FIB4 SCORE: 1.52

## 2021-07-02 NOTE — PROGRESS NOTES
Sheyla arrived to [facility] for Prolia injection. POC discussed with pt and she agrees with plan. I-stat labs completed, ok to proceed with treatment. Pt medicated per MAR. Pt tolerated injection without s/s adverse reaction. Sheyla discharged to self care, [facility]. Pt's next appt 1/3/2022. Pt given printout of next appt.

## 2021-07-12 ENCOUNTER — OFFICE VISIT (OUTPATIENT)
Dept: SLEEP MEDICINE | Facility: MEDICAL CENTER | Age: 81
End: 2021-07-12
Payer: MEDICARE

## 2021-07-12 VITALS
DIASTOLIC BLOOD PRESSURE: 72 MMHG | BODY MASS INDEX: 29.63 KG/M2 | RESPIRATION RATE: 14 BRPM | HEART RATE: 85 BPM | WEIGHT: 161 LBS | OXYGEN SATURATION: 95 % | HEIGHT: 62 IN | SYSTOLIC BLOOD PRESSURE: 134 MMHG

## 2021-07-12 DIAGNOSIS — G47.33 OSA (OBSTRUCTIVE SLEEP APNEA): Chronic | ICD-10-CM

## 2021-07-12 PROCEDURE — 99213 OFFICE O/P EST LOW 20 MIN: CPT | Performed by: FAMILY MEDICINE

## 2021-07-12 ASSESSMENT — FIBROSIS 4 INDEX: FIB4 SCORE: 1.52

## 2021-07-12 NOTE — PROGRESS NOTES
[facility] Sleep [facility] Follow Up Note     Date: 7/12/2021 / Time: 8:09 AM    Patient ID:   Name:             Sheyla Gauthier   YOB: 1940  Age:                 80 y.o.  female   MRN:               [id number]      Thank you for requesting a sleep medicine consultation on Sheyla Gauthier at the sleep center. She presents today with the chief complaints of JOHN follow up.     HISTORY OF PRESENT ILLNESS:       Pt is currently on CPAP 10 cm. She goes to sleep around 11pm-12:30 am and wakes up around 6:30 am. She is getting about 6 hrs of sleep on a good night and about 5 hr of sleep on a bad night. The bad nights are about 2 per week. She has hard time falling asleep due to mask intolerance.She does not take regular naps.She drinks about 1 caffeinated beverages per day. She denies any symptoms of RLS, narcolepsy or any symptoms to suggest parasomnias such as nightmares, sleep walking or acting out of dreams.      She is using CPAP most days of the week. Pt reports 5 hrs of average nightly use of CPAP. Pt denies snoring, gasping,choking.Pt also denies significant mask leak that is interfering with sleep. The 30 day compliance was downloaded which shows adequate compliance with more that 4 hr usage about 70%. The AHI is has improved to 1.3/hr. The mask leak is normal. The symptoms of JOHN has improved with the therapy.    Due to  Mask intolerance she is interested in using mandibular advancement device instead of the CPAP.  However since her machine is less than 5 years old she might not be eligible to switch her therapy.  She would like to see Dr. Palma for dental appliance.  We will send over our records and sleep study.    SLEEP HISTORY   Overnight pulse oximetry on 6/9/2020: The study was done on CPAP 10 cm. The total analyzed time was 7 hrs 0 min. O2 Sat. jose guadalupe was 88% and mean O2 sat was 89 % and baseline O2 at 90%. O2 sat was below 88% for 0 min of the flow evaluation time. Oxygen  Desaturation (>=3%) Index was 4.6/hr.      Overnight oximetry on 5/26/2020: The study was done on CPAP 9 cm. The total analyzed time was 7 hrs 0 min. O2 Sat. jose guadalupe was 84% and mean O2 sat was 88 % and baseline O2 at 89%. O2 sat was below 88% for 21.7 min of the flow evaluation time. Oxygen Desaturation (>=3%) Index was elevated at 9.6/hr.      Her prior home showed an AHI of 26.2/jose guadalupe saturation 74%. Her follow-up study 9/12/2018 with her dental device in place showed an AHI of 9.3 with a jose guadalupe saturation of 83%. He had another PSG done 6/12/2019 showed an AHI of 21.7/jose guadalupe saturation 84%. CPAP titration was performed 6/30/2019.  She did best on CPAP at 8 cm H2O with a resultant AHI of 0.0, jose guadalupe saturation 89%, mean saturation 93%, and the achievement of supine REM using a [employer] nasal pillow mask.       REVIEW OF SYSTEMS:       Constitutional: Denies fevers, Denies weight changes  Eyes: Denies changes in vision, no eye pain  Ears/Nose/Throat/Mouth: Denies nasal congestion or sore throat   Cardiovascular: Denies chest pain or palpitations   Respiratory: Denies shortness of breath , Denies cough  Gastrointestinal/Hepatic: Denies abdominal pain, nausea  Musculoskeletal/Rheum: Denies  joint pain and swelling   Skin/Breast: Denies rash,   Neurological: Denies headache, confusion  Psychiatric: denies mood disorder   Sleep: denies snoring     Comprehensive review of systems form is reviewed with the patient and is attached in the EMR.     PMH:  has a past medical history of Anxiety, Arthritis, Back pain, CAD (coronary artery disease) (10/2018), Cataract, Daytime sleepiness, Depression, Dyslipidemia, Frequent headaches, Herpes zoster, Hypertension, MHA (microangiopathic hemolytic anemia), Migraine syndrome ( ), Mumps, Osteopathia, Osteoporosis, Painful joint, Post-menopause on HRT (hormone replacement therapy), Sleep apnea, Thyroid disease, and White matter abnormality on MRI of brain.  MEDS:   Current Outpatient  Medications:   •  amLODIPine (NORVASC) 2.5 MG Tab, Take 1 tablet by mouth every day., Disp: 90 tablet, Rfl: 3  •  atorvastatin (LIPITOR) 40 MG Tab, TAKE 1 TABLET BY MOUTH EVERYDAY AT BEDTIME, Disp: 90 tablet, Rfl: 3  •  levothyroxine (LEVOXYL) 88 MCG Tab, 1 tablet in the morning on an empty stomach, Disp: , Rfl:   •  Meloxicam 10 MG Cap, , Disp: , Rfl:   •  losartan (COZAAR) 100 MG Tab, Take 1 Tab by mouth every day., Disp: 90 Tab, Rfl: 3  •  liothyronine (CYTOMEL) 5 MCG Tab, TAKE 1/2 TAB BY MOUTH TWICE A DAY, Disp: , Rfl:   •  denosumab (PROLIA) 60 MG/ML Solution Prefilled Syringe, Inject 60 mg as instructed Once., Disp: , Rfl:   •  vitamin D (CHOLECALCIFEROL) 1000 UNIT Tab, Take 1,000 Units by mouth every day., Disp: , Rfl:   •  aspirin (ASA) 81 MG Chew Tab chewable tablet, Take 1 Tab by mouth every day., Disp: 100 Tab, Rfl: 3  •  Ascorbic Acid (VITAMIN C) 1000 MG Tab, Take  by mouth., Disp: , Rfl:   •  Estriol 10 % Cream, c-estriol 0.5mg gm cream, Disp: , Rfl:   •  Magnesium 400 MG Tab, Take  by mouth., Disp: , Rfl:   ALLERGIES:   Allergies   Allergen Reactions   • Trazodone Swelling     Pt stated that she had swelling on her hands and feet's    • Crestor [Rosuvastatin Calcium]      Myalgias     SURGHX:   Past Surgical History:   Procedure Laterality Date   • BUNIONECTOMY Left 2003   • FOOT SURGERY  2002   • OH REDUCTION OF BREAST  1997   • CHOLECYSTECTOMY  1996   • ABDOMINAL HYSTERECTOMY TOTAL  11/1986   • ARTHROSCOPY, KNEE     • EYE SURGERY      cataracts   • HYSTERECTOMY LAPAROSCOPY     • MAMMOPLASTY AUGMENTATION     • OH BREAST AUGMENTATION WITH IMPLANT       SOCHX:  reports that she quit smoking about 41 years ago. Her smoking use included cigarettes. She smoked 0.02 packs per day. She has never used smokeless tobacco. She reports previous alcohol use. She reports that she does not use drugs..  FH:   Family History   Problem Relation Age of Onset   • Stroke Father    • Arthritis Father    • Hyperlipidemia  "Father    • Hypertension Father    • Cancer Sister         breast   • Arthritis Sister    • Arthritis Mother    • Hypertension Mother    • Hyperlipidemia Mother    • Stroke Mother    • Arthritis Brother    • Cancer Brother    • Hypertension Brother    • Hyperlipidemia Brother    • Heart Disease Brother         CABG for prox LAD in late 40s   • Cancer Daughter         breast         Physical Exam:  Vitals/ General Appearance:   Weight/BMI: Body mass index is 29.45 kg/m².  /72 (BP Location: Left arm, Patient Position: Sitting, BP Cuff Size: Adult)   Pulse 85   Resp 14   Ht 1.575 m (5' 2\")   Wt 73 kg (161 lb)   SpO2 95%   Vitals:    07/12/21 0807   BP: 134/72   BP Location: Left arm   Patient Position: Sitting   BP Cuff Size: Adult   Pulse: 85   Resp: 14   SpO2: 95%   Weight: 73 kg (161 lb)   Height: 1.575 m (5' 2\")       Pt. is alert and oriented to time, place and person. Cooperative and in no apparent distress.       Constitutional: Alert, no distress, well-groomed.  Skin: No rashes in visible areas.  Eye: Round. Conjunctiva clear, lids normal. No icterus.   ENMT: Lips pink without lesions, good dentition, moist mucous membranes. Phonation normal.  Neck: No masses, no thyromegaly. Moves freely without pain.  CV: Pulse as reported by patient  Respiratory: Unlabored respiratory effort, no cough or audible wheeze  Psych: Alert and oriented x3, normal affect and mood.     ASSESSMENT AND PLAN     1. Sleep Apnea    The pathophysiology of sleep anea and the increased risk of cardiovascular morbidity from untreated sleep apnea is discussed in detail with the patient.   She is urged to avoid supine sleep, weight gain and alcoholic beverages since all of these can worsen sleep apnea. She is cautioned against drowsy driving. If She feels sleepy while driving, She must pull over for a break/nap, rather than persist on the road, in the interest of She own safety and that of others on the road.   Plan   - Continue CPAP " 10 cm in meantime while she will be following with her dentist to try mandibular  advancement device    - compliance download was reviewed and discussed with the pt   - compliance was reinforced     2. Regarding treatment of other past medical problems and general health maintenance,  She is urged to follow up with PCP.

## 2021-09-20 NOTE — PROGRESS NOTES
"Subjective:   Chief Complaint:   Chief Complaint   Patient presents with   • Coronary Artery Disease     F/V Dx: Coronary artery disease involving native coronary artery of native heart without angina pectoris   • HTN (Controlled)     \"Sheyla\" Christin Gauthier is a 80 y.o. female who returns for CAD, prior stent to LAD, HTN, HLP mild to moderate aortic insufficiency, intolerance to Crestor, labile HTN.    Previously followed by Dr. Jean then Dr. Juan Carlos Vergara.  She is actually friends with Dr. Jean.    She was having shortness of breath at the time.  Had an abnormal calcium score which led to left heart angiogram and had a drug-eluting stent placed to the LAD on October 5, 2018 at [facility]. 2.5 x 22 mm Russell drug-eluting stent to the mid LAD after IFR abnormal, no other disease.   She never actually felt any better after the stent.  Normal EF.  She did have a nuclear stress test was stated fixed inferolateral defect but no ischemia and normal EF of 74%.  Has trouble with right radial access, recommended groin approach thereafter.  Was taken off Brilinta, for plavix, finished this.  Not on beta-blocker due to fatigue.  Remains on aspirin, statin.   No BB, cannot tolerate metoprolol.    Has hypertension, blood pressure still running a little high, we had increased losartan.  We thought amlodipine was causing gum problems but in hindsight, it was not.  Not tolerate beta-blockers.  In the ED March 18, 2021, I did review the discharge summary, start hypertension. Her BP was elevated in another providers office, Dr. Jean suggested she go to the ED.  High-sensitivity troponin was normal.  Then BP was low, she felt sluggish so she backed down on losartan.  At home BPS have been controlled.    Is mild to moderate aortic insufficiency by echo 10/20/2020.  On losartan.    Has hyperlipidemia, LDL 53, down from 107, on atorvastatin.  Changed cholesterol meds from pravastatin, now simvastatin.  Recalls being on crestor, " had muscle aches and atropy.  Prior Vit D def in the past, will check.  She is having aching muscles, did statin holiday, no difference so back on it.  Will order CPK.    Has some dyspnea on exertion, follows with pulmonology.    Was on Brilinta but this was changed.    Had normal PFTs.    Has obstructive sleep apnea, stopped CPAP, on mouth piece now.    Has been using tylenol for headaches.  Uses Ib sparingly.  She is having back pain, trouble walking, had procedure that helped.  Sees pain specialist.     Has a ventral hernia, no plans for surgery.    No family history of premature coronary artery disease.  Brother has spine problems and vascular dementia.  Brother has CAD, had prox LAD, CABG.  She is worried about vascular dementia, seems to run in her family.  Mother had TIAs,  around 86.  Father  at 88, he had CVA.  Former tobacco, quit around .  No history of diabetes, prior IFG, under control.  No history of autoimmune disease such as lupus or rheumatoid arthritis.  No chronic kidney disease.  Has headaches and spine problems.    She is not limited by chest pain, pressure or tightness with activity.  Has had some chest discomfort after spicy food.  Has a hernia, has lost 17 pounds.  No significant dyspnea on exertion, orthopnea.   Chronic mild lower extremity swelling.   No lightheadedness, or presyncope/syncope.   Very minor palpitations, wore monitor at [place]  No symptoms of leg claudication.   No stroke/TIA like symptoms.  Limited by some back pain.    Her  was close friends with Dr. Jean.    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  She is from [place],  her second , who was a friend of her brother.  Lives in [place]. Has friends here.  Brother in [place].    Plays bridge and part of a book club.    DATA REVIEWED by me:  3/18/2021  Sinus, 64, LVH, baseline wandering    ECG 10/6/2018  Sinus, rate 71, delayed R wave progression    Echo  10/28/2020  Left ventricular ejection fraction is visually estimated to be 60%.  Mild to moderate aortic insufficiency.  Mild tricuspid regurgitation.  Estimated right ventricular systolic pressure is  23 mmHg.     No prior study is available for comparison.     Echo Ong 4/4/2018  EF 65 to 70%, mild aortic valve sclerosis with mild to moderate AI    Left heart catheterization Ong 10/5/2018 Dr. Addison Marin  Aortic pressure 110/70, LV pressure 110/14, EF 70%, RCA large and dominant, diffuse intimal irregularities, plaque to the mid and distal RCA but no significant stenosis, PDA and PL branches widely free of disease, left main widely patent, proximal 80 widely patent, mid LAD 80% stenosis, distal and apical LAD free of disease, frequent circumflex small, no significant stenosis, IFR of 0.79%, 2.5 x 22 mm [place] drug-eluting stent to the mid LAD.  Has trouble with right radial access, recommended groin approach thereafter.    Nuclear stress test Ong 9/5/2018  Mild fixed inferolateral defect, no ischemia, EF 74%    PFTs 7/24/2019  Interpretation:  1.  Baseline spirometry shows normal air flows.  2.  There is no significant bronchodilator response.  3.  Lung volumes are within normal limits.  4.  Diffusion capacity is within normal limits.  Normal pulmonary function test.    Most recent labs:         Lab Results   Component Value Date/Time    HEMOGLOBIN 14.0 03/18/2021 03:25 PM    HEMATOCRIT 43.8 03/18/2021 03:25 PM    MCV 87.4 03/18/2021 03:25 PM      Lab Results   Component Value Date/Time    SODIUM 140 03/18/2021 03:25 PM    POTASSIUM 4.0 03/18/2021 03:25 PM    CHLORIDE 106 03/18/2021 03:25 PM    CO2 24 03/18/2021 03:25 PM    GLUCOSE 95 03/18/2021 03:25 PM    BUN 19 03/18/2021 03:25 PM    CREATININE 0.82 03/18/2021 03:25 PM      Lab Results   Component Value Date/Time    ASTSGOT 20 02/01/2021 08:54 AM    ALTSGPT 17 02/01/2021 08:54 AM    ALBUMIN 4.2 02/01/2021 08:54 AM    ALBUMIN 4.28 11/15/2013  11:42 AM      Lab Results   Component Value Date/Time    CHOLSTRLTOT 126 02/01/2021 08:54 AM    LDL 53 02/01/2021 08:54 AM    HDL 53 02/01/2021 08:54 AM    TRIGLYCERIDE 100 02/01/2021 08:54 AM         Past Medical History:   Diagnosis Date   • Anxiety    • Arthritis    • Back pain    • CAD (coronary artery disease) 10/2018    Abnormal CTCS. PCI/FARZAD ([place] 2.5 x 2mm) to the mid LAD.   • Cataract    • Daytime sleepiness    • Depression    • Dyslipidemia    • Frequent headaches    • Herpes zoster    • Hypertension    • MHA (microangiopathic hemolytic anemia)    • Migraine syndrome     • Mumps    • Osteopathia    • Osteoporosis    • Painful joint    • Post-menopause on HRT (hormone replacement therapy)    • Sleep apnea     Uses CPAP, followed by pulmonology   • Thyroid disease    • White matter abnormality on MRI of brain      Past Surgical History:   Procedure Laterality Date   • BUNIONECTOMY Left 2003   • FOOT SURGERY  2002   • ND REDUCTION OF BREAST  1997   • CHOLECYSTECTOMY  1996   • ABDOMINAL HYSTERECTOMY TOTAL  11/1986   • ARTHROSCOPY, KNEE     • EYE SURGERY      cataracts   • HYSTERECTOMY LAPAROSCOPY     • MAMMOPLASTY AUGMENTATION     • ND BREAST AUGMENTATION WITH IMPLANT       Family History   Problem Relation Age of Onset   • Stroke Father    • Arthritis Father    • Hyperlipidemia Father    • Hypertension Father    • Cancer Sister         breast   • Arthritis Sister    • Arthritis Mother    • Hypertension Mother    • Hyperlipidemia Mother    • Stroke Mother    • Arthritis Brother    • Cancer Brother    • Hypertension Brother    • Hyperlipidemia Brother    • Heart Disease Brother         CABG for prox LAD in late 40s   • Cancer Daughter         breast     Social History     Socioeconomic History   • Marital status:      Spouse name: Not on file   • Number of children: Not on file   • Years of education: Not on file   • Highest education level: Not on file   Occupational History   • Not on file   Tobacco  Use   • Smoking status: Former Smoker     Packs/day: 0.02     Types: Cigarettes     Quit date: 1979     Years since quittin.2   • Smokeless tobacco: Never Used   • Tobacco comment: smoked off & on for 10 yrs   Vaping Use   • Vaping Use: Never used   Substance and Sexual Activity   • Alcohol use: Yes     Alcohol/week: 0.0 oz     Comment: occassional   • Drug use: No   • Sexual activity: Not Currently     Comment: , 3 kids, retired   Other Topics Concern   • Not on file   Social History Narrative   • Not on file     Social Determinants of Health     Financial Resource Strain:    • Difficulty of Paying Living Expenses:    Food Insecurity:    • Worried About Running Out of Food in the Last Year:    • Ran Out of Food in the Last Year:    Transportation Needs:    • Lack of Transportation (Medical):    • Lack of Transportation (Non-Medical):    Physical Activity:    • Days of Exercise per Week:    • Minutes of Exercise per Session:    Stress:    • Feeling of Stress :    Social Connections:    • Frequency of Communication with Friends and Family:    • Frequency of Social Gatherings with Friends and Family:    • Attends Jew Services:    • Active Member of Clubs or Organizations:    • Attends Club or Organization Meetings:    • Marital Status:    Intimate Partner Violence:    • Fear of Current or Ex-Partner:    • Emotionally Abused:    • Physically Abused:    • Sexually Abused:      Allergies   Allergen Reactions   • Trazodone Swelling     Pt stated that she had swelling on her hands and feet's    • Crestor [Rosuvastatin Calcium]      Myalgias       Current Outpatient Medications   Medication Sig Dispense Refill   • ibuprofen (MOTRIN) 600 MG Tab Take  by mouth as needed.     • losartan (COZAAR) 50 MG Tab Take 1 Tablet by mouth every day. 90 Tablet 7   • hydrALAZINE (APRESOLINE) 10 MG Tab Take 1 Tablet by mouth 1 time a day as needed. 30 Tablet 0   • atorvastatin (LIPITOR) 40 MG Tab TAKE 1 TABLET BY  "MOUTH EVERYDAY AT BEDTIME 90 tablet 3   • levothyroxine (LEVOXYL) 88 MCG Tab 1 tablet in the morning on an empty stomach     • Meloxicam 10 MG Cap      • liothyronine (CYTOMEL) 5 MCG Tab TAKE 1/2 TAB BY MOUTH TWICE A DAY     • denosumab (PROLIA) 60 MG/ML Solution Prefilled Syringe Inject 60 mg as instructed Once.     • vitamin D (CHOLECALCIFEROL) 1000 UNIT Tab Take 1,000 Units by mouth every day.     • aspirin (ASA) 81 MG Chew Tab chewable tablet Take 1 Tab by mouth every day. 100 Tab 3   • Ascorbic Acid (VITAMIN C) 1000 MG Tab Take  by mouth.     • Estriol 10 % Cream as needed.     • Magnesium 400 MG Tab Take  by mouth.       No current facility-administered medications for this visit.       ROS    All others systems reviewed and negative.     Objective:     /86 (BP Location: Left arm, Patient Position: Sitting, BP Cuff Size: Adult)   Pulse 75   Ht 1.575 m (5' 2\")   Wt 68 kg (150 lb)   SpO2 94%  Body mass index is 27.44 kg/m².    Physical Exam   General: No acute distress. Well nourished.  HEENT: EOM grossly intact, no scleral icterus, no pharyngeal erythema.   Neck:  No JVD, no bruits, trachea midline  CVS: RRR. Normal S1, S2. No M/R/G. No LE edema.  2+ radial pulses, 2+ PT pulses  Resp: CTAB. No wheezing or crackles/rhonchi. Normal respiratory effort.  Abdomen: Soft, NT, no nanda hepatomegaly.  MSK/Ext: No clubbing or cyanosis.  Skin: Warm and dry, no rashes.  Neurological: CN III-XII grossly intact. No focal deficits.   Psych: A&O x 3, appropriate affect, good judgement    Physical exam performed today and unchanged, except what is noted, compared to 10-1-2020      Assessment:     1. Coronary artery disease involving native coronary artery of native heart without angina pectoris     2. Stented coronary artery     3. Nonrheumatic aortic insufficiency with aortic stenosis     4. Mixed hyperlipidemia     5. Essential hypertension     6. JOHN (obstructive sleep apnea)     7. MHA (microangiopathic hemolytic " anemia) (HCC)     8. Statin intolerance  CPK - TOTAL SERUM   9. IFG (impaired fasting glucose)     10. MOSQUERA (dyspnea on exertion)     11. Family history of coronary artery disease in brother     12. Former tobacco use     13. Vitamin D deficiency  VITAMIN D,25 HYDROXY   14. Beta-blocker intolerance         Medical Decision Making:  Today's Assessment / Status / Plan:     -Mostly limited by back pain. I do not think she is having active cardiac symptoms. We discussed today. She has 3 year old stent, never really noticed a difference but did sound like high risk territory, no other residual disease.  -Con secondary prevention ASA and statin, she did not tolerate BB, thought I think we could challenge her again in the future, alvin perhaps coreg if her BP is labile  -PRN hydralazine for labile HTN, can be a challenge, does not seem to tolerate more scheduled medications.  -Back on statin, LDL to goal, statin holiday made no difference  -CPK just to be sure  -Repeat Vit D  -She has a mouth guard, we don't know if it is working, she is planning on other sleep study, cannot tolerate CPAP.  -RTC 6 months with MD DONTRELL 1 year    Written instructions given today:    -If your BP is over 170/100, either number, use the as needed medication:    -Hydralazine 10 mg.  This medication can actually be taken every 8 hours.  It will wear off after some time and hopefully will not leave you driving.    -I gave you 30 pills and no refills but we can get you a refill at any time.  You might take them so sparingly that you do not need a refill for a long time.    -Nonfasting blood work at your convenience to check vitamin D and CPK.  CPK is a blood test to make sure the statin is not causing harm to your muscles.    Coronary artery disease prevention:    Anyone who lives long enough with develop some degree of coronary artery disease. Blockages in the heart artery should only have a stent or bypass if they are more than 70% blocked (at which  "point most people have symptoms such as chest pain or unusual shortness of breath with activity that goes away with rest).   People having a sudden heart attack should have a stent placed in the blocked artery.   People having chest pain that limits their ability to function could consider having a stent placed in the artery.  Otherwise, medication and lifestyle changes are the preferred treatments. Stents placed outside of severe symptoms or sudden heart attacks do not reduce mortality (likelihood of dying from a heart attack) and often people end up needing a stent within a stent later.  Lifestyle and medications are more important than stents or bypass under most circumstances.    The only things to do to lower your risk of sudden heart attack (or stroke which is the same disease but a different location- in the brain):    -Take a statin medication (as a vascular medication, not just a cholesterol medication) which is our most powerful weapon to stabilize the plaques and reduce inflammation making them less likely to rupture open and cause a sudden heart attack/stroke.   -Keep LDL cholesterol under 100 (or under 70 if you have already had a heart attack/stroke).  -Control blood pressure, under 130/80, ideally closer to 120/80.  -Control blood sugar, don't get diabetes.  -Don't smoke.  -Eat a heart healthy diet that reduces inflammation: Pritikin, Mediterranean, Vegetarian, Vegan  -Get regular exercise: 150 minutes of moderate exercise OR 75 minutes of very vigorous exercise every week. This is your greatest challenge with your back pain.  -Keep your body mass index under 30, ideal BMI is 20-25.  -Know the signs and symptoms of heart attack and stroke and when to call 9-1-1.      Signs of a stroke: sudden inability to talk, smile on one side, confusion, inability to move arm/leg on one side, numbness/tingling of arm/leg on one side that is not typical. \"Think FAST.\"  F=face drooping  A=arm weakness  S=Speech " difficulty  T= time to call 911 (do not give ride to someone, call ambulance to alert the hospital to start stroke protocol)    Signs of a heart attack: Anything very intense coming from the chest that lasts more than 15 minutes, consider calling 911.  -Pain or pressure in the chest that is intense, lasts more than 15 minutes, not explained by other known reasons such as known/similar heartburn  -It can feel like severe heart burn but associated with nausea, vomiting  -It can be vague pain that radiates to the neck, jaw, shoulders, arm/arms, wrap around your back or even cause a toothache  -Can be associated with unusual shortness of breath at rest or sense of impending doom    Return in about 6 months (around 3/24/2022).    It is my pleasure to participate in the care of Ms. Gauthier.  Please do not hesitate to contact me with questions or concerns.    Maame Rivers MD, [facility]  Cardiologist [facility]    Please note that this dictation was created using voice recognition software. I have made every reasonable attempt to correct obvious errors, but it is possible there are errors of grammar and possibly content that I did not discover before finalizing the note.

## 2021-09-24 ENCOUNTER — OFFICE VISIT (OUTPATIENT)
Dept: CARDIOLOGY | Facility: MEDICAL CENTER | Age: 81
End: 2021-09-24
Payer: MEDICARE

## 2021-09-24 VITALS
BODY MASS INDEX: 27.6 KG/M2 | HEART RATE: 75 BPM | SYSTOLIC BLOOD PRESSURE: 142 MMHG | HEIGHT: 62 IN | WEIGHT: 150 LBS | DIASTOLIC BLOOD PRESSURE: 86 MMHG | OXYGEN SATURATION: 94 %

## 2021-09-24 DIAGNOSIS — Z78.9 STATIN INTOLERANCE: ICD-10-CM

## 2021-09-24 DIAGNOSIS — D59.4: ICD-10-CM

## 2021-09-24 DIAGNOSIS — I10 ESSENTIAL HYPERTENSION: ICD-10-CM

## 2021-09-24 DIAGNOSIS — E78.2 MIXED HYPERLIPIDEMIA: ICD-10-CM

## 2021-09-24 DIAGNOSIS — R73.01 IFG (IMPAIRED FASTING GLUCOSE): ICD-10-CM

## 2021-09-24 DIAGNOSIS — R06.09 DOE (DYSPNEA ON EXERTION): ICD-10-CM

## 2021-09-24 DIAGNOSIS — I25.10 CORONARY ARTERY DISEASE INVOLVING NATIVE CORONARY ARTERY OF NATIVE HEART WITHOUT ANGINA PECTORIS: ICD-10-CM

## 2021-09-24 DIAGNOSIS — Z87.891 FORMER TOBACCO USE: ICD-10-CM

## 2021-09-24 DIAGNOSIS — E55.9 VITAMIN D DEFICIENCY: ICD-10-CM

## 2021-09-24 DIAGNOSIS — G47.33 OSA (OBSTRUCTIVE SLEEP APNEA): ICD-10-CM

## 2021-09-24 DIAGNOSIS — I35.2 NONRHEUMATIC AORTIC INSUFFICIENCY WITH AORTIC STENOSIS: ICD-10-CM

## 2021-09-24 DIAGNOSIS — Z78.9 BETA-BLOCKER INTOLERANCE: ICD-10-CM

## 2021-09-24 DIAGNOSIS — Z82.49 FAMILY HISTORY OF CORONARY ARTERY DISEASE IN BROTHER: ICD-10-CM

## 2021-09-24 DIAGNOSIS — Z95.5 STENTED CORONARY ARTERY: ICD-10-CM

## 2021-09-24 PROCEDURE — 99214 OFFICE O/P EST MOD 30 MIN: CPT | Performed by: INTERNAL MEDICINE

## 2021-09-24 RX ORDER — HYDRALAZINE HYDROCHLORIDE 10 MG/1
10 TABLET, FILM COATED ORAL
Qty: 30 TABLET | Refills: 0 | Status: SHIPPED | OUTPATIENT
Start: 2021-09-24 | End: 2021-10-21

## 2021-09-24 RX ORDER — IBUPROFEN 600 MG/1
TABLET ORAL PRN
COMMUNITY
Start: 2021-08-19 | End: 2022-08-03

## 2021-09-24 RX ORDER — LEVOTHYROXINE SODIUM 88 UG/1
TABLET ORAL
COMMUNITY
End: 2021-09-24

## 2021-09-24 RX ORDER — LOSARTAN POTASSIUM 50 MG/1
50 TABLET ORAL DAILY
Qty: 90 TABLET | Refills: 7 | Status: SHIPPED | OUTPATIENT
Start: 2021-09-24 | End: 2021-12-10

## 2021-09-24 ASSESSMENT — FIBROSIS 4 INDEX: FIB4 SCORE: 1.52

## 2021-09-24 NOTE — LETTER
"     [facility]   [address]   [address]  [place] [address]  Phone: [phone]  Fax: [phone]              Sheyla Gauthier  1940    Encounter Date: 9/24/2021    Maame Rivers M.D.          PROGRESS NOTE:  Subjective:   Chief Complaint:   Chief Complaint   Patient presents with   • Coronary Artery Disease     F/V Dx: Coronary artery disease involving native coronary artery of native heart without angina pectoris   • HTN (Controlled)     \"Sheyla\" Christin Gauthier is a 80 y.o. female who returns for CAD, prior stent to LAD, HTN, HLP mild to moderate aortic insufficiency, intolerance to Crestor, labile HTN.    Previously followed by Dr. Jean then Dr. Juan Carlos Vergara.  She is actually friends with Dr. Jean.    She was having shortness of breath at the time.  Had an abnormal calcium score which led to left heart angiogram and had a drug-eluting stent placed to the LAD on October 5, 2018 at [facility]. 2.5 x 22 mm [place] drug-eluting stent to the mid LAD after IFR abnormal, no other disease.   She never actually felt any better after the stent.  Normal EF.  She did have a nuclear stress test was stated fixed inferolateral defect but no ischemia and normal EF of 74%.  Has trouble with right radial access, recommended groin approach thereafter.  Was taken off Brilinta, for plavix, finished this.  Not on beta-blocker due to fatigue.  Remains on aspirin, statin.   No BB, cannot tolerate metoprolol.    Has hypertension, blood pressure still running a little high, we had increased losartan.  We thought amlodipine was causing gum problems but in hindsight, it was not.  Not tolerate beta-blockers.  In the ED March 18, 2021, I did review the discharge summary, start hypertension. Her BP was elevated in another providers office, Dr. Jean suggested she go to the ED.  High-sensitivity troponin was normal.  Then BP was low, she felt sluggish so she " backed down on losartan.    Is mild to moderate aortic insufficiency by echo 10/20/2020.  On losartan.    Has hyperlipidemia, LDL 53, down from 107, on atorvastatin.  Changed cholesterol meds from pravastatin, now simvastatin.  Recalls being on crestor, had muscle aches and atropy.  Prior Vit D def in the past, will check.  She is having aching muscles, did statin holiday, no difference so back on it.  Will order CPK.    Has some dyspnea on exertion, follows with pulmonology.    Was on Brilinta but this was changed.    Had normal PFTs.    Has obstructive sleep apnea, stopped CPAP, on mouth piece now.    Has been using tylenol for headaches.  Uses Ib sparingly.  She is having back pain, trouble walking, had procedure that helped.  Sees pain specialist.     Has a ventral hernia, no plans for surgery.    No family history of premature coronary artery disease.  Brother has spine problems and vascular dementia.  Brother has CAD, had prox LAD, CABG.  She is worried about vascular dementia, seems to run in her family.  Mother had TIAs,  around 86.  Father  at 88, he had CVA.  Former tobacco, quit around .  No history of diabetes, prior IFG, under control.  No history of autoimmune disease such as lupus or rheumatoid arthritis.  No chronic kidney disease.  Has headaches and spine problems.    She is not limited by chest pain, pressure or tightness with activity.  Has had some chest discomfort after spicy food.  Has a hernia, has lost 17 pounds.  No significant dyspnea on exertion, orthopnea.   Chronic mild lower extremity swelling.   No lightheadedness, or presyncope/syncope.   Very minor palpitations, wore monitor at [place]  No symptoms of leg claudication.   No stroke/TIA like symptoms.  Limited by some back pain.    Her  was close friends with Dr. Jean.    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  She is from [facility],  her second , who was a friend of her  brother.  Lives in [place]. Has friends here.  Brother in [place].    Plays bridge and part of a book club.    DATA REVIEWED by me:  3/18/2021  Sinus, 64, LVH, baseline wandering    ECG 10/6/2018  Sinus, rate 71, delayed R wave progression    Echo 10/28/2020  Left ventricular ejection fraction is visually estimated to be 60%.  Mild to moderate aortic insufficiency.  Mild tricuspid regurgitation.  Estimated right ventricular systolic pressure is  23 mmHg.     No prior study is available for comparison.     Echo [facility] 4/4/2018  EF 65 to 70%, mild aortic valve sclerosis with mild to moderate AI    Left heart catheterization [facility] 10/5/2018 Dr. Addison Mairn  Aortic pressure 110/70, LV pressure 110/14, EF 70%, RCA large and dominant, diffuse intimal irregularities, plaque to the mid and distal RCA but no significant stenosis, PDA and PL branches widely free of disease, left main widely patent, proximal 80 widely patent, mid LAD 80% stenosis, distal and apical LAD free of disease, frequent circumflex small, no significant stenosis, IFR of 0.79%, 2.5 x 22 mm [place] drug-eluting stent to the mid LAD.  Has trouble with right radial access, recommended groin approach thereafter.    Nuclear stress test [facility] 9/5/2018  Mild fixed inferolateral defect, no ischemia, EF 74%    PFTs 7/24/2019  Interpretation:  1.  Baseline spirometry shows normal air flows.  2.  There is no significant bronchodilator response.  3.  Lung volumes are within normal limits.  4.  Diffusion capacity is within normal limits.  Normal pulmonary function test.    Most recent labs:         Lab Results   Component Value Date/Time    HEMOGLOBIN 14.0 03/18/2021 03:25 PM    HEMATOCRIT 43.8 03/18/2021 03:25 PM    MCV 87.4 03/18/2021 03:25 PM      Lab Results   Component Value Date/Time    SODIUM 140 03/18/2021 03:25 PM    POTASSIUM 4.0 03/18/2021 03:25 PM    CHLORIDE 106 03/18/2021 03:25 PM    CO2 24 03/18/2021 03:25 PM    GLUCOSE 95 03/18/2021  03:25 PM    BUN 19 03/18/2021 03:25 PM    CREATININE 0.82 03/18/2021 03:25 PM      Lab Results   Component Value Date/Time    ASTSGOT 20 02/01/2021 08:54 AM    ALTSGPT 17 02/01/2021 08:54 AM    ALBUMIN 4.2 02/01/2021 08:54 AM    ALBUMIN 4.28 11/15/2013 11:42 AM      Lab Results   Component Value Date/Time    CHOLSTRLTOT 126 02/01/2021 08:54 AM    LDL 53 02/01/2021 08:54 AM    HDL 53 02/01/2021 08:54 AM    TRIGLYCERIDE 100 02/01/2021 08:54 AM         Past Medical History:   Diagnosis Date   • Anxiety    • Arthritis    • Back pain    • CAD (coronary artery disease) 10/2018    Abnormal CTCS. PCI/FARZAD ([place] 2.5 x 2mm) to the mid LAD.   • Cataract    • Daytime sleepiness    • Depression    • Dyslipidemia    • Frequent headaches    • Herpes zoster    • Hypertension    • MHA (microangiopathic hemolytic anemia)    • Migraine syndrome     • Mumps    • Osteopathia    • Osteoporosis    • Painful joint    • Post-menopause on HRT (hormone replacement therapy)    • Sleep apnea     Uses CPAP, followed by pulmonology   • Thyroid disease    • White matter abnormality on MRI of brain      Past Surgical History:   Procedure Laterality Date   • BUNIONECTOMY Left 2003   • FOOT SURGERY  2002   • [place] REDUCTION OF BREAST  1997   • CHOLECYSTECTOMY  1996   • ABDOMINAL HYSTERECTOMY TOTAL  11/1986   • ARTHROSCOPY, KNEE     • EYE SURGERY      cataracts   • HYSTERECTOMY LAPAROSCOPY     • MAMMOPLASTY AUGMENTATION     • [place] BREAST AUGMENTATION WITH IMPLANT       Family History   Problem Relation Age of Onset   • Stroke Father    • Arthritis Father    • Hyperlipidemia Father    • Hypertension Father    • Cancer Sister         breast   • Arthritis Sister    • Arthritis Mother    • Hypertension Mother    • Hyperlipidemia Mother    • Stroke Mother    • Arthritis Brother    • Cancer Brother    • Hypertension Brother    • Hyperlipidemia Brother    • Heart Disease Brother         CABG for prox LAD in late 40s   • Cancer Daughter         breast     Social  History     Socioeconomic History   • Marital status:      Spouse name: Not on file   • Number of children: Not on file   • Years of education: Not on file   • Highest education level: Not on file   Occupational History   • Not on file   Tobacco Use   • Smoking status: Former Smoker     Packs/day: 0.02     Types: Cigarettes     Quit date: 1979     Years since quittin.2   • Smokeless tobacco: Never Used   • Tobacco comment: smoked off & on for 10 yrs   Vaping Use   • Vaping Use: Never used   Substance and Sexual Activity   • Alcohol use: Yes     Alcohol/week: 0.0 oz     Comment: occassional   • Drug use: No   • Sexual activity: Not Currently     Comment: , 3 kids, retired   Other Topics Concern   • Not on file   Social History Narrative   • Not on file     Social Determinants of Health     Financial Resource Strain:    • Difficulty of Paying Living Expenses:    Food Insecurity:    • Worried About Running Out of Food in the Last Year:    • Ran Out of Food in the Last Year:    Transportation Needs:    • Lack of Transportation (Medical):    • Lack of Transportation (Non-Medical):    Physical Activity:    • Days of Exercise per Week:    • Minutes of Exercise per Session:    Stress:    • Feeling of Stress :    Social Connections:    • Frequency of Communication with Friends and Family:    • Frequency of Social Gatherings with Friends and Family:    • Attends Hoahaoism Services:    • Active Member of Clubs or Organizations:    • Attends Club or Organization Meetings:    • Marital Status:    Intimate Partner Violence:    • Fear of Current or Ex-Partner:    • Emotionally Abused:    • Physically Abused:    • Sexually Abused:      Allergies   Allergen Reactions   • Trazodone Swelling     Pt stated that she had swelling on her hands and feet's    • Crestor [Rosuvastatin Calcium]      Myalgias       Current Outpatient Medications   Medication Sig Dispense Refill   • ibuprofen (MOTRIN) 600 MG Tab Take  by  "mouth as needed.     • losartan (COZAAR) 50 MG Tab Take 1 Tablet by mouth every day. 90 Tablet 7   • hydrALAZINE (APRESOLINE) 10 MG Tab Take 1 Tablet by mouth 1 time a day as needed. 30 Tablet 0   • atorvastatin (LIPITOR) 40 MG Tab TAKE 1 TABLET BY MOUTH EVERYDAY AT BEDTIME 90 tablet 3   • levothyroxine (LEVOXYL) 88 MCG Tab 1 tablet in the morning on an empty stomach     • Meloxicam 10 MG Cap      • liothyronine (CYTOMEL) 5 MCG Tab TAKE 1/2 TAB BY MOUTH TWICE A DAY     • denosumab (PROLIA) 60 MG/ML Solution Prefilled Syringe Inject 60 mg as instructed Once.     • vitamin D (CHOLECALCIFEROL) 1000 UNIT Tab Take 1,000 Units by mouth every day.     • aspirin (ASA) 81 MG Chew Tab chewable tablet Take 1 Tab by mouth every day. 100 Tab 3   • Ascorbic Acid (VITAMIN C) 1000 MG Tab Take  by mouth.     • Estriol 10 % Cream as needed.     • Magnesium 400 MG Tab Take  by mouth.       No current facility-administered medications for this visit.       ROS    All others systems reviewed and negative.     Objective:     /86 (BP Location: Left arm, Patient Position: Sitting, BP Cuff Size: Adult)   Pulse 75   Ht 1.575 m (5' 2\")   Wt 68 kg (150 lb)   SpO2 94%  Body mass index is 27.44 kg/m².    Physical Exam   General: No acute distress. Well nourished.  HEENT: EOM grossly intact, no scleral icterus, no pharyngeal erythema.   Neck:  No JVD, no bruits, trachea midline  CVS: RRR. Normal S1, S2. No M/R/G. No LE edema.  2+ radial pulses, 2+ PT pulses  Resp: CTAB. No wheezing or crackles/rhonchi. Normal respiratory effort.  Abdomen: Soft, NT, no nanda hepatomegaly.  MSK/Ext: No clubbing or cyanosis.  Skin: Warm and dry, no rashes.  Neurological: CN III-XII grossly intact. No focal deficits.   Psych: A&O x 3, appropriate affect, good judgement    Physical exam performed today and unchanged, except what is noted, compared to 10-1-2020      Assessment:     1. Coronary artery disease involving native coronary artery of native heart " without angina pectoris     2. Stented coronary artery     3. Nonrheumatic aortic insufficiency with aortic stenosis     4. Mixed hyperlipidemia     5. Essential hypertension     6. JOHN (obstructive sleep apnea)     7. 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HCC)     8. Statin intolerance  CPK - TOTAL SERUM   9. IFG (impaired fasting glucose)     10. MOSQUERA (dyspnea on exertion)     11. Family history of coronary artery disease in brother     12. Former tobacco use     13. Vitamin D deficiency  VITAMIN D,25 HYDROXY   14. Beta-blocker intolerance         Medical Decision Making:  Today's Assessment / Status / Plan:     -Mostly limited by back pain. I do not think she is having active cardiac symptoms. We discussed today. She has 3 year old stent, never really noticed a difference but did sound like high risk territory, no other residual disease.  -Con secondary prevention ASA and statin, she did not tolerate BB, thought I think we could challenge her again in the future, alvin perhaps coreg if her BP is labile  -PRN hydralazine for labile HTN, can be a challenge, does not seem to tolerate more scheduled medications.  -Back on statin, LDL to goal, statin holiday made no difference  -CPK just to be sure  -Repeat Vit D  -She has a mouth guard, we don't know if it is working, she is planning on other sleep study, cannot tolerate CPAP.  -RTC 6 months with MD DONTRELL 1 year    Written instructions given today:    -If your BP is over 170/100, either number, use the as needed medication:    -Hydralazine 10 mg.  This medication can actually be taken every 8 hours.  It will wear off after some time and hopefully will not leave you driving.    -I gave you 30 pills and no refills but we can get you a refill at any time.  You might take them so sparingly that you do not need a refill for a long time.    -Nonfasting blood work at your convenience to check vitamin D and CPK.  CPK is a blood test to make sure the statin is not causing harm  to your muscles.    Coronary artery disease prevention:    Anyone who lives long enough with develop some degree of coronary artery disease. Blockages in the heart artery should only have a stent or bypass if they are more than 70% blocked (at which point most people have symptoms such as chest pain or unusual shortness of breath with activity that goes away with rest).   People having a sudden heart attack should have a stent placed in the blocked artery.   People having chest pain that limits their ability to function could consider having a stent placed in the artery.  Otherwise, medication and lifestyle changes are the preferred treatments. Stents placed outside of severe symptoms or sudden heart attacks do not reduce mortality (likelihood of dying from a heart attack) and often people end up needing a stent within a stent later.  Lifestyle and medications are more important than stents or bypass under most circumstances.    The only things to do to lower your risk of sudden heart attack (or stroke which is the same disease but a different location- in the brain):    -Take a statin medication (as a vascular medication, not just a cholesterol medication) which is our most powerful weapon to stabilize the plaques and reduce inflammation making them less likely to rupture open and cause a sudden heart attack/stroke.   -Keep LDL cholesterol under 100 (or under 70 if you have already had a heart attack/stroke).  -Control blood pressure, under 130/80, ideally closer to 120/80.  -Control blood sugar, don't get diabetes.  -Don't smoke.  -Eat a heart healthy diet that reduces inflammation: Pritikin, Mediterranean, Vegetarian, Vegan  -Get regular exercise: 150 minutes of moderate exercise OR 75 minutes of very vigorous exercise every week. This is your greatest challenge with your back pain.  -Keep your body mass index under 30, ideal BMI is 20-25.  -Know the signs and symptoms of heart attack and stroke and when to call  "9-1-1.      Signs of a stroke: sudden inability to talk, smile on one side, confusion, inability to move arm/leg on one side, numbness/tingling of arm/leg on one side that is not typical. \"Think FAST.\"  F=face drooping  A=arm weakness  S=Speech difficulty  T= time to call 911 (do not give ride to someone, call ambulance to alert the hospital to start stroke protocol)    Signs of a heart attack: Anything very intense coming from the chest that lasts more than 15 minutes, consider calling 911.  -Pain or pressure in the chest that is intense, lasts more than 15 minutes, not explained by other known reasons such as known/similar heartburn  -It can feel like severe heart burn but associated with nausea, vomiting  -It can be vague pain that radiates to the neck, jaw, shoulders, arm/arms, wrap around your back or even cause a toothache  -Can be associated with unusual shortness of breath at rest or sense of impending doom    Return in about 6 months (around 3/24/2022).    It is my pleasure to participate in the care of Ms. Gauthier.  Please do not hesitate to contact me with questions or concerns.    Maame Rivers MD, [facility]  Cardiologist [facility]    Please note that this dictation was created using voice recognition software. I have made every reasonable attempt to correct obvious errors, but it is possible there are errors of grammar and possibly content that I did not discover before finalizing the note.        Darius Morataya M.D.  [address] [place] [address] [place] [address]  Via In Basket              "

## 2021-09-24 NOTE — PATIENT INSTRUCTIONS
-If your BP is over 170/100, either number, use the as needed medication:    -Hydralazine 10 mg.  This medication can actually be taken every 8 hours.  It will wear off after some time and hopefully will not leave you driving.    -I gave you 30 pills and no refills but we can get you a refill at any time.  You might take them so sparingly that you do not need a refill for a long time.    -Nonfasting blood work at your convenience to check vitamin D and CPK.  CPK is a blood test to make sure the statin is not causing harm to your muscles.    Coronary artery disease prevention:    Anyone who lives long enough with develop some degree of coronary artery disease. Blockages in the heart artery should only have a stent or bypass if they are more than 70% blocked (at which point most people have symptoms such as chest pain or unusual shortness of breath with activity that goes away with rest).   People having a sudden heart attack should have a stent placed in the blocked artery.   People having chest pain that limits their ability to function could consider having a stent placed in the artery.  Otherwise, medication and lifestyle changes are the preferred treatments. Stents placed outside of severe symptoms or sudden heart attacks do not reduce mortality (likelihood of dying from a heart attack) and often people end up needing a stent within a stent later.  Lifestyle and medications are more important than stents or bypass under most circumstances.    The only things to do to lower your risk of sudden heart attack (or stroke which is the same disease but a different location- in the brain):    -Take a statin medication (as a vascular medication, not just a cholesterol medication) which is our most powerful weapon to stabilize the plaques and reduce inflammation making them less likely to rupture open and cause a sudden heart attack/stroke.   -Keep LDL cholesterol under 100 (or under 70 if you have already had a heart  "attack/stroke).  -Control blood pressure, under 130/80, ideally closer to 120/80.  -Control blood sugar, don't get diabetes.  -Don't smoke.  -Eat a heart healthy diet that reduces inflammation: Pritikin, Mediterranean, Vegetarian, Vegan  -Get regular exercise: 150 minutes of moderate exercise OR 75 minutes of very vigorous exercise every week. This is your greatest challenge with your back pain.  -Keep your body mass index under 30, ideal BMI is 20-25.  -Know the signs and symptoms of heart attack and stroke and when to call 9-1-1.      Signs of a stroke: sudden inability to talk, smile on one side, confusion, inability to move arm/leg on one side, numbness/tingling of arm/leg on one side that is not typical. \"Think FAST.\"  F=face drooping  A=arm weakness  S=Speech difficulty  T= time to call 911 (do not give ride to someone, call ambulance to alert the hospital to start stroke protocol)    Signs of a heart attack: Anything very intense coming from the chest that lasts more than 15 minutes, consider calling 911.  -Pain or pressure in the chest that is intense, lasts more than 15 minutes, not explained by other known reasons such as known/similar heartburn  -It can feel like severe heart burn but associated with nausea, vomiting  -It can be vague pain that radiates to the neck, jaw, shoulders, arm/arms, wrap around your back or even cause a toothache  -Can be associated with unusual shortness of breath at rest or sense of impending doom              "

## 2021-09-29 ENCOUNTER — HOSPITAL ENCOUNTER (OUTPATIENT)
Dept: LAB | Facility: MEDICAL CENTER | Age: 81
End: 2021-09-29
Attending: INTERNAL MEDICINE
Payer: MEDICARE

## 2021-09-29 DIAGNOSIS — Z78.9 STATIN INTOLERANCE: ICD-10-CM

## 2021-09-29 DIAGNOSIS — E55.9 VITAMIN D DEFICIENCY: ICD-10-CM

## 2021-09-29 LAB
25(OH)D3 SERPL-MCNC: 60 NG/ML (ref 30–100)
CK SERPL-CCNC: 42 U/L (ref 0–154)

## 2021-09-29 PROCEDURE — 82550 ASSAY OF CK (CPK): CPT

## 2021-09-29 PROCEDURE — 36415 COLL VENOUS BLD VENIPUNCTURE: CPT

## 2021-09-29 PROCEDURE — 82306 VITAMIN D 25 HYDROXY: CPT

## 2021-10-21 DIAGNOSIS — E78.2 MIXED HYPERLIPIDEMIA: ICD-10-CM

## 2021-10-21 DIAGNOSIS — I10 ESSENTIAL HYPERTENSION: ICD-10-CM

## 2021-10-21 RX ORDER — HYDRALAZINE HYDROCHLORIDE 10 MG/1
10 TABLET, FILM COATED ORAL
Qty: 90 TABLET | Refills: 0 | Status: SHIPPED | OUTPATIENT
Start: 2021-10-21 | End: 2022-01-14

## 2021-11-03 ENCOUNTER — OFFICE VISIT (OUTPATIENT)
Dept: MEDICAL GROUP | Age: 81
End: 2021-11-03
Payer: MEDICARE

## 2021-11-03 VITALS
WEIGHT: 153.8 LBS | OXYGEN SATURATION: 96 % | RESPIRATION RATE: 16 BRPM | BODY MASS INDEX: 28.3 KG/M2 | DIASTOLIC BLOOD PRESSURE: 90 MMHG | HEART RATE: 70 BPM | HEIGHT: 62 IN | TEMPERATURE: 98.5 F | SYSTOLIC BLOOD PRESSURE: 160 MMHG

## 2021-11-03 DIAGNOSIS — M19.90 ARTHRITIS: ICD-10-CM

## 2021-11-03 PROCEDURE — 99214 OFFICE O/P EST MOD 30 MIN: CPT | Performed by: FAMILY MEDICINE

## 2021-11-03 RX ORDER — GABAPENTIN 300 MG/1
300 CAPSULE ORAL 3 TIMES DAILY
Qty: 90 CAPSULE | Refills: 3 | Status: SHIPPED | OUTPATIENT
Start: 2021-11-03 | End: 2022-01-13

## 2021-11-03 ASSESSMENT — FIBROSIS 4 INDEX: FIB4 SCORE: 1.52

## 2021-11-03 NOTE — PROGRESS NOTES
This medical record contains text that has been entered with the assistance of computer voice recognition and dictation software.  Therefore, it may contain unintended errors in text, spelling, punctuation, or grammar      Chief Complaint   Patient presents with   • Tremors   • Arthritis     hands         Sheyla Gauthier is a 80 y.o. female here evaluation and management of: arthritis      HPI:     1. Arthritis  The patient states overall she is doing very well but the main thing is she is having pain in her left thumb when she gardens.  We did give her a wrist splint to help with possible carpal tunnel syndrome.  She states it has not helped.  She has been taking Tylenol for pain.  She also states the main thing is his aches and pains all over her body.Today the patient denied any fevers, chills, headaches, nausea, vomiting, changes in vision, shortness of breath, back pain, chest pain, nausea or vomiting, change in taste or smell, new rashes, joint pain, blood in stool dark tarry stool.      Current medicines (including changes today)  Current Outpatient Medications   Medication Sig Dispense Refill   • gabapentin (NEURONTIN) 300 MG Cap Take 1 Capsule by mouth 3 times a day. 90 Capsule 3   • hydrALAZINE (APRESOLINE) 10 MG Tab TAKE 1 TABLET BY MOUTH 1 TIME A DAY AS NEEDED. 90 Tablet 0   • ibuprofen (MOTRIN) 600 MG Tab Take  by mouth as needed.     • losartan (COZAAR) 50 MG Tab Take 1 Tablet by mouth every day. 90 Tablet 7   • atorvastatin (LIPITOR) 40 MG Tab TAKE 1 TABLET BY MOUTH EVERYDAY AT BEDTIME 90 tablet 3   • levothyroxine (LEVOXYL) 88 MCG Tab 1 tablet in the morning on an empty stomach     • Meloxicam 10 MG Cap      • liothyronine (CYTOMEL) 5 MCG Tab TAKE 1/2 TAB BY MOUTH TWICE A DAY     • denosumab (PROLIA) 60 MG/ML Solution Prefilled Syringe Inject 60 mg as instructed Once.     • vitamin D (CHOLECALCIFEROL) 1000 UNIT Tab Take 1,000 Units by mouth every day.     • aspirin (ASA) 81 MG Chew Tab chewable  tablet Take 1 Tab by mouth every day. 100 Tab 3   • Ascorbic Acid (VITAMIN C) 1000 MG Tab Take  by mouth.     • Estriol 10 % Cream as needed.     • Magnesium 400 MG Tab Take  by mouth.       No current facility-administered medications for this visit.     She  has a past medical history of Anxiety, Arthritis, Back pain, CAD (coronary artery disease) (10/2018), Cataract, Daytime sleepiness, Depression, Dyslipidemia, Frequent headaches, Herpes zoster, Hypertension, MHA (microangiopathic hemolytic anemia), Migraine syndrome ( ), Mumps, Osteopathia, Osteoporosis, Painful joint, Post-menopause on HRT (hormone replacement therapy), Sleep apnea, Thyroid disease, and White matter abnormality on MRI of brain.  She  has a past surgical history that includes cholecystectomy (); abdominal hysterectomy total (1986); pr breast reduction (); foot surgery (); pr breast augmentation with implant; mammoplasty augmentation; bunionectomy (Left, ); eye surgery; hysterectomy laparoscopy; and arthroscopy, knee.  Social History     Tobacco Use   • Smoking status: Former Smoker     Packs/day: 0.02     Types: Cigarettes     Quit date: 1979     Years since quittin.3   • Smokeless tobacco: Never Used   • Tobacco comment: smoked off & on for 10 yrs   Vaping Use   • Vaping Use: Never used   Substance Use Topics   • Alcohol use: Yes     Alcohol/week: 0.0 oz     Comment: occassional   • Drug use: No     Social History     Social History Narrative   • Not on file     Family History   Problem Relation Age of Onset   • Stroke Father    • Arthritis Father    • Hyperlipidemia Father    • Hypertension Father    • Cancer Sister         breast   • Arthritis Sister    • Arthritis Mother    • Hypertension Mother    • Hyperlipidemia Mother    • Stroke Mother    • Arthritis Brother    • Cancer Brother    • Hypertension Brother    • Hyperlipidemia Brother    • Heart Disease Brother         CABG for prox LAD in late 40s   • Cancer  "Daughter         breast     Family Status   Relation Name Status   • Fa     • Sis  Alive   • Mo          tia   • Bro  Alive   • Ayesha  (Not Specified)         ROS    The pertinent  ROS findings can be seen in the HPI above.     All other systems reviewed and are negative     Objective:     /90 (BP Location: Right arm, Patient Position: Sitting, BP Cuff Size: Adult)   Pulse 70   Temp 36.9 °C (98.5 °F) (Temporal)   Resp 16   Ht 1.575 m (5' 2\")   Wt 69.8 kg (153 lb 12.8 oz)   SpO2 96%  Body mass index is 28.13 kg/m².      Physical Exam:    Constitutional: Alert, no distress.  Skin: No suspicious lesions  Eye: Equal, round and reactive, conjunctiva clear, lids normal.  ENMT: Lips without lesions, good dentition, oropharynx clear.  Neck: Trachea midline, no masses, no thyromegaly. No cervical or supraclavicular lymphadenopathy.  Respiratory: Unlabored respiratory effort, lungs clear to auscultation, no wheezes, no ronchi.  Cardiovascular: Normal S1, S2, no murmur, no edema  Abdomen: Soft, non-tender, no masses, no hepatosplenomegaly.        Assessment and Plan:   The following treatment plan was discussed      1. Arthritis    We we will use a nonhabit-forming safe medication  Such as gabapentin  All side effects explained    - gabapentin (NEURONTIN) 300 MG Cap; Take 1 Capsule by mouth 3 times a day.  Dispense: 90 Capsule; Refill: 3            Instructed to Follow up in clinic or ER for worsening symptoms, difficulty breathing, lack of expected recovery, or should new symptoms or problems arise.    Followup: Return in about 6 months (around 5/3/2022) for Reevaluation, labs.             "

## 2021-11-30 ENCOUNTER — PATIENT MESSAGE (OUTPATIENT)
Dept: CARDIOLOGY | Facility: MEDICAL CENTER | Age: 81
End: 2021-11-30

## 2021-12-01 NOTE — PROGRESS NOTES
-If she is willing, initiate losartan starting 12.5 mg once daily.  This is a very low dose.    -If she is concerned about labile blood pressure, offer her a consultation with Dr. Michael Bloch and his team and vascular medicine, let her know they are specialist in hypertension and labile hypertension.    Thank you

## 2021-12-02 RX ORDER — DOXAZOSIN MESYLATE 1 MG/1
1 TABLET ORAL DAILY
Qty: 90 TABLET | Refills: 3 | Status: SHIPPED | OUTPATIENT
Start: 2021-12-02 | End: 2022-02-15

## 2021-12-02 NOTE — PROGRESS NOTES
-Thank you for pointing out the losartan, I missed it.    -Add Cardura/doxazosin 1 mg once daily.  If her blood pressure is still greater than 140/90, either number, increase Cardura to 2 mg once daily at the same time.    Thank you

## 2021-12-07 ENCOUNTER — HOSPITAL ENCOUNTER (OUTPATIENT)
Dept: LAB | Facility: MEDICAL CENTER | Age: 81
End: 2021-12-07
Attending: PHYSICIAN ASSISTANT
Payer: MEDICARE

## 2021-12-07 LAB
T4 FREE SERPL-MCNC: 1.54 NG/DL (ref 0.93–1.7)
TSH SERPL DL<=0.005 MIU/L-ACNC: 1.09 UIU/ML (ref 0.38–5.33)

## 2021-12-07 PROCEDURE — 84439 ASSAY OF FREE THYROXINE: CPT

## 2021-12-07 PROCEDURE — 36415 COLL VENOUS BLD VENIPUNCTURE: CPT

## 2021-12-07 PROCEDURE — 84443 ASSAY THYROID STIM HORMONE: CPT

## 2021-12-10 RX ORDER — LOSARTAN POTASSIUM 50 MG/1
TABLET ORAL
Qty: 270 TABLET | Refills: 0 | Status: SHIPPED | OUTPATIENT
Start: 2021-12-10 | End: 2021-12-10

## 2021-12-10 RX ORDER — LOSARTAN POTASSIUM 100 MG/1
100 TABLET ORAL DAILY
Qty: 90 TABLET | Refills: 3 | Status: SHIPPED | OUTPATIENT
Start: 2021-12-10 | End: 2022-08-03

## 2021-12-19 ENCOUNTER — PATIENT MESSAGE (OUTPATIENT)
Dept: MEDICAL GROUP | Age: 81
End: 2021-12-19

## 2021-12-21 RX ORDER — MELOXICAM 10 MG/1
10 CAPSULE ORAL 2 TIMES DAILY PRN
Qty: 60 CAPSULE | Refills: 0 | Status: SHIPPED | OUTPATIENT
Start: 2021-12-21 | End: 2022-03-28

## 2022-01-03 ENCOUNTER — APPOINTMENT (OUTPATIENT)
Dept: ONCOLOGY | Facility: MEDICAL CENTER | Age: 82
End: 2022-01-03
Attending: FAMILY MEDICINE
Payer: MEDICARE

## 2022-01-07 DIAGNOSIS — R05.9 COUGH: ICD-10-CM

## 2022-01-11 ENCOUNTER — APPOINTMENT (OUTPATIENT)
Dept: RADIOLOGY | Facility: MEDICAL CENTER | Age: 82
End: 2022-01-11
Attending: FAMILY MEDICINE
Payer: MEDICARE

## 2022-01-13 ENCOUNTER — OFFICE VISIT (OUTPATIENT)
Dept: SLEEP MEDICINE | Facility: MEDICAL CENTER | Age: 82
End: 2022-01-13
Payer: MEDICARE

## 2022-01-13 VITALS
HEIGHT: 62 IN | SYSTOLIC BLOOD PRESSURE: 130 MMHG | HEART RATE: 61 BPM | RESPIRATION RATE: 16 BRPM | WEIGHT: 150 LBS | OXYGEN SATURATION: 97 % | BODY MASS INDEX: 27.6 KG/M2 | DIASTOLIC BLOOD PRESSURE: 80 MMHG

## 2022-01-13 DIAGNOSIS — I10 ESSENTIAL HYPERTENSION: ICD-10-CM

## 2022-01-13 DIAGNOSIS — G47.33 OSA (OBSTRUCTIVE SLEEP APNEA): Primary | ICD-10-CM

## 2022-01-13 PROCEDURE — 99213 OFFICE O/P EST LOW 20 MIN: CPT | Performed by: STUDENT IN AN ORGANIZED HEALTH CARE EDUCATION/TRAINING PROGRAM

## 2022-01-13 ASSESSMENT — FIBROSIS 4 INDEX: FIB4 SCORE: 1.54

## 2022-01-13 NOTE — PROGRESS NOTES
[facility] Follow-up Visit    CC: Follow-up for JOHN management      HPI:  Sheyla Guathier is a 81 y.o.female  with hypertension, hyperlipidemia, migraines, depression, hypothyroidism and obstructive sleep apnea using oral appliance.  Presents today to follow-up for JOHN management.    Is previously diagnosed with obstructive sleep apnea and did undergo titration study to assess proper PAP settings.  She tried using CPAP for multiple years but was unable to feel comfortable on Pap therapy.  Highest pressure she was last on was 10 cmH2O.    Since last visit she has obtained a oral appliance.  Received oral appliance approximately 4 months ago and it has been adjusted multiple times.  She states with sleeping with oral appliance she feels that she is sleeping better than without.    She still is concerned regarding her oxygen level at night when she is sleeping.  She is interested in obtaining a personal overnight oximetry device.    Sleep History    Overnight pulse oximetry on 6/9/2020: The study was done on CPAP 10 cm. The total analyzed time was 7 hrs 0 min. O2 Sat. jose guadalupe was 88% and mean O2 sat was 89 % and baseline O2 at 90%. O2 sat was below 88% for 0 min of the flow evaluation time. Oxygen Desaturation (>=3%) Index was 4.6/hr.      Overnight oximetry on 5/26/2020: The study was done on CPAP 9 cm. The total analyzed time was 7 hrs 0 min. O2 Sat. jose guadalupe was 84% and mean O2 sat was 88 % and baseline O2 at 89%. O2 sat was below 88% for 21.7 min of the flow evaluation time. Oxygen Desaturation (>=3%) Index was elevated at 9.6/hr.      Her prior home showed an AHI of 26.2/jose guadalupe saturation 74%. Her follow-up study 9/12/2018 with her dental device in place showed an AHI of 9.3 with a jose guadalupe saturation of 83%. He had another PSG done 6/12/2019 showed an AHI of 21.7/jose guadalupe saturation 84%. CPAP titration was performed 6/30/2019.  She did best on CPAP at 8 cm H2O with a resultant AHI of 0.0, jose guadalupe saturation 89%,  mean saturation 93%, and the achievement of supine REM     Patient Active Problem List    Diagnosis Date Noted   • Arthritis 11/03/2021   • Memory loss 08/20/2020   • Conductive hearing loss of right ear 08/20/2020   • Left wrist pain 12/19/2019   • Myalgia 10/01/2019   • Former smoker 07/02/2019   • Other insomnia 06/14/2019   • Coronary artery disease involving native coronary artery of native heart without angina pectoris 03/26/2019   • Bladder prolapse, female, acquired 03/07/2018   • JOHN (obstructive sleep apnea) 02/22/2018   • Age related osteoporosis 07/26/2017   • Major depressive disorder 06/29/2017   • 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/08/2016   • Spinal stenosis of lumbar region 11/14/2016   • Migraine syndrome 02/11/2013   • Acquired hypothyroidism 03/08/2011   • HTN (hypertension) 10/21/2009   • Postmenopausal hormone therapy 10/21/2009   • Mixed hyperlipidemia    • 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HCC)        Past Medical History:   Diagnosis Date   • Anxiety    • Arthritis    • Back pain    • CAD (coronary artery disease) 10/2018    Abnormal CTCS. PCI/FARZAD ([place] 2.5 x 2mm) to the mid LAD.   • Cataract    • Daytime sleepiness    • Depression    • Dyslipidemia    • Frequent headaches    • Herpes zoster    • Hypertension    • MHA (microangiopathic hemolytic anemia)    • Migraine syndrome     • Mumps    • Osteopathia    • Osteoporosis    • Painful joint    • Post-menopause on HRT (hormone replacement therapy)    • Sleep apnea     Uses CPAP, followed by pulmonology   • Thyroid disease    • White matter abnormality on MRI of brain         Past Surgical History:   Procedure Laterality Date   • BUNIONECTOMY Left 2003   • FOOT SURGERY  2002   • HI REDUCTION OF BREAST  1997   • CHOLECYSTECTOMY  1996   • ABDOMINAL HYSTERECTOMY TOTAL  11/1986   • ARTHROSCOPY, KNEE     • EYE SURGERY      cataracts   • HYSTERECTOMY LAPAROSCOPY     • MAMMOPLASTY AUGMENTATION     • HI BREAST AUGMENTATION WITH IMPLANT          Family History   Problem Relation Age of Onset   • Stroke Father    • Arthritis Father    • Hyperlipidemia Father    • Hypertension Father    • Cancer Sister         breast   • Arthritis Sister    • Arthritis Mother    • Hypertension Mother    • Hyperlipidemia Mother    • Stroke Mother    • Arthritis Brother    • Cancer Brother    • Hypertension Brother    • Hyperlipidemia Brother    • Heart Disease Brother         CABG for prox LAD in late 40s   • Cancer Daughter         breast       Social History     Socioeconomic History   • Marital status:      Spouse name: Not on file   • Number of children: Not on file   • Years of education: Not on file   • Highest education level: Not on file   Occupational History   • Not on file   Tobacco Use   • Smoking status: Former Smoker     Packs/day: 0.02     Types: Cigarettes     Quit date: 1979     Years since quittin.5   • Smokeless tobacco: Never Used   • Tobacco comment: smoked off & on for 10 yrs   Vaping Use   • Vaping Use: Never used   Substance and Sexual Activity   • Alcohol use: Yes     Alcohol/week: 0.0 oz     Comment: occassional   • Drug use: No   • Sexual activity: Not Currently     Comment: , 3 kids, retired   Other Topics Concern   • Not on file   Social History Narrative   • Not on file     Social Determinants of Health     Financial Resource Strain:    • Difficulty of Paying Living Expenses: Not on file   Food Insecurity:    • Worried About Running Out of Food in the Last Year: Not on file   • Ran Out of Food in the Last Year: Not on file   Transportation Needs:    • Lack of Transportation (Medical): Not on file   • Lack of Transportation (Non-Medical): Not on file   Physical Activity:    • Days of Exercise per Week: Not on file   • Minutes of Exercise per Session: Not on file   Stress:    • Feeling of Stress : Not on file   Social Connections:    • Frequency of Communication with Friends and Family: Not on file   • Frequency of  Social Gatherings with Friends and Family: Not on file   • Attends Christianity Services: Not on file   • Active Member of Clubs or Organizations: Not on file   • Attends Club or Organization Meetings: Not on file   • Marital Status: Not on file   Intimate Partner Violence:    • Fear of Current or Ex-Partner: Not on file   • Emotionally Abused: Not on file   • Physically Abused: Not on file   • Sexually Abused: Not on file   Housing Stability:    • Unable to Pay for Housing in the Last Year: Not on file   • Number of Places Lived in the Last Year: Not on file   • Unstable Housing in the Last Year: Not on file       Current Outpatient Medications   Medication Sig Dispense Refill   • Meloxicam 10 MG Cap Take 10 mg by mouth 2 times a day as needed. 60 Capsule 0   • losartan (COZAAR) 100 MG Tab Take 1 Tablet by mouth every day. 90 Tablet 3   • doxazosin (CARDURA) 1 MG Tab Take 1 Tablet by mouth every day. 90 Tablet 3   • hydrALAZINE (APRESOLINE) 10 MG Tab TAKE 1 TABLET BY MOUTH 1 TIME A DAY AS NEEDED. 90 Tablet 0   • ibuprofen (MOTRIN) 600 MG Tab Take  by mouth as needed.     • atorvastatin (LIPITOR) 40 MG Tab TAKE 1 TABLET BY MOUTH EVERYDAY AT BEDTIME 90 tablet 3   • levothyroxine (LEVOXYL) 88 MCG Tab 1 tablet in the morning on an empty stomach     • liothyronine (CYTOMEL) 5 MCG Tab TAKE 1/2 TAB BY MOUTH TWICE A DAY     • denosumab (PROLIA) 60 MG/ML Solution Prefilled Syringe Inject 60 mg as instructed Once.     • vitamin D (CHOLECALCIFEROL) 1000 UNIT Tab Take 1,000 Units by mouth every day.     • aspirin (ASA) 81 MG Chew Tab chewable tablet Take 1 Tab by mouth every day. 100 Tab 3   • Ascorbic Acid (VITAMIN C) 1000 MG Tab Take  by mouth.     • Estriol 10 % Cream as needed.     • Magnesium 400 MG Tab Take  by mouth.     • gabapentin (NEURONTIN) 300 MG Cap Take 1 Capsule by mouth 3 times a day. 90 Capsule 3     No current facility-administered medications for this visit.        ALLERGIES: Trazodone and Crestor  "[rosuvastatin calcium]    ROS  Constitutional: Denies fever, chills, sweats,  weight loss, fatigue  Cardiovascular: Denies chest pain, tightness, palpitations, swelling in legs/feet  Respiratory: Denies shortness of breath, cough, sputum, wheezing, painful breathing   Sleep: per HPI  Gastrointestinal: Denies  difficulty swallowing, nausea, abdominal pain, diarrhea, constipation, heartburn.  Musculoskeletal: Denies painful joints, sore muscles,       PHYSICAL EXAM  /80 (BP Location: Left arm, Patient Position: Sitting, BP Cuff Size: Adult)   Pulse 61   Resp 16   Ht 1.575 m (5' 2\")   Wt 68 kg (150 lb)   LMP 11/01/1986   SpO2 97%   BMI 27.44 kg/m²   Appearance: Well-nourished, well-developed, no acute distress  Eyes:  No scleral icterus , EOMI  ENMT: masked  Musculoskeletal:  Grossly normal; gait and station normal; digits and nails normal  Skin:  No rashes, petechiae, cyanosis  Neurologic: without focal signs; oriented to person, time, place, and purpose; judgement intact  Psychiatric:  No depression, anxiety, agitation      Medical Decision Making   Assessment and Plan  Sheyla Gauthier is a 81 y.o.female  with hypertension, hyperlipidemia, migraines, depression, hypothyroidism and obstructive sleep apnea using oral appliance.  Presents today to follow-up for JOHN management.    The medical record was reviewed.      Obstructive sleep apnea.  Currently she is using an oral appliance to control her sleep apnea.  Her oral appliance has been adjusted multiple times since receiving it.  She feels it does help her sleep.    Would recommend obtaining home sleep study to assess for efficacy of oral appliance therapy.    Patient was interested in home overnight pulse oximetry which she can monitor her oxygen levels more frequently.  Discussed Well[facility] Sleepue device      PLAN:   -Order placed for home sleep study to be completed with oral appliance  -Advised to reach out with any questions.  MyChart    Has been " advised to continue the current  oral appliance. Recommend an earlier appointment, if significant treatment barriers develop.    The risks of untreated sleep apnea were discussed with the patient at length. Patients with JOHN are at increased risk of cardiovascular disease including coronary artery disease, systemic arterial hypertension, pulmonary arterial hypertension, cardiac arrythmias, and stroke. The patient was advised to avoid driving a motor vehicle when drowsy.    Have advised the patient to follow up with the appropriate healthcare practitioners for all other medical problems and issues.    Return for 7-14 days after sleep study .      Please note portions of this record was created using voice recognition software. I have made every reasonable attempt to correct obvious errors, but I expect that there are errors of grammar and possibly content I did not discover before finalizing the note.

## 2022-01-14 ENCOUNTER — HOSPITAL ENCOUNTER (OUTPATIENT)
Dept: RADIOLOGY | Facility: MEDICAL CENTER | Age: 82
End: 2022-01-14
Attending: FAMILY MEDICINE
Payer: MEDICARE

## 2022-01-14 DIAGNOSIS — R05.9 COUGH: ICD-10-CM

## 2022-01-14 PROCEDURE — 71046 X-RAY EXAM CHEST 2 VIEWS: CPT

## 2022-01-14 RX ORDER — HYDRALAZINE HYDROCHLORIDE 10 MG/1
TABLET, FILM COATED ORAL
Qty: 90 TABLET | Refills: 0 | Status: SHIPPED | OUTPATIENT
Start: 2022-01-14 | End: 2022-03-28

## 2022-01-18 ENCOUNTER — PATIENT MESSAGE (OUTPATIENT)
Dept: CARDIOLOGY | Facility: MEDICAL CENTER | Age: 82
End: 2022-01-18

## 2022-01-20 ENCOUNTER — HOSPITAL ENCOUNTER (OUTPATIENT)
Dept: LAB | Facility: MEDICAL CENTER | Age: 82
End: 2022-01-20
Attending: PAIN MEDICINE
Payer: MEDICARE

## 2022-01-20 LAB
ALBUMIN SERPL BCP-MCNC: 4.2 G/DL (ref 3.2–4.9)
ALBUMIN/GLOB SERPL: 1.7 G/DL
ALP SERPL-CCNC: 40 U/L (ref 30–99)
ALT SERPL-CCNC: 21 U/L (ref 2–50)
ANION GAP SERPL CALC-SCNC: 10 MMOL/L (ref 7–16)
APPEARANCE UR: CLEAR
AST SERPL-CCNC: 21 U/L (ref 12–45)
BASOPHILS # BLD AUTO: 1.5 % (ref 0–1.8)
BASOPHILS # BLD: 0.07 K/UL (ref 0–0.12)
BILIRUB SERPL-MCNC: 0.5 MG/DL (ref 0.1–1.5)
BILIRUB UR QL STRIP.AUTO: NEGATIVE
BUN SERPL-MCNC: 18 MG/DL (ref 8–22)
CALCIUM SERPL-MCNC: 9.9 MG/DL (ref 8.5–10.5)
CHLORIDE SERPL-SCNC: 108 MMOL/L (ref 96–112)
CO2 SERPL-SCNC: 24 MMOL/L (ref 20–33)
COLOR UR: YELLOW
CREAT SERPL-MCNC: 0.77 MG/DL (ref 0.5–1.4)
EOSINOPHIL # BLD AUTO: 0.15 K/UL (ref 0–0.51)
EOSINOPHIL NFR BLD: 3.1 % (ref 0–6.9)
ERYTHROCYTE [DISTWIDTH] IN BLOOD BY AUTOMATED COUNT: 45.1 FL (ref 35.9–50)
GLOBULIN SER CALC-MCNC: 2.5 G/DL (ref 1.9–3.5)
GLUCOSE SERPL-MCNC: 89 MG/DL (ref 65–99)
GLUCOSE UR STRIP.AUTO-MCNC: NEGATIVE MG/DL
HCT VFR BLD AUTO: 39.5 % (ref 37–47)
HGB BLD-MCNC: 13.2 G/DL (ref 12–16)
IMM GRANULOCYTES # BLD AUTO: 0.02 K/UL (ref 0–0.11)
IMM GRANULOCYTES NFR BLD AUTO: 0.4 % (ref 0–0.9)
KETONES UR STRIP.AUTO-MCNC: NEGATIVE MG/DL
LEUKOCYTE ESTERASE UR QL STRIP.AUTO: NEGATIVE
LYMPHOCYTES # BLD AUTO: 1.27 K/UL (ref 1–4.8)
LYMPHOCYTES NFR BLD: 26.3 % (ref 22–41)
MCH RBC QN AUTO: 29.6 PG (ref 27–33)
MCHC RBC AUTO-ENTMCNC: 33.4 G/DL (ref 33.6–35)
MCV RBC AUTO: 88.6 FL (ref 81.4–97.8)
MICRO URNS: NORMAL
MONOCYTES # BLD AUTO: 0.39 K/UL (ref 0–0.85)
MONOCYTES NFR BLD AUTO: 8.1 % (ref 0–13.4)
NEUTROPHILS # BLD AUTO: 2.92 K/UL (ref 2–7.15)
NEUTROPHILS NFR BLD: 60.6 % (ref 44–72)
NITRITE UR QL STRIP.AUTO: NEGATIVE
NRBC # BLD AUTO: 0 K/UL
NRBC BLD-RTO: 0 /100 WBC
PH UR STRIP.AUTO: 6.5 [PH] (ref 5–8)
PLATELET # BLD AUTO: 232 K/UL (ref 164–446)
PMV BLD AUTO: 10.7 FL (ref 9–12.9)
POTASSIUM SERPL-SCNC: 4.1 MMOL/L (ref 3.6–5.5)
PROT SERPL-MCNC: 6.7 G/DL (ref 6–8.2)
PROT UR QL STRIP: NEGATIVE MG/DL
RBC # BLD AUTO: 4.46 M/UL (ref 4.2–5.4)
RBC UR QL AUTO: NEGATIVE
SODIUM SERPL-SCNC: 142 MMOL/L (ref 135–145)
SP GR UR STRIP.AUTO: 1.01
UROBILINOGEN UR STRIP.AUTO-MCNC: 0.2 MG/DL
WBC # BLD AUTO: 4.8 K/UL (ref 4.8–10.8)

## 2022-01-20 PROCEDURE — 80053 COMPREHEN METABOLIC PANEL: CPT

## 2022-01-20 PROCEDURE — 81003 URINALYSIS AUTO W/O SCOPE: CPT

## 2022-01-20 PROCEDURE — 85025 COMPLETE CBC W/AUTO DIFF WBC: CPT

## 2022-01-20 PROCEDURE — 36415 COLL VENOUS BLD VENIPUNCTURE: CPT

## 2022-01-21 ENCOUNTER — OUTPATIENT INFUSION SERVICES (OUTPATIENT)
Dept: ONCOLOGY | Facility: MEDICAL CENTER | Age: 82
End: 2022-01-21
Attending: FAMILY MEDICINE
Payer: MEDICARE

## 2022-01-21 VITALS
HEART RATE: 69 BPM | TEMPERATURE: 98.7 F | RESPIRATION RATE: 18 BRPM | HEIGHT: 60 IN | OXYGEN SATURATION: 97 % | DIASTOLIC BLOOD PRESSURE: 85 MMHG | BODY MASS INDEX: 30.3 KG/M2 | WEIGHT: 154.32 LBS | SYSTOLIC BLOOD PRESSURE: 152 MMHG

## 2022-01-21 DIAGNOSIS — M81.0 AGE-RELATED OSTEOPOROSIS WITHOUT CURRENT PATHOLOGICAL FRACTURE: ICD-10-CM

## 2022-01-21 PROCEDURE — 96372 THER/PROPH/DIAG INJ SC/IM: CPT

## 2022-01-21 PROCEDURE — 700111 HCHG RX REV CODE 636 W/ 250 OVERRIDE (IP): Mod: JG | Performed by: FAMILY MEDICINE

## 2022-01-21 RX ADMIN — DENOSUMAB 60 MG: 60 INJECTION SUBCUTANEOUS at 09:40

## 2022-01-21 ASSESSMENT — FIBROSIS 4 INDEX: FIB4 SCORE: 1.58

## 2022-01-21 NOTE — LETTER
[facility]   [address] [place] [id number]  Phone: [phone]  Fax: [phone]              Dear Dr. Morataya,    Your patient, Sheyla Gauthier (: 1940), was scheduled at [facility].  Sheyla's encounter diagnosis is:  1. Age-related osteoporosis without current pathological fracture  Nursing Communication    Nursing Communication    Nursing Communication    Calcium monitoring per protocol    denosumab (PROLIA) injection 60 mg    Nursing Communication    Nursing Communication    Nursing Communication    Calcium monitoring per protocol     She arrived for her appointment, and  the scheduled treatment was   given. These medications were administered to the patient: We administered denosumab..  Sheyla Gauthier  tolerated treatment. In addition, the following labs were drawn    Recent Results (from the past 24 hour(s))   Comp Metabolic Panel    Collection Time: 22 10:50 AM   Result Value Ref Range    Sodium 142 135 - 145 mmol/L    Potassium 4.1 3.6 - 5.5 mmol/L    Chloride 108 96 - 112 mmol/L    Co2 24 20 - 33 mmol/L    Anion Gap 10.0 7.0 - 16.0    Glucose 89 65 - 99 mg/dL    Bun 18 8 - 22 mg/dL    Creatinine 0.77 0.50 - 1.40 mg/dL    Calcium 9.9 8.5 - 10.5 mg/dL    AST(SGOT) 21 12 - 45 U/L    ALT(SGPT) 21 2 - 50 U/L    Alkaline Phosphatase 40 30 - 99 U/L    Total Bilirubin 0.5 0.1 - 1.5 mg/dL    Albumin 4.2 3.2 - 4.9 g/dL    Total Protein 6.7 6.0 - 8.2 g/dL    Globulin 2.5 1.9 - 3.5 g/dL    A-G Ratio 1.7 g/dL   CBC WITH DIFFERENTIAL    Collection Time: 22 10:50 AM   Result Value Ref Range    WBC 4.8 4.8 - 10.8 K/uL    RBC 4.46 4.20 - 5.40 M/uL    Hemoglobin 13.2 12.0 - 16.0 g/dL    Hematocrit 39.5 37.0 - 47.0 %    MCV 88.6 81.4 - 97.8 fL    MCH 29.6 27.0 - 33.0 pg    MCHC 33.4 (L) 33.6 - 35.0 g/dL    RDW 45.1 35.9 - 50.0 fL    Platelet Count 232 164 - 446 K/uL    MPV 10.7 9.0 - 12.9 fL    Neutrophils-Polys 60.60 44.00 - 72.00 %    Lymphocytes 26.30  22.00 - 41.00 %    Monocytes 8.10 0.00 - 13.40 %    Eosinophils 3.10 0.00 - 6.90 %    Basophils 1.50 0.00 - 1.80 %    Immature Granulocytes 0.40 0.00 - 0.90 %    Nucleated RBC 0.00 /100 WBC    Neutrophils (Absolute) 2.92 2.00 - 7.15 K/uL    Lymphs (Absolute) 1.27 1.00 - 4.80 K/uL    Monos (Absolute) 0.39 0.00 - 0.85 K/uL    Eos (Absolute) 0.15 0.00 - 0.51 K/uL    Baso (Absolute) 0.07 0.00 - 0.12 K/uL    Immature Granulocytes (abs) 0.02 0.00 - 0.11 K/uL    NRBC (Absolute) 0.00 K/uL   URINALYSIS    Collection Time: 01/20/22 10:50 AM   Result Value Ref Range    Color Yellow     Character Clear     Specific Gravity 1.013 <1.035    Ph 6.5 5.0 - 8.0    Glucose Negative Negative mg/dL    Ketones Negative Negative mg/dL    Protein Negative Negative mg/dL    Bilirubin Negative Negative    Urobilinogen, Urine 0.2 Negative    Nitrite Negative Negative    Leukocyte Esterase Negative Negative    Occult Blood Negative Negative    Micro Urine Req see below    ESTIMATED GFR    Collection Time: 01/20/22 10:50 AM   Result Value Ref Range    GFR If African American >60 >60 mL/min/1.73 m 2    GFR If Non African American >60 >60 mL/min/1.73 m 2            A NEW ORDER WILL BE NEEDED PRIOR TO SCHEDULING PATIENT'S NEXT APPOINTMENT.  PLEASE PROVIDE NEW ORDER.    For more information, you may review the nurse's progress notes in chart review under the notes section.       Sincerely,  Chana Gallardo R.N.

## 2022-01-21 NOTE — PROGRESS NOTES
Patient arrived ambulatory to [facility] for Prolia.  Patient denies any recent oral surgery or active infections.  Labs drawn yesterday and within parameters to treat.  Prolia given to right back arm, patient tolerated well.  New order needed from provider prior to next injection, patient aware.  Patient ambulated out of clinic in no apparent distress.

## 2022-01-25 RX ORDER — DENOSUMAB 60 MG/ML
60 INJECTION SUBCUTANEOUS ONCE
Qty: 1 ML | Refills: 4 | Status: SHIPPED | OUTPATIENT
Start: 2022-01-25 | End: 2022-01-25

## 2022-01-27 RX ORDER — MELOXICAM 15 MG/1
TABLET ORAL
Qty: 60 TABLET | Refills: 0 | Status: SHIPPED | OUTPATIENT
Start: 2022-01-27 | End: 2022-02-20

## 2022-02-04 ENCOUNTER — PATIENT MESSAGE (OUTPATIENT)
Dept: CARDIOLOGY | Facility: MEDICAL CENTER | Age: 82
End: 2022-02-04

## 2022-02-07 RX ORDER — LOSARTAN POTASSIUM 50 MG/1
50 TABLET ORAL DAILY
Qty: 90 TABLET | Refills: 2 | Status: SHIPPED | OUTPATIENT
Start: 2022-02-07 | End: 2022-02-15

## 2022-02-09 ENCOUNTER — PATIENT MESSAGE (OUTPATIENT)
Dept: CARDIOLOGY | Facility: MEDICAL CENTER | Age: 82
End: 2022-02-09

## 2022-02-14 ENCOUNTER — PATIENT MESSAGE (OUTPATIENT)
Dept: CARDIOLOGY | Facility: MEDICAL CENTER | Age: 82
End: 2022-02-14
Payer: MEDICARE

## 2022-02-14 NOTE — PROGRESS NOTES
-Please clarify her losartan dose, make sure she is not taking any more than 100 mg total per day.  If we made a mistake on her refill, please get it fixed.    -If she is willing, stop Cardura, start spironolactone 12.5 mg once daily.  This is a great blood pressure medication and a mild diuretic so she should take in the morning.    -Nonfasting BMP 2 weeks after starting the medication to monitor kidney function and potassium which I think will be fine.    -If her blood pressure is not to goal after 2 weeks, increase spironolactone to 25 mg once daily.    -Thank you

## 2022-02-15 DIAGNOSIS — I10 PRIMARY HYPERTENSION: ICD-10-CM

## 2022-02-15 RX ORDER — SPIRONOLACTONE 25 MG/1
25 TABLET ORAL DAILY
Qty: 90 TABLET | Refills: 3 | Status: SHIPPED | OUTPATIENT
Start: 2022-02-15 | End: 2022-05-23 | Stop reason: SDUPTHER

## 2022-02-15 NOTE — PROGRESS NOTES
Thank you for clarifying.    She should reduce her losartan to 100 mg which is the maximum dose.    Have her start spironolactone 25 mg instead, same orders for follow-up BMP.    Stop Cardura.    Thank you.

## 2022-02-22 RX ORDER — MELOXICAM 15 MG/1
TABLET ORAL
Qty: 60 TABLET | Refills: 0 | Status: SHIPPED | OUTPATIENT
Start: 2022-02-22 | End: 2022-03-28

## 2022-03-02 ENCOUNTER — HOSPITAL ENCOUNTER (OUTPATIENT)
Dept: LAB | Facility: MEDICAL CENTER | Age: 82
End: 2022-03-02
Attending: INTERNAL MEDICINE
Payer: MEDICARE

## 2022-03-02 DIAGNOSIS — I10 PRIMARY HYPERTENSION: ICD-10-CM

## 2022-03-02 LAB
ANION GAP SERPL CALC-SCNC: 12 MMOL/L (ref 7–16)
BUN SERPL-MCNC: 36 MG/DL (ref 8–22)
CALCIUM SERPL-MCNC: 10.3 MG/DL (ref 8.5–10.5)
CHLORIDE SERPL-SCNC: 105 MMOL/L (ref 96–112)
CO2 SERPL-SCNC: 23 MMOL/L (ref 20–33)
CREAT SERPL-MCNC: 1.13 MG/DL (ref 0.5–1.4)
GLUCOSE SERPL-MCNC: 94 MG/DL (ref 65–99)
POTASSIUM SERPL-SCNC: 4.8 MMOL/L (ref 3.6–5.5)
SODIUM SERPL-SCNC: 140 MMOL/L (ref 135–145)

## 2022-03-02 PROCEDURE — 80048 BASIC METABOLIC PNL TOTAL CA: CPT

## 2022-03-02 PROCEDURE — 36415 COLL VENOUS BLD VENIPUNCTURE: CPT

## 2022-03-05 NOTE — PROGRESS NOTES
Her creatinine went up a little but her potassium was ok so we will keep her on the same meds but check again in 2 weeks.    Stay on joceline 25 for now and repeat BMP in 2 weeks.     Thank you.

## 2022-03-07 DIAGNOSIS — I10 PRIMARY HYPERTENSION: ICD-10-CM

## 2022-03-07 NOTE — PATIENT COMMUNICATION
You  Maame Rivers M.D. 3 days ago       Hi LS, ok for pt to remain on Spironolactone?     Message text

## 2022-03-21 ENCOUNTER — HOSPITAL ENCOUNTER (OUTPATIENT)
Dept: LAB | Facility: MEDICAL CENTER | Age: 82
End: 2022-03-21
Attending: INTERNAL MEDICINE
Payer: MEDICARE

## 2022-03-21 DIAGNOSIS — I10 PRIMARY HYPERTENSION: ICD-10-CM

## 2022-03-21 LAB
ANION GAP SERPL CALC-SCNC: 15 MMOL/L (ref 7–16)
BUN SERPL-MCNC: 33 MG/DL (ref 8–22)
CALCIUM SERPL-MCNC: 10.6 MG/DL (ref 8.5–10.5)
CHLORIDE SERPL-SCNC: 107 MMOL/L (ref 96–112)
CO2 SERPL-SCNC: 18 MMOL/L (ref 20–33)
CREAT SERPL-MCNC: 0.86 MG/DL (ref 0.5–1.4)
GFR SERPLBLD CREATININE-BSD FMLA CKD-EPI: 68 ML/MIN/1.73 M 2
GLUCOSE SERPL-MCNC: 95 MG/DL (ref 65–99)
POTASSIUM SERPL-SCNC: 5 MMOL/L (ref 3.6–5.5)
SODIUM SERPL-SCNC: 140 MMOL/L (ref 135–145)

## 2022-03-21 PROCEDURE — 80048 BASIC METABOLIC PNL TOTAL CA: CPT

## 2022-03-21 PROCEDURE — 36415 COLL VENOUS BLD VENIPUNCTURE: CPT

## 2022-03-23 ENCOUNTER — TELEPHONE (OUTPATIENT)
Dept: CARDIOLOGY | Facility: MEDICAL CENTER | Age: 82
End: 2022-03-23
Payer: MEDICARE

## 2022-03-23 NOTE — TELEPHONE ENCOUNTER
LS    Patient would like to discuss the results of her blood work. Please reach out to her at [phone].    Thank you,  Enedina CALABRESE

## 2022-03-28 ENCOUNTER — OFFICE VISIT (OUTPATIENT)
Dept: CARDIOLOGY | Facility: MEDICAL CENTER | Age: 82
End: 2022-03-28
Payer: MEDICARE

## 2022-03-28 VITALS
HEART RATE: 76 BPM | OXYGEN SATURATION: 95 % | SYSTOLIC BLOOD PRESSURE: 118 MMHG | RESPIRATION RATE: 16 BRPM | WEIGHT: 144.2 LBS | BODY MASS INDEX: 28.31 KG/M2 | DIASTOLIC BLOOD PRESSURE: 68 MMHG | HEIGHT: 60 IN

## 2022-03-28 DIAGNOSIS — I10 ESSENTIAL HYPERTENSION: ICD-10-CM

## 2022-03-28 DIAGNOSIS — E78.2 MIXED HYPERLIPIDEMIA: ICD-10-CM

## 2022-03-28 DIAGNOSIS — M48.061 SPINAL STENOSIS OF LUMBAR REGION, UNSPECIFIED WHETHER NEUROGENIC CLAUDICATION PRESENT: ICD-10-CM

## 2022-03-28 DIAGNOSIS — I25.10 CORONARY ARTERY DISEASE INVOLVING NATIVE CORONARY ARTERY OF NATIVE HEART WITHOUT ANGINA PECTORIS: ICD-10-CM

## 2022-03-28 DIAGNOSIS — I10 PRIMARY HYPERTENSION: ICD-10-CM

## 2022-03-28 DIAGNOSIS — G47.33 OSA (OBSTRUCTIVE SLEEP APNEA): Chronic | ICD-10-CM

## 2022-03-28 DIAGNOSIS — I35.2 NONRHEUMATIC AORTIC INSUFFICIENCY WITH AORTIC STENOSIS: ICD-10-CM

## 2022-03-28 PROCEDURE — 99214 OFFICE O/P EST MOD 30 MIN: CPT | Performed by: NURSE PRACTITIONER

## 2022-03-28 RX ORDER — ATORVASTATIN CALCIUM 40 MG/1
40 TABLET, FILM COATED ORAL
Qty: 90 TABLET | Refills: 3 | Status: ON HOLD | OUTPATIENT
Start: 2022-03-28 | End: 2023-11-21 | Stop reason: SDUPTHER

## 2022-03-28 ASSESSMENT — FIBROSIS 4 INDEX: FIB4 SCORE: 1.58

## 2022-03-28 ASSESSMENT — ENCOUNTER SYMPTOMS
BRUISES/BLEEDS EASILY: 0
FEVER: 0
COUGH: 0
MYALGIAS: 0
SHORTNESS OF BREATH: 0
NAUSEA: 1
ABDOMINAL PAIN: 0
PND: 0
INSOMNIA: 0
DIZZINESS: 0
HEADACHES: 0
HEARTBURN: 1
CHILLS: 0
LOSS OF CONSCIOUSNESS: 0
PALPITATIONS: 0
ORTHOPNEA: 0

## 2022-03-28 NOTE — PROGRESS NOTES
Chief Complaint   Patient presents with   • Follow-Up   • HTN (Controlled)   • Coronary Artery Disease   • Hyperlipidemia       Subjective     Sheyla Gauthier is a 81 y.o. female who presents today for six month follow-up of CAD, HTN and hyperlipidemia.    Sheyla is a 80 year old female with history of CAD, status post PCI/FARZAD to the mid LAD in October 2018, hypertension, hyperlipidemia, obstructive sleep apnea and hypothyroidism, last seen by Dr. Rivers in September 2021.    At that time Hydralazine was added for better BP control, but she did not tolerate this. More recently, Aldactone 25mg once daily was added.    In January 2021, she had lower back surgery, and she has been somewhat frustrated by her ongoing back pain; she does see Dr. Del Rosario later this month.     She is here today for six month follow-up. She has been working hard on losing weight, and is down almost 20 pounds from a year ago. BP is better, running 100-130 systolic over 60-80 diastolic at home. She is tolerating her medications without issues. She does have a hiatal hernia, which causes some ongoing substernal discomfort. No overt chest pain, pressure or heaviness. No palpitations; no orthopnea or PND; no dizziness or syncope; very mild LE edema.     Past Medical History:   Diagnosis Date   • Anxiety    • Arthritis    • Back pain    • CAD (coronary artery disease) 10/2018    Abnormal CTCS. PCI/FARZAD (Russell 2.5 x 2mm) to the mid LAD.   • Cataract    • Daytime sleepiness    • Depression    • Dyslipidemia    • Frequent headaches    • Herpes zoster    • Hypertension    • MHA (microangiopathic hemolytic anemia)    • Migraine syndrome     • Mumps    • Osteopathia    • Osteoporosis    • Painful joint    • Post-menopause on HRT (hormone replacement therapy)    • Sleep apnea     Uses CPAP, followed by pulmonology   • Thyroid disease    • White matter abnormality on MRI of brain      Past Surgical History:   Procedure Laterality Date   • LAMINOTOMY   2022    L4-L5 and L5-S1 posterior fusion with instrumentation and laminotomy by Dr. Benitez.   • BUNIONECTOMY Left    • FOOT SURGERY     • NV BREAST REDUCTION     • CHOLECYSTECTOMY     • ABDOMINAL HYSTERECTOMY TOTAL  1986   • ARTHROSCOPY, KNEE     • EYE SURGERY      cataracts   • HYSTERECTOMY LAPAROSCOPY     • MAMMOPLASTY AUGMENTATION     • NV BREAST AUGMENTATION WITH IMPLANT       Family History   Problem Relation Age of Onset   • Stroke Father    • Arthritis Father    • Hyperlipidemia Father    • Hypertension Father    • Cancer Sister         breast   • Arthritis Sister    • Arthritis Mother    • Hypertension Mother    • Hyperlipidemia Mother    • Stroke Mother    • Arthritis Brother    • Cancer Brother    • Hypertension Brother    • Hyperlipidemia Brother    • Heart Disease Brother         CABG for prox LAD in late 40s   • Cancer Daughter         breast     Social History     Socioeconomic History   • Marital status:      Spouse name: Not on file   • Number of children: Not on file   • Years of education: Not on file   • Highest education level: Not on file   Occupational History   • Not on file   Tobacco Use   • Smoking status: Former Smoker     Packs/day: 0.02     Types: Cigarettes     Quit date: 1979     Years since quittin.7   • Smokeless tobacco: Never Used   • Tobacco comment: smoked off & on for 10 yrs   Vaping Use   • Vaping Use: Never used   Substance and Sexual Activity   • Alcohol use: Not Currently     Alcohol/week: 0.0 oz     Comment: occassional   • Drug use: No   • Sexual activity: Not Currently     Comment: , 3 kids, retired   Other Topics Concern   • Not on file   Social History Narrative   • Not on file     Social Determinants of Health     Financial Resource Strain: Not on file   Food Insecurity: Not on file   Transportation Needs: Not on file   Physical Activity: Not on file   Stress: Not on file   Social Connections: Not on file   Intimate  Partner Violence: Not on file   Housing Stability: Not on file     Allergies   Allergen Reactions   • Trazodone Swelling     Pt stated that she had swelling on her hands and feet's    • Crestor [Rosuvastatin Calcium]      Myalgias     Outpatient Encounter Medications as of 3/28/2022   Medication Sig Dispense Refill   • Acetaminophen (TYLENOL PO) Take  by mouth.     • atorvastatin (LIPITOR) 40 MG Tab Take 1 Tablet by mouth at bedtime. 90 Tablet 3   • spironolactone (ALDACTONE) 25 MG Tab Take 1 Tablet by mouth every day. 90 Tablet 3   • losartan (COZAAR) 100 MG Tab Take 1 Tablet by mouth every day. 90 Tablet 3   • ibuprofen (MOTRIN) 600 MG Tab Take  by mouth as needed.     • levothyroxine (SYNTHROID) 88 MCG Tab 1 tablet in the morning on an empty stomach     • liothyronine (CYTOMEL) 5 MCG Tab TAKE 1/2 TAB BY MOUTH TWICE A DAY     • vitamin D (CHOLECALCIFEROL) 1000 UNIT Tab Take 1,000 Units by mouth every day.     • aspirin (ASA) 81 MG Chew Tab chewable tablet Take 1 Tab by mouth every day. 100 Tab 3   • Ascorbic Acid (VITAMIN C) 1000 MG Tab Take  by mouth.     • Estriol 10 % Cream as needed.     • Magnesium 400 MG Tab Take  by mouth.     • [DISCONTINUED] meloxicam (MOBIC) 15 MG tablet TAKE 1 TABLET BY MOUTH TWICE A DAY AS NEEDED FOR PAIN 60 Tablet 0   • [DISCONTINUED] hydrALAZINE (APRESOLINE) 10 MG Tab TAKE 1 TABLET BY MOUTH EVERY DAY AS NEEDED 90 Tablet 0   • [DISCONTINUED] Meloxicam 10 MG Cap Take 10 mg by mouth 2 times a day as needed. 60 Capsule 0   • [DISCONTINUED] atorvastatin (LIPITOR) 40 MG Tab TAKE 1 TABLET BY MOUTH EVERYDAY AT BEDTIME 90 tablet 3     No facility-administered encounter medications on file as of 3/28/2022.     Review of Systems   Constitutional: Negative for chills and fever.   HENT: Negative for congestion.    Respiratory: Negative for cough and shortness of breath.    Cardiovascular: Negative for chest pain, palpitations, orthopnea, leg swelling and PND.   Gastrointestinal: Positive for  "heartburn and nausea. Negative for abdominal pain.   Musculoskeletal: Positive for joint pain. Negative for myalgias.   Skin: Negative for rash.   Neurological: Negative for dizziness, loss of consciousness and headaches.   Endo/Heme/Allergies: Does not bruise/bleed easily.   Psychiatric/Behavioral: The patient does not have insomnia.               Objective     /68 (BP Location: Left arm, Patient Position: Sitting, BP Cuff Size: Adult)   Pulse 76   Resp 16   Ht 1.53 m (5' 0.24\")   Wt 65.4 kg (144 lb 3.2 oz)   LMP 11/01/1986   SpO2 95%   BMI 27.94 kg/m²     Physical Exam  Constitutional:       Appearance: She is well-developed.   HENT:      Head: Normocephalic.   Neck:      Vascular: No JVD.   Cardiovascular:      Rate and Rhythm: Normal rate and regular rhythm.      Heart sounds: Normal heart sounds.   Pulmonary:      Effort: Pulmonary effort is normal. No respiratory distress.      Breath sounds: Normal breath sounds. No wheezing or rales.   Abdominal:      General: Bowel sounds are normal. There is no distension.      Palpations: Abdomen is soft.      Tenderness: There is no abdominal tenderness.   Musculoskeletal:         General: Normal range of motion.      Cervical back: Normal range of motion and neck supple.      Comments: Well healed posterior scar of recent back surgery.   Skin:     General: Skin is warm and dry.      Findings: No rash.   Neurological:      Mental Status: She is alert and oriented to person, place, and time.     CONCLUSIONS OF ECHOCARDIOGRAM OF 10/28/2020:  Left ventricular ejection fraction is visually estimated to be 60%.  Mild to moderate aortic insufficiency.  Mild tricuspid regurgitation.  Estimated right ventricular systolic pressure is  23 mmHg.   No prior study is available for comparison.         Lab Results   Component Value Date/Time    CHOLSTRLTOT 126 02/01/2021 08:54 AM    LDL 53 02/01/2021 08:54 AM    HDL 53 02/01/2021 08:54 AM    TRIGLYCERIDE 100 02/01/2021 " 08:54 AM       Lab Results   Component Value Date/Time    SODIUM 140 03/21/2022 02:15 PM    POTASSIUM 5.0 03/21/2022 02:15 PM    CHLORIDE 107 03/21/2022 02:15 PM    CO2 18 (L) 03/21/2022 02:15 PM    GLUCOSE 95 03/21/2022 02:15 PM    BUN 33 (H) 03/21/2022 02:15 PM    CREATININE 0.86 03/21/2022 02:15 PM     Lab Results   Component Value Date/Time    ALKPHOSPHAT 40 01/20/2022 10:50 AM    ASTSGOT 21 01/20/2022 10:50 AM    ALTSGPT 21 01/20/2022 10:50 AM    TBILIRUBIN 0.5 01/20/2022 10:50 AM          Assessment & Plan     1. Essential hypertension  Basic Metabolic Panel   2. Primary hypertension     3. Coronary artery disease involving native coronary artery of native heart without angina pectoris  EC-ECHOCARDIOGRAM COMPLETE W/O CONT    atorvastatin (LIPITOR) 40 MG Tab   4. Mixed hyperlipidemia  Lipid Profile    atorvastatin (LIPITOR) 40 MG Tab   5. Nonrheumatic aortic insufficiency with aortic stenosis  EC-ECHOCARDIOGRAM COMPLETE W/O CONT   6. JOHN (obstructive sleep apnea)     7. Spinal stenosis of lumbar region, unspecified whether neurogenic claudication present         Medical Decision Making: Today's Assessment/Status/Plan:      1. Hypertension, treated with Losartan 100mg and Aldactone 25mg once daily. BP is good today.    2. CAD, status post PCI/FARZAD to the mid LAD in October 2018. No angina or shortness of breath. Continue:  ASA 81mg once daily  Losartan 100mg once daily  Lipitor 40mg once daily    3. Hyperlipidemia, treated with Lipitor 40mg. Last LDL was 53 (at goal).    4. Mild-moderate AR on echo in 2020, to repeat echo.    5. JOHN, unable to use CPAP, uses mouth appliance. She has lost weight, and is applauded on her efforts.    6. Spinal stenosis, status post recent back surgery, just started PT. She has follow-up with her surgeon later this month.    Same medications for now. Follow-up in 6 months. Keep follow-up with other providers too.

## 2022-03-29 ENCOUNTER — APPOINTMENT (OUTPATIENT)
Dept: SLEEP MEDICINE | Facility: MEDICAL CENTER | Age: 82
End: 2022-03-29
Attending: STUDENT IN AN ORGANIZED HEALTH CARE EDUCATION/TRAINING PROGRAM
Payer: MEDICARE

## 2022-03-29 DIAGNOSIS — G47.33 OSA (OBSTRUCTIVE SLEEP APNEA): ICD-10-CM

## 2022-04-14 ENCOUNTER — HOSPITAL ENCOUNTER (OUTPATIENT)
Dept: LAB | Facility: MEDICAL CENTER | Age: 82
End: 2022-04-14
Attending: NURSE PRACTITIONER
Payer: MEDICARE

## 2022-04-14 DIAGNOSIS — E78.2 MIXED HYPERLIPIDEMIA: ICD-10-CM

## 2022-04-14 DIAGNOSIS — I10 ESSENTIAL HYPERTENSION: ICD-10-CM

## 2022-04-14 LAB
ANION GAP SERPL CALC-SCNC: 11 MMOL/L (ref 7–16)
BUN SERPL-MCNC: 39 MG/DL (ref 8–22)
CALCIUM SERPL-MCNC: 10 MG/DL (ref 8.5–10.5)
CHLORIDE SERPL-SCNC: 107 MMOL/L (ref 96–112)
CHOLEST SERPL-MCNC: 161 MG/DL (ref 100–199)
CO2 SERPL-SCNC: 24 MMOL/L (ref 20–33)
CREAT SERPL-MCNC: 1 MG/DL (ref 0.5–1.4)
FASTING STATUS PATIENT QL REPORTED: NORMAL
GFR SERPLBLD CREATININE-BSD FMLA CKD-EPI: 57 ML/MIN/1.73 M 2
GLUCOSE SERPL-MCNC: 90 MG/DL (ref 65–99)
HDLC SERPL-MCNC: 15 MG/DL
LDLC SERPL CALC-MCNC: 134 MG/DL
POTASSIUM SERPL-SCNC: 4.9 MMOL/L (ref 3.6–5.5)
SODIUM SERPL-SCNC: 142 MMOL/L (ref 135–145)
TRIGL SERPL-MCNC: 59 MG/DL (ref 0–149)

## 2022-04-14 PROCEDURE — 36415 COLL VENOUS BLD VENIPUNCTURE: CPT

## 2022-04-14 PROCEDURE — 80048 BASIC METABOLIC PNL TOTAL CA: CPT

## 2022-04-14 PROCEDURE — 80061 LIPID PANEL: CPT

## 2022-04-15 ENCOUNTER — TELEPHONE (OUTPATIENT)
Dept: CARDIOLOGY | Facility: MEDICAL CENTER | Age: 82
End: 2022-04-15
Payer: MEDICARE

## 2022-04-15 NOTE — TELEPHONE ENCOUNTER
Called pt [phone]  Pt reports taking Lipitor every day without missing any doses. Pt states she is on a plant based diet. Pt states she will discuss lipid levels with PCP Darius Morataya MD at next appt 4/20/22.           To AB

## 2022-04-15 NOTE — TELEPHONE ENCOUNTER
----- Message from JANET Rolon sent at 4/14/2022 11:21 AM PDT -----  Is she taking her Lipitor?  Doesn't look it - lipids have gone up.  Can you please confirm?  Thanks, AB

## 2022-04-18 NOTE — TELEPHONE ENCOUNTER
Her values have really changed:  Her HDL really went down (to 15!)  Her LDL really went up (from 53 to 134)    Perhaps consider a repeat lipid panel, to double check?    The other option is to change from Lipitor 40mg to Crestor 10mg, and see if this makes a difference.    IF she does this, suggest lipid panel in 1 month.    Thanks, AB

## 2022-04-19 NOTE — TELEPHONE ENCOUNTER
Called pt [phone]  Relayed response from AB. Pt states she has taken Crestor in past and stopped due to severe muscle aches and therefore does not want to pursue this again. Pt reports have steroid injections x3 due to lumbar pain. Last injection was on 3/31/22. Pt states lipid panel was wrong due to steroid injections. Pt will discuss all this with PCP on 4/20/22. Pt will call or send my chart with how she will proceed.

## 2022-04-20 ENCOUNTER — OFFICE VISIT (OUTPATIENT)
Dept: MEDICAL GROUP | Age: 82
End: 2022-04-20
Payer: MEDICARE

## 2022-04-20 VITALS
HEIGHT: 60 IN | RESPIRATION RATE: 16 BRPM | DIASTOLIC BLOOD PRESSURE: 68 MMHG | OXYGEN SATURATION: 98 % | TEMPERATURE: 98.2 F | WEIGHT: 151 LBS | SYSTOLIC BLOOD PRESSURE: 150 MMHG | HEART RATE: 69 BPM | BODY MASS INDEX: 29.64 KG/M2

## 2022-04-20 DIAGNOSIS — N18.2 STAGE 2 CHRONIC KIDNEY DISEASE: ICD-10-CM

## 2022-04-20 DIAGNOSIS — M48.061 SPINAL STENOSIS OF LUMBAR REGION, UNSPECIFIED WHETHER NEUROGENIC CLAUDICATION PRESENT: ICD-10-CM

## 2022-04-20 PROCEDURE — 99214 OFFICE O/P EST MOD 30 MIN: CPT | Performed by: FAMILY MEDICINE

## 2022-04-20 RX ORDER — FLUTICASONE PROPIONATE 50 MCG
1 SPRAY, SUSPENSION (ML) NASAL DAILY
Qty: 16 G | Refills: 2 | Status: SHIPPED | OUTPATIENT
Start: 2022-04-20 | End: 2022-05-17

## 2022-04-20 RX ORDER — TRAMADOL HYDROCHLORIDE 50 MG/1
50 TABLET ORAL NIGHTLY PRN
Qty: 90 TABLET | Refills: 0 | Status: SHIPPED | OUTPATIENT
Start: 2022-04-20 | End: 2022-07-19

## 2022-04-20 ASSESSMENT — PATIENT HEALTH QUESTIONNAIRE - PHQ9: CLINICAL INTERPRETATION OF PHQ2 SCORE: 0

## 2022-04-20 ASSESSMENT — FIBROSIS 4 INDEX: FIB4 SCORE: 1.58

## 2022-04-20 NOTE — PROGRESS NOTES
This medical record contains text that has been entered with the assistance of computer voice recognition and dictation software.  Therefore, it may contain unintended errors in text, spelling, punctuation, or grammar      Chief Complaint   Patient presents with   • Lab Results         Sheyla Gauthier is a 81 y.o. female here evaluation and management of: labs      HPI:     1. Stage 2 chronic kidney disease  2. Spinal stenosis of lumbar region, unspecified whether neurogenic claudication present  The patient has been using ibuprofen 600 mg every day for chronic lumbar pain.  She did develop acute kidney insufficiency.  She also has been taking 1 g of Tylenol on and off as well.  The patient states she has tried Norco in the past but it made her feel too dizzy and weird.  She denies any loss of bladder or bowel function no numbness or tingling in the perineum.        Results for SHEYLA GAUTHIER (MRN [id number]) as of 4/20/2022 15:26   Ref. Range 3/2/2022 11:45   GFR If  Latest Ref Range: >60 mL/min/1.73 m 2 56 (A)   GFR If Non  Latest Ref Range: >60 mL/min/1.73 m 2 46 (A)     Current medicines (including changes today)  Current Outpatient Medications   Medication Sig Dispense Refill   • traMADol (ULTRAM) 50 MG Tab Take 1 Tablet by mouth at bedtime as needed for up to 90 days. 90 Tablet 0   • fluticasone (FLONASE) 50 MCG/ACT nasal spray Administer 1 Spray into affected nostril(S) every day. 16 g 2   • Acetaminophen (TYLENOL PO) Take  by mouth.     • atorvastatin (LIPITOR) 40 MG Tab Take 1 Tablet by mouth at bedtime. 90 Tablet 3   • spironolactone (ALDACTONE) 25 MG Tab Take 1 Tablet by mouth every day. 90 Tablet 3   • losartan (COZAAR) 100 MG Tab Take 1 Tablet by mouth every day. 90 Tablet 3   • ibuprofen (MOTRIN) 600 MG Tab Take  by mouth as needed.     • levothyroxine (SYNTHROID) 88 MCG Tab 1 tablet in the morning on an empty stomach     • liothyronine (CYTOMEL) 5 MCG Tab TAKE  1/2 TAB BY MOUTH TWICE A DAY     • vitamin D (CHOLECALCIFEROL) 1000 UNIT Tab Take 1,000 Units by mouth every day.     • aspirin (ASA) 81 MG Chew Tab chewable tablet Take 1 Tab by mouth every day. 100 Tab 3   • Ascorbic Acid (VITAMIN C) 1000 MG Tab Take  by mouth.     • Estriol 10 % Cream as needed.     • Magnesium 400 MG Tab Take  by mouth.       No current facility-administered medications for this visit.     She  has a past medical history of Anxiety, Arthritis, Back pain, CAD (coronary artery disease) (10/2018), Cataract, Daytime sleepiness, Depression, Dyslipidemia, Frequent headaches, Herpes zoster, Hypertension, MHA (microangiopathic hemolytic anemia), Migraine syndrome ( ), Mumps, Osteopathia, Osteoporosis, Painful joint, Post-menopause on HRT (hormone replacement therapy), Sleep apnea, Thyroid disease, and White matter abnormality on MRI of brain.  She  has a past surgical history that includes cholecystectomy (); abdominal hysterectomy total (1986); pr breast reduction (); foot surgery (); pr breast augmentation with implant; mammoplasty augmentation; bunionectomy (Left, ); eye surgery; hysterectomy laparoscopy; arthroscopy, knee; and laminotomy (2022).  Social History     Tobacco Use   • Smoking status: Former Smoker     Packs/day: 0.02     Types: Cigarettes     Quit date: 1979     Years since quittin.7   • Smokeless tobacco: Never Used   • Tobacco comment: smoked off & on for 10 yrs   Vaping Use   • Vaping Use: Never used   Substance Use Topics   • Alcohol use: Not Currently     Alcohol/week: 0.0 oz     Comment: occassional   • Drug use: No     Social History     Social History Narrative   • Not on file     Family History   Problem Relation Age of Onset   • Stroke Father    • Arthritis Father    • Hyperlipidemia Father    • Hypertension Father    • Cancer Sister         breast   • Arthritis Sister    • Arthritis Mother    • Hypertension Mother    • Hyperlipidemia Mother     • Stroke Mother    • Arthritis Brother    • Cancer Brother    • Hypertension Brother    • Hyperlipidemia Brother    • Heart Disease Brother         CABG for prox LAD in late 40s   • Cancer Daughter         breast     Family Status   Relation Name Status   • Fa     • Sis  Alive   • Mo          tia   • Bro  Alive   • Ayesha  (Not Specified)         ROS    The pertinent  ROS findings can be seen in the HPI above.     All other systems reviewed and are negative     Objective:     /68 (BP Location: Right arm, Patient Position: Sitting, BP Cuff Size: Adult)   Pulse 69   Temp 36.8 °C (98.2 °F) (Temporal)   Resp 16   Ht 1.524 m (5')   Wt 68.5 kg (151 lb)   SpO2 98%  Body mass index is 29.49 kg/m².      Physical Exam:    Constitutional: Alert, no distress.  Skin: No suspicious lesions  Eye: Equal, round and reactive, conjunctiva clear, lids normal.  ENMT: Lips without lesions, good dentition, oropharynx clear.  Neck: Trachea midline, no masses, no thyromegaly. No cervical or supraclavicular lymphadenopathy.  Respiratory: Unlabored respiratory effort, lungs clear to auscultation, no wheezes, no ronchi.  Cardiovascular: Normal S1, S2, no murmur, no edema  Abdomen: Soft, non-tender, no masses, no hepatosplenomegaly.        Assessment and Plan:   The following treatment plan was discussed      1. Stage 2 chronic kidney disease  Instructed her to stop NSAIDs including the ibuprofen she was taking  We will give her tramadol for pain low-dose at night  - Comp Metabolic Panel; Future    2. Spinal stenosis of lumbar region, unspecified whether neurogenic claudication present  See #1  - traMADol (ULTRAM) 50 MG Tab; Take 1 Tablet by mouth at bedtime as needed for up to 90 days.  Dispense: 90 Tablet; Refill: 0        3. PLANT BASED DIET    Yue herself is not following a plant-based diet she is following a pescatarian diet.  According to the strictest definition, a vegetarian diet consists primarily of  cereals, fruits, vegetables, legumes, and nuts; animal foods, including milk, dairy products, and eggs generally are excluded  The long-term effects of a vegetarian diet on health outcomes are difficult to separate from lifestyle elements associated with a vegetarian diet (eg, regular exercise, avoidance of tobacco and alcohol products). However, observational studies suggest that consumption of a vegetarian diet is associated with lower incidence of obesity, CHD, hypertension, type 2 diabetes, and certain cancers [. Results of randomized trials have found a beneficial impact of vegetarian diets on a number of cardiovascular risk factors                Instructed to Follow up in clinic or ER for worsening symptoms, difficulty breathing, lack of expected recovery, or should new symptoms or problems arise.    Followup: Return in about 2 months (around 6/20/2022) for Reevaluation, labs.

## 2022-04-21 NOTE — TELEPHONE ENCOUNTER
Sheyla Steinberg is returning your call, with an update from her Dr. Carrion - [phone]    Thank you,   Chanel COLÓN

## 2022-04-22 NOTE — TELEPHONE ENCOUNTER
Called pt [phone]  Pt reports PCP recommendation is not to change medications right now and recommends repeat BMP during week of 5/15/22 due to recent steroid injections.  Pt reports PCP advised NOT to take ibuprofen any more. Pt would like to follow this recommendation before changing any medications.

## 2022-05-13 ENCOUNTER — HOME STUDY (OUTPATIENT)
Dept: SLEEP MEDICINE | Facility: MEDICAL CENTER | Age: 82
End: 2022-05-13
Payer: MEDICARE

## 2022-05-13 PROCEDURE — G0399 HOME SLEEP TEST/TYPE 3 PORTA: HCPCS | Performed by: STUDENT IN AN ORGANIZED HEALTH CARE EDUCATION/TRAINING PROGRAM

## 2022-05-16 NOTE — PROCEDURES
DIAGNOSTIC HOME SLEEP TEST (HST) REPORT       PATIENT ID:  NAME:  Sheyla Gauthier  MRN:               [id number]  YOB: 1940  DATE OF STUDY: 05/13/2022      Impression:     This study shows evidence of:     1. Mild obstructive sleep apnea with 4% Respiratory Event Index (JEN) of 10.9 per hour and worse in supine sleep with JEN at 19.6. Non-Supine AHI 2.3. These findings are based on the recording time (flow evaluation time). It is not possible with this device to determine a traditional apnea+hypopnea index (AHI) for total sleep time since EEG channels are not available.     2. Oxygenation O2 Sat. jose guadalupe was 79% and mean O2 sat was 90% and baseline O2 at 93 %. O2 sat was below 88% for 1hr 42 min of the flow evaluation time. Oxygen Desaturation (>= 4%) Index was elevated at 10.9/hr. AVG HR was 65 BPM.      TECHNICAL DESCRIPTION: Fanbouts apnea link air device used was a type-III home study device. Home sleep study recording included: Airflow recording by nasal pressure transducer; Respiratory Effort by 1 RIP Band; Oxygen Saturation and heart rate by finger oximetry. A position sensor and a snore channel was also used.    Scoring Criteria: A modification of the the AASM Manual for the Scoring of Sleep and Associated Events, 2012, was used.   Obstructive apnea was scored by cessation of airflow for at least 10 seconds with continuing respiratory effort.  Central apnea was scored by cessation of airflow for at least 10 seconds with no effort.  Hypopnea was scored by a 30% or more reduction in airflow for at least 10 seconds accompanied by an arterial oxygen desaturation of 3% or more.  (For Medicare patients, hypopneas were scored by a 30% or more reduction in airflow for at least 10 seconds accompanied by an arterial oxygen saturation of 4% or more, as required by their insurance, CMS.        General sleep summary: . Total recording time is 9 hours and 10 minutes and total flow evaluation time is  5 hours and 57 minutes. The patient spent 2 hours and 57 minutes in the supine position and 2 hours and 59 minutes in the nonsupine position.    Respiratory events:    Apneas: Total 5 (Obstructive apnea index 0.8/hr, Central apnea index 0 /hr, mixed 0 /hour)  Hypopneas: Total 60 with a Hypopnea index of 10.1 /hr    Recommendations:    1. HST performed with Oral appliance in place. Mild JOHN seen with nocturnal hypoxia.   2. Appears to have positional aspect to sleep apnea and may be candidate for positional therapy with oral appliance.   3. Hypoxia occurred in supine sleep   4. Loss of signal after 6 hours of recording.   Clinical correlation is required.         Stas Mcmullen MD

## 2022-05-17 RX ORDER — FLUTICASONE PROPIONATE 50 MCG
1 SPRAY, SUSPENSION (ML) NASAL DAILY
Qty: 16 ML | Refills: 2 | Status: SHIPPED | OUTPATIENT
Start: 2022-05-17 | End: 2023-10-13

## 2022-05-18 ENCOUNTER — HOSPITAL ENCOUNTER (OUTPATIENT)
Dept: LAB | Facility: MEDICAL CENTER | Age: 82
End: 2022-05-18
Attending: FAMILY MEDICINE
Payer: MEDICARE

## 2022-05-18 DIAGNOSIS — N18.2 STAGE 2 CHRONIC KIDNEY DISEASE: ICD-10-CM

## 2022-05-18 LAB
ALBUMIN SERPL BCP-MCNC: 4.1 G/DL (ref 3.2–4.9)
ALBUMIN/GLOB SERPL: 1.8 G/DL
ALP SERPL-CCNC: 35 U/L (ref 30–99)
ALT SERPL-CCNC: 14 U/L (ref 2–50)
ANION GAP SERPL CALC-SCNC: 9 MMOL/L (ref 7–16)
AST SERPL-CCNC: 12 U/L (ref 12–45)
BILIRUB SERPL-MCNC: 0.6 MG/DL (ref 0.1–1.5)
BUN SERPL-MCNC: 31 MG/DL (ref 8–22)
CALCIUM SERPL-MCNC: 9.5 MG/DL (ref 8.5–10.5)
CHLORIDE SERPL-SCNC: 107 MMOL/L (ref 96–112)
CO2 SERPL-SCNC: 24 MMOL/L (ref 20–33)
CREAT SERPL-MCNC: 0.92 MG/DL (ref 0.5–1.4)
GFR SERPLBLD CREATININE-BSD FMLA CKD-EPI: 63 ML/MIN/1.73 M 2
GLOBULIN SER CALC-MCNC: 2.3 G/DL (ref 1.9–3.5)
GLUCOSE SERPL-MCNC: 86 MG/DL (ref 65–99)
POTASSIUM SERPL-SCNC: 4.9 MMOL/L (ref 3.6–5.5)
PROT SERPL-MCNC: 6.4 G/DL (ref 6–8.2)
SODIUM SERPL-SCNC: 140 MMOL/L (ref 135–145)

## 2022-05-18 PROCEDURE — 36415 COLL VENOUS BLD VENIPUNCTURE: CPT

## 2022-05-18 PROCEDURE — 80053 COMPREHEN METABOLIC PANEL: CPT

## 2022-05-20 ENCOUNTER — TELEPHONE (OUTPATIENT)
Dept: CARDIOLOGY | Facility: MEDICAL CENTER | Age: 82
End: 2022-05-20
Payer: MEDICARE

## 2022-05-20 DIAGNOSIS — I10 PRIMARY HYPERTENSION: ICD-10-CM

## 2022-05-20 NOTE — TELEPHONE ENCOUNTER
AB    Caller: Sheyla  Topic/issue: Sheyla would like results from her lab work, to see if she is to stay on her BP medication, as per AB  Callback Number: [phone]  Thank you,   Chanel COLÓN

## 2022-05-23 ENCOUNTER — PATIENT MESSAGE (OUTPATIENT)
Dept: CARDIOLOGY | Facility: MEDICAL CENTER | Age: 82
End: 2022-05-23
Payer: MEDICARE

## 2022-05-23 RX ORDER — SPIRONOLACTONE 25 MG/1
25 TABLET ORAL DAILY
Qty: 90 TABLET | Refills: 3 | Status: SHIPPED | OUTPATIENT
Start: 2022-05-23 | End: 2022-10-20

## 2022-05-23 NOTE — TELEPHONE ENCOUNTER
Called pt [phone]  Pharmacy verified and refill sent per pt request. Repeat lipid panel placed per AB. Pt reports using [facility] lab and does not need to be mailed to home address. Pt verbalized understanding.         spironolactone (ALDACTONE) 25 MG Tab 90 Tablet 3/3 5/23/2022     Sig - Route: Take 1 Tablet by mouth every day. - Oral    Sent to pharmacy as: Spironolactone 25 MG Oral Tablet (ALDACTONE)    Cosign for Ordering: Required by JANET Rolon    E-Prescribing Status: Receipt confirmed by pharmacy (5/23/2022 12:50 PM PDT)    Pharmacy  [id number] - [place], [place] - [address]

## 2022-05-23 NOTE — TELEPHONE ENCOUNTER
Called pt [phone]  Pt reports needing to repeat lipid panel as pt was taking steroid injections during last lab draw. Pt reports needing refill on spironolactone and has increased BLE swelling (out of medication for 7 days). Pt states she has not refilled medication as she is waiting for AB to review recent CMP lab dated 5/18/22 (in chart) to make sure kidneys are okay. Cardiac medications verified with name/dose/frequency.   NOW:  /96 HR 85        To AB  Please review BMP in chart, BUN elevated   Okay to reorder spironolactone?  Okay for repeat lipid panel?

## 2022-05-23 NOTE — TELEPHONE ENCOUNTER
Renal function is fine/normal. She can resume Spironolactone. OK to also re-order lipid panel.  Thanks, AB

## 2022-05-25 ENCOUNTER — HOSPITAL ENCOUNTER (OUTPATIENT)
Dept: LAB | Facility: MEDICAL CENTER | Age: 82
End: 2022-05-25
Attending: NURSE PRACTITIONER
Payer: MEDICARE

## 2022-05-25 DIAGNOSIS — I10 PRIMARY HYPERTENSION: ICD-10-CM

## 2022-05-25 PROCEDURE — 80061 LIPID PANEL: CPT

## 2022-05-25 PROCEDURE — 36415 COLL VENOUS BLD VENIPUNCTURE: CPT

## 2022-05-26 ENCOUNTER — OFFICE VISIT (OUTPATIENT)
Dept: SLEEP MEDICINE | Facility: MEDICAL CENTER | Age: 82
End: 2022-05-26
Payer: MEDICARE

## 2022-05-26 VITALS
WEIGHT: 149 LBS | BODY MASS INDEX: 27.42 KG/M2 | RESPIRATION RATE: 12 BRPM | OXYGEN SATURATION: 96 % | HEART RATE: 79 BPM | SYSTOLIC BLOOD PRESSURE: 108 MMHG | DIASTOLIC BLOOD PRESSURE: 66 MMHG | HEIGHT: 62 IN

## 2022-05-26 DIAGNOSIS — G47.33 OSA (OBSTRUCTIVE SLEEP APNEA): ICD-10-CM

## 2022-05-26 LAB
CHOLEST SERPL-MCNC: 145 MG/DL (ref 100–199)
FASTING STATUS PATIENT QL REPORTED: NORMAL
HDLC SERPL-MCNC: 59 MG/DL
LDLC SERPL CALC-MCNC: 69 MG/DL
TRIGL SERPL-MCNC: 85 MG/DL (ref 0–149)

## 2022-05-26 PROCEDURE — 99213 OFFICE O/P EST LOW 20 MIN: CPT | Performed by: STUDENT IN AN ORGANIZED HEALTH CARE EDUCATION/TRAINING PROGRAM

## 2022-05-26 ASSESSMENT — FIBROSIS 4 INDEX: FIB4 SCORE: 1.10590858722382504

## 2022-05-26 NOTE — PROGRESS NOTES
[facility] Follow-up Visit    CC: Follow-up to discuss sleep study results      HPI:  Sheyla Gauthier is a 81 y.o.female  with hypertension, hyperlipidemia, migraines, depression, hypothyroidism and obstructive sleep apnea using oral appliance.  Presents today to follow-up to discuss management of obstructive sleep apnea.    She has been using her oral appliance intermittently.  She did have her dentist adjusted oral appliance but has not had a change in months.  She is making sure to use the morning mouthguard for 30 minutes with each use.    Denied a sleep study she made sure to wear her oral appliance.  She did feel that she did not get the best night sleep and woke up earlier than intended.    Sleep History  Overnight pulse oximetry on 6/9/2020: The study was done on CPAP 10 cm. The total analyzed time was 7 hrs 0 min. O2 Sat. jose guadalupe was 88% and mean O2 sat was 89 % and baseline O2 at 90%. O2 sat was below 88% for 0 min of the flow evaluation time. Oxygen Desaturation (>=3%) Index was 4.6/hr.      Overnight oximetry on 5/26/2020: The study was done on CPAP 9 cm. The total analyzed time was 7 hrs 0 min. O2 Sat. jose guadalupe was 84% and mean O2 sat was 88 % and baseline O2 at 89%. O2 sat was below 88% for 21.7 min of the flow evaluation time. Oxygen Desaturation (>=3%) Index was elevated at 9.6/hr.      Her prior home showed an AHI of 26.2/jose guadalupe saturation 74%. Her follow-up study 9/12/2018 with her dental device in place showed an AHI of 9.3 with a jose guadalupe saturation of 83%. He had another PSG done 6/12/2019 showed an AHI of 21.7/jose guadalupe saturation 84%. CPAP titration was performed 6/30/2019.  She did best on CPAP at 8 cm H2O with a resultant AHI of 0.0, jose guadalupe saturation 89%, mean saturation 93%, and the achievement of supine REM     Patient Active Problem List    Diagnosis Date Noted   • Stage 2 chronic kidney disease 04/20/2022   • Arthritis 11/03/2021   • Memory loss 08/20/2020   • Conductive hearing loss of  right ear 08/20/2020   • Left wrist pain 12/19/2019   • Myalgia 10/01/2019   • Former smoker 07/02/2019   • Other insomnia 06/14/2019   • Coronary artery disease involving native coronary artery of native heart without angina pectoris 03/26/2019   • Bladder prolapse, female, acquired 03/07/2018   • JOHN (obstructive sleep apnea) 02/22/2018   • Age related osteoporosis 07/26/2017   • Major depressive disorder 06/29/2017   • 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/08/2016   • Spinal stenosis of lumbar region 11/14/2016   • Migraine syndrome 02/11/2013   • Acquired hypothyroidism 03/08/2011   • HTN (hypertension) 10/21/2009   • Postmenopausal hormone therapy 10/21/2009   • Mixed hyperlipidemia    • 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HCC)        Past Medical History:   Diagnosis Date   • Anxiety    • Arthritis    • Back pain    • CAD (coronary artery disease) 10/2018    Abnormal CTCS. PCI/FARZAD (Russell 2.5 x 2mm) to the mid LAD.   • Cataract    • Daytime sleepiness    • Depression    • Dyslipidemia    • Frequent headaches    • Herpes zoster    • Hypertension    • MHA (microangiopathic hemolytic anemia)    • Migraine syndrome     • Mumps    • Osteopathia    • Osteoporosis    • Painful joint    • Post-menopause on HRT (hormone replacement therapy)    • Sleep apnea     Uses CPAP, followed by pulmonology   • Thyroid disease    • White matter abnormality on MRI of brain         Past Surgical History:   Procedure Laterality Date   • LAMINOTOMY  01/28/2022    L4-L5 and L5-S1 posterior fusion with instrumentation and laminotomy by Dr. Benitez.   • BUNIONECTOMY Left 2003   • FOOT SURGERY  2002   • RI BREAST REDUCTION  1997   • CHOLECYSTECTOMY  1996   • ABDOMINAL HYSTERECTOMY TOTAL  11/1986   • ARTHROSCOPY, KNEE     • EYE SURGERY      cataracts   • HYSTERECTOMY LAPAROSCOPY     • MAMMOPLASTY AUGMENTATION     • RI BREAST AUGMENTATION WITH IMPLANT         Family History   Problem Relation Age of Onset   • Stroke Father    • Arthritis  Father    • Hyperlipidemia Father    • Hypertension Father    • Cancer Sister         breast   • Arthritis Sister    • Arthritis Mother    • Hypertension Mother    • Hyperlipidemia Mother    • Stroke Mother    • Arthritis Brother    • Cancer Brother    • Hypertension Brother    • Hyperlipidemia Brother    • Heart Disease Brother         CABG for prox LAD in late 40s   • Cancer Daughter         breast       Social History     Socioeconomic History   • Marital status:      Spouse name: Not on file   • Number of children: Not on file   • Years of education: Not on file   • Highest education level: Not on file   Occupational History   • Not on file   Tobacco Use   • Smoking status: Former Smoker     Packs/day: 0.02     Types: Cigarettes     Quit date: 1979     Years since quittin.8   • Smokeless tobacco: Never Used   • Tobacco comment: smoked off & on for 10 yrs   Vaping Use   • Vaping Use: Never used   Substance and Sexual Activity   • Alcohol use: Not Currently     Alcohol/week: 0.0 oz     Comment: occassional   • Drug use: No   • Sexual activity: Not Currently     Comment: , 3 kids, retired   Other Topics Concern   • Not on file   Social History Narrative   • Not on file     Social Determinants of Health     Financial Resource Strain: Not on file   Food Insecurity: Not on file   Transportation Needs: Not on file   Physical Activity: Not on file   Stress: Not on file   Social Connections: Not on file   Intimate Partner Violence: Not on file   Housing Stability: Not on file       Current Outpatient Medications   Medication Sig Dispense Refill   • spironolactone (ALDACTONE) 25 MG Tab Take 1 Tablet by mouth every day. 90 Tablet 3   • fluticasone (FLONASE) 50 MCG/ACT nasal spray ADMINISTER 1 SPRAY INTO AFFECTED NOSTRIL(S) EVERY DAY. 16 mL 2   • traMADol (ULTRAM) 50 MG Tab Take 1 Tablet by mouth at bedtime as needed for up to 90 days. 90 Tablet 0   • Acetaminophen (TYLENOL PO) Take  by mouth.     •  "atorvastatin (LIPITOR) 40 MG Tab Take 1 Tablet by mouth at bedtime. 90 Tablet 3   • losartan (COZAAR) 100 MG Tab Take 1 Tablet by mouth every day. 90 Tablet 3   • levothyroxine (SYNTHROID) 88 MCG Tab 1 tablet in the morning on an empty stomach     • liothyronine (CYTOMEL) 5 MCG Tab TAKE 1/2 TAB BY MOUTH TWICE A DAY     • vitamin D (CHOLECALCIFEROL) 1000 UNIT Tab Take 1,000 Units by mouth every day.     • aspirin (ASA) 81 MG Chew Tab chewable tablet Take 1 Tab by mouth every day. 100 Tab 3   • Ascorbic Acid (VITAMIN C) 1000 MG Tab Take  by mouth.     • Estriol 10 % Cream as needed.     • Magnesium 400 MG Tab Take  by mouth.     • ibuprofen (MOTRIN) 600 MG Tab Take  by mouth as needed. (Patient not taking: Reported on 5/26/2022)       No current facility-administered medications for this visit.        ALLERGIES: Trazodone and Crestor [rosuvastatin calcium]    ROS  Constitutional: Denies fevers, Denies weight changes  Ears/Nose/Throat/Mouth: Denies nasal congestion or sore throat   Cardiovascular: Denies chest pain  Respiratory: Denies shortness of breath, Denies cough  Gastrointestinal/Hepatic: Denies nausea, vomiting  Sleep: see HPI      PHYSICAL EXAM  /66 (BP Location: Left arm, Patient Position: Sitting, BP Cuff Size: Adult)   Pulse 79   Resp 12   Ht 1.575 m (5' 2\")   Wt 67.6 kg (149 lb)   LMP 11/01/1986   SpO2 96%   BMI 27.25 kg/m²   Appearance: Well-nourished, well-developed, no acute distress  Eyes:  No scleral icterus , EOMI  ENMT: masked  Musculoskeletal:  Grossly normal; gait and station normal; digits and nails normal  Skin:  No rashes, petechiae, cyanosis  Neurologic: without focal signs; oriented to person, time, place, and purpose; judgement intact      Medical Decision Making   Assessment and Plan  Sheyla Gauthier is a 81 y.o.female  with hypertension, hyperlipidemia, migraines, depression, hypothyroidism and obstructive sleep apnea using oral appliance.  Presents today to follow-up to " discuss management of obstructive sleep apnea.    The medical record was reviewed.    Obstructive sleep apnea  Diagnostic and titration nocturnal polysomnogram's, home sleep apnea tests, continuous nocturnal oximetry results, reports reviewed.    Reviewed recent home sleep study results with patient showing an overall AHI of 10.9.  The study was done using oral appliance.  Study showed almost a 50-50 split between back sleeping and side sleeping.  In supine position her AHI was 19.6.  Nonsupine position AHI was 2.3.  Oxygen saturations corresponded to this with time spent on her back with elevated respiratory events oxygen decreased to below 90%.  Oxygen appeared to be around and above 90% 1 on side.  Suspect positional component with oral appliance in place to her sleep apnea.    Discussed alternatives such as retrying CPAP which she feels is not an option for her.  Also discussed potential for surgery such as inspire device.    She would like to proceed with using her oral appliance.  Discussed positional therapy options including slumber bump, sleep noodle, Rematee and night shift for sleep apnea.    PLAN:   -Continue oral appliance therapy  -Advised to start positional therapy to make sure not rolling back during sleep  -Advised to reach out via MyChart with questions     The risks of untreated sleep apnea were discussed with the patient at length. Patients with JOHN are at increased risk of cardiovascular disease including coronary artery disease, systemic arterial hypertension, pulmonary arterial hypertension, cardiac arrythmias, and stroke. The patient was advised to avoid driving a motor vehicle when drowsy.    Have advised the patient to follow up with the appropriate healthcare practitioners for all other medical problems and issues.    Return in about 6 months (around 11/26/2022).      Please note portions of this record was created using voice recognition software. I have made every reasonable attempt to  correct obvious errors, but I expect that there are errors of grammar and possibly content I did not discover before finalizing the note.

## 2022-06-29 ENCOUNTER — HOSPITAL ENCOUNTER (OUTPATIENT)
Dept: CARDIOLOGY | Facility: MEDICAL CENTER | Age: 82
End: 2022-06-29
Attending: NURSE PRACTITIONER
Payer: MEDICARE

## 2022-06-29 DIAGNOSIS — I25.10 CORONARY ARTERY DISEASE INVOLVING NATIVE CORONARY ARTERY OF NATIVE HEART WITHOUT ANGINA PECTORIS: ICD-10-CM

## 2022-06-29 DIAGNOSIS — I35.2 NONRHEUMATIC AORTIC INSUFFICIENCY WITH AORTIC STENOSIS: ICD-10-CM

## 2022-06-29 LAB
LV EJECT FRACT  99904: 65
LV EJECT FRACT MOD 2C 99903: 70.2
LV EJECT FRACT MOD 4C 99902: 66.25
LV EJECT FRACT MOD BP 99901: 68.7

## 2022-06-29 PROCEDURE — 93306 TTE W/DOPPLER COMPLETE: CPT | Mod: 26 | Performed by: INTERNAL MEDICINE

## 2022-06-29 PROCEDURE — 93306 TTE W/DOPPLER COMPLETE: CPT

## 2022-08-03 ENCOUNTER — OFFICE VISIT (OUTPATIENT)
Dept: MEDICAL GROUP | Age: 82
End: 2022-08-03
Payer: MEDICARE

## 2022-08-03 VITALS
HEIGHT: 62 IN | BODY MASS INDEX: 27.7 KG/M2 | TEMPERATURE: 97.3 F | OXYGEN SATURATION: 95 % | DIASTOLIC BLOOD PRESSURE: 64 MMHG | HEART RATE: 78 BPM | RESPIRATION RATE: 16 BRPM | WEIGHT: 150.5 LBS | SYSTOLIC BLOOD PRESSURE: 118 MMHG

## 2022-08-03 DIAGNOSIS — R42 DIZZINESS: ICD-10-CM

## 2022-08-03 DIAGNOSIS — I10 PRIMARY HYPERTENSION: ICD-10-CM

## 2022-08-03 PROCEDURE — 99214 OFFICE O/P EST MOD 30 MIN: CPT | Performed by: FAMILY MEDICINE

## 2022-08-03 RX ORDER — LOSARTAN POTASSIUM 100 MG/1
50 TABLET ORAL DAILY
Qty: 45 TABLET | Refills: 3 | Status: SHIPPED | OUTPATIENT
Start: 2022-08-03 | End: 2023-01-30

## 2022-08-03 ASSESSMENT — FIBROSIS 4 INDEX: FIB4 SCORE: 1.10590858722382504

## 2022-08-03 NOTE — PROGRESS NOTES
This medical record contains text that has been entered with the assistance of computer voice recognition and dictation software.  Therefore, it may contain unintended errors in text, spelling, punctuation, or grammar      Chief Complaint   Patient presents with   • Hypertension Follow-up   • Bump     L Malone bruising around, 5 months ago, would like to look at it.          Sheyla Gauthier is a 81 y.o. female here evaluation and management of: htn, dizziness      HPI:     1. Primary hypertension  2. Dizziness  Losartan 100 mg p.o. daily    The patient states she has been having low blood pressures at home in fact she brings up a blood pressure log of 2 weeks which reveal BP range  /55-95.  She also states that she feels really dizzy especially in the morning.  She takes her meds at night.  She denies any chest pain no headache no changes in vision.  He denies any back pain, no changes in urination, no changes in defecation, no weakness in any extremity no numbness or tingling.    Current medicines (including changes today)  Current Outpatient Medications   Medication Sig Dispense Refill   • Menaquinone-7 (VITAMIN K2 PO) Take  by mouth.     • losartan (COZAAR) 100 MG Tab Take 0.5 Tablets by mouth every day. 45 Tablet 3   • spironolactone (ALDACTONE) 25 MG Tab Take 1 Tablet by mouth every day. 90 Tablet 3   • fluticasone (FLONASE) 50 MCG/ACT nasal spray ADMINISTER 1 SPRAY INTO AFFECTED NOSTRIL(S) EVERY DAY. 16 mL 2   • Acetaminophen (TYLENOL PO) Take  by mouth.     • atorvastatin (LIPITOR) 40 MG Tab Take 1 Tablet by mouth at bedtime. 90 Tablet 3   • levothyroxine (SYNTHROID) 88 MCG Tab 1 tablet in the morning on an empty stomach     • liothyronine (CYTOMEL) 5 MCG Tab TAKE 1/2 TAB BY MOUTH TWICE A DAY     • vitamin D (CHOLECALCIFEROL) 1000 UNIT Tab Take 1,000 Units by mouth every day.     • aspirin (ASA) 81 MG Chew Tab chewable tablet Take 1 Tab by mouth every day. 100 Tab 3   • Estriol 10 % Cream as needed.      • Magnesium 400 MG Tab Take  by mouth.       No current facility-administered medications for this visit.     She  has a past medical history of Anxiety, Arthritis, Back pain, CAD (coronary artery disease) (10/2018), Cataract, Daytime sleepiness, Depression, Dyslipidemia, Frequent headaches, Herpes zoster, Hypertension, MHA (microangiopathic hemolytic anemia), Migraine syndrome ( ), Mumps, Osteopathia, Osteoporosis, Painful joint, Post-menopause on HRT (hormone replacement therapy), Sleep apnea, Thyroid disease, and White matter abnormality on MRI of brain.  She  has a past surgical history that includes cholecystectomy (); abdominal hysterectomy total (1986); pr breast reduction (); foot surgery (); pr breast augmentation with implant; mammoplasty augmentation; bunionectomy (Left, ); eye surgery; hysterectomy laparoscopy; arthroscopy, knee; and laminotomy (2022).  Social History     Tobacco Use   • Smoking status: Former Smoker     Packs/day: 0.02     Types: Cigarettes     Quit date: 1979     Years since quittin.0   • Smokeless tobacco: Never Used   • Tobacco comment: smoked off & on for 10 yrs   Vaping Use   • Vaping Use: Never used   Substance Use Topics   • Alcohol use: Not Currently     Alcohol/week: 0.0 oz     Comment: occassional   • Drug use: No     Social History     Social History Narrative   • Not on file     Family History   Problem Relation Age of Onset   • Stroke Father    • Arthritis Father    • Hyperlipidemia Father    • Hypertension Father    • Cancer Sister         breast   • Arthritis Sister    • Arthritis Mother    • Hypertension Mother    • Hyperlipidemia Mother    • Stroke Mother    • Arthritis Brother    • Cancer Brother    • Hypertension Brother    • Hyperlipidemia Brother    • Heart Disease Brother         CABG for prox LAD in late 40s   • Cancer Daughter         breast     Family Status   Relation Name Status   • Fa     • Sis  Alive   • Mo   "        tia   • Bro  Alive   • Ayesha  (Not Specified)         ROS    The pertinent  ROS findings can be seen in the HPI above.     All other systems reviewed and are negative     Objective:     /64 (BP Location: Right arm, Patient Position: Sitting, BP Cuff Size: Adult)   Pulse 78   Temp 36.3 °C (97.3 °F) (Temporal)   Resp 16   Ht 1.575 m (5' 2\")   Wt 68.3 kg (150 lb 8 oz)   SpO2 95%  Body mass index is 27.53 kg/m².      Physical Exam:    Constitutional: Alert, no distress.  Skin: No suspicious lesions  Eye: Equal, round and reactive, conjunctiva clear, lids normal.  ENMT: Lips without lesions, good dentition, oropharynx clear.  Neck: Trachea midline, no masses, no thyromegaly. No cervical or supraclavicular lymphadenopathy.  Respiratory: Unlabored respiratory effort, lungs clear to auscultation, no wheezes, no ronchi.  Cardiovascular: Normal S1, S2, no murmur, no edema  Abdomen: Soft, non-tender, no masses, no hepatosplenomegaly.        Assessment and Plan:   The following treatment plan was discussed    1. Primary hypertension  2. Dizziness  We will decrease losartan to 50 mg p.o. daily  Return to clinic with a 3-4 week BP log    - Menaquinone-7 (VITAMIN K2 PO); Take  by mouth.  - losartan (COZAAR) 100 MG Tab; Take 0.5 Tablets by mouth every day.  Dispense: 45 Tablet; Refill: 3          Instructed to Follow up in clinic or ER for worsening symptoms, difficulty breathing, lack of expected recovery, or should new symptoms or problems arise.    Followup: Return for 3-4 week BP log.             "

## 2022-08-11 ENCOUNTER — OFFICE VISIT (OUTPATIENT)
Dept: MEDICAL GROUP | Age: 82
End: 2022-08-11
Payer: MEDICARE

## 2022-08-11 ENCOUNTER — HOSPITAL ENCOUNTER (OUTPATIENT)
Facility: MEDICAL CENTER | Age: 82
End: 2022-08-11
Attending: FAMILY MEDICINE
Payer: MEDICARE

## 2022-08-11 VITALS
HEART RATE: 78 BPM | DIASTOLIC BLOOD PRESSURE: 64 MMHG | HEIGHT: 62 IN | RESPIRATION RATE: 16 BRPM | BODY MASS INDEX: 27.6 KG/M2 | OXYGEN SATURATION: 95 % | WEIGHT: 150 LBS | TEMPERATURE: 97.3 F | SYSTOLIC BLOOD PRESSURE: 118 MMHG

## 2022-08-11 DIAGNOSIS — D48.9 NEOPLASM OF UNCERTAIN BEHAVIOR: ICD-10-CM

## 2022-08-11 LAB — PATHOLOGY CONSULT NOTE: NORMAL

## 2022-08-11 PROCEDURE — 88305 TISSUE EXAM BY PATHOLOGIST: CPT

## 2022-08-11 PROCEDURE — 11400 EXC TR-EXT B9+MARG 0.5 CM<: CPT | Performed by: FAMILY MEDICINE

## 2022-08-11 ASSESSMENT — FIBROSIS 4 INDEX: FIB4 SCORE: 1.10590858722382504

## 2022-08-11 NOTE — PROGRESS NOTES
This medical record contains text that has been entered with the assistance of computer voice recognition and dictation software.  Therefore, it may contain unintended errors in text, spelling, punctuation, or grammar      Chief Complaint   Patient presents with    Cyst     Leg         Sheyla Gauthier is a 81 y.o. female here evaluation and management of: bump on shin      HPI:     1. Neoplasm of uncertain behavior  NEW UNDIAGNOSED PROBLEM    Sheyla is a very pleasant 81-year-old female who presents to clinic with chief complaint of a growing painful lump on her right shin.  She thinks has been there for many months but its more painful now.    Current medicines (including changes today)  Current Outpatient Medications   Medication Sig Dispense Refill    Menaquinone-7 (VITAMIN K2 PO) Take  by mouth.      losartan (COZAAR) 100 MG Tab Take 0.5 Tablets by mouth every day. 45 Tablet 3    spironolactone (ALDACTONE) 25 MG Tab Take 1 Tablet by mouth every day. 90 Tablet 3    fluticasone (FLONASE) 50 MCG/ACT nasal spray ADMINISTER 1 SPRAY INTO AFFECTED NOSTRIL(S) EVERY DAY. 16 mL 2    Acetaminophen (TYLENOL PO) Take  by mouth.      atorvastatin (LIPITOR) 40 MG Tab Take 1 Tablet by mouth at bedtime. 90 Tablet 3    levothyroxine (SYNTHROID) 88 MCG Tab 1 tablet in the morning on an empty stomach      liothyronine (CYTOMEL) 5 MCG Tab TAKE 1/2 TAB BY MOUTH TWICE A DAY      vitamin D (CHOLECALCIFEROL) 1000 UNIT Tab Take 1,000 Units by mouth every day.      aspirin (ASA) 81 MG Chew Tab chewable tablet Take 1 Tab by mouth every day. 100 Tab 3    Estriol 10 % Cream as needed.      Magnesium 400 MG Tab Take  by mouth.       No current facility-administered medications for this visit.     She  has a past medical history of Anxiety, Arthritis, Back pain, CAD (coronary artery disease) (10/2018), Cataract, Daytime sleepiness, Depression, Dyslipidemia, Frequent headaches, Herpes zoster, Hypertension, MHA (microangiopathic hemolytic  "anemia), Migraine syndrome ( ), Mumps, Osteopathia, Osteoporosis, Painful joint, Post-menopause on HRT (hormone replacement therapy), Sleep apnea, Thyroid disease, and White matter abnormality on MRI of brain.  She  has a past surgical history that includes cholecystectomy (); abdominal hysterectomy total (1986); pr breast reduction (); foot surgery (); pr breast augmentation with implant; mammoplasty augmentation; bunionectomy (Left, ); eye surgery; hysterectomy laparoscopy; arthroscopy, knee; and laminotomy (2022).  Social History     Tobacco Use    Smoking status: Former     Packs/day: 0.02     Types: Cigarettes     Quit date: 1979     Years since quittin.0    Smokeless tobacco: Never    Tobacco comments:     smoked off & on for 10 yrs   Vaping Use    Vaping Use: Never used   Substance Use Topics    Alcohol use: Not Currently     Alcohol/week: 0.0 oz     Comment: occassional    Drug use: No     Social History     Social History Narrative    Not on file     Family History   Problem Relation Age of Onset    Stroke Father     Arthritis Father     Hyperlipidemia Father     Hypertension Father     Cancer Sister         breast    Arthritis Sister     Arthritis Mother     Hypertension Mother     Hyperlipidemia Mother     Stroke Mother     Arthritis Brother     Cancer Brother     Hypertension Brother     Hyperlipidemia Brother     Heart Disease Brother         CABG for prox LAD in late 40s    Cancer Daughter         breast     Family Status   Relation Name Status    Fa      Sis  Alive    Mo          tia    Bro  Alive    Ayesha  (Not Specified)         ROS    The pertinent  ROS findings can be seen in the HPI above.     All other systems reviewed and are negative     Objective:     /64 (BP Location: Right arm, Patient Position: Sitting, BP Cuff Size: Small adult)   Pulse 78   Temp 36.3 °C (97.3 °F) (Temporal)   Resp 16   Ht 1.575 m (5' 2\")   Wt 68 kg (150 lb)   " SpO2 95%  Body mass index is 27.44 kg/m².      Physical Exam:    Constitutional: Alert, no distress.  Skin:     Excision--- 4 mm firm nodular mass on the right mid shin, not motile nontender to touch      After informed written consent was obtained, using Betadine for cleansing and 1% Lidocaine w- epinephrine for anesthetic, with sterile technique a 8 mm punch tool was used to obtain and excise  the lesion through dermis (full thickness) . Intent was to remove entire lesion with margins . Hemostasis was obtained by pressure and wound was  Sutured  With 3.0 Ethilon x2 Antibiotic dressing is applied, and wound care instructions provided. Be alert for any signs of cutaneous infection. The specimen is labeled and sent to pathology for evaluation. The procedure was well tolerated without complications.      Final repair length (measurement of repaired defect):          Assessment and Plan:   The following treatment plan was discussed      1. Neoplasm of uncertain behavior  Patient tolerated procedure well  There were no adverse events  Patient was given post procedure precautions     - Pathology Specimen; Future        Instructed to Follow up in clinic or ER for worsening symptoms, difficulty breathing, lack of expected recovery, or should new symptoms or problems arise.    Followup: Return in about 10 days (around 8/21/2022) for Suture Removal with MA.

## 2022-08-19 ENCOUNTER — NON-PROVIDER VISIT (OUTPATIENT)
Dept: MEDICAL GROUP | Age: 82
End: 2022-08-19
Payer: MEDICARE

## 2022-08-19 DIAGNOSIS — D48.9 NEOPLASM OF UNCERTAIN BEHAVIOR: ICD-10-CM

## 2022-08-19 PROCEDURE — 99999 PR NO CHARGE: CPT | Performed by: FAMILY MEDICINE

## 2022-08-19 NOTE — PROGRESS NOTES
Sheyla Gauthier is a 81 y.o. female here for a Non-Provider Visit for Suture Removal.    Sutures were placed by  on date: 8/11/2022  Skin is healed: Yes  Provider notified if skin is not healed, or if there is redness, heat, pain, or drainage from incision: yes  Sutures removed.   Mastisol and steristips are placed: No    Advised to use emollient (vaseline, aquaphor, etc.) as needed, avoid peroxide and antibiotic ointment to reduce irritation.     Path report has not been reviewed by provider.  Path report has not reviewed with patient.

## 2022-09-13 DIAGNOSIS — N81.10 BLADDER PROLAPSE, FEMALE, ACQUIRED: ICD-10-CM

## 2022-09-22 DIAGNOSIS — M48.061 SPINAL STENOSIS OF LUMBAR REGION, UNSPECIFIED WHETHER NEUROGENIC CLAUDICATION PRESENT: ICD-10-CM

## 2022-10-20 ENCOUNTER — OFFICE VISIT (OUTPATIENT)
Dept: MEDICAL GROUP | Age: 82
End: 2022-10-20
Payer: MEDICARE

## 2022-10-20 VITALS
DIASTOLIC BLOOD PRESSURE: 76 MMHG | BODY MASS INDEX: 27.46 KG/M2 | SYSTOLIC BLOOD PRESSURE: 116 MMHG | HEART RATE: 65 BPM | RESPIRATION RATE: 16 BRPM | TEMPERATURE: 98.3 F | OXYGEN SATURATION: 97 % | WEIGHT: 149.2 LBS | HEIGHT: 62 IN

## 2022-10-20 DIAGNOSIS — D59.4: ICD-10-CM

## 2022-10-20 DIAGNOSIS — Z23 NEED FOR VACCINATION: ICD-10-CM

## 2022-10-20 DIAGNOSIS — H91.91 DECREASED HEARING OF RIGHT EAR: ICD-10-CM

## 2022-10-20 DIAGNOSIS — H61.21 IMPACTED CERUMEN OF RIGHT EAR: ICD-10-CM

## 2022-10-20 DIAGNOSIS — I10 PRIMARY HYPERTENSION: ICD-10-CM

## 2022-10-20 PROCEDURE — 99214 OFFICE O/P EST MOD 30 MIN: CPT | Mod: 25 | Performed by: FAMILY MEDICINE

## 2022-10-20 PROCEDURE — 69210 REMOVE IMPACTED EAR WAX UNI: CPT | Performed by: FAMILY MEDICINE

## 2022-10-20 PROCEDURE — 90662 IIV NO PRSV INCREASED AG IM: CPT | Performed by: FAMILY MEDICINE

## 2022-10-20 PROCEDURE — G0008 ADMIN INFLUENZA VIRUS VAC: HCPCS | Performed by: FAMILY MEDICINE

## 2022-10-20 RX ORDER — DULOXETIN HYDROCHLORIDE 30 MG/1
CAPSULE, DELAYED RELEASE ORAL
COMMUNITY
End: 2022-10-20

## 2022-10-20 RX ORDER — TRAMADOL HYDROCHLORIDE 50 MG/1
TABLET ORAL
Status: ON HOLD | COMMUNITY
End: 2023-02-06

## 2022-10-20 RX ORDER — MELOXICAM 15 MG/1
TABLET ORAL
COMMUNITY
End: 2023-01-30

## 2022-10-20 RX ORDER — HYDROCODONE BITARTRATE AND ACETAMINOPHEN 7.5; 325 MG/1; MG/1
.5-1 TABLET ORAL EVERY 8 HOURS PRN
Status: ON HOLD | COMMUNITY
Start: 2022-09-29 | End: 2023-10-13

## 2022-10-20 RX ORDER — LIDOCAINE 5 G/100G
CREAM RECTAL; TOPICAL
COMMUNITY
End: 2022-10-20

## 2022-10-20 RX ORDER — LIDOCAINE 50 MG/G
PATCH TOPICAL
COMMUNITY
End: 2022-10-20

## 2022-10-20 RX ORDER — DULOXETIN HYDROCHLORIDE 30 MG/1
CAPSULE, DELAYED RELEASE ORAL
COMMUNITY
Start: 2022-10-11 | End: 2022-10-20

## 2022-10-20 RX ORDER — AZITHROMYCIN 250 MG/1
TABLET, FILM COATED ORAL
COMMUNITY
End: 2022-10-20

## 2022-10-20 ASSESSMENT — FIBROSIS 4 INDEX: FIB4 SCORE: 1.10590858722382504

## 2022-10-20 NOTE — LETTER
October 20, 2022        Sheyla Gauthier  [address] [place] [address]        Dear Sheyla:    In our practice, we suggest that patients with a history of recurring symptomatic cerumen impaction (>once per year despite cerumen removal) and otherwise normal ears use a cotton ball dipped in mineral oil and place in the external canal for 10 to 20 minutes once per week (combined with eight hours of not using a hearing aid overnight, if applicable). This helps to liquefy the cerumen and aid the normal elimination mechanisms, thereby potentially reducing the number of visits per year for cerumen removal.  Routine cleaning of the ears by a health professional every 6 to 12 months is also suggested [2].  Patients should be instructed that chronic use of cotton swabs or cerumenolytics should not be employed.        If you have any questions or concerns, please don't hesitate to call.        Sincerely,        Darius Morataya M.D.    Electronically Signed

## 2022-10-20 NOTE — PROGRESS NOTES
This medical record contains text that has been entered with the assistance of computer voice recognition and dictation software.  Therefore, it may contain unintended errors in text, spelling, punctuation, or grammar      Chief Complaint   Patient presents with    Ear Fullness    Cerumen Impaction     Poss.         Sheyla Gauthier is a 81 y.o. 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 Continue with current defined treatment plan: . Follow-up at least annually.  Last edited 10/20/22 12:30 PDT by Darius Morataya M.D.           1. Impacted cerumen of right ear  2. Decreased hearing of right ear  NEW UNDIAGNOSED PROBLEM    Rick is a very pleasant 81-year-old female who presents to clinic with a chief complaint of having decreased hearing in the right ear and some fullness.  She states her daughter states she may need hearing aids however she is worried that she might have wax buildup.  She does have an appointment with ENT but not until December so she wants something done today.        3. Primary hypertension  Losartan 50 mg p.o. daily    The patient returns to clinic with a 3-week BP log showing ranges [id number].    Overall the patient has been compliant with his medical regimen, denies any adverse side effects such as cough, no syncope, no presyncope, no excessive fatigue.  The patient denies any chest pain today no headaches.      4. Need for vaccination  Due for flu vaccine        Current medicines (including changes today)  Current Outpatient Medications   Medication Sig Dispense Refill    meloxicam (MOBIC) 15 MG tablet meloxicam 15 mg tablet   TAKE 1 TABLET BY MOUTH TWICE A DAY AS NEEDED FOR PAIN      HYDROcodone-acetaminophen (NORCO) 7.5-325 MG tab TAKE 1/2-1 TABLET BY MOUTH EVERY 8 HOURS AS NEEDED FOR PAIN  FOR 10 DAYS ICD10: G90.50      traMADol (ULTRAM) 50 MG Tab tramadol 50 mg tablet   TAKE 1 TABLET BY MOUTH AT BEDTIME AS NEEDED FOR UP TO 90 DAYS. M48 INS MAX 30 AS 30 DAY SUPPLY      carbamide peroxide (DEBROX) 6.5 % Solution Administer 6 Drops into affected ear(s) 2 times a day. Administer drops in both ears. 15 mL 0    Menaquinone-7 (VITAMIN K2 PO) Take  by mouth.      losartan (COZAAR) 100 MG Tab Take 0.5 Tablets by mouth every day. 45 Tablet 3    fluticasone (FLONASE) 50 MCG/ACT nasal spray ADMINISTER 1 SPRAY INTO AFFECTED NOSTRIL(S) EVERY DAY. 16 mL 2    atorvastatin (LIPITOR) 40 MG Tab Take 1 Tablet by mouth at bedtime. 90 Tablet 3    levothyroxine (SYNTHROID) 88 MCG Tab 1 tablet in the morning on an empty stomach      liothyronine (CYTOMEL) 5 MCG Tab TAKE 1/2 TAB BY MOUTH TWICE A DAY      vitamin D (CHOLECALCIFEROL) 1000 UNIT Tab Take 1,000 Units by mouth every day.      aspirin (ASA) 81 MG Chew Tab chewable tablet Take 1 Tab by mouth every day. 100 Tab 3    Estriol 10 % Cream as needed.      Magnesium 400 MG Tab Take  by mouth.       No current facility-administered medications for this visit.     She  has a past medical history of Anxiety, Arthritis, Back pain, CAD (coronary artery disease) (10/2018), Cataract, Daytime sleepiness, Depression, Dyslipidemia, Frequent headaches, Herpes zoster, Hypertension, MHA (microangiopathic hemolytic anemia), Migraine syndrome ( ), Mumps, Osteopathia, Osteoporosis, Painful joint, Post-menopause on HRT (hormone replacement therapy), Sleep apnea, Thyroid disease, and White matter abnormality on MRI of brain.  She  has a past surgical history that includes cholecystectomy (1996); abdominal hysterectomy total (11/1986); pr breast reduction (1997); foot surgery (2002); pr breast augmentation with implant; mammoplasty augmentation; bunionectomy (Left, 2003); eye surgery; hysterectomy laparoscopy; arthroscopy, knee; and laminotomy (01/28/2022).  Social History     Tobacco Use     "Smoking status: Former     Packs/day: 0.02     Types: Cigarettes     Quit date: 1979     Years since quittin.2    Smokeless tobacco: Never    Tobacco comments:     smoked off & on for 10 yrs   Vaping Use    Vaping Use: Never used   Substance Use Topics    Alcohol use: Not Currently     Alcohol/week: 0.0 oz     Comment: occassional    Drug use: No     Social History     Social History Narrative    Not on file     Family History   Problem Relation Age of Onset    Stroke Father     Arthritis Father     Hyperlipidemia Father     Hypertension Father     Cancer Sister         breast    Arthritis Sister     Arthritis Mother     Hypertension Mother     Hyperlipidemia Mother     Stroke Mother     Arthritis Brother     Cancer Brother     Hypertension Brother     Hyperlipidemia Brother     Heart Disease Brother         CABG for prox LAD in late 40s    Cancer Daughter         breast     Family Status   Relation Name Status    Fa      Sis  Alive    Mo          tia    Bro  Alive    Ayesha  (Not Specified)         ROS    The pertinent  ROS findings can be seen in the HPI above.     All other systems reviewed and are negative     Objective:     /76 (BP Location: Right arm, Patient Position: Sitting, BP Cuff Size: Adult long)   Pulse 65   Temp 36.8 °C (98.3 °F) (Temporal)   Resp 16   Ht 1.575 m (5' 2\")   Wt 67.7 kg (149 lb 3.2 oz)   SpO2 97%  Body mass index is 27.29 kg/m².      Physical Exam:    Constitutional: Alert, no distress.  Skin: No suspicious lesions  Eye: Equal, round and reactive, conjunctiva clear, lids normal.  ENMT: Lips without lesions, good dentition, oropharynx clear.  Right ear reveals complete cerumen impaction  Neck: Trachea midline, no masses, no thyromegaly. No cervical or supraclavicular lymphadenopathy.  Respiratory: Unlabored respiratory effort, lungs clear to auscultation, no wheezes, no ronchi.  Cardiovascular: Normal S1, S2, no murmur, no edema  Abdomen: Soft, " non-tender, no masses, no hepatosplenomegaly.       Procedure--gentle irrigation of the ear canal was performed with a large syringe (200 mL) and warm water treated with a bacteriostatic hydrogen peroxide.  We were unsuccessful in removing the wax the patient was very uncomfortable.        Assessment and Plan:   The following treatment plan was discussed      1. Impacted cerumen of right ear  2. Decreased hearing of right ear  I explained that patients with a history of recurring symptomatic cerumen impaction (>once per year despite cerumen removal) and otherwise normal ears use a cotton ball dipped in mineral oil and place in the external canal for 10 to 20 minutes once per week (combined with eight hours of not using a hearing aid overnight, if applicable). This helps to liquefy the cerumen and aid the normal elimination mechanisms, thereby potentially reducing the number of visits per year for cerumen removal.  Routine cleaning of the ears by a health professional every 6 to 12 months is also suggested   Patients was instructed that chronic use of cotton swabs or cerumenolytics should not be employed  She has an appointment with the ENT in December she will keep that    - carbamide peroxide (DEBROX) 6.5   % Solution; Administer 6 Drops into affected ear(s) 2 times a day. Administer drops in both ears.  Dispense: 15 mL; Refill: 0        3. Primary hypertension    Patient has been stable with current management  We will make no changes for now    4. Need for vaccination  - INFLUENZA VACCINE, HIGH DOSE (65+ ONLY)        Other orders  - meloxicam (MOBIC) 15 MG tablet; meloxicam 15 mg tablet   TAKE 1 TABLET BY MOUTH TWICE A DAY AS NEEDED FOR PAIN  - HYDROcodone-acetaminophen (NORCO) 7.5-325 MG tab; TAKE 1/2-1 TABLET BY MOUTH EVERY 8 HOURS AS NEEDED FOR PAIN FOR 10 DAYS ICD10: G90.50  - traMADol (ULTRAM) 50 MG Tab; tramadol 50 mg tablet   TAKE 1 TABLET BY MOUTH AT BEDTIME AS NEEDED FOR UP TO 90 DAYS. M48 INS MAX 30 AS  30 DAY SUPPLY             Instructed to Follow up in clinic or ER for worsening symptoms, difficulty breathing, lack of expected recovery, or should new symptoms or problems arise.    Followup: Return in about 3 months (around 1/20/2023) for Reevaluation.

## 2022-11-04 ENCOUNTER — APPOINTMENT (OUTPATIENT)
Dept: LAB | Facility: MEDICAL CENTER | Age: 82
End: 2022-11-04
Payer: MEDICARE

## 2022-11-04 ENCOUNTER — HOSPITAL ENCOUNTER (OUTPATIENT)
Facility: MEDICAL CENTER | Age: 82
End: 2022-11-04
Attending: NURSE PRACTITIONER
Payer: MEDICARE

## 2022-11-04 LAB
APPEARANCE UR: CLEAR
BACTERIA #/AREA URNS HPF: ABNORMAL /HPF
BILIRUB UR QL STRIP.AUTO: NEGATIVE
COLOR UR: YELLOW
EPI CELLS #/AREA URNS HPF: ABNORMAL /HPF
GLUCOSE UR STRIP.AUTO-MCNC: NEGATIVE MG/DL
KETONES UR STRIP.AUTO-MCNC: NEGATIVE MG/DL
LEUKOCYTE ESTERASE UR QL STRIP.AUTO: ABNORMAL
MICRO URNS: ABNORMAL
NITRITE UR QL STRIP.AUTO: NEGATIVE
PH UR STRIP.AUTO: 6 [PH] (ref 5–8)
PROT UR QL STRIP: NEGATIVE MG/DL
RBC # URNS HPF: ABNORMAL /HPF
RBC UR QL AUTO: NEGATIVE
SP GR UR STRIP.AUTO: 1.02
UROBILINOGEN UR STRIP.AUTO-MCNC: 0.2 MG/DL
WBC #/AREA URNS HPF: ABNORMAL /HPF

## 2022-11-04 PROCEDURE — 81001 URINALYSIS AUTO W/SCOPE: CPT

## 2022-11-04 PROCEDURE — 87086 URINE CULTURE/COLONY COUNT: CPT

## 2022-11-06 LAB
BACTERIA UR CULT: NORMAL
SIGNIFICANT IND 70042: NORMAL
SITE SITE: NORMAL
SOURCE SOURCE: NORMAL

## 2022-11-07 ENCOUNTER — PATIENT MESSAGE (OUTPATIENT)
Dept: HEALTH INFORMATION MANAGEMENT | Facility: OTHER | Age: 82
End: 2022-11-07

## 2022-11-28 ENCOUNTER — OFFICE VISIT (OUTPATIENT)
Dept: SLEEP MEDICINE | Facility: MEDICAL CENTER | Age: 82
End: 2022-11-28
Payer: MEDICARE

## 2022-11-28 VITALS
OXYGEN SATURATION: 96 % | HEART RATE: 71 BPM | SYSTOLIC BLOOD PRESSURE: 108 MMHG | BODY MASS INDEX: 27.79 KG/M2 | HEIGHT: 62 IN | WEIGHT: 151 LBS | DIASTOLIC BLOOD PRESSURE: 60 MMHG | RESPIRATION RATE: 12 BRPM

## 2022-11-28 DIAGNOSIS — G47.33 OSA (OBSTRUCTIVE SLEEP APNEA): Chronic | ICD-10-CM

## 2022-11-28 PROCEDURE — 99213 OFFICE O/P EST LOW 20 MIN: CPT | Performed by: STUDENT IN AN ORGANIZED HEALTH CARE EDUCATION/TRAINING PROGRAM

## 2022-11-28 ASSESSMENT — FIBROSIS 4 INDEX: FIB4 SCORE: 1.12

## 2022-11-28 NOTE — PROGRESS NOTES
[facility] Follow-up Visit    CC: Follow-up regarding management of obstructive sleep apnea      HPI:  Sheyla Gauthier is a 82 y.o.female  with hypertension, hyperlipidemia, migraines, depression, hypothyroidism and obstructive sleep apnea on oral appliance therapy.  Presents today to follow-up regarding management of obstructive sleep apnea.    Overall she reports things are going well.  She had to stop using oral appliance briefly due to a sore occurring on her left upper gum.  She has recently visited with her dentist who filed to have her oral appliance where it should not rub and cause a sore.  She has been using a sleep noodle since last visit for positional therapy.    With using oral appliance and sleeping hope she is feeling rested.  She states her sleep is doing well.  She is no longer snoring.  She does have energy during the day.  Currently has no concerns other than ideally she would like to want to repeat a home sleep study with her oral appliance which has recently been slightly adjusted and using the sleep.      Patient Active Problem List    Diagnosis Date Noted    Decreased hearing of right ear 10/20/2022    Dizziness 08/03/2022    Stage 2 chronic kidney disease 04/20/2022    Arthritis 11/03/2021    Memory loss 08/20/2020    Conductive hearing loss of right ear 08/20/2020    Left wrist pain 12/19/2019    Myalgia 10/01/2019    Former smoker 07/02/2019    Other insomnia 06/14/2019    Coronary artery disease involving native coronary artery of native heart without angina pectoris 03/26/2019    Bladder prolapse, female, acquired 03/07/2018    JOHN (obstructive sleep apnea) 02/22/2018    Age related osteoporosis 07/26/2017    Major depressive disorder 06/29/2017    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/08/2016    Spinal stenosis of lumbar region 11/14/2016    Migraine syndrome 02/11/2013    Acquired hypothyroidism 03/08/2011    HTN (hypertension) 10/21/2009    Postmenopausal hormone therapy  10/21/2009    Mixed hyperlipidemia     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HCC)        Past Medical History:   Diagnosis Date    Anxiety     Arthritis     Back pain     CAD (coronary artery disease) 10/2018    Abnormal CTCS. PCI/FARZAD ([place] 2.5 x 2mm) to the mid LAD.    Cataract     Daytime sleepiness     Depression     Dyslipidemia     Frequent headaches     Herpes zoster     Hypertension     MHA (microangiopathic hemolytic anemia)     Migraine syndrome      Mumps     Osteopathia     Osteoporosis     Painful joint     Post-menopause on HRT (hormone replacement therapy)     Sleep apnea     Uses CPAP, followed by pulmonology    Thyroid disease     White matter abnormality on MRI of brain         Past Surgical History:   Procedure Laterality Date    LAMINOTOMY  01/28/2022    L4-L5 and L5-S1 posterior fusion with instrumentation and laminotomy by Dr. Benitez.    BUNIONECTOMY Left 2003    FOOT SURGERY  2002    [place] BREAST REDUCTION  1997    CHOLECYSTECTOMY  1996    ABDOMINAL HYSTERECTOMY TOTAL  11/1986    ARTHROSCOPY, KNEE      EYE SURGERY      cataracts    HYSTERECTOMY LAPAROSCOPY      MAMMOPLASTY AUGMENTATION      TN BREAST AUGMENTATION WITH IMPLANT         Family History   Problem Relation Age of Onset    Stroke Father     Arthritis Father     Hyperlipidemia Father     Hypertension Father     Cancer Sister         breast    Arthritis Sister     Arthritis Mother     Hypertension Mother     Hyperlipidemia Mother     Stroke Mother     Arthritis Brother     Cancer Brother     Hypertension Brother     Hyperlipidemia Brother     Heart Disease Brother         CABG for prox LAD in late 40s    Cancer Daughter         breast       Social History     Socioeconomic History    Marital status:      Spouse name: Not on file    Number of children: Not on file    Years of education: Not on file    Highest education level: Not on file   Occupational History    Not on file   Tobacco Use    Smoking status: Former     Packs/day:  0.02     Types: Cigarettes     Quit date: 1979     Years since quittin.3    Smokeless tobacco: Never    Tobacco comments:     smoked off & on for 10 yrs   Vaping Use    Vaping Use: Never used   Substance and Sexual Activity    Alcohol use: Not Currently     Alcohol/week: 0.0 oz     Comment: occassional    Drug use: No    Sexual activity: Not Currently     Comment: , 3 kids, retired   Other Topics Concern    Not on file   Social History Narrative    Not on file     Social Determinants of Health     Financial Resource Strain: Not on file   Food Insecurity: Not on file   Transportation Needs: Not on file   Physical Activity: Not on file   Stress: Not on file   Social Connections: Not on file   Intimate Partner Violence: Not on file   Housing Stability: Not on file       Current Outpatient Medications   Medication Sig Dispense Refill    meloxicam (MOBIC) 15 MG tablet meloxicam 15 mg tablet   TAKE 1 TABLET BY MOUTH TWICE A DAY AS NEEDED FOR PAIN      HYDROcodone-acetaminophen (NORCO) 7.5-325 MG tab TAKE 1/2-1 TABLET BY MOUTH EVERY 8 HOURS AS NEEDED FOR PAIN FOR 10 DAYS ICD10: G90.50      traMADol (ULTRAM) 50 MG Tab tramadol 50 mg tablet   TAKE 1 TABLET BY MOUTH AT BEDTIME AS NEEDED FOR UP TO 90 DAYS. M48 INS MAX 30 AS 30 DAY SUPPLY      carbamide peroxide (DEBROX) 6.5 % Solution Administer 6 Drops into affected ear(s) 2 times a day. Administer drops in both ears. 15 mL 0    Menaquinone-7 (VITAMIN K2 PO) Take  by mouth.      losartan (COZAAR) 100 MG Tab Take 0.5 Tablets by mouth every day. 45 Tablet 3    fluticasone (FLONASE) 50 MCG/ACT nasal spray ADMINISTER 1 SPRAY INTO AFFECTED NOSTRIL(S) EVERY DAY. 16 mL 2    atorvastatin (LIPITOR) 40 MG Tab Take 1 Tablet by mouth at bedtime. 90 Tablet 3    levothyroxine (SYNTHROID) 88 MCG Tab 1 tablet in the morning on an empty stomach      liothyronine (CYTOMEL) 5 MCG Tab TAKE 1/2 TAB BY MOUTH TWICE A DAY      vitamin D (CHOLECALCIFEROL) 1000 UNIT Tab Take 1,000  "Units by mouth every day.      aspirin (ASA) 81 MG Chew Tab chewable tablet Take 1 Tab by mouth every day. 100 Tab 3    Estriol 10 % Cream as needed.      Magnesium 400 MG Tab Take  by mouth.       No current facility-administered medications for this visit.        ALLERGIES: Trazodone and Crestor [rosuvastatin calcium]    ROS  Constitutional: Denies fevers, Denies weight changes  Ears/Nose/Throat/Mouth: Denies nasal congestion or sore throat   Cardiovascular: Denies chest pain  Respiratory: Denies shortness of breath, Denies cough  Gastrointestinal/Hepatic: Denies nausea, vomiting  Sleep: see HPI      PHYSICAL EXAM  /60 (BP Location: Left arm, Patient Position: Sitting, BP Cuff Size: Large adult)   Pulse 71   Resp 12   Ht 1.575 m (5' 2\")   Wt 68.5 kg (151 lb)   LMP 11/01/1986   SpO2 96%   BMI 27.62 kg/m²   Appearance: Well-nourished, well-developed, no acute distress  Eyes:  No scleral icterus , EOMI  ENMT: masked  Musculoskeletal:  Grossly normal; gait and station normal; digits and nails normal  Skin:  No rashes, petechiae, cyanosis  Neurologic: without focal signs; oriented to person, time, place, and purpose; judgement intact      Medical Decision Making   Assessment and Plan  Sheyla Gauthier is a 82 y.o.female  with hypertension, hyperlipidemia, migraines, depression, hypothyroidism and obstructive sleep apnea on oral appliance therapy.  Presents today to follow-up regarding management of obstructive sleep apnea.    The medical record was reviewed.    Obstructive sleep apnea  Diagnostic and titration nocturnal polysomnogram's, home sleep apnea tests, continuous nocturnal oximetry results, multiple sleep latency tests, and compliance reports reviewe she is noticing benefit with using her oral appliance as well as positional therapy using sleep noodle.  Overall she is happy with her current status of sleeping.  She is feeling rested.      She has had a home sleep study which has showed " improvement with oral appliance therapy and positional therapy in the past.  This was done in March of this year.  She ideally would like to undergo a new sleep study with her recently adjusted oral appliance and positional therapy device.    We will plan to undergo a home study after next visit in spring 2023.  Encouraged to continue using oral appliance and positional therapy.    PLAN:   -Continue oral appliance therapy and positional therapy  -Advised to reach out via MyChart with questions     Patients with JOHN are at increased risk of cardiovascular disease including coronary artery disease, systemic arterial hypertension, pulmonary arterial hypertension, cardiac arrythmias, and stroke. The patient was advised to avoid driving a motor vehicle when drowsy.      Have advised the patient to follow up with the appropriate healthcare practitioners for all other medical problems and issues.    Return in about 5 months (around 4/28/2023).      Please note portions of this record was created using voice recognition software. I have made every reasonable attempt to correct obvious errors, but I expect that there are errors of grammar and possibly content I did not discover before finalizing the note.

## 2022-12-07 ENCOUNTER — HOSPITAL ENCOUNTER (OUTPATIENT)
Dept: LAB | Facility: MEDICAL CENTER | Age: 82
End: 2022-12-07
Attending: PHYSICAL MEDICINE & REHABILITATION
Payer: MEDICARE

## 2022-12-07 ENCOUNTER — HOSPITAL ENCOUNTER (OUTPATIENT)
Dept: LAB | Facility: MEDICAL CENTER | Age: 82
End: 2022-12-07
Attending: INTERNAL MEDICINE
Payer: MEDICARE

## 2022-12-07 LAB
ALBUMIN SERPL BCP-MCNC: 4.3 G/DL (ref 3.2–4.9)
ALBUMIN/GLOB SERPL: 1.6 G/DL
ALP SERPL-CCNC: 50 U/L (ref 30–99)
ALT SERPL-CCNC: 22 U/L (ref 2–50)
ANION GAP SERPL CALC-SCNC: 11 MMOL/L (ref 7–16)
AST SERPL-CCNC: 20 U/L (ref 12–45)
BILIRUB SERPL-MCNC: 0.3 MG/DL (ref 0.1–1.5)
BUN SERPL-MCNC: 32 MG/DL (ref 8–22)
CALCIUM SERPL-MCNC: 10.1 MG/DL (ref 8.5–10.5)
CHLORIDE SERPL-SCNC: 103 MMOL/L (ref 96–112)
CO2 SERPL-SCNC: 22 MMOL/L (ref 20–33)
CREAT SERPL-MCNC: 0.94 MG/DL (ref 0.5–1.4)
GFR SERPLBLD CREATININE-BSD FMLA CKD-EPI: 61 ML/MIN/1.73 M 2
GLOBULIN SER CALC-MCNC: 2.7 G/DL (ref 1.9–3.5)
GLUCOSE SERPL-MCNC: 124 MG/DL (ref 65–99)
POTASSIUM SERPL-SCNC: 4.3 MMOL/L (ref 3.6–5.5)
PROT SERPL-MCNC: 7 G/DL (ref 6–8.2)
SODIUM SERPL-SCNC: 136 MMOL/L (ref 135–145)
T4 FREE SERPL-MCNC: 1.55 NG/DL (ref 0.93–1.7)
TSH SERPL DL<=0.005 MIU/L-ACNC: 0.18 UIU/ML (ref 0.38–5.33)

## 2022-12-07 PROCEDURE — 84439 ASSAY OF FREE THYROXINE: CPT

## 2022-12-07 PROCEDURE — 80053 COMPREHEN METABOLIC PANEL: CPT

## 2022-12-07 PROCEDURE — 36415 COLL VENOUS BLD VENIPUNCTURE: CPT

## 2022-12-07 PROCEDURE — 84443 ASSAY THYROID STIM HORMONE: CPT

## 2023-01-04 ENCOUNTER — APPOINTMENT (OUTPATIENT)
Dept: LAB | Facility: MEDICAL CENTER | Age: 83
End: 2023-01-04
Payer: MEDICARE

## 2023-01-04 ENCOUNTER — HOSPITAL ENCOUNTER (OUTPATIENT)
Facility: MEDICAL CENTER | Age: 83
End: 2023-01-04
Attending: NURSE PRACTITIONER
Payer: MEDICARE

## 2023-01-04 LAB
APPEARANCE UR: ABNORMAL
BACTERIA #/AREA URNS HPF: ABNORMAL /HPF
BILIRUB UR QL STRIP.AUTO: NEGATIVE
COLOR UR: YELLOW
EPI CELLS #/AREA URNS HPF: ABNORMAL /HPF
GLUCOSE UR STRIP.AUTO-MCNC: NEGATIVE MG/DL
HYALINE CASTS #/AREA URNS LPF: ABNORMAL /LPF
KETONES UR STRIP.AUTO-MCNC: NEGATIVE MG/DL
LEUKOCYTE ESTERASE UR QL STRIP.AUTO: ABNORMAL
MICRO URNS: ABNORMAL
NITRITE UR QL STRIP.AUTO: POSITIVE
PH UR STRIP.AUTO: 5.5 [PH] (ref 5–8)
PROT UR QL STRIP: NEGATIVE MG/DL
RBC # URNS HPF: ABNORMAL /HPF
RBC UR QL AUTO: NEGATIVE
SP GR UR STRIP.AUTO: 1.02
UROBILINOGEN UR STRIP.AUTO-MCNC: 0.2 MG/DL
WBC #/AREA URNS HPF: ABNORMAL /HPF

## 2023-01-04 PROCEDURE — 87077 CULTURE AEROBIC IDENTIFY: CPT

## 2023-01-04 PROCEDURE — 87186 SC STD MICRODIL/AGAR DIL: CPT

## 2023-01-04 PROCEDURE — 87086 URINE CULTURE/COLONY COUNT: CPT

## 2023-01-04 PROCEDURE — 81001 URINALYSIS AUTO W/SCOPE: CPT

## 2023-01-16 ENCOUNTER — TELEPHONE (OUTPATIENT)
Dept: MEDICAL GROUP | Age: 83
End: 2023-01-16
Payer: MEDICARE

## 2023-01-30 ENCOUNTER — PRE-ADMISSION TESTING (OUTPATIENT)
Dept: ADMISSIONS | Facility: MEDICAL CENTER | Age: 83
DRG: 520 | End: 2023-01-30
Attending: NEUROLOGICAL SURGERY
Payer: MEDICARE

## 2023-01-30 ENCOUNTER — HOSPITAL ENCOUNTER (OUTPATIENT)
Dept: RADIOLOGY | Facility: MEDICAL CENTER | Age: 83
DRG: 520 | End: 2023-01-30
Attending: NEUROLOGICAL SURGERY
Payer: MEDICARE

## 2023-01-30 DIAGNOSIS — Z01.811 PRE-OPERATIVE RESPIRATORY EXAMINATION: ICD-10-CM

## 2023-01-30 DIAGNOSIS — Z01.812 PRE-OPERATIVE LABORATORY EXAMINATION: ICD-10-CM

## 2023-01-30 DIAGNOSIS — Z01.810 PRE-OPERATIVE CARDIOVASCULAR EXAMINATION: ICD-10-CM

## 2023-01-30 LAB
ANION GAP SERPL CALC-SCNC: 11 MMOL/L (ref 7–16)
APPEARANCE UR: CLEAR
APTT PPP: 27.8 SEC (ref 24.7–36)
BACTERIA #/AREA URNS HPF: ABNORMAL /HPF
BASOPHILS # BLD AUTO: 0.9 % (ref 0–1.8)
BASOPHILS # BLD: 0.05 K/UL (ref 0–0.12)
BILIRUB UR QL STRIP.AUTO: NEGATIVE
BUN SERPL-MCNC: 32 MG/DL (ref 8–22)
CALCIUM SERPL-MCNC: 10.3 MG/DL (ref 8.5–10.5)
CHLORIDE SERPL-SCNC: 105 MMOL/L (ref 96–112)
CO2 SERPL-SCNC: 25 MMOL/L (ref 20–33)
COLOR UR: YELLOW
CREAT SERPL-MCNC: 1 MG/DL (ref 0.5–1.4)
EOSINOPHIL # BLD AUTO: 0.11 K/UL (ref 0–0.51)
EOSINOPHIL NFR BLD: 1.9 % (ref 0–6.9)
EPI CELLS #/AREA URNS HPF: ABNORMAL /HPF
ERYTHROCYTE [DISTWIDTH] IN BLOOD BY AUTOMATED COUNT: 48.9 FL (ref 35.9–50)
GFR SERPLBLD CREATININE-BSD FMLA CKD-EPI: 57 ML/MIN/1.73 M 2
GLUCOSE SERPL-MCNC: 85 MG/DL (ref 65–99)
GLUCOSE UR STRIP.AUTO-MCNC: NEGATIVE MG/DL
HCT VFR BLD AUTO: 39.2 % (ref 37–47)
HGB BLD-MCNC: 12.5 G/DL (ref 12–16)
HYALINE CASTS #/AREA URNS LPF: ABNORMAL /LPF
IMM GRANULOCYTES # BLD AUTO: 0.02 K/UL (ref 0–0.11)
IMM GRANULOCYTES NFR BLD AUTO: 0.3 % (ref 0–0.9)
INR PPP: 1.05 (ref 0.87–1.13)
KETONES UR STRIP.AUTO-MCNC: NEGATIVE MG/DL
LEUKOCYTE ESTERASE UR QL STRIP.AUTO: ABNORMAL
LYMPHOCYTES # BLD AUTO: 1.61 K/UL (ref 1–4.8)
LYMPHOCYTES NFR BLD: 27.8 % (ref 22–41)
MCH RBC QN AUTO: 29.6 PG (ref 27–33)
MCHC RBC AUTO-ENTMCNC: 31.9 G/DL (ref 33.6–35)
MCV RBC AUTO: 92.9 FL (ref 81.4–97.8)
MICRO URNS: ABNORMAL
MONOCYTES # BLD AUTO: 0.43 K/UL (ref 0–0.85)
MONOCYTES NFR BLD AUTO: 7.4 % (ref 0–13.4)
NEUTROPHILS # BLD AUTO: 3.58 K/UL (ref 2–7.15)
NEUTROPHILS NFR BLD: 61.7 % (ref 44–72)
NITRITE UR QL STRIP.AUTO: POSITIVE
NRBC # BLD AUTO: 0 K/UL
NRBC BLD-RTO: 0 /100 WBC
PH UR STRIP.AUTO: 5.5 [PH] (ref 5–8)
PLATELET # BLD AUTO: 222 K/UL (ref 164–446)
PMV BLD AUTO: 10.5 FL (ref 9–12.9)
POTASSIUM SERPL-SCNC: 4.5 MMOL/L (ref 3.6–5.5)
PROT UR QL STRIP: NEGATIVE MG/DL
PROTHROMBIN TIME: 13.6 SEC (ref 12–14.6)
RBC # BLD AUTO: 4.22 M/UL (ref 4.2–5.4)
RBC # URNS HPF: ABNORMAL /HPF
RBC UR QL AUTO: NEGATIVE
SODIUM SERPL-SCNC: 141 MMOL/L (ref 135–145)
SP GR UR STRIP.AUTO: 1.01
UROBILINOGEN UR STRIP.AUTO-MCNC: 0.2 MG/DL
WBC # BLD AUTO: 5.8 K/UL (ref 4.8–10.8)
WBC #/AREA URNS HPF: ABNORMAL /HPF

## 2023-01-30 PROCEDURE — 71046 X-RAY EXAM CHEST 2 VIEWS: CPT

## 2023-01-30 PROCEDURE — 93005 ELECTROCARDIOGRAM TRACING: CPT

## 2023-01-30 PROCEDURE — 81001 URINALYSIS AUTO W/SCOPE: CPT

## 2023-01-30 PROCEDURE — 80048 BASIC METABOLIC PNL TOTAL CA: CPT

## 2023-01-30 PROCEDURE — 36415 COLL VENOUS BLD VENIPUNCTURE: CPT

## 2023-01-30 PROCEDURE — 85610 PROTHROMBIN TIME: CPT

## 2023-01-30 PROCEDURE — 87186 SC STD MICRODIL/AGAR DIL: CPT

## 2023-01-30 PROCEDURE — 85025 COMPLETE CBC W/AUTO DIFF WBC: CPT

## 2023-01-30 PROCEDURE — 87086 URINE CULTURE/COLONY COUNT: CPT

## 2023-01-30 PROCEDURE — 85730 THROMBOPLASTIN TIME PARTIAL: CPT

## 2023-01-30 PROCEDURE — 87077 CULTURE AEROBIC IDENTIFY: CPT

## 2023-01-30 RX ORDER — LOSARTAN POTASSIUM 50 MG/1
50 TABLET ORAL EVERY MORNING
Status: ON HOLD | COMMUNITY
Start: 2023-01-05 | End: 2023-11-21 | Stop reason: SDUPTHER

## 2023-01-30 RX ORDER — PREGABALIN 75 MG/1
75 CAPSULE ORAL 2 TIMES DAILY
COMMUNITY
Start: 2022-11-28 | End: 2023-10-26

## 2023-01-30 RX ORDER — CELECOXIB 200 MG/1
200 CAPSULE ORAL 2 TIMES DAILY
Status: ON HOLD | COMMUNITY
Start: 2022-12-09 | End: 2023-02-07

## 2023-01-30 ASSESSMENT — FIBROSIS 4 INDEX: FIB4 SCORE: 1.49

## 2023-01-31 LAB — EKG IMPRESSION: NORMAL

## 2023-01-31 PROCEDURE — 93010 ELECTROCARDIOGRAM REPORT: CPT | Performed by: INTERNAL MEDICINE

## 2023-02-06 ENCOUNTER — ANESTHESIA EVENT (OUTPATIENT)
Dept: SURGERY | Facility: MEDICAL CENTER | Age: 83
DRG: 520 | End: 2023-02-06
Payer: MEDICARE

## 2023-02-06 ENCOUNTER — APPOINTMENT (OUTPATIENT)
Dept: RADIOLOGY | Facility: MEDICAL CENTER | Age: 83
DRG: 520 | End: 2023-02-06
Attending: NEUROLOGICAL SURGERY
Payer: MEDICARE

## 2023-02-06 ENCOUNTER — HOSPITAL ENCOUNTER (INPATIENT)
Facility: MEDICAL CENTER | Age: 83
LOS: 1 days | DRG: 520 | End: 2023-02-07
Attending: NEUROLOGICAL SURGERY | Admitting: NEUROLOGICAL SURGERY
Payer: MEDICARE

## 2023-02-06 ENCOUNTER — ANESTHESIA (OUTPATIENT)
Dept: SURGERY | Facility: MEDICAL CENTER | Age: 83
DRG: 520 | End: 2023-02-06
Payer: MEDICARE

## 2023-02-06 DIAGNOSIS — M62.838 MUSCLE SPASMS OF BOTH LOWER EXTREMITIES: ICD-10-CM

## 2023-02-06 DIAGNOSIS — M48.062 LUMBAR STENOSIS WITH NEUROGENIC CLAUDICATION: ICD-10-CM

## 2023-02-06 DIAGNOSIS — G89.18 ACUTE POSTOPERATIVE PAIN: ICD-10-CM

## 2023-02-06 DIAGNOSIS — H91.91 DECREASED HEARING OF RIGHT EAR: ICD-10-CM

## 2023-02-06 PROCEDURE — 700102 HCHG RX REV CODE 250 W/ 637 OVERRIDE(OP): Performed by: ANESTHESIOLOGY

## 2023-02-06 PROCEDURE — 0SP308Z REMOVAL OF SPACER FROM LUMBOSACRAL JOINT, OPEN APPROACH: ICD-10-PCS | Performed by: NEUROLOGICAL SURGERY

## 2023-02-06 PROCEDURE — 700101 HCHG RX REV CODE 250: Performed by: ANESTHESIOLOGY

## 2023-02-06 PROCEDURE — 700111 HCHG RX REV CODE 636 W/ 250 OVERRIDE (IP): Performed by: NURSE PRACTITIONER

## 2023-02-06 PROCEDURE — 110454 HCHG SHELL REV 250: Performed by: NEUROLOGICAL SURGERY

## 2023-02-06 PROCEDURE — 99100 ANES PT EXTEME AGE<1 YR&>70: CPT | Performed by: ANESTHESIOLOGY

## 2023-02-06 PROCEDURE — 0SP008Z REMOVAL OF SPACER FROM LUMBAR VERTEBRAL JOINT, OPEN APPROACH: ICD-10-PCS | Performed by: NEUROLOGICAL SURGERY

## 2023-02-06 PROCEDURE — 700105 HCHG RX REV CODE 258: Performed by: NURSE PRACTITIONER

## 2023-02-06 PROCEDURE — 160048 HCHG OR STATISTICAL LEVEL 1-5: Performed by: NEUROLOGICAL SURGERY

## 2023-02-06 PROCEDURE — 700105 HCHG RX REV CODE 258: Performed by: NEUROLOGICAL SURGERY

## 2023-02-06 PROCEDURE — 00NY0ZZ RELEASE LUMBAR SPINAL CORD, OPEN APPROACH: ICD-10-PCS | Performed by: NEUROLOGICAL SURGERY

## 2023-02-06 PROCEDURE — 160035 HCHG PACU - 1ST 60 MINS PHASE I: Performed by: NEUROLOGICAL SURGERY

## 2023-02-06 PROCEDURE — 160009 HCHG ANES TIME/MIN: Performed by: NEUROLOGICAL SURGERY

## 2023-02-06 PROCEDURE — 01NR0ZZ RELEASE SACRAL NERVE, OPEN APPROACH: ICD-10-PCS | Performed by: NEUROLOGICAL SURGERY

## 2023-02-06 PROCEDURE — 72020 X-RAY EXAM OF SPINE 1 VIEW: CPT

## 2023-02-06 PROCEDURE — 700105 HCHG RX REV CODE 258: Performed by: ANESTHESIOLOGY

## 2023-02-06 PROCEDURE — 770001 HCHG ROOM/CARE - MED/SURG/GYN PRIV*

## 2023-02-06 PROCEDURE — 00630 ANES PX LUMBAR REGION NOS: CPT | Performed by: ANESTHESIOLOGY

## 2023-02-06 PROCEDURE — 700111 HCHG RX REV CODE 636 W/ 250 OVERRIDE (IP): Performed by: ANESTHESIOLOGY

## 2023-02-06 PROCEDURE — 700102 HCHG RX REV CODE 250 W/ 637 OVERRIDE(OP): Performed by: NURSE PRACTITIONER

## 2023-02-06 PROCEDURE — 700101 HCHG RX REV CODE 250: Performed by: NURSE PRACTITIONER

## 2023-02-06 PROCEDURE — 01NB0ZZ RELEASE LUMBAR NERVE, OPEN APPROACH: ICD-10-PCS | Performed by: NEUROLOGICAL SURGERY

## 2023-02-06 PROCEDURE — 160002 HCHG RECOVERY MINUTES (STAT): Performed by: NEUROLOGICAL SURGERY

## 2023-02-06 PROCEDURE — 700111 HCHG RX REV CODE 636 W/ 250 OVERRIDE (IP): Performed by: NEUROLOGICAL SURGERY

## 2023-02-06 PROCEDURE — 110371 HCHG SHELL REV 272: Performed by: NEUROLOGICAL SURGERY

## 2023-02-06 PROCEDURE — A9270 NON-COVERED ITEM OR SERVICE: HCPCS | Performed by: NURSE PRACTITIONER

## 2023-02-06 PROCEDURE — 700101 HCHG RX REV CODE 250: Performed by: NEUROLOGICAL SURGERY

## 2023-02-06 PROCEDURE — A9270 NON-COVERED ITEM OR SERVICE: HCPCS | Performed by: ANESTHESIOLOGY

## 2023-02-06 PROCEDURE — C1755 CATHETER, INTRASPINAL: HCPCS | Performed by: NEUROLOGICAL SURGERY

## 2023-02-06 PROCEDURE — 160029 HCHG SURGERY MINUTES - 1ST 30 MINS LEVEL 4: Performed by: NEUROLOGICAL SURGERY

## 2023-02-06 PROCEDURE — 160041 HCHG SURGERY MINUTES - EA ADDL 1 MIN LEVEL 4: Performed by: NEUROLOGICAL SURGERY

## 2023-02-06 RX ORDER — FLUTICASONE PROPIONATE 50 MCG
1 SPRAY, SUSPENSION (ML) NASAL DAILY
Status: DISCONTINUED | OUTPATIENT
Start: 2023-02-06 | End: 2023-02-07 | Stop reason: HOSPADM

## 2023-02-06 RX ORDER — POLYETHYLENE GLYCOL 3350 17 G/17G
1 POWDER, FOR SOLUTION ORAL 2 TIMES DAILY PRN
Status: DISCONTINUED | OUTPATIENT
Start: 2023-02-06 | End: 2023-02-07 | Stop reason: HOSPADM

## 2023-02-06 RX ORDER — CEFAZOLIN SODIUM 1 G/3ML
INJECTION, POWDER, FOR SOLUTION INTRAMUSCULAR; INTRAVENOUS
Status: DISCONTINUED | OUTPATIENT
Start: 2023-02-06 | End: 2023-02-06 | Stop reason: HOSPADM

## 2023-02-06 RX ORDER — METOPROLOL TARTRATE 1 MG/ML
1 INJECTION, SOLUTION INTRAVENOUS
Status: DISCONTINUED | OUTPATIENT
Start: 2023-02-06 | End: 2023-02-06 | Stop reason: HOSPADM

## 2023-02-06 RX ORDER — DOCUSATE SODIUM 100 MG/1
100 CAPSULE, LIQUID FILLED ORAL 2 TIMES DAILY
Status: DISCONTINUED | OUTPATIENT
Start: 2023-02-06 | End: 2023-02-07 | Stop reason: HOSPADM

## 2023-02-06 RX ORDER — ATORVASTATIN CALCIUM 40 MG/1
40 TABLET, FILM COATED ORAL
Status: DISCONTINUED | OUTPATIENT
Start: 2023-02-06 | End: 2023-02-07 | Stop reason: HOSPADM

## 2023-02-06 RX ORDER — CIPROFLOXACIN 500 MG/1
500 TABLET, FILM COATED ORAL 2 TIMES DAILY
Status: COMPLETED | OUTPATIENT
Start: 2023-02-06 | End: 2023-02-07

## 2023-02-06 RX ORDER — OXYCODONE HCL 5 MG/5 ML
5 SOLUTION, ORAL ORAL
Status: DISCONTINUED | OUTPATIENT
Start: 2023-02-06 | End: 2023-02-06 | Stop reason: HOSPADM

## 2023-02-06 RX ORDER — BISACODYL 10 MG
10 SUPPOSITORY, RECTAL RECTAL
Status: DISCONTINUED | OUTPATIENT
Start: 2023-02-06 | End: 2023-02-07 | Stop reason: HOSPADM

## 2023-02-06 RX ORDER — DIPHENHYDRAMINE HYDROCHLORIDE 50 MG/ML
12.5 INJECTION INTRAMUSCULAR; INTRAVENOUS
Status: DISCONTINUED | OUTPATIENT
Start: 2023-02-06 | End: 2023-02-06 | Stop reason: HOSPADM

## 2023-02-06 RX ORDER — HYDROMORPHONE HYDROCHLORIDE 1 MG/ML
0.2 INJECTION, SOLUTION INTRAMUSCULAR; INTRAVENOUS; SUBCUTANEOUS
Status: DISCONTINUED | OUTPATIENT
Start: 2023-02-06 | End: 2023-02-06 | Stop reason: HOSPADM

## 2023-02-06 RX ORDER — OXYCODONE HYDROCHLORIDE 5 MG/1
5 TABLET ORAL
Status: DISCONTINUED | OUTPATIENT
Start: 2023-02-06 | End: 2023-02-07 | Stop reason: HOSPADM

## 2023-02-06 RX ORDER — DEXAMETHASONE SODIUM PHOSPHATE 4 MG/ML
INJECTION, SOLUTION INTRA-ARTICULAR; INTRALESIONAL; INTRAMUSCULAR; INTRAVENOUS; SOFT TISSUE PRN
Status: DISCONTINUED | OUTPATIENT
Start: 2023-02-06 | End: 2023-02-06 | Stop reason: SURG

## 2023-02-06 RX ORDER — CIPROFLOXACIN 500 MG/1
500 TABLET, FILM COATED ORAL 2 TIMES DAILY
Status: ON HOLD | COMMUNITY
End: 2023-02-07

## 2023-02-06 RX ORDER — BUPIVACAINE HYDROCHLORIDE AND EPINEPHRINE 2.5; 5 MG/ML; UG/ML
INJECTION, SOLUTION EPIDURAL; INFILTRATION; INTRACAUDAL; PERINEURAL
Status: DISCONTINUED | OUTPATIENT
Start: 2023-02-06 | End: 2023-02-06 | Stop reason: HOSPADM

## 2023-02-06 RX ORDER — LIDOCAINE HYDROCHLORIDE 20 MG/ML
INJECTION, SOLUTION EPIDURAL; INFILTRATION; INTRACAUDAL; PERINEURAL PRN
Status: DISCONTINUED | OUTPATIENT
Start: 2023-02-06 | End: 2023-02-06 | Stop reason: SURG

## 2023-02-06 RX ORDER — LOSARTAN POTASSIUM 50 MG/1
50 TABLET ORAL EVERY MORNING
Status: DISCONTINUED | OUTPATIENT
Start: 2023-02-06 | End: 2023-02-07 | Stop reason: HOSPADM

## 2023-02-06 RX ORDER — MAGNESIUM HYDROXIDE 1200 MG/15ML
LIQUID ORAL
Status: COMPLETED | OUTPATIENT
Start: 2023-02-06 | End: 2023-02-06

## 2023-02-06 RX ORDER — SODIUM CHLORIDE AND POTASSIUM CHLORIDE 150; 900 MG/100ML; MG/100ML
INJECTION, SOLUTION INTRAVENOUS CONTINUOUS
Status: DISCONTINUED | OUTPATIENT
Start: 2023-02-06 | End: 2023-02-07 | Stop reason: HOSPADM

## 2023-02-06 RX ORDER — LABETALOL HYDROCHLORIDE 5 MG/ML
10 INJECTION, SOLUTION INTRAVENOUS
Status: DISCONTINUED | OUTPATIENT
Start: 2023-02-06 | End: 2023-02-07 | Stop reason: HOSPADM

## 2023-02-06 RX ORDER — ONDANSETRON 2 MG/ML
4 INJECTION INTRAMUSCULAR; INTRAVENOUS
Status: DISCONTINUED | OUTPATIENT
Start: 2023-02-06 | End: 2023-02-06 | Stop reason: HOSPADM

## 2023-02-06 RX ORDER — TIZANIDINE 4 MG/1
2 TABLET ORAL 3 TIMES DAILY PRN
Status: DISCONTINUED | OUTPATIENT
Start: 2023-02-06 | End: 2023-02-07 | Stop reason: HOSPADM

## 2023-02-06 RX ORDER — DIPHENHYDRAMINE HYDROCHLORIDE 50 MG/ML
25 INJECTION INTRAMUSCULAR; INTRAVENOUS EVERY 6 HOURS PRN
Status: DISCONTINUED | OUTPATIENT
Start: 2023-02-06 | End: 2023-02-07 | Stop reason: HOSPADM

## 2023-02-06 RX ORDER — LIOTHYRONINE SODIUM 5 UG/1
5 TABLET ORAL EVERY MORNING
Status: DISCONTINUED | OUTPATIENT
Start: 2023-02-06 | End: 2023-02-07 | Stop reason: HOSPADM

## 2023-02-06 RX ORDER — DIPHENHYDRAMINE HCL 25 MG
25 TABLET ORAL EVERY 6 HOURS PRN
Status: DISCONTINUED | OUTPATIENT
Start: 2023-02-06 | End: 2023-02-07 | Stop reason: HOSPADM

## 2023-02-06 RX ORDER — CEFAZOLIN SODIUM 1 G/3ML
INJECTION, POWDER, FOR SOLUTION INTRAMUSCULAR; INTRAVENOUS PRN
Status: DISCONTINUED | OUTPATIENT
Start: 2023-02-06 | End: 2023-02-06 | Stop reason: SURG

## 2023-02-06 RX ORDER — SODIUM CHLORIDE, SODIUM LACTATE, POTASSIUM CHLORIDE, CALCIUM CHLORIDE 600; 310; 30; 20 MG/100ML; MG/100ML; MG/100ML; MG/100ML
INJECTION, SOLUTION INTRAVENOUS CONTINUOUS
Status: ACTIVE | OUTPATIENT
Start: 2023-02-06 | End: 2023-02-06

## 2023-02-06 RX ORDER — ONDANSETRON 2 MG/ML
4 INJECTION INTRAMUSCULAR; INTRAVENOUS EVERY 4 HOURS PRN
Status: DISCONTINUED | OUTPATIENT
Start: 2023-02-06 | End: 2023-02-07 | Stop reason: HOSPADM

## 2023-02-06 RX ORDER — SODIUM CHLORIDE, SODIUM LACTATE, POTASSIUM CHLORIDE, CALCIUM CHLORIDE 600; 310; 30; 20 MG/100ML; MG/100ML; MG/100ML; MG/100ML
INJECTION, SOLUTION INTRAVENOUS CONTINUOUS
Status: DISCONTINUED | OUTPATIENT
Start: 2023-02-06 | End: 2023-02-06 | Stop reason: HOSPADM

## 2023-02-06 RX ORDER — HYDRALAZINE HYDROCHLORIDE 20 MG/ML
10 INJECTION INTRAMUSCULAR; INTRAVENOUS
Status: DISCONTINUED | OUTPATIENT
Start: 2023-02-06 | End: 2023-02-07 | Stop reason: HOSPADM

## 2023-02-06 RX ORDER — SODIUM CHLORIDE, SODIUM LACTATE, POTASSIUM CHLORIDE, CALCIUM CHLORIDE 600; 310; 30; 20 MG/100ML; MG/100ML; MG/100ML; MG/100ML
INJECTION, SOLUTION INTRAVENOUS
Status: DISCONTINUED | OUTPATIENT
Start: 2023-02-06 | End: 2023-02-06 | Stop reason: SURG

## 2023-02-06 RX ORDER — HYDRALAZINE HYDROCHLORIDE 20 MG/ML
5 INJECTION INTRAMUSCULAR; INTRAVENOUS
Status: DISCONTINUED | OUTPATIENT
Start: 2023-02-06 | End: 2023-02-06 | Stop reason: HOSPADM

## 2023-02-06 RX ORDER — HYDROMORPHONE HYDROCHLORIDE 1 MG/ML
0.1 INJECTION, SOLUTION INTRAMUSCULAR; INTRAVENOUS; SUBCUTANEOUS
Status: DISCONTINUED | OUTPATIENT
Start: 2023-02-06 | End: 2023-02-06 | Stop reason: HOSPADM

## 2023-02-06 RX ORDER — LEVOTHYROXINE SODIUM 88 UG/1
88 TABLET ORAL
Status: DISCONTINUED | OUTPATIENT
Start: 2023-02-06 | End: 2023-02-07 | Stop reason: HOSPADM

## 2023-02-06 RX ORDER — ACETAMINOPHEN 500 MG
1000 TABLET ORAL ONCE
Status: COMPLETED | OUTPATIENT
Start: 2023-02-06 | End: 2023-02-06

## 2023-02-06 RX ORDER — ACETAMINOPHEN 500 MG
1000 TABLET ORAL EVERY 8 HOURS
Status: DISCONTINUED | OUTPATIENT
Start: 2023-02-06 | End: 2023-02-07 | Stop reason: HOSPADM

## 2023-02-06 RX ORDER — OXYCODONE HYDROCHLORIDE 10 MG/1
10 TABLET ORAL
Status: DISCONTINUED | OUTPATIENT
Start: 2023-02-06 | End: 2023-02-07 | Stop reason: HOSPADM

## 2023-02-06 RX ORDER — TRANEXAMIC ACID 100 MG/ML
INJECTION, SOLUTION INTRAVENOUS PRN
Status: DISCONTINUED | OUTPATIENT
Start: 2023-02-06 | End: 2023-02-06 | Stop reason: SURG

## 2023-02-06 RX ORDER — ENEMA 19; 7 G/133ML; G/133ML
1 ENEMA RECTAL
Status: DISCONTINUED | OUTPATIENT
Start: 2023-02-06 | End: 2023-02-07 | Stop reason: HOSPADM

## 2023-02-06 RX ORDER — HYDROMORPHONE HYDROCHLORIDE 1 MG/ML
0.4 INJECTION, SOLUTION INTRAMUSCULAR; INTRAVENOUS; SUBCUTANEOUS
Status: DISCONTINUED | OUTPATIENT
Start: 2023-02-06 | End: 2023-02-06 | Stop reason: HOSPADM

## 2023-02-06 RX ORDER — METHYLPREDNISOLONE SODIUM SUCCINATE 125 MG/2ML
INJECTION, POWDER, LYOPHILIZED, FOR SOLUTION INTRAMUSCULAR; INTRAVENOUS
Status: DISCONTINUED | OUTPATIENT
Start: 2023-02-06 | End: 2023-02-06 | Stop reason: HOSPADM

## 2023-02-06 RX ORDER — HALOPERIDOL 5 MG/ML
1 INJECTION INTRAMUSCULAR
Status: DISCONTINUED | OUTPATIENT
Start: 2023-02-06 | End: 2023-02-06 | Stop reason: HOSPADM

## 2023-02-06 RX ORDER — ONDANSETRON 2 MG/ML
INJECTION INTRAMUSCULAR; INTRAVENOUS PRN
Status: DISCONTINUED | OUTPATIENT
Start: 2023-02-06 | End: 2023-02-06 | Stop reason: SURG

## 2023-02-06 RX ORDER — PREGABALIN 75 MG/1
75 CAPSULE ORAL 2 TIMES DAILY
Status: DISCONTINUED | OUTPATIENT
Start: 2023-02-06 | End: 2023-02-07 | Stop reason: HOSPADM

## 2023-02-06 RX ORDER — HYDROMORPHONE HYDROCHLORIDE 1 MG/ML
0.5 INJECTION, SOLUTION INTRAMUSCULAR; INTRAVENOUS; SUBCUTANEOUS
Status: DISCONTINUED | OUTPATIENT
Start: 2023-02-06 | End: 2023-02-07 | Stop reason: HOSPADM

## 2023-02-06 RX ORDER — MIDAZOLAM HYDROCHLORIDE 1 MG/ML
INJECTION INTRAMUSCULAR; INTRAVENOUS PRN
Status: DISCONTINUED | OUTPATIENT
Start: 2023-02-06 | End: 2023-02-06 | Stop reason: SURG

## 2023-02-06 RX ORDER — OXYCODONE HCL 5 MG/5 ML
10 SOLUTION, ORAL ORAL
Status: DISCONTINUED | OUTPATIENT
Start: 2023-02-06 | End: 2023-02-06 | Stop reason: HOSPADM

## 2023-02-06 RX ORDER — ONDANSETRON 4 MG/1
4 TABLET, ORALLY DISINTEGRATING ORAL EVERY 4 HOURS PRN
Status: DISCONTINUED | OUTPATIENT
Start: 2023-02-06 | End: 2023-02-07 | Stop reason: HOSPADM

## 2023-02-06 RX ADMIN — FENTANYL CITRATE 25 MCG: 50 INJECTION, SOLUTION INTRAMUSCULAR; INTRAVENOUS at 14:20

## 2023-02-06 RX ADMIN — ATORVASTATIN CALCIUM 40 MG: 40 TABLET, FILM COATED ORAL at 21:19

## 2023-02-06 RX ADMIN — CEFAZOLIN 2 G: 330 INJECTION, POWDER, FOR SOLUTION INTRAMUSCULAR; INTRAVENOUS at 14:04

## 2023-02-06 RX ADMIN — ACETAMINOPHEN 1000 MG: 500 TABLET, FILM COATED ORAL at 21:19

## 2023-02-06 RX ADMIN — PREGABALIN 75 MG: 75 CAPSULE ORAL at 17:48

## 2023-02-06 RX ADMIN — PROPOFOL 10 MG: 10 INJECTION, EMULSION INTRAVENOUS at 14:58

## 2023-02-06 RX ADMIN — FENTANYL CITRATE 50 MCG: 50 INJECTION, SOLUTION INTRAMUSCULAR; INTRAVENOUS at 14:50

## 2023-02-06 RX ADMIN — SODIUM CHLORIDE, POTASSIUM CHLORIDE, SODIUM LACTATE AND CALCIUM CHLORIDE: 600; 310; 30; 20 INJECTION, SOLUTION INTRAVENOUS at 13:46

## 2023-02-06 RX ADMIN — LIDOCAINE HYDROCHLORIDE 60 MG: 20 INJECTION, SOLUTION EPIDURAL; INFILTRATION; INTRACAUDAL at 15:14

## 2023-02-06 RX ADMIN — CIPROFLOXACIN HYDROCHLORIDE 500 MG: 500 TABLET, FILM COATED ORAL at 17:47

## 2023-02-06 RX ADMIN — SODIUM CHLORIDE, POTASSIUM CHLORIDE, SODIUM LACTATE AND CALCIUM CHLORIDE: 600; 310; 30; 20 INJECTION, SOLUTION INTRAVENOUS at 10:31

## 2023-02-06 RX ADMIN — DEXAMETHASONE SODIUM PHOSPHATE 4 MG: 4 INJECTION, SOLUTION INTRA-ARTICULAR; INTRALESIONAL; INTRAMUSCULAR; INTRAVENOUS; SOFT TISSUE at 14:21

## 2023-02-06 RX ADMIN — FENTANYL CITRATE 50 MCG: 50 INJECTION, SOLUTION INTRAMUSCULAR; INTRAVENOUS at 14:11

## 2023-02-06 RX ADMIN — LIDOCAINE HYDROCHLORIDE 40 MG: 20 INJECTION, SOLUTION EPIDURAL; INFILTRATION; INTRACAUDAL at 13:51

## 2023-02-06 RX ADMIN — FENTANYL CITRATE 25 MCG: 50 INJECTION, SOLUTION INTRAMUSCULAR; INTRAVENOUS at 15:14

## 2023-02-06 RX ADMIN — MIDAZOLAM HYDROCHLORIDE 2 MG: 1 INJECTION, SOLUTION INTRAMUSCULAR; INTRAVENOUS at 13:49

## 2023-02-06 RX ADMIN — ONDANSETRON 4 MG: 2 INJECTION INTRAMUSCULAR; INTRAVENOUS at 14:24

## 2023-02-06 RX ADMIN — ROCURONIUM BROMIDE 30 MG: 10 INJECTION, SOLUTION INTRAVENOUS at 13:51

## 2023-02-06 RX ADMIN — CEFAZOLIN 2 G: 2 INJECTION, POWDER, FOR SOLUTION INTRAMUSCULAR; INTRAVENOUS at 21:24

## 2023-02-06 RX ADMIN — ACETAMINOPHEN 1000 MG: 500 TABLET, FILM COATED ORAL at 10:31

## 2023-02-06 RX ADMIN — ACETAMINOPHEN 1000 MG: 500 TABLET, FILM COATED ORAL at 17:47

## 2023-02-06 RX ADMIN — SUGAMMADEX 140 MG: 100 INJECTION, SOLUTION INTRAVENOUS at 15:29

## 2023-02-06 RX ADMIN — FLUTICASONE PROPIONATE 50 MCG: 50 SPRAY, METERED NASAL at 17:49

## 2023-02-06 RX ADMIN — DOCUSATE SODIUM 100 MG: 100 CAPSULE, LIQUID FILLED ORAL at 17:47

## 2023-02-06 RX ADMIN — TRANEXAMIC ACID 1000 MG: 100 INJECTION, SOLUTION INTRAVENOUS at 14:58

## 2023-02-06 RX ADMIN — PROPOFOL 100 MG: 10 INJECTION, EMULSION INTRAVENOUS at 13:51

## 2023-02-06 RX ADMIN — POTASSIUM CHLORIDE AND SODIUM CHLORIDE: 900; 150 INJECTION, SOLUTION INTRAVENOUS at 17:55

## 2023-02-06 RX ADMIN — FENTANYL CITRATE 50 MCG: 50 INJECTION, SOLUTION INTRAMUSCULAR; INTRAVENOUS at 14:57

## 2023-02-06 ASSESSMENT — COGNITIVE AND FUNCTIONAL STATUS - GENERAL
TURNING FROM BACK TO SIDE WHILE IN FLAT BAD: A LITTLE
MOBILITY SCORE: 18
STANDING UP FROM CHAIR USING ARMS: A LITTLE
HELP NEEDED FOR BATHING: A LITTLE
CLIMB 3 TO 5 STEPS WITH RAILING: A LITTLE
DRESSING REGULAR UPPER BODY CLOTHING: A LITTLE
DRESSING REGULAR LOWER BODY CLOTHING: A LITTLE
SUGGESTED CMS G CODE MODIFIER DAILY ACTIVITY: CJ
DAILY ACTIVITIY SCORE: 20
SUGGESTED CMS G CODE MODIFIER MOBILITY: CK
WALKING IN HOSPITAL ROOM: A LITTLE
MOVING FROM LYING ON BACK TO SITTING ON SIDE OF FLAT BED: A LITTLE
TOILETING: A LITTLE
MOVING TO AND FROM BED TO CHAIR: A LITTLE

## 2023-02-06 ASSESSMENT — PATIENT HEALTH QUESTIONNAIRE - PHQ9
SUM OF ALL RESPONSES TO PHQ9 QUESTIONS 1 AND 2: 0
2. FEELING DOWN, DEPRESSED, IRRITABLE, OR HOPELESS: NOT AT ALL
1. LITTLE INTEREST OR PLEASURE IN DOING THINGS: NOT AT ALL

## 2023-02-06 ASSESSMENT — LIFESTYLE VARIABLES
EVER FELT BAD OR GUILTY ABOUT YOUR DRINKING: NO
ALCOHOL_USE: NO
CONSUMPTION TOTAL: NEGATIVE
HAVE YOU EVER FELT YOU SHOULD CUT DOWN ON YOUR DRINKING: NO
TOTAL SCORE: 0
EVER HAD A DRINK FIRST THING IN THE MORNING TO STEADY YOUR NERVES TO GET RID OF A HANGOVER: NO
TOTAL SCORE: 0
DOES PATIENT WANT TO STOP DRINKING: NO
HOW MANY TIMES IN THE PAST YEAR HAVE YOU HAD 5 OR MORE DRINKS IN A DAY: 0
ON A TYPICAL DAY WHEN YOU DRINK ALCOHOL HOW MANY DRINKS DO YOU HAVE: 0
HAVE PEOPLE ANNOYED YOU BY CRITICIZING YOUR DRINKING: NO
TOTAL SCORE: 0
AVERAGE NUMBER OF DAYS PER WEEK YOU HAVE A DRINK CONTAINING ALCOHOL: 0

## 2023-02-06 ASSESSMENT — FIBROSIS 4 INDEX: FIB4 SCORE: 1.56

## 2023-02-06 NOTE — ANESTHESIA PROCEDURE NOTES
Airway    Date/Time: 2/6/2023 1:53 PM  Performed by: Agustín Hi M.D.  Authorized by: Agustín Hi M.D.     Location:  OR  Urgency:  Elective  Indications for Airway Management:  Anesthesia      Spontaneous Ventilation: absent    Sedation Level:  Deep  Preoxygenated: Yes    Patient Position:  Sniffing  Final Airway Type:  Endotracheal airway  Final Endotracheal Airway:  ETT  Cuffed: Yes    Technique Used for Successful ETT Placement:  Direct laryngoscopy  Devices/Methods Used in Placement:  Intubating stylet and cricoid pressure    Insertion Site:  Oral  Blade Type:  [place]  Laryngoscope Blade/Videolaryngoscope Blade Size:  2  ETT Size (mm):  7.0  Measured from:  Teeth  ETT to Teeth (cm):  20  Placement Verified by: auscultation and capnometry    Cormack-Lehane Classification:  Grade IIa - partial view of glottis  Number of Attempts at Approach:  1

## 2023-02-06 NOTE — ANESTHESIA TIME REPORT
Anesthesia Start and Stop Event Times     Date Time Event    2/6/2023 1318 Ready for Procedure     1346 Anesthesia Start     1538 Anesthesia Stop        Responsible Staff  02/06/23    Name Role Begin End    Agustín Hi M.D. Anesth 1346 1538        Overtime Reason:  no overtime (within assigned shift)    Comments:

## 2023-02-06 NOTE — ANESTHESIA PREPROCEDURE EVALUATION
Case: [id number] Date/Time: 02/06/23 1115    Procedures:       POSTERIOR REMOVAL OF INTERSPINOUS DEVICE WITH L4-S1 LAMINECTOMY      REMOVAL, HARDWARE    Pre-op diagnosis: SPINAL STENOSIS OF LUMBAR REGION    Location: [facility] OR 05 / SURGERY [facility]    Surgeons: Mannie Connor M.D.      Nl EF and valves from echo last year(2022)  Patient said she had a stent, no sxs  Relevant Problems   ANESTHESIA   (positive) JOHN (obstructive sleep apnea)      NEURO   (positive) Migraine syndrome      CARDIAC   (positive) Coronary artery disease involving native coronary artery of native heart without angina pectoris   (positive) HTN (hypertension)   (positive) Migraine syndrome         (positive) Stage 2 chronic kidney disease      ENDO   (positive) Acquired hypothyroidism      Other   (positive) Arthritis       Physical Exam    Airway   Mallampati: II  TM distance: >3 FB  Neck ROM: full       Cardiovascular - normal exam  Rhythm: regular  Rate: normal  (-) murmur     Dental - normal exam           Pulmonary - normal exam  Breath sounds clear to auscultation     Abdominal    Neurological - normal exam         Other findings: Appears younger than stated age            Anesthesia Plan    ASA 3   ASA physical status 3 criteria: CAD/stents (> 3 months)    Plan - general       Airway plan will be ETT          Induction: intravenous    Postoperative Plan: Postoperative administration of opioids is intended.    Pertinent diagnostic labs and testing reviewed    Informed Consent:    Anesthetic plan and risks discussed with patient.    Use of blood products discussed with: patient whom consented to blood products.

## 2023-02-06 NOTE — ANESTHESIA PROCEDURE NOTES
Peripheral IV    Date/Time: 2/6/2023 2:06 PM  Performed by: Agustín Hi M.D.  Authorized by: Agustín Hi M.D.     Size:  20 G  Laterality:  Right  Peripheral IV Location:  Hand  Site Prep:  Alcohol  Technique:  Direct puncture  Attempts:  1

## 2023-02-07 ENCOUNTER — PHARMACY VISIT (OUTPATIENT)
Dept: PHARMACY | Facility: MEDICAL CENTER | Age: 83
End: 2023-02-07
Payer: COMMERCIAL

## 2023-02-07 VITALS
RESPIRATION RATE: 16 BRPM | WEIGHT: 157.63 LBS | SYSTOLIC BLOOD PRESSURE: 98 MMHG | HEART RATE: 67 BPM | HEIGHT: 62 IN | BODY MASS INDEX: 29.01 KG/M2 | OXYGEN SATURATION: 96 % | DIASTOLIC BLOOD PRESSURE: 58 MMHG | TEMPERATURE: 97.9 F

## 2023-02-07 PROCEDURE — RXMED WILLOW AMBULATORY MEDICATION CHARGE: Performed by: NURSE PRACTITIONER

## 2023-02-07 PROCEDURE — 97535 SELF CARE MNGMENT TRAINING: CPT

## 2023-02-07 PROCEDURE — 700102 HCHG RX REV CODE 250 W/ 637 OVERRIDE(OP): Performed by: NURSE PRACTITIONER

## 2023-02-07 PROCEDURE — 97166 OT EVAL MOD COMPLEX 45 MIN: CPT

## 2023-02-07 PROCEDURE — A9270 NON-COVERED ITEM OR SERVICE: HCPCS | Performed by: NURSE PRACTITIONER

## 2023-02-07 PROCEDURE — 700105 HCHG RX REV CODE 258: Performed by: NURSE PRACTITIONER

## 2023-02-07 PROCEDURE — 97161 PT EVAL LOW COMPLEX 20 MIN: CPT

## 2023-02-07 PROCEDURE — 700111 HCHG RX REV CODE 636 W/ 250 OVERRIDE (IP): Performed by: NURSE PRACTITIONER

## 2023-02-07 RX ORDER — TIZANIDINE 2 MG/1
TABLET ORAL
Qty: 40 TABLET | Refills: 0 | Status: SHIPPED | OUTPATIENT
Start: 2023-02-07 | End: 2023-02-07 | Stop reason: SDUPTHER

## 2023-02-07 RX ORDER — OXYCODONE HYDROCHLORIDE 5 MG/1
TABLET ORAL
Qty: 30 TABLET | Refills: 0 | OUTPATIENT
Start: 2023-02-07 | End: 2023-02-07 | Stop reason: SDUPTHER

## 2023-02-07 RX ORDER — TIZANIDINE 2 MG/1
TABLET ORAL
Qty: 40 TABLET | Refills: 0 | Status: SHIPPED | OUTPATIENT
Start: 2023-02-07 | End: 2023-08-30

## 2023-02-07 RX ORDER — TIZANIDINE 2 MG/1
2 TABLET ORAL 3 TIMES DAILY PRN
Qty: 30 TABLET | Refills: 3 | Status: CANCELLED | OUTPATIENT
Start: 2023-02-07

## 2023-02-07 RX ORDER — OXYCODONE HYDROCHLORIDE 5 MG/1
TABLET ORAL
Qty: 30 TABLET | Refills: 0 | Status: SHIPPED | OUTPATIENT
Start: 2023-02-07 | End: 2023-02-14

## 2023-02-07 RX ORDER — OXYCODONE HYDROCHLORIDE 5 MG/1
5 TABLET ORAL EVERY 4 HOURS PRN
Qty: 42 TABLET | Refills: 0 | Status: CANCELLED | OUTPATIENT
Start: 2023-02-07 | End: 2023-02-14

## 2023-02-07 RX ORDER — OXYCODONE HYDROCHLORIDE 5 MG/1
TABLET ORAL
Qty: 30 TABLET | Refills: 0 | Status: CANCELLED | OUTPATIENT
Start: 2023-02-07 | End: 2023-02-14

## 2023-02-07 RX ADMIN — LOSARTAN POTASSIUM 50 MG: 50 TABLET, FILM COATED ORAL at 05:23

## 2023-02-07 RX ADMIN — ACETAMINOPHEN 1000 MG: 500 TABLET, FILM COATED ORAL at 13:30

## 2023-02-07 RX ADMIN — DOCUSATE SODIUM 100 MG: 100 CAPSULE, LIQUID FILLED ORAL at 05:23

## 2023-02-07 RX ADMIN — LEVOTHYROXINE SODIUM 88 MCG: 0.09 TABLET ORAL at 05:03

## 2023-02-07 RX ADMIN — CEFAZOLIN 2 G: 2 INJECTION, POWDER, FOR SOLUTION INTRAMUSCULAR; INTRAVENOUS at 05:18

## 2023-02-07 RX ADMIN — CIPROFLOXACIN HYDROCHLORIDE 500 MG: 500 TABLET, FILM COATED ORAL at 05:23

## 2023-02-07 RX ADMIN — OXYCODONE 5 MG: 5 TABLET ORAL at 11:49

## 2023-02-07 RX ADMIN — PREGABALIN 75 MG: 75 CAPSULE ORAL at 05:23

## 2023-02-07 RX ADMIN — FLUTICASONE PROPIONATE 50 MCG: 50 SPRAY, METERED NASAL at 05:23

## 2023-02-07 RX ADMIN — LIOTHYRONINE SODIUM 5 MCG: 5 TABLET ORAL at 05:03

## 2023-02-07 RX ADMIN — ACETAMINOPHEN 1000 MG: 500 TABLET, FILM COATED ORAL at 05:23

## 2023-02-07 ASSESSMENT — GAIT ASSESSMENTS
GAIT LEVEL OF ASSIST: SUPERVISED
DISTANCE (FEET): 100
DEVIATION: BRADYKINETIC;DECREASED HEEL STRIKE;DECREASED TOE OFF

## 2023-02-07 ASSESSMENT — COGNITIVE AND FUNCTIONAL STATUS - GENERAL
MOVING FROM LYING ON BACK TO SITTING ON SIDE OF FLAT BED: A LITTLE
SUGGESTED CMS G CODE MODIFIER MOBILITY: CJ
SUGGESTED CMS G CODE MODIFIER DAILY ACTIVITY: CH
CLIMB 3 TO 5 STEPS WITH RAILING: A LITTLE
MOVING TO AND FROM BED TO CHAIR: A LITTLE
MOBILITY SCORE: 21
DAILY ACTIVITIY SCORE: 24

## 2023-02-07 ASSESSMENT — PAIN DESCRIPTION - PAIN TYPE
TYPE: ACUTE PAIN
TYPE: SURGICAL PAIN
TYPE: ACUTE PAIN

## 2023-02-07 ASSESSMENT — ACTIVITIES OF DAILY LIVING (ADL): TOILETING: INDEPENDENT

## 2023-02-07 NOTE — THERAPY
"Physical Therapy   Initial Evaluation     Patient Name: Sheyla Gauthier  Age:  82 y.o., Sex:  female  Medical Record #: [id number]  Today's Date: 2/7/2023     Precautions  Precautions: Fall Risk;Spinal / Back Precautions   Comments: no brace    Assessment  Patient is 82 y.o. female who was admitted for an elective spine surgery. Pt is s/p L4-5 and L5-S1 removal of spaces as well as facetectomy and B foraminotomy. PMH significant for anemia, cervical stenosis, migraines, osteoporosis, arthritis, JOHN, and CAD.    Pt received seated EOB and agreeable to PT evaluation. Pt received post-op lumbar spine handout and was educated on precautions, outpatient PT follow up, and activity recommendations for return home. Pt was able to mobilize at a supervised level with no assistive device. Pt reports having a FWW and SPC at home if needed. Pt is not in need of further acute PT services.    Plan    Physical Therapy Initial Treatment Plan   Duration: Evaluation only    DC Equipment Recommendations: None  Discharge Recommendations: Recommend outpatient physical therapy services to address higher level deficits       Subjective    \"I feel great!\"     Objective       02/07/23 0931   Precautions   Precautions Fall Risk;Spinal / Back Precautions    Comments no brace   Pain 0 - 10 Group   Therapist Pain Assessment Post Activity Pain Same as Prior to Activity;Nurse Notified  (no specific c/o pain)   Prior Living Situation   Prior Services None   Housing / Facility 1 Story House   Steps Into Home 1   Steps In Home 0   Equipment Owned Front-Wheel Walker;Single Point Cane   Lives with - Patient's Self Care Capacity Alone and Able to Care For Self   Comments Pt reports her daughter is in town until next week and then her other daughter will be there after than to assist her as needed.   Prior Level of Functional Mobility   Comments Independent with no DME   History of Falls   History of Falls No   Cognition    Level of Consciousness Alert "   Comments Very pleasant and cooperative   Passive ROM Lower Body   Passive ROM Lower Body WDL   Active ROM Lower Body    Active ROM Lower Body  WDL   Strength Lower Body   Comments Grossly 4/5 in BLE   Sensation Lower Body   Comments Denies LE sensory changes   Lower Body Muscle Tone   Lower Body Muscle Tone  WDL   Coordination Lower Body    Coordination Lower Body  WDL   Balance Assessment   Sitting Balance (Static) Fair +   Sitting Balance (Dynamic) Fair +   Standing Balance (Static) Fair   Standing Balance (Dynamic) Fair   Weight Shift Sitting Good   Weight Shift Standing Fair   Comments no AD   Bed Mobility    Comments up EOB, denies concerns with log roll technique as she has practiced it prior   Gait Analysis   Gait Level Of Assist Supervised   Assistive Device None   Distance (Feet) 100   # of Times Distance was Traveled 1   Deviation Bradykinetic;Decreased Heel Strike;Decreased Toe Off   # of Stairs Climbed 2   Level of Assist with Stairs Standby Assist   Comments Cues for upright posture and improved gait speed. Pt mildly guarded.   Functional Mobility   Sit to Stand Supervised   Bed, Chair, Wheelchair Transfer Supervised   Transfer Method Stand Step   Mobility up with no AD   How much difficulty does the patient currently have...   Turning over in bed (including adjusting bedclothes, sheets and blankets)? 4   Sitting down on and standing up from a chair with arms (e.g., wheelchair, bedside commode, etc.) 3   Moving from lying on back to sitting on the side of the bed? 3   How much help from another person does the patient currently need...   Moving to and from a bed to a chair (including a wheelchair)? 4   Need to walk in a hospital room? 4   Climbing 3-5 steps with a railing? 3   6 clicks Mobility Score 21   Education Group   Education Provided Spine Precautions;Role of Physical Therapist   Spine Precautions Patient Response Patient;Acceptance;Explanation;Handout;Verbal Demonstration   Role of Physical  Therapist Patient Response Patient;Acceptance;Explanation;Verbal Demonstration   Physical Therapy Initial Treatment Plan    Duration Evaluation only   Anticipated Discharge Equipment and Recommendations   DC Equipment Recommendations None   Discharge Recommendations Recommend outpatient physical therapy services to address higher level deficits   Interdisciplinary Plan of Care Collaboration   IDT Collaboration with  Nursing;Occupational Therapist   Patient Position at End of Therapy Seated;Call Light within Reach;Tray Table within Reach;Phone within Reach   Collaboration Comments RN updated

## 2023-02-07 NOTE — ANESTHESIA POSTPROCEDURE EVALUATION
Patient: Sheyla Gauthier    Procedure Summary     Date: 02/06/23 Room / Location: [facility] OR 05 / SURGERY [facility]    Anesthesia Start: 1346 Anesthesia Stop: 1538    Procedures:       POSTERIOR REMOVAL OF INTERSPINOUS DEVICE WITH L4-S1 LAMINECTOMY (Spine Lumbar)      REMOVAL, HARDWARE (Spine Lumbar) Diagnosis: (SPINAL STENOSIS OF LUMBAR REGION)    Surgeons: Mannie Connor M.D. Responsible Provider: Agustín Hi M.D.    Anesthesia Type: general ASA Status: 3          Final Anesthesia Type: general  Last vitals  BP   Blood Pressure : 138/62    Temp   36.9 °C (98.5 °F)    Pulse   73   Resp   16    SpO2   100 %      Anesthesia Post Evaluation    Patient location during evaluation: PACU  Patient participation: complete - patient participated  Level of consciousness: awake and alert    Airway patency: patent  Anesthetic complications: no  Cardiovascular status: hemodynamically stable  Respiratory status: acceptable  Hydration status: euvolemic    PONV: none          No notable events documented.     Nurse Pain Score: 0 (NPRS)

## 2023-02-07 NOTE — PROGRESS NOTES
4 Eyes Skin Assessment Completed by PAOLA Johnson and PAOLA Clemens.    Head WDL  Ears WDL  Nose WDL  Mouth WDL  Neck WDL  Breast/Chest WDL  Shoulder Blades WDL  Spine Incision  (R) Arm/Elbow/Hand Bruising  (L) Arm/Elbow/Hand Bruising  Abdomen birthmark  Groin WDL  Scrotum/Coccyx/Buttocks WDL  (R) Leg WDL  (L) Leg WDL  (R) Heel/Foot/Toe WDL  (L) Heel/Foot/Toe WDL          Devices In Places Pulse Ox, SCD's, and Nasal Cannula      Interventions In Place NC W/Ear Foams    Possible Skin Injury No    Pictures Uploaded Into Epic N/A  Wound Consult Placed N/A  RN Wound Prevention Protocol Ordered No

## 2023-02-07 NOTE — CARE PLAN
The patient is Stable - Low risk of patient condition declining or worsening    Shift Goals  Clinical Goals: pain mgmt, safety, mobility  Patient Goals: rest, comfort  Family Goals: not present    Progress made toward(s) clinical / shift goals:        Problem: Knowledge Deficit - Standard  Goal: Patient and family/care givers will demonstrate understanding of plan of care, disease process/condition, diagnostic tests and medications  2/7/2023 0149 by Elizabeth Hwang R.N.  Outcome: Progressing  2/7/2023 0149 by Elizabeth Hwang R.N.  Outcome: Progressing     Problem: Pain - Standard  Goal: Alleviation of pain or a reduction in pain to the patient’s comfort goal  Outcome: Progressing       Patient is not progressing towards the following goals:

## 2023-02-07 NOTE — PROGRESS NOTES
D/C instructions and upcoming appointments discussed with pt.  PIV removed. Pt ambulatory and discharged with family member.

## 2023-02-07 NOTE — DISCHARGE INSTRUCTIONS
Discharge Instructions    Discharged to home by car with relative. Discharged via wheelchair, hospital escort: Yes.  Special equipment needed: Not Applicable    Be sure to schedule a follow-up appointment with your primary care doctor or any specialists as instructed.     Discharge Plan:   Diet Plan: Discussed  Activity Level: Discussed  Confirmed Follow up Appointment: Appointment Scheduled  Confirmed Symptoms Management: Discussed  Medication Reconciliation Updated: Yes  Influenza Vaccine Indication: Patient Refuses    I understand that a diet low in cholesterol, fat, and sodium is recommended for good health. Unless I have been given specific instructions below for another diet, I accept this instruction as my diet prescription.   Other diet: reg    Special Instructions: None    -Is this patient being discharged with medication to prevent blood clots?  No    Is patient discharged on Warfarin / Coumadin?   No       Laminectomy, Care After  This sheet gives you information about how to care for yourself after your procedure. Your health care provider may also give you more specific instructions. If you have problems or questions, contact your health care provider.  What can I expect after the procedure?  After the procedure, it is common to have:  Some pain around your incision area.  Muscle tightening (spasms) across the back.  Follow these instructions at home:  Incision care    Follow instructions from your health care provider about how to take care of your incision area. Make sure you:  Wash your hands with soap and water before and after you apply medicine to the area or change your bandage (dressing). If soap and water are not available, use hand .  Change your dressing as told by your health care provider.  May remove dressing post-op day 2 (February 8).    Leave stitches (sutures), skin glue, or adhesive strips in place. These skin closures may need to stay in place for 2 weeks or longer. If  adhesive strip edges start to loosen and curl up, you may trim the loose edges. Do not remove adhesive strips completely unless your health care provider tells you to do that.  Check your incision area every day for signs of infection. Check for:  More redness, swelling, or pain.  More fluid or blood.  Warmth.  Pus or a bad smell.  Medicines  Take over-the-counter and prescription medicines only as told by your health care provider.  If you were prescribed an antibiotic medicine, use it as told by your health care provider. Do not stop using the antibiotic even if you start to feel better.  Bathing  Do not take baths, swim, or use a hot tub for 2 weeks, or until your incision has healed completely.  If your health care provider approves, you may take showers after your dressing has been removed.  Activity    Return to your normal activities as told by your health care provider. Ask your health care provider what activities are safe for you.  Avoid bending or twisting at your waist. Always bend at your knees.  Do not sit for more than 20-30 minutes at a time. Lie down or walk between periods of sitting.  Do not lift anything that is heavier than 10 lb (4.5 kg) or the limit that your health care provider tells you, until he or she says that it is safe.  Do not drive for 2 weeks after your procedure or for as long as your health care provider tells you.  Do not drive or use heavy machinery while taking prescription pain medicine.  General instructions  To prevent or treat constipation while you are taking prescription pain medicine, your health care provider may recommend that you:  Drink enough fluid to keep your urine clear or pale yellow.  Take over-the-counter or prescription medicines.  Eat foods that are high in fiber, such as fresh fruits and vegetables, whole grains, and beans.  Limit foods that are high in fat and processed sugars, such as fried and sweet foods.  Do breathing exercises as told.  Keep all  follow-up visits as told by your health care provider. This is important.  Contact a health care provider if:  You have more redness, swelling, or pain around your incision area.  Your incision feels warm to the touch.  You are not able to return to activities or do exercises as told by your health care provider.  Get help right away if:  You have:  More fluid or blood coming from your incision area.  Pus or a bad smell coming from your incision area.  Chills or a fever.  Episodes of dizziness or fainting while standing.  You develop a rash.  You develop shortness of breath or you have difficulty breathing.  You cannot control when you urinate or have a bowel movement.  You become weak.  You are not able to use your legs.  Summary  After the procedure, it is common to have some pain around your incision area. You may also have muscle tightening (spasms) across the back.  Follow instructions from your health care provider about how to care for your incision.  Do not lift anything that is heavier than 10 lb (4.5 kg) or the limit that your health care provider tells you, until he or she says that it is safe.  Contact your health care provider if you have more redness, swelling, or pain around your incision area or if your incision feels warm to the touch. These can be signs of infection.  This information is not intended to replace advice given to you by your health care provider. Make sure you discuss any questions you have with your health care provider.  Document Released: 07/07/2006 Document Revised: 11/30/2018 Document Reviewed: 06/04/2017  [employer] Patient Education © 2020 [employer]

## 2023-02-07 NOTE — THERAPY
Occupational Therapy   Initial Evaluation     Patient Name: Sheyla Gauthier  Age:  82 y.o., Sex:  female  Medical Record #: [id number]  Today's Date: 2/7/2023    Precautions: (P) Spinal / Back Precautions   Comments: (P) no brace per chart    Assessment  Patient is 82 year old female  seen s/p L4-S1 laminectomy for OT evaluation. Pt demonstrated supervision level for low body dressing, standing G/H, and ADLtxfs with mod verbal cues for spinal precautions. Provided education to pt regarding ADLs while maintaining neutral spine position, brace wear and care, compensatory strategies for IADLs, pacing of activities, and recommended AD. Pt receptive and verbalized understanding and agreement. Adult dtrs able to assist during post op recovery period. No further OT needs indicated at this time.       Plan    Occupational Therapy Initial Treatment Plan   Duration: (P) Evaluation only    DC Equipment Recommendations: (P) None  Discharge Recommendations: (P) Anticipate that the patient will have no further occupational therapy needs after discharge from the hospital        02/07/23 0902   Prior Living Situation   Housing / Facility [address]   Bathroom Set up Walk In Shower   Equipment Owned Front-Wheel Walker;Single Point Cane   Lives with - Patient's Self Care Capacity Alone and Able to Care For Self   Comments Plans to have adult children stay with her the next 2-3 weeks post op   Prior Level of ADL Function   Self Feeding Independent   Grooming / Hygiene Independent   Bathing Independent   Dressing Independent   Toileting Independent   Precautions   Precautions Spinal / Back Precautions    Comments no brace per chart   Cognition    Cognition / Consciousness WDL   Comments very pleasant and receptive   Active ROM Upper Body   Active ROM Upper Body  WDL   Strength Upper Body   Upper Body Strength  WDL   Balance Assessment   Sitting Balance (Static) Fair +   Sitting Balance (Dynamic) Fair +   Standing Balance (Static)  Fair   Standing Balance (Dynamic) Fair   Weight Shift Sitting Good   Weight Shift Standing Fair   Comments without AD   Bed Mobility    Comments up EOB reports no concers with log roll, taught in op PT   ADL Assessment   Grooming Supervision   Lower Body Dressing Supervision   Toileting Supervision   How much help from another person does the patient currently need...   6 Clicks Daily Activity Score 24   Functional Mobility   Sit to Stand Supervised   Bed, Chair, Wheelchair Transfer Supervised   Toilet Transfers Supervised   Patient / Family Goals   Patient / Family Goal #1 to go home   Education Group   Role of Occupational Therapist Patient Response Patient;Acceptance;Explanation   Occupational Therapy Initial Treatment Plan    Duration Evaluation only   Anticipated Discharge Equipment and Recommendations   DC Equipment Recommendations None   Discharge Recommendations Anticipate that the patient will have no further occupational therapy needs after discharge from the hospital

## 2023-02-07 NOTE — DISCHARGE PLANNING
TCN DCE chart review. Monitoring for developments in discharge disposition and will round back to obtain choice as indicated. Per CM, no choice needed at this time.Thank you.

## 2023-02-07 NOTE — OR NURSING
1535 Patient arrived to PACU from OR.  Report from anesthesia and RN.  Patient is awake, oral airway removed.  Dressing to lower back is clean, dry and soft.  Hemovac in place.  Patient updated on plan of care.  1610 Patient arouses to voice, states minimal pain in legs, numbness in improved.  Denies pain in back.  Belongings returned to patient.  1555 Daughter Luh called and updated on patient status.  1615 Report called to Maida RN.    1625 Patient transferred to [place] [id number] via bed with transport.

## 2023-02-07 NOTE — PROGRESS NOTES
Admission assessment completed, patient is alert and oriented, able to ambulate with stand by assist to restroom, denies pain and discomfort, incision site is CDI, tolerating diet.

## 2023-02-07 NOTE — DISCHARGE PLANNING
Meds-to-Beds: Discharge prescription orders listed below delivered to patient's bedside PAOLA Lester. Patient counseled. Patient elected to have co-payment billed to patient account.       Current Outpatient Medications   Medication Sig Dispense Refill    oxyCODONE immediate-release (ROXICODONE) 5 MG Tab Take 1 tablet every 4-6 hours by oral route as needed for 5 days. 30 Tablet 0    tizanidine (ZANAFLEX) 2 MG tablet Take 1 tablet every 8 hours by oral route as needed. 40 Tablet 0      Zabrina Gan, PharmD

## 2023-02-07 NOTE — OP REPORT
DATE OF SERVICE:  02/06/2023     PREOPERATIVE DIAGNOSES:  1.  Lumbar stenosis.  2.  Status post placement of interspinous spacers with persistence of lumbar   radiculopathy and low back pain.     POSTOPERATIVE DIAGNOSES:  1.  Lumbar stenosis.  2.  Status post placement of interspinous spacers with persistence of lumbar   radiculopathy and low back pain.     PROCEDURES:  1.  Exposure and removal of interspinous spacers at L4-L5 and L5-S1.  2.  L4-L5 laminectomy with medial facetectomy and bilateral foraminotomy.  3.  L5-S1 lumbar laminectomy, medial facetectomy, and bilateral foraminotomy.     SURGEON:  Mannie Connor MD     ASSISTANT:  MICAELA Jaeger     ANESTHESIA:  General anesthetic.     ANESTHESIOLOGIST:  Agustín Hi MD     PREPARATION:  Routine.     INDICATION:  An 82-year-old female presents with low back pain and persistent   bilateral lumbar radiculopathy, left greater than right.  She has had an   interspinous spacers placed in a minimally invasive fashion by another surgeon   and she did get some relief, but the left leg pain has been persistent and   now starting to go to the right leg and she gets neurogenic claudication when   walking.  MRI examination showed significant lumbar stenosis at L4-L5 and to a   lesser degree at L5-S1.  The indications and possible complications of the   procedure were explained to the patient as above.  Informed consent was   obtained.     NEED FOR ASSISTANCE DURING THIS PROCEDURE:  We had to drill the medial facets   and protect a portion of the thecal sac in the interim.  As we went to L4-L5   and L5-S1, decompressing the lateral recess, exposing the dura further and   obtaining meticulous hemostasis required assistance during this procedure.     DESCRIPTION OF PROCEDURE:  The patient was brought to the operating room and   following induction, she was intubated and placed under general anesthetic.    Rolled prone onto the OSI table using the Leonel frame, all  pressure points   were padded, sequential stockings were started.  Back was prepped and draped   in sterile fashion.  Proposed incision site was injected with Marcaine 0.25%   with epinephrine.  Midline incision was made and carried down to subcutaneous   tissue after timeout.  We dissected over the instrumentation at L4-L5 and   L5-S1 and placed retractors to maintain exposure.  Superficial and deep   Marcaine 0.5% with epinephrine was injected.     The spinous process of inferior L3 and a portion of L4 was removed, exposing   the L4-L5 interspinous spacer.  We subsequently removed the remainder of the   spinous process of L5 and a portion of S1.  We drilled the spinous processes   down beneath the spacers and releasing them and also using monopolar cautery   to release the scar tissue.  Spacers were removed with some difficulty, but   eventually removed.  It was found that she had a portion of the L4 spinous   process fracture.     We did a resection of scar tissue at L4-L5 and L5-S1, identifying the   remaining lamina.  We drilled the junction of lamina facet to a thin shelf and   removed the center portion of the bone with Le[place]ell.  Superior facets of   L4-L5 were medially displaced and compressing along the nerve roots and this   was dissected free and removed with #3 and #4 Kerrisons from pedicle of L4 to   the pedicle of L5 with foraminotomy being performed.  We carried our   dissection inferiorly, dissecting the thecal sac free and undercutting the   facet joint with thecal sac free, and drilling the medial facet joint to a   thin shelf.  Kerrison was used to perform medial facetectomy and foraminotomy   around the pedicle of S1 and decompressing over the disk space at L5-S1.  We   did follow the L5 nerve root out as well with work being done on the superior   facet of S1 to decompress the lateral recesses and the foramina.  The area was   copiously irrigated.  Meticulous hemostasis was obtained.  We placed  an   epidural catheter and injected fentanyl 100 mcg and Solu-Medrol 125 mg into   the epidural space.  Medium size Hemovac was placed.  We then closed the   fascia with #1 Vicryl suture, subcutaneous tissue with 2-0 Vicryl and   subcuticular layer and skin with staples.  All sponge and needle counts were   correct.  Estimated blood loss was 100 mL.        ______________________________  MD SARITHA PAYNE/ROSELIA/KEEGAN    DD:  02/06/2023 15:45  DT:  02/06/2023 16:22    Job#:  [id number]

## 2023-02-07 NOTE — PROGRESS NOTES
Neurosurgery Progress Note    Subjective:  Pt doing well, wants to go home.Back mildly sore, legs good. OOB walking to br, voids    Exam:  SHERLEY, YOBANI's dressing c&d, drain 10cc recorded    BP  Min: 121/64  Max: 160/70  Pulse  Av.3  Min: 60  Max: 85  Resp  Av.2  Min: 16  Max: 174  Temp  Av.6 °C (97.9 °F)  Min: 36.1 °C (97 °F)  Max: 37.1 °C (98.8 °F)  Monitored Temp 2  Av.6 °C (97.8 °F)  Min: 36.1 °C (97 °F)  Max: 37.1 °C (98.8 °F)  SpO2  Av.6 %  Min: 89 %  Max: 100 %    No data recorded                      Intake/Output                         23 07 - 23 0659 23 - 2359      Total  Total                 Intake    I.V.  850  -- 850  --  -- --    Volume (mL) (Lactated Ringers) 850 -- 850 -- -- --    Total Intake 850 -- 850 -- -- --       Output    Urine  --  -- --  --  -- --    Number of Times Voided -- 2 x 2 x 1 x -- 1 x    Drains  10  15 25  --  -- --    Output (mL) (Closed/Suction Drain Back Hemovac) 10 15 25 -- -- --    Blood  150  -- 150  --  -- --    Est. Blood Loss 150 -- 150 -- -- --    Total Output 160 15 175 -- -- --       Net I/O     690 -15 675 -- -- --              Intake/Output Summary (Last 24 hours) at 2023 0905  Last data filed at 2023 0518  Gross per 24 hour   Intake 850 ml   Output 175 ml   Net 675 ml             atorvastatin  40 mg QHS    fluticasone  1 Spray DAILY    levothyroxine  88 mcg AM ES    liothyronine  5 mcg QAM    losartan  50 mg QAM    pregabalin  75 mg BID    Pharmacy Consult Request  1 Each PHARMACY TO DOSE    MD ALERT...DO NOT ADMINISTER NSAIDS or ASPIRIN unless ORDERED By Neurosurgery  1 Each PRN    docusate sodium  100 mg BID    polyethylene glycol/lytes  1 Packet BID PRN    magnesium hydroxide  30 mL QDAY PRN    bisacodyl  10 mg Q24HRS PRN    sodium phosphate  1 Each Once PRN    0.9 % NaCl with KCl 20 mEq 1,000 mL   Continuous    acetaminophen  1,000 mg Q8HRS    oxyCODONE  immediate-release  5 mg Q3HRS PRN    Or    oxyCODONE immediate-release  10 mg Q3HRS PRN    Or    HYDROmorphone  0.5 mg Q3HRS PRN    diphenhydrAMINE  25 mg Q6HRS PRN    Or    diphenhydrAMINE  25 mg Q6HRS PRN    ondansetron  4 mg Q4HRS PRN    ondansetron  4 mg Q4HRS PRN    tizanidine  2 mg TID PRN    labetalol  10 mg Q HOUR PRN    hydrALAZINE  10 mg Q HOUR PRN       Assessment and Plan:  POD #1   1.  Exposure and removal of interspinous spacers at L4-L5 and L5-S1.  2.  L4-L5 laminectomy with medial facetectomy and bilateral foraminotomy.  3.  L5-S1 lumbar laminectomy, medial facetectomy, and bilateral foraminotomy    NM stable  DC drain now  DC home  DC meds sent to B    Prophylactic anticoagulation: no         Start date/time: no need

## 2023-02-07 NOTE — DISCHARGE PLANNING
Care Transition Team Assessment    Met with patient at bedside, discussed plan for discharge home today, cleared by PT with no needs. Her daughter Ade who lives in [place] will stay for as long as needed and drive patient home today. Her other daughter Luh who lives in [place] is also available to help out as needed. No case management needs.     Information Source  Orientation Level: Oriented X4    Elopement Risk  Legal Hold: No  Ambulatory or Self Mobile in Wheelchair: Yes  Disoriented: No  Psychiatric Symptoms: None  History of Wandering: No  Elopement this Admit: No  Vocalizing Wanting to Leave: No  Displays Behaviors, Body Language Wanting to Leave: No-Not at Risk for Elopement  Elopement Risk: Not at Risk for Elopement    Interdisciplinary Discharge Planning  Lives with - Patient's Self Care Capacity: Alone and Able to Care For Self  Patient or legal guardian wants to designate a caregiver: No  Support Systems: Children  Housing / Facility: 1 Story House, 1 step outside.   Able to Return to Previous ADL's: Yes  Prior Services: None  Durable Medical Equipment:  (fww, cane- does not use.)    Discharge Preparedness  What is your plan after discharge?: Home with help  What are your discharge supports?: Child  Prior Functional Level: Independent with Activities of Daily Living  Difficulity with ADLs: None  Difficulity with IADLs: None    Functional Assesment  Prior Functional Level: Independent with Activities of Daily Living  Vision / Hearing Impairment  Vision Impairment : No  Right Eye Vision: Impaired, Wears Glasses  Left Eye Vision: Impaired, Wears Glasses  Hearing Impairment : No  Domestic Abuse  Have you ever been the victim of abuse or violence?: No  Physical Abuse or Sexual Abuse: No  Verbal Abuse or Emotional Abuse: No  Possible Abuse/Neglect Reported to:: Not Applicable  Discharge Risks or Barriers  Discharge risks or barriers?: No    Anticipated Discharge Information  Discharge Disposition: Discharged  to home/self care (01)

## 2023-02-13 ENCOUNTER — HOSPITAL ENCOUNTER (OUTPATIENT)
Facility: MEDICAL CENTER | Age: 83
End: 2023-02-13
Attending: NURSE PRACTITIONER
Payer: MEDICARE

## 2023-02-13 LAB
APPEARANCE UR: CLEAR
BILIRUB UR QL STRIP.AUTO: NEGATIVE
COLOR UR: YELLOW
GLUCOSE UR STRIP.AUTO-MCNC: NEGATIVE MG/DL
KETONES UR STRIP.AUTO-MCNC: NEGATIVE MG/DL
LEUKOCYTE ESTERASE UR QL STRIP.AUTO: NEGATIVE
MICRO URNS: NORMAL
NITRITE UR QL STRIP.AUTO: NEGATIVE
PH UR STRIP.AUTO: 6 [PH] (ref 5–8)
PROT UR QL STRIP: NEGATIVE MG/DL
RBC UR QL AUTO: NEGATIVE
SP GR UR STRIP.AUTO: 1.02
UROBILINOGEN UR STRIP.AUTO-MCNC: 0.2 MG/DL

## 2023-02-13 PROCEDURE — 81003 URINALYSIS AUTO W/O SCOPE: CPT

## 2023-02-13 PROCEDURE — 87086 URINE CULTURE/COLONY COUNT: CPT

## 2023-02-15 LAB
BACTERIA UR CULT: NORMAL
SIGNIFICANT IND 70042: NORMAL
SITE SITE: NORMAL
SOURCE SOURCE: NORMAL

## 2023-03-20 ENCOUNTER — HOSPITAL ENCOUNTER (OUTPATIENT)
Dept: LAB | Facility: MEDICAL CENTER | Age: 83
End: 2023-03-20
Attending: INTERNAL MEDICINE
Payer: MEDICARE

## 2023-03-20 LAB
T4 FREE SERPL-MCNC: 1.22 NG/DL (ref 0.93–1.7)
TSH SERPL DL<=0.005 MIU/L-ACNC: 0.66 UIU/ML (ref 0.38–5.33)

## 2023-03-20 PROCEDURE — 36415 COLL VENOUS BLD VENIPUNCTURE: CPT

## 2023-03-20 PROCEDURE — 84439 ASSAY OF FREE THYROXINE: CPT

## 2023-03-20 PROCEDURE — 84443 ASSAY THYROID STIM HORMONE: CPT

## 2023-04-28 ENCOUNTER — OFFICE VISIT (OUTPATIENT)
Dept: SLEEP MEDICINE | Facility: MEDICAL CENTER | Age: 83
End: 2023-04-28
Attending: STUDENT IN AN ORGANIZED HEALTH CARE EDUCATION/TRAINING PROGRAM
Payer: MEDICARE

## 2023-04-28 VITALS
RESPIRATION RATE: 14 BRPM | HEART RATE: 83 BPM | OXYGEN SATURATION: 95 % | DIASTOLIC BLOOD PRESSURE: 60 MMHG | WEIGHT: 156 LBS | SYSTOLIC BLOOD PRESSURE: 96 MMHG | BODY MASS INDEX: 28.71 KG/M2 | HEIGHT: 62 IN

## 2023-04-28 DIAGNOSIS — G47.33 OSA (OBSTRUCTIVE SLEEP APNEA): Primary | ICD-10-CM

## 2023-04-28 DIAGNOSIS — Z78.9 DIFFICULTY USING CONTINUOUS POSITIVE AIRWAY PRESSURE (CPAP) DEVICE: ICD-10-CM

## 2023-04-28 PROCEDURE — 99212 OFFICE O/P EST SF 10 MIN: CPT | Performed by: STUDENT IN AN ORGANIZED HEALTH CARE EDUCATION/TRAINING PROGRAM

## 2023-04-28 PROCEDURE — 99213 OFFICE O/P EST LOW 20 MIN: CPT | Performed by: STUDENT IN AN ORGANIZED HEALTH CARE EDUCATION/TRAINING PROGRAM

## 2023-04-28 ASSESSMENT — FIBROSIS 4 INDEX: FIB4 SCORE: 1.56

## 2023-04-28 NOTE — PROGRESS NOTES
[facility] Follow-up Visit    CC: Follow-up to discuss management of obstructive sleep apnea      HPI:  Sheyla Gauthier is a 82 y.o.female  with hypertension, hyperlipidemia, migraines, depression, hypothyroidism, and obstructive sleep apnea previously controlled with oral appliance.  Presents to sleep clinic today to discuss management of obstructive sleep apnea.    She states she continues to use a positional therapy device sleep noodle.  She states with doing so she feels that she gets a decent night sleep.  She has stopped using her oral appliance due to it no longer fitting well.    She is curious to learn more about inspire therapy.  She recently heard about inspire therapy and is interested to know if she is a candidate.    Sleep History  6/12/2019 PSG showed moderate obstructive sleep apnea with an overall AHI of 21.7 events an hour, minimum oxygen saturation 84%.  Respiratory events that were central in nature with 36% of total apneas.  6/30/2019 PSG titration study showed improvement in respiratory events with CPAP pressure of 8 cmH2O  Patient was unable to tolerate CPAP at home and transition to oral appliance for therapy  5/13/2022 HST done with oral appliance showed an overall AHI of 10.9 events an hour, minimal oxygen saturation of 79%, time at or below 88% saturation 1 hour 42 minutes    Patient Active Problem List    Diagnosis Date Noted    Lumbar stenosis with neurogenic claudication 02/06/2023    Decreased hearing of right ear 10/20/2022    Dizziness 08/03/2022    Stage 2 chronic kidney disease 04/20/2022    Arthritis 11/03/2021    Memory loss 08/20/2020    Conductive hearing loss of right ear 08/20/2020    Left wrist pain 12/19/2019    Myalgia 10/01/2019    Former smoker 07/02/2019    Other insomnia 06/14/2019    Coronary artery disease involving native coronary artery of native heart without angina pectoris 03/26/2019    Bladder prolapse, female, acquired 03/07/2018    JOHN  (obstructive sleep apnea) 02/22/2018    Age related osteoporosis 07/26/2017    Major depressive disorder 06/29/2017    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/08/2016    Spinal stenosis of lumbar region 11/14/2016    Migraine syndrome 02/11/2013    Acquired hypothyroidism 03/08/2011    HTN (hypertension) 10/21/2009    Postmenopausal hormone therapy 10/21/2009    Mixed hyperlipidemia     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HCC)        Past Medical History:   Diagnosis Date    Anxiety     Arthritis     osteoarthritis    Back pain     CAD (coronary artery disease) 10/2018    Abnormal CTCS. PCI/FARZAD (Russell 2.5 x 2mm) to the mid LAD. Cardic stent    Cataract     alex IOL    Daytime sleepiness     Depression     Dyslipidemia     Frequent headaches     Herpes zoster     High cholesterol     Hypertension     MHA (microangiopathic hemolytic anemia)     Migraine syndrome      Mumps     as a child    Osteopathia     Osteoporosis     Painful joint     Post-menopause on HRT (hormone replacement therapy)     Sleep apnea     no longer uses cpap    Snoring     Thyroid disease     hypothyroid    White matter abnormality on MRI of brain         Past Surgical History:   Procedure Laterality Date    LUMBAR LAMINECTOMY DISKECTOMY N/A 2/6/2023    Procedure: POSTERIOR REMOVAL OF INTERSPINOUS DEVICE WITH L4-S1 LAMINECTOMY;  Surgeon: Mannie Connor M.D.;  Location: SURGERY [facility];  Service: Neurosurgery    HARDWARE REMOVAL ORTHO N/A 2/6/2023    Procedure: REMOVAL, HARDWARE;  Surgeon: Mannie Connor M.D.;  Location: [facility];  Service: Neurosurgery    LAMINOTOMY  01/28/2022    L4-L5 and L5-S1 posterior fusion with instrumentation and laminotomy by Dr. Benitez.    BUNIONECTOMY Left 2003    FOOT SURGERY  2002    AR BREAST REDUCTION  1997    CHOLECYSTECTOMY  1996    ABDOMINAL HYSTERECTOMY TOTAL  11/1986    ARTHROSCOPY, KNEE      EYE SURGERY      cataracts    HYSTERECTOMY LAPAROSCOPY      MAMMOPLASTY AUGMENTATION      AR BREAST  AUGMENTATION WITH IMPLANT         Family History   Problem Relation Age of Onset    Stroke Father     Arthritis Father     Hyperlipidemia Father     Hypertension Father     Cancer Sister         breast    Arthritis Sister     Arthritis Mother     Hypertension Mother     Hyperlipidemia Mother     Stroke Mother     Arthritis Brother     Cancer Brother     Hypertension Brother     Hyperlipidemia Brother     Heart Disease Brother         CABG for prox LAD in late 40s    Cancer Daughter         breast       Social History     Socioeconomic History    Marital status:      Spouse name: Not on file    Number of children: Not on file    Years of education: Not on file    Highest education level: Not on file   Occupational History    Not on file   Tobacco Use    Smoking status: Former     Packs/day: 0.02     Types: Cigarettes     Quit date: 1979     Years since quittin.7    Smokeless tobacco: Never    Tobacco comments:     smoked off & on for 10 yrs   Vaping Use    Vaping Use: Never used   Substance and Sexual Activity    Alcohol use: Not Currently     Alcohol/week: 0.0 oz     Comment: occassional    Drug use: No    Sexual activity: Not Currently     Comment: , 3 kids, retired   Other Topics Concern    Not on file   Social History Narrative    Not on file     Social Determinants of Health     Financial Resource Strain: Not on file   Food Insecurity: Not on file   Transportation Needs: Not on file   Physical Activity: Not on file   Stress: Not on file   Social Connections: Not on file   Intimate Partner Violence: Not on file   Housing Stability: Not on file       Current Outpatient Medications   Medication Sig Dispense Refill    tizanidine (ZANAFLEX) 2 MG tablet Take 1 tablet every 8 hours by oral route as needed. 40 Tablet 0    pregabalin (LYRICA) 75 MG Cap Take 75 mg by mouth 2 times a day.      losartan (COZAAR) 50 MG Tab Take 50 mg by mouth every morning.      Menaquinone-7 (VITAMIN K2 PO) Take 1  "Tablet by mouth every evening. Unsure of dose      fluticasone (FLONASE) 50 MCG/ACT nasal spray ADMINISTER 1 SPRAY INTO AFFECTED NOSTRIL(S) EVERY DAY. 16 mL 2    atorvastatin (LIPITOR) 40 MG Tab Take 1 Tablet by mouth at bedtime. 90 Tablet 3    levothyroxine (SYNTHROID) 88 MCG Tab 1 tablet in the morning on an empty stomach      liothyronine (CYTOMEL) 5 MCG Tab Take 5 mcg by mouth every morning.      vitamin D (CHOLECALCIFEROL) 1000 UNIT Tab Take 1,000 Units by mouth every morning.      Estriol 10 % Cream Insert 1 Application into the vagina as needed.      Magnesium 400 MG Tab Take 800 mg by mouth every evening.       No current facility-administered medications for this visit.        ALLERGIES: Trazodone and Crestor [rosuvastatin calcium]    ROS  Constitutional: Denies fevers, Denies weight changes  Ears/Nose/Throat/Mouth: Denies nasal congestion or sore throat   Cardiovascular: Denies chest pain  Respiratory: Denies shortness of breath, Denies cough  Gastrointestinal/Hepatic: Denies nausea, vomiting  Sleep: see HPI      PHYSICAL EXAM  BP 96/60 (BP Location: Left arm, Patient Position: Sitting, BP Cuff Size: Large adult)   Pulse 83   Resp 14   Ht 1.575 m (5' 2\")   Wt 70.8 kg (156 lb)   LMP 11/01/1986   SpO2 95%   BMI 28.53 kg/m²   Appearance: Well-nourished, well-developed, no acute distress  Eyes:  No scleral icterus , EOMI  Musculoskeletal:  Grossly normal; gait and station normal; digits and nails normal  Skin:  No rashes, petechiae, cyanosis  Neurologic: without focal signs; oriented to person, time, place, and purpose; judgement intact      Medical Decision Making   Assessment and Plan  Sheyla Gauthier is a 82 y.o.female  with hypertension, hyperlipidemia, migraines, depression, hypothyroidism, and obstructive sleep apnea previously controlled with oral appliance.  Presents to sleep clinic today to discuss management of obstructive sleep apnea.    The medical record was reviewed.    Obstructive " sleep apnea  Diagnostic and titration nocturnal polysomnogram's, home sleep apnea tests, were reviewed with patient.  Patient does have a history of moderate obstructive sleep apnea on previous diagnostic study in 2019.  Her sleep study since then have been under treatment.  There is concern regarding central apneas on her diagnostic study which may exclude her from being a candidate for inspire.  The study was done approximately 4 years ago.  She would benefit from getting a new sleep study as it is required to have a sleep study within the last 2 years but also to reassess to see if central apneas are still present.    Reviewed inspire device.  Advised of care pathway along with surgical procedure mechanism of action.    Encouraged to continue to use positional therapy and consider restarting oral appliance or meeting with her dentist.    Given her interest in inspire and potentially being a candidate for inspire she would benefit from undergoing a in lab sleep study to assess the severity of her sleep apnea and percentage of central apneas.    PLAN:   -Order placed for PSG  -Continue positional therapy with sleep medical  -Advised to reach out via MyChart with questions     Patients with JOHN are at increased risk of cardiovascular disease including coronary artery disease, systemic arterial hypertension, pulmonary arterial hypertension, cardiac arrythmias, and stroke. The patient was advised to avoid driving a motor vehicle when drowsy.    Have advised the patient to follow up with the appropriate healthcare practitioners for all other medical problems and issues.    Return for after sleep study.      Please note portions of this record was created using voice recognition software. I have made every reasonable attempt to correct obvious errors, but I expect that there are errors of grammar and possibly content I did not discover before finalizing the note.

## 2023-05-09 ENCOUNTER — TELEPHONE (OUTPATIENT)
Dept: HEALTH INFORMATION MANAGEMENT | Facility: OTHER | Age: 83
End: 2023-05-09
Payer: MEDICARE

## 2023-05-09 ENCOUNTER — TELEPHONE (OUTPATIENT)
Dept: HEALTH INFORMATION MANAGEMENT | Facility: OTHER | Age: 83
End: 2023-05-09

## 2023-05-18 ENCOUNTER — HOSPITAL ENCOUNTER (OUTPATIENT)
Facility: MEDICAL CENTER | Age: 83
End: 2023-05-18
Attending: NURSE PRACTITIONER
Payer: MEDICARE

## 2023-05-18 LAB
APPEARANCE UR: CLEAR
BILIRUB UR QL STRIP.AUTO: NEGATIVE
COLOR UR: YELLOW
GLUCOSE UR STRIP.AUTO-MCNC: NEGATIVE MG/DL
KETONES UR STRIP.AUTO-MCNC: NEGATIVE MG/DL
LEUKOCYTE ESTERASE UR QL STRIP.AUTO: NEGATIVE
MICRO URNS: NORMAL
NITRITE UR QL STRIP.AUTO: NEGATIVE
PH UR STRIP.AUTO: 6 [PH] (ref 5–8)
PROT UR QL STRIP: NEGATIVE MG/DL
RBC UR QL AUTO: NEGATIVE
SP GR UR STRIP.AUTO: 1.01
UROBILINOGEN UR STRIP.AUTO-MCNC: 0.2 MG/DL

## 2023-05-18 PROCEDURE — 87086 URINE CULTURE/COLONY COUNT: CPT

## 2023-05-18 PROCEDURE — 81003 URINALYSIS AUTO W/O SCOPE: CPT

## 2023-05-20 LAB
BACTERIA UR CULT: NORMAL
SIGNIFICANT IND 70042: NORMAL
SITE SITE: NORMAL
SOURCE SOURCE: NORMAL

## 2023-06-06 ENCOUNTER — HOSPITAL ENCOUNTER (OUTPATIENT)
Dept: LAB | Facility: MEDICAL CENTER | Age: 83
End: 2023-06-06
Attending: INTERNAL MEDICINE
Payer: MEDICARE

## 2023-06-06 PROCEDURE — 36415 COLL VENOUS BLD VENIPUNCTURE: CPT

## 2023-06-06 PROCEDURE — 84439 ASSAY OF FREE THYROXINE: CPT

## 2023-06-06 PROCEDURE — 84443 ASSAY THYROID STIM HORMONE: CPT

## 2023-06-07 LAB
T4 FREE SERPL-MCNC: 1.42 NG/DL (ref 0.93–1.7)
TSH SERPL DL<=0.005 MIU/L-ACNC: 1.42 UIU/ML (ref 0.38–5.33)

## 2023-08-15 ENCOUNTER — TELEPHONE (OUTPATIENT)
Dept: HEALTH INFORMATION MANAGEMENT | Facility: OTHER | Age: 83
End: 2023-08-15
Payer: MEDICARE

## 2023-08-16 ENCOUNTER — DOCUMENTATION (OUTPATIENT)
Dept: HEALTH INFORMATION MANAGEMENT | Facility: OTHER | Age: 83
End: 2023-08-16
Payer: MEDICARE

## 2023-08-29 ENCOUNTER — OFFICE VISIT (OUTPATIENT)
Dept: URGENT CARE | Facility: CLINIC | Age: 83
End: 2023-08-29
Payer: MEDICARE

## 2023-08-29 VITALS
BODY MASS INDEX: 28.97 KG/M2 | DIASTOLIC BLOOD PRESSURE: 60 MMHG | RESPIRATION RATE: 18 BRPM | HEIGHT: 62 IN | OXYGEN SATURATION: 96 % | HEART RATE: 63 BPM | TEMPERATURE: 98.9 F | WEIGHT: 157.4 LBS | SYSTOLIC BLOOD PRESSURE: 114 MMHG

## 2023-08-29 DIAGNOSIS — H61.22 HEARING LOSS OF LEFT EAR DUE TO CERUMEN IMPACTION: ICD-10-CM

## 2023-08-29 PROCEDURE — 99212 OFFICE O/P EST SF 10 MIN: CPT | Performed by: FAMILY MEDICINE

## 2023-08-29 PROCEDURE — 3078F DIAST BP <80 MM HG: CPT | Performed by: FAMILY MEDICINE

## 2023-08-29 PROCEDURE — 3074F SYST BP LT 130 MM HG: CPT | Performed by: FAMILY MEDICINE

## 2023-08-29 ASSESSMENT — FIBROSIS 4 INDEX: FIB4 SCORE: 1.56

## 2023-08-29 ASSESSMENT — ENCOUNTER SYMPTOMS: FEVER: 0

## 2023-08-29 NOTE — PROGRESS NOTES
Subjective:     Sheyla Gauthier is a 82 y.o. female who presents for Ear Fullness (X5days Bilateral ear fullness/)    HPI  Pt presents for evaluation of an acute problem  Patient with bilateral ear fullness for the past few days  Fullness is constant and not improving  Did try some over-the-counter drops once this morning which did not help  Has had similar problem in the past which turned out to be cerumen impaction and resolved after an ear cleaning.  This was about 8 months ago    Review of Systems   Constitutional:  Negative for fever.   HENT:  Positive for ear pain.        PMH:  has a past medical history of Anxiety, Arthritis, Back pain, CAD (coronary artery disease) (10/2018), Cataract, Daytime sleepiness, Depression, Dyslipidemia, Frequent headaches, Herpes zoster, High cholesterol, Hypertension, MHA (microangiopathic hemolytic anemia), Migraine syndrome ( ), Mumps, Osteopathia, Osteoporosis, Painful joint, Post-menopause on HRT (hormone replacement therapy), Sleep apnea, Snoring, Thyroid disease, and White matter abnormality on MRI of brain.  MEDS:   Current Outpatient Medications:     tizanidine (ZANAFLEX) 2 MG tablet, Take 1 tablet every 8 hours by oral route as needed., Disp: 40 Tablet, Rfl: 0    pregabalin (LYRICA) 75 MG Cap, Take 75 mg by mouth 2 times a day., Disp: , Rfl:     losartan (COZAAR) 50 MG Tab, Take 50 mg by mouth every morning., Disp: , Rfl:     Menaquinone-7 (VITAMIN K2 PO), Take 1 Tablet by mouth every evening. Unsure of dose, Disp: , Rfl:     fluticasone (FLONASE) 50 MCG/ACT nasal spray, ADMINISTER 1 SPRAY INTO AFFECTED NOSTRIL(S) EVERY DAY., Disp: 16 mL, Rfl: 2    atorvastatin (LIPITOR) 40 MG Tab, Take 1 Tablet by mouth at bedtime., Disp: 90 Tablet, Rfl: 3    levothyroxine (SYNTHROID) 88 MCG Tab, 1 tablet in the morning on an empty stomach, Disp: , Rfl:     liothyronine (CYTOMEL) 5 MCG Tab, Take 5 mcg by mouth every morning., Disp: , Rfl:     vitamin D (CHOLECALCIFEROL) 1000 UNIT  "Tab, Take 1,000 Units by mouth every morning., Disp: , Rfl:     Estriol 10 % Cream, Insert 1 Application into the vagina as needed., Disp: , Rfl:     Magnesium 400 MG Tab, Take 800 mg by mouth every evening., Disp: , Rfl:   ALLERGIES:   Allergies   Allergen Reactions    Trazodone Swelling     Pt stated that she had swelling on her hands and feet's     Crestor [Rosuvastatin Calcium]      Myalgia      SURGHX:   Past Surgical History:   Procedure Laterality Date    LUMBAR LAMINECTOMY DISKECTOMY N/A 2/6/2023    Procedure: POSTERIOR REMOVAL OF INTERSPINOUS DEVICE WITH L4-S1 LAMINECTOMY;  Surgeon: Mannie Connor M.D.;  Location: SURGERY [facility];  Service: Neurosurgery    HARDWARE REMOVAL ORTHO N/A 2/6/2023    Procedure: REMOVAL, HARDWARE;  Surgeon: Mannie Connor M.D.;  Location: SURGERY [facility];  Service: Neurosurgery    LAMINOTOMY  01/28/2022    L4-L5 and L5-S1 posterior fusion with instrumentation and laminotomy by Dr. Benitez.    BUNIONECTOMY Left 2003    FOOT SURGERY  2002    MD BREAST REDUCTION  1997    CHOLECYSTECTOMY  1996    ABDOMINAL HYSTERECTOMY TOTAL  11/1986    ARTHROSCOPY, KNEE      EYE SURGERY      cataracts    HYSTERECTOMY LAPAROSCOPY      MAMMOPLASTY AUGMENTATION      MD BREAST AUGMENTATION WITH IMPLANT       SOCHX:  reports that she quit smoking about 44 years ago. Her smoking use included cigarettes. She has never used smokeless tobacco. She reports current alcohol use. She reports that she does not use drugs.     Objective:   /60 (BP Location: Left arm)   Pulse 63   Temp 37.2 °C (98.9 °F) (Temporal)   Resp 18   Ht 1.575 m (5' 2\")   Wt 71.4 kg (157 lb 6.4 oz)   LMP 11/01/1986   SpO2 96%   BMI 28.79 kg/m²     Physical Exam  Constitutional:       General: She is not in acute distress.     Appearance: She is well-developed. She is not diaphoretic.   HENT:      Right Ear: External ear normal.      Left Ear: External ear normal.      Ears:      Comments: Left ear canal initially with cerumen " impaction, after gentle lavage, impaction cleared and tympanic membrane appears clear with no effusion  Pulmonary:      Effort: Pulmonary effort is normal.   Neurological:      Mental Status: She is alert.       Assessment/Plan:   Assessment    1. Hearing loss of left ear due to cerumen impaction    Patient with cerumen impaction.  After clearing, symptoms greatly improved.  Has no sign of otitis media or other infection.  Reviewed over-the-counter medications she can use in the future to manage earwax.  Follow-up in the urgent care as needed.

## 2023-08-30 ENCOUNTER — OFFICE VISIT (OUTPATIENT)
Dept: MEDICAL GROUP | Age: 83
End: 2023-08-30
Payer: MEDICARE

## 2023-08-30 VITALS
DIASTOLIC BLOOD PRESSURE: 54 MMHG | SYSTOLIC BLOOD PRESSURE: 98 MMHG | TEMPERATURE: 98.1 F | OXYGEN SATURATION: 95 % | HEIGHT: 62 IN | HEART RATE: 69 BPM | BODY MASS INDEX: 28.89 KG/M2 | WEIGHT: 157 LBS

## 2023-08-30 DIAGNOSIS — E55.9 VITAMIN D DEFICIENCY: ICD-10-CM

## 2023-08-30 DIAGNOSIS — M80.00XD AGE-RELATED OSTEOPOROSIS WITH CURRENT PATHOLOGICAL FRACTURE WITH ROUTINE HEALING, SUBSEQUENT ENCOUNTER: ICD-10-CM

## 2023-08-30 DIAGNOSIS — D59.4: ICD-10-CM

## 2023-08-30 DIAGNOSIS — I10 PRIMARY HYPERTENSION: ICD-10-CM

## 2023-08-30 DIAGNOSIS — E78.2 MIXED HYPERLIPIDEMIA: ICD-10-CM

## 2023-08-30 DIAGNOSIS — F32.4 MAJOR DEPRESSIVE DISORDER WITH SINGLE EPISODE, IN PARTIAL REMISSION (HCC): ICD-10-CM

## 2023-08-30 DIAGNOSIS — E03.9 ACQUIRED HYPOTHYROIDISM: ICD-10-CM

## 2023-08-30 DIAGNOSIS — G47.33 OSA (OBSTRUCTIVE SLEEP APNEA): Chronic | ICD-10-CM

## 2023-08-30 DIAGNOSIS — Z00.00 MEDICARE ANNUAL WELLNESS VISIT, INITIAL: ICD-10-CM

## 2023-08-30 DIAGNOSIS — E78.00 PURE HYPERCHOLESTEROLEMIA: ICD-10-CM

## 2023-08-30 PROCEDURE — 3074F SYST BP LT 130 MM HG: CPT | Performed by: FAMILY MEDICINE

## 2023-08-30 PROCEDURE — G0439 PPPS, SUBSEQ VISIT: HCPCS | Performed by: FAMILY MEDICINE

## 2023-08-30 PROCEDURE — 3078F DIAST BP <80 MM HG: CPT | Performed by: FAMILY MEDICINE

## 2023-08-30 RX ORDER — SPIRONOLACTONE 25 MG/1
25 TABLET ORAL DAILY
Status: ON HOLD | COMMUNITY
End: 2023-11-21 | Stop reason: SDUPTHER

## 2023-08-30 ASSESSMENT — PATIENT HEALTH QUESTIONNAIRE - PHQ9
4. FEELING TIRED OR HAVING LITTLE ENERGY: NOT AT ALL
1. LITTLE INTEREST OR PLEASURE IN DOING THINGS: NOT AT ALL
6. FEELING BAD ABOUT YOURSELF - OR THAT YOU ARE A FAILURE OR HAVE LET YOURSELF OR YOUR FAMILY DOWN: NOT AL ALL
SUM OF ALL RESPONSES TO PHQ QUESTIONS 1-9: 0
9. THOUGHTS THAT YOU WOULD BE BETTER OFF DEAD, OR OF HURTING YOURSELF: NOT AT ALL
2. FEELING DOWN, DEPRESSED, IRRITABLE, OR HOPELESS: NOT AT ALL
CLINICAL INTERPRETATION OF PHQ2 SCORE: 0
3. TROUBLE FALLING OR STAYING ASLEEP OR SLEEPING TOO MUCH: NOT AT ALL
7. TROUBLE CONCENTRATING ON THINGS, SUCH AS READING THE NEWSPAPER OR WATCHING TELEVISION: NOT AT ALL
8. MOVING OR SPEAKING SO SLOWLY THAT OTHER PEOPLE COULD HAVE NOTICED. OR THE OPPOSITE, BEING SO FIGETY OR RESTLESS THAT YOU HAVE BEEN MOVING AROUND A LOT MORE THAN USUAL: NOT AT ALL
SUM OF ALL RESPONSES TO PHQ9 QUESTIONS 1 AND 2: 0
5. POOR APPETITE OR OVEREATING: NOT AT ALL

## 2023-08-30 ASSESSMENT — FIBROSIS 4 INDEX: FIB4 SCORE: 1.56

## 2023-08-30 ASSESSMENT — ACTIVITIES OF DAILY LIVING (ADL): BATHING_REQUIRES_ASSISTANCE: 0

## 2023-08-30 ASSESSMENT — ENCOUNTER SYMPTOMS: GENERAL WELL-BEING: GOOD

## 2023-08-30 NOTE — PROGRESS NOTES
Chief Complaint   Patient presents with    Annual Exam     annual       HPI:  Sheyla is a 82 y.o. here for Medicare Annual Wellness Visit        Patient Active Problem List    Diagnosis Date Noted    Lumbar stenosis with neurogenic claudication 02/06/2023    Decreased hearing of right ear 10/20/2022    Dizziness 08/03/2022    Stage 2 chronic kidney disease 04/20/2022    Arthritis 11/03/2021    Memory loss 08/20/2020    Conductive hearing loss of right ear 08/20/2020    Left wrist pain 12/19/2019    Myalgia 10/01/2019    Former smoker 07/02/2019    Other insomnia 06/14/2019    Coronary artery disease involving native coronary artery of native heart without angina pectoris 03/26/2019    Bladder prolapse, female, acquired 03/07/2018    JOHN (obstructive sleep apnea) 02/22/2018    Age related osteoporosis 07/26/2017    Major depressive disorder 06/29/2017    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/08/2016    Spinal stenosis of lumbar region 11/14/2016    Migraine syndrome 02/11/2013    Acquired hypothyroidism 03/08/2011    HTN (hypertension) 10/21/2009    Postmenopausal hormone therapy 10/21/2009    Mixed hyperlipidemia     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HCC)        Current Outpatient Medications   Medication Sig Dispense Refill    spironolactone (ALDACTONE) 25 MG Tab Take 25 mg by mouth every day.      pregabalin (LYRICA) 75 MG Cap Take 75 mg by mouth 2 times a day.      losartan (COZAAR) 50 MG Tab Take 50 mg by mouth every morning.      fluticasone (FLONASE) 50 MCG/ACT nasal spray ADMINISTER 1 SPRAY INTO AFFECTED NOSTRIL(S) EVERY DAY. 16 mL 2    atorvastatin (LIPITOR) 40 MG Tab Take 1 Tablet by mouth at bedtime. 90 Tablet 3    levothyroxine (SYNTHROID) 88 MCG Tab 1 tablet in the morning on an empty stomach      liothyronine (CYTOMEL) 5 MCG Tab Take 5 mcg by mouth every morning.      Estriol 10 % Cream Insert 1 Application into the vagina as needed.      Magnesium 400 MG Tab Take 800 mg by mouth every evening.        No current facility-administered medications for this visit.        Patient is taking medications as noted in medication list.  Current supplements as per medication list.     Allergies: Trazodone and Crestor [rosuvastatin calcium]    Current social contact/activities: book club, play board games with friends.     Is patient current with immunizations? Yes.    She  reports that she quit smoking about 44 years ago. Her smoking use included cigarettes. She has never used smokeless tobacco. She reports current alcohol use. She reports that she does not use drugs.  Counseling given: Not Answered  Tobacco comments: smoked off & on for 10 yrs      ROS:    Gait: Uses a walker and cane   Ostomy: No   Other tubes: No   Amputations: No   Chronic oxygen use No   Last eye exam: 5/2023  Wears hearing aids: No   : Reports urinary leakage during the last 6 months that has not interfered at all with their daily activities or sleep.    Screening:    Depression Screening  Little interest or pleasure in doing things?  0 - not at all  Feeling down, depressed, or hopeless? 0 - not at all  Patient Health Questionnaire Score: 0    If depressive symptoms identified deferred to follow up visit unless specifically addressed in assessment and plan.    Interpretation of PHQ-9 Total Score   Score Severity   1-4 No Depression   5-9 Mild Depression   10-14 Moderate Depression   15-19 Moderately Severe Depression   20-27 Severe Depression    Screening for Cognitive Impairment  Three Minute Recall (daughter, heaven, mountain)  2/3    Edwin clock face with all 12 numbers and set the hands to show 10 past 11.  Yes    If cognitive concerns identified, deferred for follow up unless specifically addressed in assessment and plan.    Fall Risk Assessment  Has the patient had two or more falls in the last year or any fall with injury in the last year?  No  If fall risk identified, deferred for follow up unless specifically addressed in assessment and  plan.    Safety Assessment  Throw rugs on floor.     Handrails on all stairs.     Good lighting in all hallways.     Difficulty hearing.  No  Patient counseled about all safety risks that were identified.    Functional Assessment ADLs  Are there any barriers preventing you from cooking for yourself or meeting nutritional needs?  No.    Are there any barriers preventing you from driving safely or obtaining transportation?  No.    Are there any barriers preventing you from using a telephone or calling for help?  No.    Are there any barriers preventing you from shopping?  No.    Are there any barriers preventing you from taking care of your own finances?  No.    Are there any barriers preventing you from managing your medications?  No.    Are there any barriers preventing you from showering, bathing or dressing yourself?  No.    Are you currently engaging in any exercise or physical activity?  Yes.     What is your perception of your health?  Good.    Advance Care Planning  Do you have an Advance Directive, Living Will, Durable Power of , or POLST? Yes  Advance Directive Living Will Durable Power of  POLST is on file    Health Maintenance Summary            Overdue - COVID-19 Vaccine (7 - Pfizer series) Overdue since 2/26/2023      10/26/2022  Imm Admin: PFIZER BIVALENT SARS-COV-2 VACCINE (12+)    04/04/2022  Imm Admin: PFIZER PURPLE CAP SARS-COV-2 VACCINATION (12+)    04/04/2022  Imm Admin: COVID-19 Vaccine, unspecified - HISTORICAL DATA    10/09/2021  Imm Admin: PFIZER PURPLE CAP SARS-COV-2 VACCINATION (12+)    02/04/2021  Imm Admin: PFIZER PURPLE CAP SARS-COV-2 VACCINATION (12+)    Only the first 5 history entries have been loaded, but more history exists.              Influenza Vaccine (1) Due soon on 9/1/2023      10/20/2022  Imm Admin: Influenza Vaccine Adult HD    10/26/2021  Imm Admin: Influenza Vaccine Adult HD    09/04/2020  Imm Admin: Influenza Vaccine Quad Inj (Pf)    09/24/2019  Imm  Admin: Influenza Vaccine Adult HD    09/19/2018  Imm Admin: Influenza Vaccine Adult HD    Only the first 5 history entries have been loaded, but more history exists.              IMM DTaP/Tdap/Td Vaccine (2 - Td or Tdap) Next due on 7/7/2024 07/07/2014  Imm Admin: Tdap Vaccine              Annual Wellness Visit (Every 366 Days) Next due on 8/30/2024 08/30/2023  Visit Dx: Medicare annual wellness visit, initial    08/30/2023  Level of Service: ANNUAL WELLNESS VISIT-INCLUDES PPPS SUBSEQUE*    08/20/2013  Done              Bone Density Scan (Every 5 Years) Tentatively due on 1/3/2025      01/03/2020  DS-BONE DENSITY STUDY (DEXA)    07/18/2017  DS-BONE DENSITY STUDY (DEXA)    06/18/2010  DS-BONE DENSITY STUDY (DEXA)              Pneumococcal Vaccine: 65+ Years (Series Information) Completed      06/29/2017  Imm Admin: Pneumococcal polysaccharide vaccine (PPSV-23)    12/14/2015  Imm Admin: Pneumococcal Conjugate Vaccine (Prevnar/PCV-13)    12/08/2015  Imm Admin: Pneumococcal Conjugate Vaccine (Prevnar/PCV-13)              Zoster (Shingles) Vaccines (Series Information) Completed      02/13/2020  Imm Admin: Zoster Vaccine Recombinant (RZV) (SHINGRIX)    09/24/2019  Imm Admin: Zoster Vaccine Recombinant (RZV) (SHINGRIX)    10/11/2018  Imm Admin: Zoster Vaccine Recombinant (RZV) (SHINGRIX)    10/26/2010  Imm Admin: Zoster Vaccine Live (ZVL) (Zostavax) - HISTORICAL DATA              Hepatitis A Vaccine (Hep A) (Series Information) Aged Out      No completion history exists for this topic.              Hepatitis B Vaccine (Hep B) (Series Information) Aged Out      No completion history exists for this topic.              HPV Vaccines (Series Information) Aged Out      No completion history exists for this topic.              Polio Vaccine (Inactivated Polio) (Series Information) Aged Out      No completion history exists for this topic.              IMM MENINGOCOCCAL ACWY VACCINE (Series Information) Aged Out       No completion history exists for this topic.              Discontinued - Mammogram  Discontinued        Frequency changed to Never automatically (Topic No Longer Applies)    10/17/2022  [id number]    2021  [id number]    10/05/2015  MA-SCREEN MAMMO W/CAD-BILAT    2014  MA-SCREENING MAMMOGRAM W/ CAD    Only the first 5 history entries have been loaded, but more history exists.              Discontinued - Cervical Cancer Screening  Discontinued        Frequency changed to Never automatically (Topic No Longer Applies)    2016  PATHOLOGY GYN SPECIMEN    2016  THINPREP PAP, REFLEX HPV ON ASC-US AND ABOVE    2010  PAP IG (IMAGE GUIDED)              Discontinued - Colorectal Cancer Screening  Discontinued        Frequency changed to Never automatically (Topic No Longer Applies)    2016  REFERRAL TO GI FOR COLONOSCOPY    2010  REFERRAL TO GI FOR COLONOSCOPY                    Patient Care Team:  Darius Morataya M.D. as PCP - General (Family Medicine)  Chavez Mitchell, PT as Physical Therapist  Juan Carlos Fuchs M.D. (Cardiovascular Disease (Cardiology))  Maryellen Cid M.D. (Otolaryngology)  [facility]  (DME Supplier)  Maame Rivers M.D. (Cardiovascular Disease (Cardiology))    Social History     Tobacco Use    Smoking status: Former     Current packs/day: 0.00     Types: Cigarettes     Quit date: 1979     Years since quittin.1    Smokeless tobacco: Never    Tobacco comments:     smoked off & on for 10 yrs   Vaping Use    Vaping Use: Never used   Substance Use Topics    Alcohol use: Yes     Comment: occassional    Drug use: No     Family History   Problem Relation Age of Onset    Stroke Father     Arthritis Father     Hyperlipidemia Father     Hypertension Father     Cancer Sister         breast    Arthritis Sister     Arthritis Mother     Hypertension Mother     Hyperlipidemia Mother     Stroke Mother     Arthritis Brother     Cancer Brother      "Hypertension Brother     Hyperlipidemia Brother     Heart Disease Brother         CABG for prox LAD in late 40s    Cancer Daughter         breast     She  has a past medical history of Anxiety, Arthritis, Back pain, CAD (coronary artery disease) (10/2018), Cataract, Daytime sleepiness, Depression, Dyslipidemia, Frequent headaches, Herpes zoster, High cholesterol, Hypertension, MHA (microangiopathic hemolytic anemia), Migraine syndrome ( ), Mumps, Osteopathia, Osteoporosis, Painful joint, Post-menopause on HRT (hormone replacement therapy), Sleep apnea, Snoring, Thyroid disease, and White matter abnormality on MRI of brain.   Past Surgical History:   Procedure Laterality Date    LUMBAR LAMINECTOMY DISKECTOMY N/A 2/6/2023    Procedure: POSTERIOR REMOVAL OF INTERSPINOUS DEVICE WITH L4-S1 LAMINECTOMY;  Surgeon: Mannie Connor M.D.;  Location: SURGERY [facility];  Service: Neurosurgery    HARDWARE REMOVAL ORTHO N/A 2/6/2023    Procedure: REMOVAL, HARDWARE;  Surgeon: Mannie Connor M.D.;  Location: SURGERY [facility];  Service: Neurosurgery    LAMINOTOMY  01/28/2022    L4-L5 and L5-S1 posterior fusion with instrumentation and laminotomy by Dr. Benitez.    BUNIONECTOMY Left 2003    FOOT SURGERY  2002    WI BREAST REDUCTION  1997    CHOLECYSTECTOMY  1996    ABDOMINAL HYSTERECTOMY TOTAL  11/1986    ARTHROSCOPY, KNEE      EYE SURGERY      cataracts    HYSTERECTOMY LAPAROSCOPY      MAMMOPLASTY AUGMENTATION      WI BREAST AUGMENTATION WITH IMPLANT         Exam:   BP 98/54 (BP Location: Right arm, Patient Position: Sitting, BP Cuff Size: Adult)   Pulse 69   Temp 36.7 °C (98.1 °F) (Temporal)   Ht 1.575 m (5' 2\")   Wt 71.2 kg (157 lb)   SpO2 95%  Body mass index is 28.72 kg/m².    Hearing excellent.    Dentition good  Alert, oriented in no acute distress.  Eye contact is good, speech goal directed, affect calm  yes    Assessment and Plan. The following treatment and monitoring plan is recommended:    1. Medicare annual wellness " visit, initial    2. Pure hypercholesterolemia  - CBC WITH DIFFERENTIAL; Future  - Comp Metabolic Panel; Future  - Lipid Profile; Future  - TSH+FREE T4  - T3 FREE; Future    3. Vitamin D deficiency  - VITAMIN D,25 HYDROXY (DEFICIENCY); Future    4. JOHN (obstructive sleep apnea)    5. Mixed hyperlipidemia    6. Acquired hypothyroidism    7. Age-related osteoporosis with current pathological fracture with routine healing, subsequent encounter    8. Primary hypertension    Other orders  - spironolactone (ALDACTONE) 25 MG Tab; Take 25 mg by mouth every day.     Plan    Completed    2. We will obtain new labs to update clinical profile.  Then we will adjust therapy as needed.    3. We will obtain new labs to update clinical profile.  Then we will adjust therapy as needed.    4. Patient has been stable with current management  We will make no changes for now    5. Patient has been stable with current management  We will make no changes for now    6. Patient has been stable with current management  We will make no changes for now    7. Patient has been stable with current management  We will make no changes for now    8. Patient has been stable with current management  We will make no changes for now    Services suggested: No services needed at this time  Health Care Screening recommendations as per orders if indicated.  Referrals offered: PT/OT/Nutrition counseling/Behavioral Health/Smoking cessation as per orders if indicated.    Discussion today about general wellness and lifestyle habits:    Prevent falls and reduce trip hazards; Cautioned about securing or removing rugs.  Have a working fire alarm and carbon monoxide detector;   Engage in regular physical activity and social activities     Follow-up: Return in about 6 months (around 2/29/2024) for Reevaluation, labs.

## 2023-08-31 ENCOUNTER — PATIENT OUTREACH (OUTPATIENT)
Dept: CARDIOLOGY | Facility: PHYSICIAN GROUP | Age: 83
End: 2023-08-31
Payer: MEDICARE

## 2023-08-31 ENCOUNTER — HOSPITAL ENCOUNTER (OUTPATIENT)
Dept: LAB | Facility: MEDICAL CENTER | Age: 83
End: 2023-08-31
Attending: FAMILY MEDICINE
Payer: MEDICARE

## 2023-08-31 DIAGNOSIS — I25.10 CORONARY ARTERY DISEASE INVOLVING NATIVE CORONARY ARTERY OF NATIVE HEART WITHOUT ANGINA PECTORIS: ICD-10-CM

## 2023-08-31 DIAGNOSIS — E55.9 VITAMIN D DEFICIENCY: ICD-10-CM

## 2023-08-31 DIAGNOSIS — E78.00 PURE HYPERCHOLESTEROLEMIA: ICD-10-CM

## 2023-08-31 LAB
25(OH)D3 SERPL-MCNC: 48 NG/ML (ref 30–100)
ALBUMIN SERPL BCP-MCNC: 4.2 G/DL (ref 3.2–4.9)
ALBUMIN/GLOB SERPL: 1.6 G/DL
ALP SERPL-CCNC: 85 U/L (ref 30–99)
ALT SERPL-CCNC: 22 U/L (ref 2–50)
ANION GAP SERPL CALC-SCNC: 8 MMOL/L (ref 7–16)
AST SERPL-CCNC: 29 U/L (ref 12–45)
BASOPHILS # BLD AUTO: 1.1 % (ref 0–1.8)
BASOPHILS # BLD: 0.07 K/UL (ref 0–0.12)
BILIRUB SERPL-MCNC: 0.6 MG/DL (ref 0.1–1.5)
BUN SERPL-MCNC: 36 MG/DL (ref 8–22)
CALCIUM ALBUM COR SERPL-MCNC: 10.3 MG/DL (ref 8.5–10.5)
CALCIUM SERPL-MCNC: 10.5 MG/DL (ref 8.5–10.5)
CHLORIDE SERPL-SCNC: 107 MMOL/L (ref 96–112)
CHOLEST SERPL-MCNC: 132 MG/DL (ref 100–199)
CO2 SERPL-SCNC: 23 MMOL/L (ref 20–33)
CREAT SERPL-MCNC: 1.1 MG/DL (ref 0.5–1.4)
EOSINOPHIL # BLD AUTO: 0.12 K/UL (ref 0–0.51)
EOSINOPHIL NFR BLD: 1.8 % (ref 0–6.9)
ERYTHROCYTE [DISTWIDTH] IN BLOOD BY AUTOMATED COUNT: 53.6 FL (ref 35.9–50)
GFR SERPLBLD CREATININE-BSD FMLA CKD-EPI: 50 ML/MIN/1.73 M 2
GLOBULIN SER CALC-MCNC: 2.7 G/DL (ref 1.9–3.5)
GLUCOSE SERPL-MCNC: 83 MG/DL (ref 65–99)
HCT VFR BLD AUTO: 38.3 % (ref 37–47)
HDLC SERPL-MCNC: 56 MG/DL
HGB BLD-MCNC: 12.4 G/DL (ref 12–16)
IMM GRANULOCYTES # BLD AUTO: 0.03 K/UL (ref 0–0.11)
IMM GRANULOCYTES NFR BLD AUTO: 0.5 % (ref 0–0.9)
LDLC SERPL CALC-MCNC: 61 MG/DL
LYMPHOCYTES # BLD AUTO: 1.66 K/UL (ref 1–4.8)
LYMPHOCYTES NFR BLD: 25.2 % (ref 22–41)
MCH RBC QN AUTO: 28.8 PG (ref 27–33)
MCHC RBC AUTO-ENTMCNC: 32.4 G/DL (ref 32.2–35.5)
MCV RBC AUTO: 88.9 FL (ref 81.4–97.8)
MONOCYTES # BLD AUTO: 0.63 K/UL (ref 0–0.85)
MONOCYTES NFR BLD AUTO: 9.5 % (ref 0–13.4)
NEUTROPHILS # BLD AUTO: 4.09 K/UL (ref 1.82–7.42)
NEUTROPHILS NFR BLD: 61.9 % (ref 44–72)
NRBC # BLD AUTO: 0 K/UL
NRBC BLD-RTO: 0 /100 WBC (ref 0–0.2)
PLATELET # BLD AUTO: 237 K/UL (ref 164–446)
PMV BLD AUTO: 11 FL (ref 9–12.9)
POTASSIUM SERPL-SCNC: 5.2 MMOL/L (ref 3.6–5.5)
PROT SERPL-MCNC: 6.9 G/DL (ref 6–8.2)
RBC # BLD AUTO: 4.31 M/UL (ref 4.2–5.4)
SODIUM SERPL-SCNC: 138 MMOL/L (ref 135–145)
T3FREE SERPL-MCNC: 2.43 PG/ML (ref 2–4.4)
T4 FREE SERPL-MCNC: 1.4 NG/DL (ref 0.93–1.7)
TRIGL SERPL-MCNC: 75 MG/DL (ref 0–149)
TSH SERPL DL<=0.005 MIU/L-ACNC: 0.75 UIU/ML (ref 0.38–5.33)
WBC # BLD AUTO: 6.6 K/UL (ref 4.8–10.8)

## 2023-08-31 PROCEDURE — 84481 FREE ASSAY (FT-3): CPT

## 2023-08-31 PROCEDURE — 80061 LIPID PANEL: CPT

## 2023-08-31 PROCEDURE — 85025 COMPLETE CBC W/AUTO DIFF WBC: CPT

## 2023-08-31 PROCEDURE — 82306 VITAMIN D 25 HYDROXY: CPT

## 2023-08-31 PROCEDURE — 80053 COMPREHEN METABOLIC PANEL: CPT

## 2023-08-31 PROCEDURE — 84443 ASSAY THYROID STIM HORMONE: CPT

## 2023-08-31 PROCEDURE — 36415 COLL VENOUS BLD VENIPUNCTURE: CPT

## 2023-08-31 PROCEDURE — 84439 ASSAY OF FREE THYROXINE: CPT

## 2023-10-13 ENCOUNTER — OFFICE VISIT (OUTPATIENT)
Dept: MEDICAL GROUP | Age: 83
End: 2023-10-13
Payer: MEDICARE

## 2023-10-13 VITALS
SYSTOLIC BLOOD PRESSURE: 108 MMHG | OXYGEN SATURATION: 91 % | BODY MASS INDEX: 27.6 KG/M2 | TEMPERATURE: 98 F | WEIGHT: 150 LBS | DIASTOLIC BLOOD PRESSURE: 58 MMHG | HEIGHT: 62 IN | HEART RATE: 72 BPM

## 2023-10-13 DIAGNOSIS — N18.31 CHRONIC KIDNEY DISEASE, STAGE 3A: ICD-10-CM

## 2023-10-13 DIAGNOSIS — E78.00 PURE HYPERCHOLESTEROLEMIA: ICD-10-CM

## 2023-10-13 DIAGNOSIS — Z23 NEED FOR VACCINATION: ICD-10-CM

## 2023-10-13 DIAGNOSIS — R60.9 PERIPHERAL EDEMA: ICD-10-CM

## 2023-10-13 DIAGNOSIS — E55.9 VITAMIN D DEFICIENCY: ICD-10-CM

## 2023-10-13 PROBLEM — R60.0 PERIPHERAL EDEMA: Status: ACTIVE | Noted: 2023-10-13

## 2023-10-13 PROCEDURE — 3074F SYST BP LT 130 MM HG: CPT | Performed by: FAMILY MEDICINE

## 2023-10-13 PROCEDURE — 99214 OFFICE O/P EST MOD 30 MIN: CPT | Performed by: FAMILY MEDICINE

## 2023-10-13 PROCEDURE — 3078F DIAST BP <80 MM HG: CPT | Performed by: FAMILY MEDICINE

## 2023-10-13 ASSESSMENT — FIBROSIS 4 INDEX: FIB4 SCORE: 2.11

## 2023-10-13 NOTE — PROGRESS NOTES
This medical record contains text that has been entered with the assistance of computer voice recognition and dictation software.  Therefore, it may contain unintended errors in text, spelling, punctuation, or grammar      Chief Complaint   Patient presents with    Follow-Up     Hospital follow up     Lab Results         Sheyla Gauthier is a 82 y.o. female here evaluation and management of: Labs, peripheral edema      HPI:     HCC Gap Form    Diagnosis to address: N18.31 - Chronic kidney disease, stage 3a (HCC)  Assessment and plan: Chronic, stable. Continue with current defined treatment plan: . Follow-up at least annually.  Last edited 10/13/23 14:42 PDT by Darius Morataya M.D.           1. Peripheral edema  NEW UNDIAGNOSED PROBLEM    Yue is a very pleasant 82-year-old female with a significant past medical history of chronic low back pain from lumbar stenosis with neurogenic claudication.  She had a lumbar laminectomy discectomy on February 6, 2023, also had a laminotomy at L4 L3 on January 28, 2022.  She was doing fine until 2 months ago she states she began to have pain over her thigh and knee left side, this led to decreased mobility.  As result she began to have peripheral edema.  She was seen in the emergency room they did an ultrasound to evaluate for DVT was negative.  She was discharged with instructions to follow-up with PCP.  Her pain management physician Dr. Soto is currently going to wean her off of Lyrica as it can also cause peripheral edema.  She states that she was seen by [facility] neurosurgery did obtain an MRI which the results are pending.  She has no history of kidney disease no history of liver disease no history of heart failure.  She is not on a calcium channel blocker but she is taking Lyrica.    2/6/2023  Lumbar laminectomy diskectomy (N/A) Procedure: POSTERIOR REMOVAL OF INTERSPINOUS DEVICE WITH L4-S1 LAMINECTOMY;  Surgeon: Mannie Connor M.D.;  Location: SURGERY [facility];   Service: Neurosurgery  2/6/2023  Hardware removal ortho (N/A) Procedure: REMOVAL, HARDWARE;  Surgeon: Mannie Connor M.D.;  Location: SURGERY [facility];  Service: Neurosurgery  01/28/2022  Laminotomy L4-L5 and L5-S1 posterior fusion with instrumentation and laminotomy by Dr. Benitez.        Current medicines (including changes today)  Current Outpatient Medications   Medication Sig Dispense Refill    spironolactone (ALDACTONE) 25 MG Tab Take 25 mg by mouth every day.      pregabalin (LYRICA) 75 MG Cap Take 75 mg by mouth 2 times a day.      losartan (COZAAR) 50 MG Tab Take 50 mg by mouth every morning.      atorvastatin (LIPITOR) 40 MG Tab Take 1 Tablet by mouth at bedtime. 90 Tablet 3    levothyroxine (SYNTHROID) 88 MCG Tab 1 tablet in the morning on an empty stomach      liothyronine (CYTOMEL) 5 MCG Tab Take 5 mcg by mouth every morning.      Estriol 10 % Cream Insert 1 Application into the vagina as needed.      Magnesium 400 MG Tab Take 800 mg by mouth every evening.       No current facility-administered medications for this visit.     She  has a past medical history of Anxiety, Arthritis, Back pain, CAD (coronary artery disease) (10/2018), Cataract, Daytime sleepiness, Depression, Dyslipidemia, Frequent headaches, Herpes zoster, High cholesterol, Hypertension, MHA (microangiopathic hemolytic anemia), Migraine syndrome ( ), Mumps, Osteopathia, Osteoporosis, Painful joint, Post-menopause on HRT (hormone replacement therapy), Sleep apnea, Snoring, Thyroid disease, and White matter abnormality on MRI of brain.  She  has a past surgical history that includes cholecystectomy (1996); abdominal hysterectomy total (11/1986); pr breast reduction (1997); foot surgery (2002); pr breast augmentation with implant; mammoplasty augmentation; bunionectomy (Left, 2003); eye surgery; hysterectomy laparoscopy; arthroscopy, knee; laminotomy (01/28/2022); lumbar laminectomy diskectomy (N/A, 2/6/2023); and hardware removal ortho  "(N/A, 2023).  Social History     Tobacco Use    Smoking status: Former     Current packs/day: 0.00     Types: Cigarettes     Quit date: 1979     Years since quittin.2    Smokeless tobacco: Never    Tobacco comments:     smoked off & on for 10 yrs   Vaping Use    Vaping Use: Never used   Substance Use Topics    Alcohol use: Yes     Comment: occassional    Drug use: No     Social History     Social History Narrative    Not on file     Family History   Problem Relation Age of Onset    Stroke Father     Arthritis Father     Hyperlipidemia Father     Hypertension Father     Cancer Sister         breast    Arthritis Sister     Arthritis Mother     Hypertension Mother     Hyperlipidemia Mother     Stroke Mother     Arthritis Brother     Cancer Brother     Hypertension Brother     Hyperlipidemia Brother     Heart Disease Brother         CABG for prox LAD in late 40s    Cancer Daughter         breast     Family Status   Relation Name Status    Fa      Sis  Alive    Mo          tia    Bro  Alive    Ayesha  (Not Specified)         ROS    The pertinent  ROS findings can be seen in the HPI above.     All other systems reviewed and are negative     Objective:     /58 (BP Location: Left arm, Patient Position: Sitting, BP Cuff Size: Adult)   Pulse 72   Temp 36.7 °C (98 °F) (Temporal)   Ht 1.575 m (5' 2\")   Wt 68 kg (150 lb)   SpO2 91%  Body mass index is 27.44 kg/m².      Physical Exam:    Constitutional: Alert, no distress.  Skin: No suspicious lesions  Eye: Equal, round and reactive, conjunctiva clear, lids normal.  ENMT: Lips without lesions, good dentition, oropharynx clear.  Neck: Trachea midline, no masses, no thyromegaly. No cervical or supraclavicular lymphadenopathy.  Respiratory: Unlabored respiratory effort, lungs clear to auscultation, no wheezes, no ronchi.  Cardiovascular: Normal S1, S2, no murmur, 2+ nonpitting edema bilateral lower extremities from the foot all the way up to " knees  Abdomen: Soft, non-tender, no masses, no hepatosplenomegaly.        Assessment and Plan:   The following treatment plan was discussed    All recent labs and provider notes reviewed    1. Peripheral edema    Her kidney function and liver function is not causing this.  It could be the side effect of Lyrica in combination being sedentary  She is not on a calcium channel blocker she needs an updated echocardiogram to evaluate heart function.    - EC-ECHOCARDIOGRAM COMPLETE W/ CONT; Future    2. Pure hypercholesterolemia    We will obtain new labs to update clinical profile.  Then we will adjust therapy as needed.    - Comp Metabolic Panel; Future  - CBC WITH DIFFERENTIAL; Future  - Lipid Profile; Future  - TSH+FREE T4  - T3 FREE; Future    3. Need for vaccination  - INFLUENZA VACCINE, HIGH DOSE (65+ ONLY)    4. Vitamin D deficiency  - VITAMIN D,25 HYDROXY (DEFICIENCY); Future             Instructed to Follow up in clinic or ER for worsening symptoms, difficulty breathing, lack of expected recovery, or should new symptoms or problems arise.    Followup: Return in about 2 months (around 12/13/2023) for Reevaluation.

## 2023-10-16 ENCOUNTER — HOSPITAL ENCOUNTER (OUTPATIENT)
Dept: CARDIOLOGY | Facility: MEDICAL CENTER | Age: 83
End: 2023-10-16
Attending: FAMILY MEDICINE
Payer: MEDICARE

## 2023-10-16 DIAGNOSIS — R60.9 PERIPHERAL EDEMA: ICD-10-CM

## 2023-10-16 LAB — LV EJECT FRACT  99904: 60

## 2023-10-16 PROCEDURE — 93306 TTE W/DOPPLER COMPLETE: CPT | Mod: 26 | Performed by: INTERNAL MEDICINE

## 2023-10-16 PROCEDURE — 93306 TTE W/DOPPLER COMPLETE: CPT

## 2023-10-17 ENCOUNTER — PATIENT MESSAGE (OUTPATIENT)
Dept: MEDICAL GROUP | Age: 83
End: 2023-10-17
Payer: MEDICARE

## 2023-10-18 ENCOUNTER — APPOINTMENT (OUTPATIENT)
Dept: CARDIOLOGY | Facility: MEDICAL CENTER | Age: 83
End: 2023-10-18
Attending: INTERNAL MEDICINE
Payer: MEDICARE

## 2023-10-18 DIAGNOSIS — Z00.00 MEDICARE ANNUAL WELLNESS VISIT, INITIAL: ICD-10-CM

## 2023-10-19 DIAGNOSIS — R05.1 ACUTE COUGH: ICD-10-CM

## 2023-10-19 DIAGNOSIS — R07.89 OTHER CHEST PAIN: ICD-10-CM

## 2023-10-19 NOTE — RESULT ENCOUNTER NOTE
I discussed results and plan with patient, who stated understanding.       Darius Morataya MD  [employer], [address] [address]

## 2023-10-20 ENCOUNTER — APPOINTMENT (OUTPATIENT)
Dept: ADMISSIONS | Facility: MEDICAL CENTER | Age: 83
End: 2023-10-20
Attending: NEUROLOGICAL SURGERY
Payer: MEDICARE

## 2023-10-20 ENCOUNTER — APPOINTMENT (OUTPATIENT)
Dept: MEDICAL GROUP | Age: 83
End: 2023-10-20
Payer: MEDICARE

## 2023-10-26 ENCOUNTER — PRE-ADMISSION TESTING (OUTPATIENT)
Dept: ADMISSIONS | Facility: MEDICAL CENTER | Age: 83
End: 2023-10-26
Attending: NEUROLOGICAL SURGERY
Payer: MEDICARE

## 2023-10-26 DIAGNOSIS — Z01.810 PRE-OPERATIVE CARDIOVASCULAR EXAMINATION: ICD-10-CM

## 2023-10-26 DIAGNOSIS — Z01.811 PRE-OPERATIVE RESPIRATORY EXAMINATION: ICD-10-CM

## 2023-10-26 DIAGNOSIS — Z01.812 PRE-OPERATIVE LABORATORY EXAMINATION: ICD-10-CM

## 2023-10-26 RX ORDER — M-VIT,TX,IRON,MINS/CALC/FOLIC 27MG-0.4MG
1 TABLET ORAL DAILY
Status: ON HOLD | COMMUNITY
End: 2023-11-21

## 2023-10-26 RX ORDER — MAGNESIUM 200 MG
200 TABLET ORAL EVERY EVENING
Status: ON HOLD | COMMUNITY
End: 2023-11-21 | Stop reason: SDUPTHER

## 2023-10-26 RX ORDER — PREGABALIN 100 MG/1
100 CAPSULE ORAL
Status: ON HOLD | COMMUNITY
Start: 2023-10-17 | End: 2023-11-21 | Stop reason: SDUPTHER

## 2023-10-26 RX ORDER — DOCUSATE SODIUM 100 MG/1
100 CAPSULE, LIQUID FILLED ORAL 2 TIMES DAILY
Status: ON HOLD | COMMUNITY
End: 2023-11-21

## 2023-10-26 RX ORDER — HYDROCODONE BITARTRATE AND ACETAMINOPHEN 10; 325 MG/1; MG/1
TABLET ORAL
Status: ON HOLD | COMMUNITY
Start: 2023-10-09 | End: 2023-11-21

## 2023-10-31 ENCOUNTER — PRE-ADMISSION TESTING (OUTPATIENT)
Dept: ADMISSIONS | Facility: MEDICAL CENTER | Age: 83
End: 2023-10-31
Attending: NEUROLOGICAL SURGERY
Payer: MEDICARE

## 2023-10-31 ENCOUNTER — HOSPITAL ENCOUNTER (OUTPATIENT)
Dept: RADIOLOGY | Facility: MEDICAL CENTER | Age: 83
End: 2023-10-31
Attending: NEUROLOGICAL SURGERY
Payer: MEDICARE

## 2023-10-31 DIAGNOSIS — Z01.810 PRE-OPERATIVE CARDIOVASCULAR EXAMINATION: ICD-10-CM

## 2023-10-31 DIAGNOSIS — Z01.811 PRE-OPERATIVE RESPIRATORY EXAMINATION: ICD-10-CM

## 2023-10-31 DIAGNOSIS — Z01.812 PRE-OPERATIVE LABORATORY EXAMINATION: ICD-10-CM

## 2023-10-31 LAB
ANION GAP SERPL CALC-SCNC: 13 MMOL/L (ref 7–16)
APPEARANCE UR: ABNORMAL
APTT PPP: 23.8 SEC (ref 24.7–36)
BACTERIA #/AREA URNS HPF: ABNORMAL /HPF
BASOPHILS # BLD AUTO: 0.6 % (ref 0–1.8)
BASOPHILS # BLD: 0.04 K/UL (ref 0–0.12)
BILIRUB UR QL STRIP.AUTO: NEGATIVE
BUN SERPL-MCNC: 35 MG/DL (ref 8–22)
CALCIUM SERPL-MCNC: 10.4 MG/DL (ref 8.5–10.5)
CHLORIDE SERPL-SCNC: 101 MMOL/L (ref 96–112)
CO2 SERPL-SCNC: 24 MMOL/L (ref 20–33)
COLOR UR: YELLOW
CREAT SERPL-MCNC: 1.15 MG/DL (ref 0.5–1.4)
EOSINOPHIL # BLD AUTO: 0.06 K/UL (ref 0–0.51)
EOSINOPHIL NFR BLD: 0.8 % (ref 0–6.9)
EPI CELLS #/AREA URNS HPF: ABNORMAL /HPF
ERYTHROCYTE [DISTWIDTH] IN BLOOD BY AUTOMATED COUNT: 47.7 FL (ref 35.9–50)
GFR SERPLBLD CREATININE-BSD FMLA CKD-EPI: 48 ML/MIN/1.73 M 2
GLUCOSE SERPL-MCNC: 86 MG/DL (ref 65–99)
GLUCOSE UR STRIP.AUTO-MCNC: NEGATIVE MG/DL
HCT VFR BLD AUTO: 40.6 % (ref 37–47)
HGB BLD-MCNC: 12.4 G/DL (ref 12–16)
HYALINE CASTS #/AREA URNS LPF: ABNORMAL /LPF
IMM GRANULOCYTES # BLD AUTO: 0.02 K/UL (ref 0–0.11)
IMM GRANULOCYTES NFR BLD AUTO: 0.3 % (ref 0–0.9)
INR PPP: 1.04 (ref 0.87–1.13)
KETONES UR STRIP.AUTO-MCNC: NEGATIVE MG/DL
LEUKOCYTE ESTERASE UR QL STRIP.AUTO: ABNORMAL
LYMPHOCYTES # BLD AUTO: 2.11 K/UL (ref 1–4.8)
LYMPHOCYTES NFR BLD: 29.3 % (ref 22–41)
MCH RBC QN AUTO: 27.4 PG (ref 27–33)
MCHC RBC AUTO-ENTMCNC: 30.5 G/DL (ref 32.2–35.5)
MCV RBC AUTO: 89.8 FL (ref 81.4–97.8)
MICRO URNS: ABNORMAL
MONOCYTES # BLD AUTO: 0.78 K/UL (ref 0–0.85)
MONOCYTES NFR BLD AUTO: 10.8 % (ref 0–13.4)
NEUTROPHILS # BLD AUTO: 4.18 K/UL (ref 1.82–7.42)
NEUTROPHILS NFR BLD: 58.2 % (ref 44–72)
NITRITE UR QL STRIP.AUTO: POSITIVE
NRBC # BLD AUTO: 0 K/UL
NRBC BLD-RTO: 0 /100 WBC (ref 0–0.2)
PH UR STRIP.AUTO: 5.5 [PH] (ref 5–8)
PLATELET # BLD AUTO: 323 K/UL (ref 164–446)
PMV BLD AUTO: 10.3 FL (ref 9–12.9)
POTASSIUM SERPL-SCNC: 4.9 MMOL/L (ref 3.6–5.5)
PROT UR QL STRIP: NEGATIVE MG/DL
PROTHROMBIN TIME: 13.7 SEC (ref 12–14.6)
RBC # BLD AUTO: 4.52 M/UL (ref 4.2–5.4)
RBC # URNS HPF: ABNORMAL /HPF
RBC UR QL AUTO: NEGATIVE
SODIUM SERPL-SCNC: 138 MMOL/L (ref 135–145)
SP GR UR STRIP.AUTO: 1.01
UROBILINOGEN UR STRIP.AUTO-MCNC: 0.2 MG/DL
WBC # BLD AUTO: 7.2 K/UL (ref 4.8–10.8)
WBC #/AREA URNS HPF: ABNORMAL /HPF

## 2023-10-31 PROCEDURE — 85730 THROMBOPLASTIN TIME PARTIAL: CPT

## 2023-10-31 PROCEDURE — 93005 ELECTROCARDIOGRAM TRACING: CPT

## 2023-10-31 PROCEDURE — 87077 CULTURE AEROBIC IDENTIFY: CPT

## 2023-10-31 PROCEDURE — 87086 URINE CULTURE/COLONY COUNT: CPT

## 2023-10-31 PROCEDURE — 87186 SC STD MICRODIL/AGAR DIL: CPT | Mod: 91

## 2023-10-31 PROCEDURE — 80048 BASIC METABOLIC PNL TOTAL CA: CPT

## 2023-10-31 PROCEDURE — 85610 PROTHROMBIN TIME: CPT

## 2023-10-31 PROCEDURE — 36415 COLL VENOUS BLD VENIPUNCTURE: CPT

## 2023-10-31 PROCEDURE — 71045 X-RAY EXAM CHEST 1 VIEW: CPT

## 2023-10-31 PROCEDURE — 85025 COMPLETE CBC W/AUTO DIFF WBC: CPT

## 2023-10-31 PROCEDURE — 81001 URINALYSIS AUTO W/SCOPE: CPT

## 2023-11-01 LAB — EKG IMPRESSION: NORMAL

## 2023-11-01 PROCEDURE — 93010 ELECTROCARDIOGRAM REPORT: CPT | Performed by: INTERNAL MEDICINE

## 2023-11-02 LAB
BACTERIA UR CULT: ABNORMAL
SIGNIFICANT IND 70042: ABNORMAL
SITE SITE: ABNORMAL
SOURCE SOURCE: ABNORMAL

## 2023-11-10 ENCOUNTER — APPOINTMENT (OUTPATIENT)
Dept: RADIOLOGY | Facility: MEDICAL CENTER | Age: 83
End: 2023-11-10
Attending: NEUROLOGICAL SURGERY
Payer: MEDICARE

## 2023-11-10 ENCOUNTER — HOSPITAL ENCOUNTER (OUTPATIENT)
Facility: MEDICAL CENTER | Age: 83
End: 2023-11-13
Attending: NEUROLOGICAL SURGERY | Admitting: NEUROLOGICAL SURGERY
Payer: MEDICARE

## 2023-11-10 ENCOUNTER — ANESTHESIA EVENT (OUTPATIENT)
Dept: SURGERY | Facility: MEDICAL CENTER | Age: 83
End: 2023-11-10
Payer: MEDICARE

## 2023-11-10 ENCOUNTER — ANESTHESIA (OUTPATIENT)
Dept: SURGERY | Facility: MEDICAL CENTER | Age: 83
End: 2023-11-10
Payer: MEDICARE

## 2023-11-10 DIAGNOSIS — I25.10 CORONARY ARTERY DISEASE INVOLVING NATIVE CORONARY ARTERY OF NATIVE HEART WITHOUT ANGINA PECTORIS: ICD-10-CM

## 2023-11-10 DIAGNOSIS — M48.062 SPINAL STENOSIS, LUMBAR REGION WITH NEUROGENIC CLAUDICATION: ICD-10-CM

## 2023-11-10 DIAGNOSIS — M48.062 LUMBAR STENOSIS WITH NEUROGENIC CLAUDICATION: ICD-10-CM

## 2023-11-10 LAB
ABO + RH BLD: NORMAL
ABO GROUP BLD: NORMAL
BLD GP AB SCN SERPL QL: NORMAL
RH BLD: NORMAL

## 2023-11-10 PROCEDURE — 110454 HCHG SHELL REV 250: Performed by: NEUROLOGICAL SURGERY

## 2023-11-10 PROCEDURE — 72020 X-RAY EXAM OF SPINE 1 VIEW: CPT

## 2023-11-10 PROCEDURE — 700111 HCHG RX REV CODE 636 W/ 250 OVERRIDE (IP): Performed by: ANESTHESIOLOGY

## 2023-11-10 PROCEDURE — 36415 COLL VENOUS BLD VENIPUNCTURE: CPT

## 2023-11-10 PROCEDURE — 160035 HCHG PACU - 1ST 60 MINS PHASE I: Performed by: NEUROLOGICAL SURGERY

## 2023-11-10 PROCEDURE — C1755 CATHETER, INTRASPINAL: HCPCS | Performed by: NEUROLOGICAL SURGERY

## 2023-11-10 PROCEDURE — 700102 HCHG RX REV CODE 250 W/ 637 OVERRIDE(OP): Performed by: PHYSICIAN ASSISTANT

## 2023-11-10 PROCEDURE — 700111 HCHG RX REV CODE 636 W/ 250 OVERRIDE (IP): Mod: JZ | Performed by: NEUROLOGICAL SURGERY

## 2023-11-10 PROCEDURE — 86901 BLOOD TYPING SEROLOGIC RH(D): CPT

## 2023-11-10 PROCEDURE — 700105 HCHG RX REV CODE 258: Performed by: PHYSICIAN ASSISTANT

## 2023-11-10 PROCEDURE — 95861 NEEDLE EMG 2 EXTREMITIES: CPT | Mod: XU | Performed by: NEUROLOGICAL SURGERY

## 2023-11-10 PROCEDURE — 160041 HCHG SURGERY MINUTES - EA ADDL 1 MIN LEVEL 4: Performed by: NEUROLOGICAL SURGERY

## 2023-11-10 PROCEDURE — 160036 HCHG PACU - EA ADDL 30 MINS PHASE I: Performed by: NEUROLOGICAL SURGERY

## 2023-11-10 PROCEDURE — 160002 HCHG RECOVERY MINUTES (STAT): Performed by: NEUROLOGICAL SURGERY

## 2023-11-10 PROCEDURE — A9270 NON-COVERED ITEM OR SERVICE: HCPCS | Performed by: ANESTHESIOLOGY

## 2023-11-10 PROCEDURE — 700105 HCHG RX REV CODE 258: Performed by: NEUROLOGICAL SURGERY

## 2023-11-10 PROCEDURE — 700111 HCHG RX REV CODE 636 W/ 250 OVERRIDE (IP): Performed by: PHYSICIAN ASSISTANT

## 2023-11-10 PROCEDURE — A9270 NON-COVERED ITEM OR SERVICE: HCPCS | Performed by: PHYSICIAN ASSISTANT

## 2023-11-10 PROCEDURE — 95940 IONM IN OPERATNG ROOM 15 MIN: CPT | Mod: XU | Performed by: NEUROLOGICAL SURGERY

## 2023-11-10 PROCEDURE — 700102 HCHG RX REV CODE 250 W/ 637 OVERRIDE(OP): Performed by: ANESTHESIOLOGY

## 2023-11-10 PROCEDURE — 700101 HCHG RX REV CODE 250: Performed by: PHYSICIAN ASSISTANT

## 2023-11-10 PROCEDURE — 96375 TX/PRO/DX INJ NEW DRUG ADDON: CPT | Mod: XU

## 2023-11-10 PROCEDURE — 86900 BLOOD TYPING SEROLOGIC ABO: CPT

## 2023-11-10 PROCEDURE — G0378 HOSPITAL OBSERVATION PER HR: HCPCS

## 2023-11-10 PROCEDURE — 700101 HCHG RX REV CODE 250: Performed by: ANESTHESIOLOGY

## 2023-11-10 PROCEDURE — 700101 HCHG RX REV CODE 250: Performed by: NEUROLOGICAL SURGERY

## 2023-11-10 PROCEDURE — 160048 HCHG OR STATISTICAL LEVEL 1-5: Performed by: NEUROLOGICAL SURGERY

## 2023-11-10 PROCEDURE — 700111 HCHG RX REV CODE 636 W/ 250 OVERRIDE (IP): Mod: JZ | Performed by: ANESTHESIOLOGY

## 2023-11-10 PROCEDURE — 160009 HCHG ANES TIME/MIN: Performed by: NEUROLOGICAL SURGERY

## 2023-11-10 PROCEDURE — 95938 SOMATOSENSORY TESTING: CPT | Mod: XU | Performed by: NEUROLOGICAL SURGERY

## 2023-11-10 PROCEDURE — 110371 HCHG SHELL REV 272: Performed by: NEUROLOGICAL SURGERY

## 2023-11-10 PROCEDURE — 95937 NEUROMUSCULAR JUNCTION TEST: CPT | Mod: XU | Performed by: NEUROLOGICAL SURGERY

## 2023-11-10 PROCEDURE — 96365 THER/PROPH/DIAG IV INF INIT: CPT | Mod: XU

## 2023-11-10 PROCEDURE — 160029 HCHG SURGERY MINUTES - 1ST 30 MINS LEVEL 4: Performed by: NEUROLOGICAL SURGERY

## 2023-11-10 PROCEDURE — 86850 RBC ANTIBODY SCREEN: CPT

## 2023-11-10 PROCEDURE — 95955 EEG DURING SURGERY: CPT | Mod: XU | Performed by: NEUROLOGICAL SURGERY

## 2023-11-10 RX ORDER — CEFAZOLIN SODIUM 1 G/3ML
INJECTION, POWDER, FOR SOLUTION INTRAMUSCULAR; INTRAVENOUS PRN
Status: DISCONTINUED | OUTPATIENT
Start: 2023-11-10 | End: 2023-11-10 | Stop reason: SURG

## 2023-11-10 RX ORDER — DOCUSATE SODIUM 100 MG/1
100 CAPSULE, LIQUID FILLED ORAL 2 TIMES DAILY
Status: DISCONTINUED | OUTPATIENT
Start: 2023-11-10 | End: 2023-11-10

## 2023-11-10 RX ORDER — TIZANIDINE 4 MG/1
2 TABLET ORAL 3 TIMES DAILY PRN
Status: DISCONTINUED | OUTPATIENT
Start: 2023-11-10 | End: 2023-11-13 | Stop reason: HOSPADM

## 2023-11-10 RX ORDER — ENEMA 19; 7 G/133ML; G/133ML
1 ENEMA RECTAL
Status: DISCONTINUED | OUTPATIENT
Start: 2023-11-10 | End: 2023-11-13 | Stop reason: HOSPADM

## 2023-11-10 RX ORDER — OXYCODONE HCL 5 MG/5 ML
5 SOLUTION, ORAL ORAL
Status: COMPLETED | OUTPATIENT
Start: 2023-11-10 | End: 2023-11-10

## 2023-11-10 RX ORDER — DEXAMETHASONE SODIUM PHOSPHATE 4 MG/ML
INJECTION, SOLUTION INTRA-ARTICULAR; INTRALESIONAL; INTRAMUSCULAR; INTRAVENOUS; SOFT TISSUE PRN
Status: DISCONTINUED | OUTPATIENT
Start: 2023-11-10 | End: 2023-11-10 | Stop reason: SURG

## 2023-11-10 RX ORDER — LANOLIN ALCOHOL/MO/W.PET/CERES
200 CREAM (GRAM) TOPICAL EVERY EVENING
Status: DISCONTINUED | OUTPATIENT
Start: 2023-11-10 | End: 2023-11-13 | Stop reason: HOSPADM

## 2023-11-10 RX ORDER — DOCUSATE SODIUM 100 MG/1
100 CAPSULE, LIQUID FILLED ORAL 2 TIMES DAILY
Status: DISCONTINUED | OUTPATIENT
Start: 2023-11-10 | End: 2023-11-13 | Stop reason: HOSPADM

## 2023-11-10 RX ORDER — LEVOTHYROXINE SODIUM 88 UG/1
88 TABLET ORAL
Status: DISCONTINUED | OUTPATIENT
Start: 2023-11-11 | End: 2023-11-13 | Stop reason: HOSPADM

## 2023-11-10 RX ORDER — VANCOMYCIN HYDROCHLORIDE 1 G/20ML
INJECTION, POWDER, LYOPHILIZED, FOR SOLUTION INTRAVENOUS
Status: COMPLETED | OUTPATIENT
Start: 2023-11-10 | End: 2023-11-10

## 2023-11-10 RX ORDER — MIDAZOLAM HYDROCHLORIDE 1 MG/ML
1 INJECTION INTRAMUSCULAR; INTRAVENOUS
Status: DISCONTINUED | OUTPATIENT
Start: 2023-11-10 | End: 2023-11-10 | Stop reason: HOSPADM

## 2023-11-10 RX ORDER — ONDANSETRON 4 MG/1
4 TABLET, ORALLY DISINTEGRATING ORAL EVERY 4 HOURS PRN
Status: DISCONTINUED | OUTPATIENT
Start: 2023-11-10 | End: 2023-11-13 | Stop reason: HOSPADM

## 2023-11-10 RX ORDER — CEFAZOLIN SODIUM 1 G/3ML
INJECTION, POWDER, FOR SOLUTION INTRAMUSCULAR; INTRAVENOUS
Status: DISCONTINUED | OUTPATIENT
Start: 2023-11-10 | End: 2023-11-10 | Stop reason: HOSPADM

## 2023-11-10 RX ORDER — OXYCODONE HYDROCHLORIDE 5 MG/1
5 TABLET ORAL
Status: DISCONTINUED | OUTPATIENT
Start: 2023-11-10 | End: 2023-11-13 | Stop reason: HOSPADM

## 2023-11-10 RX ORDER — BUPIVACAINE HYDROCHLORIDE AND EPINEPHRINE 5; 5 MG/ML; UG/ML
INJECTION, SOLUTION EPIDURAL; INTRACAUDAL; PERINEURAL
Status: DISCONTINUED | OUTPATIENT
Start: 2023-11-10 | End: 2023-11-10 | Stop reason: HOSPADM

## 2023-11-10 RX ORDER — OXYCODONE HCL 20 MG/1
20 TABLET, FILM COATED, EXTENDED RELEASE ORAL EVERY 6 HOURS
Status: DISCONTINUED | OUTPATIENT
Start: 2023-11-10 | End: 2023-11-12

## 2023-11-10 RX ORDER — ONDANSETRON 2 MG/ML
4 INJECTION INTRAMUSCULAR; INTRAVENOUS
Status: DISCONTINUED | OUTPATIENT
Start: 2023-11-10 | End: 2023-11-10 | Stop reason: HOSPADM

## 2023-11-10 RX ORDER — LIDOCAINE HYDROCHLORIDE 20 MG/ML
INJECTION, SOLUTION EPIDURAL; INFILTRATION; INTRACAUDAL; PERINEURAL PRN
Status: DISCONTINUED | OUTPATIENT
Start: 2023-11-10 | End: 2023-11-10 | Stop reason: SURG

## 2023-11-10 RX ORDER — LABETALOL HYDROCHLORIDE 5 MG/ML
5 INJECTION, SOLUTION INTRAVENOUS
Status: DISCONTINUED | OUTPATIENT
Start: 2023-11-10 | End: 2023-11-10 | Stop reason: HOSPADM

## 2023-11-10 RX ORDER — SODIUM CHLORIDE, SODIUM LACTATE, POTASSIUM CHLORIDE, CALCIUM CHLORIDE 600; 310; 30; 20 MG/100ML; MG/100ML; MG/100ML; MG/100ML
INJECTION, SOLUTION INTRAVENOUS CONTINUOUS
Status: ACTIVE | OUTPATIENT
Start: 2023-11-10 | End: 2023-11-10

## 2023-11-10 RX ORDER — ENOXAPARIN SODIUM 100 MG/ML
40 INJECTION SUBCUTANEOUS DAILY
Status: DISCONTINUED | OUTPATIENT
Start: 2023-11-11 | End: 2023-11-13 | Stop reason: HOSPADM

## 2023-11-10 RX ORDER — SODIUM CHLORIDE AND POTASSIUM CHLORIDE 150; 900 MG/100ML; MG/100ML
INJECTION, SOLUTION INTRAVENOUS CONTINUOUS
Status: DISCONTINUED | OUTPATIENT
Start: 2023-11-10 | End: 2023-11-13 | Stop reason: HOSPADM

## 2023-11-10 RX ORDER — ATORVASTATIN CALCIUM 40 MG/1
40 TABLET, FILM COATED ORAL
Status: DISCONTINUED | OUTPATIENT
Start: 2023-11-10 | End: 2023-11-13 | Stop reason: HOSPADM

## 2023-11-10 RX ORDER — CALCIUM CARBONATE 500 MG/1
500 TABLET, CHEWABLE ORAL 2 TIMES DAILY
Status: DISCONTINUED | OUTPATIENT
Start: 2023-11-10 | End: 2023-11-13 | Stop reason: HOSPADM

## 2023-11-10 RX ORDER — OXYCODONE HCL 5 MG/5 ML
10 SOLUTION, ORAL ORAL
Status: COMPLETED | OUTPATIENT
Start: 2023-11-10 | End: 2023-11-10

## 2023-11-10 RX ORDER — ONDANSETRON 2 MG/ML
INJECTION INTRAMUSCULAR; INTRAVENOUS PRN
Status: DISCONTINUED | OUTPATIENT
Start: 2023-11-10 | End: 2023-11-10 | Stop reason: SURG

## 2023-11-10 RX ORDER — HYDROMORPHONE HYDROCHLORIDE 1 MG/ML
0.4 INJECTION, SOLUTION INTRAMUSCULAR; INTRAVENOUS; SUBCUTANEOUS
Status: DISCONTINUED | OUTPATIENT
Start: 2023-11-10 | End: 2023-11-10 | Stop reason: HOSPADM

## 2023-11-10 RX ORDER — SPIRONOLACTONE 25 MG/1
25 TABLET ORAL DAILY
Status: DISCONTINUED | OUTPATIENT
Start: 2023-11-10 | End: 2023-11-13 | Stop reason: HOSPADM

## 2023-11-10 RX ORDER — DIPHENHYDRAMINE HCL 25 MG
25 TABLET ORAL EVERY 6 HOURS PRN
Status: DISCONTINUED | OUTPATIENT
Start: 2023-11-10 | End: 2023-11-13 | Stop reason: HOSPADM

## 2023-11-10 RX ORDER — MEPERIDINE HYDROCHLORIDE 25 MG/ML
12.5 INJECTION INTRAMUSCULAR; INTRAVENOUS; SUBCUTANEOUS
Status: DISCONTINUED | OUTPATIENT
Start: 2023-11-10 | End: 2023-11-10 | Stop reason: HOSPADM

## 2023-11-10 RX ORDER — SODIUM CHLORIDE, SODIUM LACTATE, POTASSIUM CHLORIDE, CALCIUM CHLORIDE 600; 310; 30; 20 MG/100ML; MG/100ML; MG/100ML; MG/100ML
INJECTION, SOLUTION INTRAVENOUS CONTINUOUS
Status: DISCONTINUED | OUTPATIENT
Start: 2023-11-10 | End: 2023-11-10 | Stop reason: HOSPADM

## 2023-11-10 RX ORDER — PREGABALIN 100 MG/1
100 CAPSULE ORAL 2 TIMES DAILY
Status: DISCONTINUED | OUTPATIENT
Start: 2023-11-10 | End: 2023-11-13 | Stop reason: HOSPADM

## 2023-11-10 RX ORDER — HALOPERIDOL 5 MG/ML
1 INJECTION INTRAMUSCULAR
Status: DISCONTINUED | OUTPATIENT
Start: 2023-11-10 | End: 2023-11-10 | Stop reason: HOSPADM

## 2023-11-10 RX ORDER — LIOTHYRONINE SODIUM 5 UG/1
5 TABLET ORAL EVERY MORNING
Status: DISCONTINUED | OUTPATIENT
Start: 2023-11-11 | End: 2023-11-13 | Stop reason: HOSPADM

## 2023-11-10 RX ORDER — HYDROCODONE BITARTRATE AND ACETAMINOPHEN 10; 325 MG/1; MG/1
1 TABLET ORAL EVERY 4 HOURS PRN
Status: DISCONTINUED | OUTPATIENT
Start: 2023-11-10 | End: 2023-11-10

## 2023-11-10 RX ORDER — DIPHENHYDRAMINE HYDROCHLORIDE 50 MG/ML
12.5 INJECTION INTRAMUSCULAR; INTRAVENOUS
Status: DISCONTINUED | OUTPATIENT
Start: 2023-11-10 | End: 2023-11-10 | Stop reason: HOSPADM

## 2023-11-10 RX ORDER — AMOXICILLIN 250 MG
1 CAPSULE ORAL NIGHTLY
Status: DISCONTINUED | OUTPATIENT
Start: 2023-11-10 | End: 2023-11-13 | Stop reason: HOSPADM

## 2023-11-10 RX ORDER — HYDROMORPHONE HYDROCHLORIDE 1 MG/ML
0.2 INJECTION, SOLUTION INTRAMUSCULAR; INTRAVENOUS; SUBCUTANEOUS
Status: DISCONTINUED | OUTPATIENT
Start: 2023-11-10 | End: 2023-11-10 | Stop reason: HOSPADM

## 2023-11-10 RX ORDER — LOSARTAN POTASSIUM 50 MG/1
50 TABLET ORAL EVERY MORNING
Status: DISCONTINUED | OUTPATIENT
Start: 2023-11-10 | End: 2023-11-13 | Stop reason: HOSPADM

## 2023-11-10 RX ORDER — HYDRALAZINE HYDROCHLORIDE 20 MG/ML
5 INJECTION INTRAMUSCULAR; INTRAVENOUS
Status: DISCONTINUED | OUTPATIENT
Start: 2023-11-10 | End: 2023-11-10 | Stop reason: HOSPADM

## 2023-11-10 RX ORDER — ROCURONIUM BROMIDE 10 MG/ML
INJECTION, SOLUTION INTRAVENOUS PRN
Status: DISCONTINUED | OUTPATIENT
Start: 2023-11-10 | End: 2023-11-10 | Stop reason: SURG

## 2023-11-10 RX ORDER — DIPHENHYDRAMINE HYDROCHLORIDE 50 MG/ML
25 INJECTION INTRAMUSCULAR; INTRAVENOUS EVERY 6 HOURS PRN
Status: DISCONTINUED | OUTPATIENT
Start: 2023-11-10 | End: 2023-11-13 | Stop reason: HOSPADM

## 2023-11-10 RX ORDER — OXYCODONE HCL 10 MG/1
20 TABLET, FILM COATED, EXTENDED RELEASE ORAL EVERY 6 HOURS
Status: ON HOLD | COMMUNITY
End: 2023-11-13

## 2023-11-10 RX ORDER — AMOXICILLIN 250 MG
1 CAPSULE ORAL
Status: DISCONTINUED | OUTPATIENT
Start: 2023-11-10 | End: 2023-11-13 | Stop reason: HOSPADM

## 2023-11-10 RX ORDER — POLYETHYLENE GLYCOL 3350 17 G/17G
1 POWDER, FOR SOLUTION ORAL 2 TIMES DAILY PRN
Status: DISCONTINUED | OUTPATIENT
Start: 2023-11-10 | End: 2023-11-11

## 2023-11-10 RX ORDER — BISACODYL 10 MG
10 SUPPOSITORY, RECTAL RECTAL
Status: DISCONTINUED | OUTPATIENT
Start: 2023-11-10 | End: 2023-11-13 | Stop reason: HOSPADM

## 2023-11-10 RX ORDER — HYDROCODONE BITARTRATE AND ACETAMINOPHEN 10; 325 MG/1; MG/1
1 TABLET ORAL EVERY 4 HOURS PRN
Status: DISCONTINUED | OUTPATIENT
Start: 2023-11-10 | End: 2023-11-13 | Stop reason: HOSPADM

## 2023-11-10 RX ORDER — HYDROMORPHONE HYDROCHLORIDE 1 MG/ML
0.25 INJECTION, SOLUTION INTRAMUSCULAR; INTRAVENOUS; SUBCUTANEOUS
Status: DISCONTINUED | OUTPATIENT
Start: 2023-11-10 | End: 2023-11-13 | Stop reason: HOSPADM

## 2023-11-10 RX ORDER — ONDANSETRON 2 MG/ML
4 INJECTION INTRAMUSCULAR; INTRAVENOUS EVERY 4 HOURS PRN
Status: DISCONTINUED | OUTPATIENT
Start: 2023-11-10 | End: 2023-11-13 | Stop reason: HOSPADM

## 2023-11-10 RX ORDER — HYDROMORPHONE HYDROCHLORIDE 1 MG/ML
0.1 INJECTION, SOLUTION INTRAMUSCULAR; INTRAVENOUS; SUBCUTANEOUS
Status: DISCONTINUED | OUTPATIENT
Start: 2023-11-10 | End: 2023-11-10 | Stop reason: HOSPADM

## 2023-11-10 RX ORDER — CARBOXYMETHYLCELLULOSE SODIUM 5 MG/ML
1 SOLUTION/ DROPS OPHTHALMIC PRN
Status: DISCONTINUED | OUTPATIENT
Start: 2023-11-10 | End: 2023-11-13 | Stop reason: HOSPADM

## 2023-11-10 RX ORDER — OXYCODONE HYDROCHLORIDE 5 MG/1
2.5 TABLET ORAL
Status: DISCONTINUED | OUTPATIENT
Start: 2023-11-10 | End: 2023-11-13 | Stop reason: HOSPADM

## 2023-11-10 RX ADMIN — HYDROMORPHONE HYDROCHLORIDE 0.2 MG: 1 INJECTION, SOLUTION INTRAMUSCULAR; INTRAVENOUS; SUBCUTANEOUS at 13:59

## 2023-11-10 RX ADMIN — POTASSIUM CHLORIDE AND SODIUM CHLORIDE: 900; 150 INJECTION, SOLUTION INTRAVENOUS at 17:03

## 2023-11-10 RX ADMIN — HYDROMORPHONE HYDROCHLORIDE 0.2 MG: 1 INJECTION, SOLUTION INTRAMUSCULAR; INTRAVENOUS; SUBCUTANEOUS at 14:04

## 2023-11-10 RX ADMIN — SODIUM CHLORIDE, POTASSIUM CHLORIDE, SODIUM LACTATE AND CALCIUM CHLORIDE: 600; 310; 30; 20 INJECTION, SOLUTION INTRAVENOUS at 08:00

## 2023-11-10 RX ADMIN — Medication 200 MG: at 18:33

## 2023-11-10 RX ADMIN — ANTACID TABLETS 500 MG: 500 TABLET, CHEWABLE ORAL at 18:35

## 2023-11-10 RX ADMIN — OXYCODONE HYDROCHLORIDE 20 MG: 20 TABLET, FILM COATED, EXTENDED RELEASE ORAL at 21:03

## 2023-11-10 RX ADMIN — HYDROMORPHONE HYDROCHLORIDE 0.2 MG: 1 INJECTION, SOLUTION INTRAMUSCULAR; INTRAVENOUS; SUBCUTANEOUS at 14:10

## 2023-11-10 RX ADMIN — SPIRONOLACTONE 25 MG: 25 TABLET ORAL at 18:32

## 2023-11-10 RX ADMIN — CEFAZOLIN 2 G: 2 INJECTION, POWDER, FOR SOLUTION INTRAMUSCULAR; INTRAVENOUS at 16:12

## 2023-11-10 RX ADMIN — FENTANYL CITRATE 100 MCG: 50 INJECTION, SOLUTION INTRAMUSCULAR; INTRAVENOUS at 08:03

## 2023-11-10 RX ADMIN — ROCURONIUM BROMIDE 50 MG: 50 INJECTION, SOLUTION INTRAVENOUS at 08:06

## 2023-11-10 RX ADMIN — DEXAMETHASONE SODIUM PHOSPHATE 4 MG: 4 INJECTION INTRA-ARTICULAR; INTRALESIONAL; INTRAMUSCULAR; INTRAVENOUS; SOFT TISSUE at 08:43

## 2023-11-10 RX ADMIN — SUGAMMADEX 100 MG: 100 INJECTION, SOLUTION INTRAVENOUS at 10:10

## 2023-11-10 RX ADMIN — CEFAZOLIN 2 G: 1 INJECTION, POWDER, FOR SOLUTION INTRAMUSCULAR; INTRAVENOUS at 08:27

## 2023-11-10 RX ADMIN — HYDROCODONE BITARTRATE AND ACETAMINOPHEN 1 TABLET: 10; 325 TABLET ORAL at 18:51

## 2023-11-10 RX ADMIN — PROPOFOL 75 MCG/KG/MIN: 10 INJECTION, EMULSION INTRAVENOUS at 08:09

## 2023-11-10 RX ADMIN — ONDANSETRON 4 MG: 2 INJECTION INTRAMUSCULAR; INTRAVENOUS at 10:04

## 2023-11-10 RX ADMIN — PROPOFOL 170 MG: 10 INJECTION, EMULSION INTRAVENOUS at 08:06

## 2023-11-10 RX ADMIN — OXYCODONE HYDROCHLORIDE 10 MG: 5 SOLUTION ORAL at 11:07

## 2023-11-10 RX ADMIN — OXYCODONE HYDROCHLORIDE 20 MG: 20 TABLET, FILM COATED, EXTENDED RELEASE ORAL at 14:59

## 2023-11-10 RX ADMIN — LIDOCAINE HYDROCHLORIDE 50 MG: 20 INJECTION, SOLUTION EPIDURAL; INFILTRATION; INTRACAUDAL at 08:06

## 2023-11-10 RX ADMIN — ATORVASTATIN CALCIUM 40 MG: 40 TABLET, FILM COATED ORAL at 21:03

## 2023-11-10 RX ADMIN — FENTANYL CITRATE 50 MCG: 50 INJECTION, SOLUTION INTRAMUSCULAR; INTRAVENOUS at 08:44

## 2023-11-10 RX ADMIN — LOSARTAN POTASSIUM 50 MG: 50 TABLET, FILM COATED ORAL at 16:18

## 2023-11-10 RX ADMIN — PREGABALIN 100 MG: 100 CAPSULE ORAL at 18:34

## 2023-11-10 ASSESSMENT — LIFESTYLE VARIABLES
TOTAL SCORE: 0
AVERAGE NUMBER OF DAYS PER WEEK YOU HAVE A DRINK CONTAINING ALCOHOL: 0
EVER HAD A DRINK FIRST THING IN THE MORNING TO STEADY YOUR NERVES TO GET RID OF A HANGOVER: NO
DOES PATIENT WANT TO STOP DRINKING: NO
TOTAL SCORE: 0
ON A TYPICAL DAY WHEN YOU DRINK ALCOHOL HOW MANY DRINKS DO YOU HAVE: 0
HAVE PEOPLE ANNOYED YOU BY CRITICIZING YOUR DRINKING: NO
EVER FELT BAD OR GUILTY ABOUT YOUR DRINKING: NO
HOW MANY TIMES IN THE PAST YEAR HAVE YOU HAD 5 OR MORE DRINKS IN A DAY: 0
CONSUMPTION TOTAL: NEGATIVE
TOTAL SCORE: 0
HAVE YOU EVER FELT YOU SHOULD CUT DOWN ON YOUR DRINKING: NO
ALCOHOL_USE: NO

## 2023-11-10 ASSESSMENT — PAIN DESCRIPTION - PAIN TYPE
TYPE: ACUTE PAIN
TYPE: NEUROPATHIC PAIN
TYPE: ACUTE PAIN;SURGICAL PAIN

## 2023-11-10 ASSESSMENT — COGNITIVE AND FUNCTIONAL STATUS - GENERAL
WALKING IN HOSPITAL ROOM: A LITTLE
STANDING UP FROM CHAIR USING ARMS: A LITTLE
TOILETING: A LITTLE
DRESSING REGULAR UPPER BODY CLOTHING: A LITTLE
TURNING FROM BACK TO SIDE WHILE IN FLAT BAD: A LITTLE
SUGGESTED CMS G CODE MODIFIER DAILY ACTIVITY: CJ
CLIMB 3 TO 5 STEPS WITH RAILING: A LITTLE
DRESSING REGULAR LOWER BODY CLOTHING: A LITTLE
MOBILITY SCORE: 18
DAILY ACTIVITIY SCORE: 20
MOVING FROM LYING ON BACK TO SITTING ON SIDE OF FLAT BED: A LITTLE
MOVING TO AND FROM BED TO CHAIR: A LITTLE
SUGGESTED CMS G CODE MODIFIER MOBILITY: CK
HELP NEEDED FOR BATHING: A LITTLE

## 2023-11-10 ASSESSMENT — PAIN SCALES - GENERAL: PAIN_LEVEL: 4

## 2023-11-10 ASSESSMENT — FIBROSIS 4 INDEX
FIB4 SCORE: 1.55
FIB4 SCORE: 1.55

## 2023-11-10 NOTE — OR NURSING
1420 Report to [facility] GUEVARA  1437 Pt transferred to [facility]2 with belongings bag, by transport without incident. Pt's daughter updated on transfer.

## 2023-11-10 NOTE — ANESTHESIA PROCEDURE NOTES
Airway    Date/Time: 11/10/2023 8:07 AM    Performed by: Arnel Aguila M.D.  Authorized by: Arnel Aguila M.D.    Location:  OR  Urgency:  Elective  Difficult Airway: No    Indications for Airway Management:  Anesthesia      Spontaneous Ventilation: absent    Sedation Level:  Deep  Preoxygenated: Yes    Patient Position:  Sniffing  Final Airway Type:  Endotracheal airway  Final Endotracheal Airway:  ETT  Cuffed: Yes    Technique Used for Successful ETT Placement:  Direct laryngoscopy    Insertion Site:  Oral  Blade Type:  Alicia  Laryngoscope Blade/Videolaryngoscope Blade Size:  3  ETT Size (mm):  7.5  Measured from:  Lips  ETT to Lips (cm):  21  Placement Verified by: auscultation and capnometry    Cormack-Lehane Classification:  Grade IIb - view of arytenoids or posterior of glottis only  Number of Attempts at Approach:  1  Number of Other Approaches Attempted:  0

## 2023-11-10 NOTE — PROGRESS NOTES
Med Rec complete per patient's daughter at bedside   Allergies reviewed  Antibiotics in the past 30 days:NO  Anticoagulant in past 14 days:NO  Pharmacy patient utilizes:[facility] [address]    Pt's daughter states MD replaced Norco with Oxycodone instead     Render Post-Care Instructions In Note?: no Number Of Curettages: 3 Body Location Override (Optional - Billing Will Still Be Based On Selected Body Map Location If Applicable): left medial thigh Detail Level: Detailed Bill As A Line Item Or As Units: Line Item Size Of Lesion In Cm: 0.3 Additional Information: (Optional): The wound was cleaned, and a pressure dressing was applied.  The patient received detailed post-op instructions. Post-Care Instructions: I reviewed with the patient in detail post-care instructions. Patient is to keep the area dry for 48 hours, and not to engage in any swimming until the area is healed. Should the patient develop any fevers, chills, bleeding, severe pain patient will contact the office immediately. Anesthesia Type: 1% lidocaine with epinephrine Size Of Lesion After Curettage: 0.6 Consent was obtained from the patient. The risks, benefits and alternatives to therapy were discussed in detail. Specifically, the risks of infection, scarring, bleeding, prolonged wound healing, nerve injury, incomplete removal, allergy to anesthesia and recurrence were addressed. Alternatives to ED&C, such as: surgical removal and XRT were also discussed.  Prior to the procedure, the treatment site was clearly identified and confirmed by the patient. All components of Universal Protocol/PAUSE Rule completed. Cautery Type: electrodesiccation What Was Performed First?: Curettage Anesthesia Volume In Cc: 1.5

## 2023-11-10 NOTE — OR NURSING
1026 Pt out from OR. Pt sleeping with oral airway in place. VSS. Posterior lower back site with dressing in place is clean and dry, no signs of bleeding.  1105 Pt tolerating orals  1135 Pt ready for transfer, awaiting inpatient bed  1150 Called Luh, pt's daughter and updated her on POC

## 2023-11-10 NOTE — DISCHARGE PLANNING
[facility] Transitional Care Coordination    Referral from: Dr Bright  Insurance Provider on Facesheet: Medicare  Potential Rehab Diagnosis: Ortho    Chart review indicates patient may have on going medical management and may have therapy needs to possibly meet inpatient rehab facility criteria with the goal of returning to community.    D/C support: TBD     Physiatry consultation pended per protocol.     S/p lumbar redo lami - physiatry consult pended, awaiting therapy evals as appropriate. TCC will follow.     Thank you for the referral.

## 2023-11-10 NOTE — ANESTHESIA TIME REPORT
Anesthesia Start and Stop Event Times       Date Time Event    11/10/2023 0800 Anesthesia Start     0801 Ready for Procedure     1029 Anesthesia Stop          Responsible Staff  11/10/23      Name Role Begin End    Arnel Aguila M.D. Anesth 0800 1029          Overtime Reason:  no overtime (within assigned shift)    Comments:

## 2023-11-10 NOTE — ANESTHESIA POSTPROCEDURE EVALUATION
Patient: Sheyla Gauthier    Procedure Summary       Date: 11/10/23 Room / Location: [facility] 07 / SURGERY [facility]    Anesthesia Start: 0800 Anesthesia Stop: 1029    Procedure: LUMBAR 5-SACRAL 1 REDO LAMINECTOMY (Back) Diagnosis: (LUMBAR STENOSIS)    Surgeons: Earnest Silva M.D. Responsible Provider: Arnel Aguila M.D.    Anesthesia Type: general ASA Status: 3            Final Anesthesia Type: general  Last vitals  BP   Blood Pressure : (!) 155/73    Temp   36.3 °C (97.3 °F)    Pulse   77   Resp   12    SpO2   99 %      Anesthesia Post Evaluation    Patient location during evaluation: PACU  Patient participation: complete - patient participated  Level of consciousness: awake and alert  Pain score: 4    Airway patency: patent  Anesthetic complications: no  Cardiovascular status: hemodynamically stable  Respiratory status: acceptable  Hydration status: euvolemic    PONV: none        No notable events documented.     Nurse Pain Score: 7 (NPRS)

## 2023-11-10 NOTE — ANESTHESIA PREPROCEDURE EVALUATION
Case: [id number] Date/Time: 11/10/23 0745    Procedure: LUMBAR 5-SACRAL 1 REDO LAMINECTOMY    Pre-op diagnosis: LUMBAR STENOSIS    Location: [facility]  / SURGERY [facility]    Surgeons: Earnest Silva M.D.            Relevant Problems   ANESTHESIA   (positive) JOHN (obstructive sleep apnea)      NEURO   (positive) Migraine syndrome      CARDIAC   (positive) Coronary artery disease involving native coronary artery of native heart without angina pectoris   (positive) HTN (hypertension)   (positive) Migraine syndrome         (positive) Chronic kidney disease, stage 3a (HCC)   (positive) Stage 2 chronic kidney disease      ENDO   (positive) Acquired hypothyroidism      Other   (positive) Arthritis       Physical Exam    Airway   Mallampati: II  TM distance: >3 FB  Neck ROM: full       Cardiovascular - normal exam  Rhythm: regular  Rate: normal  (-) murmur     Dental - normal exam           Pulmonary - normal exam  Breath sounds clear to auscultation     Abdominal    Neurological - normal exam               Anesthesia Plan    ASA 3 (chart)       Plan - general               Induction: intravenous    Postoperative Plan: Postoperative administration of opioids is intended.    Pertinent diagnostic labs and testing reviewed    Informed Consent:    Anesthetic plan and risks discussed with patient.    Use of blood products discussed with: patient whom consented to blood products.

## 2023-11-10 NOTE — DISCHARGE INSTRUCTIONS
HOME CARE INSTRUCTIONS    ACTIVITY: Rest and take it easy for the first 24 hours.  A responsible adult is recommended to remain with you during that time.  It is normal to feel sleepy.  We encourage you to not do anything that requires balance, judgment or coordination.    FOR 24 HOURS DO NOT:  Drive, operate machinery or run household appliances.  Drink beer or alcoholic beverages.  Make important decisions or sign legal documents.    SPECIAL INSTRUCTIONS: .    DIET: To avoid nausea, slowly advance diet as tolerated, avoiding spicy or greasy foods for the first day.  Add more substantial food to your diet according to your physician's instructions.  Babies can be fed formula or breast milk as soon as they are hungry.  INCREASE FLUIDS AND FIBER TO AVOID CONSTIPATION.    SURGICAL DRESSING/BATHING: .    MEDICATIONS: Resume taking daily medication.  Take prescribed pain medication with food.  If no medication is prescribed, you may take non-aspirin pain medication if needed.  PAIN MEDICATION CAN BE VERY CONSTIPATING.  Take a stool softener or laxative such as senokot, pericolace, or milk of magnesia if needed.    Prescription given for ___.  Last pain medication given at ___.    A follow-up appointment should be arranged with your doctor in 1-2 weeks; call to schedule.    You should CALL YOUR PHYSICIAN if you develop:  Fever greater than 101 degrees F.  Pain not relieved by medication, or persistent nausea or vomiting.  Excessive bleeding (blood soaking through dressing) or unexpected drainage from the wound.  Extreme redness or swelling around the incision site, drainage of pus or foul smelling drainage.  Inability to urinate or empty your bladder within 8 hours.  Problems with breathing or chest pain.    You should call 911 if you develop problems with breathing or chest pain.  If you are unable to contact your doctor or surgical center, you should go to the nearest emergency room or urgent care center.  Physician's  telephone #: [phone]    MILD FLU-LIKE SYMPTOMS ARE NORMAL.  YOU MAY EXPERIENCE GENERALIZED MUSCLE ACHES, THROAT IRRITATION, HEADACHE AND/OR SOME NAUSEA.    If any questions arise, call your doctor.  If your doctor is not available, please feel free to call the Surgical Center at [phone].  The Center is open Monday through Friday from 7AM to 7PM.      A registered nurse may call you a few days after your surgery to see how you are doing after your procedure.    You may also receive a survey in the mail within the next two weeks and we ask that you take a few moments to complete the survey and return it to us.  Our goal is to provide you with very good care and we value your comments.     Depression / Suicide Risk    As you are discharged from this [facility], it is important to learn how to keep safe from harming yourself.    Recognize the warning signs:  Abrupt changes in personality, positive or negative- including increase in energy   Giving away possessions  Change in eating patterns- significant weight changes-  positive or negative  Change in sleeping patterns- unable to sleep or sleeping all the time   Unwillingness or inability to communicate  Depression  Unusual sadness, discouragement and loneliness  Talk of wanting to die  Neglect of personal appearance   Rebelliousness- reckless behavior  Withdrawal from people/activities they love  Confusion- inability to concentrate     If you or a loved one observes any of these behaviors or has concerns about self-harm, here's what you can do:  Talk about it- your feelings and reasons for harming yourself  Remove any means that you might use to hurt yourself (examples: pills, rope, extension cords, firearm)  Get professional help from the community (Mental Health, Substance Abuse, psychological counseling)  Do not be alone:Call your Safe Contact- someone whom you trust who will be there for you.  Call your local CRISIS HOTLINE 254-9083 or  783-283-7417  Call your local Children's Mobile Crisis Response Team [place] (714) 938-4872 or [email or web address]  Call the toll free National Suicide Prevention Hotlines   National Suicide Prevention Lifeline 998-548-UDVU (9560)  [facility] Line Network 800-SUICIDE (079-2059)    I acknowledge receipt and understanding of these Home Care instructions.

## 2023-11-10 NOTE — OR SURGEON
Immediate Post OP Note    PreOp Diagnosis: L3-4 and L5-S1 lumbar stenosis with neurogenic claudication.      PostOp Diagnosis: same      Procedure(s):  LUMBAR 5-SACRAL 1 REDO LAMINECTOMY - Wound Class: Clean with Drain    Surgeon(s):  Earnest Silva M.D.    Anesthesiologist/Type of Anesthesia:  Anesthesiologist: Arnel Aguila M.D./General    Surgical Staff:  Assistant: Andrey Bright P.A.-C.  Circulator: Earnest Olmos R.N.; Vonda Chaudhry R.N.  Relief Scrub: Cuba Gómez  Scrub Person: Nata Peraza  Radiology Technologist: Leonela Silva    Specimens removed if any:  * No specimens in log *    Estimated Blood Loss: 20cc    Findings: Right greater than left stenosis L3-4 and L5-S1    Complications: None. Neuro monitoring stable        11/10/2023 10:31 AM Andrey Bright P.A.-C.

## 2023-11-11 PROCEDURE — G0378 HOSPITAL OBSERVATION PER HR: HCPCS

## 2023-11-11 PROCEDURE — 97535 SELF CARE MNGMENT TRAINING: CPT

## 2023-11-11 PROCEDURE — A9270 NON-COVERED ITEM OR SERVICE: HCPCS | Performed by: PHYSICIAN ASSISTANT

## 2023-11-11 PROCEDURE — A9270 NON-COVERED ITEM OR SERVICE: HCPCS

## 2023-11-11 PROCEDURE — 700102 HCHG RX REV CODE 250 W/ 637 OVERRIDE(OP): Performed by: PHYSICIAN ASSISTANT

## 2023-11-11 PROCEDURE — 97162 PT EVAL MOD COMPLEX 30 MIN: CPT

## 2023-11-11 PROCEDURE — 700105 HCHG RX REV CODE 258: Performed by: PHYSICIAN ASSISTANT

## 2023-11-11 PROCEDURE — 700111 HCHG RX REV CODE 636 W/ 250 OVERRIDE (IP): Performed by: PHYSICIAN ASSISTANT

## 2023-11-11 PROCEDURE — 97166 OT EVAL MOD COMPLEX 45 MIN: CPT

## 2023-11-11 PROCEDURE — 96366 THER/PROPH/DIAG IV INF ADDON: CPT

## 2023-11-11 PROCEDURE — 96372 THER/PROPH/DIAG INJ SC/IM: CPT

## 2023-11-11 PROCEDURE — 700102 HCHG RX REV CODE 250 W/ 637 OVERRIDE(OP)

## 2023-11-11 RX ORDER — POLYETHYLENE GLYCOL 3350 17 G/17G
1 POWDER, FOR SOLUTION ORAL DAILY
Status: DISCONTINUED | OUTPATIENT
Start: 2023-11-11 | End: 2023-11-13 | Stop reason: HOSPADM

## 2023-11-11 RX ADMIN — ATORVASTATIN CALCIUM 40 MG: 40 TABLET, FILM COATED ORAL at 20:21

## 2023-11-11 RX ADMIN — DOCUSATE SODIUM 100 MG: 100 CAPSULE, LIQUID FILLED ORAL at 17:49

## 2023-11-11 RX ADMIN — DOCUSATE SODIUM 100 MG: 100 CAPSULE, LIQUID FILLED ORAL at 05:57

## 2023-11-11 RX ADMIN — PREGABALIN 100 MG: 100 CAPSULE ORAL at 17:49

## 2023-11-11 RX ADMIN — ANTACID TABLETS 500 MG: 500 TABLET, CHEWABLE ORAL at 05:57

## 2023-11-11 RX ADMIN — Medication 1 APPLICATOR: at 05:59

## 2023-11-11 RX ADMIN — OXYCODONE HYDROCHLORIDE 20 MG: 20 TABLET, FILM COATED, EXTENDED RELEASE ORAL at 08:40

## 2023-11-11 RX ADMIN — Medication 200 MG: at 17:49

## 2023-11-11 RX ADMIN — CEFAZOLIN 2 G: 2 INJECTION, POWDER, FOR SOLUTION INTRAMUSCULAR; INTRAVENOUS at 00:00

## 2023-11-11 RX ADMIN — OXYCODONE HYDROCHLORIDE 20 MG: 20 TABLET, FILM COATED, EXTENDED RELEASE ORAL at 03:15

## 2023-11-11 RX ADMIN — OXYCODONE HYDROCHLORIDE 20 MG: 20 TABLET, FILM COATED, EXTENDED RELEASE ORAL at 14:35

## 2023-11-11 RX ADMIN — LEVOTHYROXINE SODIUM 88 MCG: 0.09 TABLET ORAL at 05:57

## 2023-11-11 RX ADMIN — POLYETHYLENE GLYCOL 3350 1 PACKET: 17 POWDER, FOR SOLUTION ORAL at 11:20

## 2023-11-11 RX ADMIN — Medication 1 APPLICATOR: at 17:49

## 2023-11-11 RX ADMIN — ANTACID TABLETS 500 MG: 500 TABLET, CHEWABLE ORAL at 17:49

## 2023-11-11 RX ADMIN — ENOXAPARIN SODIUM 40 MG: 100 INJECTION SUBCUTANEOUS at 17:49

## 2023-11-11 RX ADMIN — DOCUSATE SODIUM 50 MG AND SENNOSIDES 8.6 MG 1 TABLET: 8.6; 5 TABLET, FILM COATED ORAL at 20:21

## 2023-11-11 RX ADMIN — PREGABALIN 100 MG: 100 CAPSULE ORAL at 05:57

## 2023-11-11 RX ADMIN — LIOTHYRONINE SODIUM 5 MCG: 5 TABLET ORAL at 05:57

## 2023-11-11 RX ADMIN — LOSARTAN POTASSIUM 50 MG: 50 TABLET, FILM COATED ORAL at 05:57

## 2023-11-11 RX ADMIN — OXYCODONE HYDROCHLORIDE 20 MG: 20 TABLET, FILM COATED, EXTENDED RELEASE ORAL at 20:21

## 2023-11-11 ASSESSMENT — COGNITIVE AND FUNCTIONAL STATUS - GENERAL
DRESSING REGULAR LOWER BODY CLOTHING: A LOT
HELP NEEDED FOR BATHING: A LOT
WALKING IN HOSPITAL ROOM: TOTAL
MOVING FROM LYING ON BACK TO SITTING ON SIDE OF FLAT BED: UNABLE
TURNING FROM BACK TO SIDE WHILE IN FLAT BAD: A LOT
DRESSING REGULAR UPPER BODY CLOTHING: A LITTLE
PERSONAL GROOMING: A LITTLE
MOVING TO AND FROM BED TO CHAIR: A LOT
MOBILITY SCORE: 10
DAILY ACTIVITIY SCORE: 16
CLIMB 3 TO 5 STEPS WITH RAILING: TOTAL
TOILETING: A LOT
SUGGESTED CMS G CODE MODIFIER DAILY ACTIVITY: CK
SUGGESTED CMS G CODE MODIFIER MOBILITY: CL
STANDING UP FROM CHAIR USING ARMS: A LITTLE

## 2023-11-11 ASSESSMENT — ACTIVITIES OF DAILY LIVING (ADL): TOILETING: INDEPENDENT

## 2023-11-11 ASSESSMENT — PAIN DESCRIPTION - PAIN TYPE
TYPE: ACUTE PAIN
TYPE: ACUTE PAIN;SURGICAL PAIN
TYPE: ACUTE PAIN;SURGICAL PAIN
TYPE: SURGICAL PAIN
TYPE: ACUTE PAIN
TYPE: ACUTE PAIN;SURGICAL PAIN
TYPE: SURGICAL PAIN

## 2023-11-11 ASSESSMENT — GAIT ASSESSMENTS: GAIT LEVEL OF ASSIST: UNABLE TO PARTICIPATE

## 2023-11-11 NOTE — DISCHARGE PLANNING
Care Transition Team Assessment    Information Source  Orientation Level: Oriented X4  Information Given By: Patient  Informant's Name: Sheyla Gauthier  Who is responsible for making decisions for patient? : Patient    Readmission Evaluation  Is this a readmission?: No    Elopement Risk  Legal Hold: No  Ambulatory or Self Mobile in Wheelchair: No-Not an Elopement Risk  Elopement Risk: Not at Risk for Elopement    Interdisciplinary Discharge Planning  Lives with - Patient's Self Care Capacity: Alone and Able to Care For Self, Other (Comments) ([facility] has [facility] hours a day.)  Patient or legal guardian wants to designate a caregiver: No  Support Systems: Children  Housing / Facility: [address]  Do You Take your Prescribed Medications Regularly: Yes  Able to Return to Previous ADL's: Future Time w/Therapy  Mobility Issues: Yes  Prior Services: [facility]  Patient Prefers to be Discharged to:: inpatient rehab  Assistance Needed: Unknown at this Time  Durable Medical Equipment: Walker, Other - Specify (Patient has a wheelchair and a single prong cane.)    Discharge Preparedness  What is your plan after discharge?: Home with help (Post inpatient rehab)  What are your discharge supports?: Child, Other (comment) (Patient also has hired help at home for 20 hours per day,)  Prior Functional Level: Ambulatory    Functional Assesment  Prior Functional Level: Ambulatory    Finances  Financial Barriers to Discharge: No  Prescription Coverage: Yes (uses [facility] on [address].)    Vision / Hearing Impairment  Right Eye Vision: Patient Declines to Wear Visual Aid  Left Eye Vision: Patient Declines to Wear Visual Aid         Advance Directive  Advance Directive?: Living Will (Per the patient her daughters are the Advanced Directive.)    Domestic Abuse  Have you ever been the victim of abuse or violence?: No  Physical Abuse or Sexual Abuse: No  Verbal Abuse or Emotional Abuse: No  Possible  Abuse/Neglect Reported to:: Not Applicable    Psychological Assessment  History of Substance Abuse: None  History of Psychiatric Problems: No    Discharge Risks or Barriers  Discharge risks or barriers?: No    Anticipated Discharge Information  Discharge Disposition: Disch to  rehab facility or distinct part unit (62)  Discharge Address: [address] [place] [id number]  Discharge Contact Phone Number: [phone]    Patient discussed in rounds.  Patient is not medically cleared.  Occupation therapy is recommending post acute placement.   Reji BUCKLEY in rounds. Per Reji the preference is for the patient to go to rehab,  PMR consult is pending.     CM met with the patient at the bedside to discuss discharge planning.  Patient's prefers to go to [facility].   Choice form obtained.   Daughters are at the bedside. Per the family, the patient has 20 hours a day care with [facility] in the home because she lives alone. Per the daughter Luh Nevarez , she and her sister is with her throughout the day. Luh lives four hours away and will transport the patient home on discharge.and the daughter Ade is there 10 days at a time.  CM will f/u with discharge planning.

## 2023-11-11 NOTE — CARE PLAN
The patient is Stable - Low risk of patient condition declining or worsening    Shift Goals  Clinical Goals: pain control, hemovac, mobility  Patient Goals: pain control  Family Goals: na    Progress made toward(s) clinical / shift goals:    Problem: Pain - Standard  Goal: Alleviation of pain or a reduction in pain to the patient’s comfort goal  Outcome: Progressing  Note: Pt states pain is relieved with current pain medication regimen. Pt medicated per MAR with + results. Pt provided ice packs and pillows for comfort.        Problem: Fall Risk  Goal: Patient will remain free from falls  Outcome: Progressing  Note: Bed alarm on, call light and side table in reach, treaded socks on, no DME at bedside, hourly rounding in place.

## 2023-11-11 NOTE — PROGRESS NOTES
Neurosurgery Progress Note    Subjective:  POD#1 redo L5-S1 laminectomy    Doing well this AM, no acute events overnight.  Tolerable back pain, some improvement in bilateral lower extremity radicular pain.  Minimal ambulation, patient was in a wheelchair prior to surgery due to pain.    Drain with 140cc/8 hours    PT/OT recommending post acute placement, patient would benefit from [facility], does not want SNF placement    Exam:  Dressing CDI.    Motor 4+/5 to left leg globally due to deconditioning, 5/5 in right leg  Sensation intact.         BP  Min: 94/48  Max: 155/73  Pulse  Av.1  Min: 68  Max: 90  Resp  Av.8  Min: 10  Max: 16  Temp  Av.6 °C (97.9 °F)  Min: 36.4 °C (97.5 °F)  Max: 37 °C (98.6 °F)  Monitored Temp 2  Av.6 °C (97.8 °F)  Min: 36.5 °C (97.7 °F)  Max: 36.6 °C (97.9 °F)  SpO2  Av.3 %  Min: 90 %  Max: 99 %    No data recorded                      Intake/Output                         11/10/23 07 - 23 0659 23 07 - 23 0659      3417-2050 Total  1248-1239 Total                 Intake    I.V.  2083.8  -- 2083.8  --  -- --    Propofol Volume 83.8 -- 83.8 -- -- --    Volume (mL) (lactated ringers infusion) 2000 -- -- --    Total Intake 2083.8 -- 2083.8 -- -- --       Output    Urine  --  850 850  --  -- --    Urine Void (mL) -- 850 850 -- -- --    Drains  150  140 290  --  -- --    Output (mL) (Closed/Suction Drain 1 Midline;Posterior Back Hemovac) 150 140 290 -- -- --    Stool  --  -- --  --  -- --    Number of Times Stooled 1 x -- 1 x -- -- --    Blood  50  -- 50  --  -- --    Est. Blood Loss 50 -- 50 -- -- --    Total Output  -- -- --       Net I/O     1883.8 -990 893.8 -- -- --              Intake/Output Summary (Last 24 hours) at 2023 1052  Last data filed at 2023 0321  Gross per 24 hour   Intake 2083.81 ml   Output 1190 ml   Net 893.81 ml             atorvastatin  40 mg QHS    levothyroxine  88 mcg AM ES     liothyronine  5 mcg QAM    losartan  50 mg QAM    magnesium oxide  200 mg Q EVENING    oxyCODONE CR  20 mg Q6HRS    pregabalin  100 mg BID    spironolactone  25 mg DAILY    Pharmacy Consult Request  1 Each PHARMACY TO DOSE    MD ALERT...DO NOT ADMINISTER NSAIDS or ASPIRIN unless ORDERED By Neurosurgery  1 Each PRN    docusate sodium  100 mg BID    senna-docusate  1 Tablet Nightly    senna-docusate  1 Tablet Q24HRS PRN    polyethylene glycol/lytes  1 Packet BID PRN    magnesium hydroxide  30 mL QDAY PRN    bisacodyl  10 mg Q24HRS PRN    sodium phosphate  1 Each Once PRN    0.9 % NaCl with KCl 20 mEq 1,000 mL   Continuous    enoxaparin (LOVENOX) injection  40 mg DAILY AT 1800    oxyCODONE immediate-release  2.5 mg Q3HRS PRN    Or    oxyCODONE immediate-release  5 mg Q3HRS PRN    Or    HYDROmorphone  0.25 mg Q3HRS PRN    diphenhydrAMINE  25 mg Q6HRS PRN    Or    diphenhydrAMINE  25 mg Q6HRS PRN    ondansetron  4 mg Q4HRS PRN    ondansetron  4 mg Q4HRS PRN    tizanidine  2 mg TID PRN    benzocaine-menthol  1 Lozenge Q2HRS PRN    calcium carbonate  500 mg BID    HYDROcodone/acetaminophen  1 Tablet Q4HRS PRN    carboxymethylcellulose  1 Drop PRN    Nozin nasal  swab  1 Applicator BID       Assessment and Plan:  Hospital day # 2  POD# 1  PT/OT mobilize as tolerated  Ice incisions  Keep drain with ice pack on drain site.  Patient would benefit from [facility], order has been placed.       Chemical prophylactic DVT therapy: Yes - Lovenox 40mg/qd    Start date/time: today

## 2023-11-11 NOTE — THERAPY
Occupational Therapy   Initial Evaluation     Patient Name: Sheyla Gauthier  Age:  82 y.o., Sex:  female  Medical Record #: [id number]  Today's Date: 11/11/2023     Precautions  Precautions: Fall Risk, Spinal / Back Precautions   Comments: no brace; hemovac    Assessment  Patient is 82 y.o. female s/p redo L5-S1 laminectomy. Pt's dtr present and supportive. Reports 1 mo ago pt was independent at home, but has needed increasing assist. Reports she has been using w/c in home and doing SPTxfs only d/t LLE pain, and sponge bathing as unable to get into shower. Dtrs rotate to assist pt and they have hired a caregiver to come intermittently to also assist when they cannot be there. Currently limited by decreased functional mobility, activity tolerance, cognition, strength, balance, adherence to precautions, and pain which are currently affecting pt's ability to complete ADLs/IADLs at baseline. Will continue to follow. ,    Plan    Occupational Therapy Initial Treatment Plan   Treatment Interventions: Self Care / Activities of Daily Living, Adaptive Equipment, Neuro Re-Education / Balance, Therapeutic Exercises, Therapeutic Activity  Treatment Frequency: 4 Times per Week  Duration: Until Therapy Goals Met    DC Equipment Recommendations: Unable to determine at this time  Discharge Recommendations: Recommend post-acute placement for additional occupational therapy services prior to discharge home (PM&R consult)      Objective     11/11/23 0852   Charge Group   Prior Living Situation   Prior Services Intermittent Physical Support for ADL Per Family;Intermittent Physical Support for ADL Per Service  (for the last month, but prior to that pt was independent)   Housing / Facility [address]   Steps Into Home 0  (ramp)   Bathroom Set up Walk In Shower;Shower Chair   Equipment Owned Grab Bar(s) In Tub / Shower;Tub / Shower Seat;Front-Wheel Walker;Wheelchair;Ramp;Reacher   Lives with - Patient's Self Care Capacity Alone and  Unable to Care For Self   Comments Pt's dtr present and supportive. Reports 1 mo ago pt was I at home, but has needed increasing assist. Reports she has been using w/c in home and doing SPTxfs only d/t LLE pain. Dtrs rotate to assist pt and they have hired a caregiver to come intermittently to also assist when they cannot be there.   Prior Level of ADL Function   Self Feeding Independent   Grooming / Hygiene Independent   Bathing Independent  (recently has been unable to bathe and so has been sponge bathing)   Dressing Independent   Toileting Independent   Prior Level of IADL Function   Medication Management Independent   Laundry Independent   Kitchen Mobility Independent   Finances Independent   Home Management Independent   Shopping Independent   Prior Level Of Mobility Independent With Device in Home   Occupation (Pre-Hospital Vocational) Retired Due To Age   Precautions   Precautions Fall Risk;Spinal / Back Precautions    Comments no brace; hemovac   Vitals   Vitals Comments c/o dizziness with sup>sit, but quickly resolved   Pain 0 - 10 Group   Location Back;Groin   Location Orientation Mid;Left   Therapist Pain Assessment Post Activity;During Activity;Nurse Notified  (not quantified)   Cognition    Cognition / Consciousness X   Level of Consciousness Alert   Safety Awareness Impaired   New Learning Impaired   Attention Impaired   Sequencing Impaired   Comments very pleasant and cooperative; receptive to education. req some repetition as intermittent answered a question taht wasn't asked, may have slight delay   Passive ROM Upper Body   Passive ROM Upper Body WDL   Active ROM Upper Body   Active ROM Upper Body  WDL   Strength Upper Body   Upper Body Strength  X   Gross Strength Generalized Weakness, Equal Bilaterally.    Coordination Upper Body   Coordination WDL   Balance Assessment   Sitting Balance (Static) Fair   Sitting Balance (Dynamic) Fair -   Standing Balance (Static) Fair -   Standing Balance (Dynamic)  Poor +   Weight Shift Sitting Fair   Weight Shift Standing Poor   Comments w/FWW; req extra time for weight shift to LLE adn sequencing with Fww   Bed Mobility    Supine to Sit Minimal Assist   Sit to Supine   (left up in recliner)   Scooting Minimal Assist   Rolling Contact Guard Assist   Comments heavy use of rail   ADL Assessment   Eating Supervision   Grooming Seated;Standby Assist   Lower Body Dressing Maximal Assist   Toileting Maximal Assist  (able to get to BSC; using purewick)   Comments Began education on spinal precautions, adaptive techniques for ADL/txfs, safety with FWW during txfs, d/c to w/c level vs post acute placement, SNF vs IRF, and importance of continued OOB w/staff while in house and at post acute care.   Functional Mobility   Sit to Stand Minimal Assist   Bed, Chair, Wheelchair Transfer Minimal Assist   Toilet Transfers   (declined need; likely could get to)   Transfer Method Stand Step   Mobility w/FWW; bed>side steps>recliner. steps improved with repetition   Edema / Skin Assessment   Comments hemovac in place pre/post session   Activity Tolerance   Sitting in Chair left up in chair   Comments limited by fatigue and pain   Patient / Family Goals   Patient / Family Goal #1 to walk again   Short Term Goals   Short Term Goal # 1 LB dressing with SPV and AE PRN   Short Term Goal # 2 standing g/h with min A and RBs PRN   Short Term Goal # 3 ADL txf with SPV   Short Term Goal # 4 toileting with SPV   Education Group   Education Provided Role of Occupational Therapist;Pathology of bedrest;Spinal Precautions   Role of Occupational Therapist Patient Response Patient;Family;Acceptance;Explanation;Verbal Demonstration;Reinforcement Needed   Spinal Precautions Patient Response Patient;Family;Acceptance;Verbal Demonstration;Reinforcement Needed   Pathology of Bedrest Patient Response Patient;Family;Acceptance;Explanation;Verbal Demonstration;Reinforcement Needed   Occupational Therapy Initial Treatment  Plan    Treatment Interventions Self Care / Activities of Daily Living;Adaptive Equipment;Neuro Re-Education / Balance;Therapeutic Exercises;Therapeutic Activity   Treatment Frequency 4 Times per Week   Duration Until Therapy Goals Met   Problem List   Problem List Decreased Active Daily Living Skills;Decreased Homemaking Skills;Decreased Upper Extremity Strength Right;Decreased Upper Extremity Strength Left;Decreased Functional Mobility;Decreased Activity Tolerance;Safety Awareness Deficits / Cognition;Impaired Cognitive Function;Impaired Postural Control / Balance;Limited Knowledge of Post Op Precautions

## 2023-11-11 NOTE — PROGRESS NOTES
4 Eyes Skin Assessment Completed by Tu RN and PAOLA Alvares.    Head WDL  Ears WDL  Nose WDL  Mouth WDL  Neck WDL  Breast/Chest WDL  Shoulder Blades WDL  Spine Incision  (R) Arm/Elbow/Hand WDL  (L) Arm/Elbow/Hand WDL  Abdomen WDL  Groin WDL  Scrotum/Coccyx/Buttocks WDL  (R) Leg WDL  (L) Leg WDL  (R) Heel/Foot/Toe WDL  (L) Heel/Foot/Toe WDL          Devices In Places Blood Pressure Cuff, Pulse Ox, SCD's, and Nasal Cannula      Interventions In Place Gray Ear Foams and Pillows    Possible Skin Injury No    Pictures Uploaded Into Epic N/A  Wound Consult Placed N/A  RN Wound Prevention Protocol Ordered No

## 2023-11-11 NOTE — DISCHARGE PLANNING
"This DPA sent out referrals to [facility] and [facility] at the request of the RN CM      0949:  Agency/Facility name: [facility]  Spoke to: Shanice  Outcome: patient is in \"observation/outpatient\" patient class, and does not qualify for a SNF. DPA will check back with RN CM after rounds to see if her status changes     "

## 2023-11-12 PROCEDURE — 700111 HCHG RX REV CODE 636 W/ 250 OVERRIDE (IP): Mod: JZ | Performed by: PHYSICIAN ASSISTANT

## 2023-11-12 PROCEDURE — G0378 HOSPITAL OBSERVATION PER HR: HCPCS

## 2023-11-12 PROCEDURE — A9270 NON-COVERED ITEM OR SERVICE: HCPCS | Performed by: PHYSICIAN ASSISTANT

## 2023-11-12 PROCEDURE — G2212 PROLONG OUTPT/OFFICE VIS: HCPCS | Performed by: PHYSICAL MEDICINE & REHABILITATION

## 2023-11-12 PROCEDURE — 700102 HCHG RX REV CODE 250 W/ 637 OVERRIDE(OP)

## 2023-11-12 PROCEDURE — 99205 OFFICE O/P NEW HI 60 MIN: CPT | Mod: 25 | Performed by: PHYSICAL MEDICINE & REHABILITATION

## 2023-11-12 PROCEDURE — 700102 HCHG RX REV CODE 250 W/ 637 OVERRIDE(OP): Performed by: PHYSICIAN ASSISTANT

## 2023-11-12 PROCEDURE — 96372 THER/PROPH/DIAG INJ SC/IM: CPT

## 2023-11-12 PROCEDURE — A9270 NON-COVERED ITEM OR SERVICE: HCPCS

## 2023-11-12 RX ORDER — OXYCODONE HCL 20 MG/1
20 TABLET, FILM COATED, EXTENDED RELEASE ORAL EVERY 12 HOURS
Status: DISCONTINUED | OUTPATIENT
Start: 2023-11-12 | End: 2023-11-13

## 2023-11-12 RX ADMIN — ATORVASTATIN CALCIUM 40 MG: 40 TABLET, FILM COATED ORAL at 21:34

## 2023-11-12 RX ADMIN — ANTACID TABLETS 500 MG: 500 TABLET, CHEWABLE ORAL at 17:26

## 2023-11-12 RX ADMIN — OXYCODONE HYDROCHLORIDE 20 MG: 20 TABLET, FILM COATED, EXTENDED RELEASE ORAL at 17:25

## 2023-11-12 RX ADMIN — DOCUSATE SODIUM 100 MG: 100 CAPSULE, LIQUID FILLED ORAL at 04:35

## 2023-11-12 RX ADMIN — LEVOTHYROXINE SODIUM 88 MCG: 0.09 TABLET ORAL at 04:35

## 2023-11-12 RX ADMIN — SPIRONOLACTONE 25 MG: 25 TABLET ORAL at 17:26

## 2023-11-12 RX ADMIN — PREGABALIN 100 MG: 100 CAPSULE ORAL at 04:35

## 2023-11-12 RX ADMIN — ANTACID TABLETS 500 MG: 500 TABLET, CHEWABLE ORAL at 04:35

## 2023-11-12 RX ADMIN — OXYCODONE HYDROCHLORIDE 20 MG: 20 TABLET, FILM COATED, EXTENDED RELEASE ORAL at 04:35

## 2023-11-12 RX ADMIN — Medication 1 APPLICATOR: at 17:26

## 2023-11-12 RX ADMIN — LIOTHYRONINE SODIUM 5 MCG: 5 TABLET ORAL at 04:35

## 2023-11-12 RX ADMIN — POLYETHYLENE GLYCOL 3350 1 PACKET: 17 POWDER, FOR SOLUTION ORAL at 04:35

## 2023-11-12 RX ADMIN — ENOXAPARIN SODIUM 40 MG: 100 INJECTION SUBCUTANEOUS at 17:26

## 2023-11-12 RX ADMIN — LOSARTAN POTASSIUM 50 MG: 50 TABLET, FILM COATED ORAL at 04:51

## 2023-11-12 RX ADMIN — OXYCODONE HYDROCHLORIDE 20 MG: 20 TABLET, FILM COATED, EXTENDED RELEASE ORAL at 08:54

## 2023-11-12 RX ADMIN — PREGABALIN 100 MG: 100 CAPSULE ORAL at 17:25

## 2023-11-12 RX ADMIN — Medication 1 APPLICATOR: at 04:41

## 2023-11-12 RX ADMIN — Medication 200 MG: at 17:25

## 2023-11-12 ASSESSMENT — LIFESTYLE VARIABLES
EVER HAD A DRINK FIRST THING IN THE MORNING TO STEADY YOUR NERVES TO GET RID OF A HANGOVER: NO
TOTAL SCORE: 0
EVER FELT BAD OR GUILTY ABOUT YOUR DRINKING: NO
HAVE YOU EVER FELT YOU SHOULD CUT DOWN ON YOUR DRINKING: NO
TOTAL SCORE: 0
ALCOHOL_USE: NO
TOTAL SCORE: 0
CONSUMPTION TOTAL: INCOMPLETE
HAVE PEOPLE ANNOYED YOU BY CRITICIZING YOUR DRINKING: NO
DOES PATIENT WANT TO STOP DRINKING: CANNOT ASSESS

## 2023-11-12 ASSESSMENT — PAIN DESCRIPTION - PAIN TYPE
TYPE: ACUTE PAIN

## 2023-11-12 ASSESSMENT — PATIENT HEALTH QUESTIONNAIRE - PHQ9
1. LITTLE INTEREST OR PLEASURE IN DOING THINGS: NOT AT ALL
SUM OF ALL RESPONSES TO PHQ9 QUESTIONS 1 AND 2: 0
2. FEELING DOWN, DEPRESSED, IRRITABLE, OR HOPELESS: NOT AT ALL

## 2023-11-12 NOTE — PROGRESS NOTES
Neurosurgery Progress Note    Subjective:  POD#2 redo L5-S1 laminectomy    Doing well this AM, no acute events overnight.  Tolerable back pain, some improvement in bilateral lower extremity radicular pain.  Minimal ambulation, patient was in a wheelchair prior to surgery due to pain.    Drain with 60cc/8 hours    PT/OT recommending post acute placement, patient would benefit from [facility], does not want SNF placement    Exam:  Dressing CDI.    Motor 4+/5 to left leg globally due to deconditioning, 5/5 in right leg  Sensation intact.         BP  Min: 113/56  Max: 138/61  Pulse  Av.4  Min: 63  Max: 97  Resp  Avg: 15.8  Min: 15  Max: 16  Temp  Av.5 °C (97.7 °F)  Min: 36.4 °C (97.5 °F)  Max: 36.6 °C (97.9 °F)  SpO2  Av.2 %  Min: 91 %  Max: 98 %    No data recorded                      Intake/Output                         23 0700 - 2359 23 07 - 23 0659     [id number] 0983-6901 Total  8234-8959 Total                 Intake    Total Intake -- -- -- -- -- --       Output    Urine  650  -- 650  --  -- --    Number of Times Voided -- 2 x 2 x -- -- --    Urine Void (mL) 650 -- 650 -- -- --    Drains  130  160 290  --  -- --    Output (mL) (Closed/Suction Drain 1 Midline;Posterior Back Hemovac) 130 160 290 -- -- --    Stool  --  -- --  --  -- --    Number of Times Stooled -- -- -- 1 x -- 1 x    Total Output 780 160 940 -- -- --       Net I/O     -780 -160 -940 -- -- --              Intake/Output Summary (Last 24 hours) at 2023 0947  Last data filed at 2023 0428  Gross per 24 hour   Intake --   Output 640 ml   Net -640 ml               polyethylene glycol/lytes  1 Packet DAILY    atorvastatin  40 mg QHS    levothyroxine  88 mcg AM ES    liothyronine  5 mcg QAM    losartan  50 mg QAM    magnesium oxide  200 mg Q EVENING    oxyCODONE CR  20 mg Q6HRS    pregabalin  100 mg BID    spironolactone  25 mg DAILY    Pharmacy Consult Request  1 Each PHARMACY TO DOSE    MD  ALERT...DO NOT ADMINISTER NSAIDS or ASPIRIN unless ORDERED By Neurosurgery  1 Each PRN    docusate sodium  100 mg BID    senna-docusate  1 Tablet Nightly    senna-docusate  1 Tablet Q24HRS PRN    magnesium hydroxide  30 mL QDAY PRN    bisacodyl  10 mg Q24HRS PRN    sodium phosphate  1 Each Once PRN    0.9 % NaCl with KCl 20 mEq 1,000 mL   Continuous    enoxaparin (LOVENOX) injection  40 mg DAILY AT 1800    oxyCODONE immediate-release  2.5 mg Q3HRS PRN    Or    oxyCODONE immediate-release  5 mg Q3HRS PRN    Or    HYDROmorphone  0.25 mg Q3HRS PRN    diphenhydrAMINE  25 mg Q6HRS PRN    Or    diphenhydrAMINE  25 mg Q6HRS PRN    ondansetron  4 mg Q4HRS PRN    ondansetron  4 mg Q4HRS PRN    tizanidine  2 mg TID PRN    benzocaine-menthol  1 Lozenge Q2HRS PRN    calcium carbonate  500 mg BID    HYDROcodone/acetaminophen  1 Tablet Q4HRS PRN    carboxymethylcellulose  1 Drop PRN    Nozin nasal  swab  1 Applicator BID       Assessment and Plan:  Hospital day # 3  POD# 2  PT/OT mobilize as tolerated  Ice incisions  Adjusting medications  Keep drain with ice pack on drain site, okay to remove if meets criteria  Patient would benefit from [facility], order has been placed. Hopefully physiatry evaluates tomorrow.      Chemical prophylactic DVT therapy: Yes - Lovenox 40mg/qd    Start date/time: today

## 2023-11-12 NOTE — CARE PLAN
The patient is Stable - Low risk of patient condition declining or worsening    Shift Goals  Clinical Goals: drain out put, safety, mobiility  Patient Goals: pain control, mobility  Family Goals: comfort    Progress made toward(s) clinical / shift goals:            Problem: Pain - Standard  Goal: Alleviation of pain or a reduction in pain to the patient’s comfort goal  11/12/2023 1405 by Rosi Brennan REnriquetaN.  Outcome: Progressing  11/12/2023 1405 by Rosi Brennan R.N.  Outcome: Progressing     Problem: Fall Risk  Goal: Patient will remain free from falls  11/12/2023 1405 by PAULINO HuitronN.  Outcome: Progressing  11/12/2023 1405 by Rosi Brennan REnriquetaN.  Outcome: Progressing     Problem: Knowledge Deficit - Standard  Goal: Patient and family/care givers will demonstrate understanding of plan of care, disease process/condition, diagnostic tests and medications  11/12/2023 1405 by Rosi Brennan R.N.  Outcome: Progressing  11/12/2023 1405 by Rosi Brennan R.N.  Outcome: Progressing

## 2023-11-12 NOTE — THERAPY
Physical Therapy   Initial Evaluation     Patient Name: Sheyla Gauthier  Age:  82 y.o., Sex:  female  Medical Record #: [id number]  Today's Date: 11/11/2023     Precautions  Precautions: Fall Risk;Spinal / Back Precautions   Comments: no brace per order    Assessment  Patient is an 82 y.o. female with hx of HTN and hypothyroidism admitted s/p L5-S1 redo laminectomy on 11/10. PT eval complete, pt currently presents below her functional baseline due to pain and impaired strength, balance, activity tolerance, and mobility. Up til 1 month ago, pt was able to ambulate around her home, however she has been at WC level since then due to LLE deficits. She is at Min A for STS with FWW and cannot weight shift onto her L foot sufficiently for ambulation. Pt's dtr present to report that the plan is for pt to go to rehab initially and then move to [place] in an senior living; recommend PM&R consult. Will follow while admitted for skilled PT intervention.     Plan    Physical Therapy Initial Treatment Plan   Treatment Plan : Bed Mobility, Gait Training, Neuro Re-Education / Balance, Self Care / Home Evaluation, Stair Training, Therapeutic Activities, Therapeutic Exercise  Treatment Frequency: 4 Times per Week  Duration: Until Therapy Goals Met    DC Equipment Recommendations: Unable to determine at this time  Discharge Recommendations: Recommend post-acute placement for additional physical therapy services prior to discharge home (IPR)         Vitals   O2 (LPM) 0   O2 Delivery Device None - Room Air   Pain 0 - 10 Group   Location Back   Location Orientation Mid   Therapist Pain Assessment During Activity   Prior Living Situation   Prior Services [facility]   Housing / Facility 1 Story House   Steps Into Home 0  (ramp)   Equipment Owned Front-Wheel Walker;Wheelchair;Ramp   Lives with - Patient's Self Care Capacity Alone and Unable to Care For Self   Comments Pt's dtr present and supportive. Reports 1 mo ago pt was I  at home, but has needed increasing assist. Reports she has been using w/c in home and doing SPTxfs only d/t LLE pain. Dtrs rotate to assist pt and they have hired a caregiver to come intermittently to also assist when they cannot be there.   Prior Level of Functional Mobility   Bed Mobility Independent   Transfer Status Independent   Ambulation Independent   Ambulation Distance household distances   Assistive Devices Used Front-Wheel Walker   Wheelchair Independent   Comments pt has not been ambulating the past month due to LLE dysfunction   Cognition    Cognition / Consciousness X   Level of Consciousness Alert   Safety Awareness Impaired   Attention Impaired   Comments pleasant and participatory, receptive to education   Active ROM Lower Body    Active ROM Lower Body  WDL   Strength Lower Body   Lower Body Strength  X   Comments LLE grossly 4/5, RLE grossly 5/5   Balance Assessment   Sitting Balance (Static) Fair   Sitting Balance (Dynamic) Fair -   Standing Balance (Static) Fair -   Standing Balance (Dynamic) Poor   Weight Shift Sitting Fair   Weight Shift Standing Poor   Comments FWW in standing   Bed Mobility    Comments NT, in chair pre/post   Gait Analysis   Gait Level Of Assist Unable to Participate   Comments unable to weight shift appropriately   Functional Mobility   Sit to Stand Minimal Assist   Mobility STS x2 from chair with FWW   Comments pt unable to weight shift onto LLE and lift R foot up   How much difficulty does the patient currently have...   Turning over in bed (including adjusting bedclothes, sheets and blankets)? 2   Sitting down on and standing up from a chair with arms (e.g., wheelchair, bedside commode, etc.) 1   Moving from lying on back to sitting on the side of the bed? 2   How much help from another person does the patient currently need...   Moving to and from a bed to a chair (including a wheelchair)? 3   Need to walk in a hospital room? 1   Climbing 3-5 steps with a railing? 1   6  clicks Mobility Score 10   Activity Tolerance   Sitting in Chair pre/pos   Standing 2 min   Comments limited by weakness and fatigue   Short Term Goals    Short Term Goal # 1 pt will move supine<>eob with hob flat and spv in 6 tx for bed mobility.   Short Term Goal # 2 pt will complete spt with fww and spv in 6 tx for functional mobility.   Short Term Goal # 3 pt will ambulate 150 ft with fww and spv in 6 tx for household distances.   Education Group   Education Provided Role of Physical Therapist;Spine Precautions   Spine Precautions Patient Response Patient;Family;Acceptance;Explanation;Verbal Demonstration   Role of Physical Therapist Patient Response Patient;Acceptance;Explanation;Verbal Demonstration   Physical Therapy Initial Treatment Plan    Treatment Plan  Bed Mobility;Gait Training;Neuro Re-Education / Balance;Self Care / Home Evaluation;Stair Training;Therapeutic Activities;Therapeutic Exercise   Treatment Frequency 4 Times per Week   Duration Until Therapy Goals Met   Problem List    Problems Pain;Impaired Bed Mobility;Impaired Transfers;Impaired Ambulation;Functional Strength Deficit;Impaired Balance;Decreased Activity Tolerance   Anticipated Discharge Equipment and Recommendations   DC Equipment Recommendations Unable to determine at this time   Discharge Recommendations Recommend post-acute placement for additional physical therapy services prior to discharge home  (IPR)   Interdisciplinary Plan of Care Collaboration   IDT Collaboration with  Family / Caregiver;Nursing   Patient Position at End of Therapy Seated;Chair Alarm On;Tray Table within Reach;Call Light within Reach;Phone within Reach;Family / Friend in Room   Collaboration Comments RN updated   Session Information   Date / Session Number  11/11- 1 (1/4, 11/17)

## 2023-11-13 ENCOUNTER — HOSPITAL ENCOUNTER (INPATIENT)
Facility: REHABILITATION | Age: 83
LOS: 9 days | DRG: 552 | End: 2023-11-22
Attending: PHYSICAL MEDICINE & REHABILITATION | Admitting: PHYSICAL MEDICINE & REHABILITATION
Payer: MEDICARE

## 2023-11-13 VITALS
BODY MASS INDEX: 28.56 KG/M2 | RESPIRATION RATE: 15 BRPM | HEIGHT: 62 IN | HEART RATE: 67 BPM | OXYGEN SATURATION: 90 % | WEIGHT: 155.2 LBS | DIASTOLIC BLOOD PRESSURE: 55 MMHG | SYSTOLIC BLOOD PRESSURE: 110 MMHG | TEMPERATURE: 97.7 F

## 2023-11-13 DIAGNOSIS — I10 PRIMARY HYPERTENSION: ICD-10-CM

## 2023-11-13 DIAGNOSIS — E78.2 MIXED HYPERLIPIDEMIA: ICD-10-CM

## 2023-11-13 DIAGNOSIS — M54.17 LUMBOSACRAL RADICULOPATHY: ICD-10-CM

## 2023-11-13 DIAGNOSIS — M48.062 SPINAL STENOSIS, LUMBAR REGION WITH NEUROGENIC CLAUDICATION: ICD-10-CM

## 2023-11-13 DIAGNOSIS — I25.10 CORONARY ARTERY DISEASE INVOLVING NATIVE CORONARY ARTERY OF NATIVE HEART WITHOUT ANGINA PECTORIS: ICD-10-CM

## 2023-11-13 PROBLEM — M54.10 RADICULOPATHY: Status: ACTIVE | Noted: 2023-11-13

## 2023-11-13 LAB
ALBUMIN SERPL BCP-MCNC: 3 G/DL (ref 3.2–4.9)
ALBUMIN/GLOB SERPL: 1.1 G/DL
ALP SERPL-CCNC: 77 U/L (ref 30–99)
ALT SERPL-CCNC: 9 U/L (ref 2–50)
ANION GAP SERPL CALC-SCNC: 9 MMOL/L (ref 7–16)
AST SERPL-CCNC: 13 U/L (ref 12–45)
BILIRUB SERPL-MCNC: 0.3 MG/DL (ref 0.1–1.5)
BUN SERPL-MCNC: 21 MG/DL (ref 8–22)
CALCIUM ALBUM COR SERPL-MCNC: 10.3 MG/DL (ref 8.5–10.5)
CALCIUM SERPL-MCNC: 9.5 MG/DL (ref 8.5–10.5)
CHLORIDE SERPL-SCNC: 107 MMOL/L (ref 96–112)
CO2 SERPL-SCNC: 24 MMOL/L (ref 20–33)
CREAT SERPL-MCNC: 0.85 MG/DL (ref 0.5–1.4)
ERYTHROCYTE [DISTWIDTH] IN BLOOD BY AUTOMATED COUNT: 47.7 FL (ref 35.9–50)
GFR SERPLBLD CREATININE-BSD FMLA CKD-EPI: 68 ML/MIN/1.73 M 2
GLOBULIN SER CALC-MCNC: 2.7 G/DL (ref 1.9–3.5)
GLUCOSE SERPL-MCNC: 98 MG/DL (ref 65–99)
HCT VFR BLD AUTO: 30.9 % (ref 37–47)
HGB BLD-MCNC: 9.7 G/DL (ref 12–16)
MCH RBC QN AUTO: 27.8 PG (ref 27–33)
MCHC RBC AUTO-ENTMCNC: 31.4 G/DL (ref 32.2–35.5)
MCV RBC AUTO: 88.5 FL (ref 81.4–97.8)
PLATELET # BLD AUTO: 197 K/UL (ref 164–446)
PMV BLD AUTO: 10.1 FL (ref 9–12.9)
POTASSIUM SERPL-SCNC: 4.7 MMOL/L (ref 3.6–5.5)
PROT SERPL-MCNC: 5.7 G/DL (ref 6–8.2)
RBC # BLD AUTO: 3.49 M/UL (ref 4.2–5.4)
SODIUM SERPL-SCNC: 140 MMOL/L (ref 135–145)
WBC # BLD AUTO: 7.7 K/UL (ref 4.8–10.8)

## 2023-11-13 PROCEDURE — 36415 COLL VENOUS BLD VENIPUNCTURE: CPT

## 2023-11-13 PROCEDURE — 700102 HCHG RX REV CODE 250 W/ 637 OVERRIDE(OP): Performed by: PHYSICAL MEDICINE & REHABILITATION

## 2023-11-13 PROCEDURE — 99223 1ST HOSP IP/OBS HIGH 75: CPT | Performed by: PHYSICAL MEDICINE & REHABILITATION

## 2023-11-13 PROCEDURE — 700102 HCHG RX REV CODE 250 W/ 637 OVERRIDE(OP)

## 2023-11-13 PROCEDURE — G0378 HOSPITAL OBSERVATION PER HR: HCPCS

## 2023-11-13 PROCEDURE — A9270 NON-COVERED ITEM OR SERVICE: HCPCS | Performed by: PHYSICAL MEDICINE & REHABILITATION

## 2023-11-13 PROCEDURE — 770010 HCHG ROOM/CARE - REHAB SEMI PRIVAT*

## 2023-11-13 PROCEDURE — 700111 HCHG RX REV CODE 636 W/ 250 OVERRIDE (IP): Mod: JZ | Performed by: PHYSICAL MEDICINE & REHABILITATION

## 2023-11-13 PROCEDURE — 700102 HCHG RX REV CODE 250 W/ 637 OVERRIDE(OP): Performed by: PHYSICIAN ASSISTANT

## 2023-11-13 PROCEDURE — 80053 COMPREHEN METABOLIC PANEL: CPT

## 2023-11-13 PROCEDURE — A9270 NON-COVERED ITEM OR SERVICE: HCPCS | Performed by: PHYSICIAN ASSISTANT

## 2023-11-13 PROCEDURE — A9270 NON-COVERED ITEM OR SERVICE: HCPCS

## 2023-11-13 PROCEDURE — 85027 COMPLETE CBC AUTOMATED: CPT

## 2023-11-13 RX ORDER — ONDANSETRON 4 MG/1
4 TABLET, ORALLY DISINTEGRATING ORAL 4 TIMES DAILY PRN
Status: DISCONTINUED | OUTPATIENT
Start: 2023-11-13 | End: 2023-11-22 | Stop reason: HOSPADM

## 2023-11-13 RX ORDER — PREGABALIN 100 MG/1
100 CAPSULE ORAL 2 TIMES DAILY
Status: CANCELLED | OUTPATIENT
Start: 2023-11-13

## 2023-11-13 RX ORDER — ATORVASTATIN CALCIUM 40 MG/1
40 TABLET, FILM COATED ORAL
Status: CANCELLED | OUTPATIENT
Start: 2023-11-13

## 2023-11-13 RX ORDER — ATORVASTATIN CALCIUM 40 MG/1
40 TABLET, FILM COATED ORAL
Status: DISCONTINUED | OUTPATIENT
Start: 2023-11-13 | End: 2023-11-22 | Stop reason: HOSPADM

## 2023-11-13 RX ORDER — LANOLIN ALCOHOL/MO/W.PET/CERES
200 CREAM (GRAM) TOPICAL EVERY EVENING
Status: CANCELLED | OUTPATIENT
Start: 2023-11-13

## 2023-11-13 RX ORDER — POLYETHYLENE GLYCOL 3350 17 G/17G
17 POWDER, FOR SOLUTION ORAL DAILY
Refills: 3 | Status: ON HOLD
Start: 2023-11-14 | End: 2023-11-21 | Stop reason: SDUPTHER

## 2023-11-13 RX ORDER — POLYETHYLENE GLYCOL 3350 17 G/17G
1 POWDER, FOR SOLUTION ORAL DAILY
Status: CANCELLED | OUTPATIENT
Start: 2023-11-14

## 2023-11-13 RX ORDER — TIZANIDINE 4 MG/1
2 TABLET ORAL 3 TIMES DAILY PRN
Status: CANCELLED | OUTPATIENT
Start: 2023-11-13

## 2023-11-13 RX ORDER — OXYCODONE HYDROCHLORIDE 5 MG/1
5 TABLET ORAL
Status: CANCELLED | OUTPATIENT
Start: 2023-11-13

## 2023-11-13 RX ORDER — POLYETHYLENE GLYCOL 3350 17 G/17G
1 POWDER, FOR SOLUTION ORAL DAILY
Status: DISCONTINUED | OUTPATIENT
Start: 2023-11-14 | End: 2023-11-22 | Stop reason: HOSPADM

## 2023-11-13 RX ORDER — OXYCODONE HYDROCHLORIDE 5 MG/1
2.5 TABLET ORAL
Status: DISCONTINUED | OUTPATIENT
Start: 2023-11-13 | End: 2023-11-22 | Stop reason: HOSPADM

## 2023-11-13 RX ORDER — SPIRONOLACTONE 25 MG/1
25 TABLET ORAL DAILY
Qty: 30 TABLET | Refills: 3 | Status: ON HOLD
Start: 2023-11-13 | End: 2023-11-21

## 2023-11-13 RX ORDER — LOSARTAN POTASSIUM 50 MG/1
50 TABLET ORAL EVERY MORNING
Status: DISCONTINUED | OUTPATIENT
Start: 2023-11-14 | End: 2023-11-22 | Stop reason: HOSPADM

## 2023-11-13 RX ORDER — LIOTHYRONINE SODIUM 5 UG/1
5 TABLET ORAL EVERY MORNING
Status: CANCELLED | OUTPATIENT
Start: 2023-11-14

## 2023-11-13 RX ORDER — HYDROMORPHONE HYDROCHLORIDE 2 MG/ML
0.25 INJECTION, SOLUTION INTRAMUSCULAR; INTRAVENOUS; SUBCUTANEOUS
Status: DISCONTINUED | OUTPATIENT
Start: 2023-11-13 | End: 2023-11-13

## 2023-11-13 RX ORDER — OXYCODONE HYDROCHLORIDE 5 MG/1
5 TABLET ORAL
Qty: 30 TABLET | Refills: 0 | Status: ON HOLD
Start: 2023-11-13 | End: 2023-11-21

## 2023-11-13 RX ORDER — OXYCODONE HYDROCHLORIDE 5 MG/1
2.5 TABLET ORAL
Status: CANCELLED | OUTPATIENT
Start: 2023-11-13

## 2023-11-13 RX ORDER — HYDROMORPHONE HYDROCHLORIDE 1 MG/ML
0.25 INJECTION, SOLUTION INTRAMUSCULAR; INTRAVENOUS; SUBCUTANEOUS
Status: CANCELLED | OUTPATIENT
Start: 2023-11-13

## 2023-11-13 RX ORDER — LANOLIN ALCOHOL/MO/W.PET/CERES
200 CREAM (GRAM) TOPICAL EVERY EVENING
Status: DISCONTINUED | OUTPATIENT
Start: 2023-11-13 | End: 2023-11-22 | Stop reason: HOSPADM

## 2023-11-13 RX ORDER — SPIRONOLACTONE 25 MG/1
25 TABLET ORAL DAILY
Status: DISCONTINUED | OUTPATIENT
Start: 2023-11-13 | End: 2023-11-22 | Stop reason: HOSPADM

## 2023-11-13 RX ORDER — AMOXICILLIN 250 MG
2 CAPSULE ORAL 2 TIMES DAILY
Status: DISCONTINUED | OUTPATIENT
Start: 2023-11-13 | End: 2023-11-13

## 2023-11-13 RX ORDER — CALCIUM CARBONATE 500 MG/1
500 TABLET, CHEWABLE ORAL 2 TIMES DAILY
Status: DISCONTINUED | OUTPATIENT
Start: 2023-11-13 | End: 2023-11-22 | Stop reason: HOSPADM

## 2023-11-13 RX ORDER — LEVOTHYROXINE SODIUM 88 UG/1
88 TABLET ORAL
Status: DISCONTINUED | OUTPATIENT
Start: 2023-11-14 | End: 2023-11-22 | Stop reason: HOSPADM

## 2023-11-13 RX ORDER — CALCIUM CARBONATE 500 MG/1
500 TABLET, CHEWABLE ORAL 2 TIMES DAILY
Status: CANCELLED | OUTPATIENT
Start: 2023-11-13

## 2023-11-13 RX ORDER — ENOXAPARIN SODIUM 100 MG/ML
40 INJECTION SUBCUTANEOUS DAILY
Status: CANCELLED | OUTPATIENT
Start: 2023-11-13

## 2023-11-13 RX ORDER — LEVOTHYROXINE SODIUM 88 UG/1
88 TABLET ORAL
Status: CANCELLED | OUTPATIENT
Start: 2023-11-14

## 2023-11-13 RX ORDER — TIZANIDINE 4 MG/1
2 TABLET ORAL 3 TIMES DAILY PRN
Status: DISCONTINUED | OUTPATIENT
Start: 2023-11-13 | End: 2023-11-16

## 2023-11-13 RX ORDER — LOSARTAN POTASSIUM 50 MG/1
50 TABLET ORAL EVERY MORNING
Qty: 30 TABLET | Status: ON HOLD
Start: 2023-11-14 | End: 2023-11-21

## 2023-11-13 RX ORDER — SPIRONOLACTONE 25 MG/1
25 TABLET ORAL DAILY
Status: CANCELLED | OUTPATIENT
Start: 2023-11-13

## 2023-11-13 RX ORDER — ENOXAPARIN SODIUM 100 MG/ML
40 INJECTION SUBCUTANEOUS DAILY
Status: DISCONTINUED | OUTPATIENT
Start: 2023-11-13 | End: 2023-11-14

## 2023-11-13 RX ORDER — OXYCODONE HCL 20 MG/1
20 TABLET, FILM COATED, EXTENDED RELEASE ORAL EVERY 12 HOURS
Status: CANCELLED | OUTPATIENT
Start: 2023-11-13

## 2023-11-13 RX ORDER — OMEPRAZOLE 20 MG/1
20 CAPSULE, DELAYED RELEASE ORAL DAILY
Status: DISCONTINUED | OUTPATIENT
Start: 2023-11-13 | End: 2023-11-22 | Stop reason: HOSPADM

## 2023-11-13 RX ORDER — PREGABALIN 100 MG/1
100 CAPSULE ORAL 2 TIMES DAILY
Status: DISCONTINUED | OUTPATIENT
Start: 2023-11-13 | End: 2023-11-22 | Stop reason: HOSPADM

## 2023-11-13 RX ORDER — HYDRALAZINE HYDROCHLORIDE 25 MG/1
25 TABLET, FILM COATED ORAL EVERY 8 HOURS PRN
Status: DISCONTINUED | OUTPATIENT
Start: 2023-11-13 | End: 2023-11-22 | Stop reason: HOSPADM

## 2023-11-13 RX ORDER — ONDANSETRON 2 MG/ML
4 INJECTION INTRAMUSCULAR; INTRAVENOUS 4 TIMES DAILY PRN
Status: DISCONTINUED | OUTPATIENT
Start: 2023-11-13 | End: 2023-11-22 | Stop reason: HOSPADM

## 2023-11-13 RX ORDER — BISACODYL 10 MG
10 SUPPOSITORY, RECTAL RECTAL
Status: DISCONTINUED | OUTPATIENT
Start: 2023-11-13 | End: 2023-11-22 | Stop reason: HOSPADM

## 2023-11-13 RX ORDER — TIZANIDINE 2 MG/1
2 TABLET ORAL 3 TIMES DAILY PRN
Qty: 30 TABLET | Refills: 3 | Status: ON HOLD
Start: 2023-11-13 | End: 2023-11-21

## 2023-11-13 RX ORDER — LOSARTAN POTASSIUM 50 MG/1
50 TABLET ORAL EVERY MORNING
Status: CANCELLED | OUTPATIENT
Start: 2023-11-14

## 2023-11-13 RX ORDER — OXYCODONE HYDROCHLORIDE 5 MG/1
2.5 TABLET ORAL
Qty: 30 TABLET | Refills: 0 | Status: ON HOLD
Start: 2023-11-13 | End: 2023-11-21

## 2023-11-13 RX ORDER — LIOTHYRONINE SODIUM 5 UG/1
5 TABLET ORAL EVERY MORNING
Status: DISCONTINUED | OUTPATIENT
Start: 2023-11-14 | End: 2023-11-22 | Stop reason: HOSPADM

## 2023-11-13 RX ORDER — DIPHENHYDRAMINE HCL 25 MG
25 TABLET ORAL EVERY 6 HOURS PRN
Qty: 30 TABLET | Refills: 0 | Status: ON HOLD
Start: 2023-11-13 | End: 2023-11-21

## 2023-11-13 RX ORDER — HYDROCODONE BITARTRATE AND ACETAMINOPHEN 10; 325 MG/1; MG/1
1 TABLET ORAL EVERY 4 HOURS PRN
Qty: 20 TABLET | Refills: 0 | Status: ON HOLD
Start: 2023-11-13 | End: 2023-11-21

## 2023-11-13 RX ORDER — BISACODYL 10 MG
10 SUPPOSITORY, RECTAL RECTAL
Refills: 0 | Status: ON HOLD
Start: 2023-11-13 | End: 2023-11-21

## 2023-11-13 RX ORDER — POLYETHYLENE GLYCOL 3350 17 G/17G
1 POWDER, FOR SOLUTION ORAL
Status: DISCONTINUED | OUTPATIENT
Start: 2023-11-13 | End: 2023-11-22 | Stop reason: HOSPADM

## 2023-11-13 RX ORDER — OXYCODONE HYDROCHLORIDE 5 MG/1
5 TABLET ORAL
Status: DISCONTINUED | OUTPATIENT
Start: 2023-11-13 | End: 2023-11-22 | Stop reason: HOSPADM

## 2023-11-13 RX ORDER — ONDANSETRON 4 MG/1
4 TABLET, ORALLY DISINTEGRATING ORAL EVERY 4 HOURS PRN
Qty: 10 TABLET | Refills: 0 | Status: ON HOLD
Start: 2023-11-13 | End: 2023-11-21

## 2023-11-13 RX ORDER — CARBOXYMETHYLCELLULOSE SODIUM 5 MG/ML
1 SOLUTION/ DROPS OPHTHALMIC PRN
Status: ON HOLD
Start: 2023-11-13 | End: 2023-11-21

## 2023-11-13 RX ORDER — CALCIUM CARBONATE 500 MG/1
500 TABLET, CHEWABLE ORAL 2 TIMES DAILY
Qty: 30 TABLET | Status: ON HOLD
Start: 2023-11-13 | End: 2023-11-21

## 2023-11-13 RX ORDER — PREGABALIN 100 MG/1
100 CAPSULE ORAL 2 TIMES DAILY
Qty: 14 CAPSULE | Refills: 0 | Status: ON HOLD
Start: 2023-11-13 | End: 2023-11-21

## 2023-11-13 RX ORDER — ECHINACEA PURPUREA EXTRACT 125 MG
2 TABLET ORAL PRN
Status: DISCONTINUED | OUTPATIENT
Start: 2023-11-13 | End: 2023-11-22 | Stop reason: HOSPADM

## 2023-11-13 RX ADMIN — PREGABALIN 100 MG: 100 CAPSULE ORAL at 20:21

## 2023-11-13 RX ADMIN — OXYCODONE HYDROCHLORIDE 20 MG: 20 TABLET, FILM COATED, EXTENDED RELEASE ORAL at 05:15

## 2023-11-13 RX ADMIN — ATORVASTATIN CALCIUM 40 MG: 40 TABLET, FILM COATED ORAL at 20:22

## 2023-11-13 RX ADMIN — LIOTHYRONINE SODIUM 5 MCG: 5 TABLET ORAL at 05:15

## 2023-11-13 RX ADMIN — Medication 200 MG: at 20:21

## 2023-11-13 RX ADMIN — Medication 1 APPLICATOR: at 05:17

## 2023-11-13 RX ADMIN — ENOXAPARIN SODIUM 40 MG: 100 INJECTION SUBCUTANEOUS at 18:31

## 2023-11-13 RX ADMIN — LEVOTHYROXINE SODIUM 88 MCG: 0.09 TABLET ORAL at 05:15

## 2023-11-13 RX ADMIN — CALCIUM CARBONATE (ANTACID) CHEW TAB 500 MG 500 MG: 500 CHEW TAB at 20:21

## 2023-11-13 RX ADMIN — OMEPRAZOLE 20 MG: 20 CAPSULE, DELAYED RELEASE ORAL at 15:21

## 2023-11-13 RX ADMIN — ANTACID TABLETS 500 MG: 500 TABLET, CHEWABLE ORAL at 05:15

## 2023-11-13 RX ADMIN — PREGABALIN 100 MG: 100 CAPSULE ORAL at 05:15

## 2023-11-13 RX ADMIN — OXYCODONE HYDROCHLORIDE 5 MG: 5 TABLET ORAL at 18:36

## 2023-11-13 RX ADMIN — LOSARTAN POTASSIUM 50 MG: 50 TABLET, FILM COATED ORAL at 05:15

## 2023-11-13 ASSESSMENT — LIFESTYLE VARIABLES
TOTAL SCORE: 0
EVER HAD A DRINK FIRST THING IN THE MORNING TO STEADY YOUR NERVES TO GET RID OF A HANGOVER: NO
TOTAL SCORE: 0
ON A TYPICAL DAY WHEN YOU DRINK ALCOHOL HOW MANY DRINKS DO YOU HAVE: 0
ALCOHOL_USE: NO
HAVE YOU EVER FELT YOU SHOULD CUT DOWN ON YOUR DRINKING: NO
HAVE PEOPLE ANNOYED YOU BY CRITICIZING YOUR DRINKING: NO
AVERAGE NUMBER OF DAYS PER WEEK YOU HAVE A DRINK CONTAINING ALCOHOL: 0
EVER FELT BAD OR GUILTY ABOUT YOUR DRINKING: NO
TOTAL SCORE: 0
CONSUMPTION TOTAL: NEGATIVE
HOW MANY TIMES IN THE PAST YEAR HAVE YOU HAD 5 OR MORE DRINKS IN A DAY: 0

## 2023-11-13 ASSESSMENT — PAIN DESCRIPTION - PAIN TYPE
TYPE: ACUTE PAIN

## 2023-11-13 ASSESSMENT — PATIENT HEALTH QUESTIONNAIRE - PHQ9
2. FEELING DOWN, DEPRESSED, IRRITABLE, OR HOPELESS: NOT AT ALL
SUM OF ALL RESPONSES TO PHQ9 QUESTIONS 1 AND 2: 0
1. LITTLE INTEREST OR PLEASURE IN DOING THINGS: NOT AT ALL

## 2023-11-13 ASSESSMENT — COPD QUESTIONNAIRES
COPD SCREENING SCORE: 4
DO YOU EVER COUGH UP ANY MUCUS OR PHLEGM?: NO/ONLY WITH OCCASIONAL COLDS OR INFECTIONS
DURING THE PAST 4 WEEKS HOW MUCH DID YOU FEEL SHORT OF BREATH: NONE/LITTLE OF THE TIME
HAVE YOU SMOKED AT LEAST 100 CIGARETTES IN YOUR ENTIRE LIFE: YES

## 2023-11-13 ASSESSMENT — FIBROSIS 4 INDEX: FIB4 SCORE: 1.8

## 2023-11-13 NOTE — CARE PLAN
The patient is Stable - Low risk of patient condition declining or worsening    Shift Goals  Clinical Goals: mobility; comfort  Patient Goals: comfort; rest  Family Goals: not present    Progress made toward(s) clinical / shift goals:    Problem: Pain - Standard  Goal: Alleviation of pain or a reduction in pain to the patient’s comfort goal  Outcome: Met     Problem: Fall Risk  Goal: Patient will remain free from falls  Outcome: Met     Problem: Knowledge Deficit - Standard  Goal: Patient and family/care givers will demonstrate understanding of plan of care, disease process/condition, diagnostic tests and medications  Outcome: Met       Patient is not progressing towards the following goals:

## 2023-11-13 NOTE — DISCHARGE SUMMARY
Date of Admission: 11/10/2023.  Date of Discharge: 11/13/2023.    Discharge Diagnosis: lumbar stenosis, lumbar spondylosis, neurogenic claudication, lower extremity radiculopathy, low back pain.    Surgery Performed: redo L5-S1 laminectomy.    Complications: none .    Reason for Admission: This is a pleasant 83 -year-old woman who gives history of progressive low back pain with bilateral lower extremtiy pain, paresthesias and weakness. The patient has tried and failed extensive conservative management. Their preoperative work-up showed significant degenerative changes at L5-S1 with severe stenosis and the patient was hereby indicated for the above surgery. The patient understood risks versus benefits and treatment alternatives and elected to proceed with the operation.    Hospital Course: The patient was admitted on the day of surgery and underwent the above surgery without complication.  After surgery the patient was transferred to the [facility].  Here on the [facility], the patient has made a good recovery postoperatively. Now, on postoperative day #3, the patient's pain is well controlled on oral pain medications.  The patient is ambulating, voiding, tolerating oral food and medications well. Motor exam is 4+/5 to the left leg, 5/5 to the right. She has been deemed a good inpatient rehab candidate and would benefit from 3 hours of physical therapy daily prior to returning home. The patient's incision is clean, dry, and intact. The surgical drain has been removed. The patient is now cleared for discharge to [facility] after an uneventful hospital stay.     Discharge Instructions: The patient is to ambulate as much as tolerated.  The patient should avoid repetitive bending at the waist. They should avoid pushing, pulling or lifting greater than 15 pounds.  It is okay to shower but the patient is not to submerge the wound.  The patient shoud ice their incision for 10-15 minutes multiple times per  day. It is okay to drive in 2 weeks' time or when comfortable and when no longer taking narcotic pain medication. They should take over-the-counter stool softeners while taking narcotic pain medications. The patient is to eat a normal diet as tolerated. The patient has a postoperative appointment in 2 weeks' time at [facility]. The patient should call our office or go to the nearest emergency department with any emergent changes. The patient was given these discharge instructions verbally and voiced understanding of them.      Discharge Medications: In addition to the patient's normal home medications, all pain medications will be transferred to [facility].

## 2023-11-13 NOTE — DISCHARGE PLANNING
NEENA Meza has medically cleared.  Spoke with Luh, daughter regarding [facility] & D/C resources/support.  She is agreeable with an admission.  Luh has secured a room @ [facility] in [facility].  Luh will provide transport once D/C'd from Rehab. PAR is looking into benefits.    1008-Case is under review by Dr. Chavis.     1102-Dr. Chavis has accepted.  Transport has been arranged via [facility] between [id number].  Msg placed to NEENA Meza, [place] CM & Jelly BSN.  Luh, daughter is aware.

## 2023-11-13 NOTE — PROGRESS NOTES
4 Eyes Skin Assessment Completed by PAOLA Singletary and PAOLA Hernandez.    Head WDL  Ears WDL  Nose WDL  Mouth WDL  Neck WDL  Breast/Chest Scar  Shoulder Blades WDL  Spine Incision  (R) Arm/Elbow/Hand WDL  (L) Arm/Elbow/Hand WDL  Abdomen WDL  Groin WDL  Scrotum/Coccyx/Buttocks WDL  (R) Leg Edema  (L) Leg Bruising and Edema  (R) Heel/Foot/Toe Edema and scars  (L) Heel/Foot/Toe Edema          Devices In Places Blood Pressure Cuff      Interventions In Place Pillows    Possible Skin Injury No    Pictures Uploaded Into Epic Yes  Wound Consult Placed N/A  RN Wound Prevention Protocol Ordered No

## 2023-11-13 NOTE — PROGRESS NOTES
Patient admitted to facility at 1251 via GMT; accompanied by hospital transport.  Patient assisted to room and positioned in bed for comfort and safety; call light within reach.  Patient assisted with stowing belongings and oriented to room and facility.  Admission assessment performed and documented in computer.  Admission paperwork completed; signed copies placed in chart.

## 2023-11-13 NOTE — PREADMISSION SCREENING NOTE
Pre-Admission Screening Form    Patient Information:   Name: Sheyla Gauthier     MRN: [id number]       : 1940      Age: 83 y.o.   Gender: female      Race: White [7]       Marital Status:  [5]  Family Contact: MiracleLuhAde        Relationship: Daughter [2]  Daughter [2]  Home Phone:              Cell Phone: [phone]  Advanced Directives:  Yes  Code Status:  FULL  Current Attending Provider: Earnest Silva M.D.  Referring Physician: NEENA Bright  Physiatrist Consult: Dr. Beverly   Referral Date: 11-10-23  Primary Payor Source:  MEDICARE  Secondary Payor Source:  [facility]    Medical Information:   Date of Admission to Acute Care [place]/10/2023  Room Number: T311/02  Rehabilitation Diagnosis: 0004.111 - Spinal Cord Dysfunction, Non-Traumatic: Paraplegia, Incomplete  Immunization History   Administered Date(s) Administered    COVID-19 Vaccine, unspecified - HISTORICAL DATA 2022    Influenza (IM) Preservative Free - HISTORICAL DATA 2014    Influenza Seasonal Injectable - Historical Data 2011, 2012    Influenza Vaccine Adult HD 10/09/2010, 2015, 10/25/2016, 2017, 2017, 2018, 2019, 10/26/2021, 10/20/2022    Influenza Vaccine Pediatric Split - Historical Data 2011, 2012    Influenza Vaccine Quad Inj (Pf) 2020    PFIZER BIVALENT SARS-COV-2 VACCINE (12+) 10/26/2022    PFIZER PURPLE CAP SARS-COV-2 VACCINATION (12+) 2021, 2021, 10/09/2021, 2022    Pneumococcal Conjugate Vaccine (Prevnar/PCV-13) 2015, 2015    Pneumococcal polysaccharide vaccine (PPSV-23) 2017    Tdap Vaccine 2014    Zoster Vaccine Live (ZVL) (Zostavax) - HISTORICAL DATA 10/26/2010    Zoster Vaccine Recombinant (RZV) (SHINGRIX) 10/11/2018, 2019, 2020     Allergies   Allergen Reactions    Trazodone Swelling     Pt stated that she had swelling on her hands and feet's     Crestor  [Rosuvastatin Calcium]      Myalgia      Past Medical History:   Diagnosis Date    Anxiety     Arthritis     osteoarthritis    Back pain     CAD (coronary artery disease) 10/2018    Abnormal CTCS. PCI/FARZAD ([place] 2.5 x 2mm) to the mid LAD. Cardic stent    Cataract     alex IOL    Daytime sleepiness     Depression     Dyslipidemia     Frequent headaches     Herpes zoster     High cholesterol     Hypertension     MHA (microangiopathic hemolytic anemia)     Migraine syndrome      Mumps     as a child    Osteopathia     Osteoporosis     Painful joint     Post-menopause on HRT (hormone replacement therapy)     Renal disorder     chronic kidney disease stage 3    Sleep apnea     no longer uses cpap    Snoring     Thyroid disease     hypothyroid    Urinary incontinence     White matter abnormality on MRI of brain      Past Surgical History:   Procedure Laterality Date    LUMBAR LAMINECTOMY DISKECTOMY N/A 11/10/2023    Procedure: LUMBAR 5-SACRAL 1 REDO LAMINECTOMY;  Surgeon: Earnest Silva M.D.;  Location: SURGERY [facility];  Service: Neurosurgery    LUMBAR LAMINECTOMY DISKECTOMY N/A 2/6/2023    Procedure: POSTERIOR REMOVAL OF INTERSPINOUS DEVICE WITH L4-S1 LAMINECTOMY;  Surgeon: Mannie Connor M.D.;  Location: SURGERY [facility];  Service: Neurosurgery    HARDWARE REMOVAL ORTHO N/A 2/6/2023    Procedure: REMOVAL, HARDWARE;  Surgeon: Mannie Connor M.D.;  Location: SURGERY [facility];  Service: Neurosurgery    LAMINOTOMY  01/28/2022    L4-L5 and L5-S1 posterior fusion with instrumentation and laminotomy by Dr. Benitez.    BUNIONECTOMY Left 2003    FOOT SURGERY  2002    GA BREAST REDUCTION  1997    CHOLECYSTECTOMY  1996    ABDOMINAL HYSTERECTOMY TOTAL  11/1986    ARTHROSCOPY, KNEE      EYE SURGERY      cataracts    HYSTERECTOMY LAPAROSCOPY      MAMMOPLASTY AUGMENTATION      GA BREAST AUGMENTATION WITH IMPLANT         History Leading to Admission, Conditions that Caused the Need for Rehab (CMS):     Dr. Beverly (Physiatry)  recommendations:  Chief complaint: Status post lumbar laminectomy     HPI: The patient is a 83 y.o.  female with a past medical history of hypothyroidism, hypertension and low back pain and lower extremity weakness;  who presented on 11/10/2023  5:51 AM for scheduled lumbar surgery.  Per documentation, patient has been followed in the outpatient setting by Dr. Silva for ongoing back pain.  Patient was evaluated in the outpatient setting on 11/8/2023 due to worsening back pain impairing her function.  Patient recently required the use of a wheelchair due to her limited mobility.  An MRI of the lumbar spine was obtained which showed prior history of an L4-L5 surgical changes and evidence of spondylolisthesis at L4-5 as well as facet joint arthropathy at L5-S1.  Patient was deemed appropriate for intervention for her L5-S1 stenosis.  Patient was taken to the OR on 11/10 for an L5-S1 redo laminectomy performed by Dr. Silva.  Postop, patient's blood pressure has been well controlled and she is on room air.  There are no postop labs to review.  Preop labs were within normal limits.     patient seen and examined at bedside with patient's daughters.  Patient reports she is feeling better today.  Patient reports that her back is uncomfortable but her pain to her lower extremities has improved since surgery.  Patient reports she did have some numbness into her right feet.  Which is improved since surgery.  Patient was not able to tolerate gait with therapist however patient has been easily able to transfer to the chair per daughters.  Reviewed with patient requirement for medical necessity to be accepted to inpatient rehab and plan to check labs in the morning.  Patient agreeable.     Social Hx:  Patient lives alone in a one-story house, has intermittent caregiving as well as support from family, has a ramp to access home  0 JEOVANY  At prior level of function previously independent until approximately 1 month ago when patient  "required higher caregivers and intermittent support from daughters     Tobacco: Denies  Alcohol: Denies  Drugs: Denies     THERAPY:  Restrictions: Fall risk, spinal precautions  PT: Functional mobility   11/11 min assist sit to stand, unable to participate in gait     OT: ADLs  11/11 max assist lower body dressing, max assist toileting, contact-guard assist bed mobility, min assist sit to stand, min assist transfers     SLP: None     IMAGING:  No lumbar MRI for my review however, Per H&P: \"MRI of the lumbar spine is reviewed.  Postsurgical changes are noted at L4-5.  She is well compressed here.  There is a spondylolisthesis at L4-5.  There is facet joint arthropathy with ligamentous thickening with resultant stenosis at L5-S1.\"      PROCEDURES:  11/10 L5-S1 redo laminectomy performed by Dr. Silva     PMH:  Past Medical History  Past Medical History:  Diagnosis Date   Anxiety     Arthritis      osteoarthritis   Back pain     CAD (coronary artery disease) 10/2018    Abnormal CTCS. PCI/FARZAD ([place] 2.5 x 2mm) to the mid LAD. Cardic stent   Cataract      alex IOL   Daytime sleepiness     Depression     Dyslipidemia     Frequent headaches     Herpes zoster     High cholesterol     Hypertension     MHA (microangiopathic hemolytic anemia)     Migraine syndrome     Mumps      as a child   Osteopathia     Osteoporosis     Painful joint     Post-menopause on HRT (hormone replacement therapy)     Renal disorder      chronic kidney disease stage 3   Sleep apnea      no longer uses cpap   Snoring     Thyroid disease      hypothyroid   Urinary incontinence     White matter abnormality on MRI of brain      PSH:  Past Surgical History  Past Surgical History:  Procedure Laterality Date   LUMBAR LAMINECTOMY DISKECTOMY N/A 11/10/2023    Procedure: LUMBAR 5-SACRAL 1 REDO LAMINECTOMY;  Surgeon: Earnest Silva M.D.;  Location: SURGERY [facility];  Service: Neurosurgery   LUMBAR LAMINECTOMY DISKECTOMY N/A 2/6/2023    Procedure: POSTERIOR " REMOVAL OF INTERSPINOUS DEVICE WITH L4-S1 LAMINECTOMY;  Surgeon: Mannie Connor M.D.;  Location: SURGERY [facility];  Service: Neurosurgery   HARDWARE REMOVAL ORTHO N/A 2/6/2023    Procedure: REMOVAL, HARDWARE;  Surgeon: Mannie Connor M.D.;  Location: SURGERY [facility];  Service: Neurosurgery   LAMINOTOMY   01/28/2022    L4-L5 and L5-S1 posterior fusion with instrumentation and laminotomy by Dr. Benitez.   BUNIONECTOMY Left 2003   FOOT SURGERY   2002   MA BREAST REDUCTION   1997   CHOLECYSTECTOMY   1996   ABDOMINAL HYSTERECTOMY TOTAL   11/1986   ARTHROSCOPY, KNEE       EYE SURGERY        cataracts   HYSTERECTOMY LAPAROSCOPY       MAMMOPLASTY AUGMENTATION       MA BREAST AUGMENTATION WITH IMPLANT        FHX:  Family History  Family History  Problem Relation Age of Onset   Stroke Father     Arthritis Father     Hyperlipidemia Father     Hypertension Father     Cancer Sister          breast   Arthritis Sister     Arthritis Mother     Hypertension Mother     Hyperlipidemia Mother     Stroke Mother     Arthritis Brother     Cancer Brother     Hypertension Brother     Hyperlipidemia Brother     Heart Disease Brother          CABG for prox LAD in late 40s   Cancer Daughter          breast    Medications:  Current Facility-Administered Medications  Medication Dose   oxyCODONE CR (OxyCONTIN) tablet 20 mg  20 mg   polyethylene glycol/lytes (Miralax) PACKET 1 Packet  1 Packet   atorvastatin (Lipitor) tablet 40 mg  40 mg   levothyroxine (Synthroid) tablet 88 mcg  88 mcg   liothyronine (Cytomel) tablet 5 mcg  5 mcg   losartan (Cozaar) tablet 50 mg  50 mg   magnesium oxide tablet 200 mg  200 mg   pregabalin (Lyrica) capsule 100 mg  100 mg   spironolactone (Aldactone) tablet 25 mg  25 mg   Pharmacy Consult Request ...Pain Management Review 1 Each  1 Each   MD ALERT...DO NOT ADMINISTER NSAIDS or ASPIRIN unless ORDERED By Neurosurgery 1 Each  1 Each   docusate sodium (Colace) capsule 100 mg  100 mg   senna-docusate (Pericolace Or  "Senokot S) 8.6-50 MG per tablet 1 Tablet  1 Tablet   senna-docusate (Pericolace Or Senokot S) 8.6-50 MG per tablet 1 Tablet  1 Tablet   magnesium hydroxide (Milk Of Magnesia) suspension 30 mL  30 mL   bisacodyl (Dulcolax) suppository 10 mg  10 mg   sodium phosphate (Fleet) enema 133 mL  1 Each   0.9 % NaCl with KCl 20 mEq infusion     enoxaparin (Lovenox) inj 40 mg  40 mg   oxyCODONE immediate-release (Roxicodone) tablet 2.5 mg  2.5 mg    Or   oxyCODONE immediate-release (Roxicodone) tablet 5 mg  5 mg    Or   HYDROmorphone (Dilaudid) injection 0.25 mg  0.25 mg   diphenhydrAMINE (Benadryl) tablet/capsule 25 mg  25 mg    Or   diphenhydrAMINE (Benadryl) injection 25 mg  25 mg   ondansetron (Zofran) syringe/vial injection 4 mg  4 mg   ondansetron (Zofran ODT) dispertab 4 mg  4 mg   tizanidine (Zanaflex) tablet 2 mg  2 mg   benzocaine-menthol (Cepacol) lozenge 1 Lozenge  1 Lozenge   calcium carbonate (Tums) chewable tab 500 mg  500 mg   HYDROcodone/acetaminophen (Norco)  MG per tablet 1 Tablet  1 Tablet   carboxymethylcellulose (Refresh Tears) 0.5 % ophthalmic drops 1 Drop  1 Drop   Nozin nasal  swab  1 Applicator     Allergies:  Allergies  Allergen Reactions   Trazodone Swelling      Pt stated that she had swelling on her hands and feet's    Crestor [Rosuvastatin Calcium]        Myalgia      Physical Exam:  Vitals: /66   Pulse 72   Temp 36.8 °C (98.2 °F) (Temporal)   Resp 16   Ht 1.575 m (5' 2\")   Wt 70.4 kg (155 lb 3.3 oz)   SpO2 93%   Gen: NAD, seated comfortably in recliner, daughter is at bedside  Head:  NC/AT  Eyes/ Nose/ Mouth: PERRLA, moist mucous membranes  Cardio: RRR, good distal perfusion, warm extremities  Pulm: normal respiratory effort, no cyanosis, on room air  Abd: Soft NTND,   Ext: Mild ankle edema no calf tenderness. No clubbing.     Mental status:  A&Ox4 (person, place, date, situation) answers questions appropriately follows commands  Speech: fluent, no aphasia or " "dysarthria     CRANIAL NERVES:  2,3: visual acuity grossly intact, PERRL  3,4,6: EOMI bilaterally, no nystagmus or diplopia  5: sensation intact to light touch bilaterally and symmetric  7: no facial asymmetry  8: hearing grossly intact     Motor:                            Upper Extremity  Myotome R L  Shoulder flexion C5 5/5 5/5  Elbow flexion C5 5/5 5/5  Wrist extension C6 5/5 5/5  Elbow extension C7 5/5 5/5  Finger flexion C8 5/5 5/5  Finger abduction T1 5/5 5/5     Lower Extremity Myotome R L  Hip flexion L2 4/5 4/5  Knee extension L3 5/5 5/5  Ankle dorsiflexion L4 5/5 5/5  Toe extension L5 5/5 5/5  Ankle plantarflexion S1 5/5 5/5     Negative Pronator drift bilaterally     Sensory:   intact to light touch through out     DTRs:2+ in bilateral patellar tendons  No clonus at bilateral ankles  Negative Reyes b/l      Tone: no spasticity noted     Coordination:   intact fine motor with fingers bilaterally     Labs: Reviewed and significant for   No results for input(s): \"RBC\", \"HEMOGLOBIN\", \"HEMATOCRIT\", \"PLATELETCT\", \"PROTHROMBTM\", \"APTT\", \"INR\", \"IRON\", \"FERRITIN\", \"TOTIRONBC\" in the last 72 hours.      Recent Results  No results found for this or any previous visit (from the past 24 hour(s)).    ASSESSMENT:  Patient is a 83 y.o. female admitted with lumbar stenosis     [facility] Code / Diagnosis to Support: 0004.111 - Spinal Cord Dysfunction, Non-Traumatic: Paraplegia, Incomplete     Rehabilitation: Impaired ADLs and mobility  Patient is a anticipated to be a good candidate for inpatient rehab based on needs for PT, OT, however pending medical necessity, will need labs in order to determine additional medical need.     Barriers to transfer include: Insurance authorization, TCCs to verify disposition, medical clearance and bed availability      Additional Recommendations:  Status post lumbar laminectomy  -Patient has history of prior L4-L5 decompression  - Recent worsening of back pain with radicular pain into right " lower extremity, impairing function and recently utilizing a wheelchair in the last month  - Per outpatient documentation, MRI of the lumbar spine reportedly showed an L5-S1 stenosis  - 11/10 patient taken to the OR for L5-S1 laminectomy performed by Dr. Silva  - Spinal precautions in place, does not require bracing  - At risk for postop anemia or electrolyte abnormalities, postop laboratory values ordered for tomorrow  - Continue with PT/OT,      Hypothyroidism  -On home dose Synthroid     Hypertension  - On home dose losartan and spironolactone  - Blood pressure well controlled     Pain  - On Lyrica for neuropathic pain  - Utilizing as needed oxycodone     Dispo:  - patient is currently functioning below their level of baseline, recommend post acute rehab  - recommend IRF level therapy with 3hr of therapy 5 days per week  - piror to acceptance to IRF, will need confirmation of discharge support from daughters, as well as postop laboratory values obtained  - TCC to assist with insurance auth and DC support      Medical Complexity:  Status post lumbar laminectomy  Hypothyroidism  Hypertension  Impaired mobility and ADLs     DVT PPX: Lovenox    NEENA Meza progress note:    Neurosurgery Progress Note     Subjective:  POD#3 redo L5-S1 laminectomy     Doing well this AM, no acute events overnight.  Tolerable back pain, some improvement in bilateral lower extremity radicular pain.  Minimal ambulation, patient was in a wheelchair prior to surgery due to pain.     PT/OT recommending post acute placement, has been deemed a good candidate, pending auth.     Exam:  Dressing CDI.    Motor 4+/5 to left leg globally due to deconditioning, 5/5 in right leg  Sensation intact.      BP  Min: 110/55  Max: 140/71  Pulse  Av  Min: 63  Max: 78  Resp  Avg: 15.8  Min: 15  Max: 16  Temp  Av.7 °C (98.1 °F)  Min: 36.5 °C (97.7 °F)  Max: 36.9 °C (98.4 °F)  Monitored Temp 2  Av °C (96.8 °F)  Min: 36 °C (96.8 °F)  Max: 36 °C  (96.8 °F)  SpO2  Av.8 %  Min: 90 %  Max: 93 %     No data recorded     Recent Labs    23  WBC 7.7  RBC 3.49*  HEMOGLOBIN 9.7*  HEMATOCRIT 30.9*  MCV 88.5  MCH 27.8  MCHC 31.4*  RDW 47.7  PLATELETCT 197  MPV 10.1     Recent Labs    23  SODIUM 140  POTASSIUM 4.7  CHLORIDE 107  CO2 24  GLUCOSE 98  BUN 21  CREATININE 0.85  CALCIUM 9.5     Intake/Output                    23 - 2359 23 - 2359       Total  Total                          Intake    Total Intake -- -- -- -- -- --              Output    Urine  --  -- --  --  -- --    Number of Times Voided -- 2 x 2 x -- -- --    Drains  50  50 100  --  -- --    Output (mL) ([REMOVED] Closed/Suction Drain 1 Midline;Posterior Back Hemovac 23 0500) 50 50 100 -- -- --    Stool  --  -- --  --  -- --    Number of Times Stooled 2 x 1 x 3 x -- -- --    Total Output 50 50 100 -- -- --              Net I/O      -50 -50 -100 -- -- --     Intake/Output Summary (Last 24 hours) at 2023 0921  Last data filed at 2023 0400    Gross per 24 hour  Intake --  Output 100 ml  Net -100 ml      oxyCODONE CR  20 mg Q12HRS   polyethylene glycol/lytes  1 Packet DAILY   atorvastatin  40 mg QHS   levothyroxine  88 mcg AM ES   liothyronine  5 mcg QAM   losartan  50 mg QAM   magnesium oxide  200 mg Q EVENING   pregabalin  100 mg BID   spironolactone  25 mg DAILY   Pharmacy Consult Request  1 Each PHARMACY TO DOSE   MD ALERT...DO NOT ADMINISTER NSAIDS or ASPIRIN unless ORDERED By Neurosurgery  1 Each PRN   docusate sodium  100 mg BID   senna-docusate  1 Tablet Nightly   senna-docusate  1 Tablet Q24HRS PRN   magnesium hydroxide  30 mL QDAY PRN   bisacodyl  10 mg Q24HRS PRN   sodium phosphate  1 Each Once PRN   0.9 % NaCl with KCl 20 mEq 1,000 mL   Continuous   enoxaparin (LOVENOX) injection  40 mg DAILY AT 1800   oxyCODONE immediate-release  2.5 mg Q3HRS PRN    Or   oxyCODONE  immediate-release  5 mg Q3HRS PRN    Or   HYDROmorphone  0.25 mg Q3HRS PRN   diphenhydrAMINE  25 mg Q6HRS PRN    Or   diphenhydrAMINE  25 mg Q6HRS PRN   ondansetron  4 mg Q4HRS PRN   ondansetron  4 mg Q4HRS PRN   tizanidine  2 mg TID PRN   benzocaine-menthol  1 Lozenge Q2HRS PRN   calcium carbonate  500 mg BID   HYDROcodone/acetaminophen  1 Tablet Q4HRS PRN   carboxymethylcellulose  1 Drop PRN   Nozin nasal  swab  1 Applicator BID     Assessment and Plan:  Hospital day # 4  POD# 3  PT/OT mobilize as tolerated  Ice incisions  Adjusting medications, weaning down on narcotics  Patient would benefit from [facility], has been deemed a good candidate, pending auth.  Patient is cleared for discharge to rehab once accepted/bed available.      Chemical prophylactic DVT therapy: Yes - Lovenox 40mg/qd    Start date/time: today    Immediate Post OP Note     PreOp Diagnosis: L3-4 and L5-S1 lumbar stenosis with neurogenic claudication.     PostOp Diagnosis: same     Procedure(s):  LUMBAR 5-SACRAL 1 REDO LAMINECTOMY - Wound Class: Clean with Drain     Surgeon(s):  Earnest Silva M.D.     Anesthesiologist/Type of Anesthesia:  Anesthesiologist: Arnel Aguila M.D./General     Surgical Staff:  Assistant: Andrey Bright P.A.-C.  Circulator: Earnest Olmos R.N.; Vonda Chaudhry R.N.  Relief Scrub: Cuba Gómez  Scrub Person: Nata Peraza  Radiology Technologist: Leonela Silva     Specimens removed if any:  * No specimens in log *     Estimated Blood Loss: 20cc     Findings: Right greater than left stenosis L3-4 and L5-S1     Complications: None. Neuro monitoring stable     11/10/2023 10:31 AM Andrey Bright P.A.-C.    Co-morbidities:  See PMH  Potential Risk - Complications: Contractures, Deep Vein Thrombosis, Incontinence, Malnutrition, Pain, Pneumonia, Pressure Ulcer, and Urinary Tract Infection  Level of Risk: High    Ongoing Medical Management Needed (Medical/Nursing Needs):   Patient Active  "Problem List    Diagnosis Date Noted    Spinal stenosis, lumbar region with neurogenic claudication 11/10/2023    Peripheral edema 10/13/2023    Chronic kidney disease, stage 3a ([facility]) 10/13/2023    Major depressive disorder with single episode, in partial remission ([facility]) 08/30/2023    Lumbar stenosis with neurogenic claudication 02/06/2023    Decreased hearing of right ear 10/20/2022    Dizziness 08/03/2022    Stage 2 chronic kidney disease 04/20/2022    Arthritis 11/03/2021    Memory loss 08/20/2020    Conductive hearing loss of right ear 08/20/2020    Left wrist pain 12/19/2019    Myalgia 10/01/2019    Former smoker 07/02/2019    Other insomnia 06/14/2019    Coronary artery disease involving native coronary artery of native heart without angina pectoris 03/26/2019    Bladder prolapse, female, acquired 03/07/2018    JOHN (obstructive sleep apnea) 02/22/2018    Age related osteoporosis 07/26/2017    Major depressive disorder 06/29/2017    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/08/2016    Spinal stenosis of lumbar region 11/14/2016    Migraine syndrome 02/11/2013    Acquired hypothyroidism 03/08/2011    HTN (hypertension) 10/21/2009    Postmenopausal hormone therapy 10/21/2009    Mixed hyperlipidemia     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[facility])      A & O    Current Vital Signs:   Temperature: 36.5 °C (97.7 °F) Pulse: 67 Respiration: 15 Blood Pressure : 110/55  Weight: 70.4 kg (155 lb 3.3 oz) Height: 157.5 cm (5' 2\")  Pulse Oximetry: 90 % O2 (LPM): 0      Completed Laboratory Reports:  Recent Labs     11/13/23  0437   WBC 7.7   HEMOGLOBIN 9.7*   HEMATOCRIT 30.9*   PLATELETCT 197   SODIUM 140   POTASSIUM 4.7   BUN 21   CREATININE 0.85   ALBUMIN 3.0*   GLUCOSE 98     Additional Labs: Not Applicable    Prior Living Situation:   Housing / Facility: 1 Story House  Steps Into Home: 0 (ramp)  Lives with - Patient's Self Care Capacity: Unable To Determine At This Time  Equipment Owned: Front-Wheel Walker, Wheelchair, " Ramp    Prior Level of Function / Living Situation:   Physical Therapy: Prior Services: shelter [facility]  Housing / Facility: [address] [facility]  Steps Into Home: 0 (ramp)  Bathroom Set up: Walk In Shower, Shower Chair  Equipment Owned: Front-Wheel Walker, Wheelchair, Ramp  Lives with - Patient's Self Care Capacity: Unable To Determine At This Time  Bed Mobility: Independent  Transfer Status: Independent  Ambulation: Independent  Assistive Devices Used: Front-Wheel Walker  Wheelchair: Independent  Current Level of Function:   Gait Level Of Assist: Unable to Participate  Supine to Sit: Minimal Assist  Sit to Supine:  (left up in recliner)  Scooting: Minimal Assist  Rolling: Contact Guard Assist  Comments: NT, in chair pre/post  Sit to Stand: Minimal Assist  Bed, Chair, Wheelchair Transfer: Minimal Assist  Toilet Transfers:  (declined need; likely could get to)  Transfer Method: Stand Step  Sitting in Chair: pre/pos  Standin min  Occupational Therapy:   Self Feeding: Independent  Grooming / Hygiene: Independent  Bathing: Independent (recently has been unable to bathe and so has been sponge bathing)  Dressing: Independent  Toileting: Independent  Medication Management: Independent  Laundry: Independent  Kitchen Mobility: Independent  Finances: Independent  Home Management: Independent  Shopping: Independent  Prior Level Of Mobility: Independent With Device in Home  Prior Services: shelter [facility]  Housing / Facility: [address] [facility]  Occupation (Pre-Hospital Vocational): Retired Due To Age  Current Level of Function:   Eating: Supervision  Lower Body Dressing: Maximal Assist  Toileting: Maximal Assist (able to get to [facility]; using purewick)  Speech Language Pathology:      Rehabilitation Prognosis/Potential: Good  Estimated Length of Stay: 10-12 days    Nursing:      Incontinent    Scope/Intensity of Services Recommended:  Physical Therapy: 1 hr / day  5 days / week. Therapeutic Interventions  Required: Maximize Endurance, Mobility, Strength, and Safety  Occupational Therapy: 1 hr / day 5 days / week. Therapeutic Interventions Required: Maximize Self Care, ADLs, IADLs, and Energy Conservation  Speech & Language Pathology: 1 hr / day 5 days / week. Therapeutic Interventions Required: Maximize Cognition and Safety  Rehabilitation Nursin/7. Therapeutic Interventions Required: Monitor Pain, Skin, Wound(s), Vital Signs, Intake and Output, Labs, Safety, and Family Training  Rehabilitation Physician: 3 - 5 days / week. Therapeutic Interventions Required: Medical Management    She requires 24-hour rehabilitation nursing to manage bowel and bladder function, skin care, surgical incision, nutrition and fluid intake, pain control, safety, medication management, and patient/family goals. In addition, rehabilitation nursing will reiterate and reinforce therapy skills and equipment use, including ADLs, as well as provide education to the patient and family. Sheyla Gauthier is willing to participate in and is able to tolerate the proposed plan of care.    Rehabilitation Goals and Plan (Expected frequency & duration of treatment in the IRF):   Return to the Community, Minimal Assist Level of Care, and Family Able to Provide 24 Assistance  Anticipated Date of Rehabilitation Admission: 23  Patient/Family oriented IRF level of care/facility/plan: Yes  Patient/Family willing to participate in IRF care/facility/plan: Yes  Patient able to tolerate IRF level of care proposed: Yes  Patient has potential to benefit IRF level of care proposed: Yes  Comments: Not Applicable    Special Needs or Precautions - Medical Necessity:  Safety Concerns/Precautions:  Fall Risk / High Risk for Falls, Balance, Cognition, and Bed / Chair Alarm  Precautions  Precautions: Fall Risk;Spinal / Back Precautions   Comments: no brace per order  Complex Wound Care: Surgical  Pain Management  Current Medications:    Current  Facility-Administered Medications Ordered in Epic   Medication Dose Route Frequency Provider Last Rate Last Admin    oxyCODONE CR (OxyCONTIN) tablet 20 mg  20 mg Oral Q12HRS SARAH BETH Valadez.A.-C.   20 mg at 11/13/23 0515    polyethylene glycol/lytes (Miralax) PACKET 1 Packet  1 Packet Oral DAILY SARAH BETH Valadez.A.-C.   1 Packet at 11/12/23 0435    atorvastatin (Lipitor) tablet 40 mg  40 mg Oral QHS SARAH BETH Bowles.A.-C.   40 mg at 11/12/23 2134    levothyroxine (Synthroid) tablet 88 mcg  88 mcg Oral AM ES SARAH BETH Bowles.A.-C.   88 mcg at 11/13/23 0515    liothyronine (Cytomel) tablet 5 mcg  5 mcg Oral QAM SARAH BETH Bowles.A.-C.   5 mcg at 11/13/23 0515    losartan (Cozaar) tablet 50 mg  50 mg Oral QAM SARAH BETH Bowles.A.-C.   50 mg at 11/13/23 0515    magnesium oxide tablet 200 mg  200 mg Oral Q EVENING SARAH BETH Bowles.A.-C.   200 mg at 11/12/23 1725    pregabalin (Lyrica) capsule 100 mg  100 mg Oral BID SARAH BETH Bowles.A.-C.   100 mg at 11/13/23 0515    spironolactone (Aldactone) tablet 25 mg  25 mg Oral DAILY SARAH BETH Bowles.A.-C.   25 mg at 11/12/23 1726    Pharmacy Consult Request ...Pain Management Review 1 Each  1 Each Other PHARMACY TO DOSE Andrey Bright P.A.-C.        MD ALERT...DO NOT ADMINISTER NSAIDS or ASPIRIN unless ORDERED By Neurosurgery 1 Each  1 Each Other PRN REBECCA Bowles.-CEnriqueta        docusate sodium (Colace) capsule 100 mg  100 mg Oral BID SARAH BETH Bowles.A.-C.   100 mg at 11/12/23 0435    senna-docusate (Pericolace Or Senokot S) 8.6-50 MG per tablet 1 Tablet  1 Tablet Oral Nightly SARAH BETH Bowles.A.-C.   1 Tablet at 11/11/23 2021    senna-docusate (Pericolace Or Senokot S) 8.6-50 MG per tablet 1 Tablet  1 Tablet Oral Q24HRS PRN Andrey Bright P.A.-C.        magnesium hydroxide (Milk Of Magnesia) suspension 30 mL  30 mL Oral QDAY PRN Andrey Bright P.A.-C.        bisacodyl (Dulcolax) suppository 10 mg  10 mg Rectal Q24HRS PRN  SARAH BETH Bowles.A.-C.        sodium phosphate (Fleet) enema 133 mL  1 Each Rectal Once PRN SARAH BETH Bowles.A.-C.        0.9 % NaCl with KCl 20 mEq infusion   Intravenous Continuous FLORENCIA BowlesA.-CEnriqueta   Infusion Ended Prior to Shift at 11/11/23 0750    enoxaparin (Lovenox) inj 40 mg  40 mg Subcutaneous DAILY AT 1800 SARAH BETH Bowles.A.-C.   40 mg at 11/12/23 1726    oxyCODONE immediate-release (Roxicodone) tablet 2.5 mg  2.5 mg Oral Q3HRS PRN SARAH BETH Bowles.A.-C.        Or    oxyCODONE immediate-release (Roxicodone) tablet 5 mg  5 mg Oral Q3HRS PRN SARAH BETH Bowles.A.-C.        Or    HYDROmorphone (Dilaudid) injection 0.25 mg  0.25 mg Intravenous Q3HRS PRN SARAH BETH Bowles.A.-C.        diphenhydrAMINE (Benadryl) tablet/capsule 25 mg  25 mg Oral Q6HRS PRN SARAH BETH Bowles.A.-C.        Or    diphenhydrAMINE (Benadryl) injection 25 mg  25 mg Intravenous Q6HRS PRN Andrey Bright P.A.-C.        ondansetron (Zofran) syringe/vial injection 4 mg  4 mg Intravenous Q4HRS PRN SARAH BETH Bowles.A.-C.        ondansetron (Zofran ODT) dispertab 4 mg  4 mg Oral Q4HRS PRN SARAH BETH Bowles.A.-C.        tizanidine (Zanaflex) tablet 2 mg  2 mg Oral TID PRN SARAH BETH Bowles.A.-C.        benzocaine-menthol (Cepacol) lozenge 1 Lozenge  1 Lozenge Mouth/Throat Q2HRS PRN SARAH BETH Bowles.A.-C.        calcium carbonate (Tums) chewable tab 500 mg  500 mg Oral BID SARAH BETH Bowles.A.-CnEriqueta   500 mg at 11/13/23 0515    HYDROcodone/acetaminophen (Norco)  MG per tablet 1 Tablet  1 Tablet Oral Q4HRS PRN Andrey Bright P.A.-C.   1 Tablet at 11/10/23 0416    carboxymethylcellulose (Refresh Tears) 0.5 % ophthalmic drops 1 Drop  1 Drop Both Eyes PRN Anna Meza P.A.-C.        Nozin nasal  swab  1 Applicator Each Nostril BID Earnest Silva M.D.   1 Applicator at 11/13/23 4461     No current [facility]-ordered outpatient medications on file.     Diet:   DIET ORDERS (From  admission to next 24h)       Start     Ordered    11/10/23 1543  Diet Order Diet: Regular  ALL MEALS        Question:  Diet:  Answer:  Regular    11/10/23 1543                    Anticipated Discharge Destination / Patient/Family Goal:  Destination: Home with Assistance Support System: Family group home  Anticipated home health services: OT and PT  Previously used  service/ provider: Not Applicable  Anticipated DME Needs: [employer]  Outpatient Services: OT and PT  Alternative resources to address additional identified needs:   No future appointments.   Pre-Screen Completed: 11/13/2023 9:55 AM Arun Rain L.P.N.

## 2023-11-13 NOTE — CONSULTS
Physical Medicine and Rehabilitation Consultation              Date of initial consultation: 11/12/2023  Requesting provider: ordered by Andrey Bright P.A.-C. at 11/13/23 0821   Consulting provider: Emily Beverly D.O.  Reason for consultation: assess for acute inpatient rehab appropriateness  LOS: 0 Day(s)    Chief complaint: Status post lumbar laminectomy    HPI: The patient is a 83 y.o.  female with a past medical history of hypothyroidism, hypertension and low back pain and lower extremity weakness;  who presented on 11/10/2023  5:51 AM for scheduled lumbar surgery.  Per documentation, patient has been followed in the outpatient setting by Dr. Silva for ongoing back pain.  Patient was evaluated in the outpatient setting on 11/8/2023 due to worsening back pain impairing her function.  Patient recently required the use of a wheelchair due to her limited mobility.  An MRI of the lumbar spine was obtained which showed prior history of an L4-L5 surgical changes and evidence of spondylolisthesis at L4-5 as well as facet joint arthropathy at L5-S1.  Patient was deemed appropriate for intervention for her L5-S1 stenosis.  Patient was taken to the OR on 11/10 for an L5-S1 redo laminectomy performed by Dr. Silva.  Postop, patient's blood pressure has been well controlled and she is on room air.  There are no postop labs to review.  Preop labs were within normal limits.    patient seen and examined at bedside with patient's daughters.  Patient reports she is feeling better today.  Patient reports that her back is uncomfortable but her pain to her lower extremities has improved since surgery.  Patient reports she did have some numbness into her right feet.  Which is improved since surgery.  Patient was not able to tolerate gait with therapist however patient has been easily able to transfer to the chair per daughters.  Reviewed with patient requirement for medical necessity to be accepted to inpatient rehab and  "plan to check labs in the morning.  Patient agreeable.    Social Hx:  Patient lives alone in a one-story house, has intermittent caregiving as well as support from family, has a ramp to access home  0 JEOVANY  At prior level of function previously independent until approximately 1 month ago when patient required higher caregivers and intermittent support from daughters    Tobacco: Denies  Alcohol: Denies  Drugs: Denies    THERAPY:  Restrictions: Fall risk, spinal precautions  PT: Functional mobility   11/11 min assist sit to stand, unable to participate in gait    OT: ADLs  11/11 max assist lower body dressing, max assist toileting, contact-guard assist bed mobility, min assist sit to stand, min assist transfers    SLP: None    IMAGING:  No lumbar MRI for my review however, Per H&P: \"MRI of the lumbar spine is reviewed.  Postsurgical changes are noted at L4-5.  She is well compressed here.  There is a spondylolisthesis at L4-5.  There is facet joint arthropathy with ligamentous thickening with resultant stenosis at L5-S1.\"     PROCEDURES:  11/10 L5-S1 redo laminectomy performed by Dr. Silva    PMH:  Past Medical History:   Diagnosis Date    Anxiety     Arthritis     osteoarthritis    Back pain     CAD (coronary artery disease) 10/2018    Abnormal CTCS. PCI/FARZAD ([place] 2.5 x 2mm) to the mid LAD. Cardic stent    Cataract     alex IOL    Daytime sleepiness     Depression     Dyslipidemia     Frequent headaches     Herpes zoster     High cholesterol     Hypertension     MHA (microangiopathic hemolytic anemia)     Migraine syndrome      Mumps     as a child    Osteopathia     Osteoporosis     Painful joint     Post-menopause on HRT (hormone replacement therapy)     Renal disorder     chronic kidney disease stage 3    Sleep apnea     no longer uses cpap    Snoring     Thyroid disease     hypothyroid    Urinary incontinence     White matter abnormality on MRI of brain        PSH:  Past Surgical History:   Procedure Laterality " Date    LUMBAR LAMINECTOMY DISKECTOMY N/A 11/10/2023    Procedure: LUMBAR 5-SACRAL 1 REDO LAMINECTOMY;  Surgeon: Earnest Silva M.D.;  Location: SURGERY [facility];  Service: Neurosurgery    LUMBAR LAMINECTOMY DISKECTOMY N/A 2/6/2023    Procedure: POSTERIOR REMOVAL OF INTERSPINOUS DEVICE WITH L4-S1 LAMINECTOMY;  Surgeon: Mannie Connor M.D.;  Location: SURGERY [facility];  Service: Neurosurgery    HARDWARE REMOVAL ORTHO N/A 2/6/2023    Procedure: REMOVAL, HARDWARE;  Surgeon: Mannie Connor M.D.;  Location: SURGERY [facility];  Service: Neurosurgery    LAMINOTOMY  01/28/2022    L4-L5 and L5-S1 posterior fusion with instrumentation and laminotomy by Dr. Benitez.    BUNIONECTOMY Left 2003    FOOT SURGERY  2002    TX BREAST REDUCTION  1997    CHOLECYSTECTOMY  1996    ABDOMINAL HYSTERECTOMY TOTAL  11/1986    ARTHROSCOPY, KNEE      EYE SURGERY      cataracts    HYSTERECTOMY LAPAROSCOPY      MAMMOPLASTY AUGMENTATION      TX BREAST AUGMENTATION WITH IMPLANT         FHX:  Family History   Problem Relation Age of Onset    Stroke Father     Arthritis Father     Hyperlipidemia Father     Hypertension Father     Cancer Sister         breast    Arthritis Sister     Arthritis Mother     Hypertension Mother     Hyperlipidemia Mother     Stroke Mother     Arthritis Brother     Cancer Brother     Hypertension Brother     Hyperlipidemia Brother     Heart Disease Brother         CABG for prox LAD in late 40s    Cancer Daughter         breast       Medications:  Current Facility-Administered Medications   Medication Dose    oxyCODONE CR (OxyCONTIN) tablet 20 mg  20 mg    polyethylene glycol/lytes (Miralax) PACKET 1 Packet  1 Packet    atorvastatin (Lipitor) tablet 40 mg  40 mg    levothyroxine (Synthroid) tablet 88 mcg  88 mcg    liothyronine (Cytomel) tablet 5 mcg  5 mcg    losartan (Cozaar) tablet 50 mg  50 mg    magnesium oxide tablet 200 mg  200 mg    pregabalin (Lyrica) capsule 100 mg  100 mg    spironolactone (Aldactone) tablet 25 mg  " 25 mg    Pharmacy Consult Request ...Pain Management Review 1 Each  1 Each    MD ALERT...DO NOT ADMINISTER NSAIDS or ASPIRIN unless ORDERED By Neurosurgery 1 Each  1 Each    docusate sodium (Colace) capsule 100 mg  100 mg    senna-docusate (Pericolace Or Senokot S) 8.6-50 MG per tablet 1 Tablet  1 Tablet    senna-docusate (Pericolace Or Senokot S) 8.6-50 MG per tablet 1 Tablet  1 Tablet    magnesium hydroxide (Milk Of Magnesia) suspension 30 mL  30 mL    bisacodyl (Dulcolax) suppository 10 mg  10 mg    sodium phosphate (Fleet) enema 133 mL  1 Each    0.9 % NaCl with KCl 20 mEq infusion      enoxaparin (Lovenox) inj 40 mg  40 mg    oxyCODONE immediate-release (Roxicodone) tablet 2.5 mg  2.5 mg    Or    oxyCODONE immediate-release (Roxicodone) tablet 5 mg  5 mg    Or    HYDROmorphone (Dilaudid) injection 0.25 mg  0.25 mg    diphenhydrAMINE (Benadryl) tablet/capsule 25 mg  25 mg    Or    diphenhydrAMINE (Benadryl) injection 25 mg  25 mg    ondansetron (Zofran) syringe/vial injection 4 mg  4 mg    ondansetron (Zofran ODT) dispertab 4 mg  4 mg    tizanidine (Zanaflex) tablet 2 mg  2 mg    benzocaine-menthol (Cepacol) lozenge 1 Lozenge  1 Lozenge    calcium carbonate (Tums) chewable tab 500 mg  500 mg    HYDROcodone/acetaminophen (Norco)  MG per tablet 1 Tablet  1 Tablet    carboxymethylcellulose (Refresh Tears) 0.5 % ophthalmic drops 1 Drop  1 Drop    Nozin nasal  swab  1 Applicator       Allergies:  Allergies   Allergen Reactions    Trazodone Swelling     Pt stated that she had swelling on her hands and feet's     Crestor [Rosuvastatin Calcium]      Myalgia        Physical Exam:  Vitals: /66   Pulse 72   Temp 36.8 °C (98.2 °F) (Temporal)   Resp 16   Ht 1.575 m (5' 2\")   Wt 70.4 kg (155 lb 3.3 oz)   SpO2 93%   Gen: NAD, seated comfortably in recliner, daughter is at bedside  Head:  NC/AT  Eyes/ Nose/ Mouth: PERRLA, moist mucous membranes  Cardio: RRR, good distal perfusion, warm " "extremities  Pulm: normal respiratory effort, no cyanosis, on room air  Abd: Soft NTND,   Ext: Mild ankle edema no calf tenderness. No clubbing.    Mental status:  A&Ox4 (person, place, date, situation) answers questions appropriately follows commands  Speech: fluent, no aphasia or dysarthria    CRANIAL NERVES:  2,3: visual acuity grossly intact, PERRL  3,4,6: EOMI bilaterally, no nystagmus or diplopia  5: sensation intact to light touch bilaterally and symmetric  7: no facial asymmetry  8: hearing grossly intact    Motor:      Upper Extremity  Myotome R L   Shoulder flexion C5 5/5 5/5   Elbow flexion C5 5/5 5/5   Wrist extension C6 5/5 5/5   Elbow extension C7 5/5 5/5   Finger flexion C8 5/5 5/5   Finger abduction T1 5/5 5/5     Lower Extremity Myotome R L   Hip flexion L2 4/5 4/5   Knee extension L3 5/5 5/5   Ankle dorsiflexion L4 5/5 5/5   Toe extension L5 5/5 5/5   Ankle plantarflexion S1 5/5 5/5       Negative Pronator drift bilaterally    Sensory:   intact to light touch through out    DTRs:2+ in bilateral patellar tendons  No clonus at bilateral ankles  Negative Reyes b/l     Tone: no spasticity noted    Coordination:   intact fine motor with fingers bilaterally      Labs: Reviewed and significant for   No results for input(s): \"RBC\", \"HEMOGLOBIN\", \"HEMATOCRIT\", \"PLATELETCT\", \"PROTHROMBTM\", \"APTT\", \"INR\", \"IRON\", \"FERRITIN\", \"TOTIRONBC\" in the last 72 hours.      No results found for this or any previous visit (from the past 24 hour(s)).      ASSESSMENT:  Patient is a 83 y.o. female admitted with lumbar stenosis    [facility] Code / Diagnosis to Support: 0004.111 - Spinal Cord Dysfunction, Non-Traumatic: Paraplegia, Incomplete    Rehabilitation: Impaired ADLs and mobility  Patient is a anticipated to be a good candidate for inpatient rehab based on needs for PT, OT, however pending medical necessity, will need labs in order to determine additional medical need.    Barriers to transfer include: Insurance " authorization, TCCs to verify disposition, medical clearance and bed availability     Additional Recommendations:  Status post lumbar laminectomy  -Patient has history of prior L4-L5 decompression  - Recent worsening of back pain with radicular pain into right lower extremity, impairing function and recently utilizing a wheelchair in the last month  - Per outpatient documentation, MRI of the lumbar spine reportedly showed an L5-S1 stenosis  - 11/10 patient taken to the OR for L5-S1 laminectomy performed by Dr. Silva  - Spinal precautions in place, does not require bracing  - At risk for postop anemia or electrolyte abnormalities, postop laboratory values ordered for tomorrow  - Continue with PT/OT,     Hypothyroidism  -On home dose Synthroid    Hypertension  - On home dose losartan and spironolactone  - Blood pressure well controlled    Pain  - On Lyrica for neuropathic pain  - Utilizing as needed oxycodone    Dispo:  - patient is currently functioning below their level of baseline, recommend post acute rehab  - recommend IRF level therapy with 3hr of therapy 5 days per week  - piror to acceptance to IRF, will need confirmation of discharge support from daughters, as well as postop laboratory values obtained  - TCC to assist with insurance auth and DC support         Medical Complexity:  Status post lumbar laminectomy  Hypothyroidism  Hypertension  Impaired mobility and ADLs      DVT PPX: Lovenox      Thank you for allowing us to participate in the care of this patient.     Patient was seen for 81 minutes on unit/floor of which > 50% of time was spent on counseling and coordination of care regarding the above, including prognosis, risk reduction, benefits of treatment, and options for next stage of care.    Emily Beverly D.O.   Physical Medicine and Rehabilitation     Please note that this dictation was created using voice recognition software. I have made every reasonable attempt to correct obvious errors, but  there may be errors of grammar and possibly content that I did not discover before finalizing the note.

## 2023-11-13 NOTE — CARE PLAN
The patient is Stable - Low risk of patient condition declining or worsening    Shift Goals  Clinical Goals: drain, safety  Patient Goals: pain  Family Goals: na    Progress made toward(s) clinical / shift goals:    Problem: Pain - Standard  Goal: Alleviation of pain or a reduction in pain to the patient’s comfort goal  Outcome: Progressing     Problem: Fall Risk  Goal: Patient will remain free from falls  Outcome: Progressing     Problem: Knowledge Deficit - Standard  Goal: Patient and family/care givers will demonstrate understanding of plan of care, disease process/condition, diagnostic tests and medications  Outcome: Progressing       Patient is not progressing towards the following goals:

## 2023-11-13 NOTE — H&P
Physical Medicine & Rehabilitation  History and Physical (H&P)  &     Post Admission Physician Evaluation (PAXTON)       Date of Admission: 11/13/2023  Date of Service: 11/13/2023   Sheyla Gauthier  RH24/01    [facility] Code to Support Admission: 0003.3 - Neurologic Conditions: Polyneuropathy  Etiologic Diagnosis: Radiculopathy    _______________________________________________    Chief Complaint: Weakness    History of Present Illness:  Adapted from the PM&R Consult by Dr. Beverly:     The patient is a 83 y.o.  female with a past medical history of hypothyroidism, hypertension and low back pain and lower extremity weakness;  who presented on 11/10/2023  5:51 AM for scheduled lumbar surgery.  Per documentation, patient has been followed in the outpatient setting by Dr. Silva for ongoing back pain.  Patient was evaluated in the outpatient setting on 11/8/2023 due to worsening back pain impairing her function.  Patient recently required the use of a wheelchair due to her limited mobility.  An MRI of the lumbar spine was obtained which showed prior history of an L4-L5 surgical changes and evidence of spondylolisthesis at L4-5 as well as facet joint arthropathy at L5-S1.  Patient was deemed appropriate for intervention for her L5-S1 stenosis.  Patient was taken to the OR on 11/10 for an L5-S1 redo laminectomy performed by Dr. Silva.  Postop, patient's blood pressure has been well controlled and she is on room air.  There are no postop labs to review.  Preop labs were within normal limits.     patient seen and examined at bedside  Patient reports she is feeling better today.  Patient reports that her back is uncomfortable but her pain to her lower extremities has improved since surgery.  Patient reports she did have some numbness into her right feet.  Which is improved since surgery.       PROCEDURES:  11/10 L5-S1 redo laminectomy performed by Dr. Silva    Review of Systems:     Comprehensive 14 point ROS was reviewed and  all were negative except as noted elsewhere in this document.     Past Medical History:  Past Medical History:   Diagnosis Date    Anxiety     Arthritis     osteoarthritis    Back pain     CAD (coronary artery disease) 10/2018    Abnormal CTCS. PCI/FARZAD ([place] 2.5 x 2mm) to the mid LAD. Cardic stent    Cataract     alex IOL    Daytime sleepiness     Depression     Dyslipidemia     Frequent headaches     Herpes zoster     High cholesterol     Hypertension     MHA (microangiopathic hemolytic anemia)     Migraine syndrome      Mumps     as a child    Osteopathia     Osteoporosis     Painful joint     Post-menopause on HRT (hormone replacement therapy)     Renal disorder     chronic kidney disease stage 3    Sleep apnea     no longer uses cpap    Snoring     Thyroid disease     hypothyroid    Urinary incontinence     White matter abnormality on MRI of brain        Past Surgical History:  Past Surgical History:   Procedure Laterality Date    LUMBAR LAMINECTOMY DISKECTOMY N/A 11/10/2023    Procedure: LUMBAR 5-SACRAL 1 REDO LAMINECTOMY;  Surgeon: Earnest Silva M.D.;  Location: SURGERY [facility];  Service: Neurosurgery    LUMBAR LAMINECTOMY DISKECTOMY N/A 2/6/2023    Procedure: POSTERIOR REMOVAL OF INTERSPINOUS DEVICE WITH L4-S1 LAMINECTOMY;  Surgeon: Mannie Connor M.D.;  Location: SURGERY [facility];  Service: Neurosurgery    HARDWARE REMOVAL ORTHO N/A 2/6/2023    Procedure: REMOVAL, HARDWARE;  Surgeon: Mannie Connor M.D.;  Location: SURGERY [facility];  Service: Neurosurgery    LAMINOTOMY  01/28/2022    L4-L5 and L5-S1 posterior fusion with instrumentation and laminotomy by Dr. Benitez.    BUNIONECTOMY Left 2003    FOOT SURGERY  2002    NC BREAST REDUCTION  1997    CHOLECYSTECTOMY  1996    ABDOMINAL HYSTERECTOMY TOTAL  11/1986    ARTHROSCOPY, KNEE      EYE SURGERY      cataracts    HYSTERECTOMY LAPAROSCOPY      MAMMOPLASTY AUGMENTATION      NC BREAST AUGMENTATION WITH IMPLANT         Family History:  Family History    Problem Relation Age of Onset    Stroke Father     Arthritis Father     Hyperlipidemia Father     Hypertension Father     Cancer Sister         breast    Arthritis Sister     Arthritis Mother     Hypertension Mother     Hyperlipidemia Mother     Stroke Mother     Arthritis Brother     Cancer Brother     Hypertension Brother     Hyperlipidemia Brother     Heart Disease Brother         CABG for prox LAD in late 40s    Cancer Daughter         breast       Medications:  Current Facility-Administered Medications   Medication Dose    atorvastatin (Lipitor) tablet 40 mg  40 mg    calcium carbonate (Tums) chewable tab 500 mg  500 mg    enoxaparin (Lovenox) inj 40 mg  40 mg    [START ON 11/14/2023] levothyroxine (Synthroid) tablet 88 mcg  88 mcg    [START ON 11/14/2023] liothyronine (Cytomel) tablet 5 mcg  5 mcg    [START ON 11/14/2023] losartan (Cozaar) tablet 50 mg  50 mg    magnesium oxide tablet 200 mg  200 mg    [START ON 11/14/2023] polyethylene glycol/lytes (Miralax) PACKET 1 Packet  1 Packet    pregabalin (Lyrica) capsule 100 mg  100 mg    spironolactone (Aldactone) tablet 25 mg  25 mg    oxyCODONE immediate-release (Roxicodone) tablet 2.5 mg  2.5 mg    Or    oxyCODONE immediate-release (Roxicodone) tablet 5 mg  5 mg    Or    HYDROmorphone (Dilaudid) injection 0.25 mg  0.25 mg    tizanidine (Zanaflex) tablet 2 mg  2 mg    Respiratory Therapy Consult      Pharmacy Consult Request ...Pain Management Review 1 Each  1 Each    hydrALAZINE (Apresoline) tablet 25 mg  25 mg    senna-docusate (Pericolace Or Senokot S) 8.6-50 MG per tablet 2 Tablet  2 Tablet    And    polyethylene glycol/lytes (Miralax) PACKET 1 Packet  1 Packet    And    magnesium hydroxide (Milk Of Magnesia) suspension 30 mL  30 mL    And    bisacodyl (Dulcolax) suppository 10 mg  10 mg    omeprazole (PriLOSEC) capsule 20 mg  20 mg    ondansetron (Zofran ODT) dispertab 4 mg  4 mg    Or    ondansetron (Zofran) syringe/vial injection 4 mg  4 mg    sodium  chloride (Ocean) 0.65 % nasal spray 2 Spray  2 Spray       Allergies:  Trazodone and Crestor [rosuvastatin calcium]    Psychosocial History:  Prior Living Situation:   Housing / Facility: 1 Story House  Steps Into Home: 0 (ramp)  Lives with - Patient's Self Care Capacity: Unable To Determine At This Time  Equipment Owned: Front-Wheel Walker, Wheelchair, Ramp     Prior Level of Function / Living Situation:   Physical Therapy: Prior Services: [facility]  Housing / Facility: [address]  Steps Into Home: 0 (ramp)  Bathroom Set up: Walk In Shower, Shower Chair  Equipment Owned: Front-Wheel Walker, Wheelchair, Ramp  Lives with - Patient's Self Care Capacity: Unable To Determine At This Time  Bed Mobility: Independent  Transfer Status: Independent  Ambulation: Independent  Assistive Devices Used: Front-Wheel Walker  Wheelchair: Independent  Current Level of Function:   Gait Level Of Assist: Unable to Participate  Supine to Sit: Minimal Assist  Sit to Supine:  (left up in recliner)  Scooting: Minimal Assist  Rolling: Contact Guard Assist  Comments: NT, in chair pre/post  Sit to Stand: Minimal Assist  Bed, Chair, Wheelchair Transfer: Minimal Assist  Toilet Transfers:  (declined need; likely could get to)  Transfer Method: Stand Step  Sitting in Chair: pre/pos  Standin min  Occupational Therapy:   Self Feeding: Independent  Grooming / Hygiene: Independent  Bathing: Independent (recently has been unable to bathe and so has been sponge bathing)  Dressing: Independent  Toileting: Independent  Medication Management: Independent  Laundry: Independent  Kitchen Mobility: Independent  Finances: Independent  Home Management: Independent  Shopping: Independent  Prior Level Of Mobility: Independent With Device in Home  Prior Services: [facility]  Housing / Facility: [address]  Occupation (Pre-Hospital Vocational): Retired Due To Age  Current Level of Function:   Eating: Supervision  Lower  Body Dressing: Maximal Assist  Toileting: Maximal Assist (able to get to [facility]; using purewick)    CURRENT LEVEL OF FUNCTION:   Same as level of function prior to admission to [facility]    Physical Examination:     VITAL SIGNS:   weight is 63.5 kg (140 lb).   General: Well developed, Well nourished, No Acute Distress  HEENT: Normocephalic, atraumatic. Anicteric sclera  Cardiac: Physiologic rate and regular rhythm, no murmurs  Pulmonary: Lungs are clear to auscultation bilaterally, comfortable on room air  Abdomen: Soft, non-tender, non-distended. Bowel sounds normoactive.   Extremities: Warm and well perfused, no clubbing, cyanosis. No edema.   Skin: No visible rashes or lesions.   Psych: calm, no agitation, congruent mood/affect    NEUROLOGIC EXAM:  Gen:  Alert and oriented to person, place, date, and circumstance. Appropriately interactive  Speech/Language/Comprehension: Fluent speech w/o paraphasic errors, follows simple and complex commands without L/R confusion .?  Attention: Attentive to conversation. Names MOYB correctly.?? ??  Memory: Recalls her primary reason for being in the hospital  Judgment: Demonstrates appropriate use of call light   Praxis:  Pantomimes brushing hair and teeth.???    Cranial Nerves:   II:   Visual fields full to confrontation. Pupils equally round & reactive to light. ??  III, IV, VI:  EOMI w/o nystagmus or diplopia. No ptosis.????  V:  Sensation intact to light touch. ??  VII:  Face symmetric without weakness.????  VIII:  Hears functionally.????  IX, X:  Voice normal. Palate elevates symmetrically.????  XI:  Trapezii full.????  XII:  Tongue protrudes midline.????    Motor:?  ?? Delt???? Bi???? Tri???? Wrist ??  Ext?? DIP flex ??  (FDP)?? Finger ABd?? IP???? Quad???? [place]st???? TibAnt???? EHL???? Plantar  flexion   ???? C5???? C6???? C7???? ?C6?? C8/T1?? C8/T1???? L2???? L3???? L4-S1???? L4???? L5???? S1   L???? 5/5 5/5 5/5 5/5 5/5 5/5 4/5 4/5 4/5 4/5 4/5 4/5  "  R???? 5/5 5/5 5/5 5/5 5/5 5/5 4/5 4/5 4/5 4/5 4/5 4/5       Sensation: Decreased sensation to light touch in the L5- S1  dermatomes lower extremities.     Coordination: Normal finger-to-nose testing without dysmetria. Normal rapid alternating movements.     Reflexes: Reflexes are 2+ at the biceps, triceps, brachioradialis, 1+ patella, and 1+ achilles.     Radiology:  IMAGING:  No lumbar MRI for my review however, Per H&P: \"MRI of the lumbar spine is reviewed.  Postsurgical changes are noted at L4-5.  She is well compressed here.  There is a spondylolisthesis at L4-5.  There is facet joint arthropathy with ligamentous thickening with resultant stenosis at L5-S1.\"     Laboratory Values:  Recent Labs     11/13/23  0437   SODIUM 140   POTASSIUM 4.7   CHLORIDE 107   CO2 24   GLUCOSE 98   BUN 21   CREATININE 0.85   CALCIUM 9.5     Recent Labs     11/13/23  0437   WBC 7.7   RBC 3.49*   HEMOGLOBIN 9.7*   HEMATOCRIT 30.9*   MCV 88.5   MCH 27.8   MCHC 31.4*   RDW 47.7   PLATELETCT 197   MPV 10.1           Primary Rehabilitation Diagnosis:    This patient is a 83 y.o. female admitted for acute inpatient rehabilitation with Radiculopathy.    Impairments:   ADLs/IADLs  Mobility    Secondary Diagnosis/Medical Co-morbidities Affecting Function  Acute blood loss anemia, HTN, hypothyrodism    Relevant Changes Since Preadmission Evaluation:    Status unchanged    The patient's rehabilitation potential is Good  The patient's medical prognosis is good    Rehabilitation Plan:   Discussion and Recommendations:   1. The patient requires an acute inpatient rehabilitation program with a coordinated program of care at an intensity and frequency not available at a lower level of care. This recommendation is substantiated by the patient's medical physicians who recommend that the patient's intervention and assessment of medical issues needs to be done at an acute level of care for patient's safety and maximum outcome.   2. A coordinated " program of care will be supplied by an interdisciplinary team of physical therapy, occupational therapy, rehab physician, rehab nursing, and, if needed, speech therapy and rehab psychology. Rehab team presents a patient-specific rehabilitation and education program concentrating on prevention of future problems related to accessibility, mobility, skin, bowel, bladder, sexuality, and psychosocial and medical/surgical problems.   3. Need for Rehabilitation Physician: The rehab physician will be evaluating the patient on a multi-weekly basis to help coordinate the program of care. The rehab physician communicates between medical physicians, therapists, and nurses to maximize the patient's potential outcome. Specific areas in which the rehab physician will be providing daily assessment include the following:   A. Assessing the patient's heart rate and blood pressure response (vitals monitoring) to activity and making adjustments in medications or conservative measures as needed.   B. The rehab physician will be assessing the frequency at which the program can be increased to allow the patient to reach optimal functional outcome.   C. The rehab physician will also provide assessments in daily skin care, especially in light of patient's impairments in mobility.   D. The rehab physician will provide special expertise in understanding how to work with functional impairment and recommend appropriate interventions, compensatory techniques, and education that will facilitate the patient's outcome.   4. Rehab R.N.   The rehab RN will be working with patient to carry over in room mobility and activities of daily living when the patient is not in 3 hours of skilled therapy. Rehab nursing will be working in conjunction with rehab physician to address all the medical issues above and continue to assess laboratory work and discuss abnormalities with the treating physicians, assess vitals, and response to activity, and discuss and  report abnormalities with the rehab physician. Rehab RN will also continue daily skin care, supervise bladder/bowel program, instruct in medication administration, and ensure patient safety.   5. Rehab Therapy: Therapies to treat at intensity and frequency of (may change after completion of evaluation by all therapeutic disciplines):       PT:  Physical therapy to address mobility, transfer, gait training and evaluation for adaptive equipment needs 1.5 hour/day at least 5 days/week for the duration of the ELOS (see below)       OT:  Occupational therapy to address ADLs, self-care, home management training, functional mobility/transfers and assistive device evaluation, and community re-integration 1 .5 hour/day at least 5 days/week for the duration of the ELOS (see below).            Medical management / Rehabilitation Issues/ Adverse Potential as part of rehabilitation plan     Rehabilitation Issues/Adverse Potential  1.  Polyradiculopathy: Patient demonstrates functional deficits in strength, balance, coordination, and ADL's. Patient is admitted to [facility] for comprehensive rehabilitation therapy as described below.   Rehabilitation nursing monitors bowel and bladder control, educates on medication administration, co-morbidities and monitors patient safety.    2.  Neurostimulants: None at this time but continue to assess daily for need to initiate should status change.    3.  DVT prophylaxis:  Patient is on Lovenox for anticoagulation upon transfer. Encourage OOB. Monitor daily for signs and symptoms of DVT including but not limited to swelling and pain to prevent the development of DVT that may interfere with therapies.    4.  GI prophylaxis:  On prilosec to prevent gastritis/dyspepsia which may interfere with therapies.    5.  Pain: No issues with pain currently / Controlled with Oxycodone    6.  Nutrition/Dysphagia: Dietician monitors nutrient intake, recommend supplements prn and provide  nutrition education to pt/family to promote optimal nutrition for wound healing/recovery.     7.  Bladder/bowel:  Start bowel and bladder program as described below, to prevent constipation, urinary retention (which may lead to UTI), and urinary incontinence (which will impact upon pt's functional independence).   - TV Q3h while awake with post void bladder scans, I&O cath for PVRs >400  - up to commode after meal     8.  Skin/dermal ulcer prophylaxis: Monitor for new skin conditions with q.2 h. turns as required to prevent the development of skin breakdown.     9.  Cognition/Behavior: As needed psychologist provides adjustment counseling to illness and psychosocial barriers that may be potential barriers to rehabilitation.     10. Respiratory therapy: RT performs O2 management prn, breathing retraining, pulmonary hygiene and bronchospasm management prn to optimize participation in therapies.     Medical Co-Morbidities/Adverse Potential Affecting Function:    Polyradiculopathy  Status post lumbar laminectomy  -Patient has history of prior L4-L5 decompression  - Recent worsening of back pain with radicular pain into right lower extremity, impairing function and recently utilizing a wheelchair in the last month  - Per outpatient documentation, MRI of the lumbar spine reportedly showed an L5-S1 stenosis  - 11/10 patient taken to the OR for L5-S1 laminectomy performed by Dr. Silva  - Spinal precautions in place, does not require bracing  - Continue with PT/OT inpatient    Bilateral Lower Extremity Edema  -Bilateral Lower extremity ultrasounds to rule out dvts  -SHERRELL hose if negative  -consider weaning lyrica    Hypothyroidism  -On home dose Synthroid     Hypertension  - On home dose losartan and spironolactone  - Blood pressure well controlled       Neurogenic bladder:  - Timed voids with PVR q4H x3. If PVR > 400mL or if patient is unable to void, straight cath patient.    Neurogenic bowel:  -  Colace, Senna BID on  admission  - Goal of 1BM/day.  -     Circadian Rhythm disorder:     Recommend lights on during the day/off at night, minimize nighttime interruptions as able.     Mood  - at risk of adjustment disorder, depression, and anxiety due to functional decline    ID:  - at risk for Urinary tract infection    Skin/Wounds:  - Pressure relief q2h while in bed. Close monitoring for signs of breakdown    Pain:  - Neuroceptic - On Tylenol prn, oxycodone 2.5-5mg PRN, Tizanidine PRN  - Neuropathic - On Lyrica    DVT prophylaxis:  Patient is on Lovenox.     GI prophylaxis:  On Prilosec 20mg daily     -Follow-up Ortho, PCP    I personally performed a complete drug regimen review and no potential clinically significant medication issues were identified.     Goals/Expected Level of Function Based on Current Medical and Functional Status:  (may change based on patient's medical status and rate of impairment recovery)  Transfers:   Supervision  Mobility/Gait:   Supervision  ADL's:   Supervision    DISPOSITION: Discharge to pre-morbid independent living setting with the supportive care of patient's family.    ELOS: 10-14 days  ____________________________________    Mitchell Chavis MD  Physical Medicine & Rehabilitation   Brain Injury Medicine   ____________________________________    Pt was seen today for 79 min, and entire time spent in face-to-face contact was >50% in counseling and coordination of care as detailed in A/P above.

## 2023-11-13 NOTE — PROGRESS NOTES
Neurosurgery Progress Note    Subjective:  POD#3 redo L5-S1 laminectomy    Doing well this AM, no acute events overnight.  Tolerable back pain, some improvement in bilateral lower extremity radicular pain.  Minimal ambulation, patient was in a wheelchair prior to surgery due to pain.      PT/OT recommending post acute placement, has been deemed a good candidate, pending auth.    Exam:  Dressing CDI.    Motor 4+/5 to left leg globally due to deconditioning, 5/5 in right leg  Sensation intact.         BP  Min: 110/55  Max: 140/71  Pulse  Av  Min: 63  Max: 78  Resp  Avg: 15.8  Min: 15  Max: 16  Temp  Av.7 °C (98.1 °F)  Min: 36.5 °C (97.7 °F)  Max: 36.9 °C (98.4 °F)  Monitored Temp 2  Av °C (96.8 °F)  Min: 36 °C (96.8 °F)  Max: 36 °C (96.8 °F)  SpO2  Av.8 %  Min: 90 %  Max: 93 %    No data recorded    Recent Labs     23  0437   WBC 7.7   RBC 3.49*   HEMOGLOBIN 9.7*   HEMATOCRIT 30.9*   MCV 88.5   MCH 27.8   MCHC 31.4*   RDW 47.7   PLATELETCT 197   MPV 10.1     Recent Labs     23  0437   SODIUM 140   POTASSIUM 4.7   CHLORIDE 107   CO2 24   GLUCOSE 98   BUN 21   CREATININE 0.85   CALCIUM 9.5               Intake/Output                         23 0700 - 23 0659 23 0700 - 23 0659      Total 2746-5719 5078-0769 Total                 Intake    Total Intake -- -- -- -- -- --       Output    Urine  --  -- --  --  -- --    Number of Times Voided -- 2 x 2 x -- -- --    Drains  50  50 100  --  -- --    Output (mL) ([REMOVED] Closed/Suction Drain 1 Midline;Posterior Back Hemovac 23 0500) 50 50 100 -- -- --    Stool  --  -- --  --  -- --    Number of Times Stooled 2 x 1 x 3 x -- -- --    Total Output 50 50 100 -- -- --       Net I/O     -50 -50 -100 -- -- --              Intake/Output Summary (Last 24 hours) at 2023 0921  Last data filed at 2023 0400  Gross per 24 hour   Intake --   Output 100 ml   Net -100 ml               oxyCODONE CR  20  mg Q12HRS    polyethylene glycol/lytes  1 Packet DAILY    atorvastatin  40 mg QHS    levothyroxine  88 mcg AM ES    liothyronine  5 mcg QAM    losartan  50 mg QAM    magnesium oxide  200 mg Q EVENING    pregabalin  100 mg BID    spironolactone  25 mg DAILY    Pharmacy Consult Request  1 Each PHARMACY TO DOSE    MD ALERT...DO NOT ADMINISTER NSAIDS or ASPIRIN unless ORDERED By Neurosurgery  1 Each PRN    docusate sodium  100 mg BID    senna-docusate  1 Tablet Nightly    senna-docusate  1 Tablet Q24HRS PRN    magnesium hydroxide  30 mL QDAY PRN    bisacodyl  10 mg Q24HRS PRN    sodium phosphate  1 Each Once PRN    0.9 % NaCl with KCl 20 mEq 1,000 mL   Continuous    enoxaparin (LOVENOX) injection  40 mg DAILY AT 1800    oxyCODONE immediate-release  2.5 mg Q3HRS PRN    Or    oxyCODONE immediate-release  5 mg Q3HRS PRN    Or    HYDROmorphone  0.25 mg Q3HRS PRN    diphenhydrAMINE  25 mg Q6HRS PRN    Or    diphenhydrAMINE  25 mg Q6HRS PRN    ondansetron  4 mg Q4HRS PRN    ondansetron  4 mg Q4HRS PRN    tizanidine  2 mg TID PRN    benzocaine-menthol  1 Lozenge Q2HRS PRN    calcium carbonate  500 mg BID    HYDROcodone/acetaminophen  1 Tablet Q4HRS PRN    carboxymethylcellulose  1 Drop PRN    Nozin nasal  swab  1 Applicator BID       Assessment and Plan:  Hospital day # 4  POD# 3  PT/OT mobilize as tolerated  Ice incisions  Adjusting medications, weaning down on narcotics  Patient would benefit from [facility], has been deemed a good candidate, pending auth.  Patient is cleared for discharge to rehab once accepted/bed available.       Chemical prophylactic DVT therapy: Yes - Lovenox 40mg/qd    Start date/time: today

## 2023-11-13 NOTE — PROGRESS NOTES
Patient transferred to [facility] via [facility] wheelchair. Discharge instructions given to patient and daughter, all questions answered. Patient left with two bags of belongings. Report given to [facility].

## 2023-11-14 ENCOUNTER — APPOINTMENT (OUTPATIENT)
Dept: OCCUPATIONAL THERAPY | Facility: REHABILITATION | Age: 83
DRG: 552 | End: 2023-11-14
Attending: PHYSICAL MEDICINE & REHABILITATION
Payer: MEDICARE

## 2023-11-14 ENCOUNTER — APPOINTMENT (OUTPATIENT)
Dept: PHYSICAL THERAPY | Facility: REHABILITATION | Age: 83
DRG: 552 | End: 2023-11-14
Attending: PHYSICAL MEDICINE & REHABILITATION
Payer: MEDICARE

## 2023-11-14 ENCOUNTER — ANCILLARY PROCEDURE (OUTPATIENT)
Dept: CARDIOLOGY | Facility: REHABILITATION | Age: 83
DRG: 552 | End: 2023-11-14
Attending: PHYSICAL MEDICINE & REHABILITATION
Payer: MEDICARE

## 2023-11-14 LAB
ALBUMIN SERPL BCP-MCNC: 3.3 G/DL (ref 3.2–4.9)
ALBUMIN/GLOB SERPL: 1.3 G/DL
ALP SERPL-CCNC: 74 U/L (ref 30–99)
ALT SERPL-CCNC: 11 U/L (ref 2–50)
ANION GAP SERPL CALC-SCNC: 10 MMOL/L (ref 7–16)
AST SERPL-CCNC: 14 U/L (ref 12–45)
BASOPHILS # BLD AUTO: 0.6 % (ref 0–1.8)
BASOPHILS # BLD: 0.04 K/UL (ref 0–0.12)
BILIRUB SERPL-MCNC: 0.4 MG/DL (ref 0.1–1.5)
BUN SERPL-MCNC: 20 MG/DL (ref 8–22)
CALCIUM ALBUM COR SERPL-MCNC: 10.1 MG/DL (ref 8.5–10.5)
CALCIUM SERPL-MCNC: 9.5 MG/DL (ref 8.5–10.5)
CHLORIDE SERPL-SCNC: 108 MMOL/L (ref 96–112)
CO2 SERPL-SCNC: 24 MMOL/L (ref 20–33)
CREAT SERPL-MCNC: 0.72 MG/DL (ref 0.5–1.4)
EOSINOPHIL # BLD AUTO: 0.14 K/UL (ref 0–0.51)
EOSINOPHIL NFR BLD: 2 % (ref 0–6.9)
ERYTHROCYTE [DISTWIDTH] IN BLOOD BY AUTOMATED COUNT: 47.3 FL (ref 35.9–50)
EST. AVERAGE GLUCOSE BLD GHB EST-MCNC: 114 MG/DL
GFR SERPLBLD CREATININE-BSD FMLA CKD-EPI: 83 ML/MIN/1.73 M 2
GLOBULIN SER CALC-MCNC: 2.5 G/DL (ref 1.9–3.5)
GLUCOSE SERPL-MCNC: 98 MG/DL (ref 65–99)
HBA1C MFR BLD: 5.6 % (ref 4–5.6)
HCT VFR BLD AUTO: 31 % (ref 37–47)
HGB BLD-MCNC: 9.8 G/DL (ref 12–16)
IMM GRANULOCYTES # BLD AUTO: 0.04 K/UL (ref 0–0.11)
IMM GRANULOCYTES NFR BLD AUTO: 0.6 % (ref 0–0.9)
LYMPHOCYTES # BLD AUTO: 1.42 K/UL (ref 1–4.8)
LYMPHOCYTES NFR BLD: 20.5 % (ref 22–41)
MAGNESIUM SERPL-MCNC: 2 MG/DL (ref 1.5–2.5)
MCH RBC QN AUTO: 27.8 PG (ref 27–33)
MCHC RBC AUTO-ENTMCNC: 31.6 G/DL (ref 32.2–35.5)
MCV RBC AUTO: 88.1 FL (ref 81.4–97.8)
MONOCYTES # BLD AUTO: 0.45 K/UL (ref 0–0.85)
MONOCYTES NFR BLD AUTO: 6.5 % (ref 0–13.4)
NEUTROPHILS # BLD AUTO: 4.84 K/UL (ref 1.82–7.42)
NEUTROPHILS NFR BLD: 69.8 % (ref 44–72)
NRBC # BLD AUTO: 0 K/UL
NRBC BLD-RTO: 0 /100 WBC (ref 0–0.2)
PLATELET # BLD AUTO: 230 K/UL (ref 164–446)
PMV BLD AUTO: 10.3 FL (ref 9–12.9)
POTASSIUM SERPL-SCNC: 4.2 MMOL/L (ref 3.6–5.5)
PROT SERPL-MCNC: 5.8 G/DL (ref 6–8.2)
RBC # BLD AUTO: 3.52 M/UL (ref 4.2–5.4)
SODIUM SERPL-SCNC: 142 MMOL/L (ref 135–145)
TSH SERPL DL<=0.005 MIU/L-ACNC: 0.43 UIU/ML (ref 0.38–5.33)
WBC # BLD AUTO: 6.9 K/UL (ref 4.8–10.8)

## 2023-11-14 PROCEDURE — 85025 COMPLETE CBC W/AUTO DIFF WBC: CPT

## 2023-11-14 PROCEDURE — 97116 GAIT TRAINING THERAPY: CPT

## 2023-11-14 PROCEDURE — 97535 SELF CARE MNGMENT TRAINING: CPT

## 2023-11-14 PROCEDURE — 84443 ASSAY THYROID STIM HORMONE: CPT

## 2023-11-14 PROCEDURE — 83735 ASSAY OF MAGNESIUM: CPT

## 2023-11-14 PROCEDURE — 700102 HCHG RX REV CODE 250 W/ 637 OVERRIDE(OP): Performed by: PHYSICAL MEDICINE & REHABILITATION

## 2023-11-14 PROCEDURE — 93970 EXTREMITY STUDY: CPT

## 2023-11-14 PROCEDURE — 80053 COMPREHEN METABOLIC PANEL: CPT

## 2023-11-14 PROCEDURE — 94760 N-INVAS EAR/PLS OXIMETRY 1: CPT

## 2023-11-14 PROCEDURE — 83036 HEMOGLOBIN GLYCOSYLATED A1C: CPT

## 2023-11-14 PROCEDURE — 770010 HCHG ROOM/CARE - REHAB SEMI PRIVAT*

## 2023-11-14 PROCEDURE — 97166 OT EVAL MOD COMPLEX 45 MIN: CPT

## 2023-11-14 PROCEDURE — 97162 PT EVAL MOD COMPLEX 30 MIN: CPT

## 2023-11-14 PROCEDURE — 99232 SBSQ HOSP IP/OBS MODERATE 35: CPT | Performed by: PHYSICAL MEDICINE & REHABILITATION

## 2023-11-14 PROCEDURE — 36415 COLL VENOUS BLD VENIPUNCTURE: CPT

## 2023-11-14 PROCEDURE — A9270 NON-COVERED ITEM OR SERVICE: HCPCS | Performed by: PHYSICAL MEDICINE & REHABILITATION

## 2023-11-14 PROCEDURE — 97530 THERAPEUTIC ACTIVITIES: CPT

## 2023-11-14 RX ORDER — OXYCODONE HYDROCHLORIDE 5 MG/1
5 TABLET ORAL 2 TIMES DAILY
Status: DISCONTINUED | OUTPATIENT
Start: 2023-11-15 | End: 2023-11-16

## 2023-11-14 RX ADMIN — OXYCODONE HYDROCHLORIDE 5 MG: 5 TABLET ORAL at 14:03

## 2023-11-14 RX ADMIN — LEVOTHYROXINE SODIUM 88 MCG: 0.09 TABLET ORAL at 05:36

## 2023-11-14 RX ADMIN — OXYCODONE HYDROCHLORIDE 5 MG: 5 TABLET ORAL at 10:46

## 2023-11-14 RX ADMIN — SPIRONOLACTONE 25 MG: 25 TABLET, FILM COATED ORAL at 17:13

## 2023-11-14 RX ADMIN — CALCIUM CARBONATE (ANTACID) CHEW TAB 500 MG 500 MG: 500 CHEW TAB at 08:40

## 2023-11-14 RX ADMIN — OXYCODONE HYDROCHLORIDE 2.5 MG: 5 TABLET ORAL at 20:17

## 2023-11-14 RX ADMIN — OXYCODONE HYDROCHLORIDE 5 MG: 5 TABLET ORAL at 07:34

## 2023-11-14 RX ADMIN — LOSARTAN POTASSIUM 50 MG: 50 TABLET, FILM COATED ORAL at 08:40

## 2023-11-14 RX ADMIN — APIXABAN 10 MG: 5 TABLET, FILM COATED ORAL at 17:13

## 2023-11-14 RX ADMIN — CALCIUM CARBONATE (ANTACID) CHEW TAB 500 MG 500 MG: 500 CHEW TAB at 20:01

## 2023-11-14 RX ADMIN — PREGABALIN 100 MG: 100 CAPSULE ORAL at 08:40

## 2023-11-14 RX ADMIN — Medication 200 MG: at 20:01

## 2023-11-14 RX ADMIN — ATORVASTATIN CALCIUM 40 MG: 40 TABLET, FILM COATED ORAL at 20:00

## 2023-11-14 RX ADMIN — OXYCODONE HYDROCHLORIDE 5 MG: 5 TABLET ORAL at 00:17

## 2023-11-14 RX ADMIN — OMEPRAZOLE 20 MG: 20 CAPSULE, DELAYED RELEASE ORAL at 08:40

## 2023-11-14 RX ADMIN — LIOTHYRONINE SODIUM 5 MCG: 5 TABLET ORAL at 08:45

## 2023-11-14 RX ADMIN — OXYCODONE HYDROCHLORIDE 5 MG: 5 TABLET ORAL at 17:13

## 2023-11-14 RX ADMIN — PREGABALIN 100 MG: 100 CAPSULE ORAL at 20:00

## 2023-11-14 ASSESSMENT — GAIT ASSESSMENTS
ASSISTIVE DEVICE: PARALLEL BARS
GAIT LEVEL OF ASSIST: MINIMAL ASSIST
DISTANCE (FEET): 10
ASSISTIVE DEVICE: FRONT WHEEL WALKER
GAIT LEVEL OF ASSIST: MODERATE ASSIST
DISTANCE (FEET): 12

## 2023-11-14 ASSESSMENT — ACTIVITIES OF DAILY LIVING (ADL)
TOILETING: INDEPENDENT
BED_CHAIR_WHEELCHAIR_TRANSFER_DESCRIPTION: ADAPTIVE EQUIPMENT;INCREASED TIME;INITIAL PREPARATION FOR TASK;SET-UP OF EQUIPMENT;SUPERVISION FOR SAFETY;VERBAL CUEING
TOILET_TRANSFER_DESCRIPTION: GRAB BAR;SUPERVISION FOR SAFETY;VERBAL CUEING
TOILETING_LEVEL_OF_ASSIST_DESCRIPTION: ASSIST FOR STANDING BALANCE;GRAB BAR;INCREASED TIME;SUPERVISION FOR SAFETY;VERBAL CUEING
TUB_SHOWER_TRANSFER_DESCRIPTION: GRAB BAR;SHOWER BENCH;INCREASED TIME;SUPERVISION FOR SAFETY;VERBAL CUEING

## 2023-11-14 ASSESSMENT — BRIEF INTERVIEW FOR MENTAL STATUS (BIMS)
BIMS SUMMARY SCORE: 15
WHAT YEAR IS IT: CORRECT
INITIAL REPETITION OF BED BLUE SOCK - FIRST ATTEMPT: 3
ASKED TO RECALL BLUE: YES, NO CUE REQUIRED
ASKED TO RECALL BED: YES, NO CUE REQUIRED
ASKED TO RECALL SOCK: YES, NO CUE REQUIRED
WHAT DAY OF THE WEEK IS IT: CORRECT
WHAT MONTH IS IT: ACCURATE WITHIN 5 DAYS

## 2023-11-14 ASSESSMENT — LIFESTYLE VARIABLES: EVER_SMOKED: YES

## 2023-11-14 ASSESSMENT — PAIN DESCRIPTION - PAIN TYPE
TYPE: ACUTE PAIN

## 2023-11-14 NOTE — PROGRESS NOTES
Received shift report and assumed care of patient.  Patient awake, calm and stable, currently positioned in bed for comfort and safety; call light within reach.  Complains of 8/10 pain at this time.  Will continue to monitor.

## 2023-11-14 NOTE — CARE PLAN
Problem: Self Care  Goal: Patient will have the ability to perform ADLs independently or with assistance  Outcome: Progressing  Note: Patient able to perform regular activities this shift.  Pain controlled this shift.  Pain management includes PRN pain meds as well as non-pharmacological measures such as emotional support, rest, and repositioning.  Will continue to monitor.   The patient is Stable - Low risk of patient condition declining or worsening    Shift Goals  Clinical Goals: pAIN CONTROLI  Patient Goals: PAIN CONTROL    Progress made toward(s) clinical / shift goals:  pt participates with therapy and is cooperative with nursing    Patient is not progressing towards the following goals:

## 2023-11-14 NOTE — CARE PLAN
"  Problem: Fall Risk - Rehab  Goal: Patient will remain free from falls  Outcome: Progressing  Note: Pugh Jose Fall risk Assessment Score: 17    High fall risk Interventions   - Bed and strip alarm   - Yellow sign by the door   - Yellow wrist band \"Fall risk\"  - Room near to the nurse station  - Do not leave patient unattended in the bathroom  - Fall risk education provided      Problem: Pain - Standard  Goal: Alleviation of pain or a reduction in pain to the patient’s comfort goal  Outcome: Progressing  Note: Assessed for pain and discomfort , back incision line with steri strips , pain under control.Needs anticipated and attended, Cont monitored.Bilatearl lower extremities elevated .   The patient is Stable - Low risk of patient condition declining or worsening    Shift Goals  Clinical Goals: Pain control  Patient Goals: Pain control and safety.  Progress made toward(s) clinical / shift goals:  Pt free from fall and injury.        "

## 2023-11-14 NOTE — FLOWSHEET NOTE
11/14/23 1029   Events/Summary/Plan   Events/Summary/Plan RT Assessment   Vital Signs   Pulse 83   Respiration 16   Pulse Oximetry 96 %   $ Pulse Oximetry (Spot Check) Yes   Respiratory Assessment   Respiratory Pattern Within Normal Limits   Level of Consciousness Alert   Chest Exam   Work Of Breathing / Effort Within Normal Limits   Oxygen   O2 Delivery Device None - Room Air   Smoking History   Have you ever smoked Yes   Have you smoked in the last 12 months No   Confirm Quit Date   (Quit over 30 years ago)

## 2023-11-14 NOTE — CARE PLAN
Problem: Fall Risk - Rehab  Goal: Patient will remain free from falls  Outcome: Progressing   Patient calls for assist with all transfers. No unsafe behaviors noted.

## 2023-11-14 NOTE — FLOWSHEET NOTE
11/13/23 1849   Protocol Assessment   Initial Assessment Yes   Patient History   Pulmonary Diagnosis None   Procedures Relevant to Respiratory Status None   Home O2 No   Nocturnal CPAP No   Home Treatments/Frequency No   Sleep Apnea Screening   Have you had a sleep study? Yes   Have you been diagnosed with sleep apnea? Yes   Do you use a CPAP/BIPAP/AUTOPAP? No   COPD Risk Screening   Do you have a history of COPD? No   COPD Population Screener   During the past 4 weeks, how much did you feel short of breath? 0   Do you ever cough up any mucus or phlegm? 0   In the past 12 months, you do less than you used to because of your breathing problems 0   Have you smoked at least 100 cigarettes in your entire life? 2   How old are you? 2   COPD Screening Score 4   COPD Coordinator Not Recommended Yes   Protocol Pathways   Protocol Pathways None

## 2023-11-14 NOTE — PROGRESS NOTES
Physical Medicine & Rehabilitation Progress Note    Encounter Date: 11/14/2023    Chief Complaint: Weakness    Interval Events (Subjective):  Seen in bed. Painful morning    Objective:  VITAL SIGNS: /67   Pulse 72   Temp 36.4 °C (97.5 °F) (Oral)   Resp 18   Wt 63.5 kg (140 lb)   LMP 11/01/1986   SpO2 97%   BMI 25.61 kg/m²   Gen: No acute distress, well developed well nourished adult  HEENT: Normal Cephalic Atraumatic, Normal conjunctiva.   CV: warm extremities, well perfused, no edema  Resp: symmetric chest rise, breathing comfortably on room air  Abd: Soft, Non distended  Extremities: normal bulk, no atrophy  Skin: no visible rashes or lesions.   Neuro: alert, awake  Psych: Mood and affect appropriate and congruent    Laboratory Values:  Recent Results (from the past 72 hour(s))   CBC WITHOUT DIFFERENTIAL    Collection Time: 11/13/23  4:37 AM   Result Value Ref Range    WBC 7.7 4.8 - 10.8 K/uL    RBC 3.49 (L) 4.20 - 5.40 M/uL    Hemoglobin 9.7 (L) 12.0 - 16.0 g/dL    Hematocrit 30.9 (L) 37.0 - 47.0 %    MCV 88.5 81.4 - 97.8 fL    MCH 27.8 27.0 - 33.0 pg    MCHC 31.4 (L) 32.2 - 35.5 g/dL    RDW 47.7 35.9 - 50.0 fL    Platelet Count 197 164 - 446 K/uL    MPV 10.1 9.0 - 12.9 fL   Comp Metabolic Panel    Collection Time: 11/13/23  4:37 AM   Result Value Ref Range    Sodium 140 135 - 145 mmol/L    Potassium 4.7 3.6 - 5.5 mmol/L    Chloride 107 96 - 112 mmol/L    Co2 24 20 - 33 mmol/L    Anion Gap 9.0 7.0 - 16.0    Glucose 98 65 - 99 mg/dL    Bun 21 8 - 22 mg/dL    Creatinine 0.85 0.50 - 1.40 mg/dL    Calcium 9.5 8.5 - 10.5 mg/dL    Correct Calcium 10.3 8.5 - 10.5 mg/dL    AST(SGOT) 13 12 - 45 U/L    ALT(SGPT) 9 2 - 50 U/L    Alkaline Phosphatase 77 30 - 99 U/L    Total Bilirubin 0.3 0.1 - 1.5 mg/dL    Albumin 3.0 (L) 3.2 - 4.9 g/dL    Total Protein 5.7 (L) 6.0 - 8.2 g/dL    Globulin 2.7 1.9 - 3.5 g/dL    A-G Ratio 1.1 g/dL   ESTIMATED GFR    Collection Time: 11/13/23  4:37 AM   Result Value Ref Range     GFR (CKD-EPI) 68 >60 mL/min/1.73 m 2   CBC with Differential    Collection Time: 11/14/23  5:59 AM   Result Value Ref Range    WBC 6.9 4.8 - 10.8 K/uL    RBC 3.52 (L) 4.20 - 5.40 M/uL    Hemoglobin 9.8 (L) 12.0 - 16.0 g/dL    Hematocrit 31.0 (L) 37.0 - 47.0 %    MCV 88.1 81.4 - 97.8 fL    MCH 27.8 27.0 - 33.0 pg    MCHC 31.6 (L) 32.2 - 35.5 g/dL    RDW 47.3 35.9 - 50.0 fL    Platelet Count 230 164 - 446 K/uL    MPV 10.3 9.0 - 12.9 fL    Neutrophils-Polys 69.80 44.00 - 72.00 %    Lymphocytes 20.50 (L) 22.00 - 41.00 %    Monocytes 6.50 0.00 - 13.40 %    Eosinophils 2.00 0.00 - 6.90 %    Basophils 0.60 0.00 - 1.80 %    Immature Granulocytes 0.60 0.00 - 0.90 %    Nucleated RBC 0.00 0.00 - 0.20 /100 WBC    Neutrophils (Absolute) 4.84 1.82 - 7.42 K/uL    Lymphs (Absolute) 1.42 1.00 - 4.80 K/uL    Monos (Absolute) 0.45 0.00 - 0.85 K/uL    Eos (Absolute) 0.14 0.00 - 0.51 K/uL    Baso (Absolute) 0.04 0.00 - 0.12 K/uL    Immature Granulocytes (abs) 0.04 0.00 - 0.11 K/uL    NRBC (Absolute) 0.00 K/uL   Comp Metabolic Panel (CMP)    Collection Time: 11/14/23  5:59 AM   Result Value Ref Range    Sodium 142 135 - 145 mmol/L    Potassium 4.2 3.6 - 5.5 mmol/L    Chloride 108 96 - 112 mmol/L    Co2 24 20 - 33 mmol/L    Anion Gap 10.0 7.0 - 16.0    Glucose 98 65 - 99 mg/dL    Bun 20 8 - 22 mg/dL    Creatinine 0.72 0.50 - 1.40 mg/dL    Calcium 9.5 8.5 - 10.5 mg/dL    Correct Calcium 10.1 8.5 - 10.5 mg/dL    AST(SGOT) 14 12 - 45 U/L    ALT(SGPT) 11 2 - 50 U/L    Alkaline Phosphatase 74 30 - 99 U/L    Total Bilirubin 0.4 0.1 - 1.5 mg/dL    Albumin 3.3 3.2 - 4.9 g/dL    Total Protein 5.8 (L) 6.0 - 8.2 g/dL    Globulin 2.5 1.9 - 3.5 g/dL    A-G Ratio 1.3 g/dL   HEMOGLOBIN A1C    Collection Time: 11/14/23  5:59 AM   Result Value Ref Range    Glycohemoglobin 5.6 4.0 - 5.6 %    Est Avg Glucose 114 mg/dL   Magnesium    Collection Time: 11/14/23  5:59 AM   Result Value Ref Range    Magnesium 2.0 1.5 - 2.5 mg/dL   TSH with Reflex to FT4     Collection Time: 11/14/23  5:59 AM   Result Value Ref Range    TSH 0.430 0.380 - 5.330 uIU/mL   ESTIMATED GFR    Collection Time: 11/14/23  5:59 AM   Result Value Ref Range    GFR (CKD-EPI) 83 >60 mL/min/1.73 m 2       Medications:  Scheduled Medications   Medication Dose Frequency    [START ON 11/15/2023] oxyCODONE immediate-release  5 mg BID    atorvastatin  40 mg QHS    calcium carbonate  500 mg BID    enoxaparin (LOVENOX) injection  40 mg DAILY AT 1800    levothyroxine  88 mcg AM ES    liothyronine  5 mcg QAM    losartan  50 mg QAM    magnesium oxide  200 mg Q EVENING    polyethylene glycol/lytes  1 Packet DAILY    pregabalin  100 mg BID    spironolactone  25 mg DAILY    Pharmacy Consult Request  1 Each PHARMACY TO DOSE    omeprazole  20 mg DAILY     PRN medications: oxyCODONE immediate-release **OR** oxyCODONE immediate-release **OR** [DISCONTINUED] HYDROmorphone, tizanidine, Respiratory Therapy Consult, hydrALAZINE, [DISCONTINUED] senna-docusate **AND** polyethylene glycol/lytes **AND** magnesium hydroxide **AND** bisacodyl, ondansetron **OR** ondansetron, sodium chloride    Diet:  Current Diet Order   Procedures    Diet Order Diet: Regular       Medical Decision Making and Plan:  Polyradiculopathy  Status post lumbar laminectomy  -Patient has history of prior L4-L5 decompression  - Recent worsening of back pain with radicular pain into right lower extremity, impairing function and recently utilizing a wheelchair in the last month  - Per outpatient documentation, MRI of the lumbar spine reportedly showed an L5-S1 stenosis  - 11/10 patient taken to the OR for L5-S1 laminectomy performed by Dr. Silva  - Spinal precautions in place, does not require bracing  - Continue with PT/OT inpatient     Right Popliteal DVT  Provoked from surgery and immobility  -Bilateral Lower extremity ultrasounds - shows right distal popliteal and peroneal occlusion thrombus. Left is negative  -SHERRELL hose on left  -started Eliquis 10mg  BID for 7 days then Eliquis 5mg BID for 3 months  -NS aware and approves starting anticoagulation     Hypothyroidism  -On home dose Synthroid     Hypertension  - On home dose losartan and spironolactone  - Blood pressure well controlled     Neurogenic bladder:  - Timed voids with PVR q4H x3. If PVR > 400mL or if patient is unable to void, straight cath patient.     Neurogenic bowel:  -  Colace, Senna BID on admission  - Goal of 1BM/day.  -      Circadian Rhythm disorder:      Recommend lights on during the day/off at night, minimize nighttime interruptions as able.     Mood  - at risk of adjustment disorder, depression, and anxiety due to functional decline     ID:  - at risk for Urinary tract infection     Skin/Wounds:  - Pressure relief q2h while in bed. Close monitoring for signs of breakdown     Pain:  - Neuroceptic - On Tylenol prn, oxycodone 2.5-5mg PRN, Tizanidine PRN,   11/14- schedule oxycodone 5mg BID at 630am and noon  - Neuropathic - On Lyrica     DVT prophylaxis:  continue lovenox     GI prophylaxis:  On Prilosec 20mg daily      -Follow-up Ortho, PCP    ____________________________________    Mitchell Chavis MD  Physical Medicine & Rehabilitation   Brain Injury Medicine   ____________________________________

## 2023-11-14 NOTE — THERAPY
Physical Therapy   Initial Evaluation     Patient Name: Sheyla Gauthier  Age:  83 y.o., Sex:  female  Medical Record #: [id number]  Today's Date: 11/14/2023     Subjective    Pt agreeable to tx. Reports inability to bear weight through L LE due to pain and fear of knee buckling     Objective       11/14/23 1301   PT Charge Group   PT Therapeutic Activities (Units) 1   PT Evaluation PT Evaluation Mod   PT Total Time Spent   PT Individual Total Time Spent (Mins) 60   Prior Living Situation   Prior Services senior care Home Aide Services   Housing / Facility Assisted Living Residence   Steps Into Home 0   Steps In Home 0   Bathroom Set up Walk In Shower;Grab Bars;Shower Chair   Equipment Owned Front-Wheel Walker;Quad Cane;Tub / Shower Seat;Grab Bar(s) In Tub / Shower;Grab Bar(s) By Toilet;Reacher   Lives with - Patient's Self Care Capacity Alone and Able to Care For Self   Comments Pt lives in [place] in a [facility] with 2 steps to enter, but plans to DC directly to an snf in [place] with dtr nearby. The aforementioned description is of the snf the pt will be DC to   Prior Level of Functional Mobility   Bed Mobility Independent   Transfer Status Independent   Ambulation Independent   Distance Ambulation (Feet)   (limited community prior to 10/7, very limited household since then)   Assistive Devices Used Wheelchair   Wheelchair Independent   Stairs Required Assist   Pain 0 - 10 Group   Location Groin;Hip   Location Orientation Left   Pain Rating Scale (NPRS) 6   Description Sharp;Shooting;Throbbing   Comfort Goal Comfort with Movement;Perform Activity   Therapist Pain Assessment During Activity;Nurse Notified   Cognition    Comments A&Ox4, pleasant, somewhat anxious regarding dynamic standing mobility   Passive ROM Lower Body   Passive ROM Lower Body WDL   Strength Lower Body   Lower Body Strength  X   Rt Hip Flexion Strength 4- (G-)   Rt Knee Flexion Strength 4- (G-)   Rt Knee Extension Strength 4 (G)   Lt Hip Flexion  Strength 2 (P)   Lt Knee Flexion Strength 3- (F-)   Lt Knee Extension Strength 3+ (F+)   Sensation Lower Body   Comments paresthesia and shooting pain anterior L medial thigh   Lower Body Muscle Tone   Lower Body Muscle Tone  WDL   Comments L LE guarding   Balance Assessment   Sitting Balance (Static) Good   Sitting Balance (Dynamic) Fair   Standing Balance (Static) Poor +   Standing Balance (Dynamic) Poor -   Weight Shift Sitting Fair   Weight Shift Standing Absent   Bed Mobility    Supine to Sit Moderate Assist   Sit to Supine Minimal Assist   Sit to Stand Minimal Assist   Rolling Minimum Assist to Lt.   Roll Left and Right   Assistance Needed Physical assistance   Physical Assistance Level 25% or less   CARE Score - Roll Left and Right 3   Roll Left and Right Discharge Goal   Discharge Goal 6   Sit to Lying   Assistance Needed Physical assistance   Physical Assistance Level 25% or less   CARE Score - Sit to Lying 3   Sit to Lying Discharge Goal   Discharge Goal 6   Lying to Sitting on Side of Bed   Assistance Needed Physical assistance   Physical Assistance Level 26%-50%   CARE Score - Lying to Sitting on Side of Bed 3   Lying to Sitting on Side of Bed Discharge Goal   Discharge Goal 6   Sit to Stand   Assistance Needed Physical assistance   Physical Assistance Level 25% or less   CARE Score - Sit to Stand 3   Sit to Stand Discharge Goal   Discharge Goal 6   Chair/Bed-to-Chair Transfer   Assistance Needed Physical assistance   Physical Assistance Level 25% or less   CARE Score - Chair/Bed-to-Chair Transfer 3   Chair/Bed-to-Chair Transfer Discharge Goal   Discharge Goal 6   Toilet Transfer   Assistance Needed Physical assistance   Physical Assistance Level 25% or less   CARE Score - Toilet Transfer 3   Toilet Transfer Discharge Goal   Discharge Goal 6   Car Transfer   Reason if not Attempted Safety concerns   CARE Score - Car Transfer 88   Car Transfer Discharge Goal   Discharge Goal 3   Walk 10 Feet   Reason if  not Attempted Safety concerns   CARE Score - Walk 10 Feet 88   Walk 10 Feet Discharge Goal   Discharge Goal 6   Walk 50 Feet with Two Turns   Reason if not Attempted Safety concerns   CARE Score - Walk 50 Feet with Two Turns 88   Walk 50 Feet with Two Turns Discharge Goal   Discharge Goal 6   Walk 150 Feet   Reason if not Attempted Safety concerns   CARE Score - Walk 150 Feet 88   Walk 150 Feet Discharge Goal   Discharge Goal 6   Walking 10 Feet on Uneven Surfaces   Reason if not Attempted Safety concerns   CARE Score - Walking 10 Feet on Uneven Surfaces 88   Walking 10 Feet on Uneven Surfaces Discharge Goal   Discharge Goal 4   1 Step (Curb)   Reason if not Attempted Activity not applicable   CARE Score - 1 Step (Curb) 9   1 Step (Curb) Discharge Goal   Discharge Goal 9   4 Steps   Reason if not Attempted Activity not applicable   CARE Score - 4 Steps 9   4 Steps Discharge Goal   Discharge Goal 9   12 Steps   Reason if not Attempted Activity not applicable   CARE Score - 12 Steps 9   12 Steps Discharge Goal   Discharge Goal 9   Picking Up Object   Reason if not Attempted Safety concerns   CARE Score - Picking Up Object 88   Picking Up Object Discharge Goal   Discharge Goal 5   Wheel 50 Feet with Two Turns   Assistance Needed Physical assistance   Physical Assistance Level 25% or less   CARE Score - Wheel 50 Feet with Two Turns 3   Type of Wheelchair/Scooter Manual   Wheel 50 Feet with Two Turns Discharge Goal   Discharge Goal 6   Wheel 150 Feet   Assistance Needed Physical assistance   Physical Assistance Level 51%-75%   CARE Score - Wheel 150 Feet 2   Wheel 150 Feet Discharge Goal   Discharge Goal 6   Gait Functional Level of Assist    Gait Level Of Assist Moderate Assist   Assistive Device Parallel Bars   Distance (Feet) 10   # of Times Distance was Traveled 1   Deviation Antalgic;Step To;Decreased Base Of Support;Bradykinetic;Shuffled Gait;Trendelenberg  (L LE antalgia and pelvic retraction)   Wheelchair  Functional Level of Assist   Wheelchair Assist Maximal Assist   Distance Wheelchair (Feet or Distance) 200  (Rajan first 50ft totalA thereafter)   Wheelchair Description Extra time;Leg rest management;Limited by fatigue;Supervision for safety;Verbal cueing;Assistance with steering   Stairs Functional Level of Assist   Level of Assist with Stairs Unable to Participate   Transfer Functional Level of Assist   Bed, Chair, Wheelchair Transfer Minimal Assist   Bed Chair Wheelchair Transfer Description Adaptive equipment;Increased time;Initial preparation for task;Set-up of equipment;Supervision for safety;Verbal cueing  (squat pivot vs stand step FWW)   Problem List    Problems Pain;Impaired Bed Mobility;Impaired Transfers;Impaired Ambulation;Functional Strength Deficit;Impaired Balance;Decreased Activity Tolerance   Precautions   Precautions Fall Risk;Spinal / Back Precautions    Current Discharge Plan   Current Discharge Plan Assisted Living Facility   Interdisciplinary Plan of Care Collaboration   IDT Collaboration with  Family / Caregiver;Occupational Therapist;Physical Therapist   Patient Position at End of Therapy Seated;Chair Alarm On;Self Releasing Lap Belt Applied;Call Light within Reach;Tray Table within Reach;Family / Friend in Room   Collaboration Comments pt's daughter present during tx, CLOF/POC   Physical Therapist Assigned   Assigned PT / Treatment Time / Comments AGUSTÍN   Benefit   Therapy Benefit Patient Would Benefit from Inpatient Rehabilitation Physical Therapy to Maximize Functional [place] with ADLs, IADLs and Mobility.   Strengths & Barriers   Strengths Able to follow instructions;Good insight into deficits/needs;Independent prior level of function;Making steady progress towards goals;Motivated for self care and independence;Pleasant and cooperative;Supportive family;Willingly participates in therapeutic activities   Barriers Decreased endurance;Generalized weakness;Impaired activity  tolerance;Impaired balance;Pain       Patient education: reviewed PT plan of care, rehab expectations, mobility needs and patient passport, orientation to unit, role of PT, fall risk and use of call light.     Assessment    The patient is a 83 y.o. female admitted to [facility] inpatient rehabilitation 11/13/2023 with severe functional debility after L4-S1 redo laminectomy. Pt also reports ongoing radiating L groin pain that has been limiting standing mobility for >1 month resulting in primarily WC level activity.    Patient's PMH includes:  Past Medical History:   Diagnosis Date    Anxiety     Arthritis     osteoarthritis    Back pain     CAD (coronary artery disease) 10/2018    Abnormal CTCS. PCI/FARZAD (Russell 2.5 x 2mm) to the mid LAD. Cardic stent    Cataract     alex IOL    Daytime sleepiness     Depression     Dyslipidemia     Frequent headaches     Herpes zoster     High cholesterol     Hypertension     MHA (microangiopathic hemolytic anemia)     Migraine syndrome      Mumps     as a child    Osteopathia     Osteoporosis     Painful joint     Post-menopause on HRT (hormone replacement therapy)     Renal disorder     chronic kidney disease stage 3    Sleep apnea     no longer uses cpap    Snoring     Thyroid disease     hypothyroid    Urinary incontinence     White matter abnormality on MRI of brain          PT evaluation performed today; functional performance at today's assessment is as above.     Additional factors influencing patient status and prognosis include: prior level of function, history of L groin pain limiting function, and the above comorbidities.     The patient is performing well below their baseline level of function and will benefit from an interdisciplinary high intensity rehabilitation program to maximize functional independence, decrease burden of care, and support a safe return to [facility] with hired CG support.     Plan  Recommend Physical Therapy  minutes per day 5-7 days per week for 2  weeks for the following treatments:  PT Gait Training, PT Therapeutic Exercises, PT TENS Application, PT Neuro Re-Ed/Balance, PT Therapeutic Activity, and PT Evaluation.    Passport items to be completed:  Get in/out of bed safely, in/out of a vehicle, safely use mobility device, walk or wheel around home/community, navigate up and down stairs, show how to get up/down from the ground, ensure home is accessible, demonstrate HEP, complete caregiver training    Goals:  Long term and short term goals have been discussed with patient and they are in agreement.          Physical Therapy Problems (Active)       Problem: Mobility       Dates: Start:  11/14/23         Goal: STG-Within one week, patient will propel wheelchair 100ft with SBA on level ground indoors       Dates: Start:  11/14/23            Goal: STG-Within one week, patient will ambulate 50ft with FWW and Rajan       Dates: Start:  11/14/23               Problem: Mobility Transfers       Dates: Start:  11/14/23         Goal: STG-Within one week, patient will perform bed mobility with verbal cues for log roll sequencing and AE as needed       Dates: Start:  11/14/23            Goal: STG-Within one week, patient will transfer bed to chair CGA with FWW       Dates: Start:  11/14/23               Problem: PT-Long Term Goals       Dates: Start:  11/14/23         Goal: LTG-By discharge, patient will propel wheelchair 150ft Sherman       Dates: Start:  11/14/23            Goal: LTG-By discharge, patient will ambulate 100ft with FWW [place]       Dates: Start:  11/14/23            Goal: LTG-By discharge, patient will transfer one surface to another Sherman       Dates: Start:  11/14/23            Goal: LTG-By discharge, patient will perform home exercise program independently       Dates: Start:  11/14/23            Goal: LTG-By discharge, patient will transfer in/out of a car with CGA       Dates: Start:  11/14/23            Goal: LTG-By discharge, patient will perform bed  mobility independently       Dates: Start:  11/14/23

## 2023-11-14 NOTE — THERAPY
Occupational Therapy   Initial Evaluation     Patient Name: Sheyla Gauthier  Age:  83 y.o., Sex:  female  Medical Record #: [id number]  Today's Date: 11/14/2023     Subjective    Pt seen 2x this day, once at 0700 for OT evaluation and treatment, and again at 1230 with dtr present and focus on completing oral care, AE education, edema management education, and adjusting w/c break controls. Pt agreeable to both sessions.      Objective       11/14/23 0701   OT Charge Group   Charges Yes   OT Self Care / ADL (Units) 2   OT Therapy Activity (Units) 1   OT Evaluation OT Evaluation Mod   OT Total Time Spent   OT Individual Total Time Spent (Mins) 90   Prior Living Situation   Prior Services skilled nursing Home Aide Services   Housing / Facility Assisted Living Residence   Steps Into Home 0   Steps In Home 0   Bathroom Set up Walk In Shower;Grab Bars;Shower Chair   Equipment Owned Front-Wheel Walker;Quad Cane;Tub / Shower Seat;Grab Bar(s) In Tub / Shower;Grab Bar(s) By Toilet;Reacher   Lives with - Patient's Self Care Capacity Alone and Able to Care For Self   Comments Pt lives in [place] in a [facility] with 2 steps to enter, but plans to DC directly to an [facility] in [place] with dtr nearby. The aforementioned description is of the long-term the pt will be DC to.   Prior Level of ADL Function   Self Feeding Independent   Grooming / Hygiene Independent   Bathing Independent   Dressing Independent   Toileting Independent   Prior Level of IADL Function   Medication Management Independent   Laundry Independent   Kitchen Mobility Independent   Finances Independent   Home Management Independent   Shopping Independent   Prior Level Of Mobility Independent With Device in Community;Independent With Steps in Community;Independent With Device in Home;Independent With Steps in Home   Driving / Transportation Driving Independent   Occupation (Pre-Hospital Vocational) Retired Due To Age   Leisure Interests Family   Prior Functioning: Everyday Activities  "  Self Care Independent   Indoor Mobility (Ambulation) Independent   Stairs Independent   Functional Cognition Independent   Prior Device Use Manual wheelchair   Pain   Intervention Medication (see MAR);Emotional Support;Repositioned;Rest;Nurse Notified   Pain 0 - 10 Group   Location Back   Location Orientation Lower   Pain Rating Scale (NPRS) 8   Description Aching   Comfort Goal Comfort with Movement;Perform Activity   Therapist Pain Assessment During Activity;Nurse Notified   Cognition    Cognition / Consciousness WDL   Level of Consciousness Alert   Comments A&Ox4   ABS (Agitated Behavior Scale)   Agitated Behavior Scale Performed No   Cognitive Pattern Assessment   Cognitive Pattern Assessment Used BIMS   Brief Interview for Mental Status (BIMS)   Repetition of Three Words (First Attempt) 3   Temporal Orientation: Year Correct   Temporal Orientation: Month Accurate within 5 days   Temporal Orientation: Day Correct   Recall: \"Sock\" Yes, no cue required   Recall: \"Blue\" Yes, no cue required   Recall: \"Bed\" Yes, no cue required   BIMS Summary Score 15   Confusion Assessment Method (CAM)   Is there evidence of an acute change in mental status from the patient's baseline? No   Inattention Behavior not present   Disorganized thinking Behavior not present   Altered level of consciousness Behavior not present   Vision Screen   Vision Not tested  (Pt reports no changes to vision since admission)   Passive ROM Upper Body   Passive ROM Upper Body WDL   Comments BUES   Active ROM Upper Body   Active ROM Upper Body  WDL   Dominant Hand Right   Comments BUES   Strength Upper Body   Upper Body Strength  WDL   Comments BUES   Sensation Upper Body   Upper Extremity Sensation  WDL   Comments BUES   Upper Body Muscle Tone   Upper Body Muscle Tone  WDL   Comments BUES   Balance Assessment   Sitting Balance (Static) Good   Sitting Balance (Dynamic) Fair -   Standing Balance (Static) Poor +   Standing Balance (Dynamic) Poor - "   Weight Shift Sitting Good   Weight Shift Standing Absent   Coordination Upper Body   Coordination WDL   Comments BUES   Eating   Assistance Needed Independent   CARE Score - Eating 6   Eating Discharge Goal   Discharge Goal 6   Oral Hygiene   Assistance Needed Set-up / clean-up   CARE Score - Oral Hygiene 5   Oral Hygiene Discharge Goal   Discharge Goal 6   Shower/Bathe Self   Assistance Needed Physical assistance   Physical Assistance Level 25% or less   CARE Score - Shower/Bathe Self 3   Shower/Bathe Self Discharge Goal   Discharge Goal 6   Upper Body Dressing   Assistance Needed Set-up / clean-up   CARE Score - Upper Body Dressing 5   Upper Body Dressing Discharge Goal   Discharge Goal 6   Lower Body Dressing   Assistance Needed Physical assistance   Physical Assistance Level 76% or more   CARE Score - Lower Body Dressing 2   Lower Body Dressing Discharge Goal   Discharge Goal 6   Putting On/Taking Off Footwear   Assistance Needed Physical assistance   Physical Assistance Level Total assistance   CARE Score - Putting On/Taking Off Footwear 1   Putting On/Taking Off Footwear Discharge Goal   Discharge Goal 6   Toileting Hygiene   Assistance Needed Incidental touching   CARE Score - Toileting Hygiene 4   Toileting Hygiene Discharge Goal   Discharge Goal 6   Toilet Transfer   Assistance Needed Physical assistance   Physical Assistance Level 25% or less   CARE Score - Toilet Transfer 3   Toilet Transfer Discharge Goal   Discharge Goal 6   Hearing, Speech, and Vision   Ability to Hear Adequate   Ability to See in Adequate Light Adequate   Expression of Ideas and Wants Without difficulty   Understanding Verbal and Non-Verbal Content Understands   Functional Level of Assist   Eating Independent   Grooming Supervision;Seated   Grooming Description Set-up of equipment;Seated in wheelchair at sink  (for oral care)   Bathing Minimal Assist   Bathing Description Grab bar;Hand held shower;Assit with back;Assit wtih lower  extremities;Increased time;Supervision for safety;Verbal cueing  (v/c for spinal precautions, Min A for knee to toe bilaterally, CGA in standing for washing rear.)   Upper Body Dressing Supervision   Upper Body Dressing Description Set-up of equipment   Lower Body Dressing Maximal Assist   Lower Body Dressing Description Grab bar;Cues for spinal precautions;Initial preparation for task;Increased time;Supervision for safety;Verbal cueing   Toileting Contact Guard Assist   Toileting Description Assist for standing balance;Grab bar;Increased time;Supervision for safety;Verbal cueing   Toilet Transfers Minimal Assist   Toilet Transfer Description Grab bar;Supervision for safety;Verbal cueing   Tub / Shower Transfers Minimal Assist   Tub Shower Transfer Description Grab bar;Shower bench;Increased time;Supervision for safety;Verbal cueing   Comprehension Independent   Expression Independent   Problem Solving Supervision   Problem Solving Description Verbal cueing;Supervision   Memory Supervision   Memory Description Verbal cueing;Supervision   Problem List   Problem List Decreased Active Daily Living Skills;Decreased Homemaking Skills;Decreased Upper Extremity Strength Right;Decreased Upper Extremity Strength Left;Decreased Functional Mobility;Decreased Activity Tolerance;Impaired Postural Control / Balance;Limited Knowledge of Post Op Precautions   Precautions   Precautions Fall Risk;Spinal / Back Precautions    Comments No brace   Current Discharge Plan   Current Discharge Plan Assisted Living Facility  (In [place])   Benefit    Therapy Benefit Patient Would Benefit from Inpatient Rehab Occupational Therapy to Maximize [place] with ADLs, IADLs and Functional Mobility.   Interdisciplinary Plan of Care Collaboration   IDT Collaboration with  Nursing;Family / Caregiver;Physical Therapist   Patient Position at End of Therapy Seated;Self Releasing Lap Belt Applied;Chair Alarm On;Call Light within Reach;Tray Table  within Reach   Collaboration Comments re: CLOF, POC   Strengths & Barriers   Strengths Able to follow instructions;Alert and oriented;Good carryover of learning;Good insight into deficits/needs;Independent prior level of function;Making steady progress towards goals;Manages pain appropriately;Motivated for self care and independence;Pleasant and cooperative;Supportive family;Willingly participates in therapeutic activities   Barriers Decreased endurance;Fatigue;Generalized weakness;Impaired balance;Impaired activity tolerance;Limited mobility;Pain   Occupational Therapist Assigned   Assigned OT / Treatment Time / Comments  30/60       Patient education: reviewed OT plan of care, rehab expectations, goal setting, ADL needs, orientation to schedule, role of OT in recovery, fall risk and use of call light. Also discussed spinal precautions without brace, and elevating BLE throughout the day for edema management (noted 2+ pitting edema over dorsum of bilateral feet, MD notified). Educated dtr on rehab expectations, schedule, and her mothers performance during AM evaluation.     Assessment  Patient is 83 y.o. Female with a diagnosis of Radiculopathy s/p elective L5-S1 redo laminectomy .  Additional factors influencing patient status / progress (ie: cognitive factors, co-morbidities, social support, etc): Per Dr. Chavis's H&P - Pt with a past medical history of hypothyroidism, hypertension and low back pain and lower extremity weakness;  who presented on 11/10/2023  5:51 AM for scheduled lumbar surgery.  Per documentation, patient has been followed in the outpatient setting by Dr. Silva for ongoing back pain.  Patient was evaluated in the outpatient setting on 11/8/2023 due to worsening back pain impairing her function.  Patient recently required the use of a wheelchair due to her limited mobility.  An MRI of the lumbar spine was obtained which showed prior history of an L4-L5 surgical changes and evidence of  spondylolisthesis at L4-5 as well as facet joint arthropathy at L5-S1.  Patient was deemed appropriate for intervention for her L5-S1 stenosis.  Patient was taken to the OR on 11/10 for an L5-S1 redo laminectomy performed by Dr. Silva.  Postop, patient's blood pressure has been well controlled and she is on room air.  There are no postop labs to review.  Preop labs were within normal limits. .     OT evaluation performed today; functional performance at today's assessment is as above. Pt presents to rehab below her normal baseline Ind level, currently functioning between SBA-Max A  for ADLs, and Min A for functional transfers. Pt is limited by decreased strength, decreased balance, decreased endurance, pain, limited knowledge of post-op precautions, and decreased LB strength. Pt lives in a HOUSING FACILITY: Assisted Living Residence with 0STE and care staff present who can assist 24/7. Pt will benefit from ongoing care from this skilled interdisciplinary high intensity rehabilitation program to address the aforementioned deficits in order to maximize ind with ADLs, IADLs, and household/community mobility while reducing burden of care, supporting a safe return detention in [facility].      Plan  Recommend Occupational Therapy  minutes per day 5-7 days per week for 7-10 days for the following treatments:  OT Group Therapy, OT Self Care/ADL, OT Community Reintegration, OT Manual Ther Technique, OT Neuro Re-Ed/Balance, OT Therapeutic Activity, OT Evaluation, and OT Therapeutic Exercise.    Passport items to be completed:  Perform bathroom transfers, complete dressing, complete feeding, get ready for the day, prepare a simple meal, participate in household tasks, adapt home for safety needs, demonstrate home exercise program, complete caregiver training     Goals:  Long term and short term goals have been discussed with patient and dtr and they are in agreement.    Occupational Therapy Goals (Active)        Problem: Bathing       Dates: Start:  11/14/23         Goal: STG-Within one week, patient will bathe at SBA level using DME/AE PRN.       Dates: Start:  11/14/23               Problem: Dressing       Dates: Start:  11/14/23         Goal: STG-Within one week, patient will dress LB at SBA level using DME/AE PRN.       Dates: Start:  11/14/23               Problem: Functional Transfers       Dates: Start:  11/14/23         Goal: STG-Within one week, patient will transfer to toilet at SBA level using DME/AE PRN.       Dates: Start:  11/14/23            Goal: STG-Within one week, patient will transfer to tub/shower at SBA level using DME/AE PRN.       Dates: Start:  11/14/23               Problem: OT Long Term Goals       Dates: Start:  11/14/23         Goal: LTG-By discharge, patient will complete basic self care tasks at SBA-Mod I level using DME/AE PRN.       Dates: Start:  11/14/23            Goal: LTG-By discharge, patient will perform bathroom transfers at SBA-Mod I level using DME/AE PRN.       Dates: Start:  11/14/23               Problem: Toileting       Dates: Start:  11/14/23         Goal: STG-Within one week, patient will complete toileting tasks at SBA level using DME/AE PRN.       Dates: Start:  11/14/23

## 2023-11-14 NOTE — PROGRESS NOTES
NURSING DAILY NOTE    Name: Sheyla Gauthier   Date of Admission: 11/13/2023   Admitting Diagnosis: Radiculopathy  Attending Physician: Mitchell Chavis M.d.  Allergies: Trazodone and Crestor [rosuvastatin calcium]    Safety  Patient Assist     Patient Precautions     Precaution Comments     Bed Transfer Status     Toilet Transfer Status      Assistive Devices     Oxygen  None - Room Air  Diet/Therapeutic Dining  Current Diet Order   Procedures    Diet Order Diet: Regular     Pill Administration  whole and one at a time   Agitated Behavioral Scale     ABS Level of Severity       Fall Risk  Has the patient had a fall this admission?      Lexy Garcia Fall Risk Scoring  17, HIGH RISK  Fall Risk Safety Measures  bed alarm, chair alarm, and poor balance, right ear hearing loss    Vitals  Temperature: 36.6 °C (97.9 °F)  Temp src: Oral  Pulse: 69  Respiration: 18  Blood Pressure : (!) 141/66  Blood Pressure MAP (Calculated): 91 MM HG  BP Location: Right, Upper Arm  Patient BP Position: Gant's Position     Oxygen  Pulse Oximetry: 97 %  O2 (LPM): 0  O2 Delivery Device: None - Room Air    Bowel and Bladder  Last Bowel Movement  11/13/23  Stool Type     Bowel Device     Continent  Bladder: Stress incontinence   Bowel: Continent movement  Bladder Function  Number of Times Voided: 1  Urine Color: Yellow  Genitourinary Assessment   Bladder Assessment (WDL):  WDL Except  Urinary Elimination: Incontinence  Urine Color: Yellow  Bladder Device: Bathroom, Absorbent Brief (Pull Up)  Bladder Scan: Unable To Void  $ Bladder Scan Results (mL): 348    Skin  Wyatt Score   18  Sensory Interventions   Bed Types: Standard/Trauma Mattress  Skin Preventative Measures: Pillows in Use for Support / Positioning  Moisture Interventions  Moisturizers/Barriers: Barrier Wipes      Pain  Pain Rating Scale  0 - No Pain  Pain Location  Back, Foot  Pain Location Orientation  Posterior  Pain  Interventions   Medication (see MAR) (OXYCODONE 5 MG)    ADLs    Bathing      Linen Change      Personal Hygiene     Chlorhexidine Bath      Oral Care     Teeth/Dentures     Shave     Nutrition Percentage Eaten  Dinner, Between 50-75% Consumed  Environmental Precautions     Patient Turns/Positioning  Patient Turns Self from Side to Side  Patient Turns Assistance/Tolerance     Bed Positions     Head of Bed Elevated         Psychosocial/Neurologic Assessment  Psychosocial Assessment  Psychosocial (WDL):  Within Defined Limits  Neurologic Assessment  Neuro (WDL): Exceptions to WDL  EENT (WDL):  Within Defined Limits    Cardio/Pulmonary Assessment  Edema   RLE Edema: Dependent, 2+  LLE Edema: Dependent, 2+  Respiratory Breath Sounds     Cardiac Assessment   Cardiac (WDL):  Within Defined Limits

## 2023-11-15 ENCOUNTER — APPOINTMENT (OUTPATIENT)
Dept: OCCUPATIONAL THERAPY | Facility: REHABILITATION | Age: 83
DRG: 552 | End: 2023-11-15
Attending: PHYSICAL MEDICINE & REHABILITATION
Payer: MEDICARE

## 2023-11-15 ENCOUNTER — APPOINTMENT (OUTPATIENT)
Dept: PHYSICAL THERAPY | Facility: REHABILITATION | Age: 83
DRG: 552 | End: 2023-11-15
Attending: PHYSICAL MEDICINE & REHABILITATION
Payer: MEDICARE

## 2023-11-15 PROCEDURE — 99232 SBSQ HOSP IP/OBS MODERATE 35: CPT | Performed by: PHYSICAL MEDICINE & REHABILITATION

## 2023-11-15 PROCEDURE — 97530 THERAPEUTIC ACTIVITIES: CPT

## 2023-11-15 PROCEDURE — 97110 THERAPEUTIC EXERCISES: CPT

## 2023-11-15 PROCEDURE — A9270 NON-COVERED ITEM OR SERVICE: HCPCS | Performed by: PHYSICAL MEDICINE & REHABILITATION

## 2023-11-15 PROCEDURE — 770010 HCHG ROOM/CARE - REHAB SEMI PRIVAT*

## 2023-11-15 PROCEDURE — 700102 HCHG RX REV CODE 250 W/ 637 OVERRIDE(OP): Performed by: PHYSICAL MEDICINE & REHABILITATION

## 2023-11-15 PROCEDURE — 97116 GAIT TRAINING THERAPY: CPT

## 2023-11-15 RX ORDER — TIZANIDINE 4 MG/1
2 TABLET ORAL
Status: DISCONTINUED | OUTPATIENT
Start: 2023-11-15 | End: 2023-11-16

## 2023-11-15 RX ADMIN — ATORVASTATIN CALCIUM 40 MG: 40 TABLET, FILM COATED ORAL at 20:34

## 2023-11-15 RX ADMIN — APIXABAN 10 MG: 5 TABLET, FILM COATED ORAL at 20:34

## 2023-11-15 RX ADMIN — OXYCODONE HYDROCHLORIDE 5 MG: 5 TABLET ORAL at 20:37

## 2023-11-15 RX ADMIN — LIOTHYRONINE SODIUM 5 MCG: 5 TABLET ORAL at 08:04

## 2023-11-15 RX ADMIN — CALCIUM CARBONATE (ANTACID) CHEW TAB 500 MG 500 MG: 500 CHEW TAB at 20:34

## 2023-11-15 RX ADMIN — APIXABAN 10 MG: 5 TABLET, FILM COATED ORAL at 08:04

## 2023-11-15 RX ADMIN — CALCIUM CARBONATE (ANTACID) CHEW TAB 500 MG 500 MG: 500 CHEW TAB at 08:04

## 2023-11-15 RX ADMIN — PREGABALIN 100 MG: 100 CAPSULE ORAL at 20:34

## 2023-11-15 RX ADMIN — OXYCODONE HYDROCHLORIDE 5 MG: 5 TABLET ORAL at 13:02

## 2023-11-15 RX ADMIN — PREGABALIN 100 MG: 100 CAPSULE ORAL at 08:04

## 2023-11-15 RX ADMIN — LEVOTHYROXINE SODIUM 88 MCG: 0.09 TABLET ORAL at 06:08

## 2023-11-15 RX ADMIN — OXYCODONE HYDROCHLORIDE 5 MG: 5 TABLET ORAL at 06:08

## 2023-11-15 RX ADMIN — OXYCODONE HYDROCHLORIDE 5 MG: 5 TABLET ORAL at 16:39

## 2023-11-15 RX ADMIN — LOSARTAN POTASSIUM 50 MG: 50 TABLET, FILM COATED ORAL at 08:03

## 2023-11-15 RX ADMIN — OXYCODONE HYDROCHLORIDE 5 MG: 5 TABLET ORAL at 04:13

## 2023-11-15 RX ADMIN — Medication 200 MG: at 20:34

## 2023-11-15 RX ADMIN — OXYCODONE HYDROCHLORIDE 5 MG: 5 TABLET ORAL at 09:08

## 2023-11-15 RX ADMIN — TIZANIDINE 2 MG: 4 TABLET ORAL at 20:34

## 2023-11-15 RX ADMIN — OMEPRAZOLE 20 MG: 20 CAPSULE, DELAYED RELEASE ORAL at 08:03

## 2023-11-15 SDOH — ECONOMIC STABILITY: TRANSPORTATION INSECURITY
IN THE PAST 12 MONTHS, HAS THE LACK OF TRANSPORTATION KEPT YOU FROM MEDICAL APPOINTMENTS OR FROM GETTING MEDICATIONS?: NO

## 2023-11-15 SDOH — ECONOMIC STABILITY: TRANSPORTATION INSECURITY
IN THE PAST 12 MONTHS, HAS LACK OF RELIABLE TRANSPORTATION KEPT YOU FROM MEDICAL APPOINTMENTS, MEETINGS, WORK OR FROM GETTING THINGS NEEDED FOR DAILY LIVING?: NO

## 2023-11-15 ASSESSMENT — ACTIVITIES OF DAILY LIVING (ADL)
BED_CHAIR_WHEELCHAIR_TRANSFER_DESCRIPTION: ADAPTIVE EQUIPMENT;SET-UP OF EQUIPMENT;SUPERVISION FOR SAFETY;VERBAL CUEING
BED_CHAIR_WHEELCHAIR_TRANSFER_DESCRIPTION: ADAPTIVE EQUIPMENT;SET-UP OF EQUIPMENT;SUPERVISION FOR SAFETY;VERBAL CUEING
BED_CHAIR_WHEELCHAIR_TRANSFER_DESCRIPTION: ADAPTIVE EQUIPMENT;SET-UP OF EQUIPMENT;SUPERVISION FOR SAFETY;VERBAL CUEING;INCREASED TIME
BED_CHAIR_WHEELCHAIR_TRANSFER_DESCRIPTION: ADAPTIVE EQUIPMENT;SET-UP OF EQUIPMENT;SUPERVISION FOR SAFETY;VERBAL CUEING

## 2023-11-15 ASSESSMENT — GAIT ASSESSMENTS
GAIT LEVEL OF ASSIST: MINIMAL ASSIST
ASSISTIVE DEVICE: FRONT WHEEL WALKER
DISTANCE (FEET): 20

## 2023-11-15 NOTE — THERAPY
Physical Therapy   Daily Treatment     Patient Name: Sheyla Gauthier  Age:  83 y.o., Sex:  female  Medical Record #: [id number]  Today's Date: 11/15/2023     Precautions  Precautions: Fall Risk, Spinal / Back Precautions   Comments: No brace    Subjective    1st tx: Pt reporting 6/10 L groin pain request pain meds from RN prior to tx  2nd tx: Pt agreeable to tx and reporting improvement in pain     Objective       11/15/23 0901   PT Charge Group   PT Therapeutic Activities (Units) 2   PT Total Time Spent   PT Individual Total Time Spent (Mins) 30   Pain   Intervention Cold Pack;Relaxation Technique;Repositioned  (TENS unit to 11mA on low back for 15min)   Pain 0 - 10 Group   Location Back;Groin;Hip   Location Orientation Left   Pain Rating Scale (NPRS) 6   Description Sharp   Comfort Goal Comfort with Movement;Perform Activity   Transfer Functional Level of Assist   Bed, Chair, Wheelchair Transfer Contact Guard Assist   Bed Chair Wheelchair Transfer Description Adaptive equipment;Set-up of equipment;Supervision for safety;Verbal cueing;Increased time  (stand pivot)   Bed Mobility    Supine to Sit Minimal Assist  (for LEs)   Sit to Stand Contact Guard Assist   Interdisciplinary Plan of Care Collaboration   IDT Collaboration with  Family / Caregiver;Occupational Therapist   Patient Position at End of Therapy   (supine on mat in gym)   Collaboration Comments daughter present for tx, handoff of care     Supine therex on cold pack to low back:  L LE PROM into gross hip/knee flexion and extension   Hooklying hip add ball squeeze x10 5sec hold  Pelvic tilt with abdominal brace x10       11/15/23 1030   PT Charge Group   PT Gait Training (Units) 1   PT Therapeutic Exercise (Units) 2   PT Therapeutic Activities (Units) 1   PT Total Time Spent   PT Individual Total Time Spent (Mins) 60   Pain 0 - 10 Group   Location Back;Groin;Hip   Location Orientation Left;Lower;Posterior;Anterior  (anterior L hip/groin, mid/low back)    Pain Rating Scale (NPRS) 3   Therapist Pain Assessment Post Activity   Gait Functional Level of Assist    Gait Level Of Assist Minimal Assist   Assistive Device Front Wheel Walker   Distance (Feet) 20   # of Times Distance was Traveled 1   Deviation Antalgic;Step To;Trendelenberg;Bradykinetic  (L trendelenberg)   Transfer Functional Level of Assist   Bed, Chair, Wheelchair Transfer Contact Guard Assist   Bed Chair Wheelchair Transfer Description Adaptive equipment;Set-up of equipment;Supervision for safety;Verbal cueing   Supine Lower Body Exercise   Bridges Two Legged;2 sets of 15   Abdominal Bracing Left;Alternate Steps;1 set of 15  (AAROM)   Hip Abduction 1 set of 10;Hook Lying;Left;Medium Resistance Theraband   Other Exercises assisted prone hip ext with knee flex x15   Bed Mobility    Supine to Sit Minimal Assist   Sit to Supine Minimal Assist   Sit to Stand Contact Guard Assist   Rolling Minimal Assist to Rt.;Minimum Assist to Lt.   Interdisciplinary Plan of Care Collaboration   IDT Collaboration with  Family / Caregiver   Patient Position at End of Therapy Seated;Chair Alarm On;Self Releasing Lap Belt Applied;Family / Friend in Room   Collaboration Comments both daughters present for tx     Completed 10 min B LE motomed warm up level 2 0.89 miles    Low back/ L hip manual assessment:  No reproduction of symptoms with prone L femoral nerve glide  Groin and radiating L thigh pain with active hip flexion in supine and attempt at sidelying L hip abduction  Unable to perform active L hip abduction or extension against gravity without assistance  Minimal glute med activation palpated with hooklying clams and minimal glute max contraction observed with assisted prone hip extension  L clunk with PROM into hip flexion past 90 deg  Likely leg length discrepancy with L LE shorter than R, trial heel wedge  L trendelenberg in stance phase of gait      Assessment    Pt is limited by radiating L groin pain, L glute  weakness, abdominal weakness, L trendelenberg in stance phase of gait and decreased standing tolerance consistent with L4 nerve root involvement and arthritic degeneration of L hip joint. She did find some relief with relaxation techniques, cold pack, manual pressure to groin/hip, and TENS.   Strengths: Able to follow instructions, Good insight into deficits/needs, Independent prior level of function, Making steady progress towards goals, Motivated for self care and independence, Pleasant and cooperative, Supportive family, Willingly participates in therapeutic activities  Barriers: Decreased endurance, Generalized weakness, Impaired activity tolerance, Impaired balance, Pain    Plan    L hip ext and abd NRE  Abdominal stabilization  Gait training with emphasis on L stance stability, trial heel wedge  Motomed/Nustep for cardio  Bed mobility with logroll    DME       Passport items to be completed:  Get in/out of bed safely, in/out of a vehicle, safely use mobility device, walk or wheel around home/community, navigate up and down stairs, show how to get up/down from the ground, ensure home is accessible, demonstrate HEP, complete caregiver training    Physical Therapy Problems (Active)       Problem: Mobility       Dates: Start:  11/14/23         Goal: STG-Within one week, patient will propel wheelchair 100ft with SBA on level ground indoors       Dates: Start:  11/14/23            Goal: STG-Within one week, patient will ambulate 50ft with FWW and Rajan       Dates: Start:  11/14/23               Problem: Mobility Transfers       Dates: Start:  11/14/23         Goal: STG-Within one week, patient will perform bed mobility with verbal cues for log roll sequencing and AE as needed       Dates: Start:  11/14/23            Goal: STG-Within one week, patient will transfer bed to chair CGA with FWW       Dates: Start:  11/14/23               Problem: PT-Long Term Goals       Dates: Start:  11/14/23         Goal: LTG-By  discharge, patient will propel wheelchair 150ft [place]       Dates: Start:  11/14/23            Goal: LTG-By discharge, patient will ambulate 100ft with FWW [place]       Dates: Start:  11/14/23            Goal: LTG-By discharge, patient will transfer one surface to another [place]       Dates: Start:  11/14/23            Goal: LTG-By discharge, patient will perform home exercise program independently       Dates: Start:  11/14/23            Goal: LTG-By discharge, patient will transfer in/out of a car with CGA       Dates: Start:  11/14/23            Goal: LTG-By discharge, patient will perform bed mobility independently       Dates: Start:  11/14/23

## 2023-11-15 NOTE — PROGRESS NOTES
Patient care assumed. Report received from [facility] LYRIC Martinez. Patient is alert and calm, resting in bed. Call light and bedside table within reach. Will continue to monitor.

## 2023-11-15 NOTE — THERAPY
Occupational Therapy  Daily Treatment     Patient Name: Sheyla Gauthier  Age:  83 y.o., Sex:  female  Medical Record #: [id number]  Today's Date: 11/15/2023     Precautions  Precautions: Fall Risk, Spinal / Back Precautions   Comments: No brace         Subjective    Pt seen 2x this day with dtr present and agreeable to both sessions.     Objective       11/15/23 0931 11/15/23 1331   OT Charge Group   OT Therapy Activity (Units)  --  4   OT Therapeutic Exercise (Units) 2  --    OT Total Time Spent   OT Individual Total Time Spent (Mins) 30 60   Pain   Intervention Cold Pack;Other (Comments)  (OTR removed ice pack and TENS unit from pt's Lumbar spine following PT session. Pt reporting total resolution of back pain.) Cold Pack   Pain 0 - 10 Group   Location  --  Back;Groin;Hip   Location Orientation  --  Left;Lower;Posterior;Anterior   Pain Rating Scale (NPRS) 0  --    Description  --  Sharp   Comfort Goal  --  Comfort with Movement;Perform Activity   Therapist Pain Assessment  --  Post Activity Pain Same as Prior to Activity   Cognition    Level of Consciousness Alert Alert   Functional Level of Assist   Bed, Chair, Wheelchair Transfer Contact Guard Assist Contact Guard Assist   Bed Chair Wheelchair Transfer Description Adaptive equipment;Set-up of equipment;Supervision for safety;Verbal cueing Adaptive equipment;Set-up of equipment;Supervision for safety;Verbal cueing   Supine Upper Body Exercises   Supine Upper Body Exercises Yes  --    Chest Press Bilateral;Weight (See Comments for lbs)  (2 sets of 20 reps, 4# dowel)  --    Front Arm Raise Bilateral;Weight (See Comments for lbs)  (2 sets of 20 reps, 4# dowel)  --    Other Exercise 2 sets of 20 reps BUE tricep press using 4# dowel with moderate resistance therapy band.  --    Bed Mobility    Supine to Sit Minimal Assist Minimal Assist   Sit to Supine  --  Minimal Assist   Rolling Contact Guard Assist  --    Interdisciplinary Plan of Care Collaboration   IDT  Collaboration with  Family / Caregiver;Physician;Physical Therapist Family / Caregiver   Patient Position at End of Therapy Seated;Family / Friend in Room;Chair Alarm On;Self Releasing Lap Belt Applied In Bed;Bed Alarm On;Family / Friend in Room;Call Light within Reach;Tray Table within Reach   Collaboration Comments Dtr present during session, PT CLOF, Dr. Partha gallagher during session, discussing L-hip pain and weakness. Dtr present for tx     PM session - Time spent educating pt and dtr on importance of team conference to DC process, DC planning, and MOISÉS setup with step in shower. Dtr phoned director of [facility] for setup, with walk in shower that has a 4 inch barrier, sliding glass doors, and horizontal GB on R-side and back of shower stall with HHSH and back corner built in bench (may not accommodate pt safely). Discussed and demonstrated use of both shower chair and tub transfer bench in mock shower space. Pt attempted to clear 4 inch barrier, but is limited by L-hip pain with weight baring at this time.   Pt returned to room and completed sit<>standing from 18inch surface at SBA level and was recommended to set shower equipment to this height for pt safety.   Noted continued edema in bilateral feet (pitting +2 on L and +3 on R). Pt placed supine in bed with heels floated and BLE elevated with education on completing multiple times per day between sessions to avoid continued edema. Observed mild decrease in edema following 2 sets of 20 reps ankle pumps. Also educated pt not to elevate like this overnight to allow for circulation.      Strengths: Able to follow instructions, Alert and oriented, Good carryover of learning, Good insight into deficits/needs, Independent prior level of function, Making steady progress towards goals, Manages pain appropriately, Motivated for self care and independence, Pleasant and cooperative, Supportive family, Willingly participates in therapeutic activities  Barriers: Decreased  endurance, Fatigue, Generalized weakness, Impaired balance, Impaired activity tolerance, Limited mobility, Pain    Plan    ADL's with AE, standing tolerance/balance, overall strengthening, pain management, spinal precautions in function.    DME  TBD, pt may require a tub transfer bench if unable to clear 4inch barrier prior to DC, otherwise, shower chair with back    Passport items to be completed:  Perform bathroom transfers, complete dressing, complete feeding, get ready for the day, prepare a simple meal, participate in household tasks, adapt home for safety needs, demonstrate home exercise program, complete caregiver training     Occupational Therapy Goals (Active)       Problem: Bathing       Dates: Start:  11/14/23         Goal: STG-Within one week, patient will bathe at SBA level using DME/AE PRN.       Dates: Start:  11/14/23               Problem: Dressing       Dates: Start:  11/14/23         Goal: STG-Within one week, patient will dress LB at SBA level using DME/AE PRN.       Dates: Start:  11/14/23               Problem: Functional Transfers       Dates: Start:  11/14/23         Goal: STG-Within one week, patient will transfer to toilet at SBA level using DME/AE PRN.       Dates: Start:  11/14/23            Goal: STG-Within one week, patient will transfer to tub/shower at SBA level using DME/AE PRN.       Dates: Start:  11/14/23               Problem: OT Long Term Goals       Dates: Start:  11/14/23         Goal: LTG-By discharge, patient will complete basic self care tasks at SBA-Mod I level using DME/AE PRN.       Dates: Start:  11/14/23            Goal: LTG-By discharge, patient will perform bathroom transfers at SBA-Mod I level using DME/AE PRN.       Dates: Start:  11/14/23               Problem: Toileting       Dates: Start:  11/14/23         Goal: STG-Within one week, patient will complete toileting tasks at SBA level using DME/AE PRN.       Dates: Start:  11/14/23

## 2023-11-15 NOTE — PROGRESS NOTES
NURSING DAILY NOTE    Name: Sheyla Gauthier   Date of Admission: 11/13/2023   Admitting Diagnosis: Radiculopathy  Attending Physician: Mitchell Chavis M.d.  Allergies: Trazodone and Crestor [rosuvastatin calcium]    Safety  Patient Assist     Patient Precautions  Fall Risk, Spinal / Back Precautions   Precaution Comments  No brace  Bed Transfer Status  Minimal Assist  Toilet Transfer Status   Minimal Assist  Assistive Devices  Rails, Wheelchair  Oxygen  None - Room Air  Diet/Therapeutic Dining  Current Diet Order   Procedures    Diet Order Diet: Regular     Pill Administration  whole  Agitated Behavioral Scale     ABS Level of Severity       Fall Risk  Has the patient had a fall this admission?   No  Lexy Garcia Fall Risk Scoring  17, HIGH RISK  Fall Risk Safety Measures  bed alarm and chair alarm    Vitals  Temperature: 36.6 °C (97.9 °F)  Temp src: Oral  Pulse: 72  Respiration: 18  Blood Pressure : 114/61  Blood Pressure MAP (Calculated): 79 MM HG  BP Location: Right, Upper Arm  Patient BP Position: Sitting     Oxygen  Pulse Oximetry: 96 %  O2 (LPM): 0  O2 Delivery Device: None - Room Air    Bowel and Bladder  Last Bowel Movement  11/13/23  Stool Type     Bowel Device  Bathroom  Continent  Bladder: Stress incontinence   Bowel: Continent movement  Bladder Function  Number of Times Voided: 1  Urine Color: Yellow  Genitourinary Assessment   Bladder Assessment (WDL):  WDL Except  Urinary Elimination: Incontinence  Urine Color: Yellow  Bladder Device: Bathroom, Diaper  Bladder Scan: Unable To Void  $ Bladder Scan Results (mL): 315    Skin  Wyatt Score   18  Sensory Interventions   Bed Types: Standard/Trauma Mattress  Skin Preventative Measures: Pillows in Use for Support / Positioning  Moisture Interventions  Moisturizers/Barriers: Barrier Wipes      Pain  Pain Rating Scale  6 - Hard to ignore, avoid usual activities  Pain Location  Groin, Hip  Pain  Location Orientation  Left  Pain Interventions   Medication (see MAR)    ADLs    Bathing      Linen Change   Partial  Personal Hygiene     Chlorhexidine Bath      Oral Care     Teeth/Dentures     Shave     Nutrition Percentage Eaten  Breakfast, Between 25-50% Consumed  Environmental Precautions     Patient Turns/Positioning  Patient Turns Self from Side to Side  Patient Turns Assistance/Tolerance     Bed Positions  Bed Controls On  Head of Bed Elevated  Self regulated      Psychosocial/Neurologic Assessment  Psychosocial Assessment  Psychosocial (WDL):  Within Defined Limits  Neurologic Assessment  Neuro (WDL): Exceptions to WDL  Level of Consciousness: Alert  Orientation Level: Oriented X4  Cognition: Follows commands  Speech: Clear  Pupil Assesment: No  EENT (WDL):  Within Defined Limits    Cardio/Pulmonary Assessment  Edema   RLE Edema: Dependent, 2+  LLE Edema: Dependent, 2+  Respiratory Breath Sounds     Cardiac Assessment   Cardiac (WDL):  WDL Except (hx htn, cad)

## 2023-11-15 NOTE — DISCHARGE PLANNING
CASE MANAGEMENT INITIAL ASSESSMENT    Admit Date:  11/13/2023     I met with patient and and  Dtr Ade Partida  to discuss role of case management / discharge planning / team conference.   Patient is a  83 y.o. female transferred from [facility] where she was hospitalized form 11/10 to 11/13    DC Disposition:  Relocate to Assisted Living in [place], [place];     Diagnosis: Radiculopathy [M54.10]    Co-morbidities:   Patient Active Problem List    Diagnosis Date Noted    Radiculopathy 11/13/2023    Spinal stenosis, lumbar region with neurogenic claudication 11/10/2023    Peripheral edema 10/13/2023    Chronic kidney disease, stage 3a ([facility]) 10/13/2023    Major depressive disorder with single episode, in partial remission ([facility]) 08/30/2023    Lumbar stenosis with neurogenic claudication 02/06/2023    Decreased hearing of right ear 10/20/2022    Dizziness 08/03/2022    Stage 2 chronic kidney disease 04/20/2022    Arthritis 11/03/2021    Memory loss 08/20/2020    Conductive hearing loss of right ear 08/20/2020    Left wrist pain 12/19/2019    Myalgia 10/01/2019    Former smoker 07/02/2019    Other insomnia 06/14/2019    Coronary artery disease involving native coronary artery of native heart without angina pectoris 03/26/2019    Bladder prolapse, female, acquired 03/07/2018    JOHN (obstructive sleep apnea) 02/22/2018    Age related osteoporosis 07/26/2017    Major depressive disorder 06/29/2017    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/08/2016    Spinal stenosis of lumbar region 11/14/2016    Migraine syndrome 02/11/2013    Acquired hypothyroidism 03/08/2011    HTN (hypertension) 10/21/2009    Postmenopausal hormone therapy 10/21/2009    Mixed hyperlipidemia     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    Prior Level of Function:  Medication Management: Independent  Finances: Independent  Home Management: Independent  Shopping: Independent  Prior Level Of Mobility: Independent With Device in Community, Independent With Steps in Community, Independent With Device in Home, Independent With Steps in Home  Driving / Transportation: Driving Independent    Support Systems:  Primary : Luh Rausch Dtr in [place], [place]  Other support systems: Ade Partida  in [place] area       Previous Services Utilized:   Equipment Owned: Front-Wheel Walker, Quad Cane, Wheelchair, Tub / Shower Seat, Reacher  Prior Services: retirement Home Aide Services (thru private caregiving agency.)    Other Information:  Occupation (Pre-Hospital Vocational): Retired Due To Age  Primary Payor Source: Medicare A  Secondary Payor Source:  (AARP)  Primary Care Practitioner : Darius Morataya Md  Other MDs: Dr Earnest Silva    Patient / Family Goal:  Patient / Family Goal: Recloate to [facility] in [facility]    Plan:  1. Continue to follow patient through hospitalization and provide discharge planning in collaboration with patient, family, physicians and ancillary services.     2. Utilize community resources to ensure a safe discharge.

## 2023-11-15 NOTE — PROGRESS NOTES
..                                                         NURSING DAILY NOTE    Name: Sheyla Gauthier   Date of Admission: 11/13/2023   Admitting Diagnosis: Radiculopathy  Attending Physician: Mitchell Chavis M.d.  Allergies: Trazodone and Crestor [rosuvastatin calcium]    Safety  Patient Assist  min assist  Patient Precautions  Fall Risk, Spinal / Back Precautions   Precaution Comments  No brace  Bed Transfer Status  Minimal Assist  Toilet Transfer Status   Minimal Assist  Assistive Devices  Rails, Wheelchair  Oxygen  None - Room Air  Diet/Therapeutic Dining  Current Diet Order   Procedures    Diet Order Diet: Regular     Pill Administration  whole  Agitated Behavioral Scale     ABS Level of Severity       Fall Risk  Has the patient had a fall this admission?   No  Lexy Garcia Fall Risk Scoring  15, HIGH RISK  Fall Risk Safety Measures  bed alarm, chair alarm, seatbelt alarm, poor balance, low vision/ hearing, and ok to leave pt in bathroom    Vitals  Temperature: 36.7 °C (98.1 °F)  Temp src: Oral  Pulse: 71  Respiration: 18  Blood Pressure : 129/67  Blood Pressure MAP (Calculated): 88 MM HG  BP Location: Right, Upper Arm  Patient BP Position: Supine     Oxygen  Pulse Oximetry: 94 %  O2 (LPM): 0  O2 Delivery Device: None - Room Air    Bowel and Bladder  Last Bowel Movement  11/14/23 (per patient)  Stool Type     Bowel Device  Bathroom  Continent  Bladder: Stress incontinence   Bowel: Continent movement  Bladder Function  Urine Void (mL):  (moderate)  Number of Times Voided: 1  Urine Color: Yellow  Genitourinary Assessment   Bladder Assessment (WDL):  WDL Except  Urinary Elimination: Incontinence  Urine Color: Yellow  Bladder Device: Bathroom, Diaper  Bladder Scan: Unable To Void  $ Bladder Scan Results (mL): 368    Skin  Wyatt Score   18  Sensory Interventions   Bed Types: Standard/Trauma Mattress  Skin Preventative Measures: Pillows in Use for Support / Positioning  Moisture  Interventions  Moisturizers/Barriers: Barrier Wipes      Pain  Pain Rating Scale  5 - Interrupts some activities  Pain Location  Hip  Pain Location Orientation  Left  Pain Interventions   Medication (see MAR)    ADLs    Bathing      Linen Change   Partial  Personal Hygiene  Moist Klarissa Wipes, Perineal Care  Chlorhexidine Bath      Oral Care     Teeth/Dentures     Shave     Nutrition Percentage Eaten  Breakfast, Between 25-50% Consumed  Environmental Precautions  Bed in Low Position, Treaded Slipper Socks on Patient  Patient Turns/Positioning  Patient Turns Self from Side to Side  Patient Turns Assistance/Tolerance     Bed Positions  Bed Controls On  Head of Bed Elevated  Self regulated      Psychosocial/Neurologic Assessment  Psychosocial Assessment  Psychosocial (WDL):  Within Defined Limits  Neurologic Assessment  Neuro (WDL): Exceptions to WDL (neuropathy)  Level of Consciousness: Alert  Orientation Level: Oriented X4  Cognition: Follows commands  Speech: Clear  Pupil Assesment: No  EENT (WDL):  WDL Except    Cardio/Pulmonary Assessment  Edema   RLE Edema: Dependent, 2+  LLE Edema: Dependent, 2+  Respiratory Breath Sounds     Cardiac Assessment   Cardiac (WDL):  Within Defined Limits

## 2023-11-15 NOTE — PROGRESS NOTES
Physical Medicine & Rehabilitation Progress Note    Encounter Date: 11/15/2023    Chief Complaint: Weakness    Interval Events (Subjective):  Painful left hip, psoas, greater trochanter and knee    Objective:  VITAL SIGNS: /67   Pulse 71   Temp 36.7 °C (98.1 °F) (Oral)   Resp 18   Wt 63.5 kg (140 lb)   LMP 11/01/1986   SpO2 94%   BMI 25.61 kg/m²   Gen: No acute distress, well developed well nourished adult  HEENT: Normal Cephalic Atraumatic, Normal conjunctiva.   CV: warm extremities, well perfused, no edema  Resp: symmetric chest rise, breathing comfortably on room air  Abd: Soft, Non distended  Extremities: normal bulk, no atrophy  Skin: no visible rashes or lesions.   Neuro: alert, awake  Psych: Mood and affect appropriate and congruent    Laboratory Values:  Recent Results (from the past 72 hour(s))   CBC WITHOUT DIFFERENTIAL    Collection Time: 11/13/23  4:37 AM   Result Value Ref Range    WBC 7.7 4.8 - 10.8 K/uL    RBC 3.49 (L) 4.20 - 5.40 M/uL    Hemoglobin 9.7 (L) 12.0 - 16.0 g/dL    Hematocrit 30.9 (L) 37.0 - 47.0 %    MCV 88.5 81.4 - 97.8 fL    MCH 27.8 27.0 - 33.0 pg    MCHC 31.4 (L) 32.2 - 35.5 g/dL    RDW 47.7 35.9 - 50.0 fL    Platelet Count 197 164 - 446 K/uL    MPV 10.1 9.0 - 12.9 fL   Comp Metabolic Panel    Collection Time: 11/13/23  4:37 AM   Result Value Ref Range    Sodium 140 135 - 145 mmol/L    Potassium 4.7 3.6 - 5.5 mmol/L    Chloride 107 96 - 112 mmol/L    Co2 24 20 - 33 mmol/L    Anion Gap 9.0 7.0 - 16.0    Glucose 98 65 - 99 mg/dL    Bun 21 8 - 22 mg/dL    Creatinine 0.85 0.50 - 1.40 mg/dL    Calcium 9.5 8.5 - 10.5 mg/dL    Correct Calcium 10.3 8.5 - 10.5 mg/dL    AST(SGOT) 13 12 - 45 U/L    ALT(SGPT) 9 2 - 50 U/L    Alkaline Phosphatase 77 30 - 99 U/L    Total Bilirubin 0.3 0.1 - 1.5 mg/dL    Albumin 3.0 (L) 3.2 - 4.9 g/dL    Total Protein 5.7 (L) 6.0 - 8.2 g/dL    Globulin 2.7 1.9 - 3.5 g/dL    A-G Ratio 1.1 g/dL   ESTIMATED GFR    Collection Time: 11/13/23  4:37 AM    Result Value Ref Range    GFR (CKD-EPI) 68 >60 mL/min/1.73 m 2   CBC with Differential    Collection Time: 11/14/23  5:59 AM   Result Value Ref Range    WBC 6.9 4.8 - 10.8 K/uL    RBC 3.52 (L) 4.20 - 5.40 M/uL    Hemoglobin 9.8 (L) 12.0 - 16.0 g/dL    Hematocrit 31.0 (L) 37.0 - 47.0 %    MCV 88.1 81.4 - 97.8 fL    MCH 27.8 27.0 - 33.0 pg    MCHC 31.6 (L) 32.2 - 35.5 g/dL    RDW 47.3 35.9 - 50.0 fL    Platelet Count 230 164 - 446 K/uL    MPV 10.3 9.0 - 12.9 fL    Neutrophils-Polys 69.80 44.00 - 72.00 %    Lymphocytes 20.50 (L) 22.00 - 41.00 %    Monocytes 6.50 0.00 - 13.40 %    Eosinophils 2.00 0.00 - 6.90 %    Basophils 0.60 0.00 - 1.80 %    Immature Granulocytes 0.60 0.00 - 0.90 %    Nucleated RBC 0.00 0.00 - 0.20 /100 WBC    Neutrophils (Absolute) 4.84 1.82 - 7.42 K/uL    Lymphs (Absolute) 1.42 1.00 - 4.80 K/uL    Monos (Absolute) 0.45 0.00 - 0.85 K/uL    Eos (Absolute) 0.14 0.00 - 0.51 K/uL    Baso (Absolute) 0.04 0.00 - 0.12 K/uL    Immature Granulocytes (abs) 0.04 0.00 - 0.11 K/uL    NRBC (Absolute) 0.00 K/uL   Comp Metabolic Panel (CMP)    Collection Time: 11/14/23  5:59 AM   Result Value Ref Range    Sodium 142 135 - 145 mmol/L    Potassium 4.2 3.6 - 5.5 mmol/L    Chloride 108 96 - 112 mmol/L    Co2 24 20 - 33 mmol/L    Anion Gap 10.0 7.0 - 16.0    Glucose 98 65 - 99 mg/dL    Bun 20 8 - 22 mg/dL    Creatinine 0.72 0.50 - 1.40 mg/dL    Calcium 9.5 8.5 - 10.5 mg/dL    Correct Calcium 10.1 8.5 - 10.5 mg/dL    AST(SGOT) 14 12 - 45 U/L    ALT(SGPT) 11 2 - 50 U/L    Alkaline Phosphatase 74 30 - 99 U/L    Total Bilirubin 0.4 0.1 - 1.5 mg/dL    Albumin 3.3 3.2 - 4.9 g/dL    Total Protein 5.8 (L) 6.0 - 8.2 g/dL    Globulin 2.5 1.9 - 3.5 g/dL    A-G Ratio 1.3 g/dL   HEMOGLOBIN A1C    Collection Time: 11/14/23  5:59 AM   Result Value Ref Range    Glycohemoglobin 5.6 4.0 - 5.6 %    Est Avg Glucose 114 mg/dL   Magnesium    Collection Time: 11/14/23  5:59 AM   Result Value Ref Range    Magnesium 2.0 1.5 - 2.5 mg/dL   TSH  with Reflex to FT4    Collection Time: 11/14/23  5:59 AM   Result Value Ref Range    TSH 0.430 0.380 - 5.330 uIU/mL   ESTIMATED GFR    Collection Time: 11/14/23  5:59 AM   Result Value Ref Range    GFR (CKD-EPI) 83 >60 mL/min/1.73 m 2       Medications:  Scheduled Medications   Medication Dose Frequency    tizanidine  2 mg QHS    oxyCODONE immediate-release  5 mg BID    apixaban  10 mg BID    [START ON 11/21/2023] apixaban  5 mg BID    atorvastatin  40 mg QHS    calcium carbonate  500 mg BID    levothyroxine  88 mcg AM ES    liothyronine  5 mcg QAM    losartan  50 mg QAM    magnesium oxide  200 mg Q EVENING    polyethylene glycol/lytes  1 Packet DAILY    pregabalin  100 mg BID    spironolactone  25 mg DAILY    Pharmacy Consult Request  1 Each PHARMACY TO DOSE    omeprazole  20 mg DAILY     PRN medications: oxyCODONE immediate-release **OR** oxyCODONE immediate-release **OR** [DISCONTINUED] HYDROmorphone, tizanidine, Respiratory Therapy Consult, hydrALAZINE, [DISCONTINUED] senna-docusate **AND** polyethylene glycol/lytes **AND** magnesium hydroxide **AND** bisacodyl, ondansetron **OR** ondansetron, sodium chloride    Diet:  Current Diet Order   Procedures    Diet Order Diet: Regular       Medical Decision Making and Plan:  Polyradiculopathy  Status post lumbar laminectomy  -Patient has history of prior L4-L5 decompression  - Recent worsening of back pain with radicular pain into right lower extremity, impairing function and recently utilizing a wheelchair in the last month  - Per outpatient documentation, MRI of the lumbar spine reportedly showed an L5-S1 stenosis  - 11/10 patient taken to the OR for L5-S1 laminectomy performed by Dr. Silva  - Spinal precautions in place, does not require bracing  - Continue with PT/OT inpatient     Right Popliteal DVT  Provoked from surgery and immobility  -Bilateral Lower extremity ultrasounds - shows right distal popliteal and peroneal occlusion thrombus. Left is negative  -SHERRELL  hose on left  -started Eliquis 10mg BID for 7 days then Eliquis 5mg BID for 3 months  -NS aware and approves starting anticoagulation     Hypothyroidism  -On home dose Synthroid     Hypertension  - On home dose losartan and spironolactone  - Blood pressure well controlled     Neurogenic bladder:  - Timed voids with PVR q4H x3. If PVR > 400mL or if patient is unable to void, straight cath patient.     Neurogenic bowel:  -  Colace, Senna BID on admission  - Goal of 1BM/day.  -      Circadian Rhythm disorder:      Recommend lights on during the day/off at night, minimize nighttime interruptions as able.     Mood  - at risk of adjustment disorder, depression, and anxiety due to functional decline     ID:  - at risk for Urinary tract infection     Skin/Wounds:  - Pressure relief q2h while in bed. Close monitoring for signs of breakdown     Pain:  - Neuroceptic - On Tylenol prn, oxycodone 2.5-5mg PRN, Tizanidine PRN,   11/14- schedule oxycodone 5mg BID at 630am and noon  11/15- schedule tizanidine at night for sleep.  - Neuropathic - On Lyrica     DVT prophylaxis:  continue eliquis     GI prophylaxis:  On Prilosec 20mg daily      -Follow-up Ortho, PCP    ____________________________________    Mitchell Chavis MD  Physical Medicine & Rehabilitation   Brain Injury Medicine   ____________________________________

## 2023-11-15 NOTE — CARE PLAN
"The patient is Stable - Low risk of patient condition declining or worsening    Shift Goals: Sleep Well  Clinical Goals: Pain control  Patient Goals: PAIN CONTROL    Patient is not progressing towards the following goals:    Problem: Fall Risk - Rehab  Goal: Patient will remain free from falls  Outcome: Not Met  Note: Lexy Garcia Fall risk Assessment Score: 15    High fall risk Interventions   - Alarming seatbelt  - Bed and strip alarm   - Yellow sign by the door   - Yellow wrist band \"Fall risk\"  - Fall risk education provided     "

## 2023-11-15 NOTE — CARE PLAN
The patient is Stable - Low risk of patient condition declining or worsening    Shift Goals  Clinical Goals: safety  Patient Goals: pain management    Problem: Fall Risk - Rehab  Goal: Patient will remain free from falls  Outcome: Progressing Pt uses call light consistently and appropriately. Waits for assistance does not attempt self transfer this shift. Able to verbalize needs.     Problem: Pain - Standard  Goal: Alleviation of pain or a reduction in pain to the patient’s comfort goal  Outcome: Progressing Patient able to verbalize pain level and verbalize an acceptable level of pain.

## 2023-11-15 NOTE — THERAPY
Physical Therapy   Daily Treatment     Patient Name: Sheyla Gauthier  Age:  83 y.o., Sex:  female  Medical Record #: [id number]  Today's Date: 11/14/2023     Precautions  Precautions: Fall Risk, Spinal / Back Precautions   Comments: No brace    Subjective    Patient pleasant and motivated to participate. Patient's daughter, Benita, present and very supportive.      Objective       11/14/23 1531   PT Charge Group   PT Gait Training (Units) 1   PT Therapeutic Activities (Units) 1   PT Total Time Spent   PT Individual Total Time Spent (Mins) 30   Gait Functional Level of Assist    Gait Level Of Assist Minimal Assist  (WC follow by patient's daughter for convenience)   Assistive Device Front Wheel Walker   Distance (Feet) 12  (+ 10' in // bars for warm-up (min A; WC follow by daughter for convenience))   Deviation Antalgic;Step To;Trendelenberg;Bradykinetic;Decreased Heel Strike;Decreased Base Of Support  (L trendelenberg)   Transfer Functional Level of Assist   Bed, Chair, Wheelchair Transfer Minimal Assist   Bed Chair Wheelchair Transfer Description   (squat/stand pivot bed <> WC (using WC arm rests))   Bed Mobility    Sit to Stand Contact Guard Assist  (cuing for hand placement)   Interdisciplinary Plan of Care Collaboration   IDT Collaboration with  Family / Caregiver;Physical Therapist   Patient Position at End of Therapy Seated;Chair Alarm On;Self Releasing Lap Belt Applied;Call Light within Reach;Tray Table within Reach;Phone within Reach;Family / Friend in Room   Collaboration Comments Collaborated with primary PT re: POC; patient's daughter present and very supportive         Assessment    Patient able to progress to FWW this afternoon, but continues to have impaired ability to weight-bearing on the L LE due to pain. Patient required cuing for sequencing with FWW but with good carry-over within session. Patient very motivated to participate.     Strengths: Able to follow instructions, Good insight into  deficits/needs, Independent prior level of function, Making steady progress towards goals, Motivated for self care and independence, Pleasant and cooperative, Supportive family, Willingly participates in therapeutic activities  Barriers: Decreased endurance, Generalized weakness, Impaired activity tolerance, Impaired balance, Pain    Plan    Gait training, LE/glute/core strengthening and ROM, transfer training, overall activity tolerance/endurance     DME  TBD    Passport items to be completed:  Get in/out of bed safely, in/out of a vehicle, safely use mobility device, walk or wheel around home/community, navigate up and down stairs, show how to get up/down from the ground, ensure home is accessible, demonstrate HEP, complete caregiver training    Physical Therapy Problems (Active)       Problem: Mobility       Dates: Start:  11/14/23         Goal: STG-Within one week, patient will propel wheelchair 100ft with SBA on level ground indoors       Dates: Start:  11/14/23            Goal: STG-Within one week, patient will ambulate 50ft with FWW and Rajan       Dates: Start:  11/14/23               Problem: Mobility Transfers       Dates: Start:  11/14/23         Goal: STG-Within one week, patient will perform bed mobility with verbal cues for log roll sequencing and AE as needed       Dates: Start:  11/14/23            Goal: STG-Within one week, patient will transfer bed to chair CGA with FWW       Dates: Start:  11/14/23               Problem: PT-Long Term Goals       Dates: Start:  11/14/23         Goal: LTG-By discharge, patient will propel wheelchair 150ft Sherman       Dates: Start:  11/14/23            Goal: LTG-By discharge, patient will ambulate 100ft with FWW Sherman       Dates: Start:  11/14/23            Goal: LTG-By discharge, patient will transfer one surface to another Sherman       Dates: Start:  11/14/23            Goal: LTG-By discharge, patient will perform home exercise program independently       Dates: Start:   11/14/23            Goal: LTG-By discharge, patient will transfer in/out of a car with CGA       Dates: Start:  11/14/23            Goal: LTG-By discharge, patient will perform bed mobility independently       Dates: Start:  11/14/23

## 2023-11-16 ENCOUNTER — APPOINTMENT (OUTPATIENT)
Dept: PHYSICAL THERAPY | Facility: REHABILITATION | Age: 83
DRG: 552 | End: 2023-11-16
Attending: PHYSICAL MEDICINE & REHABILITATION
Payer: MEDICARE

## 2023-11-16 ENCOUNTER — APPOINTMENT (OUTPATIENT)
Dept: OCCUPATIONAL THERAPY | Facility: REHABILITATION | Age: 83
DRG: 552 | End: 2023-11-16
Attending: PHYSICAL MEDICINE & REHABILITATION
Payer: MEDICARE

## 2023-11-16 PROCEDURE — A9270 NON-COVERED ITEM OR SERVICE: HCPCS | Performed by: PHYSICAL MEDICINE & REHABILITATION

## 2023-11-16 PROCEDURE — 36415 COLL VENOUS BLD VENIPUNCTURE: CPT

## 2023-11-16 PROCEDURE — 97530 THERAPEUTIC ACTIVITIES: CPT

## 2023-11-16 PROCEDURE — 97535 SELF CARE MNGMENT TRAINING: CPT

## 2023-11-16 PROCEDURE — 770010 HCHG ROOM/CARE - REHAB SEMI PRIVAT*

## 2023-11-16 PROCEDURE — 86480 TB TEST CELL IMMUN MEASURE: CPT

## 2023-11-16 PROCEDURE — 97116 GAIT TRAINING THERAPY: CPT

## 2023-11-16 PROCEDURE — 99233 SBSQ HOSP IP/OBS HIGH 50: CPT | Performed by: PHYSICAL MEDICINE & REHABILITATION

## 2023-11-16 PROCEDURE — 700102 HCHG RX REV CODE 250 W/ 637 OVERRIDE(OP): Performed by: PHYSICAL MEDICINE & REHABILITATION

## 2023-11-16 PROCEDURE — 97110 THERAPEUTIC EXERCISES: CPT

## 2023-11-16 RX ORDER — METHOCARBAMOL 750 MG/1
750 TABLET, FILM COATED ORAL 4 TIMES DAILY
Status: DISCONTINUED | OUTPATIENT
Start: 2023-11-16 | End: 2023-11-20

## 2023-11-16 RX ORDER — OXYCODONE HYDROCHLORIDE 10 MG/1
10 TABLET ORAL 2 TIMES DAILY
Status: DISCONTINUED | OUTPATIENT
Start: 2023-11-16 | End: 2023-11-16

## 2023-11-16 RX ORDER — OXYCODONE HYDROCHLORIDE 5 MG/1
5 TABLET ORAL 2 TIMES DAILY
Status: DISCONTINUED | OUTPATIENT
Start: 2023-11-17 | End: 2023-11-22 | Stop reason: HOSPADM

## 2023-11-16 RX ADMIN — POLYETHYLENE GLYCOL 3350 1 PACKET: 17 POWDER, FOR SOLUTION ORAL at 08:16

## 2023-11-16 RX ADMIN — ATORVASTATIN CALCIUM 40 MG: 40 TABLET, FILM COATED ORAL at 20:12

## 2023-11-16 RX ADMIN — Medication 200 MG: at 20:12

## 2023-11-16 RX ADMIN — APIXABAN 10 MG: 5 TABLET, FILM COATED ORAL at 20:12

## 2023-11-16 RX ADMIN — OXYCODONE HYDROCHLORIDE 5 MG: 5 TABLET ORAL at 00:27

## 2023-11-16 RX ADMIN — APIXABAN 10 MG: 5 TABLET, FILM COATED ORAL at 08:15

## 2023-11-16 RX ADMIN — OXYCODONE HYDROCHLORIDE 5 MG: 5 TABLET ORAL at 21:40

## 2023-11-16 RX ADMIN — LEVOTHYROXINE SODIUM 88 MCG: 0.09 TABLET ORAL at 05:56

## 2023-11-16 RX ADMIN — OMEPRAZOLE 20 MG: 20 CAPSULE, DELAYED RELEASE ORAL at 08:15

## 2023-11-16 RX ADMIN — CALCIUM CARBONATE (ANTACID) CHEW TAB 500 MG 500 MG: 500 CHEW TAB at 20:11

## 2023-11-16 RX ADMIN — METHOCARBAMOL TABLETS 750 MG: 750 TABLET, COATED ORAL at 11:17

## 2023-11-16 RX ADMIN — LOSARTAN POTASSIUM 50 MG: 50 TABLET, FILM COATED ORAL at 08:15

## 2023-11-16 RX ADMIN — METHOCARBAMOL TABLETS 750 MG: 750 TABLET, COATED ORAL at 20:12

## 2023-11-16 RX ADMIN — OXYCODONE HYDROCHLORIDE 10 MG: 10 TABLET ORAL at 14:20

## 2023-11-16 RX ADMIN — PREGABALIN 100 MG: 100 CAPSULE ORAL at 08:15

## 2023-11-16 RX ADMIN — LIOTHYRONINE SODIUM 5 MCG: 5 TABLET ORAL at 08:15

## 2023-11-16 RX ADMIN — OXYCODONE HYDROCHLORIDE 5 MG: 5 TABLET ORAL at 05:56

## 2023-11-16 RX ADMIN — METHOCARBAMOL TABLETS 750 MG: 750 TABLET, COATED ORAL at 17:16

## 2023-11-16 RX ADMIN — PREGABALIN 100 MG: 100 CAPSULE ORAL at 20:13

## 2023-11-16 RX ADMIN — CALCIUM CARBONATE (ANTACID) CHEW TAB 500 MG 500 MG: 500 CHEW TAB at 08:15

## 2023-11-16 ASSESSMENT — GAIT ASSESSMENTS
GAIT LEVEL OF ASSIST: CONTACT GUARD ASSIST
GAIT LEVEL OF ASSIST: MINIMAL ASSIST
DISTANCE (FEET): 25
ASSISTIVE DEVICE: PARALLEL BARS
DISTANCE (FEET): 15
ASSISTIVE DEVICE: FRONT WHEEL WALKER

## 2023-11-16 ASSESSMENT — ACTIVITIES OF DAILY LIVING (ADL)
TOILET_TRANSFER_DESCRIPTION: GRAB BAR;ADAPTIVE EQUIPMENT;SET-UP OF EQUIPMENT;SUPERVISION FOR SAFETY;VERBAL CUEING
TUB_SHOWER_TRANSFER_DESCRIPTION: GRAB BAR;SHOWER BENCH;SUPERVISION FOR SAFETY;SET-UP OF EQUIPMENT
TOILET_TRANSFER_DESCRIPTION: GRAB BAR;INCREASED TIME;INITIAL PREPARATION FOR TASK;SET-UP OF EQUIPMENT;SUPERVISION FOR SAFETY
BED_CHAIR_WHEELCHAIR_TRANSFER_DESCRIPTION: ADAPTIVE EQUIPMENT;SET-UP OF EQUIPMENT;SUPERVISION FOR SAFETY;VERBAL CUEING

## 2023-11-16 NOTE — THERAPY
Physical Therapy   Daily Treatment     Patient Name: Sheyla Gauthier  Age:  83 y.o., Sex:  female  Medical Record #: [id number]  Today's Date: 11/16/2023     Precautions  Precautions: Fall Risk, Spinal / Back Precautions   Comments: No brace    Subjective    Pt was in bed upon arrival, agreeable to session.     Objective       11/16/23 1301   PT Charge Group   PT Gait Training (Units) 1   PT Therapeutic Activities (Units) 1   Supervising Physical Therapist Alan Schaefer   PT Total Time Spent   PT Individual Total Time Spent (Mins) 30   Cognition    Level of Consciousness Alert   Gait Functional Level of Assist    Gait Level Of Assist Contact Guard Assist   Assistive Device Front Wheel Walker   Distance (Feet) 25   # of Times Distance was Traveled 1   Deviation Antalgic;Step To;Trendelenberg;Bradykinetic  (LLE even up shoe)   Bed Mobility    Supine to Sit Contact Guard Assist  (bed rail)   Sit to Stand Contact Guard Assist   Scooting Contact Guard Assist   Interdisciplinary Plan of Care Collaboration   Patient Position at End of Therapy Seated;Call Light within Reach;Tray Table within Reach;Phone within Reach     Review spinal precaution, pt recalled 2/3 during session.(Missing lifting)  Provided ice pack at the end of session.    Assessment    Pt tolerated session well, progressing in mobility w/o complaining pain during the whole session. Small step length and narrow JENNIFER but was able to improve after cues.       Strengths: Able to follow instructions, Good insight into deficits/needs, Independent prior level of function, Making steady progress towards goals, Motivated for self care and independence, Pleasant and cooperative, Supportive family, Willingly participates in therapeutic activities  Barriers: Decreased endurance, Generalized weakness, Impaired activity tolerance, Impaired balance, Pain    Plan    L hip ext and abd NRE  Abdominal stabilization  Gait training with emphasis on L stance stability, trial heel  wedge  Motomed/Nustep for cardio  Bed mobility with logroll  Pain science education     DME        Passport items to be completed:  Get in/out of bed safely, in/out of a vehicle, safely use mobility device, walk or wheel around home/community, navigate up and down stairs, show how to get up/down from the ground, ensure home is accessible, demonstrate HEP, complete caregiver training    Physical Therapy Problems (Active)       Problem: Mobility       Dates: Start:  11/14/23         Goal: STG-Within one week, patient will propel wheelchair 100ft with SBA on level ground indoors       Dates: Start:  11/14/23            Goal: STG-Within one week, patient will ambulate 50ft with FWW and Rajan       Dates: Start:  11/14/23               Problem: Mobility Transfers       Dates: Start:  11/14/23         Goal: STG-Within one week, patient will perform bed mobility with verbal cues for log roll sequencing and AE as needed       Dates: Start:  11/14/23            Goal: STG-Within one week, patient will transfer bed to chair CGA with FWW       Dates: Start:  11/14/23               Problem: PT-Long Term Goals       Dates: Start:  11/14/23         Goal: LTG-By discharge, patient will propel wheelchair 150ft Sherman       Dates: Start:  11/14/23            Goal: LTG-By discharge, patient will ambulate 100ft with FWW Sherman       Dates: Start:  11/14/23            Goal: LTG-By discharge, patient will transfer one surface to another Sherman       Dates: Start:  11/14/23            Goal: LTG-By discharge, patient will perform home exercise program independently       Dates: Start:  11/14/23            Goal: LTG-By discharge, patient will transfer in/out of a car with CGA       Dates: Start:  11/14/23            Goal: LTG-By discharge, patient will perform bed mobility independently       Dates: Start:  11/14/23

## 2023-11-16 NOTE — DISCHARGE PLANNING
"Case Management/IDT follow up.   IDT continues to recommend IRF level of care as patient continue to make progress with all therapies.   Dc date set for 11/24     Family has made arrangements for pt to  DC to [facility] in [place]; dtr PAIGE to provide transportation there; will fax [employer] Results to them once completed.         DC needs: home health for PT/OT/RN recommended - this will need to be coordinated thru pt's new PCP in [place]; dme - 16\" wc with cushion; pt has fww/cane; pt will need to establish w/ NEW PCP in [place]; follow up with Dr Earnest Silva Surgeon; family training.       Met with pt / family providing update from IDT and discussed plan of care.  "

## 2023-11-16 NOTE — DISCHARGE PLANNING
EVERETTE    Dme sent to [facility]. They are aware patient will be going to [facility].

## 2023-11-16 NOTE — THERAPY
Occupational Therapy  Daily Treatment     Patient Name: Sheyla Gauthier  Age:  83 y.o., Sex:  female  Medical Record #: [id number]  Today's Date: 11/16/2023     Precautions  Precautions: Fall Risk, Spinal / Back Precautions   Comments: No brace         Subjective    Pt seated in w/c and agreeable to OT tx.     Objective       11/16/23 0701   OT Charge Group   OT Self Care / ADL (Units) 3   OT Therapy Activity (Units) 1   OT Total Time Spent   OT Individual Total Time Spent (Mins) 60   Pain   Intervention Medication (see MAR);Shower   Pain 0 - 10 Group   Location Groin   Location Orientation Left   Pain Rating Scale (NPRS) 5   Description Pressure   Comfort Goal Comfort with Movement;Perform Activity   Therapist Pain Assessment Prior to Activity;Nurse Notified   Cognition    Level of Consciousness Alert   Functional Level of Assist   Grooming Supervision;Seated   Grooming Description Set-up of equipment   Bathing Standby Assist   Bathing Description Grab bar;Hand held shower;Long handled bath tool;Tub bench;Increased time;Supervision for safety;Verbal cueing  (SBA in standing for safety with GB for support while washing rear. Issued LHS with pt able to reach all remaining parts seated on fold down bench, v/c for spinal precautions.)   Upper Body Dressing Independent   Upper Body Dressing Description   (Mod I for clothing retrieval from w/c using reacher in closet. Ind to don/doff pullover shirt and button up sweater)   Lower Body Dressing Minimal Assist   Lower Body Dressing Description Grab bar;Reacher;Sock aid;Cues for spinal precautions;Increased time;Set-up of equipment;Supervision for safety;Verbal cueing  (Mod cues for sequencing and Min A for donning socks due to time constraints only.)   Tub / Shower Transfers Standby Assist   Tub Shower Transfer Description Grab bar;Shower bench;Supervision for safety;Set-up of equipment  (Stand pivot into zero threshold shower, w/c<>fold down shower bench.)    Interdisciplinary Plan of Care Collaboration   IDT Collaboration with  Nursing   Patient Position at End of Therapy Seated;Chair Alarm On;Self Releasing Lap Belt Applied;Call Light within Reach;Tray Table within Reach   Collaboration Comments Pass off to Nursing at end of session.         Assessment    Pt tolerated session well with focus on ADLs, AE training, and functional transfers. She required mod cues for sequencing threading BLE while maintaining spinal precautions, with noted retro leaning into w/c while attempting to draw up leg. Pt remains limited not by back pain but L-groin pain during ADLs and functional transfers, with noted avoidance of WB through LLE.  Strengths: Able to follow instructions, Alert and oriented, Good carryover of learning, Good insight into deficits/needs, Independent prior level of function, Making steady progress towards goals, Manages pain appropriately, Motivated for self care and independence, Pleasant and cooperative, Supportive family, Willingly participates in therapeutic activities  Barriers: Decreased endurance, Fatigue, Generalized weakness, Impaired balance, Impaired activity tolerance, Limited mobility, Pain    Plan    ADL's with AE, standing tolerance/balance, overall strengthening, pain management, spinal precautions in function.     DME  TBD, pt may require a tub transfer bench if unable to clear 4inch barrier prior to DC, otherwise, shower chair with back     Passport items to be completed:  Perform bathroom transfers, complete dressing, complete feeding, get ready for the day, prepare a simple meal, participate in household tasks, adapt home for safety needs, demonstrate home exercise program, complete caregiver training     Occupational Therapy Goals (Active)       Problem: Dressing       Dates: Start:  11/14/23         Goal: STG-Within one week, patient will dress LB at SBA level using DME/AE PRN.       Dates: Start:  11/14/23               Problem: Functional  Transfers       Dates: Start:  11/14/23         Goal: STG-Within one week, patient will transfer to toilet at SBA level using DME/AE PRN.       Dates: Start:  11/14/23               Problem: OT Long Term Goals       Dates: Start:  11/14/23         Goal: LTG-By discharge, patient will complete basic self care tasks at SBA-Mod I level using DME/AE PRN.       Dates: Start:  11/14/23            Goal: LTG-By discharge, patient will perform bathroom transfers at SBA-Mod I level using DME/AE PRN.       Dates: Start:  11/14/23               Problem: Toileting       Dates: Start:  11/14/23         Goal: STG-Within one week, patient will complete toileting tasks at SBA level using DME/AE PRN.       Dates: Start:  11/14/23

## 2023-11-16 NOTE — DISCHARGE PLANNING
Fabiano  Tc to RUBY, , [email or web address], cell [phone] re coordinating transfer to [facility] in [place]; not available.  Left message.

## 2023-11-16 NOTE — THERAPY
Occupational Therapy  Daily Treatment     Patient Name: Sheyla Gauthier  Age:  83 y.o., Sex:  female  Medical Record #: [id number]  Today's Date: 11/16/2023     Precautions  Precautions: (P) Fall Risk, Spinal / Back Precautions   Comments: (P) No brace         Subjective    Pt agreeaable for an OT tx session.     Objective       11/16/23 1101   OT Charge Group   OT Self Care / ADL (Units) 1   OT Therapeutic Exercise (Units) 1   OT Total Time Spent   OT Individual Total Time Spent (Mins) 30   Precautions   Precautions Fall Risk;Spinal / Back Precautions    Comments No brace   Pain   Pain Scales 0 to 10 Scale    Intervention Emotional Support   Pain 0 - 10 Group   Location Groin   Location Orientation Left   Pain Rating Scale (NPRS) 3   Description Aching   Comfort Goal Comfort with Movement   Therapist Pain Assessment Post Activity Pain Same as Prior to Activity   Functional Level of Assist   Grooming Supervision;Seated   Grooming Description Seated in wheelchair at sink;Increased time;Initial preparation for task   Toileting Contact Guard Assist   Toileting Description Grab bar;Increased time;Initial preparation for task;Supervision for safety;Assist for standing balance   Toilet Transfers Contact Guard Assist   Toilet Transfer Description Grab bar;Increased time;Initial preparation for task;Set-up of equipment;Supervision for safety   Sitting Upper Body Exercises   Chest Press 2 sets of 10;Bilateral;Weight (See Comments for lbs)  (2 lb dumbbells)   Front Arm Raise 2 sets of 10;Bilateral;Weight (See Comments for lbs)  (2 lb dumbbells)   Bicep Curls 2 sets of 10;Bilateral;Weight (See Comments for lbs)  (2 lb dumbbells)   Pronation / Supination 2 sets of 10;Bilateral;Weight (See Comments for lbs)  (2 lb dumbbells)   Wrist Flexion / Extension 2 sets of 10;Bilateral;Weight (See Comments for lbs)  (2 lb dumbbells)   Interdisciplinary Plan of Care Collaboration   IDT Collaboration with  Family / Caregiver   Patient  Position at End of Therapy Seated;Chair Alarm On;Self Releasing Lap Belt Applied  (Pt taken to dining room for lunch with daughter.)     Pt making good progress with toilet t/f's. Pt also performed therapeutic ex's to increase strength/activity tolerance for improved ADL's and functional t/f's/tasks.    Assessment    Pt tolerated OT tx well. Rest break given as needed.  Strengths: Able to follow instructions, Alert and oriented, Good carryover of learning, Good insight into deficits/needs, Independent prior level of function, Making steady progress towards goals, Manages pain appropriately, Motivated for self care and independence, Pleasant and cooperative, Supportive family, Willingly participates in therapeutic activities  Barriers: Decreased endurance, Fatigue, Generalized weakness, Impaired balance, Impaired activity tolerance, Limited mobility, Pain    Plan    ADL's with AE, standing tolerance/balance, overall strengthening, pain management, spinal precautions in function.       DME       Passport items to be completed:      Occupational Therapy Goals (Active)       Problem: Dressing       Dates: Start:  11/14/23         Goal: STG-Within one week, patient will dress LB at SBA level using DME/AE PRN.       Dates: Start:  11/14/23               Problem: Functional Transfers       Dates: Start:  11/14/23         Goal: STG-Within one week, patient will transfer to toilet at SBA level using DME/AE PRN.       Dates: Start:  11/14/23               Problem: OT Long Term Goals       Dates: Start:  11/14/23         Goal: LTG-By discharge, patient will complete basic self care tasks at SBA-Mod I level using DME/AE PRN.       Dates: Start:  11/14/23            Goal: LTG-By discharge, patient will perform bathroom transfers at SBA-Mod I level using DME/AE PRN.       Dates: Start:  11/14/23               Problem: Toileting       Dates: Start:  11/14/23         Goal: STG-Within one week, patient will complete toileting tasks  at SBA level using DME/AE PRN.       Dates: Start:  11/14/23

## 2023-11-16 NOTE — CARE PLAN
"The patient is Stable - Low risk of patient condition declining or worsening    Shift Goals  Clinical Goals: safety  Patient Goals: pain management    Patient is not progressing towards the following goals:    Problem: Fall Risk - Rehab  Goal: Patient will remain free from falls  Outcome: Not Met  Note: Lexy Garcia Fall risk Assessment Score: 12    Moderate fall risk Interventions  - Bed and strip alarm   - Yellow sign by the door   - Yellow wrist band \"Fall risk\"  - Do not leave patient unattended in the bathroom  - Fall risk education provided     "

## 2023-11-16 NOTE — THERAPY
Physical Therapy   Daily Treatment     Patient Name: Sheyla Gauthier  Age:  83 y.o., Sex:  female  Medical Record #: [id number]  Today's Date: 11/16/2023     Precautions  Precautions: Fall Risk, Spinal / Back Precautions   Comments: No brace    Subjective    Pt reporting 6/10 groin pain at rest and activity. Concerned over how to reposition in bed to avoid remaining in supine all night     Objective       11/16/23 0830   PT Charge Group   PT Gait Training (Units) 1   PT Therapeutic Exercise (Units) 2   PT Therapeutic Activities (Units) 1   PT Total Time Spent   PT Individual Total Time Spent (Mins) 60   Gait Functional Level of Assist    Gait Level Of Assist Minimal Assist   Assistive Device Parallel Bars   Distance (Feet) 15   # of Times Distance was Traveled 1   Deviation Antalgic;Step To;Trendelenberg;Bradykinetic  (progressing to step through, L LE even up, limited weight acceptance L LE)   Transfer Functional Level of Assist   Bed, Chair, Wheelchair Transfer Contact Guard Assist   Bed Chair Wheelchair Transfer Description Adaptive equipment;Set-up of equipment;Supervision for safety;Verbal cueing  (stand pivot FWW vs no AD)   Toilet Transfers Standby Assist   Toilet Transfer Description Grab bar;Adaptive equipment;Set-up of equipment;Supervision for safety;Verbal cueing  (stand pivot)   Sitting Lower Body Exercises   Other Exercises B LE motomed 10 min level 3 0.66mi   Standing Lower Body Exercises   Hip Flexion 2 sets of 10;Left   Hip Extension 2 sets of 10;Left   Hip Abduction 2 sets of 10;Left   Comments B UE support // bars   Bed Mobility    Supine to Sit Minimal Assist  (Rajan to flex L LE, max verbcal cues for logroll over R side of bed)   Sit to Supine Minimal Assist  (for LEs)   Sit to Stand Contact Guard Assist   Rolling Standby Assist   Neuro-Muscular Treatments   Comments bed mobility via logroll and positioning in sidelying with pillow between knees for sleep positioning   Interdisciplinary Plan of  Care Collaboration   IDT Collaboration with  Family / Caregiver;Physician   Patient Position at End of Therapy Seated;Chair Alarm On;Self Releasing Lap Belt Applied;Family / Friend in Room   Collaboration Comments handoff to MD     Assisted to bathroom per request, continent void of bladder, Rajan pants management    Educated pt and and daughter on expected functional outcomes and rehabilitation continuum including continuation with OP PT    Assessment    Pt demonstrated improved weight acceptance through her L LE and progressed to step through pattern with use of even up on L foot. She is very pain limited and anxious regarding her back and radiating L groin pain.  Strengths: Able to follow instructions, Good insight into deficits/needs, Independent prior level of function, Making steady progress towards goals, Motivated for self care and independence, Pleasant and cooperative, Supportive family, Willingly participates in therapeutic activities  Barriers: Decreased endurance, Generalized weakness, Impaired activity tolerance, Impaired balance, Pain    Plan    L hip ext and abd NRE  Abdominal stabilization  Gait training with emphasis on L stance stability, trial heel wedge  Motomed/Nustep for cardio  Bed mobility with logroll  Pain science education    DME       Passport items to be completed:  Get in/out of bed safely, in/out of a vehicle, safely use mobility device, walk or wheel around home/community, navigate up and down stairs, show how to get up/down from the ground, ensure home is accessible, demonstrate HEP, complete caregiver training    Physical Therapy Problems (Active)       Problem: Mobility       Dates: Start:  11/14/23         Goal: STG-Within one week, patient will propel wheelchair 100ft with SBA on level ground indoors       Dates: Start:  11/14/23            Goal: STG-Within one week, patient will ambulate 50ft with FWW and Rajan       Dates: Start:  11/14/23               Problem: Mobility  Transfers       Dates: Start:  11/14/23         Goal: STG-Within one week, patient will perform bed mobility with verbal cues for log roll sequencing and AE as needed       Dates: Start:  11/14/23            Goal: STG-Within one week, patient will transfer bed to chair CGA with FWW       Dates: Start:  11/14/23               Problem: PT-Long Term Goals       Dates: Start:  11/14/23         Goal: LTG-By discharge, patient will propel wheelchair 150ft Sherman       Dates: Start:  11/14/23            Goal: LTG-By discharge, patient will ambulate 100ft with FWW Sherman       Dates: Start:  11/14/23            Goal: LTG-By discharge, patient will transfer one surface to another Sherman       Dates: Start:  11/14/23            Goal: LTG-By discharge, patient will perform home exercise program independently       Dates: Start:  11/14/23            Goal: LTG-By discharge, patient will transfer in/out of a car with CGA       Dates: Start:  11/14/23            Goal: LTG-By discharge, patient will perform bed mobility independently       Dates: Start:  11/14/23

## 2023-11-16 NOTE — PROGRESS NOTES
..                                                         NURSING DAILY NOTE    Name: Sheyla Gauthier   Date of Admission: 11/13/2023   Admitting Diagnosis: Radiculopathy  Attending Physician: Mitchell Chavis M.d.  Allergies: Trazodone and Crestor [rosuvastatin calcium]    Safety  Patient Assist  Min assist  Patient Precautions  Fall Risk, Spinal / Back Precautions   Precaution Comments  No brace  Bed Transfer Status  Contact Guard Assist  Toilet Transfer Status   Minimal Assist  Assistive Devices  Rails, Wheelchair  Oxygen  None - Room Air  Diet/Therapeutic Dining  Current Diet Order   Procedures    Diet Order Diet: Regular     Pill Administration  whole  Agitated Behavioral Scale     ABS Level of Severity       Fall Risk  Has the patient had a fall this admission?   No  Lexy Garcia Fall Risk Scoring  12, MODERATE RISK  Fall Risk Safety Measures  bed alarm, chair alarm, poor balance, low vision/ hearing, and ok to leave pt in bathroom    Vitals  Temperature: 36.6 °C (97.9 °F)  Temp src: Oral  Pulse: 66  Respiration: 18  Blood Pressure : 110/66  Blood Pressure MAP (Calculated): 81 MM HG  BP Location: Right, Upper Arm  Patient BP Position: Sitting     Oxygen  Pulse Oximetry: 94 %  O2 (LPM): 0  O2 Delivery Device: None - Room Air    Bowel and Bladder  Last Bowel Movement  11/14/23 (per patient)  Stool Type     Bowel Device  Bathroom  Continent  Bladder: Stress incontinence   Bowel: Continent movement  Bladder Function  Urine Void (mL):  (small void)  Number of Times Voided: 1  Urine Color: Yellow  Genitourinary Assessment   Bladder Assessment (WDL):  WDL Except  Urinary Elimination: Incontinence  Urine Color: Yellow  Bladder Device: Bathroom  Bladder Scan: Post Void  $ Bladder Scan Results (mL): 140    Skin  Wyatt Score   19  Sensory Interventions   Bed Types: Standard/Trauma Mattress  Skin Preventative Measures: Pillows in Use for Support / Positioning  Moisture Interventions  Moisturizers/Barriers: Barrier  Wipes      Pain  Pain Rating Scale  7 - Focus of attention, prevents doing daily activities  Pain Location  Back, Groin, Hip  Pain Location Orientation  Left, Lower, Posterior, Anterior  Pain Interventions   Medication (see MAR)    ADLs    Bathing   Patient Refused Bathing (states she is getting one in a.m. tomarrow)  Linen Change   Partial  Personal Hygiene  Moist Klarissa Wipes, Perineal Care  Chlorhexidine Bath      Oral Care     Teeth/Dentures     Shave     Nutrition Percentage Eaten  Breakfast, Between 25-50% Consumed  Environmental Precautions  Bed in Low Position, Treaded Slipper Socks on Patient  Patient Turns/Positioning  Sitting Up in Wheelchair  Patient Turns Assistance/Tolerance  Assistance of One  Bed Positions  Bed Controls On, Bed Locked  Head of Bed Elevated  Self regulated      Psychosocial/Neurologic Assessment  Psychosocial Assessment  Psychosocial (WDL):  Within Defined Limits  Neurologic Assessment  Neuro (WDL): Exceptions to WDL (neuropathy)  Level of Consciousness: Alert  Orientation Level: Oriented X4  Cognition: Follows commands  Speech: Clear  Pupil Assesment: No  EENT (WDL):  WDL Except    Cardio/Pulmonary Assessment  Edema   RLE Edema: Dependent, 2+  LLE Edema: Dependent, 2+  Respiratory Breath Sounds     Cardiac Assessment   Cardiac (WDL):  Within Defined Limits

## 2023-11-16 NOTE — CARE PLAN
Problem: Toileting  Goal: STG-Within one week, patient will complete toileting tasks at SBA level using DME/AE PRN.  Outcome: Not Met     Problem: Functional Transfers  Goal: STG-Within one week, patient will transfer to toilet at SBA level using DME/AE PRN.  Outcome: Not Met     Problem: Dressing  Goal: STG-Within one week, patient will dress LB at SBA level using DME/AE PRN.  Outcome: Progressing     Problem: Bathing  Goal: STG-Within one week, patient will bathe at SBA level using DME/AE PRN.  Outcome: Met     Problem: Functional Transfers  Goal: STG-Within one week, patient will transfer to tub/shower at SBA level using DME/AE PRN.  Outcome: Met

## 2023-11-16 NOTE — PROGRESS NOTES
Patient care assumed. Report received from [facility] LYRIC Martinez. Patient is alert and calm, resting in bed. Call light and bedside table within reach. Will continue to monitor.

## 2023-11-17 ENCOUNTER — APPOINTMENT (OUTPATIENT)
Dept: OCCUPATIONAL THERAPY | Facility: REHABILITATION | Age: 83
DRG: 552 | End: 2023-11-17
Attending: PHYSICAL MEDICINE & REHABILITATION
Payer: MEDICARE

## 2023-11-17 ENCOUNTER — APPOINTMENT (OUTPATIENT)
Dept: PHYSICAL THERAPY | Facility: REHABILITATION | Age: 83
DRG: 552 | End: 2023-11-17
Attending: PHYSICAL MEDICINE & REHABILITATION
Payer: MEDICARE

## 2023-11-17 LAB
GAMMA INTERFERON BACKGROUND BLD IA-ACNC: 0.02 IU/ML
M TB IFN-G BLD-IMP: NEGATIVE
M TB IFN-G CD4+ BCKGRND COR BLD-ACNC: 0.02 IU/ML
MITOGEN IGNF BCKGRD COR BLD-ACNC: 8.89 IU/ML
QFT TB2 - NIL TBQ2: 0.02 IU/ML

## 2023-11-17 PROCEDURE — 97110 THERAPEUTIC EXERCISES: CPT

## 2023-11-17 PROCEDURE — 700102 HCHG RX REV CODE 250 W/ 637 OVERRIDE(OP): Performed by: PHYSICAL MEDICINE & REHABILITATION

## 2023-11-17 PROCEDURE — A9270 NON-COVERED ITEM OR SERVICE: HCPCS | Performed by: PHYSICAL MEDICINE & REHABILITATION

## 2023-11-17 PROCEDURE — 97116 GAIT TRAINING THERAPY: CPT

## 2023-11-17 PROCEDURE — 97530 THERAPEUTIC ACTIVITIES: CPT

## 2023-11-17 PROCEDURE — 97535 SELF CARE MNGMENT TRAINING: CPT

## 2023-11-17 PROCEDURE — 99232 SBSQ HOSP IP/OBS MODERATE 35: CPT | Performed by: PHYSICAL MEDICINE & REHABILITATION

## 2023-11-17 PROCEDURE — 770010 HCHG ROOM/CARE - REHAB SEMI PRIVAT*

## 2023-11-17 RX ORDER — ACETAMINOPHEN 500 MG
1000 TABLET ORAL 3 TIMES DAILY
Status: DISCONTINUED | OUTPATIENT
Start: 2023-11-17 | End: 2023-11-22 | Stop reason: HOSPADM

## 2023-11-17 RX ADMIN — PREGABALIN 100 MG: 100 CAPSULE ORAL at 20:30

## 2023-11-17 RX ADMIN — PREGABALIN 100 MG: 100 CAPSULE ORAL at 08:51

## 2023-11-17 RX ADMIN — CALCIUM CARBONATE (ANTACID) CHEW TAB 500 MG 500 MG: 500 CHEW TAB at 20:28

## 2023-11-17 RX ADMIN — METHOCARBAMOL TABLETS 750 MG: 750 TABLET, COATED ORAL at 08:51

## 2023-11-17 RX ADMIN — LOSARTAN POTASSIUM 50 MG: 50 TABLET, FILM COATED ORAL at 08:51

## 2023-11-17 RX ADMIN — CALCIUM CARBONATE (ANTACID) CHEW TAB 500 MG 500 MG: 500 CHEW TAB at 08:51

## 2023-11-17 RX ADMIN — METHOCARBAMOL TABLETS 750 MG: 750 TABLET, COATED ORAL at 17:55

## 2023-11-17 RX ADMIN — METHOCARBAMOL TABLETS 750 MG: 750 TABLET, COATED ORAL at 20:29

## 2023-11-17 RX ADMIN — OMEPRAZOLE 20 MG: 20 CAPSULE, DELAYED RELEASE ORAL at 08:52

## 2023-11-17 RX ADMIN — APIXABAN 10 MG: 5 TABLET, FILM COATED ORAL at 20:29

## 2023-11-17 RX ADMIN — OXYCODONE HYDROCHLORIDE 5 MG: 5 TABLET ORAL at 22:30

## 2023-11-17 RX ADMIN — OXYCODONE HYDROCHLORIDE 5 MG: 5 TABLET ORAL at 18:06

## 2023-11-17 RX ADMIN — ACETAMINOPHEN 1000 MG: 500 TABLET ORAL at 12:58

## 2023-11-17 RX ADMIN — LEVOTHYROXINE SODIUM 88 MCG: 0.09 TABLET ORAL at 05:33

## 2023-11-17 RX ADMIN — Medication 200 MG: at 20:29

## 2023-11-17 RX ADMIN — ATORVASTATIN CALCIUM 40 MG: 40 TABLET, FILM COATED ORAL at 20:29

## 2023-11-17 RX ADMIN — ACETAMINOPHEN 1000 MG: 500 TABLET ORAL at 20:30

## 2023-11-17 RX ADMIN — METHOCARBAMOL TABLETS 750 MG: 750 TABLET, COATED ORAL at 12:58

## 2023-11-17 RX ADMIN — OXYCODONE HYDROCHLORIDE 5 MG: 5 TABLET ORAL at 12:58

## 2023-11-17 RX ADMIN — LIOTHYRONINE SODIUM 5 MCG: 5 TABLET ORAL at 08:50

## 2023-11-17 RX ADMIN — APIXABAN 10 MG: 5 TABLET, FILM COATED ORAL at 08:50

## 2023-11-17 RX ADMIN — OXYCODONE HYDROCHLORIDE 5 MG: 5 TABLET ORAL at 05:33

## 2023-11-17 ASSESSMENT — ACTIVITIES OF DAILY LIVING (ADL)
TOILET_TRANSFER_DESCRIPTION: GRAB BAR;SUPERVISION FOR SAFETY
TOILETING_LEVEL_OF_ASSIST_DESCRIPTION: GRAB BAR;INCREASED TIME;VERBAL CUEING;SUPERVISION FOR SAFETY
BED_CHAIR_WHEELCHAIR_TRANSFER_DESCRIPTION: INCREASED TIME
BED_CHAIR_WHEELCHAIR_TRANSFER_DESCRIPTION: ADAPTIVE EQUIPMENT;INCREASED TIME;SUPERVISION FOR SAFETY;VERBAL CUEING;SET-UP OF EQUIPMENT

## 2023-11-17 ASSESSMENT — GAIT ASSESSMENTS
DISTANCE (FEET): 10
ASSISTIVE DEVICE: PARALLEL BARS
DISTANCE (FEET): 32
GAIT LEVEL OF ASSIST: CONTACT GUARD ASSIST
GAIT LEVEL OF ASSIST: CONTACT GUARD ASSIST
ASSISTIVE DEVICE: FRONT WHEEL WALKER

## 2023-11-17 ASSESSMENT — PAIN DESCRIPTION - PAIN TYPE: TYPE: SURGICAL PAIN

## 2023-11-17 NOTE — PROGRESS NOTES
NURSING DAILY NOTE    Name: Sheyla Gauthier   Date of Admission: 11/13/2023   Admitting Diagnosis: Radiculopathy  Attending Physician: Mitchell Chavis M.d.  Allergies: Trazodone and Crestor [rosuvastatin calcium]    Safety  Patient Assist  min assist  Patient Precautions  Fall Risk, Spinal / Back Precautions   Precaution Comments  No brace  Bed Transfer Status  Contact Guard Assist  Toilet Transfer Status   Contact Guard Assist  Assistive Devices  Rails, Wheelchair  Oxygen  None - Room Air  Diet/Therapeutic Dining  Current Diet Order   Procedures    Diet Order Diet: Regular     Pill Administration  whole  Agitated Behavioral Scale     ABS Level of Severity       Fall Risk  Has the patient had a fall this admission?   No  Lexy Garcia Fall Risk Scoring  12, MODERATE RISK  Fall Risk Safety Measures  bed alarm, chair alarm, and low vision/ hearing    Vitals  Temperature: 36.8 °C (98.3 °F)  Temp src: Oral  Pulse: 64  Respiration: 18  Blood Pressure : 125/58  Blood Pressure MAP (Calculated): 80 MM HG  BP Location: Right, Upper Arm  Patient BP Position: Supine     Oxygen  Pulse Oximetry: 95 %  O2 (LPM): 0  O2 Delivery Device: None - Room Air    Bowel and Bladder  Last Bowel Movement  11/14/23 (per patient)  Stool Type     Bowel Device  Bathroom  Continent  Bladder: Stress incontinence   Bowel: Continent movement  Bladder Function  Urine Void (mL):  (moderate)  Number of Times Voided: 1  Urine Color: Yellow  Genitourinary Assessment   Bladder Assessment (WDL):  WDL Except  Urinary Elimination: Incontinence  Urine Color: Yellow  Bladder Device: Bathroom  Bladder Scan: Post Void  $ Bladder Scan Results (mL): 196    Skin  Wyatt Score   19  Sensory Interventions   Bed Types: Standard/Trauma Mattress  Skin Preventative Measures: Pillows in Use for Support / Positioning  Moisture Interventions  Moisturizers/Barriers: Barrier Wipes      Pain  Pain Rating Scale  3 -  Sometimes distracts me  Pain Location  Groin  Pain Location Orientation  Left  Pain Interventions   Emotional Support, Medication (see MAR)    ADLs    Bathing   Shower, Staff (OT)  Linen Change   Partial  Personal Hygiene  Moist Klarissa Wipes, Perineal Care  Chlorhexidine Bath      Oral Care     Teeth/Dentures     Shave     Nutrition Percentage Eaten  Between % Consumed, Lunch  Environmental Precautions  Treaded Slipper Socks on Patient, Bed in Low Position  Patient Turns/Positioning  Patient Turns Self from Side to Side  Patient Turns Assistance/Tolerance  Assistance of One  Bed Positions  Bed Controls On  Head of Bed Elevated  Self regulated      Psychosocial/Neurologic Assessment  Psychosocial Assessment  Psychosocial (WDL):  Within Defined Limits  Neurologic Assessment  Neuro (WDL): Exceptions to WDL  Level of Consciousness: Alert  Orientation Level: Oriented X4  Cognition: Follows commands  Speech: Clear  Pupil Assesment: No  EENT (WDL):  WDL Except    Cardio/Pulmonary Assessment  Edema   RLE Edema: Dependent, 2+  LLE Edema: Dependent, 2+  Respiratory Breath Sounds     Cardiac Assessment   Cardiac (WDL):  WDL Except (Hx: HTN)

## 2023-11-17 NOTE — THERAPY
Occupational Therapy  Daily Treatment     Patient Name: Sheyla Gauthier  Age:  83 y.o., Sex:  female  Medical Record #: [id number]  Today's Date: 11/17/2023     Precautions  Precautions: Fall Risk, Spinal / Back Precautions   Comments: (P) No brace         Subjective    Pt was supine in bed upon arrival and agreeable for OT tx session.     Objective       11/17/23 1031   OT Charge Group   OT Therapeutic Exercise (Units) 2   OT Total Time Spent   OT Individual Total Time Spent (Mins) 30   Precautions   Precautions Fall Risk;Spinal / Back Precautions    Comments No brace   Pain   Pain Scales 0 to 10 Scale    Intervention Emotional Support;Repositioned   Pain 0 - 10 Group   Location Groin   Location Orientation Left   Pain Rating Scale (NPRS) 4   Description Sharp   Sitting Upper Body Exercises   Tricep Press 3 sets of 15;Bilateral;Weight (See Comments for lbs)  (10 lbs using rickshaw.)   Upper Extremity Bike Level 1 Resistance  (10 minutes.)   Bed Mobility    Supine to Sit Standby Assist   Scooting Standby Assist   Interdisciplinary Plan of Care Collaboration   IDT Collaboration with  Family / Caregiver   Patient Position at End of Therapy Seated;Chair Alarm On;Self Releasing Lap Belt Applied  (Pt handed off to Physical therapist in gym.)     Therapeutic ex's performed to increase strength for improved ADL's and functional t/f's/tasks.    Assessment    Pt tolerated OT tx well with no new c/o pain verbalized by pt.  Strengths: Able to follow instructions, Alert and oriented, Good carryover of learning, Good insight into deficits/needs, Independent prior level of function, Making steady progress towards goals, Manages pain appropriately, Motivated for self care and independence, Pleasant and cooperative, Supportive family, Willingly participates in therapeutic activities  Barriers: Decreased endurance, Fatigue, Generalized weakness, Impaired balance, Impaired activity tolerance, Limited mobility,  Pain    Plan      ADL's with AE, standing tolerance/balance, overall strengthening, pain management, spinal precautions in function.     DME       Passport items to be completed:      Occupational Therapy Goals (Active)       Problem: Dressing       Dates: Start:  11/14/23         Goal: STG-Within one week, patient will dress LB at SBA level using DME/AE PRN.       Dates: Start:  11/14/23               Problem: Functional Transfers       Dates: Start:  11/14/23         Goal: STG-Within one week, patient will transfer to toilet at SBA level using DME/AE PRN.       Dates: Start:  11/14/23               Problem: OT Long Term Goals       Dates: Start:  11/14/23         Goal: LTG-By discharge, patient will complete basic self care tasks at SBA-Mod I level using DME/AE PRN.       Dates: Start:  11/14/23            Goal: LTG-By discharge, patient will perform bathroom transfers at SBA-Mod I level using DME/AE PRN.       Dates: Start:  11/14/23               Problem: Toileting       Dates: Start:  11/14/23         Goal: STG-Within one week, patient will complete toileting tasks at SBA level using DME/AE PRN.       Dates: Start:  11/14/23

## 2023-11-17 NOTE — PROGRESS NOTES
Physical Medicine & Rehabilitation Progress Note    Encounter Date: 11/17/2023    Chief Complaint: Weakness    Interval Events (Subjective):  Seen in therapy. Walked 25 feet. Pain better controlled    Objective:  VITAL SIGNS: /61   Pulse 66   Temp 36.6 °C (97.9 °F) (Oral)   Resp 18   Wt 63.5 kg (140 lb)   LMP 11/01/1986   SpO2 94%   BMI 25.61 kg/m²   Gen: No acute distress, well developed well nourished adult  HEENT: Normal Cephalic Atraumatic, Normal conjunctiva.   CV: warm extremities, well perfused, minimal edema  Resp: symmetric chest rise, breathing comfortably on room air  Abd: Soft, Non distended  Extremities: normal bulk, no atrophy  Skin: no visible rashes or lesions.   Neuro: alert, awake  Psych: Mood and affect appropriate and congruent    Laboratory Values:  No results found for this or any previous visit (from the past 72 hour(s)).    Medications:  Scheduled Medications   Medication Dose Frequency    methocarbamol  750 mg 4X/DAY    oxyCODONE immediate-release  5 mg BID    apixaban  10 mg BID    [START ON 11/21/2023] apixaban  5 mg BID    atorvastatin  40 mg QHS    calcium carbonate  500 mg BID    levothyroxine  88 mcg AM ES    liothyronine  5 mcg QAM    losartan  50 mg QAM    magnesium oxide  200 mg Q EVENING    polyethylene glycol/lytes  1 Packet DAILY    pregabalin  100 mg BID    spironolactone  25 mg DAILY    Pharmacy Consult Request  1 Each PHARMACY TO DOSE    omeprazole  20 mg DAILY     PRN medications: oxyCODONE immediate-release **OR** oxyCODONE immediate-release **OR** [DISCONTINUED] HYDROmorphone, Respiratory Therapy Consult, hydrALAZINE, [DISCONTINUED] senna-docusate **AND** polyethylene glycol/lytes **AND** magnesium hydroxide **AND** bisacodyl, ondansetron **OR** ondansetron, sodium chloride    Diet:  Current Diet Order   Procedures    Diet Order Diet: Regular       Medical Decision Making and Plan:  Polyradiculopathy  Status post lumbar laminectomy  -Patient has history of  prior L4-L5 decompression  - Recent worsening of back pain with radicular pain into right lower extremity, impairing function and recently utilizing a wheelchair in the last month  - Per outpatient documentation, MRI of the lumbar spine reportedly showed an L5-S1 stenosis  - 11/10 patient taken to the OR for L5-S1 laminectomy performed by Dr. Silva  - Spinal precautions in place, does not require bracing  - Continue with PT/OT inpatient  IGC Code / Diagnosis to Support: 0003.9 - Neurologic Conditions: Other Neurologic      Right Popliteal DVT  Provoked from surgery and immobility  -Bilateral Lower extremity ultrasounds - shows right distal popliteal and peroneal occlusion thrombus. Left is negative  -SHERRELL hose on left  -started Eliquis 10mg BID for 7 days then Eliquis 5mg BID for 3 months  -NS aware and approves starting anticoagulation     Hypothyroidism  -On home dose Synthroid     Hypertension  - On home dose losartan and spironolactone  - Blood pressure well controlled     Neurogenic bladder:  - Timed voids with PVR q4H x3. If PVR > 400mL or if patient is unable to void, straight cath patient.     Neurogenic bowel:  -  Colace, Senna BID on admission  - Goal of 1BM/day.  -      Circadian Rhythm disorder:      Recommend lights on during the day/off at night, minimize nighttime interruptions as able.     Mood  - at risk of adjustment disorder, depression, and anxiety due to functional decline     ID:  - at risk for Urinary tract infection     Skin/Wounds:  - Pressure relief q2h while in bed. Close monitoring for signs of breakdown     Pain:  - Neuroceptic - On Tylenol prn, oxycodone 2.5-5mg PRN, Tizanidine PRN,   11/14- schedule oxycodone 5mg BID at 630am and noon  11/15- schedule tizanidine at night for sleep.  11/16- Robaxin 750 mg QID  11/16 increased oxycodone 10mg BID scheduled  11/17 decreased oxycodone to 5mg BID scheduled  -scheduled Tylenol 1g TID  - Neuropathic - On Lyrica  -consider left hip  intraarticular injection     DVT prophylaxis:  continue eliquis     GI prophylaxis:  On Prilosec 20mg daily      -Follow-up Ortho, PCP    ____________________________________    Mitchell Chavis MD  Physical Medicine & Rehabilitation   Brain Injury Medicine   ____________________________________

## 2023-11-17 NOTE — DISCHARGE PLANNING
Cm  Tc to [facility]/ [facility]na re status of order. I spoke with Anya who reports it is better to send referral to [facility] [place] [facility] since daughter will be picking up wc there and pt is relocating to the area.     Referral sent in [facility] and faxed  [facility].       Pt's new  address in [place] is: [address] [address]  Phone: [phone]

## 2023-11-17 NOTE — CARE PLAN
Problem: Skin Integrity  Goal: Skin integrity is maintained or improved  Outcome: Progressing  Incision without redness, swelling or drainage and skin edges are approximated I

## 2023-11-17 NOTE — CARE PLAN
The patient is Watcher - Medium risk of patient condition declining or worsening    Shift Goals  Clinical Goals: safety, pain mgmt, sleep  Patient Goals: pain control, sleep    Progress made toward(s) clinical / shift goals:    Problem: Knowledge Deficit - Standard  Goal: Patient and family/care givers will demonstrate understanding of plan of care, disease process/condition, diagnostic tests and medications  Outcome: Progressing  Note: Pt education reinforced regarding plan of care with emphasis on adequate hydration, pt again shows better understanding with improved follow through, pt again reports understanding how this can effect constipation, will continue to reinforce education and continue to monitor.

## 2023-11-17 NOTE — THERAPY
"Physical Therapy   Daily Treatment     Patient Name: Sheyla Gauthier  Age:  83 y.o., Sex:  female  Medical Record #: [id number]  Today's Date: 11/17/2023     Precautions  Precautions: Fall Risk, Spinal / Back Precautions   Comments: (P) No brace    Subjective    Pt received in bed, agreeable to PT session. Notes she has had ongoing L groin region pain for at least 6 months, as well as a feeling of pain and weakness deep L glut region.     Objective       11/17/23 1301   PT Charge Group   PT Gait Training (Units) 1   PT Therapeutic Exercise (Units) 1   PT Therapeutic Activities (Units) 2   PT Total Time Spent   PT Individual Total Time Spent (Mins) 60   Precautions   Precautions Fall Risk;Spinal / Back Precautions    Vitals   Pulse 72   Room Air Oximetry 99   Pain 0 - 10 Group   Therapist Pain Assessment   (up to 10/10 L glut/groin)   Cognition    Level of Consciousness Alert   Sleep/Wake Cycle   Sleep & Rest Awake   Gait Functional Level of Assist    Gait Level Of Assist Contact Guard Assist   Assistive Device Front Wheel Walker   Distance (Feet) 32   # of Times Distance was Traveled 1   Deviation   (step-to, discontinuous)   Stairs Functional Level of Assist   Level of Assist with Stairs Unable to Participate  (initiated 4\" stairs with step-to pattern and B HRs, however pt unable to take first step due to pain and feeling of weakness)   Transfer Functional Level of Assist   Bed, Chair, Wheelchair Transfer Contact Guard Assist  (SPT with FWW and CGA)   Bed Chair Wheelchair Transfer Description Increased time   Supine Lower Body Exercise   Gluteal Isometrics 3 sets of 10;Bilateral  (6\" hold)   Other Exercises L LE flexed while performing R heel slides- focus on L LE alignment and stability x10 rep   Bed Mobility    Supine to Sit Contact Guard Assist  (VCs for log roll)   Sit to Supine Moderate Assist  (for B LE management)   Sit to Stand Contact Guard Assist   Rolling Contact Guard Assist  (to pt's R, cues for " "adducting L UE as she rolls to R to maintain neutral spine)   Interdisciplinary Plan of Care Collaboration   IDT Collaboration with  Nursing;Family / Caregiver   Patient Position at End of Therapy Seated;Self Releasing Lap Belt Applied;Call Light within Reach;Tray Table within Reach;Phone within Reach;Family / Friend in Room   Collaboration Comments re: pt's reports of pain; pt's daughter and friend in room at end of session     Reviewed spinal precautions: pt able to verbalize 2/3 precautions, however required prompting to recall \"no bending.\" She was educated on how to maintain spinal precautions during bed mob.    Assessment    Pt increased her ambulation distance with FWW to 32' with CGA, distance limited by pt fatigue/feeling of weakness L LE. Gait characterized by slow, step-to pattern despite encouragement to ambulate with continuous pattern. Attempted stair negotiation on 4\" steps with B HRs and step-to pattern however pt unable to perform due to weakness L LE.    Strengths: Able to follow instructions, Good insight into deficits/needs, Independent prior level of function, Making steady progress towards goals, Motivated for self care and independence, Pleasant and cooperative, Supportive family, Willingly participates in therapeutic activities  Barriers: Decreased endurance, Generalized weakness, Impaired activity tolerance, Impaired balance, Pain    Plan    L hip ext and abd NRE  Abdominal stabilization  Gait training with emphasis on L stance stability, trial heel wedge  Motomed/Nustep for cardio  Bed mobility with logroll  Pain science education    DME  PT DME Recommendations  Wheelchair: 16\" Width, Lightweight, Standard Leg Rests  Cushion: Standard        Physical Therapy Problems (Active)       Problem: Mobility       Dates: Start:  11/14/23         Goal: STG-Within one week, patient will propel wheelchair 100ft with SBA on level ground indoors       Dates: Start:  11/14/23            Goal: STG-Within " one week, patient will ambulate 50ft with FWW and Rajan       Dates: Start:  11/14/23               Problem: Mobility Transfers       Dates: Start:  11/14/23         Goal: STG-Within one week, patient will perform bed mobility with verbal cues for log roll sequencing and AE as needed       Dates: Start:  11/14/23            Goal: STG-Within one week, patient will transfer bed to chair CGA with FWW       Dates: Start:  11/14/23               Problem: PT-Long Term Goals       Dates: Start:  11/14/23         Goal: LTG-By discharge, patient will propel wheelchair 150ft Sherman       Dates: Start:  11/14/23            Goal: LTG-By discharge, patient will ambulate 100ft with FWW Sherman       Dates: Start:  11/14/23            Goal: LTG-By discharge, patient will transfer one surface to another Sherman       Dates: Start:  11/14/23            Goal: LTG-By discharge, patient will perform home exercise program independently       Dates: Start:  11/14/23            Goal: LTG-By discharge, patient will transfer in/out of a car with CGA       Dates: Start:  11/14/23            Goal: LTG-By discharge, patient will perform bed mobility independently       Dates: Start:  11/14/23

## 2023-11-17 NOTE — THERAPY
Physical Therapy   Daily Treatment     Patient Name: Sheyla Gauthier  Age:  83 y.o., Sex:  female  Medical Record #: [id number]  Today's Date: 11/17/2023     Precautions  Precautions: Fall Risk, Spinal / Back Precautions   Comments: No brace    Subjective    Pt did not notice a change with use of heel lift in L shoe     Objective       11/17/23 1101   PT Charge Group   PT Therapeutic Activities (Units) 2   PT Total Time Spent   PT Individual Total Time Spent (Mins) 30   Gait Functional Level of Assist    Gait Level Of Assist Contact Guard Assist   Assistive Device Parallel Bars   Distance (Feet) 10   # of Times Distance was Traveled 2   Deviation Antalgic;Step To;Trendelenberg;Bradykinetic  (L heel wedge, progressed to step through with visual target, mod-max verbal cues)   Stairs Functional Level of Assist   Level of Assist with Stairs Contact Guard Assist   # of Stairs Climbed 2  (2 inch and 4 inch step in // bars)   Bed Mobility    Sit to Stand Standby Assist   Neuro-Muscular Treatments   Neuro-Muscular Treatments Postural Facilitation;Verbal Cuing;Sequencing   Comments stair negotiation sequencing leading with R LE ascending L LE descending   Interdisciplinary Plan of Care Collaboration   IDT Collaboration with  Family / Caregiver   Patient Position at End of Therapy Seated;Chair Alarm On;Self Releasing Lap Belt Applied;Family / Friend in Room   Collaboration Comments daughter present for tx     Trialed L heel lift with improvement in trendelenberg and overall L stance stability  Discussed DC disposition with pt and daughter with plan to temporarily go to pt's home with 2 JEOVANY no rail and family assist before transitioning to [facility]    Assessment    Pt was able to progress to initiate stair training in // bars on 2inch and 4inch steps with CGA. She has good potential to progress to B HHA for 1 step followed by FWW for second step prior to DC.  Strengths: Able to follow instructions, Good insight into  "deficits/needs, Independent prior level of function, Making steady progress towards goals, Motivated for self care and independence, Pleasant and cooperative, Supportive family, Willingly participates in therapeutic activities  Barriers: Decreased endurance, Generalized weakness, Impaired activity tolerance, Impaired balance, Pain    Plan    L hip ext and abd NRE  Abdominal stabilization  Gait training with emphasis on L stance stability, trial heel wedge  Motomed/Nustep for cardio  Bed mobility with logroll  Pain science education    DME   PT DME Recommendations  Wheelchair: (P) 16\" Width, Lightweight, Standard Leg Rests  Cushion: (P) Standard    Passport items to be completed:  Get in/out of bed safely, in/out of a vehicle, safely use mobility device, walk or wheel around home/community, navigate up and down stairs, show how to get up/down from the ground, ensure home is accessible, demonstrate HEP, complete caregiver training    Physical Therapy Problems (Active)       Problem: Mobility       Dates: Start:  11/14/23         Goal: STG-Within one week, patient will propel wheelchair 100ft with SBA on level ground indoors       Dates: Start:  11/14/23            Goal: STG-Within one week, patient will ambulate 50ft with FWW and Rajan       Dates: Start:  11/14/23               Problem: Mobility Transfers       Dates: Start:  11/14/23         Goal: STG-Within one week, patient will perform bed mobility with verbal cues for log roll sequencing and AE as needed       Dates: Start:  11/14/23            Goal: STG-Within one week, patient will transfer bed to chair CGA with FWW       Dates: Start:  11/14/23               Problem: PT-Long Term Goals       Dates: Start:  11/14/23         Goal: LTG-By discharge, patient will propel wheelchair 150ft Sherman       Dates: Start:  11/14/23            Goal: LTG-By discharge, patient will ambulate 100ft with FWW Sherman       Dates: Start:  11/14/23            Goal: LTG-By discharge, " patient will transfer one surface to another Sherman       Dates: Start:  11/14/23            Goal: LTG-By discharge, patient will perform home exercise program independently       Dates: Start:  11/14/23            Goal: LTG-By discharge, patient will transfer in/out of a car with CGA       Dates: Start:  11/14/23            Goal: LTG-By discharge, patient will perform bed mobility independently       Dates: Start:  11/14/23

## 2023-11-17 NOTE — THERAPY
"Occupational Therapy  Daily Treatment     Patient Name: Sheyla Gauthier  Age:  83 y.o., Sex:  female  Medical Record #: [id number]  Today's Date: 11/17/2023     Precautions  Precautions: Fall Risk, Spinal / Back Precautions   Comments: No brace         Subjective    \"I'm starting to feel left out of the decision making.\" OTR and pt's dtr had began a conversation re: DC planning, as dtr reported that pt will not be admitted to [facility] until Monday, but planned DC is Friday. OTR had mentioned that maybe an earlier DC of Wednesday might be better, as pt has a follow up appt with orthopedic surgeon that day and could just go to appointment and possibly be admitted to new facility that day. Pt was consulted re: her feelings on DC with no changes made. Further collaboration with PT, Dr. Chavis, and CM completed during session re: DC planning with this new information. Pt's dtr also stated that the plan was for her dtr to take her to pt's old residence with 2 steps to enter (a rented ramp was returned already). Discussed with PT for further POC on completing steps as new part of DC plan.     Objective       11/17/23 0901   OT Charge Group   OT Self Care / ADL (Units) 1   OT Therapy Activity (Units) 2   OT Therapeutic Exercise (Units) 1   OT Total Time Spent   OT Individual Total Time Spent (Mins) 60   Pain   Intervention Emotional Support;Distraction;Rest   Pain 0 - 10 Group   Location Groin   Location Orientation Left   Pain Rating Scale (NPRS) 3   Description Sharp;Tender   Comfort Goal Comfort with Movement;Perform Activity   Therapist Pain Assessment During Activity   Cognition    Level of Consciousness Alert   Functional Level of Assist   Grooming Modified Independent;Seated   Grooming Description Seated in wheelchair at sink  (for oral care and hand hygiene)   Toileting Standby Assist   Toileting Description Grab bar;Increased time;Verbal cueing;Supervision for safety   Bed, Chair, Wheelchair Transfer Standby Assist   Bed " Chair Wheelchair Transfer Description Adaptive equipment;Increased time;Supervision for safety;Verbal cueing;Set-up of equipment  (Stand step with cues for weight shift and FWW approximation to sitting surfaces.)   Toilet Transfers Standby Assist   Toilet Transfer Description Grab bar;Supervision for safety  (stand pivot w/c<>toilet with GB)   Sitting Lower Body Exercises   Sitting Lower Body Exercises Yes   Ankle Pumps 3 sets of 15;Bilateral   Hip Abduction 3 sets of 15;Bilateral;Medium Resistance Theraband   Other Exercises 3 sets of 15;Bilateral; calf raises   Balance   Comments Pt completed short distance mobility using FWW at SBA level without LOB for 25ft in gym to build strength and endurance. Noted increased L-hip drop after ~12-15ft.   Bed Mobility    Sit to Supine Minimal Assist  (for LEs)   Interdisciplinary Plan of Care Collaboration   IDT Collaboration with  Family / Caregiver;Physical Therapist;Physician   Patient Position at End of Therapy In Bed   Collaboration Comments Dr. Partha gallagher during session and dtr present during entire session discussing pt's CLOF and DC planning. PT collaboration for POC.         Assessment    Pt with fair tolerance to OT tx with focus on DC planning, ADLs, LE strengthening and short distance mobility for strengthening and endurance. Pt remains limited by L-groin pain with all transitional movements, but when pain is more controlled, she is able to move with improved mechanics. She did require increased time for education on maintaining FWW approximation to sitting surfaces, as she leaves FWW behind when turning. Plan remains for pt to DC into dtr's care on Friday at this time.  Strengths: Able to follow instructions, Alert and oriented, Good carryover of learning, Good insight into deficits/needs, Independent prior level of function, Making steady progress towards goals, Manages pain appropriately, Motivated for self care and independence, Pleasant and cooperative,  Supportive family, Willingly participates in therapeutic activities  Barriers: Decreased endurance, Fatigue, Generalized weakness, Impaired balance, Impaired activity tolerance, Limited mobility, Pain    Plan    ADL's with AE, standing tolerance/balance, overall strengthening, pain management, spinal precautions in function.     DME  TBD, pt may require a tub transfer bench if unable to clear 4inch barrier prior to DC, otherwise, shower chair with back     Passport items to be completed:  Perform bathroom transfers, complete dressing, complete feeding, get ready for the day, prepare a simple meal, participate in household tasks, adapt home for safety needs, demonstrate home exercise program, complete caregiver training     Occupational Therapy Goals (Active)       Problem: Dressing       Dates: Start:  11/14/23         Goal: STG-Within one week, patient will dress LB at SBA level using DME/AE PRN.       Dates: Start:  11/14/23               Problem: Functional Transfers       Dates: Start:  11/14/23         Goal: STG-Within one week, patient will transfer to toilet at SBA level using DME/AE PRN.       Dates: Start:  11/14/23               Problem: OT Long Term Goals       Dates: Start:  11/14/23         Goal: LTG-By discharge, patient will complete basic self care tasks at SBA-Mod I level using DME/AE PRN.       Dates: Start:  11/14/23            Goal: LTG-By discharge, patient will perform bathroom transfers at SBA-Mod I level using DME/AE PRN.       Dates: Start:  11/14/23               Problem: Toileting       Dates: Start:  11/14/23         Goal: STG-Within one week, patient will complete toileting tasks at SBA level using DME/AE PRN.       Dates: Start:  11/14/23

## 2023-11-18 ENCOUNTER — APPOINTMENT (OUTPATIENT)
Dept: OCCUPATIONAL THERAPY | Facility: REHABILITATION | Age: 83
DRG: 552 | End: 2023-11-18
Attending: PHYSICAL MEDICINE & REHABILITATION
Payer: MEDICARE

## 2023-11-18 PROCEDURE — 770010 HCHG ROOM/CARE - REHAB SEMI PRIVAT*

## 2023-11-18 PROCEDURE — 97116 GAIT TRAINING THERAPY: CPT

## 2023-11-18 PROCEDURE — 700102 HCHG RX REV CODE 250 W/ 637 OVERRIDE(OP): Performed by: PHYSICAL MEDICINE & REHABILITATION

## 2023-11-18 PROCEDURE — 97530 THERAPEUTIC ACTIVITIES: CPT

## 2023-11-18 PROCEDURE — A9270 NON-COVERED ITEM OR SERVICE: HCPCS | Performed by: PHYSICAL MEDICINE & REHABILITATION

## 2023-11-18 RX ADMIN — APIXABAN 10 MG: 5 TABLET, FILM COATED ORAL at 21:17

## 2023-11-18 RX ADMIN — OXYCODONE HYDROCHLORIDE 5 MG: 5 TABLET ORAL at 09:53

## 2023-11-18 RX ADMIN — OXYCODONE HYDROCHLORIDE 5 MG: 5 TABLET ORAL at 18:29

## 2023-11-18 RX ADMIN — CALCIUM CARBONATE (ANTACID) CHEW TAB 500 MG 500 MG: 500 CHEW TAB at 09:42

## 2023-11-18 RX ADMIN — LEVOTHYROXINE SODIUM 88 MCG: 0.09 TABLET ORAL at 05:24

## 2023-11-18 RX ADMIN — POLYETHYLENE GLYCOL 3350 1 PACKET: 17 POWDER, FOR SOLUTION ORAL at 09:48

## 2023-11-18 RX ADMIN — OXYCODONE HYDROCHLORIDE 5 MG: 5 TABLET ORAL at 12:56

## 2023-11-18 RX ADMIN — ATORVASTATIN CALCIUM 40 MG: 40 TABLET, FILM COATED ORAL at 21:17

## 2023-11-18 RX ADMIN — METHOCARBAMOL TABLETS 750 MG: 750 TABLET, COATED ORAL at 21:18

## 2023-11-18 RX ADMIN — METHOCARBAMOL TABLETS 750 MG: 750 TABLET, COATED ORAL at 12:56

## 2023-11-18 RX ADMIN — ACETAMINOPHEN 1000 MG: 500 TABLET ORAL at 21:18

## 2023-11-18 RX ADMIN — LOSARTAN POTASSIUM 50 MG: 50 TABLET, FILM COATED ORAL at 09:43

## 2023-11-18 RX ADMIN — Medication 200 MG: at 21:18

## 2023-11-18 RX ADMIN — METHOCARBAMOL TABLETS 750 MG: 750 TABLET, COATED ORAL at 09:44

## 2023-11-18 RX ADMIN — CALCIUM CARBONATE (ANTACID) CHEW TAB 500 MG 500 MG: 500 CHEW TAB at 21:17

## 2023-11-18 RX ADMIN — LIOTHYRONINE SODIUM 5 MCG: 5 TABLET ORAL at 09:48

## 2023-11-18 RX ADMIN — OMEPRAZOLE 20 MG: 20 CAPSULE, DELAYED RELEASE ORAL at 09:42

## 2023-11-18 RX ADMIN — PREGABALIN 100 MG: 100 CAPSULE ORAL at 21:17

## 2023-11-18 RX ADMIN — APIXABAN 10 MG: 5 TABLET, FILM COATED ORAL at 09:43

## 2023-11-18 RX ADMIN — ACETAMINOPHEN 1000 MG: 500 TABLET ORAL at 09:43

## 2023-11-18 RX ADMIN — ACETAMINOPHEN 1000 MG: 500 TABLET ORAL at 15:06

## 2023-11-18 RX ADMIN — METHOCARBAMOL TABLETS 750 MG: 750 TABLET, COATED ORAL at 18:30

## 2023-11-18 RX ADMIN — OXYCODONE HYDROCHLORIDE 5 MG: 5 TABLET ORAL at 23:30

## 2023-11-18 RX ADMIN — PREGABALIN 100 MG: 100 CAPSULE ORAL at 09:42

## 2023-11-18 RX ADMIN — SPIRONOLACTONE 25 MG: 25 TABLET, FILM COATED ORAL at 18:30

## 2023-11-18 RX ADMIN — OXYCODONE HYDROCHLORIDE 5 MG: 5 TABLET ORAL at 04:10

## 2023-11-18 ASSESSMENT — GAIT ASSESSMENTS
GAIT LEVEL OF ASSIST: CONTACT GUARD ASSIST
ASSISTIVE DEVICE: FRONT WHEEL WALKER
DISTANCE (FEET): 45

## 2023-11-18 ASSESSMENT — ACTIVITIES OF DAILY LIVING (ADL)
TUB_SHOWER_TRANSFER_DESCRIPTION: GRAB BAR;ADAPTIVE EQUIPMENT;INCREASED TIME;SET-UP OF EQUIPMENT;SUPERVISION FOR SAFETY;VERBAL CUEING

## 2023-11-18 NOTE — CARE PLAN
The patient is Stable - Low risk of patient condition declining or worsening    Shift Goals  Clinical Goals: safety, pain mgmt, sleep  Patient Goals: pain control, sleep    Progress made toward(s) clinical / shift goals:   Problem: Fall Risk - Rehab  Goal: Patient will remain free from falls  Outcome: Progressing  Note: Call light within reach, pt educated to use for assistance for safe transferring. Pt demonstrates good safety technique this shift.

## 2023-11-18 NOTE — PROGRESS NOTES
NURSING DAILY NOTE    Name: Sheyla Gauthier   Date of Admission: 11/13/2023   Admitting Diagnosis: Radiculopathy  Attending Physician: Mitchell Chavis M.d.  Allergies: Trazodone and Crestor [rosuvastatin calcium]    Safety  Patient Assist  Min  Patient Precautions  Fall Risk, Spinal / Back Precautions   Precaution Comments  No brace  Bed Transfer Status  Contact Guard Assist (SPT with FWW and CGA)  Toilet Transfer Status   Standby Assist  Assistive Devices  Wheelchair  Oxygen  None - Room Air  Diet/Therapeutic Dining  Current Diet Order   Procedures    Diet Order Diet: Regular     Pill Administration  whole  Agitated Behavioral Scale     ABS Level of Severity       Fall Risk  Has the patient had a fall this admission?   No  Lexy Garcia Fall Risk Scoring  12, MODERATE RISK  Fall Risk Safety Measures  bed alarm, chair alarm, and poor balance    Vitals  Temperature: 36.7 °C (98.1 °F)  Temp src: Oral  Pulse: 72  Respiration: 18  Blood Pressure : 112/68  Blood Pressure MAP (Calculated): 83 MM HG  BP Location: Left, Upper Arm  Patient BP Position: Sitting     Oxygen  Pulse Oximetry: 96 %  O2 (LPM): 0  O2 Delivery Device: None - Room Air    Bowel and Bladder  Last Bowel Movement  11/17/23  Stool Type     Bowel Device  Bathroom  Continent  Bladder: Stress incontinence   Bowel: Continent movement  Bladder Function  Urine Void (mL):  (moderate)  Number of Times Voided: 1  Urine Color: Yellow  Genitourinary Assessment   Bladder Assessment (WDL):  WDL Except  Urinary Elimination: Incontinence  Urine Color: Yellow  Bladder Device: Bathroom, Absorbent Brief (Pull Up)  Bladder Scan: Post Void  $ Bladder Scan Results (mL): 196    Skin  Waytt Score   19  Sensory Interventions   Bed Types: Standard/Trauma Mattress  Skin Preventative Measures: Pillows in Use for Support / Positioning  Moisture Interventions  Moisturizers/Barriers: Barrier Wipes      Pain  Pain Rating  Scale  6 - Hard to ignore, avoid usual activities  Pain Location  Groin  Pain Location Orientation  Left  Pain Interventions   Emotional Support, Repositioned    ADLs    Bathing   Shower, Staff (OT)  Linen Change   Partial  Personal Hygiene  Moist Klarissa Wipes, Perineal Care  Chlorhexidine Bath      Oral Care     Teeth/Dentures     Shave     Nutrition Percentage Eaten  *  * Meal *  *, Lunch, Between 50-75% Consumed  Environmental Precautions  Treaded Slipper Socks on Patient, Bed in Low Position  Patient Turns/Positioning  Patient Turns Self from Side to Side  Patient Turns Assistance/Tolerance  Assistance of One  Bed Positions  Bed Controls On  Head of Bed Elevated  Self regulated      Psychosocial/Neurologic Assessment  Psychosocial Assessment  Psychosocial (WDL):  Within Defined Limits  Neurologic Assessment  Neuro (WDL): Exceptions to WDL  Level of Consciousness: Alert  Orientation Level: Oriented X4  Cognition: Follows commands  Speech: Clear  Pupil Assesment: No  EENT (WDL):  WDL Except    Cardio/Pulmonary Assessment  Edema   RLE Edema: Dependent, 2+  LLE Edema: Dependent, 2+  Respiratory Breath Sounds     Cardiac Assessment   Cardiac (WDL):  WDL Except (HTN)

## 2023-11-18 NOTE — PROGRESS NOTES
NURSING DAILY NOTE    Name: Sheyla Gauthier   Date of Admission: 11/13/2023   Admitting Diagnosis: Radiculopathy  Attending Physician: Mitchell Chavis M.d.  Allergies: Trazodone and Crestor [rosuvastatin calcium]    Safety  Patient Assist  min  Patient Precautions  Fall Risk, Spinal / Back Precautions   Precaution Comments  No brace  Bed Transfer Status  Contact Guard Assist (SPT with FWW and CGA)  Toilet Transfer Status   Standby Assist  Assistive Devices  Wheelchair  Oxygen  None - Room Air  Diet/Therapeutic Dining  Current Diet Order   Procedures    Diet Order Diet: Regular     Pill Administration  whole  Agitated Behavioral Scale     ABS Level of Severity       Fall Risk  Has the patient had a fall this admission?   No  Lexy Garcia Fall Risk Scoring  12, MODERATE RISK  Fall Risk Safety Measures  bed alarm, chair alarm, and poor balance    Vitals  Temperature: 36.7 °C (98.1 °F)  Temp src: Oral  Pulse: 63  Respiration: 18  Blood Pressure : 138/78  Blood Pressure MAP (Calculated): 98 MM HG  BP Location: Left, Upper Arm  Patient BP Position: Supine     Oxygen  Pulse Oximetry: 96 %  O2 (LPM): 0  O2 Delivery Device: None - Room Air    Bowel and Bladder  Last Bowel Movement  11/17/23  Stool Type     Bowel Device  Bathroom  Continent  Bladder: Stress incontinence   Bowel: Continent movement  Bladder Function  Urine Void (mL):  (moderate)  Number of Times Voided: 1  Urine Color: Yellow  Genitourinary Assessment   Bladder Assessment (WDL):  WDL Except  Urinary Elimination: Incontinence  Urine Color: Yellow  Bladder Device: Bathroom  Time Void: Yes  Bladder Scan: Unable To Void  $ Bladder Scan Results (mL): 150    Skin  Wyatt Score   19  Sensory Interventions   Bed Types: Standard/Trauma Mattress  Skin Preventative Measures: Pillows in Use for Support / Positioning  Moisture Interventions  Moisturizers/Barriers: Barrier Wipes      Pain  Pain Rating Scale  8 -  Awful, hard to do anything  Pain Location  Groin  Pain Location Orientation  Left  Pain Interventions   Emotional Support, Repositioned    ADLs    Bathing   Shower, Staff (OT)  Linen Change   Partial  Personal Hygiene  Moist Klarissa Wipes, Perineal Care  Chlorhexidine Bath      Oral Care     Teeth/Dentures     Shave     Nutrition Percentage Eaten  *  * Meal *  *, Lunch, Between 50-75% Consumed  Environmental Precautions  Treaded Slipper Socks on Patient, Personal Belongings, Wastebasket, Call Bell etc. in Easy Reach, Transferred to Stronger Side, Bed in Low Position  Patient Turns/Positioning  Patient Turns Self from Side to Side  Patient Turns Assistance/Tolerance  Assistance of One  Bed Positions  Bed Controls On, Bed Locked  Head of Bed Elevated  Self regulated      Psychosocial/Neurologic Assessment  Psychosocial Assessment  Psychosocial (WDL):  Within Defined Limits  Neurologic Assessment  Neuro (WDL): Exceptions to WDL  Level of Consciousness: Alert  Orientation Level: Oriented X4  Cognition: Follows commands  Speech: Clear  Pupil Assesment: No  EENT (WDL):  WDL Except    Cardio/Pulmonary Assessment  Edema   RLE Edema: Dependent, 2+  LLE Edema: Dependent, 2+  Respiratory Breath Sounds     Cardiac Assessment   Cardiac (WDL):  WDL Except (Hx: HTN)

## 2023-11-19 ENCOUNTER — APPOINTMENT (OUTPATIENT)
Dept: OCCUPATIONAL THERAPY | Facility: REHABILITATION | Age: 83
DRG: 552 | End: 2023-11-19
Attending: PHYSICAL MEDICINE & REHABILITATION
Payer: MEDICARE

## 2023-11-19 ENCOUNTER — APPOINTMENT (OUTPATIENT)
Dept: PHYSICAL THERAPY | Facility: REHABILITATION | Age: 83
DRG: 552 | End: 2023-11-19
Attending: PHYSICAL MEDICINE & REHABILITATION
Payer: MEDICARE

## 2023-11-19 PROCEDURE — 97116 GAIT TRAINING THERAPY: CPT

## 2023-11-19 PROCEDURE — 97530 THERAPEUTIC ACTIVITIES: CPT

## 2023-11-19 PROCEDURE — A9270 NON-COVERED ITEM OR SERVICE: HCPCS | Performed by: PHYSICAL MEDICINE & REHABILITATION

## 2023-11-19 PROCEDURE — 700102 HCHG RX REV CODE 250 W/ 637 OVERRIDE(OP): Performed by: PHYSICAL MEDICINE & REHABILITATION

## 2023-11-19 PROCEDURE — 770010 HCHG ROOM/CARE - REHAB SEMI PRIVAT*

## 2023-11-19 PROCEDURE — 97110 THERAPEUTIC EXERCISES: CPT

## 2023-11-19 RX ADMIN — APIXABAN 10 MG: 5 TABLET, FILM COATED ORAL at 08:40

## 2023-11-19 RX ADMIN — Medication 200 MG: at 20:58

## 2023-11-19 RX ADMIN — OXYCODONE HYDROCHLORIDE 5 MG: 5 TABLET ORAL at 11:40

## 2023-11-19 RX ADMIN — PREGABALIN 100 MG: 100 CAPSULE ORAL at 20:58

## 2023-11-19 RX ADMIN — LOSARTAN POTASSIUM 50 MG: 50 TABLET, FILM COATED ORAL at 08:40

## 2023-11-19 RX ADMIN — METHOCARBAMOL TABLETS 750 MG: 750 TABLET, COATED ORAL at 08:40

## 2023-11-19 RX ADMIN — ACETAMINOPHEN 1000 MG: 500 TABLET ORAL at 14:06

## 2023-11-19 RX ADMIN — ACETAMINOPHEN 1000 MG: 500 TABLET ORAL at 20:58

## 2023-11-19 RX ADMIN — OXYCODONE HYDROCHLORIDE 5 MG: 5 TABLET ORAL at 06:10

## 2023-11-19 RX ADMIN — OXYCODONE HYDROCHLORIDE 5 MG: 5 TABLET ORAL at 14:07

## 2023-11-19 RX ADMIN — OMEPRAZOLE 20 MG: 20 CAPSULE, DELAYED RELEASE ORAL at 08:40

## 2023-11-19 RX ADMIN — LIOTHYRONINE SODIUM 5 MCG: 5 TABLET ORAL at 08:44

## 2023-11-19 RX ADMIN — ATORVASTATIN CALCIUM 40 MG: 40 TABLET, FILM COATED ORAL at 20:58

## 2023-11-19 RX ADMIN — ACETAMINOPHEN 1000 MG: 500 TABLET ORAL at 08:40

## 2023-11-19 RX ADMIN — PREGABALIN 100 MG: 100 CAPSULE ORAL at 08:40

## 2023-11-19 RX ADMIN — METHOCARBAMOL TABLETS 750 MG: 750 TABLET, COATED ORAL at 20:58

## 2023-11-19 RX ADMIN — LEVOTHYROXINE SODIUM 88 MCG: 0.09 TABLET ORAL at 06:10

## 2023-11-19 RX ADMIN — METHOCARBAMOL TABLETS 750 MG: 750 TABLET, COATED ORAL at 14:06

## 2023-11-19 RX ADMIN — SPIRONOLACTONE 25 MG: 25 TABLET, FILM COATED ORAL at 17:37

## 2023-11-19 RX ADMIN — OXYCODONE HYDROCHLORIDE 5 MG: 5 TABLET ORAL at 20:57

## 2023-11-19 RX ADMIN — CALCIUM CARBONATE (ANTACID) CHEW TAB 500 MG 500 MG: 500 CHEW TAB at 08:40

## 2023-11-19 RX ADMIN — METHOCARBAMOL TABLETS 750 MG: 750 TABLET, COATED ORAL at 17:37

## 2023-11-19 RX ADMIN — APIXABAN 10 MG: 5 TABLET, FILM COATED ORAL at 20:58

## 2023-11-19 RX ADMIN — CALCIUM CARBONATE (ANTACID) CHEW TAB 500 MG 500 MG: 500 CHEW TAB at 20:58

## 2023-11-19 ASSESSMENT — ACTIVITIES OF DAILY LIVING (ADL): BED_CHAIR_WHEELCHAIR_TRANSFER_DESCRIPTION: SQUAT PIVOT TRANSFER TO WHEELCHAIR;INCREASED TIME

## 2023-11-19 ASSESSMENT — PATIENT HEALTH QUESTIONNAIRE - PHQ9
2. FEELING DOWN, DEPRESSED, IRRITABLE, OR HOPELESS: NOT AT ALL
1. LITTLE INTEREST OR PLEASURE IN DOING THINGS: NOT AT ALL
SUM OF ALL RESPONSES TO PHQ9 QUESTIONS 1 AND 2: 0

## 2023-11-19 ASSESSMENT — GAIT ASSESSMENTS
GAIT LEVEL OF ASSIST: CONTACT GUARD ASSIST
ASSISTIVE DEVICE: FRONT WHEEL WALKER
DISTANCE (FEET): 55

## 2023-11-19 ASSESSMENT — PAIN DESCRIPTION - PAIN TYPE
TYPE: SURGICAL PAIN
TYPE: SURGICAL PAIN

## 2023-11-19 NOTE — THERAPY
Physical Therapy   Daily Treatment     Patient Name: Sheyla Gauthier  Age:  83 y.o., Sex:  female  Medical Record #: [id number]  Today's Date: 11/19/2023     Precautions  Precautions: Fall Risk, Spinal / Back Precautions   Comments: no brace    Subjective    Patient reports rough night with leg cramping and bladder urgency; very appreciative for PROM and STM during therapy session     Objective       11/19/23 0931   PT Charge Group   PT Gait Training (Units) 1   PT Therapeutic Exercise (Units) 2   PT Therapeutic Activities (Units) 1   PT Total Time Spent   PT Individual Total Time Spent (Mins) 60   Precautions   Precautions Fall Risk;Spinal / Back Precautions    Comments no brace   Gait Functional Level of Assist    Gait Level Of Assist Contact Guard Assist   Assistive Device Front Wheel Walker   Distance (Feet) 55   # of Times Distance was Traveled 1   Deviation Antalgic;Bradykinetic;Trendelenberg   Transfer Functional Level of Assist   Bed, Chair, Wheelchair Transfer Contact Guard Assist   Bed Chair Wheelchair Transfer Description Squat pivot transfer to wheelchair;Increased time   Supine Lower Body Exercise   Hip Abduction 1 set of 10;Bilateral   Hip Adduction  1 set of 10;Bilateral   Heel Slide 1 set of 10;Bilateral   Ankle Pumps 1 set of 10;Reciprocal   Gluteal Isometrics 1 set of 10;Bilateral   Comments supine PROM with gait belt UE assist 30sec each bilatera- hamstring, ITB, groin, calves; STM 5min right groin   Sitting Lower Body Exercises   Sitting Lower Body Exercises   (used as cool-down; hand-out given; encouraged throughout the day)   Ankle Pumps 1 set of 10;Bilateral   Hip Abduction 1 set of 10;Bilateral;Medium Resistance Theraband   Hip Adduction 1 set of 10;Bilateral   Long Arc Quad 1 set of 10;Bilateral   Marching Reciprocal;1 set of 10   Hamstring Curl 1 set of 10;Bilateral;Medium Resistance Theraband   Interdisciplinary Plan of Care Collaboration   IDT Collaboration with  Occupational Therapist  "  Collaboration Comments treatment planning         Assessment    Self-limits ambulation distance based on groin pain; requires 2 standing rest breaks with ambulation due to pain, increased Trendelenberg pattern with fatigue/pain    Strengths: Able to follow instructions, Good insight into deficits/needs, Independent prior level of function, Making steady progress towards goals, Motivated for self care and independence, Pleasant and cooperative, Supportive family, Willingly participates in therapeutic activities  Barriers: Decreased endurance, Generalized weakness, Impaired activity tolerance, Impaired balance, Pain    Plan    L hip ext and abd NRE  Abdominal stabilization  Gait training with emphasis on L stance stability, trial heel wedge  Motomed/Nustep for cardio  Bed mobility with logroll  Pain science education     DME  PT DME Recommendations  Wheelchair: 16\" Width, Lightweight, Standard Leg Rests  Cushion: Standard     Passport items to be completed:  Get in/out of bed safely, in/out of a vehicle, safely use mobility device, walk or wheel around home/community, navigate up and down stairs, show how to get up/down from the ground, ensure home is accessible, demonstrate HEP, complete caregiver training    Physical Therapy Problems (Active)       Problem: Mobility       Dates: Start:  11/14/23         Goal: STG-Within one week, patient will propel wheelchair 100ft with SBA on level ground indoors       Dates: Start:  11/14/23            Goal: STG-Within one week, patient will ambulate 50ft with FWW and Rajan       Dates: Start:  11/14/23               Problem: Mobility Transfers       Dates: Start:  11/14/23         Goal: STG-Within one week, patient will perform bed mobility with verbal cues for log roll sequencing and AE as needed       Dates: Start:  11/14/23            Goal: STG-Within one week, patient will transfer bed to chair CGA with FWW       Dates: Start:  11/14/23               Problem: PT-Long Term " Goals       Dates: Start:  11/14/23         Goal: LTG-By discharge, patient will propel wheelchair 150ft Sherman       Dates: Start:  11/14/23            Goal: LTG-By discharge, patient will ambulate 100ft with FWW Sherman       Dates: Start:  11/14/23            Goal: LTG-By discharge, patient will transfer one surface to another Sherman       Dates: Start:  11/14/23            Goal: LTG-By discharge, patient will perform home exercise program independently       Dates: Start:  11/14/23            Goal: LTG-By discharge, patient will transfer in/out of a car with CGA       Dates: Start:  11/14/23            Goal: LTG-By discharge, patient will perform bed mobility independently       Dates: Start:  11/14/23

## 2023-11-19 NOTE — PROGRESS NOTES
NURSING DAILY NOTE    Name: Sheyla Gauthier   Date of Admission: 11/13/2023   Admitting Diagnosis: Radiculopathy  Attending Physician: Mitchell Chavis M.d.  Allergies: Trazodone and Crestor [rosuvastatin calcium]    Safety  Patient Assist  CGA  Patient Precautions  Fall Risk, Spinal / Back Precautions   Precaution Comments  No brace  Bed Transfer Status  Contact Guard Assist (SPT with FWW and CGA)  Toilet Transfer Status   Standby Assist  Assistive Devices  Wheelchair  Oxygen  None - Room Air  Diet/Therapeutic Dining  Current Diet Order   Procedures    Diet Order Diet: Regular     Pill Administration  whole  Agitated Behavioral Scale     ABS Level of Severity       Fall Risk  Has the patient had a fall this admission?   No  Lexy Garcia Fall Risk Scoring  12, MODERATE RISK  Fall Risk Safety Measures  bed alarm, chair alarm, and poor balance    Vitals  Temperature: 36.7 °C (98.1 °F)  Temp src: Oral  Pulse: 76  Respiration: 18  Blood Pressure : 118/72  Blood Pressure MAP (Calculated): 87 MM HG  BP Location: Left, Upper Arm  Patient BP Position: Sitting     Oxygen  Pulse Oximetry: 96 %  O2 (LPM): 0  O2 Delivery Device: None - Room Air    Bowel and Bladder  Last Bowel Movement  11/17/23  Stool Type     Bowel Device  Bathroom  Continent  Bladder: Stress incontinence   Bowel: Continent movement  Bladder Function  Urine Void (mL):  (small)  Number of Times Voided: 1  Urine Color: Yellow  Genitourinary Assessment   Bladder Assessment (WDL):  WDL Except  Urinary Elimination: Incontinence  Urine Color: Yellow  Bladder Device: Bathroom  Time Void: Yes  Bladder Scan: Post Void  $ Bladder Scan Results (mL): 211    Skin  Wyatt Score   19  Sensory Interventions   Bed Types: Standard/Trauma Mattress  Skin Preventative Measures: Pillows in Use for Support / Positioning  Moisture Interventions  Moisturizers/Barriers: Barrier Wipes      Pain  Pain Rating Scale  6 - Hard to  ignore, avoid usual activities  Pain Location  Groin  Pain Location Orientation  Left  Pain Interventions   Emotional Support, Cold Pack (Cold pack provided for L-groin pain at end of session.)    ADLs    Bathing   Shower, Staff (OT)  Linen Change   Partial  Personal Hygiene  Moist Klarissa Wipes, Perineal Care  Chlorhexidine Bath      Oral Care     Teeth/Dentures     Shave     Nutrition Percentage Eaten  *  * Meal *  *, Lunch, Between % Consumed  Environmental Precautions  Treaded Slipper Socks on Patient, Personal Belongings, Wastebasket, Call Bell etc. in Easy Reach, Transferred to Stronger Side, Bed in Low Position  Patient Turns/Positioning  Patient Turns Self from Side to Side  Patient Turns Assistance/Tolerance  Assistance of One  Bed Positions  Bed Controls On, Bed Locked  Head of Bed Elevated  Self regulated      Psychosocial/Neurologic Assessment  Psychosocial Assessment  Psychosocial (WDL):  Within Defined Limits  Neurologic Assessment  Neuro (WDL): Exceptions to WDL  Level of Consciousness: Alert  Orientation Level: Oriented X4  Cognition: Follows commands  Speech: Clear  Pupil Assesment: No  EENT (WDL):  WDL Except    Cardio/Pulmonary Assessment  Edema   RLE Edema: 2+  LLE Edema: 2+  Respiratory Breath Sounds     Cardiac Assessment   Cardiac (WDL):  WDL Except (Hx: HTN)

## 2023-11-19 NOTE — PROGRESS NOTES
NURSING DAILY NOTE    Name: Sheyla Gauthier   Date of Admission: 11/13/2023   Admitting Diagnosis: Radiculopathy  Attending Physician: Mitchell Chavis M.d.  Allergies: Trazodone and Crestor [rosuvastatin calcium]    Safety  Patient Assist  min  Patient Precautions  Fall Risk, Spinal / Back Precautions   Precaution Comments  No brace  Bed Transfer Status  Contact Guard Assist (SPT with FWW and CGA)  Toilet Transfer Status   Standby Assist  Assistive Devices  Wheelchair  Oxygen  None - Room Air  Diet/Therapeutic Dining  Current Diet Order   Procedures    Diet Order Diet: Regular     Pill Administration  whole  Agitated Behavioral Scale     ABS Level of Severity       Fall Risk  Has the patient had a fall this admission?   No  Lexy Garcia Fall Risk Scoring  12, MODERATE RISK  Fall Risk Safety Measures  bed alarm, chair alarm, and poor balance    Vitals  Temperature: 36.7 °C (98 °F)  Temp src: Oral  Pulse: 60  Respiration: 18  Blood Pressure : 122/65  Blood Pressure MAP (Calculated): 84 MM HG  BP Location: Right, Upper Arm  Patient BP Position: Supine     Oxygen  Pulse Oximetry: 96 %  O2 (LPM): 0  O2 Delivery Device: None - Room Air    Bowel and Bladder  Last Bowel Movement  11/17/23  Stool Type     Bowel Device  Bathroom  Continent  Bladder: Stress incontinence   Bowel: Continent movement  Bladder Function  Urine Void (mL):  (small)  Number of Times Voided: 1  Urine Color: Yellow  Genitourinary Assessment   Bladder Assessment (WDL):  WDL Except  Urinary Elimination: Incontinence  Urine Color: Yellow  Bladder Device: Bathroom  Time Void: Yes  Bladder Scan: Post Void  $ Bladder Scan Results (mL): 211    Skin  Wyatt Score   19  Sensory Interventions   Bed Types: Standard/Trauma Mattress  Skin Preventative Measures: Pillows in Use for Support / Positioning  Moisture Interventions  Moisturizers/Barriers: Barrier Wipes      Pain  Pain Rating Scale  5 -  Interrupts some activities  Pain Location  Groin  Pain Location Orientation  Left  Pain Interventions   Emotional Support, Cold Pack    ADLs    Bathing   Shower, Staff (OT)  Linen Change   Partial  Personal Hygiene  Moist Klarissa Wipes, Perineal Care  Chlorhexidine Bath      Oral Care     Teeth/Dentures     Shave     Nutrition Percentage Eaten  *  * Meal *  *, Lunch, Between % Consumed  Environmental Precautions  Treaded Slipper Socks on Patient, Personal Belongings, Wastebasket, Call Bell etc. in Easy Reach, Transferred to Stronger Side, Bed in Low Position  Patient Turns/Positioning  Patient Turns Self from Side to Side  Patient Turns Assistance/Tolerance  Assistance of One  Bed Positions  Bed Controls On, Bed Locked  Head of Bed Elevated  Self regulated      Psychosocial/Neurologic Assessment  Psychosocial Assessment  Psychosocial (WDL):  Within Defined Limits  Neurologic Assessment  Neuro (WDL): Exceptions to WDL  Level of Consciousness: Alert  Orientation Level: Oriented X4  Cognition: Follows commands  Speech: Clear  Pupil Assesment: No  EENT (WDL):  WDL Except    Cardio/Pulmonary Assessment  Edema   RLE Edema: Dependent, 2+  LLE Edema: Dependent, 2+  Respiratory Breath Sounds     Cardiac Assessment   Cardiac (WDL):  WDL Except (Hx: HTN)

## 2023-11-19 NOTE — THERAPY
"Occupational Therapy  Daily Treatment     Patient Name: Sheyla Gauthier  Age:  83 y.o., Sex:  female  Medical Record #: [id number]  Today's Date: 11/18/2023     Precautions  Precautions: Fall Risk, Spinal / Back Precautions   Comments: No brace         Subjective    \"I think I can make it to the door.\" Per pt during mobility using FWW.     Objective       11/18/23 1231   OT Charge Group   OT Therapy Activity (Units) 4   OT Total Time Spent   OT Individual Total Time Spent (Mins) 60   Pain   Intervention Emotional Support;Cold Pack  (Cold pack provided for L-groin pain at end of session.)   Pain 0 - 10 Group   Location Groin   Location Orientation Left   Pain Rating Scale (NPRS) 3   Description Pressure   Comfort Goal Comfort with Movement;Perform Activity   Therapist Pain Assessment Prior to Activity   Cognition    Level of Consciousness Alert   Functional Level of Assist   Tub / Shower Transfers Contact Guard Assist   Tub Shower Transfer Description Grab bar;Adaptive equipment;Increased time;Set-up of equipment;Supervision for safety;Verbal cueing  (**dry step in shower transfer using FWW reversing over 4 inch barrier then pivoting to shower chair and use of GB for balance. Cues for sequencing and hand placement.)   Interdisciplinary Plan of Care Collaboration   IDT Collaboration with  Family / Caregiver   Patient Position at End of Therapy Seated;Chair Alarm On;Self Releasing Lap Belt Applied;Call Light within Reach;Family / Friend in Room;Tray Table within Reach   Collaboration Comments Dtr Lore present during OT session     Simulated step in shower transfer in //bars prior to live trial in mock shower space. Pt completed reverse stepping over 4 inch barrier 4x with CGA and initial cues for sequencing with good carryover noted. Recommended [facility] remove step in shower doors to allow for safe transfers, which dtr will request.   Also discussed possible need for a padded commode or cushion for toilet at [facility] due to " pt's groin pain when sitting for prolonged periods of time during bowel movements. MOISÉS toilet height is 17 inches with a GB to L-side.  Pt completed functional mobility (per pt request) at SBA-CGA level using FWW for 51' with a few standing RB and v/c for hip hike to compensate for L-hip drop.    Assessment    Pt tolerated session well, despite increased pain by end of session. She doubled her mobility distance tolerated when compared to previous sessions. She was able to complete mock shower transfer well with improved pain control and standing balance as well.  Strengths: Able to follow instructions, Alert and oriented, Good carryover of learning, Good insight into deficits/needs, Independent prior level of function, Making steady progress towards goals, Manages pain appropriately, Motivated for self care and independence, Pleasant and cooperative, Supportive family, Willingly participates in therapeutic activities  Barriers: Decreased endurance, Fatigue, Generalized weakness, Impaired balance, Impaired activity tolerance, Limited mobility, Pain    Plan    ADL's with AE, standing tolerance/balance, overall strengthening, pain management, spinal precautions in function.     DME  TBD, pt may require a tub transfer bench if unable to clear 4inch barrier prior to DC, otherwise, shower chair with back     Passport items to be completed:  Perform bathroom transfers, complete dressing, complete feeding, get ready for the day, prepare a simple meal, participate in household tasks, adapt home for safety needs, demonstrate home exercise program, complete caregiver training     Occupational Therapy Goals (Active)       Problem: Dressing       Dates: Start:  11/14/23         Goal: STG-Within one week, patient will dress LB at SBA level using DME/AE PRN.       Dates: Start:  11/14/23               Problem: Functional Transfers       Dates: Start:  11/14/23         Goal: STG-Within one week, patient will transfer to toilet at  SBA level using DME/AE PRN.       Dates: Start:  11/14/23               Problem: OT Long Term Goals       Dates: Start:  11/14/23         Goal: LTG-By discharge, patient will complete basic self care tasks at SBA-Mod I level using DME/AE PRN.       Dates: Start:  11/14/23            Goal: LTG-By discharge, patient will perform bathroom transfers at SBA-Mod I level using DME/AE PRN.       Dates: Start:  11/14/23               Problem: Toileting       Dates: Start:  11/14/23         Goal: STG-Within one week, patient will complete toileting tasks at SBA level using DME/AE PRN.       Dates: Start:  11/14/23

## 2023-11-19 NOTE — CARE PLAN
The patient is Watcher - Medium risk of patient condition declining or worsening    Shift Goals  Clinical Goals: safety, pain mgmt, sleep  Patient Goals: pain control, sleep  Family Goals: get pt to skilled long term care in [place]    Progress made toward(s) clinical / shift goals:      Problem: Psychosocial  Goal: Patient and family will demonstrate ability to cope with life altering diagnosis and/or procedure  Outcome: Progressing  Note: Pt's daughter here from [place] and discussing and setting up long term skilled care for pt in [place] near her other sister.

## 2023-11-19 NOTE — THERAPY
Occupational Therapy  Daily Treatment     Patient Name: Sheyla Gauthier  Age:  83 y.o., Sex:  female  Medical Record #: [id number]  Today's Date: 11/19/2023     Precautions  Precautions: Fall Risk, Spinal / Back Precautions   Comments: no brace         Subjective    Pt seated in w/c upon arrival, pleasant and cooperative, agreeable to therapy. Pt declined shower because she is too cold     Objective       11/19/23 1431   OT Charge Group   OT Therapeutic Exercise (Units) 4   OT Total Time Spent   OT Individual Total Time Spent (Mins) 60   Sitting Upper Body Exercises   Chest Press 2 sets of 10;Bilateral;Weight (See Comments for lbs)  (4lb dumbbell)   Internal Shoulder Rotation 2 sets of 10;Bilateral;Weight (See Comments for lbs)  (4lb dumbbell)   External Shoulder Rotation 2 sets of 10;Bilateral;Weight (See Comments for lbs)  (4lb dumbbell)   Bilateral Row 2 sets of 10;Bilateral;Weight (See Comments for lbs)  (4lb dumbbell)   Bicep Curls 2 sets of 10;Bilateral;Weight (See Comments for lbs)  (4lb dumbbell)   Wrist Flexion / Extension 2 sets of 10;Bilateral;Weight (See Comments for lbs)  (4lb dumbbell)   Bed Mobility    Sit to Supine Moderate Assist   Interdisciplinary Plan of Care Collaboration   Patient Position at End of Therapy Call Light within Reach;Tray Table within Reach;In Bed     Functional mobility at FWW: around mat table x 1 with increase time, significant reliance on UE support    Assessment    Pt completed UB exercises to improve overall strength and cardiovascular endurance in order to maximize independence and safety with ADL's and functional mobility tasks. Pt completed to the best of their abilities despite RLE back/hip pain    Strengths: Able to follow instructions, Alert and oriented, Good carryover of learning, Good insight into deficits/needs, Independent prior level of function, Making steady progress towards goals, Manages pain appropriately, Motivated for self care and independence, Pleasant  and cooperative, Supportive family, Willingly participates in therapeutic activities  Barriers: Decreased endurance, Fatigue, Generalized weakness, Impaired balance, Impaired activity tolerance, Limited mobility, Pain    Plan    Refer to Primary OT POC/goals     Occupational Therapy Goals (Active)       Problem: Dressing       Dates: Start:  11/14/23         Goal: STG-Within one week, patient will dress LB at SBA level using DME/AE PRN.       Dates: Start:  11/14/23               Problem: Functional Transfers       Dates: Start:  11/14/23         Goal: STG-Within one week, patient will transfer to toilet at SBA level using DME/AE PRN.       Dates: Start:  11/14/23               Problem: OT Long Term Goals       Dates: Start:  11/14/23         Goal: LTG-By discharge, patient will complete basic self care tasks at SBA-Mod I level using DME/AE PRN.       Dates: Start:  11/14/23            Goal: LTG-By discharge, patient will perform bathroom transfers at SBA-Mod I level using DME/AE PRN.       Dates: Start:  11/14/23               Problem: Toileting       Dates: Start:  11/14/23         Goal: STG-Within one week, patient will complete toileting tasks at SBA level using DME/AE PRN.       Dates: Start:  11/14/23

## 2023-11-19 NOTE — CARE PLAN
Problem: Pain - Standard  Goal: Alleviation of pain or a reduction in pain to the patient’s comfort goal  Outcome: Progressing  Patient able to verbalize pain on a 0/10 scale. Patients pain is being controlled with prn pain medications and rest.

## 2023-11-19 NOTE — THERAPY
"Physical Therapy   Daily Treatment     Patient Name: Sheyla Gauthier  Age:  83 y.o., Sex:  female  Medical Record #: [id number]  Today's Date: 11/18/2023     Precautions  Precautions: Fall Risk, Spinal / Back Precautions   Comments: No brace    Subjective    Pt agreeable to additional 30 min session     Objective       11/18/23 1401   PT Charge Group   PT Gait Training (Units) 1   PT Therapeutic Activities (Units) 1   PT Total Time Spent   PT Individual Total Time Spent (Mins) 30   Pain 0 - 10 Group   Pain Rating Scale (NPRS)   (pt with 3 ice packs at start of session, reapplied at end of session. Pain noted in L groin and outer hip)   Gait Functional Level of Assist    Gait Level Of Assist Contact Guard Assist   Assistive Device Front Wheel Walker   Distance (Feet) 45   # of Times Distance was Traveled 1   Deviation Bradykinetic;Antalgic;Trendelenberg   Stairs Functional Level of Assist   Level of Assist with Stairs Moderate Assist   # of Stairs Climbed 4  (6\" steps with step to pattern and B HR)   Stairs Description Extra time;Hand rails;Limited by fatigue;Verbal cueing  (pt with excessive trendelenberg)   Sitting Lower Body Exercises   Ankle Pumps 3 sets of 10;Bilateral   Long Arc Quad 1 set of 10;Bilateral   Marching Reciprocal;1 set of 10   Bed Mobility    Sit to Stand Contact Guard Assist   Interdisciplinary Plan of Care Collaboration   Patient Position at End of Therapy Seated;Chair Alarm On;Call Light within Reach;Tray Table within Reach;Family / Friend in Room         Assessment    Pt verbose throughout session, requiring redirection. Motivated for independence.     Strengths: Able to follow instructions, Good insight into deficits/needs, Independent prior level of function, Making steady progress towards goals, Motivated for self care and independence, Pleasant and cooperative, Supportive family, Willingly participates in therapeutic activities  Barriers: Decreased endurance, Generalized weakness, " "Impaired activity tolerance, Impaired balance, Pain    Plan    L hip ext and abd NRE  Abdominal stabilization  Gait training with emphasis on L stance stability, trial heel wedge  Motomed/Nustep for cardio  Bed mobility with logroll  Pain science education    DME  PT DME Recommendations  Wheelchair: 16\" Width, Lightweight, Standard Leg Rests  Cushion: Standard    Passport items to be completed:  Get in/out of bed safely, in/out of a vehicle, safely use mobility device, walk or wheel around home/community, navigate up and down stairs, show how to get up/down from the ground, ensure home is accessible, demonstrate HEP, complete caregiver training    Physical Therapy Problems (Active)       Problem: Mobility       Dates: Start:  11/14/23         Goal: STG-Within one week, patient will propel wheelchair 100ft with SBA on level ground indoors       Dates: Start:  11/14/23            Goal: STG-Within one week, patient will ambulate 50ft with FWW and Rajan       Dates: Start:  11/14/23               Problem: Mobility Transfers       Dates: Start:  11/14/23         Goal: STG-Within one week, patient will perform bed mobility with verbal cues for log roll sequencing and AE as needed       Dates: Start:  11/14/23            Goal: STG-Within one week, patient will transfer bed to chair CGA with FWW       Dates: Start:  11/14/23               Problem: PT-Long Term Goals       Dates: Start:  11/14/23         Goal: LTG-By discharge, patient will propel wheelchair 150ft Sherman       Dates: Start:  11/14/23            Goal: LTG-By discharge, patient will ambulate 100ft with FWW Sherman       Dates: Start:  11/14/23            Goal: LTG-By discharge, patient will transfer one surface to another Sherman       Dates: Start:  11/14/23            Goal: LTG-By discharge, patient will perform home exercise program independently       Dates: Start:  11/14/23            Goal: LTG-By discharge, patient will transfer in/out of a car with CGA       " Dates: Start:  11/14/23            Goal: LTG-By discharge, patient will perform bed mobility independently       Dates: Start:  11/14/23

## 2023-11-19 NOTE — CARE PLAN
The patient is Stable - Low risk of patient condition declining or worsening    Shift Goals  Clinical Goals: safety, pain mgmt, sleep  Patient Goals: pain control, sleep  Family Goals: get pt to skilled long term care in [place]    Progress made toward(s) clinical / shift goals:    Problem: Pain - Standard  Goal: Alleviation of pain or a reduction in pain to the patient’s comfort goal  Outcome: Progressing     Patient reports satisfactory pain control and decrease intensity after pharmacological pain management.

## 2023-11-20 ENCOUNTER — APPOINTMENT (OUTPATIENT)
Dept: OCCUPATIONAL THERAPY | Facility: REHABILITATION | Age: 83
DRG: 552 | End: 2023-11-20
Attending: PHYSICAL MEDICINE & REHABILITATION
Payer: MEDICARE

## 2023-11-20 ENCOUNTER — APPOINTMENT (OUTPATIENT)
Dept: PHYSICAL THERAPY | Facility: REHABILITATION | Age: 83
DRG: 552 | End: 2023-11-20
Attending: PHYSICAL MEDICINE & REHABILITATION
Payer: MEDICARE

## 2023-11-20 PROCEDURE — 770010 HCHG ROOM/CARE - REHAB SEMI PRIVAT*

## 2023-11-20 PROCEDURE — 97110 THERAPEUTIC EXERCISES: CPT | Mod: CO

## 2023-11-20 PROCEDURE — 700102 HCHG RX REV CODE 250 W/ 637 OVERRIDE(OP): Performed by: PHYSICAL MEDICINE & REHABILITATION

## 2023-11-20 PROCEDURE — 99232 SBSQ HOSP IP/OBS MODERATE 35: CPT | Performed by: PHYSICAL MEDICINE & REHABILITATION

## 2023-11-20 PROCEDURE — A9270 NON-COVERED ITEM OR SERVICE: HCPCS | Performed by: PHYSICAL MEDICINE & REHABILITATION

## 2023-11-20 PROCEDURE — 97110 THERAPEUTIC EXERCISES: CPT

## 2023-11-20 PROCEDURE — 97530 THERAPEUTIC ACTIVITIES: CPT

## 2023-11-20 PROCEDURE — 97535 SELF CARE MNGMENT TRAINING: CPT

## 2023-11-20 PROCEDURE — 97116 GAIT TRAINING THERAPY: CPT

## 2023-11-20 RX ORDER — LANOLIN ALCOHOL/MO/W.PET/CERES
3 CREAM (GRAM) TOPICAL
Status: DISCONTINUED | OUTPATIENT
Start: 2023-11-20 | End: 2023-11-22 | Stop reason: HOSPADM

## 2023-11-20 RX ORDER — METHOCARBAMOL 500 MG/1
1000 TABLET, FILM COATED ORAL 3 TIMES DAILY
Status: DISCONTINUED | OUTPATIENT
Start: 2023-11-20 | End: 2023-11-22 | Stop reason: HOSPADM

## 2023-11-20 RX ADMIN — SPIRONOLACTONE 25 MG: 25 TABLET, FILM COATED ORAL at 17:27

## 2023-11-20 RX ADMIN — OXYCODONE HYDROCHLORIDE 5 MG: 5 TABLET ORAL at 04:16

## 2023-11-20 RX ADMIN — LEVOTHYROXINE SODIUM 88 MCG: 0.09 TABLET ORAL at 05:46

## 2023-11-20 RX ADMIN — METHOCARBAMOL TABLETS 750 MG: 750 TABLET, COATED ORAL at 12:23

## 2023-11-20 RX ADMIN — ACETAMINOPHEN 1000 MG: 500 TABLET ORAL at 14:12

## 2023-11-20 RX ADMIN — ACETAMINOPHEN 1000 MG: 500 TABLET ORAL at 08:32

## 2023-11-20 RX ADMIN — ATORVASTATIN CALCIUM 40 MG: 40 TABLET, FILM COATED ORAL at 21:02

## 2023-11-20 RX ADMIN — CALCIUM CARBONATE (ANTACID) CHEW TAB 500 MG 500 MG: 500 CHEW TAB at 08:32

## 2023-11-20 RX ADMIN — LIOTHYRONINE SODIUM 5 MCG: 5 TABLET ORAL at 08:37

## 2023-11-20 RX ADMIN — LOSARTAN POTASSIUM 50 MG: 50 TABLET, FILM COATED ORAL at 08:33

## 2023-11-20 RX ADMIN — APIXABAN 10 MG: 5 TABLET, FILM COATED ORAL at 08:32

## 2023-11-20 RX ADMIN — OXYCODONE HYDROCHLORIDE 5 MG: 5 TABLET ORAL at 08:33

## 2023-11-20 RX ADMIN — POLYETHYLENE GLYCOL 3350 1 PACKET: 17 POWDER, FOR SOLUTION ORAL at 08:38

## 2023-11-20 RX ADMIN — OMEPRAZOLE 20 MG: 20 CAPSULE, DELAYED RELEASE ORAL at 08:33

## 2023-11-20 RX ADMIN — ACETAMINOPHEN 1000 MG: 500 TABLET ORAL at 21:01

## 2023-11-20 RX ADMIN — OXYCODONE HYDROCHLORIDE 5 MG: 5 TABLET ORAL at 12:23

## 2023-11-20 RX ADMIN — METHOCARBAMOL TABLETS 750 MG: 750 TABLET, COATED ORAL at 08:33

## 2023-11-20 RX ADMIN — PREGABALIN 100 MG: 100 CAPSULE ORAL at 08:33

## 2023-11-20 RX ADMIN — APIXABAN 10 MG: 5 TABLET, FILM COATED ORAL at 21:02

## 2023-11-20 RX ADMIN — OXYCODONE HYDROCHLORIDE 5 MG: 5 TABLET ORAL at 19:45

## 2023-11-20 RX ADMIN — Medication 200 MG: at 21:01

## 2023-11-20 RX ADMIN — Medication 3 MG: at 21:02

## 2023-11-20 RX ADMIN — PREGABALIN 100 MG: 100 CAPSULE ORAL at 21:02

## 2023-11-20 RX ADMIN — CALCIUM CARBONATE (ANTACID) CHEW TAB 500 MG 500 MG: 500 CHEW TAB at 21:02

## 2023-11-20 RX ADMIN — METHOCARBAMOL 1000 MG: 500 TABLET ORAL at 21:01

## 2023-11-20 ASSESSMENT — PAIN DESCRIPTION - PAIN TYPE
TYPE: SURGICAL PAIN
TYPE: SURGICAL PAIN

## 2023-11-20 ASSESSMENT — GAIT ASSESSMENTS
DISTANCE (FEET): 50
GAIT LEVEL OF ASSIST: CONTACT GUARD ASSIST
GAIT LEVEL OF ASSIST: CONTACT GUARD ASSIST
ASSISTIVE DEVICE: FRONT WHEEL WALKER
DISTANCE (FEET): 50
ASSISTIVE DEVICE: FRONT WHEEL WALKER

## 2023-11-20 ASSESSMENT — ACTIVITIES OF DAILY LIVING (ADL)
TOILET_TRANSFER_DESCRIPTION: GRAB BAR;SET-UP OF EQUIPMENT
TUB_SHOWER_TRANSFER_DESCRIPTION: GRAB BAR;SHOWER BENCH;SET-UP OF EQUIPMENT
BED_CHAIR_WHEELCHAIR_TRANSFER_DESCRIPTION: ADAPTIVE EQUIPMENT;SET-UP OF EQUIPMENT;SUPERVISION FOR SAFETY;VERBAL CUEING;INCREASED TIME;INITIAL PREPARATION FOR TASK

## 2023-11-20 NOTE — CARE PLAN
"The patient is Stable - Low risk of patient condition declining or worsening    Shift Goals  Clinical Goals: Safety  Patient Goals: pain control, sleep  Family Goals: get pt to skilled long term care in [place]    Progress made toward(s) clinical / shift goals:    Problem: Fall Risk - Rehab  Goal: Patient will remain free from falls  Outcome: Progressing       Lexy Garcia Fall risk Assessment : 12    Moderate fall risk Interventions  - Bed and strip alarm   - Yellow sign by the door   - Yellow wrist band \"Fall risk\"  - Room near to the nurse station  - Do not leave patient unattended in the bathroom  - Fall risk education provided    Pt uses call light consistently and appropriately. Waits for assistance does not attempt self transfer this shift. Able to verbalize needs.  "

## 2023-11-20 NOTE — PROGRESS NOTES
NURSING DAILY NOTE    Name: Sheyla Gauthier   Date of Admission: 11/13/2023   Admitting Diagnosis: Radiculopathy  Attending Physician: Mitchell Chavis M.d.  Allergies: Trazodone and Crestor [rosuvastatin calcium]    Safety  Patient Assist  CGA  Patient Precautions  Fall Risk, Spinal / Back Precautions   Precaution Comments  no brace  Bed Transfer Status  Contact Guard Assist  Toilet Transfer Status   Standby Assist  Assistive Devices  Wheelchair  Oxygen  None - Room Air  Diet/Therapeutic Dining  Current Diet Order   Procedures    Diet Order Diet: Regular     Pill Administration  whole  Agitated Behavioral Scale     ABS Level of Severity       Fall Risk  Has the patient had a fall this admission?   No  Lexy Garcia Fall Risk Scoring  12, MODERATE RISK  Fall Risk Safety Measures  bed alarm and chair alarm    Vitals  Temperature: 36.7 °C (98 °F)  Temp src: Oral  Pulse: 60  Respiration: 16  Blood Pressure : 116/51  Blood Pressure MAP (Calculated): 73 MM HG  BP Location: Right, Upper Arm  Patient BP Position: Sitting     Oxygen  Pulse Oximetry: 95 %  O2 (LPM): 0  O2 Delivery Device: None - Room Air    Bowel and Bladder  Last Bowel Movement  11/17/23  Stool Type     Bowel Device  Bathroom  Continent  Bladder: Stress incontinence   Bowel: Continent movement  Bladder Function  Urine Void (mL):  (small)  Number of Times Voided: 1  Urine Color: Yellow  Genitourinary Assessment   Bladder Assessment (WDL):  WDL Except  Urinary Elimination: Incontinence  Urine Color: Yellow  Bladder Device: Bathroom  Time Void: Yes  Bladder Scan: Post Void  $ Bladder Scan Results (mL): 211    Skin  Wyatt Score   19  Sensory Interventions   Bed Types: Standard/Trauma Mattress  Skin Preventative Measures: Pillows in Use for Support / Positioning  Moisture Interventions  Moisturizers/Barriers: Barrier Cream      Pain  Pain Rating Scale  6 - Hard to ignore, avoid usual activities  Pain  Location  Groin  Pain Location Orientation  Left  Pain Interventions   Distraction, Education, Rest    ADLs    Bathing   Shower, Staff (OT)  Linen Change   Partial  Personal Hygiene  Moist Klarissa Wipes, Perineal Care  Chlorhexidine Bath      Oral Care     Teeth/Dentures     Shave     Nutrition Percentage Eaten  *  * Meal *  *, Lunch, Between % Consumed  Environmental Precautions  Treaded Slipper Socks on Patient, Bed in Low Position  Patient Turns/Positioning  Patient Turns Self from Side to Side  Patient Turns Assistance/Tolerance  Assistance of One  Bed Positions  Bed Controls On, Bed Locked  Head of Bed Elevated  Self regulated      Psychosocial/Neurologic Assessment  Psychosocial Assessment  Psychosocial (WDL):  Within Defined Limits  Neurologic Assessment  Neuro (WDL): Exceptions to WDL  Level of Consciousness: Alert  Orientation Level: Oriented X4  Cognition: Follows commands  Speech: Clear  Pupil Assesment: No  EENT (WDL):  WDL Except    Cardio/Pulmonary Assessment  Edema   RLE Edema: 2+  LLE Edema: 2+  Respiratory Breath Sounds     Cardiac Assessment   Cardiac (WDL):  WDL Except (Hx: HTN)

## 2023-11-20 NOTE — CARE PLAN
"  Problem: Fall Risk - Rehab  Goal: Patient will remain free from falls  Note: Lexy Garcia Fall risk Assessment Score: 12      Moderate fall risk Interventions  - Bed and strip alarm   - Yellow sign by the door   - Yellow wrist band \"Fall risk\"  - Room near to the nurse station  - Do not leave patient unattended in the bathroom  - Fall risk education provided      Problem: Pain - Standard  Goal: Alleviation of pain or a reduction in pain to the patient’s comfort goal  Note: Educate patient of non-pharmacological comfort measures: repositioning, relaxation/breathing technique, cold compress and activities.         The patient is Stable - Low risk of patient condition declining or worsening    Shift Goals  Clinical Goals: Pain management  Patient Goals: pain control, sleep  Family Goals: get pt to skilled long term care in [place]      "

## 2023-11-20 NOTE — THERAPY
"Occupational Therapy  Daily Treatment     Patient Name: Sheyla Gauthier  Age:  83 y.o., Sex:  female  Medical Record #: [id number]  Today's Date: 11/20/2023     Precautions  Precautions: Fall Risk, Spinal / Back Precautions   Comments: no brace         Subjective    \"I didn't get a lot of sleep last night because I was in pain.\" Pt was referring to LLE      Objective       11/20/23 0901   OT Charge Group   OT Therapeutic Exercise (Units) 2   OT Total Time Spent   OT Individual Total Time Spent (Mins) 30   Sitting Upper Body Exercises   Chest Press 2 sets of 10;Bilateral;Weight (See Comments for lbs)   Bilateral Row 2 sets of 10;Bilateral;Weight (See Comments for lbs)   Bicep Curls 2 sets of 10;Bilateral;Weight (See Comments for lbs)   Tricep Press 2 sets of 10;Bilateral;Weight (See Comments for lbs)   Upper Extremity Bike Level 4 Resistance  (10min)   Balance   Sitting Balance (Static) Good   Sitting Balance (Dynamic) Fair +   Interdisciplinary Plan of Care Collaboration   Patient Position at End of Therapy Seated;Chair Alarm On   Collaboration Comments seated in room awaiting for next therapy         Assessment    Pt had good activity tolerance and was able to recall spinal precautions while adhering to them during session. Attempted to place pt on Nunstep for UB/LB excersice, however pt was complaining of LLE pain during movement. Pt was agreeable to motomed instead.   Strengths: Able to follow instructions, Alert and oriented, Good carryover of learning, Good insight into deficits/needs, Independent prior level of function, Making steady progress towards goals, Manages pain appropriately, Motivated for self care and independence, Pleasant and cooperative, Supportive family, Willingly participates in therapeutic activities  Barriers: Decreased endurance, Fatigue, Generalized weakness, Impaired balance, Impaired activity tolerance, Limited mobility, Pain    Plan    Continue with ADL's using AE, pain management " and standing balance and tolerance.     DME       Passport items to be completed:  Perform bathroom transfers, complete dressing, complete feeding, get ready for the day, prepare a simple meal, participate in household tasks, adapt home for safety needs, demonstrate home exercise program, complete caregiver training     Occupational Therapy Goals (Active)       Problem: Dressing       Dates: Start:  11/14/23         Goal: STG-Within one week, patient will dress LB at SBA level using DME/AE PRN.       Dates: Start:  11/14/23               Problem: Functional Transfers       Dates: Start:  11/14/23         Goal: STG-Within one week, patient will transfer to toilet at SBA level using DME/AE PRN.       Dates: Start:  11/14/23               Problem: OT Long Term Goals       Dates: Start:  11/14/23         Goal: LTG-By discharge, patient will complete basic self care tasks at SBA-Mod I level using DME/AE PRN.       Dates: Start:  11/14/23            Goal: LTG-By discharge, patient will perform bathroom transfers at SBA-Mod I level using DME/AE PRN.       Dates: Start:  11/14/23               Problem: Toileting       Dates: Start:  11/14/23         Goal: STG-Within one week, patient will complete toileting tasks at SBA level using DME/AE PRN.       Dates: Start:  11/14/23

## 2023-11-20 NOTE — THERAPY
Physical Therapy   Daily Treatment     Patient Name: Sheyla Gauthier  Age:  83 y.o., Sex:  female  Medical Record #: [id number]  Today's Date: 11/20/2023     Precautions  Precautions: Fall Risk, Spinal / Back Precautions   Comments: no brace    Subjective    Pt requests to use bathroom.      Objective       11/20/23 1400   PT Charge Group   PT Gait Training (Units) 1   PT Therapeutic Exercise (Units) 1   PT Total Time Spent   PT Individual Total Time Spent (Mins) 30   Pain 0 - 10 Group   Location Back   Location Orientation Left   Pain Rating Scale (NPRS) 4   Gait Functional Level of Assist    Gait Level Of Assist Contact Guard Assist   Assistive Device Front Wheel Walker   Distance (Feet) 50   # of Times Distance was Traveled 2   Deviation Antalgic;Step To;Bradykinetic;Trendelenberg   Bed Mobility    Sit to Supine Standby Assist   Sit to Stand Standby Assist   Interdisciplinary Plan of Care Collaboration   Patient Position at End of Therapy Seated;Chair Alarm On;Phone within Reach;Tray Table within Reach;Call Light within Reach       Pt supine asleep in bed. Supine to sit with SBA. Pt ambulated to bathroom with fWW and CGA. Toilet transfer with Cga. Pt performed self zackery care. She ambulated back to w/c with FWW and CGA.     Pt ambulated additional bout of 50' with fWW and CGA. She demonstrates antalgic pattern of the LLE resulting in decreased step distance and impaired loading response.     Sit<>stand x 10 with FWW.     Assessment    Pt tolerated session well and was happy that she could ambulate to the bathroom from her bed.   Strengths: Able to follow instructions, Good insight into deficits/needs, Independent prior level of function, Making steady progress towards goals, Motivated for self care and independence, Pleasant and cooperative, Supportive family, Willingly participates in therapeutic activities  Barriers: Decreased endurance, Generalized weakness, Impaired activity tolerance, Impaired balance,  "Pain    Plan    Continue to progress pt's functional independence as per plan of care     DME  PT DME Recommendations  Wheelchair: 16\" Width, Lightweight, Standard Leg Rests  Cushion: Standard    Passport items to be completed:  Get in/out of bed safely, in/out of a vehicle, safely use mobility device, walk or wheel around home/community, navigate up and down stairs, show how to get up/down from the ground, ensure home is accessible, demonstrate HEP, complete caregiver training    Physical Therapy Problems (Active)       Problem: Mobility Transfers       Dates: Start:  11/14/23         Goal: STG-Within one week, patient will perform bed mobility with verbal cues for log roll sequencing and AE as needed       Dates: Start:  11/14/23         Goal Note filed on 11/20/23 9034 by Alan Schaefer PT       Rajan with AE                 Problem: PT-Long Term Goals       Dates: Start:  11/14/23         Goal: LTG-By discharge, patient will propel wheelchair 150ft Sherman       Dates: Start:  11/14/23            Goal: LTG-By discharge, patient will ambulate 100ft with FWW Sherman       Dates: Start:  11/14/23            Goal: LTG-By discharge, patient will transfer one surface to another Sherman       Dates: Start:  11/14/23            Goal: LTG-By discharge, patient will perform home exercise program independently       Dates: Start:  11/14/23            Goal: LTG-By discharge, patient will transfer in/out of a car with CGA       Dates: Start:  11/14/23            Goal: LTG-By discharge, patient will perform bed mobility independently       Dates: Start:  11/14/23              "

## 2023-11-20 NOTE — THERAPY
"Physical Therapy   Daily Treatment     Patient Name: Sheyla Gauthier  Age:  83 y.o., Sex:  female  Medical Record #: [id number]  Today's Date: 11/20/2023     Precautions  Precautions: Fall Risk, Spinal / Back Precautions   Comments: no brace    Subjective    \"I just need to keep moving even though it hurts\"     Objective       11/20/23 0930   PT Charge Group   PT Gait Training (Units) 1   PT Therapeutic Exercise (Units) 2   PT Therapeutic Activities (Units) 1   PT Total Time Spent   PT Individual Total Time Spent (Mins) 60   Gait Functional Level of Assist    Gait Level Of Assist Contact Guard Assist   Assistive Device Front Wheel Walker   Distance (Feet) 50   # of Times Distance was Traveled 1   Deviation Antalgic;Step To;Bradykinetic;Trendelenberg   Wheelchair Functional Level of Assist   Wheelchair Assist Supervised   Distance Wheelchair (Feet or Distance) 150   Wheelchair Description Extra time;Limited by fatigue;Supervision for safety;Verbal cueing   Transfer Functional Level of Assist   Bed, Chair, Wheelchair Transfer Standby Assist   Bed Chair Wheelchair Transfer Description Adaptive equipment;Set-up of equipment;Supervision for safety;Verbal cueing;Increased time;Initial preparation for task  (stand step FWW)   Supine Lower Body Exercise   Bridges Two Legged;2 sets of 10  (pink TB around knees)   Hip Flexion 2 sets of 10;Left  (AAROM)   Hip Abduction Hook Lying;2 sets of 10;Left;Light Resistance Theraband   Sitting Lower Body Exercises   Other Exercises B LE motomed 10 min level 4 0.58   Bed Mobility    Supine to Sit Standby Assist  (set up with gait belt as thigh strap)   Sit to Supine Minimal Assist  (gait belt around B thighs)   Sit to Stand Standby Assist   Neuro-Muscular Treatments   Comments sit to supine via logroll blocked practice with gait belt above B thighs   Interdisciplinary Plan of Care Collaboration   IDT Collaboration with  Physical Therapist   Patient Position at End of Therapy " "Seated;Chair Alarm On;Self Releasing Lap Belt Applied;Call Light within Reach;Tray Table within Reach;Phone within Reach   Collaboration Comments POC     Applied TENS to L hip for 20min at 9mA with improvement in pain and ability to complete therex    Assessment    Pt is making steady progress toward her goals though she continues to be pain limited. She has significantly improved her standing tolerance and ambulation with FWW since admission. She is on track to [place] to [facility] with hired care on 11/24.  Strengths: Able to follow instructions, Good insight into deficits/needs, Independent prior level of function, Making steady progress towards goals, Motivated for self care and independence, Pleasant and cooperative, Supportive family, Willingly participates in therapeutic activities  Barriers: Decreased endurance, Generalized weakness, Impaired activity tolerance, Impaired balance, Pain    Plan    Stair negotiation simulating home set up  L hip ext and abd NRE  Abdominal stabilization  Gait training with emphasis on L stance stability, trial heel wedge  Motomed/Nustep for cardio  Sit to supine with logroll  Pain science education    DME  PT DME Recommendations  Wheelchair: 16\" Width, Lightweight, Standard Leg Rests  Cushion: Standard    Passport items to be completed:  Get in/out of bed safely, in/out of a vehicle, safely use mobility device, walk or wheel around home/community, navigate up and down stairs, show how to get up/down from the ground, ensure home is accessible, demonstrate HEP, complete caregiver training    Physical Therapy Problems (Active)       Problem: Mobility       Dates: Start:  11/14/23         Goal: STG-Within one week, patient will propel wheelchair 100ft with SBA on level ground indoors       Dates: Start:  11/14/23            Goal: STG-Within one week, patient will ambulate 50ft with FWW and Rajan       Dates: Start:  11/14/23               Problem: Mobility Transfers       Dates: Start:  " 11/14/23         Goal: STG-Within one week, patient will perform bed mobility with verbal cues for log roll sequencing and AE as needed       Dates: Start:  11/14/23            Goal: STG-Within one week, patient will transfer bed to chair CGA with FWW       Dates: Start:  11/14/23               Problem: PT-Long Term Goals       Dates: Start:  11/14/23         Goal: LTG-By discharge, patient will propel wheelchair 150ft Sherman       Dates: Start:  11/14/23            Goal: LTG-By discharge, patient will ambulate 100ft with FWW Sherman       Dates: Start:  11/14/23            Goal: LTG-By discharge, patient will transfer one surface to another Sherman       Dates: Start:  11/14/23            Goal: LTG-By discharge, patient will perform home exercise program independently       Dates: Start:  11/14/23            Goal: LTG-By discharge, patient will transfer in/out of a car with CGA       Dates: Start:  11/14/23            Goal: LTG-By discharge, patient will perform bed mobility independently       Dates: Start:  11/14/23

## 2023-11-20 NOTE — PROGRESS NOTES
NURSING DAILY NOTE    Name: Sheyla Gauthier   Date of Admission: 11/13/2023   Admitting Diagnosis: Radiculopathy  Attending Physician: Mitchell Chavis M.d.  Allergies: Trazodone and Crestor [rosuvastatin calcium]    Safety  Patient Assist  CGA  Patient Precautions  Fall Risk, Spinal / Back Precautions   Precaution Comments  no brace  Bed Transfer Status  Contact Guard Assist  Toilet Transfer Status   Standby Assist  Assistive Devices  Wheelchair  Oxygen  None - Room Air  Diet/Therapeutic Dining  Current Diet Order   Procedures    Diet Order Diet: Regular     Pill Administration  whole and one at a time   Agitated Behavioral Scale     ABS Level of Severity       Fall Risk  Has the patient had a fall this admission?   No  Lexy Garcia Fall Risk Scoring  12, MODERATE RISK  Fall Risk Safety Measures  bed alarm and chair alarm    Vitals  Temperature: 36.9 °C (98.4 °F)  Temp src: Oral  Pulse: (!) 58  Respiration: 18  Blood Pressure : (!) 147/9  Blood Pressure MAP (Calculated): 55 MM HG  BP Location: Right, Upper Arm  Patient BP Position: Supine     Oxygen  Pulse Oximetry: 94 %  O2 (LPM): 0  O2 Delivery Device: None - Room Air    Bowel and Bladder  Last Bowel Movement  11/19/23 (per pt)  Stool Type     Bowel Device  Bathroom  Continent  Bladder: Stress incontinence   Bowel: Continent movement  Bladder Function  Urine Void (mL):  (moderate)  Number of Times Voided: 1  Urine Color: Yellow  Genitourinary Assessment   Bladder Assessment (WDL):  WDL Except  Urinary Elimination: Incontinence  Urine Color: Yellow  Bladder Device: Bathroom  Time Void: Yes  Bladder Scan: Post Void  $ Bladder Scan Results (mL): 261    Skin  Wyatt Score   19  Sensory Interventions   Bed Types: Standard/Trauma Mattress  Skin Preventative Measures: Pillows in Use for Support / Positioning  Moisture Interventions  Moisturizers/Barriers: Barrier Cream      Pain  Pain Rating Scale  0 - No  Pain  Pain Location  Groin, Leg  Pain Location Orientation  Left  Pain Interventions   Medication (see MAR), Repositioned, Rest    ADLs    Bathing   Shower, Staff (OT)  Linen Change   Partial  Personal Hygiene  Moist Klarissa Wipes, Perineal Care  Chlorhexidine Bath      Oral Care     Teeth/Dentures     Shave     Nutrition Percentage Eaten  *  * Meal *  *, Lunch, Between % Consumed  Environmental Precautions  Treaded Slipper Socks on Patient, Bed in Low Position  Patient Turns/Positioning  Sitting Up in Wheelchair  Patient Turns Assistance/Tolerance  Assistance of One  Bed Positions  Bed Controls On, Bed Locked  Head of Bed Elevated  Self regulated      Psychosocial/Neurologic Assessment  Psychosocial Assessment  Psychosocial (WDL):  Within Defined Limits  Neurologic Assessment  Neuro (WDL): Exceptions to WDL  Level of Consciousness: Alert  Orientation Level: Oriented X4  Cognition: Follows commands  Speech: Clear  Pupil Assesment: No  EENT (WDL):  WDL Except    Cardio/Pulmonary Assessment  Edema   RLE Edema: 2+  LLE Edema: 2+  Respiratory Breath Sounds     Cardiac Assessment   Cardiac (WDL):  WDL Except (Hx: HTN)

## 2023-11-20 NOTE — DISCHARGE PLANNING
CARI  Tc to [facility] @ [phone] re status of dtr picking up wc at their office; on hold for over 20 minutes and call sent to voice mail.  Message states they will call back within 2 hours.    I faxed results from [employer] [facility] to admission coordinator @ [facility]

## 2023-11-20 NOTE — THERAPY
"Occupational Therapy  Daily Treatment     Patient Name: Sheyla Gauthier  Age:  83 y.o., Sex:  female  Medical Record #: [id number]  Today's Date: 11/20/2023     Precautions  Precautions: (P) Fall Risk, Spinal / Back Precautions   Comments: (P) no brace         Subjective  Patient supine in bed upon arrival, agreeable to OT session. Requested to use bathroom and shower. Reported poor sleep last night due to pain, \"I try to do deep breathing exercises to control the pain..It's so painful that I can't even pee even though I feel like I need to.\" RN aware of pain levels.      Objective     11/20/23 0701   OT Charge Group   OT Self Care / ADL (Units) 4   OT Total Time Spent   OT Individual Total Time Spent (Mins) 60   Precautions   Precautions Fall Risk;Spinal / Back Precautions    Comments no brace   Vitals   O2 Delivery Device None - Room Air   Functional Level of Assist   Bathing Standby Assist  (sup-SBA for seated showering w/ long handled sponge; SBA for standing showering to wash zackery area)   Bathing Description Adaptive equipment;Grab bar;Hand held shower;Long handled bath tool;Initial preparation for task;Set up for shower sleeve;Supervision for safety;Verbal cueing   Upper Body Dressing Modified Independent  (to retrieve, don, and doff long sleeve shirt from w/c level)   Lower Body Dressing Standby Assist  (verbal cues and visual demo for sock aid; SBA to don/doff brief, socks, and pants @ w/c level w/ GB)   Toileting Standby Assist   Bed, Chair, Wheelchair Transfer Standby Assist  (EOB > w/c)   Toilet Transfers Contact Guard Assist  (w/c <> toilet via GB)   Toilet Transfer Description Grab bar;Set-up of equipment   Tub / Shower Transfers Contact Guard Assist  (w/c <> shower bench via GB)   Tub Shower Transfer Description Grab bar;Shower bench;Set-up of equipment   Interdisciplinary Plan of Care Collaboration   IDT Collaboration with  Nursing   Patient Position at End of Therapy Seated;Chair Alarm On;Self " Releasing Lap Belt Applied;Call Light within Reach;Tray Table within Reach   Collaboration Comments Nursing present at start of session     1 minor LOB during toilet > w/c txfr, patient recovered with use of GB.   Although she reported pain last night, there were no reports of pain during morning shower and ADL routine.     Assessment  Patient had good tolerance to session. She adhered to spinal precautions well and made good jroge of AE for LB dressing (reacher and sock aid). She will benefit from blocked txfr training, she required CGA for toilet and shower txfrs and demo'd poor sequencing and safety awareness in regards to positioning of w/c relative to transfer surface. She demo'd the physical skills to progress to consistent SBA for fxl trxfrs.     Strengths: Able to follow instructions, Alert and oriented, Good carryover of learning, Good insight into deficits/needs, Independent prior level of function, Making steady progress towards goals, Manages pain appropriately, Motivated for self care and independence, Pleasant and cooperative, Supportive family, Willingly participates in therapeutic activities  Barriers: Decreased endurance, Fatigue, Generalized weakness, Impaired balance, Impaired activity tolerance, Limited mobility, Pain    Plan  ADL's with AE, standing tolerance/balance, overall strengthening, pain management, spinal precautions in function.     DME       Passport items to be completed:  Perform bathroom transfers, complete dressing, complete feeding, get ready for the day, prepare a simple meal, participate in household tasks, adapt home for safety needs, demonstrate home exercise program, complete caregiver training     Occupational Therapy Goals (Active)       Problem: Dressing       Dates: Start:  11/14/23         Goal: STG-Within one week, patient will dress LB at SBA level using DME/AE PRN.       Dates: Start:  11/14/23               Problem: Functional Transfers       Dates: Start:  11/14/23          Goal: STG-Within one week, patient will transfer to toilet at SBA level using DME/AE PRN.       Dates: Start:  11/14/23               Problem: OT Long Term Goals       Dates: Start:  11/14/23         Goal: LTG-By discharge, patient will complete basic self care tasks at SBA-Mod I level using DME/AE PRN.       Dates: Start:  11/14/23            Goal: LTG-By discharge, patient will perform bathroom transfers at SBA-Mod I level using DME/AE PRN.       Dates: Start:  11/14/23               Problem: Toileting       Dates: Start:  11/14/23         Goal: STG-Within one week, patient will complete toileting tasks at SBA level using DME/AE PRN.       Dates: Start:  11/14/23

## 2023-11-20 NOTE — PROGRESS NOTES
Physical Medicine & Rehabilitation Progress Note    Encounter Date: 11/20/2023    Chief Complaint: Weakness    Interval Events (Subjective):  Seen in bed. Last night pain was not controlled. Only asked for 1 prn medication.     Objective:  VITAL SIGNS: /70   Pulse 64   Temp 36.4 °C (97.5 °F) (Oral)   Resp 20   Wt 63.5 kg (140 lb)   LMP 11/01/1986   SpO2 96%   BMI 25.61 kg/m²   Gen: No acute distress, well developed well nourished adult  HEENT: Normal Cephalic Atraumatic, Normal conjunctiva.   CV: warm extremities, well perfused, no edema  Resp: symmetric chest rise, breathing comfortably on room air  Abd: Soft, Non distended  Extremities: normal bulk, no atrophy  Skin: no visible rashes or lesions.   Neuro: alert, awake  Psych: Mood and affect appropriate and congruent    Laboratory Values:  No results found for this or any previous visit (from the past 72 hour(s)).    Medications:  Scheduled Medications   Medication Dose Frequency    methocarbamol  1,000 mg TID    melatonin  3 mg QHS    acetaminophen  1,000 mg TID    oxyCODONE immediate-release  5 mg BID    apixaban  10 mg BID    [START ON 11/21/2023] apixaban  5 mg BID    atorvastatin  40 mg QHS    calcium carbonate  500 mg BID    levothyroxine  88 mcg AM ES    liothyronine  5 mcg QAM    losartan  50 mg QAM    magnesium oxide  200 mg Q EVENING    polyethylene glycol/lytes  1 Packet DAILY    pregabalin  100 mg BID    spironolactone  25 mg DAILY    Pharmacy Consult Request  1 Each PHARMACY TO DOSE    omeprazole  20 mg DAILY     PRN medications: oxyCODONE immediate-release **OR** oxyCODONE immediate-release **OR** [DISCONTINUED] HYDROmorphone, Respiratory Therapy Consult, hydrALAZINE, [DISCONTINUED] senna-docusate **AND** polyethylene glycol/lytes **AND** magnesium hydroxide **AND** bisacodyl, ondansetron **OR** ondansetron, sodium chloride    Diet:  Current Diet Order   Procedures    Diet Order Diet: Regular       Medical Decision Making and  Plan:  Polyradiculopathy 2/2 to bilateral Lumbar foraminal stenosis  Status post lumbar laminectomy  -Patient has history of prior L4-L5 decompression  - Recent worsening of back pain with radicular pain into right lower extremity, impairing function and recently utilizing a wheelchair in the last month  - Per outpatient documentation, MRI of the lumbar spine reportedly showed an L5-S1 stenosis  - 11/10 patient taken to the OR for L5-S1 laminectomy performed by Dr. Silva  - Spinal precautions in place, does not require bracing  - Continue with PT/OT inpatient  IGC Code / Diagnosis to Support: 0003.9 - Neurologic Conditions: Other Neurologic      Right Popliteal DVT  Provoked from surgery and immobility  -Bilateral Lower extremity ultrasounds - shows right distal popliteal and peroneal occlusion thrombus. Left is negative  -SHERRELL hose on left  -started Eliquis 10mg BID for 7 days then Eliquis 5mg BID for 3 months  -NS aware and approves starting anticoagulation     Hypothyroidism  -On home dose Synthroid     Hypertension  - On home dose losartan and spironolactone  - Blood pressure well controlled     Neurogenic bladder:  - Timed voids with PVR q4H x3. If PVR > 400mL or if patient is unable to void, straight cath patient.     Neurogenic bowel:  -  Colace, Senna BID on admission  - Goal of 1BM/day.  -      Circadian Rhythm disorder:    -11/20 started melatonin for sleep  Recommend lights on during the day/off at night, minimize nighttime interruptions as able.     Mood  - at risk of adjustment disorder, depression, and anxiety due to functional decline     ID:  - at risk for Urinary tract infection     Skin/Wounds:  - Pressure relief q2h while in bed. Close monitoring for signs of breakdown     Pain:  - Neuroceptic - On Tylenol prn, oxycodone 2.5-5mg PRN, Tizanidine PRN,   11/14- schedule oxycodone 5mg BID at 630am and noon  11/15- schedule tizanidine at night for sleep.  11/16- Robaxin 750 mg QID  11/16 increased  oxycodone 10mg BID scheduled  11/17 decreased oxycodone to 5mg BID scheduled  -11/20 increased Robaxin to 1g TID  -scheduled Tylenol 1g TID  - Neuropathic - On Lyrica  -consider left hip intraarticular injection     DVT prophylaxis:  continue eliquis     GI prophylaxis:  On Prilosec 20mg daily      -Follow-up Ortho, PCP    ____________________________________    Mitchell Chavis MD  Physical Medicine & Rehabilitation   Brain Injury Medicine   ____________________________________

## 2023-11-20 NOTE — CARE PLAN
Problem: Mobility Transfers  Goal: STG-Within one week, patient will perform bed mobility with verbal cues for log roll sequencing and AE as needed  Outcome: Progressing  Note: Rajan with AE     Problem: Mobility  Goal: STG-Within one week, patient will propel wheelchair 100ft with SBA on level ground indoors  Outcome: Met  Goal: STG-Within one week, patient will ambulate 50ft with FWW and Rajan  Outcome: Met     Problem: Mobility Transfers  Goal: STG-Within one week, patient will transfer bed to chair CGA with FWW  Outcome: Met

## 2023-11-20 NOTE — DISCHARGE PLANNING
CARI  Tc to dtr Ade who had Stephanie from [facility] on the line; Stephanie states they did no receive order I faxed to them on 11/17; I re faxed order.     Plan is for pt's other dtr to  [facility]/ [facility] in [facility] and bring to [place] tomorrow when she comes to [place].     Tc from Kathy @ [facility]; she received [employer] gold; she needs signed copy of med list and dc summary    Home health NOT available until pt has a PCP in [place]: dtrs aware they need to secure one.

## 2023-11-20 NOTE — DISCHARGE PLANNING
Case Management/IDT follow up.   IDT continues to recommend IRF level of care as patient continue to make progress with all therapies.   Dc date set for 11/24      DC needs:dc home in [place] w/ dtr; travel to [facility] on 11/27; wc ordered - pending; [employer]  obtain pcp in [place] in order to coordinate  home health for PT/OT/RN; follow up with Dr Earnest Silva surgeon     Met with pt / family providing update from IDT and discussed plan of care.    Plan:  dc 11/24.

## 2023-11-21 ENCOUNTER — APPOINTMENT (OUTPATIENT)
Dept: OCCUPATIONAL THERAPY | Facility: REHABILITATION | Age: 83
DRG: 552 | End: 2023-11-21
Attending: PHYSICAL MEDICINE & REHABILITATION
Payer: MEDICARE

## 2023-11-21 ENCOUNTER — APPOINTMENT (OUTPATIENT)
Dept: PHYSICAL THERAPY | Facility: REHABILITATION | Age: 83
DRG: 552 | End: 2023-11-21
Attending: PHYSICAL MEDICINE & REHABILITATION
Payer: MEDICARE

## 2023-11-21 LAB
ALBUMIN SERPL BCP-MCNC: 3.5 G/DL (ref 3.2–4.9)
ALBUMIN/GLOB SERPL: 1.5 G/DL
ALP SERPL-CCNC: 62 U/L (ref 30–99)
ALT SERPL-CCNC: 11 U/L (ref 2–50)
ANION GAP SERPL CALC-SCNC: 9 MMOL/L (ref 7–16)
AST SERPL-CCNC: 14 U/L (ref 12–45)
BILIRUB SERPL-MCNC: 0.3 MG/DL (ref 0.1–1.5)
BUN SERPL-MCNC: 23 MG/DL (ref 8–22)
CALCIUM ALBUM COR SERPL-MCNC: 10 MG/DL (ref 8.5–10.5)
CALCIUM SERPL-MCNC: 9.6 MG/DL (ref 8.5–10.5)
CHLORIDE SERPL-SCNC: 110 MMOL/L (ref 96–112)
CO2 SERPL-SCNC: 23 MMOL/L (ref 20–33)
CREAT SERPL-MCNC: 0.86 MG/DL (ref 0.5–1.4)
ERYTHROCYTE [DISTWIDTH] IN BLOOD BY AUTOMATED COUNT: 49.7 FL (ref 35.9–50)
GFR SERPLBLD CREATININE-BSD FMLA CKD-EPI: 67 ML/MIN/1.73 M 2
GLOBULIN SER CALC-MCNC: 2.3 G/DL (ref 1.9–3.5)
GLUCOSE SERPL-MCNC: 93 MG/DL (ref 65–99)
HCT VFR BLD AUTO: 32.7 % (ref 37–47)
HGB BLD-MCNC: 10.4 G/DL (ref 12–16)
MCH RBC QN AUTO: 27.9 PG (ref 27–33)
MCHC RBC AUTO-ENTMCNC: 31.8 G/DL (ref 32.2–35.5)
MCV RBC AUTO: 87.7 FL (ref 81.4–97.8)
PLATELET # BLD AUTO: 338 K/UL (ref 164–446)
PMV BLD AUTO: 10 FL (ref 9–12.9)
POTASSIUM SERPL-SCNC: 4.4 MMOL/L (ref 3.6–5.5)
PROT SERPL-MCNC: 5.8 G/DL (ref 6–8.2)
RBC # BLD AUTO: 3.73 M/UL (ref 4.2–5.4)
SODIUM SERPL-SCNC: 142 MMOL/L (ref 135–145)
WBC # BLD AUTO: 5.4 K/UL (ref 4.8–10.8)

## 2023-11-21 PROCEDURE — A9270 NON-COVERED ITEM OR SERVICE: HCPCS | Performed by: PHYSICAL MEDICINE & REHABILITATION

## 2023-11-21 PROCEDURE — 97110 THERAPEUTIC EXERCISES: CPT

## 2023-11-21 PROCEDURE — 99233 SBSQ HOSP IP/OBS HIGH 50: CPT | Performed by: PHYSICAL MEDICINE & REHABILITATION

## 2023-11-21 PROCEDURE — 97116 GAIT TRAINING THERAPY: CPT

## 2023-11-21 PROCEDURE — 97535 SELF CARE MNGMENT TRAINING: CPT | Mod: CO

## 2023-11-21 PROCEDURE — 700102 HCHG RX REV CODE 250 W/ 637 OVERRIDE(OP): Performed by: PHYSICAL MEDICINE & REHABILITATION

## 2023-11-21 PROCEDURE — 36415 COLL VENOUS BLD VENIPUNCTURE: CPT

## 2023-11-21 PROCEDURE — 85027 COMPLETE CBC AUTOMATED: CPT

## 2023-11-21 PROCEDURE — 770010 HCHG ROOM/CARE - REHAB SEMI PRIVAT*

## 2023-11-21 PROCEDURE — 80053 COMPREHEN METABOLIC PANEL: CPT

## 2023-11-21 RX ORDER — LIOTHYRONINE SODIUM 5 UG/1
5 TABLET ORAL EVERY MORNING
Qty: 90 TABLET | Refills: 0 | Status: SHIPPED | OUTPATIENT
Start: 2023-11-21

## 2023-11-21 RX ORDER — POLYETHYLENE GLYCOL 3350 17 G/17G
17 POWDER, FOR SOLUTION ORAL
Qty: 30 EACH | Refills: 0 | Status: ON HOLD | OUTPATIENT
Start: 2023-11-21 | End: 2023-12-11

## 2023-11-21 RX ORDER — SPIRONOLACTONE 25 MG/1
25 TABLET ORAL DAILY
Qty: 90 TABLET | Refills: 0 | Status: ON HOLD | OUTPATIENT
Start: 2023-11-21 | End: 2023-11-29

## 2023-11-21 RX ORDER — ATORVASTATIN CALCIUM 40 MG/1
40 TABLET, FILM COATED ORAL
Qty: 90 TABLET | Refills: 0 | Status: SHIPPED | OUTPATIENT
Start: 2023-11-21

## 2023-11-21 RX ORDER — PREGABALIN 100 MG/1
100 CAPSULE ORAL 2 TIMES DAILY
Qty: 60 CAPSULE | Refills: 0 | Status: SHIPPED | OUTPATIENT
Start: 2023-11-21 | End: 2023-12-21

## 2023-11-21 RX ORDER — MAGNESIUM 200 MG
200 TABLET ORAL EVERY EVENING
Qty: 90 TABLET | Refills: 0 | Status: ON HOLD | OUTPATIENT
Start: 2023-11-21 | End: 2023-12-11

## 2023-11-21 RX ORDER — LOSARTAN POTASSIUM 50 MG/1
50 TABLET ORAL EVERY MORNING
Qty: 90 TABLET | Refills: 0 | Status: ON HOLD | OUTPATIENT
Start: 2023-11-21 | End: 2023-11-29

## 2023-11-21 RX ORDER — ACETAMINOPHEN 500 MG
1000 TABLET ORAL 3 TIMES DAILY
Qty: 30 TABLET | Refills: 0 | Status: ON HOLD | OUTPATIENT
Start: 2023-11-21 | End: 2023-12-11

## 2023-11-21 RX ORDER — LEVOTHYROXINE SODIUM 88 UG/1
88 TABLET ORAL
Qty: 90 TABLET | Refills: 0 | Status: SHIPPED | OUTPATIENT
Start: 2023-11-21

## 2023-11-21 RX ORDER — METHOCARBAMOL 1000 MG/1
1000 TABLET, COATED ORAL 3 TIMES DAILY PRN
Qty: 90 TABLET | Refills: 0 | Status: SHIPPED | OUTPATIENT
Start: 2023-11-21 | End: 2023-11-22 | Stop reason: SDUPTHER

## 2023-11-21 RX ADMIN — SPIRONOLACTONE 25 MG: 25 TABLET, FILM COATED ORAL at 17:07

## 2023-11-21 RX ADMIN — Medication 200 MG: at 20:16

## 2023-11-21 RX ADMIN — OXYCODONE HYDROCHLORIDE 5 MG: 5 TABLET ORAL at 01:25

## 2023-11-21 RX ADMIN — OMEPRAZOLE 20 MG: 20 CAPSULE, DELAYED RELEASE ORAL at 07:58

## 2023-11-21 RX ADMIN — METHOCARBAMOL 1000 MG: 500 TABLET ORAL at 15:19

## 2023-11-21 RX ADMIN — METHOCARBAMOL 1000 MG: 500 TABLET ORAL at 07:58

## 2023-11-21 RX ADMIN — LIOTHYRONINE SODIUM 5 MCG: 5 TABLET ORAL at 08:03

## 2023-11-21 RX ADMIN — LEVOTHYROXINE SODIUM 88 MCG: 0.09 TABLET ORAL at 05:39

## 2023-11-21 RX ADMIN — APIXABAN 5 MG: 5 TABLET, FILM COATED ORAL at 20:15

## 2023-11-21 RX ADMIN — METHOCARBAMOL 1000 MG: 500 TABLET ORAL at 20:17

## 2023-11-21 RX ADMIN — PREGABALIN 100 MG: 100 CAPSULE ORAL at 07:58

## 2023-11-21 RX ADMIN — Medication 3 MG: at 20:16

## 2023-11-21 RX ADMIN — ACETAMINOPHEN 1000 MG: 500 TABLET ORAL at 15:20

## 2023-11-21 RX ADMIN — ACETAMINOPHEN 1000 MG: 500 TABLET ORAL at 20:15

## 2023-11-21 RX ADMIN — CALCIUM CARBONATE (ANTACID) CHEW TAB 500 MG 500 MG: 500 CHEW TAB at 20:15

## 2023-11-21 RX ADMIN — LOSARTAN POTASSIUM 50 MG: 50 TABLET, FILM COATED ORAL at 07:58

## 2023-11-21 RX ADMIN — ACETAMINOPHEN 1000 MG: 500 TABLET ORAL at 07:58

## 2023-11-21 RX ADMIN — OXYCODONE HYDROCHLORIDE 5 MG: 5 TABLET ORAL at 12:20

## 2023-11-21 RX ADMIN — CALCIUM CARBONATE (ANTACID) CHEW TAB 500 MG 500 MG: 500 CHEW TAB at 07:58

## 2023-11-21 RX ADMIN — APIXABAN 10 MG: 5 TABLET, FILM COATED ORAL at 07:58

## 2023-11-21 RX ADMIN — OXYCODONE HYDROCHLORIDE 5 MG: 5 TABLET ORAL at 22:13

## 2023-11-21 RX ADMIN — ATORVASTATIN CALCIUM 40 MG: 40 TABLET, FILM COATED ORAL at 20:15

## 2023-11-21 RX ADMIN — PREGABALIN 100 MG: 100 CAPSULE ORAL at 20:17

## 2023-11-21 RX ADMIN — OXYCODONE HYDROCHLORIDE 5 MG: 5 TABLET ORAL at 05:39

## 2023-11-21 ASSESSMENT — GAIT ASSESSMENTS
GAIT LEVEL OF ASSIST: CONTACT GUARD ASSIST
DISTANCE (FEET): 50
ASSISTIVE DEVICE: FRONT WHEEL WALKER

## 2023-11-21 ASSESSMENT — PATIENT HEALTH QUESTIONNAIRE - PHQ9
SUM OF ALL RESPONSES TO PHQ9 QUESTIONS 1 AND 2: 0
1. LITTLE INTEREST OR PLEASURE IN DOING THINGS: NOT AT ALL
2. FEELING DOWN, DEPRESSED, IRRITABLE, OR HOPELESS: NOT AT ALL

## 2023-11-21 ASSESSMENT — ACTIVITIES OF DAILY LIVING (ADL)
TOILETING_LEVEL_OF_ASSIST_DESCRIPTION: GRAB BAR;INCREASED TIME
TOILET_TRANSFER_DESCRIPTION: GRAB BAR;INCREASED TIME
BED_CHAIR_WHEELCHAIR_TRANSFER_DESCRIPTION: ADAPTIVE EQUIPMENT;INCREASED TIME;INITIAL PREPARATION FOR TASK;SET-UP OF EQUIPMENT;SUPERVISION FOR SAFETY;VERBAL CUEING

## 2023-11-21 ASSESSMENT — PAIN DESCRIPTION - PAIN TYPE: TYPE: ACUTE PAIN

## 2023-11-21 NOTE — CARE PLAN
Problem: Functional Transfers  Goal: STG-Within one week, patient will transfer to toilet at SBA level using DME/AE PRN.  Outcome: Progressing     Problem: Dressing  Goal: STG-Within one week, patient will dress LB at SBA level using DME/AE PRN.  Outcome: Met     Problem: Toileting  Goal: STG-Within one week, patient will complete toileting tasks at SBA level using DME/AE PRN.  Outcome: Met

## 2023-11-21 NOTE — CARE PLAN
"  Problem: Fall Risk - Rehab  Goal: Patient will remain free from falls  Note: Lexy Garcia Fall risk Assessment Score: 12  Moderate fall risk Interventions  - Bed and strip alarm   - Yellow sign by the door   - Yellow wrist band \"Fall risk\"  - Room near to the nurse station  - Do not leave patient unattended in the bathroom  - Fall risk education provided      Problem: Pain - Standard  Goal: Alleviation of pain or a reduction in pain to the patient’s comfort goal  Note: Medicated per request for back pain, 7/10.Repositioned with pillows for comfort.Will continue to monitor and assess pain level and medicate as needed.     Problem: Bowel Elimination  Goal: Establish and maintain regular bowel function  Note: Pt is continent of bowel per report.LBM 11/20.Will continue to monitor.         "

## 2023-11-21 NOTE — CARE PLAN
Problem: Fall Risk - Rehab  Goal: Patient will remain free from falls  Outcome: Progressing     Problem: Pain - Standard  Goal: Alleviation of pain or a reduction in pain to the patient’s comfort goal  Outcome: Progressing       The patient is Stable - Low risk of patient condition declining or worsening    Shift Goals  Clinical Goals: Safety  Patient Goals: pain control, sleep  Family Goals: get pt to skilled long term care in [place]

## 2023-11-21 NOTE — PROGRESS NOTES
Physical Medicine & Rehabilitation Progress Note  _____________________________________  Interdisciplinary Team Conference   Most recent IDT on 11/21/2023    IMitchell M.D., was present and led the interdisciplinary team conference on 11/21/2023.  I led the IDT conference and agree with the IDT conference documentation and plan of care as noted below.     Nursing:  Diet Current Diet Order   Procedures    Diet Order Diet: Regular       Eating ADL Independent      % of Last Meal  Oral Nutrition: Between 25-50% Consumed   Sleep    Bowel Last BM: 11/20/23   Bladder    Barriers to Discharge Home: weakness, pain      Physical Therapy:  Bed Mobility    Transfers Standby Assist  Adaptive equipment, Set-up of equipment, Supervision for safety, Verbal cueing, Increased time, Initial preparation for task (stand step FWW)   Mobility Contact Guard Assist   Stairs    Barriers to Discharge Home: weakness, pain      Occupational Therapy:  Grooming Modified Independent, Seated   Bathing Standby Assist (sup-SBA for seated showering w/ long handled sponge; SBA for standing showering to wash zackery area)   UB Dressing Modified Independent (to retrieve, don, and doff long sleeve shirt from w/c level)   LB Dressing Standby Assist (verbal cues and visual demo for sock aid; SBA to don/doff brief, socks, and pants @ w/c level w/ GB)   Toileting Standby Assist   Shower & Transfer    Barriers to Discharge Home: weakness, pain      Respiratory Therapy:  O2 (LPM): 0  O2 Delivery Device: None - Room Air    Case Management:  Continues to work on disposition and DME needs.      Discharge Date/Disposition:  11/24/23  _____________________________________   Encounter Date: 11/21/2023    Chief Complaint: Weakness    Interval Events (Subjective):  Seen in chair. Pain controlled. Took 1 PRN pain med overnight. Tolerating increased robaxin dose.    Objective:  VITAL SIGNS: /59   Pulse 62   Temp 36.6 °C (97.9 °F) (Oral)   Resp 18   Wt  63.5 kg (140 lb)   [facility] 11/01/1985   SpO2 94%   BMI 25.61 kg/m²   Gen: No acute distress, well developed well nourished adult  HEENT: Normal Cephalic Atraumatic, Normal conjunctiva.   CV: warm extremities, well perfused, no edema  Resp: symmetric chest rise, breathing comfortably on room air  Abd: Soft, Non distended  Extremities: normal bulk, no atrophy  Skin: no visible rashes or lesions.   Neuro: alert, awake  Psych: Mood and affect appropriate and congruent    Laboratory Values:  Recent Results (from the past 72 hour(s))   CBC WITHOUT DIFFERENTIAL    Collection Time: 11/21/23  6:11 AM   Result Value Ref Range    WBC 5.4 4.8 - 10.8 K/uL    RBC 3.73 (L) 4.20 - 5.40 M/uL    Hemoglobin 10.4 (L) 12.0 - 16.0 g/dL    Hematocrit 32.7 (L) 37.0 - 47.0 %    MCV 87.7 81.4 - 97.8 fL    MCH 27.9 27.0 - 33.0 pg    MCHC 31.8 (L) 32.2 - 35.5 g/dL    RDW 49.7 35.9 - 50.0 fL    Platelet Count 338 164 - 446 K/uL    MPV 10.0 9.0 - 12.9 fL   Comp Metabolic Panel    Collection Time: 11/21/23  6:11 AM   Result Value Ref Range    Sodium 142 135 - 145 mmol/L    Potassium 4.4 3.6 - 5.5 mmol/L    Chloride 110 96 - 112 mmol/L    Co2 23 20 - 33 mmol/L    Anion Gap 9.0 7.0 - 16.0    Glucose 93 65 - 99 mg/dL    Bun 23 (H) 8 - 22 mg/dL    Creatinine 0.86 0.50 - 1.40 mg/dL    Calcium 9.6 8.5 - 10.5 mg/dL    Correct Calcium 10.0 8.5 - 10.5 mg/dL    AST(SGOT) 14 12 - 45 U/L    ALT(SGPT) 11 2 - 50 U/L    Alkaline Phosphatase 62 30 - 99 U/L    Total Bilirubin 0.3 0.1 - 1.5 mg/dL    Albumin 3.5 3.2 - 4.9 g/dL    Total Protein 5.8 (L) 6.0 - 8.2 g/dL    Globulin 2.3 1.9 - 3.5 g/dL    A-G Ratio 1.5 g/dL   ESTIMATED GFR    Collection Time: 11/21/23  6:11 AM   Result Value Ref Range    GFR (CKD-EPI) 67 >60 mL/min/1.73 m 2       Medications:  Scheduled Medications   Medication Dose Frequency    methocarbamol  1,000 mg TID    melatonin  3 mg QHS    acetaminophen  1,000 mg TID    oxyCODONE immediate-release  5 mg BID    apixaban  5 mg BID    atorvastatin   40 mg QHS    calcium carbonate  500 mg BID    levothyroxine  88 mcg AM ES    liothyronine  5 mcg QAM    losartan  50 mg QAM    magnesium oxide  200 mg Q EVENING    polyethylene glycol/lytes  1 Packet DAILY    pregabalin  100 mg BID    spironolactone  25 mg DAILY    Pharmacy Consult Request  1 Each PHARMACY TO DOSE    omeprazole  20 mg DAILY     PRN medications: oxyCODONE immediate-release **OR** oxyCODONE immediate-release **OR** [DISCONTINUED] HYDROmorphone, Respiratory Therapy Consult, hydrALAZINE, [DISCONTINUED] senna-docusate **AND** polyethylene glycol/lytes **AND** magnesium hydroxide **AND** bisacodyl, ondansetron **OR** ondansetron, sodium chloride    Diet:  Current Diet Order   Procedures    Diet Order Diet: Regular       Medical Decision Making and Plan:  Polyradiculopathy 2/2 to bilateral Lumbar foraminal stenosis  Status post lumbar laminectomy  -Patient has history of prior L4-L5 decompression  - Recent worsening of back pain with radicular pain into right lower extremity, impairing function and recently utilizing a wheelchair in the last month  - Per outpatient documentation, MRI of the lumbar spine reportedly showed an L5-S1 stenosis  - 11/10 patient taken to the OR for L5-S1 laminectomy performed by Dr. Silva  - Spinal precautions in place, does not require bracing  - Continue with PT/OT inpatient  IGC Code / Diagnosis to Support: 0003.9 - Neurologic Conditions: Other Neurologic      Right Popliteal DVT  Provoked from surgery and immobility  -Bilateral Lower extremity ultrasounds - shows right distal popliteal and peroneal occlusion thrombus. Left is negative  -SHERRELL hose on left  -started Eliquis 10mg BID for 7 days then Eliquis 5mg BID for 3 months  -NS aware and approves starting anticoagulation  -no concerns or bleeding since starting full anticoagulation     Hypothyroidism  -On home dose Synthroid     Hypertension  - On home dose losartan and spironolactone  - Blood pressure well controlled      Neurogenic bladder:  - Timed voids with PVR q4H x3. If PVR > 400mL or if patient is unable to void, straight cath patient.     Neurogenic bowel:  -  Colace, Senna BID on admission  - Goal of 1BM/day.  -      Circadian Rhythm disorder:    -11/20 started melatonin for sleep  Recommend lights on during the day/off at night, minimize nighttime interruptions as able.     Mood  - at risk of adjustment disorder, depression, and anxiety due to functional decline     ID:  - at risk for Urinary tract infection     Skin/Wounds:  - Pressure relief q2h while in bed. Close monitoring for signs of breakdown     Pain:  - Neuroceptic - On Tylenol prn, oxycodone 2.5-5mg PRN, Tizanidine PRN,   11/14- schedule oxycodone 5mg BID at 630am and noon  11/15- schedule tizanidine at night for sleep.  11/16- Robaxin 750 mg QID  11/16 increased oxycodone 10mg BID scheduled  11/17 decreased oxycodone to 5mg BID scheduled  -11/20 increased Robaxin to 1g TID  -scheduled Tylenol 1g TID  - Neuropathic - On Lyrica  -consider left hip intraarticular injection outpatent  -Patient has pain medication from her pain physician, will continue previous outpatient regiment at discharge     DVT prophylaxis:  continue eliquis     GI prophylaxis:  On Prilosec 20mg daily      -Follow-up Ortho, PCP    ____________________________________    Mitchell Chavis MD  Physical Medicine & Rehabilitation   Brain Injury Medicine   ____________________________________    Total time:  60 minutes. Time spent included pre-rounding, review of vitals and tests, unit/floor time, face-to-face time with the patient including physical examination, care coordination, counseling of patient and/or family, ordering medications/procedures/tests, discussion with CM, PT, OT, SLP and/or other healthcare providers, and documentation in the electronic medical record. Topics discussed included disposition.

## 2023-11-21 NOTE — PROGRESS NOTES
NURSING DAILY NOTE    Name: Sheyla Gauthier   Date of Admission: 11/13/2023   Admitting Diagnosis: Radiculopathy  Attending Physician: Mitchell Chavis M.d.  Allergies: Trazodone and Crestor [rosuvastatin calcium]    Safety  Patient Assist  CGA  Patient Precautions  Fall Risk, Spinal / Back Precautions   Precaution Comments  no brace  Bed Transfer Status  Standby Assist  Toilet Transfer Status   Contact Guard Assist (w/c <> toilet via GB)  Assistive Devices  Wheelchair  Oxygen  None - Room Air  Diet/Therapeutic Dining  Current Diet Order   Procedures    Diet Order Diet: Regular     Pill Administration  whole  Agitated Behavioral Scale     ABS Level of Severity       Fall Risk  Has the patient had a fall this admission?   No  Lexy Garcia Fall Risk Scoring  12, MODERATE RISK  Fall Risk Safety Measures  bed alarm and chair alarm    Vitals  Temperature: 36.4 °C (97.6 °F)  Temp src: Oral  Pulse: 62  Respiration: 20  Blood Pressure : 102/66  Blood Pressure MAP (Calculated): 78 MM HG  BP Location: Left, Upper Arm  Patient BP Position: Supine     Oxygen  Pulse Oximetry: 94 %  O2 (LPM): 0  O2 Delivery Device: None - Room Air    Bowel and Bladder  Last Bowel Movement  11/20/23  Stool Type     Bowel Device  Bathroom  Continent  Bladder: Stress incontinence   Bowel: Continent movement  Bladder Function  Urine Void (mL):  (moderate)  Number of Times Voided: 1  Urine Color: Unable To Evaluate  Genitourinary Assessment   Bladder Assessment (WDL):  WDL Except  Urinary Elimination: Incontinence  Urine Color: Unable To Evaluate  Bladder Device: Bathroom  Time Void: Yes  Bladder Scan: Post Void  $ Bladder Scan Results (mL): 261    Skin  Wyatt Score   19  Sensory Interventions   Bed Types: Standard/Trauma Mattress  Skin Preventative Measures: Pillows in Use for Support / Positioning  Moisture Interventions  Moisturizers/Barriers: Barrier Cream      Pain  Pain Rating  Scale  4 - Distracts me, can do usual activities  Pain Location  Back  Pain Location Orientation  Left  Pain Interventions   Medication (see MAR), Repositioned, Rest    ADLs    Bathing   Shower, Staff (OT)  Linen Change   Partial  Personal Hygiene  Moist Klarissa Wipes, Perineal Care  Chlorhexidine Bath      Oral Care     Teeth/Dentures     Shave     Nutrition Percentage Eaten  Between 25-50% Consumed  Environmental Precautions  Treaded Slipper Socks on Patient, Bed in Low Position  Patient Turns/Positioning  Patient Turns Self from Side to Side  Patient Turns Assistance/Tolerance  Assistance of One  Bed Positions  Bed Controls On, Bed Locked  Head of Bed Elevated  Self regulated      Psychosocial/Neurologic Assessment  Psychosocial Assessment  Psychosocial (WDL):  Within Defined Limits  Neurologic Assessment  Neuro (WDL): Exceptions to WDL  Level of Consciousness: Alert  Orientation Level: Oriented X4  Cognition: Follows commands  Speech: Clear  Pupil Assesment: No  EENT (WDL):  WDL Except    Cardio/Pulmonary Assessment  Edema   RLE Edema: 2+  LLE Edema: 2+  Respiratory Breath Sounds     Cardiac Assessment   Cardiac (WDL):  WDL Except (Hx: HTN)

## 2023-11-21 NOTE — PROGRESS NOTES
NURSING DAILY NOTE    Name: Sheyla Gauthier   Date of Admission: 11/13/2023   Admitting Diagnosis: Radiculopathy  Attending Physician: Mitchell Chavis M.d.  Allergies: Trazodone and Crestor [rosuvastatin calcium]    Safety  Patient Assist  CGA  Patient Precautions  Fall Risk, Spinal / Back Precautions   Precaution Comments  no brace  Bed Transfer Status  Standby Assist  Toilet Transfer Status   Contact Guard Assist (w/c <> toilet via GB)  Assistive Devices  Wheelchair  Oxygen  None - Room Air  Diet/Therapeutic Dining  Current Diet Order   Procedures    Diet Order Diet: Regular     Pill Administration  whole  Agitated Behavioral Scale     ABS Level of Severity       Fall Risk  Has the patient had a fall this admission?   No  Lexy Garcia Fall Risk Scoring  12, MODERATE RISK  Fall Risk Safety Measures  bed alarm and chair alarm    Vitals  Temperature: 36.4 °C (97.6 °F)  Temp src: Oral  Pulse: 62  Respiration: 20  Blood Pressure : 102/66  Blood Pressure MAP (Calculated): 78 MM HG  BP Location: Left, Upper Arm  Patient BP Position: Supine     Oxygen  Pulse Oximetry: 94 %  O2 (LPM): 0  O2 Delivery Device: None - Room Air    Bowel and Bladder  Last Bowel Movement  11/20/23  Stool Type     Bowel Device  Bathroom  Continent  Bladder: Stress incontinence   Bowel: Continent movement  Bladder Function  Urine Void (mL):  (moderate)  Number of Times Voided: 1  Urine Color: Unable To Evaluate  Genitourinary Assessment   Bladder Assessment (WDL):  WDL Except  Urinary Elimination: Incontinence  Urine Color: Unable To Evaluate  Bladder Device: Bathroom  Time Void: Yes  Bladder Scan: Post Void  $ Bladder Scan Results (mL): 294    Skin  Wyatt Score   19  Sensory Interventions   Bed Types: Standard/Trauma Mattress  Skin Preventative Measures: Pillows in Use for Support / Positioning  Moisture Interventions  Moisturizers/Barriers: Barrier Cream      Pain  Pain Rating  Scale  7 - Focus of attention, prevents doing daily activities  Pain Location  Back  Pain Location Orientation  Left  Pain Interventions    (sleeping)    ADLs    Bathing   Shower, Staff (OT)  Linen Change   Partial  Personal Hygiene  Moist Klarissa Wipes, Perineal Care  Chlorhexidine Bath      Oral Care     Teeth/Dentures     Shave     Nutrition Percentage Eaten  Between 25-50% Consumed  Environmental Precautions  Treaded Slipper Socks on Patient, Bed in Low Position  Patient Turns/Positioning  Patient Turns Self from Side to Side  Patient Turns Assistance/Tolerance  Assistance of One  Bed Positions  Bed Controls On, Bed Locked  Head of Bed Elevated  Self regulated      Psychosocial/Neurologic Assessment  Psychosocial Assessment  Psychosocial (WDL):  Within Defined Limits  Neurologic Assessment  Neuro (WDL): Exceptions to WDL  Level of Consciousness: Alert  Orientation Level: Oriented X4  Cognition: Follows commands  Speech: Clear  Pupil Assesment: No  EENT (WDL):  WDL Except    Cardio/Pulmonary Assessment  Edema   RLE Edema: 2+  LLE Edema: 2+  Respiratory Breath Sounds     Cardiac Assessment   Cardiac (WDL):  WDL Except (Hx: HTN)

## 2023-11-21 NOTE — THERAPY
Occupational Therapy  Daily Treatment     Patient Name: Sheyla Gauthier  Age:  83 y.o., Sex:  female  Medical Record #: [id number]  Today's Date: 11/21/2023     Precautions  Precautions: Fall Risk, Spinal / Back Precautions   Comments: no brace         Subjective    Pt up in w/c upon arrival, agreeable to OT session.      Objective     11/21/23 1301   OT Charge Group   OT Therapeutic Exercise (Units) 4   OT Total Time Spent   OT Individual Total Time Spent (Mins) 60   Functional Level of Assist   Bed, Chair, Wheelchair Transfer Standby Assist   Bed Chair Wheelchair Transfer Description Adaptive equipment;Increased time;Initial preparation for task;Set-up of equipment;Supervision for safety;Verbal cueing   Sitting Upper Body Exercises   Chest Press 2 sets of 10;Bilateral;Weight (See Comments for lbs)  (3-4# dumbbell)   Front Arm Raise 2 sets of 10;Bilateral;Weight (See Comments for lbs)  (3-4# dumbbell)   Side Arm Raise 2 sets of 10;Bilateral;Weight (See Comments for lbs)  (3-4# dumbbell)   Shoulder Press 2 sets of 10;Bilateral;Weight (See Comments for lbs)  (3-4# dumbbell)   Shoulder Extension 2 sets of 10;Bilateral;Weight (See Comments for lbs)  (3-4# dumbbell)   Internal Shoulder Rotation 2 sets of 10;Bilateral;Weight (See Comments for lbs)  (3-4# dumbbell)   External Shoulder Rotation 2 sets of 10;Bilateral;Weight (See Comments for lbs)  (3-4# dumbbell)   Bilateral Row 2 sets of 10;Bilateral;Weight (See Comments for lbs)  (3-4# dumbbell)   Bicep Curls 2 sets of 10;Bilateral;Weight (See Comments for lbs)  (3-4# dumbbell)   Pronation / Supination 2 sets of 10;Bilateral;Weight (See Comments for lbs)  (3-4# dumbbell)   Wrist Flexion / Extension 2 sets of 10;Bilateral;Weight (See Comments for lbs)  (3-4# dumbbell)   Sitting Lower Body Exercises   Nustep Resistance Level 3;Resistance Level 6  (5 min level 6, 5 min level 3 on Nustep with one RB)   Bed Mobility    Sit to Supine Standby Assist   Interdisciplinary Plan  of Care Collaboration   Patient Position at End of Therapy In Bed;Bed Alarm On;Call Light within Reach;Tray Table within Reach;Phone within Reach         Issued UE HEP for above exercises which pt demo'd as noted in flowsheet    Assessment    Pt tolerated UE exercises well to progress strength and endurance for transfers and functional daily activities. Min cues for technique on second set.     Strengths: Able to follow instructions, Alert and oriented, Good carryover of learning, Good insight into deficits/needs, Independent prior level of function, Making steady progress towards goals, Manages pain appropriately, Motivated for self care and independence, Pleasant and cooperative, Supportive family, Willingly participates in therapeutic activities  Barriers: Decreased endurance, Fatigue, Generalized weakness, Impaired balance, Impaired activity tolerance, Limited mobility, Pain    Plan    ADL's with AE, standing tolerance/balance, overall strengthening, pain management, spinal precautions in function.      DME       Passport items to be completed:  Perform bathroom transfers, complete dressing, complete feeding, get ready for the day, prepare a simple meal, participate in household tasks, adapt home for safety needs, demonstrate home exercise program, complete caregiver training     Occupational Therapy Goals (Active)       Problem: Functional Transfers       Dates: Start:  11/14/23         Goal: STG-Within one week, patient will transfer to toilet at SBA level using DME/AE PRN.       Dates: Start:  11/14/23               Problem: OT Long Term Goals       Dates: Start:  11/14/23         Goal: LTG-By discharge, patient will complete basic self care tasks at SBA-Mod I level using DME/AE PRN.       Dates: Start:  11/14/23            Goal: LTG-By discharge, patient will perform bathroom transfers at SBA-Mod I level using DME/AE PRN.       Dates: Start:  11/14/23

## 2023-11-21 NOTE — THERAPY
"Physical Therapy   Daily Treatment  Note reflects 2 nonconsecutive treatment sessions, the first from [id number] and the second from [id number]     Patient Name: Sheyla Gauthier  Age:  83 y.o., Sex:  female  Medical Record #: [id number]  Today's Date: 11/21/2023     Precautions  Precautions: Fall Risk, Spinal / Back Precautions   Comments: no brace    Subjective    \"You're pissing me off! You wake me up early in the morning, drag me out of bed, and bring me to this gym expecting me to do exercise. Dont you know my leg hurts.     Pt was educated that 700 am is a regularly scheduled therapy time.         Objective       11/21/23 0701   PT Charge Group   PT Gait Training (Units) 3   PT Therapeutic Exercise (Units) 3   PT Total Time Spent   PT Individual Total Time Spent (Mins) 90   Gait Functional Level of Assist    Gait Level Of Assist Contact Guard Assist   Assistive Device Front Wheel Walker   Distance (Feet) 50   # of Times Distance was Traveled 2   Stairs Functional Level of Assist   Level of Assist with Stairs Moderate Assist   # of Stairs Climbed 1   Stairs Description Extra time;Hand rails   Bed Mobility    Supine to Sit Standby Assist   Sit to Stand Standby Assist   Interdisciplinary Plan of Care Collaboration   Patient Position at End of Therapy Seated;Chair Alarm On  (in dining room)       First session-  Pt supine in bed. Supine to sit with SBA. Stand pivot to w/c with CGA.     Pt ambulated 2 bouts of 50' with fWW and CGA. She demonstrates antalgic pattern of the LLE with impaired loading response. Initially step to gait, able to progress to step through with VC. She demonstrates significant reliance on FWW support.     Pt attempted stairs however was unable to complete and yelled at therapist when this therapist attempted to provide cues/solution for improved performance. She performed repeated step ups x 5 B with CGA    Pt performed static standing marching in front of mirror with cues to decrease " "trendelenberg and upright posture with minimal improvements.     Second session-     Pt reports significant L hip pain \"I just want to cry\".     Pt ambulated 2 bouts of 35' with FWW and CGA with aforementioned gait deviations. She reported significant fatigue.     Pt performed car transfer with FWW and CGA. She demonstrated good safety awareness and sequencing during task.     Assessment    Pt tolerated session fairly and continues to ambulate just 50' with CGA. She continues to be limited by LLE weakness and pain.   Strengths: Able to follow instructions, Good insight into deficits/needs, Independent prior level of function, Making steady progress towards goals, Motivated for self care and independence, Pleasant and cooperative, Supportive family, Willingly participates in therapeutic activities  Barriers: Decreased endurance, Generalized weakness, Impaired activity tolerance, Impaired balance, Pain    Plan    Continue to progress pt's functional independence and ambulation distances.    DME  PT DME Recommendations  Wheelchair: 16\" Width, Lightweight, Standard Leg Rests  Cushion: Standard    Passport items to be completed:  Get in/out of bed safely, in/out of a vehicle, safely use mobility device, walk or wheel around home/community, navigate up and down stairs, show how to get up/down from the ground, ensure home is accessible, demonstrate HEP, complete caregiver training    Physical Therapy Problems (Active)       Problem: Mobility Transfers       Dates: Start:  11/14/23         Goal: STG-Within one week, patient will perform bed mobility with verbal cues for log roll sequencing and AE as needed       Dates: Start:  11/14/23         Goal Note filed on 11/20/23 1254 by Alan Schaefer, PT       Rajan with AE                 Problem: PT-Long Term Goals       Dates: Start:  11/14/23         Goal: LTG-By discharge, patient will propel wheelchair 150ft Sherman       Dates: Start:  11/14/23            Goal: LTG-By discharge, " patient will ambulate 100ft with FWW Sherman       Dates: Start:  11/14/23            Goal: LTG-By discharge, patient will transfer one surface to another Sherman       Dates: Start:  11/14/23            Goal: LTG-By discharge, patient will perform home exercise program independently       Dates: Start:  11/14/23            Goal: LTG-By discharge, patient will transfer in/out of a car with CGA       Dates: Start:  11/14/23            Goal: LTG-By discharge, patient will perform bed mobility independently       Dates: Start:  11/14/23

## 2023-11-21 NOTE — DISCHARGE PLANNING
CARI  Tc to [facility] @ [phone] - spoke w/ Cora  She reports they do not have an account for patient and they have no information on a referral; no record of receivng it.   I informed her I faxed it twice; once on 11/17 and then again on 11/20.     I inquired if there was a fax to send it to; she replied she does not have a fax.  Therefore, [facility] is not an option since they cannot find referral. .     Tc to [facility] in [facility] ; spoke with Ayad who who states they only provide 02 service.     Tc to pt's dtr Ade; pt's other dtr Luh will rent a wheelchair from [employer] in [place] and pay privately for item for 1 month.    Addendum 1513  Tc to [facility] office; spoke with Stephanie who states they never received a fax; I confirmed the fax no again . I refaxed order a 3rd time and requested [facility] deliver wc to pt's assisted living  facility.

## 2023-11-21 NOTE — THERAPY
"Occupational Therapy  Daily Treatment     Patient Name: Sheyla Gauthier  Age:  83 y.o., Sex:  female  Medical Record #: [id number]  Today's Date: 11/21/2023     Precautions  Precautions: Fall Risk, Spinal / Back Precautions   Comments: no brace         Subjective    Patient was seated in w/c. Agreeable to OT session. Pt stated, \"I feel okay today, I slept a lot better\"     Objective       11/21/23 0901   OT Charge Group   OT Self Care / ADL (Units) 2   OT Total Time Spent   OT Individual Total Time Spent (Mins) 30   Functional Level of Assist   Grooming Modified Independent   Grooming Description Seated in wheelchair at sink   Upper Body Dressing Modified Independent   Upper Body Dressing Description   (Mod I for clothing retrievel from w/c. Ind to verena/doff pullover shirt and verena sports bra.)   Toileting Standby Assist   Toileting Description Grab bar;Increased time   Toilet Transfers Contact Guard Assist   Toilet Transfer Description Grab bar;Increased time   Balance   Sitting Balance (Static) Good   Sitting Balance (Dynamic) Good   Standing Balance (Static) Fair -   Standing Balance (Dynamic) Poor -   Weight Shift Sitting Fair   Interdisciplinary Plan of Care Collaboration   Patient Position at End of Therapy Seated;Chair Alarm On   Collaboration Comments wiating for next therapy session     Stand pivot transfer from w/c>toilet with no LOB, however encouraged pt in functional amb from toilet to sink using FWW but pt was feeling unsafe d/t pain and weakness. Pt was placed in w/c and preformed w/c management to sink to complete grooming/hygiene tasks seated.     Assessment    She adhered to spinal precautions while preforming transfers and toileting. Pt may benefit in static weight shifting to increase WB-ing on LLE to improver pt safety reassurance.      Strengths: Able to follow instructions, Alert and oriented, Good carryover of learning, Good insight into deficits/needs, Independent prior level of function, " Making steady progress towards goals, Manages pain appropriately, Motivated for self care and independence, Pleasant and cooperative, Supportive family, Willingly participates in therapeutic activities  Barriers: Decreased endurance, Fatigue, Generalized weakness, Impaired balance, Impaired activity tolerance, Limited mobility, Pain    Plan    Standing tolerance/balance  I/ADL's   Strengthening and endurance     DME       Passport items to be completed:  Perform bathroom transfers, complete dressing, complete feeding, get ready for the day, prepare a simple meal, participate in household tasks, adapt home for safety needs, demonstrate home exercise program, complete caregiver training     Occupational Therapy Goals (Active)       Problem: Functional Transfers       Dates: Start:  11/14/23         Goal: STG-Within one week, patient will transfer to toilet at SBA level using DME/AE PRN.       Dates: Start:  11/14/23               Problem: OT Long Term Goals       Dates: Start:  11/14/23         Goal: LTG-By discharge, patient will complete basic self care tasks at SBA-Mod I level using DME/AE PRN.       Dates: Start:  11/14/23            Goal: LTG-By discharge, patient will perform bathroom transfers at SBA-Mod I level using DME/AE PRN.       Dates: Start:  11/14/23

## 2023-11-22 ENCOUNTER — HOSPITAL ENCOUNTER (INPATIENT)
Facility: MEDICAL CENTER | Age: 83
LOS: 7 days | DRG: 522 | End: 2023-11-29
Attending: STUDENT IN AN ORGANIZED HEALTH CARE EDUCATION/TRAINING PROGRAM | Admitting: STUDENT IN AN ORGANIZED HEALTH CARE EDUCATION/TRAINING PROGRAM
Payer: MEDICARE

## 2023-11-22 ENCOUNTER — APPOINTMENT (OUTPATIENT)
Dept: RADIOLOGY | Facility: REHABILITATION | Age: 83
DRG: 552 | End: 2023-11-22
Attending: PHYSICAL MEDICINE & REHABILITATION
Payer: MEDICARE

## 2023-11-22 ENCOUNTER — APPOINTMENT (OUTPATIENT)
Dept: OCCUPATIONAL THERAPY | Facility: REHABILITATION | Age: 83
DRG: 522 | End: 2023-11-22
Attending: PHYSICAL MEDICINE & REHABILITATION
Payer: MEDICARE

## 2023-11-22 ENCOUNTER — APPOINTMENT (OUTPATIENT)
Dept: PHYSICAL THERAPY | Facility: REHABILITATION | Age: 83
DRG: 522 | End: 2023-11-22
Attending: PHYSICAL MEDICINE & REHABILITATION
Payer: MEDICARE

## 2023-11-22 VITALS
WEIGHT: 140 LBS | OXYGEN SATURATION: 93 % | HEART RATE: 74 BPM | BODY MASS INDEX: 25.61 KG/M2 | TEMPERATURE: 97.8 F | DIASTOLIC BLOOD PRESSURE: 60 MMHG | RESPIRATION RATE: 20 BRPM | SYSTOLIC BLOOD PRESSURE: 114 MMHG

## 2023-11-22 DIAGNOSIS — D50.9 IRON DEFICIENCY ANEMIA, UNSPECIFIED IRON DEFICIENCY ANEMIA TYPE: ICD-10-CM

## 2023-11-22 DIAGNOSIS — S72.002A LEFT DISPLACED FEMORAL NECK FRACTURE (HCC): ICD-10-CM

## 2023-11-22 DIAGNOSIS — K21.9 GASTROESOPHAGEAL REFLUX DISEASE WITHOUT ESOPHAGITIS: ICD-10-CM

## 2023-11-22 DIAGNOSIS — E83.39 HYPOPHOSPHATEMIA: ICD-10-CM

## 2023-11-22 PROBLEM — Z98.890 S/P LUMBAR LAMINECTOMY: Status: ACTIVE | Noted: 2023-11-22

## 2023-11-22 PROCEDURE — 97530 THERAPEUTIC ACTIVITIES: CPT

## 2023-11-22 PROCEDURE — A9270 NON-COVERED ITEM OR SERVICE: HCPCS | Performed by: STUDENT IN AN ORGANIZED HEALTH CARE EDUCATION/TRAINING PROGRAM

## 2023-11-22 PROCEDURE — 99232 SBSQ HOSP IP/OBS MODERATE 35: CPT | Performed by: PHYSICAL MEDICINE & REHABILITATION

## 2023-11-22 PROCEDURE — 99223 1ST HOSP IP/OBS HIGH 75: CPT | Mod: AI | Performed by: STUDENT IN AN ORGANIZED HEALTH CARE EDUCATION/TRAINING PROGRAM

## 2023-11-22 PROCEDURE — 700102 HCHG RX REV CODE 250 W/ 637 OVERRIDE(OP): Performed by: PHYSICAL MEDICINE & REHABILITATION

## 2023-11-22 PROCEDURE — A9270 NON-COVERED ITEM OR SERVICE: HCPCS | Performed by: PHYSICAL MEDICINE & REHABILITATION

## 2023-11-22 PROCEDURE — 770001 HCHG ROOM/CARE - MED/SURG/GYN PRIV*

## 2023-11-22 PROCEDURE — 73502 X-RAY EXAM HIP UNI 2-3 VIEWS: CPT | Mod: LT

## 2023-11-22 PROCEDURE — 700102 HCHG RX REV CODE 250 W/ 637 OVERRIDE(OP): Performed by: STUDENT IN AN ORGANIZED HEALTH CARE EDUCATION/TRAINING PROGRAM

## 2023-11-22 PROCEDURE — 97116 GAIT TRAINING THERAPY: CPT

## 2023-11-22 RX ORDER — ONDANSETRON 2 MG/ML
4 INJECTION INTRAMUSCULAR; INTRAVENOUS EVERY 4 HOURS PRN
Status: DISCONTINUED | OUTPATIENT
Start: 2023-11-22 | End: 2023-11-29 | Stop reason: HOSPADM

## 2023-11-22 RX ORDER — ACETAMINOPHEN 500 MG
1000 TABLET ORAL 3 TIMES DAILY
Status: DISCONTINUED | OUTPATIENT
Start: 2023-11-22 | End: 2023-11-29 | Stop reason: HOSPADM

## 2023-11-22 RX ORDER — METHOCARBAMOL 500 MG/1
1000 TABLET, FILM COATED ORAL 3 TIMES DAILY
Status: DISCONTINUED | OUTPATIENT
Start: 2023-11-22 | End: 2023-11-29 | Stop reason: HOSPADM

## 2023-11-22 RX ORDER — POLYETHYLENE GLYCOL 3350 17 G/17G
1 POWDER, FOR SOLUTION ORAL
Status: DISCONTINUED | OUTPATIENT
Start: 2023-11-22 | End: 2023-11-29 | Stop reason: HOSPADM

## 2023-11-22 RX ORDER — LIOTHYRONINE SODIUM 5 UG/1
5 TABLET ORAL EVERY MORNING
Status: DISCONTINUED | OUTPATIENT
Start: 2023-11-23 | End: 2023-11-29 | Stop reason: HOSPADM

## 2023-11-22 RX ORDER — AMOXICILLIN 250 MG
2 CAPSULE ORAL 2 TIMES DAILY
Status: DISCONTINUED | OUTPATIENT
Start: 2023-11-22 | End: 2023-11-29 | Stop reason: HOSPADM

## 2023-11-22 RX ORDER — ATORVASTATIN CALCIUM 40 MG/1
40 TABLET, FILM COATED ORAL
Status: DISCONTINUED | OUTPATIENT
Start: 2023-11-22 | End: 2023-11-29 | Stop reason: HOSPADM

## 2023-11-22 RX ORDER — ONDANSETRON 4 MG/1
4 TABLET, ORALLY DISINTEGRATING ORAL EVERY 4 HOURS PRN
Status: DISCONTINUED | OUTPATIENT
Start: 2023-11-22 | End: 2023-11-29 | Stop reason: HOSPADM

## 2023-11-22 RX ORDER — LEVOTHYROXINE SODIUM 88 UG/1
88 TABLET ORAL
Status: DISCONTINUED | OUTPATIENT
Start: 2023-11-23 | End: 2023-11-29 | Stop reason: HOSPADM

## 2023-11-22 RX ORDER — BISACODYL 10 MG
10 SUPPOSITORY, RECTAL RECTAL
Status: DISCONTINUED | OUTPATIENT
Start: 2023-11-22 | End: 2023-11-29 | Stop reason: HOSPADM

## 2023-11-22 RX ORDER — METHOCARBAMOL 1000 MG/1
1000 TABLET, COATED ORAL 3 TIMES DAILY
Qty: 270 TABLET | Refills: 0 | Status: SHIPPED | OUTPATIENT
Start: 2023-11-22

## 2023-11-22 RX ORDER — MORPHINE SULFATE 4 MG/ML
1 INJECTION INTRAVENOUS EVERY 4 HOURS PRN
Status: DISCONTINUED | OUTPATIENT
Start: 2023-11-22 | End: 2023-11-23

## 2023-11-22 RX ORDER — SPIRONOLACTONE 25 MG/1
25 TABLET ORAL DAILY
Status: DISCONTINUED | OUTPATIENT
Start: 2023-11-23 | End: 2023-11-29 | Stop reason: HOSPADM

## 2023-11-22 RX ORDER — PREGABALIN 100 MG/1
100 CAPSULE ORAL 2 TIMES DAILY
Status: DISCONTINUED | OUTPATIENT
Start: 2023-11-22 | End: 2023-11-29 | Stop reason: HOSPADM

## 2023-11-22 RX ORDER — LOSARTAN POTASSIUM 50 MG/1
50 TABLET ORAL EVERY MORNING
Status: DISCONTINUED | OUTPATIENT
Start: 2023-11-23 | End: 2023-11-29 | Stop reason: HOSPADM

## 2023-11-22 RX ADMIN — OXYCODONE HYDROCHLORIDE 5 MG: 5 TABLET ORAL at 18:09

## 2023-11-22 RX ADMIN — METHOCARBAMOL 1000 MG: 500 TABLET ORAL at 15:32

## 2023-11-22 RX ADMIN — APIXABAN 5 MG: 5 TABLET, FILM COATED ORAL at 08:24

## 2023-11-22 RX ADMIN — OMEPRAZOLE 20 MG: 20 CAPSULE, DELAYED RELEASE ORAL at 08:22

## 2023-11-22 RX ADMIN — LOSARTAN POTASSIUM 50 MG: 50 TABLET, FILM COATED ORAL at 08:23

## 2023-11-22 RX ADMIN — LEVOTHYROXINE SODIUM 88 MCG: 0.09 TABLET ORAL at 05:39

## 2023-11-22 RX ADMIN — BISACODYL 10 MG: 10 SUPPOSITORY RECTAL at 12:53

## 2023-11-22 RX ADMIN — SPIRONOLACTONE 25 MG: 25 TABLET, FILM COATED ORAL at 17:02

## 2023-11-22 RX ADMIN — OXYCODONE HYDROCHLORIDE 5 MG: 5 TABLET ORAL at 12:59

## 2023-11-22 RX ADMIN — METHOCARBAMOL 1000 MG: 500 TABLET ORAL at 08:22

## 2023-11-22 RX ADMIN — METHOCARBAMOL 1000 MG: 500 TABLET ORAL at 22:47

## 2023-11-22 RX ADMIN — OXYCODONE HYDROCHLORIDE 5 MG: 5 TABLET ORAL at 05:39

## 2023-11-22 RX ADMIN — ATORVASTATIN CALCIUM 40 MG: 40 TABLET, FILM COATED ORAL at 22:47

## 2023-11-22 RX ADMIN — POLYETHYLENE GLYCOL 3350 1 PACKET: 17 POWDER, FOR SOLUTION ORAL at 08:30

## 2023-11-22 RX ADMIN — ACETAMINOPHEN 1000 MG: 500 TABLET ORAL at 08:23

## 2023-11-22 RX ADMIN — PREGABALIN 100 MG: 100 CAPSULE ORAL at 08:23

## 2023-11-22 RX ADMIN — CALCIUM CARBONATE (ANTACID) CHEW TAB 500 MG 500 MG: 500 CHEW TAB at 08:24

## 2023-11-22 RX ADMIN — ACETAMINOPHEN 1000 MG: 500 TABLET ORAL at 15:32

## 2023-11-22 RX ADMIN — LIOTHYRONINE SODIUM 5 MCG: 5 TABLET ORAL at 08:22

## 2023-11-22 ASSESSMENT — GAIT ASSESSMENTS
DEVIATION: ANTALGIC;TRENDELENBERG;BRADYKINETIC
GAIT LEVEL OF ASSIST: CONTACT GUARD ASSIST
ASSISTIVE DEVICE: FRONT WHEEL WALKER
DISTANCE (FEET): 45

## 2023-11-22 ASSESSMENT — PAIN DESCRIPTION - PAIN TYPE
TYPE: ACUTE PAIN

## 2023-11-22 ASSESSMENT — ACTIVITIES OF DAILY LIVING (ADL)
TUB_SHOWER_TRANSFER_DESCRIPTION: GRAB BAR;INCREASED TIME;SUPERVISION FOR SAFETY;SET-UP OF EQUIPMENT;VERBAL CUEING
BED_CHAIR_WHEELCHAIR_TRANSFER_DESCRIPTION: ADAPTIVE EQUIPMENT;SET-UP OF EQUIPMENT;SUPERVISION FOR SAFETY;VERBAL CUEING
TUB_SHOWER_TRANSFER_DESCRIPTION: GRAB BAR;INCREASED TIME;SUPERVISION FOR SAFETY;SET-UP OF EQUIPMENT;VERBAL CUEING

## 2023-11-22 ASSESSMENT — ENCOUNTER SYMPTOMS
BACK PAIN: 1
WEAKNESS: 1

## 2023-11-22 ASSESSMENT — FIBROSIS 4 INDEX: FIB4 SCORE: 1.02

## 2023-11-22 NOTE — DISCHARGE PLANNING
"Cm  Tc form Jazmin @ [facility] in [place]; she needs medical records in order to admit pt; for continuity of care, I am able to send records for their review;  requested records faxed.      Tc from pt's dtr Ade; she states assisted [facility] facility in [place] needs updated paperwork completed before pt is able to be admitted to [facility]; dtr faxed me \"602\" form which has been completed except for \"admission orders\" as confirmed by Jazmin @ [facility] that those forms do not needs to be completed; once MD signs, I will email to [facility]     Plan is for pt to dc on 11/24; MD will need to sign DC summary on 11/24 and I will email to [facility].     Addendum:  [facility] in [place] is able to provide wc/cushion for patient; [facility] will deliver to [facility] on Friday AM.  Updated both of pt's dtrs.  \"602\" form completed and faxed to [facility] in [place].      Addendum 1600: TC to Kathy @ [facility]; call transferred to their [facility] as Kathy is out of facility; I requested she call me back on Friday to confirm acceptance.   "

## 2023-11-22 NOTE — CARE PLAN
"The patient is Stable - Low risk of patient condition declining or worsening    Shift Goals  Clinical Goals: Safety  Patient Goals: pain control, sleep  Family Goals: get pt to skilled long term care in [place]    Problem: Fall Risk - Rehab  Goal: Patient will remain free from falls  Outcome: Progressing  Note: Lexy [facility] Fall risk Assessment Score: 12    Moderate fall risk Interventions  - Bed and strip alarm   - Yellow sign by the door   - Yellow wrist band \"Fall risk\"  - Room near to the nurse station  - Do not leave patient unattended in the bathroom  - Fall risk education provided    Problem: Pain - Standard  Goal: Alleviation of pain or a reduction in pain to the patient’s comfort goal  Outcome: Progressing  Note: Patient able to verbalize pain level and verbalize an acceptable level of pain. Oxycodone and Tylenol for pain when needed.         "

## 2023-11-22 NOTE — PROGRESS NOTES
NURSING DAILY NOTE    Name: Sheyla Gauthier   Date of Admission: 11/13/2023   Admitting Diagnosis: Radiculopathy  Attending Physician: Mitchell Chavis M.d.  Allergies: Trazodone and Crestor [rosuvastatin calcium]    Safety  Patient Assist  CGA  Patient Precautions  Fall Risk, Spinal / Back Precautions   Precaution Comments  no brace  Bed Transfer Status  Standby Assist  Toilet Transfer Status   Contact Guard Assist  Assistive Devices  Rails, Wheelchair  Oxygen  None - Room Air  Diet/Therapeutic Dining  Current Diet Order   Procedures    Diet Order Diet: Regular     Pill Administration  whole  Agitated Behavioral Scale     ABS Level of Severity       Fall Risk  Has the patient had a fall this admission?   No  Lexy Garcia Fall Risk Scoring  12, MODERATE RISK  Fall Risk Safety Measures  bed alarm and chair alarm    Vitals  Temperature: 36.3 °C (97.3 °F)  Temp src: Oral  Pulse: 72  Respiration: 20  Blood Pressure : 100/58  Blood Pressure MAP (Calculated): 72 MM HG  BP Location: Right, Upper Arm  Patient BP Position: Sitting     Oxygen  Pulse Oximetry: 96 %  O2 (LPM): 0  O2 Delivery Device: None - Room Air    Bowel and Bladder  Last Bowel Movement  11/20/23  Stool Type     Bowel Device  Bathroom  Continent  Bladder: Stress incontinence   Bowel: Continent movement  Bladder Function  Urine Void (mL):  (moderate)  Number of Times Voided: 1  Urine Color: Yellow  Genitourinary Assessment   Bladder Assessment (WDL):  WDL Except  Urinary Elimination: Incontinence  Urine Color: Yellow  Bladder Device: Bathroom  Time Void: Yes  Bladder Scan: Post Void  $ Bladder Scan Results (mL):  (ref to scan Q after void)    Skin  Wyatt Score   19  Sensory Interventions   Bed Types: Standard/Trauma Mattress  Skin Preventative Measures: Pillows in Use for Support / Positioning  Moisture Interventions  Moisturizers/Barriers: Barrier Cream      Pain  Pain Rating Scale  7 - Focus of  attention, prevents doing daily activities  Pain Location  Leg  Pain Location Orientation  Left  Pain Interventions   Medication (see MAR)    ADLs    Bathing   Patient Refused Bathing  Linen Change   Partial  Personal Hygiene  Perineal Care, Moist Klarissa Wipes  Chlorhexidine Bath      Oral Care     Teeth/Dentures     Shave     Nutrition Percentage Eaten  Between 50-75% Consumed, Dinner  Environmental Precautions  Treaded Slipper Socks on Patient  Patient Turns/Positioning  Patient Turns Self from Side to Side  Patient Turns Assistance/Tolerance  Assistance of One  Bed Positions  Bed Controls On, Bed Locked  Head of Bed Elevated  Self regulated      Psychosocial/Neurologic Assessment  Psychosocial Assessment  Psychosocial (WDL):  Within Defined Limits  Neurologic Assessment  Neuro (WDL): Exceptions to WDL  Level of Consciousness: Alert  Orientation Level: Oriented X4  Cognition: Follows commands  Speech: Clear  Pupil Assesment: No  EENT (WDL):  WDL Except    Cardio/Pulmonary Assessment  Edema   RLE Edema: 2+  LLE Edema: 2+  Respiratory Breath Sounds     Cardiac Assessment   Cardiac (WDL):  WDL Except (Hx: HTN)

## 2023-11-22 NOTE — PROGRESS NOTES
Physical Medicine & Rehabilitation Progress Note    Encounter Date: 11/22/2023    Chief Complaint: Weakness    Interval Events (Subjective):  Seen in bed. Pain controlled. Swelling improved    Objective:  VITAL SIGNS: /68   Pulse 68   Temp 36.7 °C (98.1 °F) (Oral)   Resp 18   Wt 63.5 kg (140 lb)   LMP 11/01/1986   SpO2 96%   BMI 25.61 kg/m²   Gen: No acute distress, well developed well nourished adult  HEENT: Normal Cephalic Atraumatic, Normal conjunctiva.   CV: warm extremities, well perfused,  1+ bilateral leg edema  Resp: symmetric chest rise, breathing comfortably on room air  Abd: Soft, Non distended  Extremities: normal bulk, no atrophy  Skin: no visible rashes or lesions.   Neuro: alert, awake  Psych: Mood and affect appropriate and congruent    Laboratory Values:  Recent Results (from the past 72 hour(s))   CBC WITHOUT DIFFERENTIAL    Collection Time: 11/21/23  6:11 AM   Result Value Ref Range    WBC 5.4 4.8 - 10.8 K/uL    RBC 3.73 (L) 4.20 - 5.40 M/uL    Hemoglobin 10.4 (L) 12.0 - 16.0 g/dL    Hematocrit 32.7 (L) 37.0 - 47.0 %    MCV 87.7 81.4 - 97.8 fL    MCH 27.9 27.0 - 33.0 pg    MCHC 31.8 (L) 32.2 - 35.5 g/dL    RDW 49.7 35.9 - 50.0 fL    Platelet Count 338 164 - 446 K/uL    MPV 10.0 9.0 - 12.9 fL   Comp Metabolic Panel    Collection Time: 11/21/23  6:11 AM   Result Value Ref Range    Sodium 142 135 - 145 mmol/L    Potassium 4.4 3.6 - 5.5 mmol/L    Chloride 110 96 - 112 mmol/L    Co2 23 20 - 33 mmol/L    Anion Gap 9.0 7.0 - 16.0    Glucose 93 65 - 99 mg/dL    Bun 23 (H) 8 - 22 mg/dL    Creatinine 0.86 0.50 - 1.40 mg/dL    Calcium 9.6 8.5 - 10.5 mg/dL    Correct Calcium 10.0 8.5 - 10.5 mg/dL    AST(SGOT) 14 12 - 45 U/L    ALT(SGPT) 11 2 - 50 U/L    Alkaline Phosphatase 62 30 - 99 U/L    Total Bilirubin 0.3 0.1 - 1.5 mg/dL    Albumin 3.5 3.2 - 4.9 g/dL    Total Protein 5.8 (L) 6.0 - 8.2 g/dL    Globulin 2.3 1.9 - 3.5 g/dL    A-G Ratio 1.5 g/dL   ESTIMATED GFR    Collection Time: 11/21/23   6:11 AM   Result Value Ref Range    GFR (CKD-EPI) 67 >60 mL/min/1.73 m 2       Medications:  Scheduled Medications   Medication Dose Frequency    methocarbamol  1,000 mg TID    melatonin  3 mg QHS    acetaminophen  1,000 mg TID    oxyCODONE immediate-release  5 mg BID    apixaban  5 mg BID    atorvastatin  40 mg QHS    calcium carbonate  500 mg BID    levothyroxine  88 mcg AM ES    liothyronine  5 mcg QAM    losartan  50 mg QAM    magnesium oxide  200 mg Q EVENING    polyethylene glycol/lytes  1 Packet DAILY    pregabalin  100 mg BID    spironolactone  25 mg DAILY    Pharmacy Consult Request  1 Each PHARMACY TO DOSE    omeprazole  20 mg DAILY     PRN medications: oxyCODONE immediate-release **OR** oxyCODONE immediate-release **OR** [DISCONTINUED] HYDROmorphone, Respiratory Therapy Consult, hydrALAZINE, [DISCONTINUED] senna-docusate **AND** polyethylene glycol/lytes **AND** magnesium hydroxide **AND** bisacodyl, ondansetron **OR** ondansetron, sodium chloride    Diet:  Current Diet Order   Procedures    Diet Order Diet: Regular       Medical Decision Making and Plan:  Polyradiculopathy 2/2 to bilateral Lumbar foraminal stenosis  Status post lumbar laminectomy  -Patient has history of prior L4-L5 decompression  - Recent worsening of back pain with radicular pain into right lower extremity, impairing function and recently utilizing a wheelchair in the last month  - Per outpatient documentation, MRI of the lumbar spine reportedly showed an L5-S1 stenosis  - 11/10 patient taken to the OR for L5-S1 laminectomy performed by Dr. Silva  - Spinal precautions in place, does not require bracing  - Continue with PT/OT inpatient  IGC Code / Diagnosis to Support: 0003.9 - Neurologic Conditions: Other Neurologic      Right Popliteal DVT  Provoked from surgery and immobility  -Bilateral Lower extremity ultrasounds - shows right distal popliteal and peroneal occlusion thrombus. Left is negative  -SHERRELL hose on left  -started Eliquis  10mg BID for 7 days then Eliquis 5mg BID for 3 months  -NS aware and approves starting anticoagulation  -no concerns or bleeding since starting full anticoagulation     Hypothyroidism  -On home dose Synthroid     Hypertension  - On home dose losartan and spironolactone  - Blood pressure well controlled     Neurogenic bladder:  - Timed voids with PVR q4H x3. If PVR > 400mL or if patient is unable to void, straight cath patient.     Neurogenic bowel:  -  Colace, Senna BID on admission  - Goal of 1BM/day.  -      Circadian Rhythm disorder:    -11/20 started melatonin for sleep  Recommend lights on during the day/off at night, minimize nighttime interruptions as able.     Mood  - at risk of adjustment disorder, depression, and anxiety due to functional decline     ID:  - at risk for Urinary tract infection     Skin/Wounds:  - Pressure relief q2h while in bed. Close monitoring for signs of breakdown     Pain:  - Neuroceptic - On Tylenol prn, oxycodone 2.5-5mg PRN, Tizanidine PRN,   11/14- schedule oxycodone 5mg BID at 630am and noon  11/15- schedule tizanidine at night for sleep.  11/16- Robaxin 750 mg QID  11/16 increased oxycodone 10mg BID scheduled  11/17 decreased oxycodone to 5mg BID scheduled  -11/20 increased Robaxin to 1g TID  -scheduled Tylenol 1g TID  - Neuropathic - On Lyrica  -consider left hip intraarticular injection outpatent  -continue oxycodone scheduled + PRN and Robaxin scheduled  -Patient has pain medication from her pain physician, will continue previous outpatient regiment at discharge     DVT prophylaxis:  continue eliquis     GI prophylaxis:  On Prilosec 20mg daily      -Follow-up Ortho, PCP    ____________________________________    Mitchell Chavis MD  Physical Medicine & Rehabilitation   Brain Injury Medicine   ____________________________________

## 2023-11-22 NOTE — PROGRESS NOTES
NURSING DAILY NOTE    Name: Sheyla Gauthier   Date of Admission: 11/13/2023   Admitting Diagnosis: Radiculopathy  Attending Physician: Mitchell Chavis M.d.  Allergies: Trazodone and Crestor [rosuvastatin calcium]    Safety  Patient Assist  CGA  Patient Precautions  Fall Risk, Spinal / Back Precautions   Precaution Comments  no brace  Bed Transfer Status  Standby Assist  Toilet Transfer Status   Contact Guard Assist  Assistive Devices  Wheelchair  Oxygen  None - Room Air  Diet/Therapeutic Dining  Current Diet Order   Procedures    Diet Order Diet: Regular     Pill Administration  whole  Agitated Behavioral Scale     ABS Level of Severity       Fall Risk  Has the patient had a fall this admission?   No  Lexy Garcia Fall Risk Scoring  12, MODERATE RISK  Fall Risk Safety Measures  bed alarm and chair alarm    Vitals  Temperature: 36.3 °C (97.3 °F)  Temp src: Oral  Pulse: 72  Respiration: 20  Blood Pressure : 100/58  Blood Pressure MAP (Calculated): 72 MM HG  BP Location: Right, Upper Arm  Patient BP Position: Sitting     Oxygen  Pulse Oximetry: 96 %  O2 (LPM): 0  O2 Delivery Device: None - Room Air    Bowel and Bladder  Last Bowel Movement  11/20/23  Stool Type     Bowel Device  Bathroom  Continent  Bladder: Stress incontinence   Bowel: Continent movement  Bladder Function  Urine Void (mL):  (moderate)  Number of Times Voided: 1  Urine Color: Unable To Evaluate  Genitourinary Assessment   Bladder Assessment (WDL):  WDL Except  Urinary Elimination: Incontinence  Urine Color: Unable To Evaluate  Bladder Device: Bathroom  Time Void: Yes  Bladder Scan: Post Void  $ Bladder Scan Results (mL): 294    Skin  Wyatt Score   19  Sensory Interventions   Bed Types: Standard/Trauma Mattress  Skin Preventative Measures: Pillows in Use for Support / Positioning  Moisture Interventions  Moisturizers/Barriers: Barrier Cream      Pain  Pain Rating Scale  5 - Interrupts some  activities  Pain Location  Leg  Pain Location Orientation  Right, Left  Pain Interventions    (sleeping)    ADLs    Bathing   Shower, Staff (OT)  Linen Change   Partial  Personal Hygiene  Moist Klarissa Wipes, Perineal Care  Chlorhexidine Bath      Oral Care     Teeth/Dentures     Shave     Nutrition Percentage Eaten  Between 50-75% Consumed, Dinner  Environmental Precautions  Treaded Slipper Socks on Patient  Patient Turns/Positioning  Patient Turns Self from Side to Side  Patient Turns Assistance/Tolerance  Assistance of One  Bed Positions  Bed Controls On, Bed Locked  Head of Bed Elevated  Self regulated      Psychosocial/Neurologic Assessment  Psychosocial Assessment  Psychosocial (WDL):  Within Defined Limits  Neurologic Assessment  Neuro (WDL): Exceptions to WDL  Level of Consciousness: Alert  Orientation Level: Oriented X4  Cognition: Follows commands  Speech: Clear  Pupil Assesment: No  EENT (WDL):  WDL Except    Cardio/Pulmonary Assessment  Edema   RLE Edema: 2+  LLE Edema: 2+  Respiratory Breath Sounds     Cardiac Assessment   Cardiac (WDL):  WDL Except (Hx: HTN)

## 2023-11-22 NOTE — THERAPY
Occupational Therapy  Daily Treatment     Patient Name: Sheyla Gauthier  Age:  83 y.o., Sex:  female  Medical Record #: [id number]  Today's Date: 11/22/2023     Precautions  Precautions: Fall Risk, Spinal / Back Precautions   Comments: no brace         Subjective    Pt seen 2x this day for FT with dtmarshal Snider, and agreeable to both sessions.     Objective       11/22/23 0831 11/22/23 1031   OT Charge Group   OT Therapy Activity (Units) 4 2   OT Total Time Spent   OT Individual Total Time Spent (Mins) 60 30   Pain   Intervention Emotional Support;Rest;Repositioned  --    Pain 0 - 10 Group   Location Hip  --    Location Orientation Left  --    Pain Rating Scale (NPRS) 5  --    Description Aching;Constant  --    Comfort Goal Perform Activity;Comfort with Movement  --    Therapist Pain Assessment During Activity  --    Cognition    Level of Consciousness Alert Alert   Functional Level of Assist   Tub / Shower Transfers Contact Guard Assist Contact Guard Assist   Tub Shower Transfer Description Grab bar;Increased time;Supervision for safety;Set-up of equipment;Verbal cueing  (**dry step in shower transfer, reversing over 4 inch barrer with FWW and cues for sequencing to sit on shower chair.) Grab bar;Increased time;Supervision for safety;Set-up of equipment;Verbal cueing  (**dry step in shower transfer, reversing over 4 inch barrer with FWW and cues for sequencing to sit on shower chair. Completed with dtr as part of FT)   IADL Treatments   IADL Treatments Kitchen mobility education  --    Kitchen Mobility Education Completd kitchen mobility education and training from a w/c level with SBA-SPV required. Cues for sequencing, body mechanics, and problem solving. Pt will have a narrow kitchen space in [facility] and likely will only be making coffee in the AM and snacks during the day. Educated on increasing accessibility all over the apt for spinal precautions and w/c level accessibility, with use of reacher for items out of safe  reach. Pt able to lock breaks and stand to counter tops, accessing upper/lower cabinets, and fridge/freezer, but unable to weight shift to the left side. Expressed that setting items up on R-side of kitchen may be better to dtr.  --    Interdisciplinary Plan of Care Collaboration   IDT Collaboration with  Physical Therapist;Family / Caregiver Physical Therapist;Family / Caregiver   Patient Position at End of Therapy Seated;Chair Alarm On;Self Releasing Lap Belt Applied;Family / Friend in Room Seated;Chair Alarm On;Self Releasing Lap Belt Applied;Family / Friend in Room   Collaboration Comments Dtr Agatha present during session with FT initiated. Pass off to PT at end of session, Dtr Agatha present during session with FT initiated.     Family training completed with Daughter who were on time and present in person for all training. Reviewed and educated re: CLOF with ADL's, functional transfers, DME recommendations, spinal precautions, home safety, and level of supervision. OTR answered all questions recommending shower chair, removal of shower doors from shower in [facility], and checking with [facility] regarding if they have a call light system vs, pendent and assist for toilet transfers. Continued training planned for tomorrow with bathing and dressing education.  Issued handout for reducing falls in the home with focus on removal of throw rugs and keeping walkways clear.     Assessment    Pt tolerated both OT tx sessions well with focus on w/c level home access, shower transfers, and reducing fall risk at home. Pt continues to require CGA-SBA for mobility and transfers due to R-hip pain limitations. FT initiated and plan for further training tomorrow, but so far dtr reports that she is feeling confident she will be able to provide pt with care in [facility].  Strengths: Able to follow instructions, Alert and oriented, Good carryover of learning, Good insight into deficits/needs, Independent prior level of function, Making steady progress  towards goals, Manages pain appropriately, Motivated for self care and independence, Pleasant and cooperative, Supportive family, Willingly participates in therapeutic activities  Barriers: Decreased endurance, Fatigue, Generalized weakness, Impaired balance, Impaired activity tolerance, Limited mobility, Pain    Plan    DC shower tomorrow with FT at 8:30  ADL's with AE, standing tolerance/balance, overall strengthening, pain management, spinal precautions in function.       DME        Passport items to be completed:  Perform bathroom transfers, complete dressing, complete feeding, get ready for the day, prepare a simple meal, participate in household tasks, adapt home for safety needs, demonstrate home exercise program, complete caregiver training     Occupational Therapy Goals (Active)       Problem: Functional Transfers       Dates: Start:  11/14/23         Goal: STG-Within one week, patient will transfer to toilet at SBA level using DME/AE PRN.       Dates: Start:  11/14/23               Problem: OT Long Term Goals       Dates: Start:  11/14/23         Goal: LTG-By discharge, patient will complete basic self care tasks at SBA-Mod I level using DME/AE PRN.       Dates: Start:  11/14/23            Goal: LTG-By discharge, patient will perform bathroom transfers at SBA-Mod I level using DME/AE PRN.       Dates: Start:  11/14/23

## 2023-11-23 ENCOUNTER — APPOINTMENT (OUTPATIENT)
Dept: RADIOLOGY | Facility: MEDICAL CENTER | Age: 83
DRG: 522 | End: 2023-11-23
Attending: STUDENT IN AN ORGANIZED HEALTH CARE EDUCATION/TRAINING PROGRAM
Payer: MEDICARE

## 2023-11-23 PROBLEM — I82.401 ACUTE DEEP VEIN THROMBOSIS (DVT) OF RIGHT LOWER EXTREMITY (HCC): Status: ACTIVE | Noted: 2023-11-23

## 2023-11-23 PROBLEM — S72.002A LEFT DISPLACED FEMORAL NECK FRACTURE (HCC): Status: ACTIVE | Noted: 2023-11-23

## 2023-11-23 LAB
ALBUMIN SERPL BCP-MCNC: 3.8 G/DL (ref 3.2–4.9)
ALBUMIN/GLOB SERPL: 1.5 G/DL
ALP SERPL-CCNC: 68 U/L (ref 30–99)
ALT SERPL-CCNC: 15 U/L (ref 2–50)
ANION GAP SERPL CALC-SCNC: 11 MMOL/L (ref 7–16)
APTT PPP: 25 SEC (ref 24.7–36)
APTT PPP: 75.3 SEC (ref 24.7–36)
AST SERPL-CCNC: 16 U/L (ref 12–45)
BASOPHILS # BLD AUTO: 0.9 % (ref 0–1.8)
BASOPHILS # BLD: 0.05 K/UL (ref 0–0.12)
BILIRUB SERPL-MCNC: 0.4 MG/DL (ref 0.1–1.5)
BUN SERPL-MCNC: 23 MG/DL (ref 8–22)
CALCIUM ALBUM COR SERPL-MCNC: 10.1 MG/DL (ref 8.5–10.5)
CALCIUM SERPL-MCNC: 9.9 MG/DL (ref 8.5–10.5)
CHLORIDE SERPL-SCNC: 107 MMOL/L (ref 96–112)
CO2 SERPL-SCNC: 22 MMOL/L (ref 20–33)
CREAT SERPL-MCNC: 0.9 MG/DL (ref 0.5–1.4)
EOSINOPHIL # BLD AUTO: 0.16 K/UL (ref 0–0.51)
EOSINOPHIL NFR BLD: 2.9 % (ref 0–6.9)
ERYTHROCYTE [DISTWIDTH] IN BLOOD BY AUTOMATED COUNT: 50.5 FL (ref 35.9–50)
GFR SERPLBLD CREATININE-BSD FMLA CKD-EPI: 64 ML/MIN/1.73 M 2
GLOBULIN SER CALC-MCNC: 2.6 G/DL (ref 1.9–3.5)
GLUCOSE SERPL-MCNC: 87 MG/DL (ref 65–99)
HCT VFR BLD AUTO: 36 % (ref 37–47)
HGB BLD-MCNC: 11.5 G/DL (ref 12–16)
IMM GRANULOCYTES # BLD AUTO: 0.04 K/UL (ref 0–0.11)
IMM GRANULOCYTES NFR BLD AUTO: 0.7 % (ref 0–0.9)
INR PPP: 1.18 (ref 0.87–1.13)
INR PPP: 1.24 (ref 0.87–1.13)
LYMPHOCYTES # BLD AUTO: 1.68 K/UL (ref 1–4.8)
LYMPHOCYTES NFR BLD: 30.4 % (ref 22–41)
MCH RBC QN AUTO: 28 PG (ref 27–33)
MCHC RBC AUTO-ENTMCNC: 31.9 G/DL (ref 32.2–35.5)
MCV RBC AUTO: 87.8 FL (ref 81.4–97.8)
MONOCYTES # BLD AUTO: 0.47 K/UL (ref 0–0.85)
MONOCYTES NFR BLD AUTO: 8.5 % (ref 0–13.4)
NEUTROPHILS # BLD AUTO: 3.13 K/UL (ref 1.82–7.42)
NEUTROPHILS NFR BLD: 56.6 % (ref 44–72)
NRBC # BLD AUTO: 0 K/UL
NRBC BLD-RTO: 0 /100 WBC (ref 0–0.2)
PLATELET # BLD AUTO: 360 K/UL (ref 164–446)
PMV BLD AUTO: 10.1 FL (ref 9–12.9)
POTASSIUM SERPL-SCNC: 4.4 MMOL/L (ref 3.6–5.5)
PROT SERPL-MCNC: 6.4 G/DL (ref 6–8.2)
PROTHROMBIN TIME: 15.2 SEC (ref 12–14.6)
PROTHROMBIN TIME: 15.8 SEC (ref 12–14.6)
RBC # BLD AUTO: 4.1 M/UL (ref 4.2–5.4)
SODIUM SERPL-SCNC: 140 MMOL/L (ref 135–145)
UFH PPP CHRO-ACNC: >1.1 IU/ML
WBC # BLD AUTO: 5.5 K/UL (ref 4.8–10.8)

## 2023-11-23 PROCEDURE — 85025 COMPLETE CBC W/AUTO DIFF WBC: CPT

## 2023-11-23 PROCEDURE — 99222 1ST HOSP IP/OBS MODERATE 55: CPT | Mod: 57 | Performed by: ORTHOPAEDIC SURGERY

## 2023-11-23 PROCEDURE — 700117 HCHG RX CONTRAST REV CODE 255: Performed by: STUDENT IN AN ORGANIZED HEALTH CARE EDUCATION/TRAINING PROGRAM

## 2023-11-23 PROCEDURE — 700102 HCHG RX REV CODE 250 W/ 637 OVERRIDE(OP): Performed by: STUDENT IN AN ORGANIZED HEALTH CARE EDUCATION/TRAINING PROGRAM

## 2023-11-23 PROCEDURE — 99233 SBSQ HOSP IP/OBS HIGH 50: CPT | Performed by: STUDENT IN AN ORGANIZED HEALTH CARE EDUCATION/TRAINING PROGRAM

## 2023-11-23 PROCEDURE — 36415 COLL VENOUS BLD VENIPUNCTURE: CPT

## 2023-11-23 PROCEDURE — 85610 PROTHROMBIN TIME: CPT

## 2023-11-23 PROCEDURE — A9270 NON-COVERED ITEM OR SERVICE: HCPCS | Performed by: STUDENT IN AN ORGANIZED HEALTH CARE EDUCATION/TRAINING PROGRAM

## 2023-11-23 PROCEDURE — 85730 THROMBOPLASTIN TIME PARTIAL: CPT | Mod: 91

## 2023-11-23 PROCEDURE — 73701 CT LOWER EXTREMITY W/DYE: CPT | Mod: LT

## 2023-11-23 PROCEDURE — 700111 HCHG RX REV CODE 636 W/ 250 OVERRIDE (IP): Performed by: STUDENT IN AN ORGANIZED HEALTH CARE EDUCATION/TRAINING PROGRAM

## 2023-11-23 PROCEDURE — 700111 HCHG RX REV CODE 636 W/ 250 OVERRIDE (IP): Mod: JZ | Performed by: STUDENT IN AN ORGANIZED HEALTH CARE EDUCATION/TRAINING PROGRAM

## 2023-11-23 PROCEDURE — 76882 US LMTD JT/FCL EVL NVASC XTR: CPT | Mod: LT

## 2023-11-23 PROCEDURE — 80053 COMPREHEN METABOLIC PANEL: CPT

## 2023-11-23 PROCEDURE — 770001 HCHG ROOM/CARE - MED/SURG/GYN PRIV*

## 2023-11-23 PROCEDURE — 85520 HEPARIN ASSAY: CPT

## 2023-11-23 RX ORDER — HEPARIN SODIUM 1000 [USP'U]/ML
4000 INJECTION, SOLUTION INTRAVENOUS; SUBCUTANEOUS ONCE
Status: COMPLETED | OUTPATIENT
Start: 2023-11-23 | End: 2023-11-23

## 2023-11-23 RX ORDER — MORPHINE SULFATE 4 MG/ML
1 INJECTION INTRAVENOUS EVERY 6 HOURS PRN
Status: DISCONTINUED | OUTPATIENT
Start: 2023-11-23 | End: 2023-11-27

## 2023-11-23 RX ORDER — HEPARIN SODIUM 1000 [USP'U]/ML
2200 INJECTION, SOLUTION INTRAVENOUS; SUBCUTANEOUS PRN
Status: DISCONTINUED | OUTPATIENT
Start: 2023-11-23 | End: 2023-11-28

## 2023-11-23 RX ORDER — OXYCODONE HYDROCHLORIDE 5 MG/1
5 TABLET ORAL EVERY 6 HOURS PRN
Status: DISCONTINUED | OUTPATIENT
Start: 2023-11-23 | End: 2023-11-27

## 2023-11-23 RX ORDER — HEPARIN SODIUM 5000 [USP'U]/100ML
0-30 INJECTION, SOLUTION INTRAVENOUS CONTINUOUS
Status: DISCONTINUED | OUTPATIENT
Start: 2023-11-23 | End: 2023-11-23

## 2023-11-23 RX ORDER — HEPARIN SODIUM 1000 [USP'U]/ML
40 INJECTION, SOLUTION INTRAVENOUS; SUBCUTANEOUS PRN
Status: DISCONTINUED | OUTPATIENT
Start: 2023-11-23 | End: 2023-11-23

## 2023-11-23 RX ORDER — HEPARIN SODIUM 5000 [USP'U]/100ML
INJECTION, SOLUTION INTRAVENOUS CONTINUOUS
Status: DISCONTINUED | OUTPATIENT
Start: 2023-11-23 | End: 2023-11-28

## 2023-11-23 RX ORDER — HEPARIN SODIUM 1000 [USP'U]/ML
80 INJECTION, SOLUTION INTRAVENOUS; SUBCUTANEOUS ONCE
Status: DISCONTINUED | OUTPATIENT
Start: 2023-11-23 | End: 2023-11-23

## 2023-11-23 RX ADMIN — METHOCARBAMOL 1000 MG: 500 TABLET ORAL at 16:12

## 2023-11-23 RX ADMIN — LEVOTHYROXINE SODIUM 88 MCG: 0.09 TABLET ORAL at 05:08

## 2023-11-23 RX ADMIN — ACETAMINOPHEN 1000 MG: 500 TABLET, FILM COATED ORAL at 20:03

## 2023-11-23 RX ADMIN — IOHEXOL 100 ML: 350 INJECTION, SOLUTION INTRAVENOUS at 18:15

## 2023-11-23 RX ADMIN — ACETAMINOPHEN 1000 MG: 500 TABLET, FILM COATED ORAL at 09:23

## 2023-11-23 RX ADMIN — LIOTHYRONINE SODIUM 5 MCG: 5 TABLET ORAL at 05:08

## 2023-11-23 RX ADMIN — PREGABALIN 100 MG: 100 CAPSULE ORAL at 20:03

## 2023-11-23 RX ADMIN — ATORVASTATIN CALCIUM 40 MG: 40 TABLET, FILM COATED ORAL at 20:03

## 2023-11-23 RX ADMIN — HEPARIN SODIUM 4000 UNITS: 1000 INJECTION, SOLUTION INTRAVENOUS; SUBCUTANEOUS at 12:14

## 2023-11-23 RX ADMIN — ACETAMINOPHEN 1000 MG: 500 TABLET, FILM COATED ORAL at 16:12

## 2023-11-23 RX ADMIN — METHOCARBAMOL 1000 MG: 500 TABLET ORAL at 20:02

## 2023-11-23 RX ADMIN — METHOCARBAMOL 1000 MG: 500 TABLET ORAL at 09:23

## 2023-11-23 RX ADMIN — HEPARIN SODIUM 900 UNITS/HR: 5000 INJECTION, SOLUTION INTRAVENOUS at 12:14

## 2023-11-23 RX ADMIN — LOSARTAN POTASSIUM 50 MG: 50 TABLET, FILM COATED ORAL at 05:07

## 2023-11-23 RX ADMIN — OXYCODONE 5 MG: 5 TABLET ORAL at 19:04

## 2023-11-23 RX ADMIN — MORPHINE SULFATE 1 MG: 4 INJECTION, SOLUTION INTRAMUSCULAR; INTRAVENOUS at 01:59

## 2023-11-23 RX ADMIN — PREGABALIN 100 MG: 100 CAPSULE ORAL at 09:23

## 2023-11-23 RX ADMIN — OXYCODONE 5 MG: 5 TABLET ORAL at 10:40

## 2023-11-23 ASSESSMENT — ENCOUNTER SYMPTOMS
PSYCHIATRIC NEGATIVE: 1
GASTROINTESTINAL NEGATIVE: 1
EYES NEGATIVE: 1
CONSTITUTIONAL NEGATIVE: 1
CARDIOVASCULAR NEGATIVE: 1
RESPIRATORY NEGATIVE: 1
NEUROLOGICAL NEGATIVE: 1

## 2023-11-23 ASSESSMENT — PAIN DESCRIPTION - PAIN TYPE
TYPE: ACUTE PAIN

## 2023-11-23 NOTE — PROGRESS NOTES
[facility]OSPITALIST TRIAGE OFFICER DIRECT ADMISSION REPORT  Transferring facility: [facility]   Transferring physician: Dr. Chavis   Transferring facility/physician contact number: [facility]   Chief complaint: displacement L femur   Pertinent history & patient course: 81 female recent status post lumbar laminectomy on 11/10/2023, right lower extremity DVT on Eliquis presents from rehab with left hip pain found to have left femoral head avascular necrosis, left femur superiorly displaced.  Transfer was requested, orthopedic consulted recommended IR aspiration of left hip to rule out infectious etiology and to keep patient n.p.o. midnight.  Pertinent imaging & lab results: See epic  Code Status: Resume prior CODE STATUS, full code  Further work up or recommendations per triage officer prior to transfer: None  Consultants called prior to transfer and pertinent input from consultants: Dr. Ko   Patient accepted for transfer: Yes  Consultants to be called upon arrival: Ortho in a.m.  Admission status: Inpatient.   Floor requested: Ortho  If ICU transfer, name of intensivist case discussed with and pertinent input from critical care: med/surg     Please inform the triage officer upon arrival of the patient to [facility] for assignment of a hospitalist to perform admission.      For any question or concerns regarding the care of this patient, please reach out to the assigned hospitalist.

## 2023-11-23 NOTE — PROGRESS NOTES
4 Eyes Skin Assessment Completed by Nick RN and Mohsen RN.    Head WDL  Ears WDL  Nose WDL  Mouth WDL  Neck WDL  Breast/Chest WDL  Shoulder Blades WDL  Spine WDL S/p Laminectomy 11/10 with island dressing and with old dry drainage at the bottom part of the dressing  (R) Arm/Elbow/Hand Bruising  (L) Arm/Elbow/Hand Bruising  Abdomen WDL Large Birth ari (black in color)  Groin WDL  Scrotum/Coccyx/Buttocks WDL  (R) Leg Edema  (L) Leg Edema Discoloration Bruising  (R) Heel/Foot/Toe Swelling  (L) Heel/Foot/Toe Swelling          Devices In Places Blood Pressure Cuff, Pulse Ox, and SCD's      Interventions In Place Pillows    Possible Skin Injury No    Pictures Uploaded Into Epic N/A  Wound Consult Placed N/A  RN Wound Prevention Protocol Ordered No

## 2023-11-23 NOTE — CARE PLAN
The patient is Stable - Low risk of patient condition declining or worsening    Shift Goals  Clinical Goals: Monitor incision site  Patient Goals: Pain control  Family Goals: get pt to skilled long term care in [place]    Progress made toward(s) clinical / shift goals:  Wound care completed    Patient is not progressing towards the following goals:    Problem: Skin Integrity  Goal: Skin integrity is maintained or improved  Outcome: Progressing  Note: Incision site to midline, low back is clean, approximated, with serous drainage. Dressing was changed and wound cleaned.     Problem: Pain - Standard  Goal: Alleviation of pain or a reduction in pain to the patient’s comfort goal  Outcome: Progressing  Note: Due to increased pain, X-ray 2 view completed to left hip.

## 2023-11-23 NOTE — PROGRESS NOTES
Tanya from Lab called with critical result of Heparin Xa at 1102. Critical lab result read back to Tanya.   Pharmacist Elizabet was notified of critical lab result at 1105.  Critical lab result read back by Shyanne.     Pharmacist to switch the Heparin order to aPTT

## 2023-11-23 NOTE — PROGRESS NOTES
Hospital Medicine Daily Progress Note    Date of Service  11/23/2023    Chief Complaint  Sheyla Gauthier is a 83 y.o. female admitted 11/22/2023 with left displaced femoral neck fracture     Hospital Course  83 y.o. female with past medical history of dyslipidemia, hypertension, recent lumbar laminectomy by Dr. Earnest Silva on 11/10/2023 who presented 11/22/2023 from rehab with left hip pain found to have displaced left femoral neck fracture and avascular necrosis.  Patient reports she has been having difficulty walking and funny gait even before the spinal surgery.  She does report spinal surgery helped her left lower extremity weakness.  Orthopedics, Dr. Ko was consulted prior to arrival and spoke with transferring provider recommended transfer here for IR aspiration of left hip to rule out infection.  She will be hospitalized for orthopedic evaluation, IR aspiration of left hip and possible surgical repair of left femoral neck fracture.     Interval Problem Update  Evaluated at bedside  Pain tolerable today  Stable vitals and on room air  Normocytic anemia better today  No leukocytosis  As needed pain management  Hold home Eliquis  Start heparin drip for acute DVT, turn off the procedure necessary  Case was discussed with orthopedic surgery (Dr. Ko), who recommended IR arthrocentesis to determine OR indication  Spoke to interventional radiology (Dr. Banerjee), recommending left hip ultrasound to assess for joint effusion as well as left hip CT as MRIs have been backed up.  Labs on a.m.    I have discussed this patient's plan of care and discharge plan at IDT rounds today with Case Management, Nursing, Nursing leadership, and other members of the IDT team.    Consultants/Specialty  Orthopedic surgery    Code Status  Full Code    Disposition  The patient is not medically cleared for discharge to home or a post-acute facility.  Anticipate discharge to: IPR    I have placed the appropriate orders for  post-discharge needs.    Review of Systems  Review of Systems   Constitutional: Negative.    HENT: Negative.     Eyes: Negative.    Respiratory: Negative.     Cardiovascular: Negative.  Negative for chest pain.   Gastrointestinal: Negative.    Genitourinary: Negative.    Musculoskeletal:  Positive for joint pain.   Skin: Negative.    Neurological: Negative.    Endo/Heme/Allergies: Negative.    Psychiatric/Behavioral: Negative.          Physical Exam  Temp:  [36.1 °C (97 °F)-36.6 °C (97.8 °F)] 36.5 °C (97.7 °F)  Pulse:  [64-75] 73  Resp:  [16-20] 17  BP: ()/(60-77) 148/77  SpO2:  [92 %-96 %] 96 %    Physical Exam  Constitutional:       Appearance: Normal appearance.   HENT:      Head: Normocephalic and atraumatic.      Mouth/Throat:      Mouth: Mucous membranes are moist.   Eyes:      Extraocular Movements: Extraocular movements intact.      Pupils: Pupils are equal, round, and reactive to light.   Cardiovascular:      Rate and Rhythm: Normal rate and regular rhythm.      Pulses: Normal pulses.      Heart sounds: Normal heart sounds.   Pulmonary:      Effort: Pulmonary effort is normal.      Breath sounds: Normal breath sounds.   Abdominal:      General: Bowel sounds are normal.      Palpations: Abdomen is soft.   Musculoskeletal:         General: Tenderness present. No swelling.      Cervical back: Normal range of motion and neck supple.      Comments: Left hip tenderness to palpation.  No erythema, swelling or lesions/secretions noted   Skin:     General: Skin is warm.      Coloration: Skin is not jaundiced.   Neurological:      General: No focal deficit present.      Mental Status: She is alert and oriented to person, place, and time. Mental status is at baseline.      Cranial Nerves: No cranial nerve deficit.   Psychiatric:         Mood and Affect: Mood normal.         Behavior: Behavior normal.         Thought Content: Thought content normal.         Judgment: Judgment normal.         Fluids  No intake or  output data in the 24 hours ending 11/23/23 1019    Laboratory  Recent Labs     11/21/23  0611 11/23/23  0132   WBC 5.4 5.5   RBC 3.73* 4.10*   HEMOGLOBIN 10.4* 11.5*   HEMATOCRIT 32.7* 36.0*   MCV 87.7 87.8   MCH 27.9 28.0   MCHC 31.8* 31.9*   RDW 49.7 50.5*   PLATELETCT 338 360   MPV 10.0 10.1     Recent Labs     11/21/23  0611 11/23/23  0132   SODIUM 142 140   POTASSIUM 4.4 4.4   CHLORIDE 110 107   CO2 23 22   GLUCOSE 93 87   BUN 23* 23*   CREATININE 0.86 0.90   CALCIUM 9.6 9.9     Recent Labs     11/23/23  0132   INR 1.24*               Imaging  IR-CONSULT AND TREAT    (Results Pending)   DX-ARTHROGRAM-HIP LEFT    (Results Pending)   CT-HIP WITH LEFT    (Results Pending)   US-EXTREMITY NON VASCULAR UNILATERAL LEFT    (Results Pending)        Assessment/Plan  * Closed fracture of neck of left femur (HCC)- (present on admission)  Assessment & Plan  X-ray showing left femoral neck fracture.  Case was discussed with orthopedics recommended IR aspiration of left hip to rule out infection.  If negative Ortho will attempt to perform fixation of left femoral neck fracture  Pain control with IV narcotics monitor pulse oximetry and monitor for respiratory distress    Orthopedics following and requesting hip aspiration per IR  IR requesting further imaging prior to aspiration  Pending left hip ultrasound  Pending left hip CT with contrast    Acute deep vein thrombosis (DVT) of right lower extremity (HCC)- (present on admission)  Assessment & Plan  Noted on 11/2023 ultrasound  Has been on Eliquis outpatient  Hold Eliquis  Heparin drip, possible procedure  Likely switch back to Eliquis after completion of surgery/procedures    S/P lumbar laminectomy- (present on admission)  Assessment & Plan  History of recent lumbar laminectomy on 11/10/2023 by Dr. Earnest Silva  Follow-up with neurosurgical team  Wound care for dressing changes    Chronic kidney disease, stage 3a (HCC)- (present on admission)  Assessment & Plan  Avoid  nephrotoxic agents and renally dose all medications  Monitor creatinine    Peripheral edema- (present on admission)  Assessment & Plan  Resume spirnolactone     Acquired hypothyroidism- (present on admission)  Assessment & Plan  Resume home thyroid medications    Mixed hyperlipidemia- (present on admission)  Assessment & Plan  Statin     HTN (hypertension)- (present on admission)  Assessment & Plan  Resume home losartan, spironolactone  IV hydralazine as needed         VTE prophylaxis: Heparin drip, on Eliquis at home for DVT, turn off in case of procedure    I have performed a physical exam and reviewed and updated ROS and Plan today (11/23/2023). In review of yesterday's note (11/22/2023), there are no changes except as documented above.    Greater than 51 minutes spent prepping to see patient (e.g. review of tests) obtaining and/or reviewing separately obtained history. Performing a medically appropriate examination and/ evaluation.  Counseling and educating the patient/family/caregiver.  Ordering medications, tests, or procedures.  Referring and communicating with other health care professionals.  Documenting clinical information in EPIC.  Independently interpreting results and communicating results to patient/family/caregiver.  Care coordination

## 2023-11-23 NOTE — CARE PLAN
The patient is Stable - Low risk of patient condition declining or worsening    Shift Goals  Clinical Goals: pain management, discuss POC with care team  Patient Goals: pain management, discuss POC with care team  Family Goals: pain management, discuss POC with care team    Progress made toward(s) clinical / shift goals:  Medicated the patient per MAR for pain. Pt and family discussed the POC with the care team members. Pending CT scan of the left hip today. Working on getting US IV placed by an US certified RN.       Problem: Pain - Standard  Goal: Alleviation of pain or a reduction in pain to the patient’s comfort goal  Outcome: Progressing     Problem: Knowledge Deficit - Standard  Goal: Patient and family/care givers will demonstrate understanding of plan of care, disease process/condition, diagnostic tests and medications  Outcome: Progressing

## 2023-11-23 NOTE — ASSESSMENT & PLAN NOTE
History of recent lumbar laminectomy on 11/10/2023 by Dr. Earnest Silva    11/28 S/p left total hip arthroplasty 11/27, Prevena drain in place, WBAT with posterior hip precaution  Okay to resume Eliquis per surgery, I have ordered

## 2023-11-23 NOTE — ASSESSMENT & PLAN NOTE
Noted on 11/2023 ultrasound  Has been on Eliquis outpatient  Hold Eliquis  Heparin drip, possible procedure  Likely switch back to Eliquis after completion of surgery/procedures

## 2023-11-23 NOTE — PROGRESS NOTES
Patient complained of left hip pain, Dr. Chavis ordered 2 view x-ray of hip.    IMPRESSION:     1.  The left femoral head is not well visualized and could be previously resected versus destruction. The resulting left femur is displaced superiorly.  2.  Mild degenerative changes of the right hip.    Dr. Chavis was notified of results. Patient to be transported to [facility] this evening, [facility] Room 310 to accept patient.  Report called into [email or web address].

## 2023-11-23 NOTE — CARE PLAN
The patient is Stable - Low risk of patient condition declining or worsening    Shift Goals  Clinical Goals: pain control, NPO post midnigt  Patient Goals: Rest and comfort  Family Goals: N/A    Progress made toward(s) clinical / shift goals:        Problem: Pain - Standard  Goal: Alleviation of pain or a reduction in pain to the patient’s comfort goal  Description: Target End Date:  Prior to discharge or change in level of care    Document on Vitals flowsheet    1.  Document pain using the appropriate pain scale per order or unit policy  2.  Educate and implement non-pharmacologic comfort measures (i.e. relaxation, distraction, massage, cold/heat therapy, etc.)  3.  Pain management medications as ordered  4.  Reassess pain after pain med administration per policy  5.  If opiods administered assess patient's response to pain medication is appropriate per POSS sedation scale  6.  Follow pain management plan developed in collaboration with patient and interdisciplinary team (including palliative care or pain specialists if applicable)  Outcome: Progressing     Problem: Fall Risk  Goal: Patient will remain free from falls  Description: Target End Date:  Prior to discharge or change in level of care    Document interventions on the Pugh Jose Fall Risk Assessment    1.  Assess for fall risk factors  2.  Implement fall precautions  Outcome: Progressing       Patient is not progressing towards the following goals:

## 2023-11-23 NOTE — THERAPY
Physical Therapy   Daily Treatment     Patient Name: Sheyla Gauthier  Age:  83 y.o., Sex:  female  Medical Record #: [id number]  Today's Date: 11/22/2023     Precautions  Precautions: Fall Risk, Spinal / Back Precautions   Comments: no brace    Subjective    Pt agreeable to tx     Objective       11/22/23 1101   PT Charge Group   PT Therapeutic Activities (Units) 2   PT Total Time Spent   PT Individual Total Time Spent (Mins) 30   Wheelchair Functional Level of Assist   Wheelchair Assist Modified Independent   Distance Wheelchair (Feet or Distance) 100  (indoors level ground)   Wheelchair Description Leg rest management   Stairs Functional Level of Assist   Level of Assist with Stairs Total Assist X 2   # of Stairs Climbed 3  (2 consecutive steps with FWW simulating home set up vs 1 step with B HHA)   Stairs Description Extra time;Supervision for safety;Verbal cueing;Walker   Interdisciplinary Plan of Care Collaboration   IDT Collaboration with  Family / Caregiver;Physician   Patient Position at End of Therapy Seated;Chair Alarm On;Self Releasing Lap Belt Applied;Family / Friend in Room   Collaboration Comments handoff of care, stair negotiation training with pt's daughter     Session focused on problem-solving home entry stair negotiation with pt and her daughter, Luh       11/22/23 1330   PT Charge Group   PT Gait Training (Units) 1   PT Therapeutic Activities (Units) 3   PT Total Time Spent   PT Individual Total Time Spent (Mins) 60   Gait Functional Level of Assist    Gait Level Of Assist Contact Guard Assist   Assistive Device Front Wheel Walker   Distance (Feet) 45   # of Times Distance was Traveled 1   Deviation Antalgic;Trendelenberg;Bradykinetic   Wheelchair Functional Level of Assist   Wheelchair Assist Modified Independent   Distance Wheelchair (Feet or Distance) 50   Stairs Functional Level of Assist   Level of Assist with Stairs Moderate Assist   # of Stairs Climbed 1  (platform step with FWW)    Stairs Description Extra time;Supervision for safety;Verbal cueing   Transfer Functional Level of Assist   Bed, Chair, Wheelchair Transfer Standby Assist   Bed Chair Wheelchair Transfer Description Adaptive equipment;Set-up of equipment;Supervision for safety;Verbal cueing   Bed Mobility    Supine to Sit Supervised   Sit to Supine Supervised   Sit to Stand Supervised   Rolling Modified Independent   Neuro-Muscular Treatments   Comments bed mobility with leg  vs gait belt around thighs, car transfer training, stair negotiation   Interdisciplinary Plan of Care Collaboration   IDT Collaboration with  Family / Caregiver;Physician   Patient Position at End of Therapy Seated;Chair Alarm On;Self Releasing Lap Belt Applied;Family / Friend in Room   Collaboration Comments discussion of chronic pain and potentia OA     Trialed alternative method of stair negotiation with WC and feet at first step and FWW on second step to only need to negotiate 1 step  Extensive time spent on bed mobility training with AE and alternative methods  Positive R hip pain with scour and FADIR suggestive of OA. Educated pt and daughter on conservative management and strengthening vs alternative options if conservative management does not improve pain    Assessment    Pt was able to negotiate steps simulating home entrance with single person assist after extensive problem-solving and building her confidence with advancing R LE. She is on track for [place] home temporarily then assisted living in [place] on 11/24.  Strengths: Able to follow instructions, Good insight into deficits/needs, Independent prior level of function, Making steady progress towards goals, Motivated for self care and independence, Pleasant and cooperative, Supportive family, Willingly participates in therapeutic activities  Barriers: Decreased endurance, Generalized weakness, Impaired activity tolerance, Impaired balance, Pain    Plan    HEP for R hip OA, complete family training,  "passport    DME  PT DME Recommendations  Wheelchair: 16\" Width, Lightweight, Standard Leg Rests  Cushion: Standard    Passport items to be completed:  Get in/out of bed safely, in/out of a vehicle, safely use mobility device, walk or wheel around home/community, navigate up and down stairs, show how to get up/down from the ground, ensure home is accessible, demonstrate HEP, complete caregiver training    Physical Therapy Problems (Active)       Problem: Mobility Transfers       Dates: Start:  11/14/23         Goal: STG-Within one week, patient will perform bed mobility with verbal cues for log roll sequencing and AE as needed       Dates: Start:  11/14/23         Goal Note filed on 11/20/23 1254 by Alan Schaefer, PT       Rajan with AE                 Problem: PT-Long Term Goals       Dates: Start:  11/14/23         Goal: LTG-By discharge, patient will propel wheelchair 150ft Sherman       Dates: Start:  11/14/23            Goal: LTG-By discharge, patient will ambulate 100ft with FWW Sherman       Dates: Start:  11/14/23            Goal: LTG-By discharge, patient will transfer one surface to another Sherman       Dates: Start:  11/14/23            Goal: LTG-By discharge, patient will perform home exercise program independently       Dates: Start:  11/14/23            Goal: LTG-By discharge, patient will transfer in/out of a car with CGA       Dates: Start:  11/14/23            Goal: LTG-By discharge, patient will perform bed mobility independently       Dates: Start:  11/14/23              "

## 2023-11-23 NOTE — PROGRESS NOTES
NURSING DAILY NOTE    Name: Sheyla Gauthier   Date of Admission: 11/13/2023   Admitting Diagnosis: Radiculopathy  Attending Physician: Mitchell Chavis M.d.  Allergies: Trazodone and Crestor [rosuvastatin calcium]    Safety  Patient Assist  CGA  Patient Precautions  Fall Risk, Spinal / Back Precautions   Precaution Comments  no brace  Bed Transfer Status  Standby Assist  Toilet Transfer Status   Contact Guard Assist  Assistive Devices  Wheelchair  Oxygen  None - Room Air  Diet/Therapeutic Dining  Current Diet Order   Procedures    Diet Order Diet: Regular     Pill Administration  whole  Agitated Behavioral Scale     ABS Level of Severity       Fall Risk  Has the patient had a fall this admission?   No  Lexy Garcia Fall Risk Scoring  12, MODERATE RISK  Fall Risk Safety Measures  poor balance    Vitals  Temperature: 36.6 °C (97.8 °F)  Temp src: Oral  Pulse: 74  Respiration: 20  Blood Pressure : 114/60  Blood Pressure MAP (Calculated): 78 MM HG  BP Location: Left, Upper Arm  Patient BP Position: Supine     Oxygen  Pulse Oximetry: 93 %  O2 (LPM): 0  O2 Delivery Device: None - Room Air    Bowel and Bladder  Last Bowel Movement  11/22/23  Stool Type     Bowel Device  Bathroom  Continent  Bladder: Stress incontinence   Bowel: Continent movement  Bladder Function  Urine Void (mL):  (small)  Number of Times Voided: 1  Urine Color: Unable To Evaluate  Genitourinary Assessment   Bladder Assessment (WDL):  WDL Except  Urinary Elimination: Incontinence  Urine Color: Unable To Evaluate  Bladder Device: Bathroom  Time Void: Yes  Bladder Scan: Post Void  $ Bladder Scan Results (mL): 394    Skin  Wyatt Score   19  Sensory Interventions   Bed Types: Standard/Trauma Mattress  Skin Preventative Measures: Pillows in Use for Support / Positioning  Moisture Interventions  Moisturizers/Barriers: Barrier Wipes      Pain  Pain Rating Scale  5 - Interrupts some activities  Pain  Location  Leg  Pain Location Orientation  Left  Pain Interventions   Medication (see MAR)    ADLs    Bathing   Patient Refused Bathing  Linen Change   Partial  Personal Hygiene  Perineal Care, Moist Klarissa Wipes  Chlorhexidine Bath      Oral Care     Teeth/Dentures     Shave     Nutrition Percentage Eaten  Between 50-75% Consumed  Environmental Precautions  Treaded Slipper Socks on Patient  Patient Turns/Positioning  Patient Turns Self from Side to Side  Patient Turns Assistance/Tolerance  Assistance of One  Bed Positions  Bed Controls On, Bed Locked  Head of Bed Elevated  Self regulated      Psychosocial/Neurologic Assessment  Psychosocial Assessment  Psychosocial (WDL):  Within Defined Limits  Neurologic Assessment  Neuro (WDL): Exceptions to WDL  Level of Consciousness: Alert  Orientation Level: Oriented X4  Cognition: Follows commands  Speech: Clear  Pupil Assesment: No  EENT (WDL):  WDL Except    Cardio/Pulmonary Assessment  Edema   RLE Edema: 2+  LLE Edema: 2+  Respiratory Breath Sounds     Cardiac Assessment   Cardiac (WDL):  (HX: HTN)

## 2023-11-23 NOTE — ASSESSMENT & PLAN NOTE
X-ray showing left femoral neck fracture   --Left hip CT concerning for avascular necrosis  --Dr Iyer discussed case with interventional radiology (Dr. Banerjee), who reviewed left hip ultrasound, left hip CT and does not recommend IR aspiration as there is no to minimal fluid in the joint space.  Orthopedic Surgery following,  Patient underwent left total hip arthroplasty on 11/27/2023; per orthopedic surgery, patient is weightbearing as tolerated on the left lower extremity with posterior hip precautions.  We will obtain PT/OT, multimodal pain management  -- Discussed with Dr. Ko who recommended starting heparin drip 18 hours after the surgery

## 2023-11-23 NOTE — PROGRESS NOTES
Order for CT scan of the left hip received  Order received for Heparin IV infusion. Heparin Xa, Pt, and aPTT collected and in process

## 2023-11-23 NOTE — H&P
Hospital Medicine History & Physical Note    Date of Service  11/22/2023    Primary Care Physician  Darius Morataya M.D.    Consultants  Ortho - dr. Ko prior to arrival     Code Status  Full Code    Chief Complaint  Left displaced femoral neck fracture      History of Presenting Illness  Sheyla Gauthier is a 83 y.o. female with past medical history of dyslipidemia, hypertension, recent lumbar laminectomy by Dr. Earnest Silva on 11/10/2023 who presented 11/22/2023 from rehab with left hip pain found to have displaced left femoral neck fracture and avascular necrosis.  Patient reports she has been having difficulty walking and funny gait even before the spinal surgery.  She does report spinal surgery helped her left lower extremity weakness.  Orthopedics, Dr. Ko was consulted prior to arrival and spoke with transferring provider recommended transfer here for IR aspiration of left hip to rule out infection.  She will be hospitalized for orthopedic evaluation, IR aspiration of left hip and possible surgical repair of left femoral neck fracture.    I discussed the plan of care with patient, family, and bedside RN.    Review of Systems  Review of Systems   Musculoskeletal:  Positive for back pain and joint pain.   Neurological:  Positive for weakness.       Past Medical History   has a past medical history of Anxiety, Arthritis, Back pain, CAD (coronary artery disease) (10/2018), Cataract, Daytime sleepiness, Depression, Dyslipidemia, Frequent headaches, Herpes zoster, High cholesterol, Hypertension, MHA (microangiopathic hemolytic anemia), Migraine syndrome ( ), Mumps, Osteopathia, Osteoporosis, Painful joint, Post-menopause on HRT (hormone replacement therapy), Renal disorder, Sleep apnea, Snoring, Thyroid disease, Urinary incontinence, and White matter abnormality on MRI of brain.    Surgical History   has a past surgical history that includes cholecystectomy (1996); abdominal hysterectomy total  (11/1986); pr breast reduction (1997); foot surgery (2002); pr breast augmentation with implant; mammoplasty augmentation; bunionectomy (Left, 2003); eye surgery; hysterectomy laparoscopy; arthroscopy, knee; laminotomy (01/28/2022); lumbar laminectomy diskectomy (N/A, 2/6/2023); hardware removal ortho (N/A, 2/6/2023); and lumbar laminectomy diskectomy (N/A, 11/10/2023).     Family History  family history includes Arthritis in her brother, father, mother, and sister; Cancer in her brother, daughter, and sister; Heart Disease in her brother; Hyperlipidemia in her brother, father, and mother; Hypertension in her brother, father, and mother; Stroke in her father and mother.   Family history reviewed with patient. There is no family history that is pertinent to the chief complaint.     Social History   reports that she quit smoking about 44 years ago. Her smoking use included cigarettes. She has never used smokeless tobacco. She reports current alcohol use. She reports that she does not use drugs.    Allergies  Allergies   Allergen Reactions    Trazodone Swelling     Pt stated that she had swelling on her hands and feet's     Crestor [Rosuvastatin Calcium]      Myalgia        Medications  Prior to Admission Medications   Prescriptions Last Dose Informant Patient Reported? Taking?   Estriol 10 % Cream  Family Member Yes No   Sig: Insert 1 Application into the vagina as needed.   Magnesium 200 MG Tab 11/22/2023  No No   Sig: Take 200 mg by mouth every evening.   Methocarbamol 1000 MG Tab 11/22/2023 at 1200  No No   Sig: Take 1,000 mg by mouth in the morning, at noon, and at bedtime.   acetaminophen (TYLENOL) 500 MG Tab 11/22/2023 at 1200  No No   Sig: Take 2 Tablets by mouth in the morning, at noon, and at bedtime.   apixaban (ELIQUIS) 5mg Tab 11/22/2023  No No   Sig: Take 1 Tablet by mouth 2 times a day.   atorvastatin (LIPITOR) 40 MG Tab 11/21/2023  No No   Sig: Take 1 Tablet by mouth at bedtime.   levothyroxine  (SYNTHROID) 88 MCG Tab 11/22/2023  No No   Sig: Take 1 Tablet by mouth every morning on an empty stomach.   liothyronine (CYTOMEL) 5 MCG Tab 11/22/2023  No No   Sig: Take 1 Tablet by mouth every morning.   losartan (COZAAR) 50 MG Tab 11/22/2023  No No   Sig: Take 1 Tablet by mouth every morning.   polyethylene glycol/lytes (MIRALAX) Pack 11/22/2023  No No   Sig: Take 1 Packet by mouth 1 time a day as needed (constipation).   pregabalin (LYRICA) 100 MG Cap 11/22/2023  No No   Sig: Take 1 Capsule by mouth 2 times a day for 30 days.   spironolactone (ALDACTONE) 25 MG Tab 11/22/2023  No No   Sig: Take 1 Tablet by mouth every day.      Facility-Administered Medications: None       Physical Exam  Temp:  [36.4 °C (97.6 °F)-36.7 °C (98.1 °F)] 36.5 °C (97.7 °F)  Pulse:  [64-75] 64  Resp:  [16-20] 16  BP: ()/(60-68) 112/67  SpO2:  [93 %-94 %] 93 %  Blood Pressure : 112/67   Temperature: 36.5 °C (97.7 °F)   Pulse: 64   Respiration: 16   Pulse Oximetry: 93 %       Physical Exam  Vitals and nursing note reviewed.   Constitutional:       Appearance: Normal appearance.   HENT:      Head: Normocephalic and atraumatic.      Right Ear: Tympanic membrane normal.      Left Ear: Tympanic membrane normal.      Nose: Nose normal.      Mouth/Throat:      Mouth: Mucous membranes are moist.      Pharynx: Oropharynx is clear.   Eyes:      Extraocular Movements: Extraocular movements intact.      Pupils: Pupils are equal, round, and reactive to light.   Cardiovascular:      Rate and Rhythm: Normal rate and regular rhythm.      Pulses: Normal pulses.      Heart sounds: Normal heart sounds.   Pulmonary:      Effort: Pulmonary effort is normal.      Breath sounds: Normal breath sounds.   Abdominal:      General: Bowel sounds are normal. There is no distension.      Palpations: Abdomen is soft. There is no mass.   Musculoskeletal:         General: Tenderness (left hip TTP) present.      Cervical back: Neck supple.      Comments: Lower back  "island dressing with slight discharge    Skin:     General: Skin is warm.      Capillary Refill: Capillary refill takes less than 2 seconds.   Neurological:      General: No focal deficit present.      Mental Status: She is alert and oriented to person, place, and time. Mental status is at baseline.   Psychiatric:         Mood and Affect: Mood normal.         Behavior: Behavior normal.         Laboratory:  Recent Labs     11/21/23  0611   WBC 5.4   RBC 3.73*   HEMOGLOBIN 10.4*   HEMATOCRIT 32.7*   MCV 87.7   MCH 27.9   MCHC 31.8*   RDW 49.7   PLATELETCT 338   MPV 10.0     Recent Labs     11/21/23  0611   SODIUM 142   POTASSIUM 4.4   CHLORIDE 110   CO2 23   GLUCOSE 93   BUN 23*   CREATININE 0.86   CALCIUM 9.6     Recent Labs     11/21/23  0611   ALTSGPT 11   ASTSGOT 14   ALKPHOSPHAT 62   TBILIRUBIN 0.3   GLUCOSE 93         No results for input(s): \"NTPROBNP\" in the last 72 hours.      No results for input(s): \"TROPONINT\" in the last 72 hours.    Imaging:  IR-CONSULT AND TREAT    (Results Pending)       no X-Ray or EKG requiring interpretation    Assessment/Plan:  Justification for Admission Status  I anticipate this patient will require at least two midnights for appropriate medical management, necessitating inpatient admission because Left femoral neck fracture, recent lumbar laminectomy on 11/10 by Dr. Earnest Silva.    Patient will need a Med/Surg bed on MEDICAL service .  The need is secondary to see above.    Closed fracture of neck of left femur (HCC)  Assessment & Plan  X-ray showing left femoral neck fracture.  Case was discussed with orthopedics recommended IR aspiration of left hip to rule out infection.  If negative Ortho will attempt to perform fixation of left femoral neck fracture  Pain control with IV narcotics monitor pulse oximetry and monitor for respiratory distress  N.p.o. midnight  Orthopedics on board    S/P lumbar laminectomy  Assessment & Plan  History of recent lumbar laminectomy on 11/10/2023 " by Dr. Earnest Silva  Follow-up with neurosurgical team  Wound care for dressing changes    Chronic kidney disease, stage 3a (HCC)- (present on admission)  Assessment & Plan  Avoid nephrotoxic agents and renally dose all medications  Monitor creatinine    Peripheral edema- (present on admission)  Assessment & Plan  Resume spirnolactone     Acquired hypothyroidism- (present on admission)  Assessment & Plan  Resume home thyroid medications    Mixed hyperlipidemia- (present on admission)  Assessment & Plan  Statin     HTN (hypertension)- (present on admission)  Assessment & Plan  Resume home losartan, spironolactone  IV hydralazine as needed        VTE prophylaxis: SCDs/TEDs

## 2023-11-24 ENCOUNTER — APPOINTMENT (OUTPATIENT)
Dept: RADIOLOGY | Facility: MEDICAL CENTER | Age: 83
DRG: 522 | End: 2023-11-24
Attending: STUDENT IN AN ORGANIZED HEALTH CARE EDUCATION/TRAINING PROGRAM
Payer: MEDICARE

## 2023-11-24 LAB
APTT PPP: 77.8 SEC (ref 24.7–36)
APTT PPP: 85.3 SEC (ref 24.7–36)

## 2023-11-24 PROCEDURE — 700102 HCHG RX REV CODE 250 W/ 637 OVERRIDE(OP): Performed by: STUDENT IN AN ORGANIZED HEALTH CARE EDUCATION/TRAINING PROGRAM

## 2023-11-24 PROCEDURE — 85730 THROMBOPLASTIN TIME PARTIAL: CPT

## 2023-11-24 PROCEDURE — A9270 NON-COVERED ITEM OR SERVICE: HCPCS | Performed by: STUDENT IN AN ORGANIZED HEALTH CARE EDUCATION/TRAINING PROGRAM

## 2023-11-24 PROCEDURE — 99233 SBSQ HOSP IP/OBS HIGH 50: CPT | Performed by: STUDENT IN AN ORGANIZED HEALTH CARE EDUCATION/TRAINING PROGRAM

## 2023-11-24 PROCEDURE — 770001 HCHG ROOM/CARE - MED/SURG/GYN PRIV*

## 2023-11-24 PROCEDURE — 700111 HCHG RX REV CODE 636 W/ 250 OVERRIDE (IP): Performed by: STUDENT IN AN ORGANIZED HEALTH CARE EDUCATION/TRAINING PROGRAM

## 2023-11-24 PROCEDURE — 36415 COLL VENOUS BLD VENIPUNCTURE: CPT

## 2023-11-24 RX ADMIN — OXYCODONE 5 MG: 5 TABLET ORAL at 13:42

## 2023-11-24 RX ADMIN — HEPARIN SODIUM 900 UNITS/HR: 5000 INJECTION, SOLUTION INTRAVENOUS at 15:50

## 2023-11-24 RX ADMIN — METHOCARBAMOL 1000 MG: 500 TABLET ORAL at 20:28

## 2023-11-24 RX ADMIN — METHOCARBAMOL 1000 MG: 500 TABLET ORAL at 08:28

## 2023-11-24 RX ADMIN — PREGABALIN 100 MG: 100 CAPSULE ORAL at 20:28

## 2023-11-24 RX ADMIN — PREGABALIN 100 MG: 100 CAPSULE ORAL at 08:28

## 2023-11-24 RX ADMIN — LOSARTAN POTASSIUM 50 MG: 50 TABLET, FILM COATED ORAL at 05:34

## 2023-11-24 RX ADMIN — OXYCODONE 5 MG: 5 TABLET ORAL at 21:34

## 2023-11-24 RX ADMIN — ATORVASTATIN CALCIUM 40 MG: 40 TABLET, FILM COATED ORAL at 20:28

## 2023-11-24 RX ADMIN — ACETAMINOPHEN 1000 MG: 500 TABLET, FILM COATED ORAL at 08:28

## 2023-11-24 RX ADMIN — LIOTHYRONINE SODIUM 5 MCG: 5 TABLET ORAL at 05:35

## 2023-11-24 RX ADMIN — SPIRONOLACTONE 25 MG: 25 TABLET ORAL at 05:34

## 2023-11-24 RX ADMIN — OXYCODONE 5 MG: 5 TABLET ORAL at 04:48

## 2023-11-24 RX ADMIN — LEVOTHYROXINE SODIUM 88 MCG: 0.09 TABLET ORAL at 05:34

## 2023-11-24 RX ADMIN — METHOCARBAMOL 1000 MG: 500 TABLET ORAL at 15:42

## 2023-11-24 RX ADMIN — ACETAMINOPHEN 1000 MG: 500 TABLET, FILM COATED ORAL at 20:28

## 2023-11-24 RX ADMIN — ACETAMINOPHEN 1000 MG: 500 TABLET, FILM COATED ORAL at 15:42

## 2023-11-24 ASSESSMENT — ENCOUNTER SYMPTOMS
CARDIOVASCULAR NEGATIVE: 1
EYES NEGATIVE: 1
CONSTITUTIONAL NEGATIVE: 1
PSYCHIATRIC NEGATIVE: 1
RESPIRATORY NEGATIVE: 1
GASTROINTESTINAL NEGATIVE: 1
NEUROLOGICAL NEGATIVE: 1

## 2023-11-24 ASSESSMENT — PAIN DESCRIPTION - PAIN TYPE: TYPE: ACUTE PAIN

## 2023-11-24 NOTE — PROGRESS NOTES
Hospital Medicine Daily Progress Note    Date of Service  11/24/2023    Chief Complaint  Sheyla Gauthier is a 83 y.o. female admitted 11/22/2023 with left displaced femoral neck fracture     Hospital Course  83 y.o. female with past medical history of dyslipidemia, hypertension, recent lumbar laminectomy by Dr. Earnest Silva on 11/10/2023 who presented 11/22/2023 from rehab with left hip pain found to have displaced left femoral neck fracture and avascular necrosis.  Patient reports she has been having difficulty walking and funny gait even before the spinal surgery.  She does report spinal surgery helped her left lower extremity weakness.  Orthopedics, Dr. Ko was consulted prior to arrival and spoke with transferring provider recommended transfer here for IR aspiration of left hip to rule out infection.  She will be hospitalized for orthopedic evaluation, IR aspiration of left hip and possible surgical repair of left femoral neck fracture.     Interval Problem Update  Evaluated at bedside  In no acute distress this morning  Stable vitals and on room air  As needed pain management  Hold home Eliquis  Continue heparin drip  I discussed case with interventional radiology (Dr. Banerjee), who reviewed left hip ultrasound, left hip CT and does not recommend IR aspiration as there is no to minimal fluid in the joint space.  Orthopedic surgery following, appreciate recommendations  Labs on a.m.    I have discussed this patient's plan of care and discharge plan at IDT rounds today with Case Management, Nursing, Nursing leadership, and other members of the IDT team.    Consultants/Specialty  Orthopedic surgery    Code Status  Full Code    Disposition  The patient is not medically cleared for discharge to home or a post-acute facility.  Anticipate discharge to: [facility]    I have placed the appropriate orders for post-discharge needs.    Review of Systems  Review of Systems   Constitutional: Negative.    HENT: Negative.      Eyes: Negative.    Respiratory: Negative.     Cardiovascular: Negative.  Negative for chest pain.   Gastrointestinal: Negative.    Genitourinary: Negative.    Musculoskeletal:  Positive for joint pain.   Skin: Negative.    Neurological: Negative.    Endo/Heme/Allergies: Negative.    Psychiatric/Behavioral: Negative.          Physical Exam  Temp:  [36.2 °C (97.2 °F)-36.6 °C (97.9 °F)] 36.6 °C (97.9 °F)  Pulse:  [62-93] 62  Resp:  [18] 18  BP: (106-121)/(58-67) 121/63  SpO2:  [93 %-94 %] 94 %    Physical Exam  Constitutional:       Appearance: Normal appearance.   HENT:      Head: Normocephalic and atraumatic.      Mouth/Throat:      Mouth: Mucous membranes are moist.   Eyes:      Extraocular Movements: Extraocular movements intact.      Pupils: Pupils are equal, round, and reactive to light.   Cardiovascular:      Rate and Rhythm: Normal rate and regular rhythm.      Pulses: Normal pulses.      Heart sounds: Normal heart sounds.   Pulmonary:      Effort: Pulmonary effort is normal.      Breath sounds: Normal breath sounds.   Abdominal:      General: Bowel sounds are normal.      Palpations: Abdomen is soft.   Musculoskeletal:         General: Tenderness present. No swelling.      Cervical back: Normal range of motion and neck supple.      Comments: Left hip tenderness to palpation.  No erythema, swelling or lesions/secretions noted   Skin:     General: Skin is warm.      Coloration: Skin is not jaundiced.   Neurological:      General: No focal deficit present.      Mental Status: She is alert and oriented to person, place, and time. Mental status is at baseline.      Cranial Nerves: No cranial nerve deficit.   Psychiatric:         Mood and Affect: Mood normal.         Behavior: Behavior normal.         Thought Content: Thought content normal.         Judgment: Judgment normal.         Fluids  No intake or output data in the 24 hours ending 11/24/23 0947    Laboratory  Recent Labs     11/23/23  0132   WBC 5.5   RBC  4.10*   HEMOGLOBIN 11.5*   HEMATOCRIT 36.0*   MCV 87.8   MCH 28.0   MCHC 31.9*   RDW 50.5*   PLATELETCT 360   MPV 10.1     Recent Labs     11/23/23  0132   SODIUM 140   POTASSIUM 4.4   CHLORIDE 107   CO2 22   GLUCOSE 87   BUN 23*   CREATININE 0.90   CALCIUM 9.9     Recent Labs     11/23/23  0132 11/23/23  0931 11/23/23  1848 11/24/23  0350   APTT  --  25.0 75.3* 85.3*   INR 1.24* 1.18*  --   --                Imaging  CT-HIP WITH LEFT   Final Result      1.  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 There is fragmentation of the femoral head with multiple bone fragments seen within the joint space. There is secondary    osteoarthritis of the hip articulation. There is minimal joint fluid within the left hip articulation. No surrounding fluid collections.      2.  Multiple calcific fragments seen extending into the area of the left iliopsoas bursa and extending along the course of the left iliopsoas muscle and tendon into the left iliacus muscle likely representing either myositis ossificans or extrusion of    ossific fragments along the course of the iliopsoas muscle and tendon.      3.  No evidence of inflammatory process seen within the pelvis.      4.  Sigmoid diverticulosis.      US-EXTREMITY NON VASCULAR UNILATERAL LEFT   Final Result      No evidence of left hip joint effusion or fluid in the area of the left hip.      IR-CONSULT AND TREAT    (Results Pending)        Assessment/Plan  * Closed fracture of neck of left femur (HCC)- (present on admission)  Assessment & Plan  X-ray showing left femoral neck fracture   Left hip CT concerning for avascular necrosis  PRN pain mgmt  I discussed case with interventional radiology (Dr. Banerjee), who reviewed left hip ultrasound, left hip CT and does not recommend IR aspiration as there is no to minimal fluid in the joint space.  Orthopedic Surgery following, appreciate  recommendations.    Acute deep vein thrombosis (DVT) of right lower extremity (HCC)- (present on admission)  Assessment & Plan  Noted on 11/2023 ultrasound  Has been on Eliquis outpatient  Hold Eliquis  Heparin drip, possible procedure  Likely switch back to Eliquis after completion of surgery/procedures    S/P lumbar laminectomy- (present on admission)  Assessment & Plan  History of recent lumbar laminectomy on 11/10/2023 by Dr. Earnest Silva  Follow-up with neurosurgical team  Wound care for dressing changes    Chronic kidney disease, stage 3a (HCC)- (present on admission)  Assessment & Plan  Avoid nephrotoxic agents and renally dose all medications  Monitor creatinine    Peripheral edema- (present on admission)  Assessment & Plan  Resume spirnolactone     Acquired hypothyroidism- (present on admission)  Assessment & Plan  Resume home thyroid medications    Mixed hyperlipidemia- (present on admission)  Assessment & Plan  Statin     HTN (hypertension)- (present on admission)  Assessment & Plan  Resume home losartan, spironolactone  IV hydralazine as needed         VTE prophylaxis: Heparin drip, on Eliquis at home for DVT, turn off in case of procedure    I have performed a physical exam and reviewed and updated ROS and Plan today (11/24/2023). In review of yesterday's note (11/23/2023), there are no changes except as documented above.    Greater than 51 minutes spent prepping to see patient (e.g. review of tests) obtaining and/or reviewing separately obtained history. Performing a medically appropriate examination and/ evaluation.  Counseling and educating the patient/family/caregiver.  Ordering medications, tests, or procedures.  Referring and communicating with other health care professionals.  Documenting clinical information in EPIC.  Independently interpreting results and communicating results to patient/family/caregiver.  Care coordination

## 2023-11-24 NOTE — CARE PLAN
The patient is Stable - Low risk of patient condition declining or worsening    Shift Goals  Clinical Goals: pain control, APTT monitoring  Patient Goals: rest and comfort  Family Goals: pain management, discuss POC with care team    Progress made toward(s) clinical / shift goals:        Problem: Pain - Standard  Goal: Alleviation of pain or a reduction in pain to the patient’s comfort goal  Outcome: Progressing     Medicated PER MAR, current pain regimen is effective to the patient.      Problem: Fall Risk  Goal: Patient will remain free from falls  Description: Target End Date:  Prior to discharge or change in level of care    Document interventions on the Pugh Jose Fall Risk Assessment    1.  Assess for fall risk factors  2.  Implement fall precautions    Outcome: Progressing       Patient is not progressing towards the following goals:

## 2023-11-24 NOTE — DISCHARGE PLANNING
Care Transition Team Assessment    Information Source  Orientation Level: Oriented X4  Information Given By: Patient  Informant's Name: Sheyla Gauthier  Who is responsible for making decisions for patient? : Patient    Readmission Evaluation  Is this a readmission?: Yes - unplanned readmission  Why do you think you were readmitted?: per the patient it was discovered in rehab that she had a displaced hp, because it was noticed that she was walking funny.  Was an appointment arranged for you prior to discharge?: No    Elopement Risk  Legal Hold: No  Ambulatory or Self Mobile in Wheelchair: Yes  Disoriented: No  Psychiatric Symptoms: None  History of Wandering: No  Elopement this Admit: No  Vocalizing Wanting to Leave: No  Displays Behaviors, Body Language Wanting to Leave: No-Not at Risk for Elopement  Elopement Risk: Not at Risk for Elopement    Interdisciplinary Discharge Planning  Does Admitting Nurse Feel This Could be a Complex Discharge?: No  Primary Care Physician: REJI VILLARREAL M.D.  Lives with - Patient's Self Care Capacity: Alone and Able to Care For Self (Patient has 20 hours of hired help and her daughter are in and out all day.[place] 4 hours away and Ade stays with her 10 days at  a time.)  Support Systems: Children  Housing / Facility: 1 Story House (2 steps to the front door.)  Do You Take your Prescribed Medications Regularly: Yes  Able to Return to Previous ADL's: Future Time w/Therapy  Mobility Issues: Yes  Prior Services: Housekeeping / Homemaker Services  Patient Prefers to be Discharged to:: return to rehab  Durable Medical Equipment: Walker (Cane and wheelchair)    Discharge Preparedness  What is your plan after discharge?: Other (comment) (return to rehab)  What are your discharge supports?: Child  Prior Functional Level: Ambulatory, Independent with Activities of Daily Living    Functional Assesment  Prior Functional Level: Ambulatory, Independent with Activities of Daily  Living    Finances  Financial Barriers to Discharge: No  Prescription Coverage: Yes (Uses the [facility] pharmacy on [address].)    Vision / Hearing Impairment  Right Eye Vision: Wears Glasses  Left Eye Vision: Wears Glasses  Hearing Impairment: Both Ears    Advance Directive  Advance Directive?: None (Per the patient her daughters will serve as her Advanced Directive)  Advance Directive offered?: AD Booklet refused    Psychological Assessment  History of Substance Abuse: None  History of Psychiatric Problems: No    Discharge Risks or Barriers  Discharge risks or barriers?: No    Anticipated Discharge Information  Discharge Disposition: Disch to  rehab facility or distinct part unit (62)  Discharge Address: [address] [place] [address] [phone]  Discharge Contact Phone Number: [phone]    CM met with the patient at the bedside to discuss discharge planning.  Patient is not medically cleared.  Patient is pending a procedure for her hip.   Per the patient she prefers to return to [facility].   Per the patient she is reluctant to go to a SNF in the event that she is not accepted back to [facility].   CM will f/u with discharge needs post surgery.

## 2023-11-24 NOTE — H&P (VIEW-ONLY)
"11/23/2023    HPI: Sheyla Gauthier is a 83 y.o. female who presents with chronic left femoral neck fracture.  She has had left hip pain and left leg pain for \"a long time \".  She is unable to tell me when she last walked.  She reports she underwent extensive workup with [facility] neurosurgery regarding her back pain that included imaging of her left hip which apparently did not demonstrate any fracture at that time.  This was beginning in February of this year.  Most recently she underwent lumbar spine surgery with [facility] which did not improve her pain.  She was at rehab and found to have a chronic appearing left femoral neck fracture.  Currently she complains of left hip and groin pain.    Past Medical History:   Diagnosis Date    Anxiety     Arthritis     osteoarthritis    Back pain     CAD (coronary artery disease) 10/2018    Abnormal CTCS. PCI/FARZAD (Russell 2.5 x 2mm) to the mid LAD. Cardic stent    Cataract     alex IOL    Daytime sleepiness     Depression     Dyslipidemia     Frequent headaches     Herpes zoster     High cholesterol     Hypertension     MHA (microangiopathic hemolytic anemia)     Migraine syndrome      Mumps     as a child    Osteopathia     Osteoporosis     Painful joint     Post-menopause on HRT (hormone replacement therapy)     Renal disorder     chronic kidney disease stage 3    Sleep apnea     no longer uses cpap    Snoring     Thyroid disease     hypothyroid    Urinary incontinence     White matter abnormality on MRI of brain        Past Surgical History:   Procedure Laterality Date    LUMBAR LAMINECTOMY DISKECTOMY N/A 11/10/2023    Procedure: LUMBAR 5-SACRAL 1 REDO LAMINECTOMY;  Surgeon: Earnest Silva M.D.;  Location: [facility];  Service: Neurosurgery    LUMBAR LAMINECTOMY DISKECTOMY N/A 2/6/2023    Procedure: POSTERIOR REMOVAL OF INTERSPINOUS DEVICE WITH L4-S1 LAMINECTOMY;  Surgeon: Mannie Connor M.D.;  Location: [facility];  Service: Neurosurgery    HARDWARE REMOVAL " ORTHO N/A 2/6/2023    Procedure: REMOVAL, HARDWARE;  Surgeon: Mannie Connor M.D.;  Location: SURGERY [facility];  Service: Neurosurgery    LAMINOTOMY  01/28/2022    L4-L5 and L5-S1 posterior fusion with instrumentation and laminotomy by Dr. Benitez.    BUNIONECTOMY Left 2003    FOOT SURGERY  2002    GA BREAST REDUCTION  1997    CHOLECYSTECTOMY  1996    ABDOMINAL HYSTERECTOMY TOTAL  11/1986    ARTHROSCOPY, KNEE      EYE SURGERY      cataracts    HYSTERECTOMY LAPAROSCOPY      MAMMOPLASTY AUGMENTATION      GA BREAST AUGMENTATION WITH IMPLANT         Medications  No current facility-administered medications on file prior to encounter.     Current Outpatient Medications on File Prior to Encounter   Medication Sig Dispense Refill    Methocarbamol 1000 MG Tab Take 1,000 mg by mouth in the morning, at noon, and at bedtime. 270 Tablet 0    atorvastatin (LIPITOR) 40 MG Tab Take 1 Tablet by mouth at bedtime. 90 Tablet 0    levothyroxine (SYNTHROID) 88 MCG Tab Take 1 Tablet by mouth every morning on an empty stomach. 90 Tablet 0    liothyronine (CYTOMEL) 5 MCG Tab Take 1 Tablet by mouth every morning. 90 Tablet 0    losartan (COZAAR) 50 MG Tab Take 1 Tablet by mouth every morning. 90 Tablet 0    Magnesium 200 MG Tab Take 200 mg by mouth every evening. 90 Tablet 0    polyethylene glycol/lytes (MIRALAX) Pack Take 1 Packet by mouth 1 time a day as needed (constipation). 30 Each 0    pregabalin (LYRICA) 100 MG Cap Take 1 Capsule by mouth 2 times a day for 30 days. 60 Capsule 0    spironolactone (ALDACTONE) 25 MG Tab Take 1 Tablet by mouth every day. 90 Tablet 0    acetaminophen (TYLENOL) 500 MG Tab Take 2 Tablets by mouth in the morning, at noon, and at bedtime. 30 Tablet 0    apixaban (ELIQUIS) 5mg Tab Take 1 Tablet by mouth 2 times a day. 60 Tablet 3    Estriol 10 % Cream Insert 1 Application into the vagina as needed.         Allergies  Trazodone and Crestor [rosuvastatin calcium]    ROS  Negative except as indicated in the  "HPI    Family History   Problem Relation Age of Onset    Stroke Father     Arthritis Father     Hyperlipidemia Father     Hypertension Father     Cancer Sister         breast    Arthritis Sister     Arthritis Mother     Hypertension Mother     Hyperlipidemia Mother     Stroke Mother     Arthritis Brother     Cancer Brother     Hypertension Brother     Hyperlipidemia Brother     Heart Disease Brother         CABG for prox LAD in late 40s    Cancer Daughter         breast       Social History     Socioeconomic History    Marital status:    Tobacco Use    Smoking status: Former     Current packs/day: 0.00     Types: Cigarettes     Quit date: 1979     Years since quittin.3    Smokeless tobacco: Never    Tobacco comments:     smoked off & on for 10 yrs   Vaping Use    Vaping Use: Never used   Substance and Sexual Activity    Alcohol use: Yes     Comment: occassional    Drug use: No    Sexual activity: Not Currently     Comment: , 3 kids, retired     Social Determinants of Health     Transportation Needs: No Transportation Needs (11/15/2023)    PRAPARE - Transportation     Lack of Transportation (Medical): No     Lack of Transportation (Non-Medical): No       Physical Exam  Vitals  /58   Pulse 93   Temp 36.2 °C (97.2 °F) (Temporal)   Resp 18   Ht 1.575 m (5' 2\")   Wt 68.2 kg (150 lb 5.7 oz)   SpO2 93%   General: NAD  HEENT: Normocephalic, atraumatic  Psych: Normal mood and affect  Neck: No collar in place, normal appearing motion  Lungs: No increased work of breathing  Heart: Regular rate by palpation of peripheral pulse  Abdomen: Nondistended  MSK:   On inspection of the left lower extremity there is no obvious deformity.  The leg is short.  Mild pain with manipulation of the extremity.  Moves ankle and toes up/down.  Foot is sensate and perfused.      Radiographs:  X-rays of the pelvis demonstrate a chronic appearing left femoral neck fracture with significant proximal " "migration    US-EXTREMITY NON VASCULAR UNILATERAL LEFT   Final Result      No evidence of left hip joint effusion or fluid in the area of the left hip.      IR-CONSULT AND TREAT    (Results Pending)   DX-ARTHROGRAM-HIP LEFT    (Results Pending)   CT-HIP WITH LEFT    (Results Pending)   IR-US GUIDED PIV    (Results Pending)       Laboratory Values  Recent Labs     11/21/23  0611 11/23/23  0132   WBC 5.4 5.5   RBC 3.73* 4.10*   HEMOGLOBIN 10.4* 11.5*   HEMATOCRIT 32.7* 36.0*   MCV 87.7 87.8   MCH 27.9 28.0   MCHC 31.8* 31.9*   RDW 49.7 50.5*   PLATELETCT 338 360   MPV 10.0 10.1     Recent Labs     11/21/23  0611 11/23/23 0132   SODIUM 142 140   POTASSIUM 4.4 4.4   CHLORIDE 110 107   CO2 23 22   GLUCOSE 93 87   BUN 23* 23*     Recent Labs     11/23/23 0132 11/23/23  0931   APTT  --  25.0   INR 1.24* 1.18*         Assessment: 83-year-old female with a left chronic femoral neck fracture    Plan:   This is an unusual situation.  The patient is unable to tell me when she last walked but has had chronic pain in her hip and back for \"a long time\".  She denies any trauma or fall that may have caused the femoral neck fracture.  We will attempt to obtain imaging from her prior workup at [facility] to see if we can establish a timeline for when this happened  We will obtain advanced imaging of the pelvis and I recommend IR guided aspiration of the left hip to rule out potential chronic infection  If IR guided aspirate shows no concern for infection or is a dry tap and then we can likely move forward with left hip hemiarthroplasty  N.p.o. at midnight for the case that her workup can be completed      Parveen Ko MD  Orthopedic Trauma    "

## 2023-11-24 NOTE — CONSULTS
"11/23/2023    HPI: Sheyla Gauthier is a 83 y.o. female who presents with chronic left femoral neck fracture.  She has had left hip pain and left leg pain for \"a long time \".  She is unable to tell me when she last walked.  She reports she underwent extensive workup with [facility] neurosurgery regarding her back pain that included imaging of her left hip which apparently did not demonstrate any fracture at that time.  This was beginning in February of this year.  Most recently she underwent lumbar spine surgery with [facility] which did not improve her pain.  She was at rehab and found to have a chronic appearing left femoral neck fracture.  Currently she complains of left hip and groin pain.    Past Medical History:   Diagnosis Date    Anxiety     Arthritis     osteoarthritis    Back pain     CAD (coronary artery disease) 10/2018    Abnormal CTCS. PCI/FARZAD (Russell 2.5 x 2mm) to the mid LAD. Cardic stent    Cataract     alex IOL    Daytime sleepiness     Depression     Dyslipidemia     Frequent headaches     Herpes zoster     High cholesterol     Hypertension     MHA (microangiopathic hemolytic anemia)     Migraine syndrome      Mumps     as a child    Osteopathia     Osteoporosis     Painful joint     Post-menopause on HRT (hormone replacement therapy)     Renal disorder     chronic kidney disease stage 3    Sleep apnea     no longer uses cpap    Snoring     Thyroid disease     hypothyroid    Urinary incontinence     White matter abnormality on MRI of brain        Past Surgical History:   Procedure Laterality Date    LUMBAR LAMINECTOMY DISKECTOMY N/A 11/10/2023    Procedure: LUMBAR 5-SACRAL 1 REDO LAMINECTOMY;  Surgeon: Earnest Sivla M.D.;  Location: [facility];  Service: Neurosurgery    LUMBAR LAMINECTOMY DISKECTOMY N/A 2/6/2023    Procedure: POSTERIOR REMOVAL OF INTERSPINOUS DEVICE WITH L4-S1 LAMINECTOMY;  Surgeon: Mannie Connor M.D.;  Location: [facility];  Service: Neurosurgery    HARDWARE REMOVAL " ORTHO N/A 2/6/2023    Procedure: REMOVAL, HARDWARE;  Surgeon: Mannie Connor M.D.;  Location: SURGERY [facility];  Service: Neurosurgery    LAMINOTOMY  01/28/2022    L4-L5 and L5-S1 posterior fusion with instrumentation and laminotomy by Dr. Benitez.    BUNIONECTOMY Left 2003    FOOT SURGERY  2002    [place] BREAST REDUCTION  1997    CHOLECYSTECTOMY  1996    ABDOMINAL HYSTERECTOMY TOTAL  11/1986    ARTHROSCOPY, KNEE      EYE SURGERY      cataracts    HYSTERECTOMY LAPAROSCOPY      MAMMOPLASTY AUGMENTATION      W[place] BREAST AUGMENTATION WITH IMPLANT         Medications  No current facility-administered medications on file prior to encounter.     Current Outpatient Medications on File Prior to Encounter   Medication Sig Dispense Refill    Methocarbamol 1000 MG Tab Take 1,000 mg by mouth in the morning, at noon, and at bedtime. 270 Tablet 0    atorvastatin (LIPITOR) 40 MG Tab Take 1 Tablet by mouth at bedtime. 90 Tablet 0    levothyroxine (SYNTHROID) 88 MCG Tab Take 1 Tablet by mouth every morning on an empty stomach. 90 Tablet 0    liothyronine (CYTOMEL) 5 MCG Tab Take 1 Tablet by mouth every morning. 90 Tablet 0    losartan (COZAAR) 50 MG Tab Take 1 Tablet by mouth every morning. 90 Tablet 0    Magnesium 200 MG Tab Take 200 mg by mouth every evening. 90 Tablet 0    polyethylene glycol/lytes (MIRALAX) Pack Take 1 Packet by mouth 1 time a day as needed (constipation). 30 Each 0    pregabalin (LYRICA) 100 MG Cap Take 1 Capsule by mouth 2 times a day for 30 days. 60 Capsule 0    spironolactone (ALDACTONE) 25 MG Tab Take 1 Tablet by mouth every day. 90 Tablet 0    acetaminophen (TYLENOL) 500 MG Tab Take 2 Tablets by mouth in the morning, at noon, and at bedtime. 30 Tablet 0    apixaban (ELIQUIS) 5mg Tab Take 1 Tablet by mouth 2 times a day. 60 Tablet 3    Estriol 10 % Cream Insert 1 Application into the vagina as needed.         Allergies  Trazodone and Crestor [rosuvastatin calcium]    ROS  Negative except as indicated in the  "HPI    Family History   Problem Relation Age of Onset    Stroke Father     Arthritis Father     Hyperlipidemia Father     Hypertension Father     Cancer Sister         breast    Arthritis Sister     Arthritis Mother     Hypertension Mother     Hyperlipidemia Mother     Stroke Mother     Arthritis Brother     Cancer Brother     Hypertension Brother     Hyperlipidemia Brother     Heart Disease Brother         CABG for prox LAD in late 40s    Cancer Daughter         breast       Social History     Socioeconomic History    Marital status:    Tobacco Use    Smoking status: Former     Current packs/day: 0.00     Types: Cigarettes     Quit date: 1979     Years since quittin.3    Smokeless tobacco: Never    Tobacco comments:     smoked off & on for 10 yrs   Vaping Use    Vaping Use: Never used   Substance and Sexual Activity    Alcohol use: Yes     Comment: occassional    Drug use: No    Sexual activity: Not Currently     Comment: , 3 kids, retired     Social Determinants of Health     Transportation Needs: No Transportation Needs (11/15/2023)    PRAPARE - Transportation     Lack of Transportation (Medical): No     Lack of Transportation (Non-Medical): No       Physical Exam  Vitals  /58   Pulse 93   Temp 36.2 °C (97.2 °F) (Temporal)   Resp 18   Ht 1.575 m (5' 2\")   Wt 68.2 kg (150 lb 5.7 oz)   SpO2 93%   General: NAD  HEENT: Normocephalic, atraumatic  Psych: Normal mood and affect  Neck: No collar in place, normal appearing motion  Lungs: No increased work of breathing  Heart: Regular rate by palpation of peripheral pulse  Abdomen: Nondistended  MSK:   On inspection of the left lower extremity there is no obvious deformity.  The leg is short.  Mild pain with manipulation of the extremity.  Moves ankle and toes up/down.  Foot is sensate and perfused.      Radiographs:  X-rays of the pelvis demonstrate a chronic appearing left femoral neck fracture with significant proximal " "migration    US-EXTREMITY NON VASCULAR UNILATERAL LEFT   Final Result      No evidence of left hip joint effusion or fluid in the area of the left hip.      IR-CONSULT AND TREAT    (Results Pending)   DX-ARTHROGRAM-HIP LEFT    (Results Pending)   CT-HIP WITH LEFT    (Results Pending)   IR-US GUIDED PIV    (Results Pending)       Laboratory Values  Recent Labs     11/21/23  0611 11/23/23  0132   WBC 5.4 5.5   RBC 3.73* 4.10*   HEMOGLOBIN 10.4* 11.5*   HEMATOCRIT 32.7* 36.0*   MCV 87.7 87.8   MCH 27.9 28.0   MCHC 31.8* 31.9*   RDW 49.7 50.5*   PLATELETCT 338 360   MPV 10.0 10.1     Recent Labs     11/21/23  0611 11/23/23 0132   SODIUM 142 140   POTASSIUM 4.4 4.4   CHLORIDE 110 107   CO2 23 22   GLUCOSE 93 87   BUN 23* 23*     Recent Labs     11/23/23 0132 11/23/23  0931   APTT  --  25.0   INR 1.24* 1.18*         Assessment: 83-year-old female with a left chronic femoral neck fracture    Plan:   This is an unusual situation.  The patient is unable to tell me when she last walked but has had chronic pain in her hip and back for \"a long time\".  She denies any trauma or fall that may have caused the femoral neck fracture.  We will attempt to obtain imaging from her prior workup at [facility] to see if we can establish a timeline for when this happened  We will obtain advanced imaging of the pelvis and I recommend IR guided aspiration of the left hip to rule out potential chronic infection  If IR guided aspirate shows no concern for infection or is a dry tap and then we can likely move forward with left hip hemiarthroplasty  N.p.o. at midnight for the case that her workup can be completed      Parveen Ko MD  Orthopedic Trauma    "

## 2023-11-24 NOTE — CARE PLAN
Problem: Knowledge Deficit - Standard  Goal: Patient and family/care givers will demonstrate understanding of plan of care, disease process/condition, diagnostic tests and medications  Outcome: Progressing     Problem: Pain - Standard  Goal: Alleviation of pain or a reduction in pain to the patient’s comfort goal  Outcome: Progressing     Problem: Fall Risk  Goal: Patient will remain free from falls  Outcome: Progressing   The patient is Stable - Low risk of patient condition declining or worsening    Shift Goals  Clinical Goals: APTT  Patient Goals: Rest, sleep  Family Goals: Not present    Progress made toward(s) clinical / shift goals:      Patient is not progressing towards the following goals:

## 2023-11-25 LAB
APTT PPP: 70.3 SEC (ref 24.7–36)
APTT PPP: 98.3 SEC (ref 24.7–36)

## 2023-11-25 PROCEDURE — 700111 HCHG RX REV CODE 636 W/ 250 OVERRIDE (IP): Performed by: STUDENT IN AN ORGANIZED HEALTH CARE EDUCATION/TRAINING PROGRAM

## 2023-11-25 PROCEDURE — 700102 HCHG RX REV CODE 250 W/ 637 OVERRIDE(OP): Performed by: STUDENT IN AN ORGANIZED HEALTH CARE EDUCATION/TRAINING PROGRAM

## 2023-11-25 PROCEDURE — A9270 NON-COVERED ITEM OR SERVICE: HCPCS | Performed by: HOSPITALIST

## 2023-11-25 PROCEDURE — 85730 THROMBOPLASTIN TIME PARTIAL: CPT

## 2023-11-25 PROCEDURE — 770001 HCHG ROOM/CARE - MED/SURG/GYN PRIV*

## 2023-11-25 PROCEDURE — 99233 SBSQ HOSP IP/OBS HIGH 50: CPT | Performed by: HOSPITALIST

## 2023-11-25 PROCEDURE — 36415 COLL VENOUS BLD VENIPUNCTURE: CPT

## 2023-11-25 PROCEDURE — 700102 HCHG RX REV CODE 250 W/ 637 OVERRIDE(OP): Performed by: HOSPITALIST

## 2023-11-25 PROCEDURE — A9270 NON-COVERED ITEM OR SERVICE: HCPCS | Performed by: STUDENT IN AN ORGANIZED HEALTH CARE EDUCATION/TRAINING PROGRAM

## 2023-11-25 RX ORDER — LORAZEPAM 0.5 MG/1
0.5 TABLET ORAL 3 TIMES DAILY PRN
Status: DISCONTINUED | OUTPATIENT
Start: 2023-11-25 | End: 2023-11-29 | Stop reason: HOSPADM

## 2023-11-25 RX ADMIN — OXYCODONE 5 MG: 5 TABLET ORAL at 03:42

## 2023-11-25 RX ADMIN — ATORVASTATIN CALCIUM 40 MG: 40 TABLET, FILM COATED ORAL at 20:08

## 2023-11-25 RX ADMIN — LEVOTHYROXINE SODIUM 88 MCG: 0.09 TABLET ORAL at 04:09

## 2023-11-25 RX ADMIN — PREGABALIN 100 MG: 100 CAPSULE ORAL at 20:08

## 2023-11-25 RX ADMIN — METHOCARBAMOL 1000 MG: 500 TABLET ORAL at 10:00

## 2023-11-25 RX ADMIN — OXYCODONE 5 MG: 5 TABLET ORAL at 11:37

## 2023-11-25 RX ADMIN — METHOCARBAMOL 1000 MG: 500 TABLET ORAL at 15:20

## 2023-11-25 RX ADMIN — LIOTHYRONINE SODIUM 5 MCG: 5 TABLET ORAL at 04:09

## 2023-11-25 RX ADMIN — METHOCARBAMOL 1000 MG: 500 TABLET ORAL at 20:08

## 2023-11-25 RX ADMIN — LORAZEPAM 0.5 MG: 0.5 TABLET ORAL at 22:22

## 2023-11-25 RX ADMIN — PREGABALIN 100 MG: 100 CAPSULE ORAL at 10:00

## 2023-11-25 RX ADMIN — OXYCODONE 5 MG: 5 TABLET ORAL at 20:06

## 2023-11-25 RX ADMIN — ACETAMINOPHEN 1000 MG: 500 TABLET, FILM COATED ORAL at 10:00

## 2023-11-25 RX ADMIN — HEPARIN SODIUM 700 UNITS/HR: 5000 INJECTION, SOLUTION INTRAVENOUS at 13:44

## 2023-11-25 RX ADMIN — ACETAMINOPHEN 1000 MG: 500 TABLET, FILM COATED ORAL at 20:08

## 2023-11-25 RX ADMIN — ACETAMINOPHEN 1000 MG: 500 TABLET, FILM COATED ORAL at 15:20

## 2023-11-25 ASSESSMENT — ENCOUNTER SYMPTOMS
PSYCHIATRIC NEGATIVE: 1
FOCAL WEAKNESS: 1
HEMOPTYSIS: 0
GASTROINTESTINAL NEGATIVE: 1
PALPITATIONS: 0
MYALGIAS: 1
CARDIOVASCULAR NEGATIVE: 1
COUGH: 0

## 2023-11-25 ASSESSMENT — PAIN DESCRIPTION - PAIN TYPE
TYPE: ACUTE PAIN

## 2023-11-25 NOTE — PROGRESS NOTES
[facility] Medicine Daily Progress Note    Date of Service  11/25/2023    Chief Complaint  Sheyla Gauthier is a 83 y.o. female admitted 11/22/2023 with left displaced femoral neck fracture     Hospital Course  Lizzy is a 83-year-old female with a past medical significant for hypertension,, dyslipidemia, recent lumbar laminectomy by Dr. Silva on 11/10/2023 presented to ER on 11/22/2023 primary nausea with a complaint of left ear pain.  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    Patient report that she has been having difficulty walking and pain in her groin before her lumbar surgery.  Orthopedic surgery Dr. Ko was consulted who recommended IR guided patient medically care discussed with IR who stated there is not enough fluid to do aspiration.    Reached out to Dr. Ko who stated that  patient will have surgery on Monday, will make n.p.o. at midnight on 11/26.  We will hold heparin 6 hours prior to surgery.    Interval events :  --- No acute events overnight, medicine has been stable, patient is alert, awake, answering questions, upon my evaluation, she was noted to be drinking coffee, her daughter is noted to be at bedside  -- Holding eliquis and patient is on heparin drip for surgical intervention.    Patient is on high risk medication including heparin, will monitor hemoglobin hematocrit, make sure that her hemoglobin is greater than 7.  We will monitor anti Xa level to make sure that the heparin is therapeutic and titrate the dose accordingly.    Reached out to Dr. Ko who stated that  patient will have surgery on Monday, will make n.p.o. at midnight on 11/26.  We will hold heparin 6 hours prior to surgery.    Went to the bedside of the patient and answered all their questions with the nursing staff at bedside    I have discussed this patient's plan of care and discharge plan at IDT rounds today with Case Management, Nursing, Nursing leadership, and other  members of the IDT team.    Consultants/Specialty  Orthopedic surgery    Code Status  Full Code    Disposition  The patient is not medically cleared for discharge to home or a post-acute facility.  Anticipate discharge to: [facility]    I have placed the appropriate orders for post-discharge needs.    Review of Systems  Review of Systems   Respiratory:  Negative for cough and hemoptysis.    Cardiovascular: Negative.  Negative for chest pain and palpitations.   Gastrointestinal: Negative.    Genitourinary: Negative.    Musculoskeletal:  Positive for joint pain and myalgias.   Neurological:  Positive for focal weakness.   Endo/Heme/Allergies: Negative.    Psychiatric/Behavioral: Negative.          Physical Exam  Temp:  [36.5 °C (97.7 °F)-36.7 °C (98.1 °F)] 36.7 °C (98.1 °F)  Pulse:  [61-81] 61  Resp:  [17-18] 17  BP: ()/(53-71) 144/71  SpO2:  [93 %] 93 %    Physical Exam  Constitutional:       Appearance: Normal appearance.   HENT:      Head: Normocephalic and atraumatic.      Mouth/Throat:      Mouth: Mucous membranes are moist.   Eyes:      Extraocular Movements: Extraocular movements intact.      Pupils: Pupils are equal, round, and reactive to light.   Cardiovascular:      Rate and Rhythm: Normal rate and regular rhythm.      Pulses: Normal pulses.      Heart sounds:      No friction rub.   Pulmonary:      Effort: Pulmonary effort is normal.   Abdominal:      General: Bowel sounds are normal.      Palpations: Abdomen is soft.   Musculoskeletal:         General: Tenderness present. No swelling.      Cervical back: Neck supple.      Comments: Left hip tenderness to palpation.  No erythema, swelling or lesions/secretions noted   Skin:     General: Skin is warm.   Neurological:      Mental Status: She is alert and oriented to person, place, and time. Mental status is at baseline.      Cranial Nerves: No cranial nerve deficit.      Motor: Weakness (LLE weakness) present.   Psychiatric:         Mood and Affect: Mood  normal.         Behavior: Behavior normal.         Thought Content: Thought content normal.         Judgment: Judgment normal.         Fluids  No intake or output data in the 24 hours ending 11/25/23 1231    Laboratory  Recent Labs     11/23/23 0132   WBC 5.5   RBC 4.10*   HEMOGLOBIN 11.5*   HEMATOCRIT 36.0*   MCV 87.8   MCH 28.0   MCHC 31.9*   RDW 50.5*   PLATELETCT 360   MPV 10.1       Recent Labs     11/23/23  0132   SODIUM 140   POTASSIUM 4.4   CHLORIDE 107   CO2 22   GLUCOSE 87   BUN 23*   CREATININE 0.90   CALCIUM 9.9       Recent Labs     11/23/23  0132 11/23/23  0931 11/23/23  0931 11/23/23  1848 11/24/23  0350 11/24/23  1009   APTT  --  25.0   < > 75.3* 85.3* 77.8*   INR 1.24* 1.18*  --   --   --   --     < > = values in this interval not displayed.                 Imaging  CT-HIP WITH LEFT   Final Result      1.  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 There is fragmentation of the femoral head with multiple bone fragments seen within the joint space. There is secondary    osteoarthritis of the hip articulation. There is minimal joint fluid within the left hip articulation. No surrounding fluid collections.      2.  Multiple calcific fragments seen extending into the area of the left iliopsoas bursa and extending along the course of the left iliopsoas muscle and tendon into the left iliacus muscle likely representing either myositis ossificans or extrusion of    ossific fragments along the course of the iliopsoas muscle and tendon.      3.  No evidence of inflammatory process seen within the pelvis.      4.  Sigmoid diverticulosis.      US-EXTREMITY NON VASCULAR UNILATERAL LEFT   Final Result      No evidence of left hip joint effusion or fluid in the area of the left hip.      IR-CONSULT AND TREAT    (Results Pending)        Assessment/Plan  * Closed fracture of neck of left femur (HCC)- (present on  admission)  Assessment & Plan  X-ray showing left femoral neck fracture   --Left hip CT concerning for avascular necrosis  --Dr Iyer discussed case with interventional radiology (Dr. Banerjee), who reviewed left hip ultrasound, left hip CT and does not recommend IR aspiration as there is no to minimal fluid in the joint space.  Orthopedic Surgery following,  planning for surgery on Monday 11/27  Continue heparin gtt in the mean time   Multimodal pain management     Acute deep vein thrombosis (DVT) of right lower extremity (HCC)- (present on admission)  Assessment & Plan  Noted on 11/2023 ultrasound  Has been on Eliquis outpatient  Hold Eliquis  Heparin drip, possible procedure  Likely switch back to Eliquis after completion of surgery/procedures    S/P lumbar laminectomy- (present on admission)  Assessment & Plan  History of recent lumbar laminectomy on 11/10/2023 by Dr. Earnest Silva  Follow-up with neurosurgical team  Wound care for dressing changes    Chronic kidney disease, stage 3a (HCC)- (present on admission)  Assessment & Plan  Avoid nephrotoxic agents and renally dose all medications  Monitor creatinine    Peripheral edema- (present on admission)  Assessment & Plan  Resume spirnolactone     Acquired hypothyroidism- (present on admission)  Assessment & Plan  Resume home thyroid medications    Mixed hyperlipidemia- (present on admission)  Assessment & Plan  Statin     HTN (hypertension)- (present on admission)  Assessment & Plan  Resume home losartan, spironolactone  IV hydralazine as needed   -- BP well controlled         VTE prophylaxis: Heparin drip, on Eliquis at home for DVT, turn off in case of procedure

## 2023-11-25 NOTE — HOSPITAL COURSE
This is a 83-year-old female with a past medical significant for hypertension,, dyslipidemia, recent lumbar laminectomy by Dr. Silva on 11/10/2023 presented to ER on 11/22/2023 primary nausea with a complaint of left ear pain.  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    Patient report that she has been having difficulty walking and pain in her groin before her lumbar surgery.  Orthopedic surgery Dr. Ko was consulted who recommended IR guided patient medically care discussed with IR who stated there is not enough fluid to do aspiration.    Patient underwent left total hip arthroplasty on 11/27/2023; per orthopedic surgery, patient is weightbearing as tolerated on the left lower extremity with posterior hip precautions.  We will obtain PT/OT, DVT prophylaxis, multimodal pain management    Interval events :  --- No acute events overnight, medicine has been stable, patient is alert, awake, answering questions, upon my evaluation, she was noted to be drinking coffee, her daughter is noted to be at bedside  -- Holding eliquis and patient is on heparin drip for surgical intervention.    Reached out to Dr. Ko who stated that  patient will have surgery on Monday, will make n.p.o. at midnight on 11/26.  We will hold heparin 6 hours prior to surgery.    Went to the bedside of the patient and answered all their questions with the nursing staff at bedside

## 2023-11-25 NOTE — CARE PLAN
The patient is Stable - Low risk of patient condition declining or worsening    Shift Goals  Clinical Goals: pain control; mobility  Patient Goals: pain control; know the plan  Family Goals: plan    Progress made toward(s) clinical / shift goals:    Problem: Knowledge Deficit - Standard  Goal: Patient and family/care givers will demonstrate understanding of plan of care, disease process/condition, diagnostic tests and medications  Outcome: Progressing     Problem: Pain - Standard  Goal: Alleviation of pain or a reduction in pain to the patient’s comfort goal  Outcome: Progressing     Problem: Fall Risk  Goal: Patient will remain free from falls  Outcome: Progressing       Patient is not progressing towards the following goals:

## 2023-11-25 NOTE — CARE PLAN
Problem: Knowledge Deficit - Standard  Goal: Patient and family/care givers will demonstrate understanding of plan of care, disease process/condition, diagnostic tests and medications  Outcome: Progressing     Problem: Pain - Standard  Goal: Alleviation of pain or a reduction in pain to the patient’s comfort goal  Outcome: Progressing     Problem: Fall Risk  Goal: Patient will remain free from falls  Outcome: Progressing   The patient is Stable - Low risk of patient condition declining or worsening    Shift Goals  Clinical Goals: Pain management  Patient Goals: Pain management  Family Goals: Not present    Progress made toward(s) clinical / shift goals:      Patient is not progressing towards the following goals:

## 2023-11-26 LAB
ALBUMIN SERPL BCP-MCNC: 3.6 G/DL (ref 3.2–4.9)
APTT PPP: 62.5 SEC (ref 24.7–36)
BUN SERPL-MCNC: 20 MG/DL (ref 8–22)
CALCIUM ALBUM COR SERPL-MCNC: 10 MG/DL (ref 8.5–10.5)
CALCIUM SERPL-MCNC: 9.7 MG/DL (ref 8.5–10.5)
CHLORIDE SERPL-SCNC: 110 MMOL/L (ref 96–112)
CO2 SERPL-SCNC: 19 MMOL/L (ref 20–33)
CREAT SERPL-MCNC: 0.92 MG/DL (ref 0.5–1.4)
ERYTHROCYTE [DISTWIDTH] IN BLOOD BY AUTOMATED COUNT: 53.2 FL (ref 35.9–50)
GFR SERPLBLD CREATININE-BSD FMLA CKD-EPI: 62 ML/MIN/1.73 M 2
GLUCOSE SERPL-MCNC: 88 MG/DL (ref 65–99)
HCT VFR BLD AUTO: 34.6 % (ref 37–47)
HGB BLD-MCNC: 10.9 G/DL (ref 12–16)
MAGNESIUM SERPL-MCNC: 2 MG/DL (ref 1.5–2.5)
MCH RBC QN AUTO: 27.9 PG (ref 27–33)
MCHC RBC AUTO-ENTMCNC: 31.5 G/DL (ref 32.2–35.5)
MCV RBC AUTO: 88.5 FL (ref 81.4–97.8)
PHOSPHATE SERPL-MCNC: 3.1 MG/DL (ref 2.5–4.5)
PLATELET # BLD AUTO: 304 K/UL (ref 164–446)
PMV BLD AUTO: 10.1 FL (ref 9–12.9)
POTASSIUM SERPL-SCNC: 4.1 MMOL/L (ref 3.6–5.5)
RBC # BLD AUTO: 3.91 M/UL (ref 4.2–5.4)
SODIUM SERPL-SCNC: 139 MMOL/L (ref 135–145)
WBC # BLD AUTO: 4.8 K/UL (ref 4.8–10.8)

## 2023-11-26 PROCEDURE — A9270 NON-COVERED ITEM OR SERVICE: HCPCS | Performed by: STUDENT IN AN ORGANIZED HEALTH CARE EDUCATION/TRAINING PROGRAM

## 2023-11-26 PROCEDURE — 700111 HCHG RX REV CODE 636 W/ 250 OVERRIDE (IP): Performed by: STUDENT IN AN ORGANIZED HEALTH CARE EDUCATION/TRAINING PROGRAM

## 2023-11-26 PROCEDURE — 85027 COMPLETE CBC AUTOMATED: CPT

## 2023-11-26 PROCEDURE — 770001 HCHG ROOM/CARE - MED/SURG/GYN PRIV*

## 2023-11-26 PROCEDURE — 700102 HCHG RX REV CODE 250 W/ 637 OVERRIDE(OP): Performed by: STUDENT IN AN ORGANIZED HEALTH CARE EDUCATION/TRAINING PROGRAM

## 2023-11-26 PROCEDURE — 36415 COLL VENOUS BLD VENIPUNCTURE: CPT

## 2023-11-26 PROCEDURE — 85730 THROMBOPLASTIN TIME PARTIAL: CPT

## 2023-11-26 PROCEDURE — 80069 RENAL FUNCTION PANEL: CPT

## 2023-11-26 PROCEDURE — 99233 SBSQ HOSP IP/OBS HIGH 50: CPT | Performed by: HOSPITALIST

## 2023-11-26 PROCEDURE — 83735 ASSAY OF MAGNESIUM: CPT

## 2023-11-26 RX ADMIN — OXYCODONE 5 MG: 5 TABLET ORAL at 20:40

## 2023-11-26 RX ADMIN — METHOCARBAMOL 1000 MG: 500 TABLET ORAL at 20:40

## 2023-11-26 RX ADMIN — LIOTHYRONINE SODIUM 5 MCG: 5 TABLET ORAL at 06:34

## 2023-11-26 RX ADMIN — ACETAMINOPHEN 1000 MG: 500 TABLET, FILM COATED ORAL at 16:40

## 2023-11-26 RX ADMIN — HEPARIN SODIUM 700 UNITS/HR: 5000 INJECTION, SOLUTION INTRAVENOUS at 23:25

## 2023-11-26 RX ADMIN — METHOCARBAMOL 1000 MG: 500 TABLET ORAL at 16:41

## 2023-11-26 RX ADMIN — SPIRONOLACTONE 25 MG: 25 TABLET ORAL at 06:34

## 2023-11-26 RX ADMIN — ATORVASTATIN CALCIUM 40 MG: 40 TABLET, FILM COATED ORAL at 20:41

## 2023-11-26 RX ADMIN — PREGABALIN 100 MG: 100 CAPSULE ORAL at 08:28

## 2023-11-26 RX ADMIN — ACETAMINOPHEN 1000 MG: 500 TABLET, FILM COATED ORAL at 08:28

## 2023-11-26 RX ADMIN — METHOCARBAMOL 1000 MG: 500 TABLET ORAL at 08:28

## 2023-11-26 RX ADMIN — ACETAMINOPHEN 1000 MG: 500 TABLET, FILM COATED ORAL at 20:41

## 2023-11-26 RX ADMIN — OXYCODONE 5 MG: 5 TABLET ORAL at 14:02

## 2023-11-26 RX ADMIN — LEVOTHYROXINE SODIUM 88 MCG: 0.09 TABLET ORAL at 06:34

## 2023-11-26 RX ADMIN — PREGABALIN 100 MG: 100 CAPSULE ORAL at 20:41

## 2023-11-26 RX ADMIN — LOSARTAN POTASSIUM 50 MG: 50 TABLET, FILM COATED ORAL at 06:34

## 2023-11-26 RX ADMIN — OXYCODONE 5 MG: 5 TABLET ORAL at 06:40

## 2023-11-26 ASSESSMENT — ENCOUNTER SYMPTOMS
GASTROINTESTINAL NEGATIVE: 1
PALPITATIONS: 0
COUGH: 0
HEMOPTYSIS: 0
CARDIOVASCULAR NEGATIVE: 1
MYALGIAS: 1
FOCAL WEAKNESS: 1
PSYCHIATRIC NEGATIVE: 1

## 2023-11-26 ASSESSMENT — PAIN DESCRIPTION - PAIN TYPE
TYPE: ACUTE PAIN

## 2023-11-26 NOTE — PROGRESS NOTES
[facility] Medicine Daily Progress Note    Date of Service  11/26/2023    Chief Complaint  Sheyla Gauthier is a 83 y.o. female admitted 11/22/2023 with left displaced femoral neck fracture     Hospital Course  Lizzy is a 83-year-old female with a past medical significant for hypertension,, dyslipidemia, recent lumbar laminectomy by Dr. Silva on 11/10/2023 presented to ER on 11/22/2023 primary nausea with a complaint of left ear pain.  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    Patient report that she has been having difficulty walking and pain in her groin before her lumbar surgery.  Orthopedic surgery Dr. Ko was consulted who recommended IR guided patient medically care discussed with IR who stated there is not enough fluid to do aspiration.    Reached out to Dr. Ko who stated that  patient will have surgery on Monday, will make n.p.o. at midnight on 11/26.  We will hold heparin 6 hours prior to surgery.    Interval events :  --- No acute events overnight, medicine has been stable, patient is alert, awake, answering questions, upon my evaluation, she was noted to be drinking coffee, her daughter is noted to be at bedside  -- Holding eliquis and patient is on heparin drip for surgical intervention.    Patient is on high risk medication including heparin, will monitor hemoglobin hematocrit, make sure that her hemoglobin is greater than 7.  We will monitor anti Xa level to make sure that the heparin is therapeutic and titrate the dose accordingly.    Reached out to Dr. Ko who stated that  patient will have surgery on Monday, will make n.p.o. at midnight on 11/26.  We will hold heparin 6 hours prior to surgery.    Went to the bedside of the patient and answered all their questions with the nursing staff at bedside    11/26:  -- No acute events overnight, vital sign has been stable, patient is alert, awake and answering questions appropriately, noted noted to the  bedside.  --- Patient is going for surgery tomorrow, will make n.p.o. at midnight.  We will hold patient's heparin drip at 1 AM  -- Monitor hemoglobin hematocrit,  today at 10.9  --Pain well controlled  with Oxy, robaxin and morphine      I have discussed this patient's plan of care and discharge plan at IDT rounds today with Case Management, Nursing, Nursing leadership, and other members of the IDT team.    Consultants/Specialty  Orthopedic surgery    Code Status  Full Code    Disposition  The patient is not medically cleared for discharge to home or a post-acute facility.  Anticipate discharge to: [facility]    I have placed the appropriate orders for post-discharge needs.    Review of Systems  Review of Systems   Respiratory:  Negative for cough and hemoptysis.    Cardiovascular: Negative.  Negative for chest pain and palpitations.   Gastrointestinal: Negative.    Genitourinary: Negative.    Musculoskeletal:  Positive for joint pain and myalgias.   Neurological:  Positive for focal weakness.   Endo/Heme/Allergies: Negative.    Psychiatric/Behavioral: Negative.          Physical Exam  Temp:  [35.9 °C (96.6 °F)-37 °C (98.6 °F)] 36.4 °C (97.5 °F)  Pulse:  [62-69] 62  Resp:  [16-17] 16  BP: (112-143)/(63-80) 121/67  SpO2:  [91 %-94 %] 92 %    Physical Exam  Constitutional:       Appearance: Normal appearance.   HENT:      Head: Normocephalic and atraumatic.      Mouth/Throat:      Mouth: Mucous membranes are moist.   Eyes:      Extraocular Movements: Extraocular movements intact.      Pupils: Pupils are equal, round, and reactive to light.   Cardiovascular:      Rate and Rhythm: Normal rate and regular rhythm.      Pulses: Normal pulses.      Heart sounds:      No friction rub.   Pulmonary:      Effort: Pulmonary effort is normal.   Abdominal:      General: Bowel sounds are normal.      Palpations: Abdomen is soft.   Musculoskeletal:         General: Tenderness present. No swelling.      Cervical back: Neck supple.       Comments: Left hip tenderness to palpation.  No erythema, swelling or lesions/secretions noted   Skin:     General: Skin is warm.   Neurological:      Mental Status: She is alert and oriented to person, place, and time. Mental status is at baseline.      Cranial Nerves: No cranial nerve deficit.      Motor: Weakness (LLE weakness) present.   Psychiatric:         Mood and Affect: Mood normal.         Behavior: Behavior normal.         Thought Content: Thought content normal.         Judgment: Judgment normal.         Fluids    Intake/Output Summary (Last 24 hours) at 11/26/2023 0931  Last data filed at 11/26/2023 0340  Gross per 24 hour   Intake 240 ml   Output 0 ml   Net 240 ml       Laboratory  Recent Labs     11/26/23  0320   WBC 4.8   RBC 3.91*   HEMOGLOBIN 10.9*   HEMATOCRIT 34.6*   MCV 88.5   MCH 27.9   MCHC 31.5*   RDW 53.2*   PLATELETCT 304   MPV 10.1       Recent Labs     11/26/23  0320   SODIUM 139   POTASSIUM 4.1   CHLORIDE 110   CO2 19*   GLUCOSE 88   BUN 20   CREATININE 0.92   CALCIUM 9.7       Recent Labs     11/23/23  0931 11/23/23  1848 11/25/23  1148 11/25/23  2004 11/26/23  0320   APTT 25.0   < > 98.3* 70.3* 62.5*   INR 1.18*  --   --   --   --     < > = values in this interval not displayed.                 Imaging  CT-HIP WITH LEFT   Final Result      1.  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 There is fragmentation of the femoral head with multiple bone fragments seen within the joint space. There is secondary    osteoarthritis of the hip articulation. There is minimal joint fluid within the left hip articulation. No surrounding fluid collections.      2.  Multiple calcific fragments seen extending into the area of the left iliopsoas bursa and extending along the course of the left iliopsoas muscle and tendon into the left iliacus muscle likely representing either myositis ossificans or extrusion of     ossific fragments along the course of the iliopsoas muscle and tendon.      3.  No evidence of inflammatory process seen within the pelvis.      4.  Sigmoid diverticulosis.      US-EXTREMITY NON VASCULAR UNILATERAL LEFT   Final Result      No evidence of left hip joint effusion or fluid in the area of the left hip.           Assessment/Plan  * Closed fracture of neck of left femur (HCC)- (present on admission)  Assessment & Plan  X-ray showing left femoral neck fracture   --Left hip CT concerning for avascular necrosis  --Dr Iyer discussed case with interventional radiology (Dr. Banerjee), who reviewed left hip ultrasound, left hip CT and does not recommend IR aspiration as there is no to minimal fluid in the joint space.  Orthopedic Surgery following,  planning for surgery on Monday 11/27  Continue heparin gtt in the mean time ; stop heparin at 1 am   Multimodal pain management   Npo at midnight    S/P lumbar laminectomy- (present on admission)  Assessment & Plan  History of recent lumbar laminectomy on 11/10/2023 by Dr. Earnest Silva  Follow-up with neurosurgical team  Wound care for dressing changes    Acute deep vein thrombosis (DVT) of right lower extremity (HCC)- (present on admission)  Assessment & Plan  Noted on 11/2023 ultrasound  Has been on Eliquis outpatient  Hold Eliquis  Heparin drip, possible procedure  Likely switch back to Eliquis after completion of surgery/procedures    Chronic kidney disease, stage 3a (HCC)- (present on admission)  Assessment & Plan  Avoid nephrotoxic agents and renally dose all medications  Monitor creatinine    Peripheral edema- (present on admission)  Assessment & Plan  Resume spirnolactone     Mixed hyperlipidemia- (present on admission)  Assessment & Plan  Statin     HTN (hypertension)- (present on admission)  Assessment & Plan  Resume home losartan, spironolactone  IV hydralazine as needed   -- BP well controlled    Acquired hypothyroidism- (present on  admission)  Assessment & Plan  Resume home thyroid medications         VTE prophylaxis: Heparin drip  Stop heparin gtt at 1.00 am    I have performed a physical exam and reviewed and updated ROS and Plan today (11/26/2023). In review of yesterday's note (11/25/2023), there are no changes except as documented above.

## 2023-11-26 NOTE — CARE PLAN
The patient is Stable - Low risk of patient condition declining or worsening    Shift Goals  Clinical Goals: mobility; pain conrol; heparin drip  Patient Goals: pain control; comfort  Family Goals: comfort    Progress made toward(s) clinical / shift goals:    Problem: Knowledge Deficit - Standard  Goal: Patient and family/care givers will demonstrate understanding of plan of care, disease process/condition, diagnostic tests and medications  Outcome: Progressing     Problem: Pain - Standard  Goal: Alleviation of pain or a reduction in pain to the patient’s comfort goal  Outcome: Progressing     Problem: Fall Risk  Goal: Patient will remain free from falls  Outcome: Progressing       Patient is not progressing towards the following goals:

## 2023-11-26 NOTE — CARE PLAN
The patient is Stable - Low risk of patient condition declining or worsening    Shift Goals  Clinical Goals: Pain control, mobility, safety  Patient Goals: Pain control, comfort, rest  Family Goals: N/A    Progress made toward(s) clinical / shift goals:    Problem: Knowledge Deficit - Standard  Goal: Patient and family/care givers will demonstrate understanding of plan of care, disease process/condition, diagnostic tests and medications  Outcome: Progressing     Problem: Pain - Standard  Goal: Alleviation of pain or a reduction in pain to the patient’s comfort goal  Outcome: Progressing     Problem: Fall Risk  Goal: Patient will remain free from falls  Outcome: Progressing              Israel Gonzalez)

## 2023-11-27 ENCOUNTER — ANESTHESIA EVENT (OUTPATIENT)
Dept: SURGERY | Facility: MEDICAL CENTER | Age: 83
DRG: 522 | End: 2023-11-27
Payer: MEDICARE

## 2023-11-27 ENCOUNTER — ANESTHESIA (OUTPATIENT)
Dept: SURGERY | Facility: MEDICAL CENTER | Age: 83
DRG: 522 | End: 2023-11-27
Payer: MEDICARE

## 2023-11-27 ENCOUNTER — APPOINTMENT (OUTPATIENT)
Dept: RADIOLOGY | Facility: MEDICAL CENTER | Age: 83
DRG: 522 | End: 2023-11-27
Attending: ORTHOPAEDIC SURGERY
Payer: MEDICARE

## 2023-11-27 LAB
FUNGUS SPEC FUNGUS STN: NORMAL
FUNGUS SPEC FUNGUS STN: NORMAL
GLUCOSE BLD STRIP.AUTO-MCNC: 167 MG/DL (ref 65–99)
PATHOLOGY CONSULT NOTE: NORMAL
RHODAMINE-AURAMINE STN SPEC: NORMAL
RHODAMINE-AURAMINE STN SPEC: NORMAL
SIGNIFICANT IND 70042: NORMAL
SITE SITE: NORMAL
SOURCE SOURCE: NORMAL

## 2023-11-27 PROCEDURE — 87070 CULTURE OTHR SPECIMN AEROBIC: CPT | Mod: 91

## 2023-11-27 PROCEDURE — 700111 HCHG RX REV CODE 636 W/ 250 OVERRIDE (IP): Mod: JZ | Performed by: STUDENT IN AN ORGANIZED HEALTH CARE EDUCATION/TRAINING PROGRAM

## 2023-11-27 PROCEDURE — 700111 HCHG RX REV CODE 636 W/ 250 OVERRIDE (IP): Performed by: ORTHOPAEDIC SURGERY

## 2023-11-27 PROCEDURE — 700102 HCHG RX REV CODE 250 W/ 637 OVERRIDE(OP): Performed by: STUDENT IN AN ORGANIZED HEALTH CARE EDUCATION/TRAINING PROGRAM

## 2023-11-27 PROCEDURE — 88311 DECALCIFY TISSUE: CPT

## 2023-11-27 PROCEDURE — A9270 NON-COVERED ITEM OR SERVICE: HCPCS | Performed by: HOSPITALIST

## 2023-11-27 PROCEDURE — A9270 NON-COVERED ITEM OR SERVICE: HCPCS | Performed by: ANESTHESIOLOGY

## 2023-11-27 PROCEDURE — 700101 HCHG RX REV CODE 250: Performed by: ANESTHESIOLOGY

## 2023-11-27 PROCEDURE — C1713 ANCHOR/SCREW BN/BN,TIS/BN: HCPCS | Performed by: ORTHOPAEDIC SURGERY

## 2023-11-27 PROCEDURE — A9270 NON-COVERED ITEM OR SERVICE: HCPCS | Performed by: STUDENT IN AN ORGANIZED HEALTH CARE EDUCATION/TRAINING PROGRAM

## 2023-11-27 PROCEDURE — 27130 TOTAL HIP ARTHROPLASTY: CPT | Mod: LT | Performed by: ORTHOPAEDIC SURGERY

## 2023-11-27 PROCEDURE — 160009 HCHG ANES TIME/MIN: Performed by: ORTHOPAEDIC SURGERY

## 2023-11-27 PROCEDURE — 700101 HCHG RX REV CODE 250: Performed by: ORTHOPAEDIC SURGERY

## 2023-11-27 PROCEDURE — 27130 TOTAL HIP ARTHROPLASTY: CPT | Mod: 80ROC,LT | Performed by: STUDENT IN AN ORGANIZED HEALTH CARE EDUCATION/TRAINING PROGRAM

## 2023-11-27 PROCEDURE — 700102 HCHG RX REV CODE 250 W/ 637 OVERRIDE(OP): Performed by: ANESTHESIOLOGY

## 2023-11-27 PROCEDURE — 160048 HCHG OR STATISTICAL LEVEL 1-5: Performed by: ORTHOPAEDIC SURGERY

## 2023-11-27 PROCEDURE — 99233 SBSQ HOSP IP/OBS HIGH 50: CPT | Performed by: HOSPITALIST

## 2023-11-27 PROCEDURE — 770001 HCHG ROOM/CARE - MED/SURG/GYN PRIV*

## 2023-11-27 PROCEDURE — 73502 X-RAY EXAM HIP UNI 2-3 VIEWS: CPT | Mod: LT

## 2023-11-27 PROCEDURE — 700111 HCHG RX REV CODE 636 W/ 250 OVERRIDE (IP): Performed by: ANESTHESIOLOGY

## 2023-11-27 PROCEDURE — 87102 FUNGUS ISOLATION CULTURE: CPT

## 2023-11-27 PROCEDURE — C1776 JOINT DEVICE (IMPLANTABLE): HCPCS | Performed by: ORTHOPAEDIC SURGERY

## 2023-11-27 PROCEDURE — 160002 HCHG RECOVERY MINUTES (STAT): Performed by: ORTHOPAEDIC SURGERY

## 2023-11-27 PROCEDURE — 160031 HCHG SURGERY MINUTES - 1ST 30 MINS LEVEL 5: Performed by: ORTHOPAEDIC SURGERY

## 2023-11-27 PROCEDURE — 88305 TISSUE EXAM BY PATHOLOGIST: CPT

## 2023-11-27 PROCEDURE — 502000 HCHG MISC OR IMPLANTS RC 0278: Performed by: ORTHOPAEDIC SURGERY

## 2023-11-27 PROCEDURE — 82962 GLUCOSE BLOOD TEST: CPT

## 2023-11-27 PROCEDURE — 160035 HCHG PACU - 1ST 60 MINS PHASE I: Performed by: ORTHOPAEDIC SURGERY

## 2023-11-27 PROCEDURE — 700111 HCHG RX REV CODE 636 W/ 250 OVERRIDE (IP): Mod: JZ | Performed by: ANESTHESIOLOGY

## 2023-11-27 PROCEDURE — 700105 HCHG RX REV CODE 258: Performed by: HOSPITALIST

## 2023-11-27 PROCEDURE — 87206 SMEAR FLUORESCENT/ACID STAI: CPT | Mod: 91

## 2023-11-27 PROCEDURE — 87205 SMEAR GRAM STAIN: CPT | Mod: 91

## 2023-11-27 PROCEDURE — 160042 HCHG SURGERY MINUTES - EA ADDL 1 MIN LEVEL 5: Performed by: ORTHOPAEDIC SURGERY

## 2023-11-27 PROCEDURE — 87015 SPECIMEN INFECT AGNT CONCNTJ: CPT | Mod: 91

## 2023-11-27 PROCEDURE — 700111 HCHG RX REV CODE 636 W/ 250 OVERRIDE (IP): Mod: JZ | Performed by: HOSPITALIST

## 2023-11-27 PROCEDURE — 0SRB029 REPLACEMENT OF LEFT HIP JOINT WITH METAL ON POLYETHYLENE SYNTHETIC SUBSTITUTE, CEMENTED, OPEN APPROACH: ICD-10-PCS | Performed by: ORTHOPAEDIC SURGERY

## 2023-11-27 PROCEDURE — 700105 HCHG RX REV CODE 258: Performed by: ANESTHESIOLOGY

## 2023-11-27 PROCEDURE — 87116 MYCOBACTERIA CULTURE: CPT

## 2023-11-27 PROCEDURE — 72170 X-RAY EXAM OF PELVIS: CPT

## 2023-11-27 PROCEDURE — 87075 CULTR BACTERIA EXCEPT BLOOD: CPT

## 2023-11-27 PROCEDURE — 700105 HCHG RX REV CODE 258: Performed by: ORTHOPAEDIC SURGERY

## 2023-11-27 PROCEDURE — 700102 HCHG RX REV CODE 250 W/ 637 OVERRIDE(OP): Performed by: HOSPITALIST

## 2023-11-27 DEVICE — BONE CEMENT SIMPLEX ANTIBIO - (10/PK): Type: IMPLANTABLE DEVICE | Site: HIP | Status: FUNCTIONAL

## 2023-11-27 DEVICE — IMPLANT R3 3 HOLE ACET SHELL 52MM (1EA): Type: IMPLANTABLE DEVICE | Site: HIP | Status: FUNCTIONAL

## 2023-11-27 DEVICE — IMPLANTABLE DEVICE: Type: IMPLANTABLE DEVICE | Site: HIP | Status: FUNCTIONAL

## 2023-11-27 DEVICE — IMPLANT OXINIUM FEM HD 12/14 28MM +4 (1EA): Type: IMPLANTABLE DEVICE | Site: HIP | Status: FUNCTIONAL

## 2023-11-27 RX ORDER — HYDROMORPHONE HYDROCHLORIDE 1 MG/ML
0.2 INJECTION, SOLUTION INTRAMUSCULAR; INTRAVENOUS; SUBCUTANEOUS
Status: DISCONTINUED | OUTPATIENT
Start: 2023-11-27 | End: 2023-11-27 | Stop reason: HOSPADM

## 2023-11-27 RX ORDER — IPRATROPIUM BROMIDE AND ALBUTEROL SULFATE 2.5; .5 MG/3ML; MG/3ML
3 SOLUTION RESPIRATORY (INHALATION)
Status: DISCONTINUED | OUTPATIENT
Start: 2023-11-27 | End: 2023-11-27 | Stop reason: HOSPADM

## 2023-11-27 RX ORDER — DIPHENHYDRAMINE HYDROCHLORIDE 50 MG/ML
12.5 INJECTION INTRAMUSCULAR; INTRAVENOUS
Status: DISCONTINUED | OUTPATIENT
Start: 2023-11-27 | End: 2023-11-27 | Stop reason: HOSPADM

## 2023-11-27 RX ORDER — SODIUM CHLORIDE, SODIUM GLUCONATE, SODIUM ACETATE, POTASSIUM CHLORIDE AND MAGNESIUM CHLORIDE 526; 502; 368; 37; 30 MG/100ML; MG/100ML; MG/100ML; MG/100ML; MG/100ML
INJECTION, SOLUTION INTRAVENOUS
Status: DISCONTINUED | OUTPATIENT
Start: 2023-11-27 | End: 2023-11-27 | Stop reason: SURG

## 2023-11-27 RX ORDER — LIDOCAINE HYDROCHLORIDE 20 MG/ML
INJECTION, SOLUTION EPIDURAL; INFILTRATION; INTRACAUDAL; PERINEURAL PRN
Status: DISCONTINUED | OUTPATIENT
Start: 2023-11-27 | End: 2023-11-27 | Stop reason: SURG

## 2023-11-27 RX ORDER — HYDROMORPHONE HYDROCHLORIDE 1 MG/ML
0.1 INJECTION, SOLUTION INTRAMUSCULAR; INTRAVENOUS; SUBCUTANEOUS
Status: DISCONTINUED | OUTPATIENT
Start: 2023-11-27 | End: 2023-11-27 | Stop reason: HOSPADM

## 2023-11-27 RX ORDER — ONDANSETRON 2 MG/ML
INJECTION INTRAMUSCULAR; INTRAVENOUS PRN
Status: DISCONTINUED | OUTPATIENT
Start: 2023-11-27 | End: 2023-11-27 | Stop reason: SURG

## 2023-11-27 RX ORDER — MIDAZOLAM HYDROCHLORIDE 1 MG/ML
1 INJECTION INTRAMUSCULAR; INTRAVENOUS
Status: DISCONTINUED | OUTPATIENT
Start: 2023-11-27 | End: 2023-11-27 | Stop reason: HOSPADM

## 2023-11-27 RX ORDER — MAGNESIUM HYDROXIDE 1200 MG/15ML
LIQUID ORAL
Status: COMPLETED | OUTPATIENT
Start: 2023-11-27 | End: 2023-11-27

## 2023-11-27 RX ORDER — VANCOMYCIN HYDROCHLORIDE 1 G/20ML
INJECTION, POWDER, LYOPHILIZED, FOR SOLUTION INTRAVENOUS
Status: COMPLETED | OUTPATIENT
Start: 2023-11-27 | End: 2023-11-27

## 2023-11-27 RX ORDER — HYDROMORPHONE HYDROCHLORIDE 1 MG/ML
0.5 INJECTION, SOLUTION INTRAMUSCULAR; INTRAVENOUS; SUBCUTANEOUS
Status: DISCONTINUED | OUTPATIENT
Start: 2023-11-27 | End: 2023-11-27 | Stop reason: HOSPADM

## 2023-11-27 RX ORDER — MORPHINE SULFATE 4 MG/ML
2-4 INJECTION INTRAVENOUS EVERY 4 HOURS PRN
Status: DISCONTINUED | OUTPATIENT
Start: 2023-11-27 | End: 2023-11-29 | Stop reason: HOSPADM

## 2023-11-27 RX ORDER — SODIUM CHLORIDE, SODIUM LACTATE, POTASSIUM CHLORIDE, CALCIUM CHLORIDE 600; 310; 30; 20 MG/100ML; MG/100ML; MG/100ML; MG/100ML
INJECTION, SOLUTION INTRAVENOUS CONTINUOUS
Status: DISCONTINUED | OUTPATIENT
Start: 2023-11-27 | End: 2023-11-29

## 2023-11-27 RX ORDER — MEPERIDINE HYDROCHLORIDE 25 MG/ML
25 INJECTION INTRAMUSCULAR; INTRAVENOUS; SUBCUTANEOUS
Status: DISCONTINUED | OUTPATIENT
Start: 2023-11-27 | End: 2023-11-27 | Stop reason: HOSPADM

## 2023-11-27 RX ORDER — DEXAMETHASONE SODIUM PHOSPHATE 4 MG/ML
INJECTION, SOLUTION INTRA-ARTICULAR; INTRALESIONAL; INTRAMUSCULAR; INTRAVENOUS; SOFT TISSUE PRN
Status: DISCONTINUED | OUTPATIENT
Start: 2023-11-27 | End: 2023-11-27 | Stop reason: SURG

## 2023-11-27 RX ORDER — CEFAZOLIN SODIUM 1 G/3ML
INJECTION, POWDER, FOR SOLUTION INTRAMUSCULAR; INTRAVENOUS PRN
Status: DISCONTINUED | OUTPATIENT
Start: 2023-11-27 | End: 2023-11-27 | Stop reason: SURG

## 2023-11-27 RX ORDER — ONDANSETRON 2 MG/ML
4 INJECTION INTRAMUSCULAR; INTRAVENOUS
Status: DISCONTINUED | OUTPATIENT
Start: 2023-11-27 | End: 2023-11-27 | Stop reason: HOSPADM

## 2023-11-27 RX ORDER — OXYCODONE HYDROCHLORIDE 5 MG/1
5 TABLET ORAL
Status: DISCONTINUED | OUTPATIENT
Start: 2023-11-27 | End: 2023-11-29 | Stop reason: HOSPADM

## 2023-11-27 RX ORDER — TRANEXAMIC ACID 100 MG/ML
INJECTION, SOLUTION INTRAVENOUS PRN
Status: DISCONTINUED | OUTPATIENT
Start: 2023-11-27 | End: 2023-11-27 | Stop reason: SURG

## 2023-11-27 RX ORDER — OXYCODONE HCL 5 MG/5 ML
10 SOLUTION, ORAL ORAL
Status: COMPLETED | OUTPATIENT
Start: 2023-11-27 | End: 2023-11-27

## 2023-11-27 RX ORDER — OXYCODONE HCL 5 MG/5 ML
5 SOLUTION, ORAL ORAL
Status: COMPLETED | OUTPATIENT
Start: 2023-11-27 | End: 2023-11-27

## 2023-11-27 RX ORDER — SODIUM CHLORIDE, SODIUM LACTATE, POTASSIUM CHLORIDE, CALCIUM CHLORIDE 600; 310; 30; 20 MG/100ML; MG/100ML; MG/100ML; MG/100ML
INJECTION, SOLUTION INTRAVENOUS CONTINUOUS
Status: DISCONTINUED | OUTPATIENT
Start: 2023-11-27 | End: 2023-11-27 | Stop reason: HOSPADM

## 2023-11-27 RX ADMIN — ONDANSETRON 4 MG: 2 INJECTION INTRAMUSCULAR; INTRAVENOUS at 07:51

## 2023-11-27 RX ADMIN — DEXAMETHASONE SODIUM PHOSPHATE 8 MG: 4 INJECTION INTRA-ARTICULAR; INTRALESIONAL; INTRAMUSCULAR; INTRAVENOUS; SOFT TISSUE at 07:51

## 2023-11-27 RX ADMIN — ROCURONIUM BROMIDE 10 MG: 10 INJECTION, SOLUTION INTRAVENOUS at 09:23

## 2023-11-27 RX ADMIN — MIDAZOLAM HYDROCHLORIDE 2 MG: 2 INJECTION, SOLUTION INTRAMUSCULAR; INTRAVENOUS at 07:30

## 2023-11-27 RX ADMIN — FENTANYL CITRATE 50 MCG: 50 INJECTION, SOLUTION INTRAMUSCULAR; INTRAVENOUS at 07:34

## 2023-11-27 RX ADMIN — LIOTHYRONINE SODIUM 5 MCG: 5 TABLET ORAL at 03:26

## 2023-11-27 RX ADMIN — MORPHINE SULFATE 4 MG: 4 INJECTION, SOLUTION INTRAMUSCULAR; INTRAVENOUS at 23:10

## 2023-11-27 RX ADMIN — CEFAZOLIN 2 G: 2 INJECTION, POWDER, FOR SOLUTION INTRAMUSCULAR; INTRAVENOUS at 22:21

## 2023-11-27 RX ADMIN — LIDOCAINE HYDROCHLORIDE 40 MG: 20 INJECTION, SOLUTION EPIDURAL; INFILTRATION; INTRACAUDAL at 07:34

## 2023-11-27 RX ADMIN — OXYCODONE HYDROCHLORIDE 10 MG: 5 SOLUTION ORAL at 11:07

## 2023-11-27 RX ADMIN — OXYCODONE 5 MG: 5 TABLET ORAL at 15:55

## 2023-11-27 RX ADMIN — ACETAMINOPHEN 1000 MG: 500 TABLET, FILM COATED ORAL at 15:55

## 2023-11-27 RX ADMIN — DOCUSATE SODIUM 50 MG AND SENNOSIDES 8.6 MG 2 TABLET: 8.6; 5 TABLET, FILM COATED ORAL at 17:52

## 2023-11-27 RX ADMIN — OXYCODONE 5 MG: 5 TABLET ORAL at 20:10

## 2023-11-27 RX ADMIN — SODIUM CHLORIDE, POTASSIUM CHLORIDE, SODIUM LACTATE AND CALCIUM CHLORIDE: 600; 310; 30; 20 INJECTION, SOLUTION INTRAVENOUS at 15:52

## 2023-11-27 RX ADMIN — SODIUM CHLORIDE, SODIUM GLUCONATE, SODIUM ACETATE, POTASSIUM CHLORIDE AND MAGNESIUM CHLORIDE: 526; 502; 368; 37; 30 INJECTION, SOLUTION INTRAVENOUS at 07:30

## 2023-11-27 RX ADMIN — PROPOFOL 100 MG: 10 INJECTION, EMULSION INTRAVENOUS at 07:34

## 2023-11-27 RX ADMIN — FENTANYL CITRATE 50 MCG: 50 INJECTION, SOLUTION INTRAMUSCULAR; INTRAVENOUS at 08:00

## 2023-11-27 RX ADMIN — LEVOTHYROXINE SODIUM 88 MCG: 0.09 TABLET ORAL at 03:27

## 2023-11-27 RX ADMIN — ACETAMINOPHEN 1000 MG: 500 TABLET, FILM COATED ORAL at 20:10

## 2023-11-27 RX ADMIN — OXYCODONE 5 MG: 5 TABLET ORAL at 03:27

## 2023-11-27 RX ADMIN — FENTANYL CITRATE 50 MCG: 50 INJECTION, SOLUTION INTRAMUSCULAR; INTRAVENOUS at 11:07

## 2023-11-27 RX ADMIN — ROCURONIUM BROMIDE 50 MG: 10 INJECTION, SOLUTION INTRAVENOUS at 07:34

## 2023-11-27 RX ADMIN — SUGAMMADEX 200 MG: 100 INJECTION, SOLUTION INTRAVENOUS at 10:12

## 2023-11-27 RX ADMIN — CEFAZOLIN 2 G: 1 INJECTION, POWDER, FOR SOLUTION INTRAMUSCULAR; INTRAVENOUS at 07:30

## 2023-11-27 RX ADMIN — ATORVASTATIN CALCIUM 40 MG: 40 TABLET, FILM COATED ORAL at 20:10

## 2023-11-27 RX ADMIN — CEFAZOLIN 2 G: 2 INJECTION, POWDER, FOR SOLUTION INTRAMUSCULAR; INTRAVENOUS at 17:45

## 2023-11-27 RX ADMIN — MORPHINE SULFATE 1 MG: 4 INJECTION, SOLUTION INTRAMUSCULAR; INTRAVENOUS at 12:30

## 2023-11-27 RX ADMIN — FENTANYL CITRATE 50 MCG: 50 INJECTION, SOLUTION INTRAMUSCULAR; INTRAVENOUS at 08:49

## 2023-11-27 RX ADMIN — TRANEXAMIC ACID 680 MG: 100 INJECTION, SOLUTION INTRAVENOUS at 07:51

## 2023-11-27 RX ADMIN — FENTANYL CITRATE 50 MCG: 50 INJECTION, SOLUTION INTRAMUSCULAR; INTRAVENOUS at 10:01

## 2023-11-27 ASSESSMENT — ENCOUNTER SYMPTOMS
GASTROINTESTINAL NEGATIVE: 1
PSYCHIATRIC NEGATIVE: 1
MYALGIAS: 1
CARDIOVASCULAR NEGATIVE: 1
PALPITATIONS: 0
HEMOPTYSIS: 0
FOCAL WEAKNESS: 1
COUGH: 0

## 2023-11-27 ASSESSMENT — PAIN DESCRIPTION - PAIN TYPE
TYPE: ACUTE PAIN
TYPE: SURGICAL PAIN
TYPE: ACUTE PAIN
TYPE: SURGICAL PAIN

## 2023-11-27 ASSESSMENT — PAIN SCALES - GENERAL: PAIN_LEVEL: 3

## 2023-11-27 NOTE — ANESTHESIA TIME REPORT
Anesthesia Start and Stop Event Times       Date Time Event    11/27/2023 0713 Ready for Procedure     0730 Anesthesia Start     1039 Anesthesia Stop          Responsible Staff  11/27/23      Name Role Begin End    Jose Burroughs M.D. Anesth 0730 1039          Overtime Reason:  no overtime (within assigned shift)    Comments:

## 2023-11-27 NOTE — OP REPORT
DATE OF OPERATION: 11/27/2023     PREOPERATIVE DIAGNOSIS:  Chronic left femoral neck fracture    POSTOPERATIVE DIAGNOSIS: Same    PROCEDURE PERFORMED:   Left total hip arthroplasty    SURGEON: Parveen Ko M.D.     ASSISTANT: Williams Rizzo MD - ortho trauma fellow  The use of the fellow as a surgical assistant was necessary for assistance with exposure, retraction, fracture reduction, instrumentation, and closure.      ANESTHESIA: General    SPECIMEN: None    ESTIMATED BLOOD LOSS: 200 mL    IMPLANTS: Smith [employer] Nephew size 2 standard offset polar cemented stem, 52 mm Smith [employer]hew R3 acetabular component with 2 screws, 52 mm dual mobility liner, dual mobility head 40 mm poly  28 mm inner head ball +4    INDICATIONS: The patient is a 83 y.o. female who presented with significant left groin pain following lumbar decompression surgery.  She was in rehab for a long time because of an inability to ambulate and persistent groin and leg pain.  She was ultimately found to have a chronic left femoral neck fracture.  After discussion with the patient the decision was made to move forward with total hip arthroplasty versus left hip hemiarthroplasty.  We discussed taking intraoperative cultures and being cautious for possible infection.  I discussed the risks and benefits of the above procedure which include but are not limited to risks of infection, wound healing complication, neurovascular injury, pain, malunion, non-union, malrotation, dislocation, need for revision surgery, and the medical risks of anesthesia including MI, stroke, and death.  Alternatives to surgery were also discussed, including non-operative management, which I did not recommend.  The patient and/or their POA was in agreement with the plan to proceed, and the informed consent was signed and documented.    DESCRIPTION OF PROCEDURE:  Patient was seen in the preoperative holding area on the day of surgery and marked on the operative site which was  the left hip.  They were transported to the operating room.  Anesthesia was induced.  The patient was then transferred to the operating table and positioned in lateral position with an out well-padded axillary roll on a beanbag.  Care was taken to pad any bony prominences and prominent neurovascular structures.  The left lower extremity was then prescribed with a CHG brush followed by an alcohol bath and then prepped and draped in usual sterile fashion.  Timeout was then called was correct patient, correct site, correct procedure were confirmed by all operative personnel and all were in agreement.    We then turned our attention to the left hip.  We made a standard Kocher Langenbeck approach to the posterior hip incising down through skin and subcutaneous tissue to the level of the feet band and gluteal fascia which was divided longitudinally and split bluntly moving in the proximal direction exposing the underlying greater trochanter and trochanteric bursa.  The bursa was bluntly swept off the back of the greater trochanter and the external rotators using a lap sponge.  Hemostasis was achieved using electrocautery.  Trialing retractor was placed taking care not to entrap the sciatic nerve.  The piriformis was then identified and removed from the posterior aspect of the greater trochanter using a Bovie.  This was tagged with a #1 Vicryl suture and retracted posteriorly.  The short external rotators were then also divided and tagged in the similar manner.  This revealed the underlying hip capsule which was noted to be loose and attenuated given the chronic retraction of the proximal femur.  With some tension held on the extremity, we made a capsulotomy and developed an anterior and posterior flap which were then tagged with #5 Ethibond.  There is no purulent or necrotic appearing material.  We did take some inflamed synovial tissue and sent this for tissue culture.  The femoral head was identified and removed from  the acetabulum using a rongeur.  It was noted to be quite atrophic.  This was passed off and sent for pathology.  We then inspected the acetabulum and remove the labrum with the Bovie.  We then began with acetabular preparation sequentially reaming up to a size 51 reamer.  We then selected our acetabular component which was a size 52 Smith & Nephew R3 3 hole cup which was impacted.  X-ray was taken to confirm appropriate position and the cup was eventually adjusted.  2 screws were placed.  The dual mobility liner was then impacted into the acetabular component.  We then turned our attention to the femur.  First we used an oscillating saw to freshen up the neck cut.  We then used a box osteotome to remove excess bone from the lateral neck.  This was followed by the rasp.  The canal finder was then used followed by sequential broaches up to a size 2 stem which had excellent axial and rotational stability.  We elected to trial with this with a +0 component.  The hip was reduced and noted to be short and relatively unstable.  We redislocated the hip and trialed again with a +4 component.  The leg lengths were much improved however there was significant posterior instability with 45 degrees of flexion and less than 45 degrees of internal rotation.  Hip was dislocated and the neck component was removed.  We inspected the acetabular component and noted that it was slightly retroverted.  We remove the dual mobility liner from the acetabular component as well as the 2 screws and then adjusted the cup position under fluoroscopy to confirm that we had appropriate anteversion and abduction.  Once the cup was impacted, and a had excellent bite.  I then clinically inspected the position of the acetabular component which was within the the MORALES and there is no overhang of the posterior wall.  I then placed 2 screws through the cup.  The dual mobility liner was reimpacted.  We then retrialed once again with the +4 component and  stability was noted to be excellent and leg lengths appeared appropriate.  We then redislocated and resumed our femoral canal prep.  The trial stem was removed.  The cement restrictor was placed at the appropriate depth.  The canal was irrigated and dried.  The cement was injected and pressurized.  The final component which was a size 2 standard offset [employer] Nephew Polar cemented stem was inserted into the canal and held into the cement was cured.  Excess cement was removed.  Acetabular, component was irrigated once again.  We retrial with a +4 neck and again noted appropriate leg lengths and excellent stability.  We redislocated then impacted the final head component onto the Crouch taper which was a 40 mm outer diameter poly with a 28 mm inner head ball +4 neck.  It was reduced.  The wound was then thoroughly irrigated with saline.  The posterior capsule was closed interrupted fashion using #5 Ethibond.  The short external rotators were then reattached to the posterior aspect of the capsule using #5 Ethibond.  1 g vancomycin powder was placed into the wound.  The IT band and gluteal fascia layer was then approximated with #1 Vicryl and then oversewn with a #1 strata fix suture.  The skin was then closed in layers using 2-0 Vicryl followed by staples.  Prevena dressing was then placed with a good seal.  The patient was then awoken from anesthesia without immediate complication and transported to the PACU in stable condition.    POSTOPERATIVE PLAN:      Inpatient plan: PACU low AP pelvis.  PACU to floor.  24 hours of antibiotics.  Monitor cultures and pathology results.  Mobilize with PT and OT when able.  Weightbearing status: Weightbearing as tolerated left lower extremity with posterior hip precautions  DVT prophylaxis: Okay to resume baseline anticoagulation in 24 to 48 hours based on medical need  Outpatient plan: The patient will follow up in clinic in 2 weeks for wound check, suture/staple removal (if  applicable), and xrays.  If the patient is at a facility at that time, wound check and suture/staple removal may be performed by nursing care and the patient should instead follow up at the 6 week post operative ari for repeat clinical check and xrays.      ____________________________________   Parveen Ko M.D.   DD: 11/27/2023  10:14 AM

## 2023-11-27 NOTE — PROGRESS NOTES
Patient asked for pain med. Prn pain med administered.  Patient wanted to take thyroid meds only this morning.

## 2023-11-27 NOTE — DISCHARGE SUMMARY
Physical Medicine & Rehabilitation Discharge Summary    Admission Date: 11/13/2023    Discharge Date: 11/22/2023    Attending Provider: Mitchell Chavis MD    Admission Diagnosis:   Active Hospital Problems    Diagnosis     *Radiculopathy        Discharge Diagnosis:  Active Hospital Problems    Diagnosis     *Radiculopathy        HPI per Admission History & Physical:  The patient is a 83 y.o.  female with a past medical history of hypothyroidism, hypertension and low back pain and lower extremity weakness;  who presented on 11/10/2023  5:51 AM for scheduled lumbar surgery.  Per documentation, patient has been followed in the outpatient setting by Dr. Silva for ongoing back pain.  Patient was evaluated in the outpatient setting on 11/8/2023 due to worsening back pain impairing her function.  Patient recently required the use of a wheelchair due to her limited mobility.  An MRI of the lumbar spine was obtained which showed prior history of an L4-L5 surgical changes and evidence of spondylolisthesis at L4-5 as well as facet joint arthropathy at L5-S1.  Patient was deemed appropriate for intervention for her L5-S1 stenosis.  Patient was taken to the OR on 11/10 for an L5-S1 redo laminectomy performed by Dr. Silva.       PROCEDURES:  11/10 L5-S1 redo laminectomy performed by Dr. Silva    Patient was admitted to [facility] on 11/13/2023.     Hospital Course by Problem List:  Left Femoral Neck fracture with superior displacement- diagnosed 11/22, unknown chronicity  Seen on Xray of hip taken 11/22  No clear history of fall. Patient has not walked pain free since February 2023  Spoke with Dr Blancas 11/22 (Ortho)- he agreed to direct admission to [facility] for workup and potential hip surgery.  Dr Blancas states septic hip is on the differential and should be worked up at [facility] before surgery.  -11/22-Spoke with Patrick CLAUDIO for [facility] on call. He states [facility] does not have any  previous xrays of hip to determine chronicity.  -Patient transferred to [facility] on 11/22.      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 Silva  - Spinal precautions in place, does not require bracing  [facility] Code / Diagnosis to Support: 0003.9 - Neurologic Conditions: Other Neurologic      Right Popliteal DVT  Provoked from surgery and immobility  -Bilateral Lower extremity ultrasounds - shows right distal popliteal and peroneal occlusion thrombus. Left is negative  -SHERRELL hose on left  -started Eliquis 10mg BID for 7 days then Eliquis 5mg BID for 3 months  -NS aware and approves starting anticoagulation  -11/22 consider holding Eliquis for potential surgery, consider IVC filter     Hypothyroidism  -On home dose Synthroid     Hypertension  - On home dose losartan and spironolactone  - Blood pressure well controlled      Pain:  - Neuroceptic - On Tylenol prn, oxycodone 2.5-5mg PRN, Tizanidine PRN,   11/14- schedule oxycodone 5mg BID at 630am and noon  11/15- schedule tizanidine at night for sleep.  11/16- Robaxin 750 mg QID  11/16 increased oxycodone 10mg BID scheduled  11/17 decreased oxycodone to 5mg BID scheduled  -11/20 increased Robaxin to 1g TID  -scheduled Tylenol 1g TID  - Neuropathic - On Lyrica  -continue oxycodone scheduled + PRN and Robaxin scheduled  -Patient has pain medication from her pain physician, will continue previous outpatient regiment at discharge     -Follow-up Ortho, PCP       Functional Status at Discharge  Eating:  Independent  Eating Description:     Grooming:  Modified  Independent  Grooming Description:  Seated in wheelchair at sink  Bathing:  Standby Assist (sup-SBA for seated showering w/ long handled sponge; SBA for standing showering to wash zackery area)  Bathing Description:  Adaptive equipment, Grab bar, Hand held shower, Long handled bath tool, Initial preparation for task, Set up for shower sleeve, Supervision for safety, Verbal cueing  Upper Body Dressing:  Modified Independent  Upper Body Dressing Description:   (Mod I for clothing retrievel from w/c. Ind to verena/doff pullover shirt and verena sports bra.)  Lower Body Dressing:  Standby Assist (verbal cues and visual demo for sock aid; SBA to don/doff brief, socks, and pants @ w/c level w/ GB)  Lower Body Dressing Description:  Grab bar, Reacher, Sock aid, Cues for spinal precautions, Increased time, Set-up of equipment, Supervision for safety, Verbal cueing (Mod cues for sequencing and Min A for donning socks due to time constraints only.)     Walk:  Contact Guard Assist  Distance Walked:  45  Number of Times Distance Was Traveled:  1  Assistive Device:  Front Wheel Walker  Gait Deviation:  Antalgic, Trendelenberg, Bradykinetic  Wheelchair:  Modified Independent  Distance Propelled:  50   Wheelchair Description:  Leg rest management  Stairs Moderate Assist  Stairs Description Extra time, Supervision for safety, Verbal cueing     Comprehension:  Independent  Comprehension Description:     Expression:  Independent  Expression Description:     Social Interaction:     Social Interaction Description:     Problem Solving:  Supervision  Problem Solving Description:  Verbal cueing, Supervision  Memory:  Supervision  Memory Description:  Verbal cueing, Supervision       IMitchell M.D., personally performed a complete drug regimen review and no potential clinically significant medication issues were identified.   Discharge Medication:     Medication List        START taking these medications        Instructions    acetaminophen 500 MG Tabs  Commonly known as: Tylenol   Take 2 Tablets by mouth in the morning, at noon, and at bedtime.  Dose: 1,000 mg     apixaban 5mg Tabs  Commonly known as: Eliquis   Take 1 Tablet by mouth 2 times a day.  Dose: 5 mg     Methocarbamol 1000 MG Tabs   Take 1,000 mg by mouth in the morning, at noon, and at bedtime.  Dose: 1,000 mg            CHANGE how you take these medications        Instructions   losartan 50 MG Tabs  What changed: Another medication with the same name was removed. Continue taking this medication, and follow the directions you see here.  Commonly known as: Cozaar   Take 1 Tablet by mouth every morning.  Dose: 50 mg     polyethylene glycol/lytes Pack  What changed:   when to take this  reasons to take this  Commonly known as: Miralax   Take 1 Packet by mouth 1 time a day as needed (constipation).  Dose: 17 g     pregabalin 100 MG Caps  What changed:   when to take this  Another medication with the same name was removed. Continue taking this medication, and follow the directions you see here.  Commonly known as: Lyrica   Take 1 Capsule by mouth 2 times a day for 30 days.  Dose: 100 mg     spironolactone 25 MG Tabs  What changed: Another medication with the same name was removed. Continue taking this medication, and follow the directions you see here.  Commonly known as: Aldactone   Take 1 Tablet by mouth every day.  Dose: 25 mg            CONTINUE taking these medications        Instructions   atorvastatin 40 MG Tabs  Commonly known as: Lipitor   Take 1 Tablet by mouth at bedtime.  Dose: 40 mg     levothyroxine 88 MCG Tabs  Commonly known as: Synthroid   Take 1 Tablet by mouth every morning on an empty stomach.  Dose: 88 mcg     liothyronine 5 MCG Tabs  Commonly known as: Cytomel   Take 1 Tablet by mouth every morning.  Dose: 5 mcg     Magnesium 200 MG Tabs   Take 200 mg by mouth every evening.  Dose: 200 mg            STOP taking these medications      bisacodyl 10 MG  Supp  Commonly known as: Dulcolax     calcium carbonate 500 MG Chew  Commonly known as: Tums     carboxymethylcellulose 0.5 % Soln  Commonly known as: Refresh Tears     diphenhydrAMINE 25 MG Tabs  Commonly known as: Benadryl     docusate sodium 100 MG Caps  Commonly known as: Colace     HYDROcodone/acetaminophen  MG Tabs  Commonly known as: Norco     magnesium hydroxide 400 MG/5ML Susp  Commonly known as: Milk Of Magnesia     Magnesium Oxide -Mg Supplement 200 MG Tabs     ondansetron 4 MG Tbdp  Commonly known as: Zofran ODT     oxyCODONE immediate-release 5 MG Tabs  Commonly known as: Roxicodone     therapeutic multivitamin-minerals Tabs     tizanidine 2 MG tablet  Commonly known as: Zanaflex            ASK your doctor about these medications        Instructions   Estriol 10 % Crea   Insert 1 Application into the vagina as needed.  Dose: 1 Application               Discharge Diet:  No Active Diet Orders        Disposition:  Patient to discharge to [facility] for medical management.         ____________________________________     Mitchell Chavis MD  Physical Medicine & Rehabilitation   Brain Injury Medicine   ____________________________________

## 2023-11-27 NOTE — ANESTHESIA PROCEDURE NOTES
Airway    Date/Time: 11/27/2023 7:35 AM    Performed by: Jose Burroughs M.D.  Authorized by: Jose Burroughs M.D.    Location:  OR  Urgency:  Elective  Indications for Airway Management:  Anesthesia      Spontaneous Ventilation: absent    Sedation Level:  Deep  Preoxygenated: Yes    Patient Position:  Sniffing  Final Airway Type:  Endotracheal airway  Final Endotracheal Airway:  ETT  Cuffed: Yes    Technique Used for Successful ETT Placement:  Direct laryngoscopy    Insertion Site:  Oral  Blade Type:  Alicia  Laryngoscope Blade/Videolaryngoscope Blade Size:  3  ETT Size (mm):  6.5  Measured from:  Teeth  ETT to Teeth (cm):  21  Placement Verified by: auscultation and capnometry    Cormack-Lehane Classification:  Grade III - view of epiglottis only  Number of Attempts at Approach:  1

## 2023-11-27 NOTE — ANESTHESIA PREPROCEDURE EVALUATION
Case: [id number] Date/Time: 11/27/23 0730    Procedure: HEMIARTHROPLASTY, HIP (Left)    Location: [place] [address] / SURGERY [facility]    Surgeons: Parveen Ko M.D.            Relevant Problems   ANESTHESIA   (positive) JOHN (obstructive sleep apnea)      NEURO   (positive) Migraine syndrome      CARDIAC   (positive) Acute deep vein thrombosis (DVT) of right lower extremity (HCC)   (positive) Coronary artery disease involving native coronary artery of native heart without angina pectoris   (positive) HTN (hypertension)   (positive) Migraine syndrome         (positive) Chronic kidney disease, stage 3a (HCC)   (positive) Stage 2 chronic kidney disease      ENDO   (positive) Acquired hypothyroidism      Other   (positive) Arthritis       Physical Exam    Airway   Mallampati: II  TM distance: >3 FB  Neck ROM: full       Cardiovascular - normal exam  Rhythm: regular  Rate: normal  (-) murmur     Dental - normal exam           Pulmonary - normal exam  Breath sounds clear to auscultation     Abdominal    Neurological - normal exam                   Anesthesia Plan    ASA 3       Plan - general       Airway plan will be ETT          Induction: intravenous    Postoperative Plan: Postoperative administration of opioids is intended.    Pertinent diagnostic labs and testing reviewed    Informed Consent:    Anesthetic plan and risks discussed with patient.    Use of blood products discussed with: patient whom consented to blood products.

## 2023-11-27 NOTE — PROGRESS NOTES
Hospital Medicine Daily Progress Note    Date of Service  11/27/2023    Chief Complaint  Sheyla Gauthier is a 83 y.o. female admitted 11/22/2023 with left displaced femoral neck fracture     Hospital Course  This is a 83-year-old female with a past medical significant for hypertension,, dyslipidemia, recent lumbar laminectomy by Dr. Silva on 11/10/2023 presented to ER on 11/22/2023 primary nausea with a complaint of left ear pain.  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    Patient report that she has been having difficulty walking and pain in her groin before her lumbar surgery.  Orthopedic surgery Dr. Ko was consulted who recommended IR guided patient medically care discussed with IR who stated there is not enough fluid to do aspiration.    Patient underwent left total hip arthroplasty on 11/27/2023; per orthopedic surgery, patient is weightbearing as tolerated on the left lower extremity with posterior hip precautions.  We will obtain PT/OT, DVT prophylaxis, multimodal pain management    Interval events :  --- No acute events overnight, medicine has been stable, patient is alert, awake, answering questions, upon my evaluation, she was noted to be drinking coffee, her daughter is noted to be at bedside  -- Holding eliquis and patient is on heparin drip for surgical intervention.    Patient is on high risk medication including heparin, will monitor hemoglobin hematocrit, make sure that her hemoglobin is greater than 7.  We will monitor anti Xa level to make sure that the heparin is therapeutic and titrate the dose accordingly.    Reached out to Dr. Ko who stated that  patient will have surgery on Monday, will make n.p.o. at midnight on 11/26.  We will hold heparin 6 hours prior to surgery.    Went to the bedside of the patient and answered all their questions with the nursing staff at bedside    11/26:  -- No acute events overnight, vital sign has been stable,  patient is alert, awake and answering questions appropriately, noted noted to the bedside.  --- Patient is going for surgery tomorrow, will make n.p.o. at midnight.  We will hold patient's heparin drip at 1 AM  -- Monitor hemoglobin hematocrit,  today at 10.9  --Pain well controlled  with Oxy, robaxin and morphine      11/27:  -- No acute events overnight, vital sign has been stable, patient is currently requiring 2 L of oxygen.  --Patient underwent left hip hemiarthroplasty today; will obtain PT/OT, DVT prophylaxis, multimodal pain management.    I have discussed this patient's plan of care and discharge plan at IDT rounds today with Case Management, Nursing, Nursing leadership, and other members of the IDT team.    Consultants/Specialty  Orthopedic surgery    Code Status  Full Code    Disposition  The patient is not medically cleared for discharge to home or a post-acute facility.  Anticipate discharge to: [facility]    I have placed the appropriate orders for post-discharge needs.    Review of Systems  Review of Systems   Respiratory:  Negative for cough and hemoptysis.    Cardiovascular: Negative.  Negative for chest pain and palpitations.   Gastrointestinal: Negative.    Genitourinary: Negative.    Musculoskeletal:  Positive for joint pain and myalgias.   Neurological:  Positive for focal weakness.   Endo/Heme/Allergies: Negative.    Psychiatric/Behavioral: Negative.          Physical Exam  Temp:  [36.2 °C (97.2 °F)-37.2 °C (99 °F)] 36.5 °C (97.7 °F)  Pulse:  [] 101  Resp:  [11-18] 16  BP: (103-146)/(56-76) 108/64  SpO2:  [90 %-98 %] 97 %    Physical Exam  Constitutional:       Appearance: Normal appearance.   HENT:      Head: Normocephalic and atraumatic.      Mouth/Throat:      Mouth: Mucous membranes are moist.   Eyes:      Extraocular Movements: Extraocular movements intact.      Pupils: Pupils are equal, round, and reactive to light.   Cardiovascular:      Rate and Rhythm: Normal rate and regular rhythm.       Pulses: Normal pulses.      Heart sounds:      No friction rub.   Pulmonary:      Effort: Pulmonary effort is normal.   Abdominal:      General: Bowel sounds are normal.      Palpations: Abdomen is soft.   Musculoskeletal:         General: Tenderness present. No swelling.      Cervical back: Neck supple.      Comments: Left hip tenderness to palpation.  No erythema, swelling or lesions/secretions noted   Skin:     General: Skin is warm.   Neurological:      Mental Status: She is alert and oriented to person, place, and time. Mental status is at baseline.      Cranial Nerves: No cranial nerve deficit.      Motor: Weakness (LLE weakness) present.   Psychiatric:         Mood and Affect: Mood normal.         Behavior: Behavior normal.         Thought Content: Thought content normal.         Judgment: Judgment normal.         Fluids    Intake/Output Summary (Last 24 hours) at 11/27/2023 1417  Last data filed at 11/27/2023 1035  Gross per 24 hour   Intake 1500 ml   Output 600 ml   Net 900 ml       Laboratory  Recent Labs     11/26/23  0320   WBC 4.8   RBC 3.91*   HEMOGLOBIN 10.9*   HEMATOCRIT 34.6*   MCV 88.5   MCH 27.9   MCHC 31.5*   RDW 53.2*   PLATELETCT 304   MPV 10.1     Recent Labs     11/26/23  0320   SODIUM 139   POTASSIUM 4.1   CHLORIDE 110   CO2 19*   GLUCOSE 88   BUN 20   CREATININE 0.92   CALCIUM 9.7     Recent Labs     11/25/23  1148 11/25/23  2004 11/26/23  0320   APTT 98.3* 70.3* 62.5*               Imaging  DX-PELVIS-1 OR 2 VIEWS   Final Result      Status post left hip arthroplasty without immediate postoperative hardware complication.      CT-HIP WITH LEFT   Final Result      1.  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 There is fragmentation of the femoral head with multiple bone fragments seen within the joint space. There is secondary    osteoarthritis of the hip articulation. There is minimal joint fluid within  the left hip articulation. No surrounding fluid collections.      2.  Multiple calcific fragments seen extending into the area of the left iliopsoas bursa and extending along the course of the left iliopsoas muscle and tendon into the left iliacus muscle likely representing either myositis ossificans or extrusion of    ossific fragments along the course of the iliopsoas muscle and tendon.      3.  No evidence of inflammatory process seen within the pelvis.      4.  Sigmoid diverticulosis.      US-EXTREMITY NON VASCULAR UNILATERAL LEFT   Final Result      No evidence of left hip joint effusion or fluid in the area of the left hip.      DX-HIP-UNILATERAL-W/O PELVIS-2/3 VIEWS LEFT    (Results Pending)   DX-PORTABLE FLUORO > 1 HOUR    (Results Pending)        Assessment/Plan  * Closed fracture of neck of left femur (HCC)- (present on admission)  Assessment & Plan  X-ray showing left femoral neck fracture   --Left hip CT concerning for avascular necrosis  --Dr Iyer discussed case with interventional radiology (Dr. Banerjee), who reviewed left hip ultrasound, left hip CT and does not recommend IR aspiration as there is no to minimal fluid in the joint space.  Orthopedic Surgery following,  Patient underwent left total hip arthroplasty on 11/27/2023; per orthopedic surgery, patient is weightbearing as tolerated on the left lower extremity with posterior hip precautions.  We will obtain PT/OT, multimodal pain management  -- Discussed with Dr. Ko who recommended starting heparin drip 18 hours after the surgery    S/P lumbar laminectomy- (present on admission)  Assessment & Plan  History of recent lumbar laminectomy on 11/10/2023 by Dr. Earnest Silva  Follow-up with neurosurgical team  Wound care for dressing changes    Acute deep vein thrombosis (DVT) of right lower extremity (HCC)- (present on admission)  Assessment & Plan  Noted on 11/2023 ultrasound  Has been on Eliquis outpatient  Hold Eliquis  Heparin drip,  possible procedure  Likely switch back to Eliquis after completion of surgery/procedures    Chronic kidney disease, stage 3a (HCC)- (present on admission)  Assessment & Plan  Avoid nephrotoxic agents and renally dose all medications  Monitor creatinine    Peripheral edema- (present on admission)  Assessment & Plan  Resume spirnolactone     Acquired hypothyroidism- (present on admission)  Assessment & Plan  Resume home thyroid medications    Mixed hyperlipidemia- (present on admission)  Assessment & Plan  Statin     HTN (hypertension)- (present on admission)  Assessment & Plan  Resume home losartan, spironolactone  IV hydralazine as needed   -- BP well controlled         VTE prophylaxis: no scd  Heparin drip can be started 18 hours after the surgery  I have performed a physical exam and reviewed and updated ROS and Plan today (11/27/2023). In review of yesterday's note (11/26/2023), there are no changes except as documented above.

## 2023-11-27 NOTE — OR NURSING
1033: Pt arrived from OR post L total hip under anesthesia. Pt is asleep. Sight dressing is CDI with Prevena. Cardiac rhythm appears to be SR/ST.    1038: Pt arousable to voice.     1104: Pt tolerating orals; medicated per MAR.     1157: Pt returned to room via bed with transport; pt on 3L; tank is full. Dressing is CDI.

## 2023-11-27 NOTE — CODE DOCUMENTATION
Bedside RN to update MD on patient who appeared to be orthostatic on mobility after first time getting up post surgery

## 2023-11-27 NOTE — PROGRESS NOTES
Per Dr Ko ok to resume Heparin 18 hours post-op. Re-draw APTT due on 11/18/2023 at 0530 prior to resuming heparin GTT.

## 2023-11-27 NOTE — INTERVAL H&P NOTE
H&P reviewed. The patient was examined and there are no changes to the H&P  Hep gtt paused. To OR for L david vs SRIDEVI

## 2023-11-27 NOTE — PROGRESS NOTES
4 Eyes Skin Assessment Completed by PAOLA Reaves and PAOLA Murcia.    Head WDL  Ears WDL  Nose WDL  Mouth WDL  Neck WDL  Breast/Chest WDL  Shoulder Blades WDL  Spine Incision  (R) Arm/Elbow/Hand   (L) Arm/Elbow/Hand Bruising  Abdomen Bruising  Groin WDL  Scrotum/Coccyx/Buttocks Redness and Blanching  (R) Leg WDL  (L) Leg Incision  (R) Heel/Foot/Toe WDL  (L) Heel/Foot/Toe WDL          Devices In Places Blood Pressure Cuff, Pulse Ox, SCD's, and Nasal Cannula; Wound manager Prevena      Interventions In Place Gray Ear Foams, Waffle Overlay, Pillows, and Pressure Redistribution Mattress    Possible Skin Injury No    Pictures Uploaded Into Epic N/A  Wound Consult Placed N/A  RN Wound Prevention Protocol Ordered No

## 2023-11-27 NOTE — PROGRESS NOTES
2330 Patient got upset because this rn mentioned her sister has to leave per policy. But her sister can come back at 0500 as they wish. Also, patient does not want this RN to assess. The patient stated it is too late to do it. Informed about this situation to charge nurse.

## 2023-11-27 NOTE — ANESTHESIA POSTPROCEDURE EVALUATION
Patient: Sheyla Gauthier    Procedure Summary       Date: 11/27/23 Room / Location: [place] [address] / SURGERY [facility]    Anesthesia Start: 0730 Anesthesia Stop: 1039    Procedure: LEFT TOTAL HIP ARTHROPLASTY (Left: Hip) Diagnosis: (left displaced femoral neck fracture)    Surgeons: Parveen Ko M.D. Responsible Provider: Jose Burroughs M.D.    Anesthesia Type: general ASA Status: 3            Final Anesthesia Type: general  Last vitals  BP   Blood Pressure : 125/59    Temp   37.2 °C (99 °F)    Pulse   92   Resp   12    SpO2   91 %      Anesthesia Post Evaluation    Patient location during evaluation: PACU  Patient participation: complete - patient participated  Level of consciousness: awake and alert  Pain score: 3    Airway patency: patent  Anesthetic complications: no  Cardiovascular status: hemodynamically stable  Respiratory status: acceptable  Hydration status: euvolemic    PONV: none          No notable events documented.     Nurse Pain Score: 3 (NPRS)

## 2023-11-27 NOTE — CARE PLAN
The patient is Stable - Low risk of patient condition declining or worsening    Shift Goals  Clinical Goals: pain control; VSS  Patient Goals: comfort  Family Goals: not present    Progress made toward(s) clinical / shift goals:    Problem: Knowledge Deficit - Standard  Goal: Patient and family/care givers will demonstrate understanding of plan of care, disease process/condition, diagnostic tests and medications  Outcome: Progressing     Problem: Pain - Standard  Goal: Alleviation of pain or a reduction in pain to the patient’s comfort goal  Outcome: Progressing     Problem: Fall Risk  Goal: Patient will remain free from falls  Outcome: Progressing       Patient is not progressing towards the following goals:

## 2023-11-27 NOTE — PROGRESS NOTES
Rapid Response Summary     Rapid response called at 1413 for:  HOTN    VS: WDL (See Vitals Flowsheet)  Additional info: Surgery today, mobilized for first time post op  MD Paged: Loi  Interventions:    Imaging/Tests: N/A and     Labs: N/A   Medications:  None   Other: N/A  Disposition: Improved with rapid response team interventions. Primary RN updated on plan of care. Transfer not indicated at this time.  and Rapid team will continue to follow the patient.

## 2023-11-27 NOTE — OP REPORT
DATE OF SERVICE:  11/10/2023     PREOPERATIVE DIAGNOSES:  1.  L3 through L5 lumbar stenosis.  2.  Bilateral neurogenic claudications.  3.  Left L4-L5 radiculopathy.  4.  Failed conservative care.     POSTOPERATIVE DIAGNOSES:  1.  L3 through L5 lumbar stenosis.  2.  Bilateral neurogenic claudications.  3.  Left L4-L5 radiculopathy.  4.  Failed conservative care.     PROCEDURES:  1.  L3 decompressive lumbar laminectomy, bilateral foraminotomy.  2.  Bilateral redo L4 laminotomies, foraminotomies, decompression of bilateral   L4 nerve roots.  3.  Bilateral redo L5 laminotomies, foraminotomies, decompression of bilateral   L5 nerve roots.  4.  S1 decompressive lumbar laminectomy with bilateral foraminotomies.  5.  Left L3 transpedicular approach far lateral decompression of left L3 nerve   root.  6.  Left L4 transpedicular approach far lateral decompression of left L4 nerve   root.  7.  SSEPs and EMG monitoring performed by neuromonitoring associates, which   remained stable throughout.     SURGEON:  Earnest Silva M.D., Neurosurgery and Spine Surgery.     ASSISTANT:  Andrey Bright PA-C.     ANESTHESIA:  General endotracheal anesthesia.     ANESTHESIOLOGIST:  Arnel Aguila M.D.     COMPLICATIONS:  None.     ESTIMATED BLOOD LOSS:  Less than 50 mL.     PREOPERATIVE NOTE:  This extremely pleasant 82-year-old lady, who presents   with bilateral neurogenic claudication, lower extremity radiculopathies   consistent with predominantly left L3-L4 radiculopathy with proximal weakness   and partial footdrop.  The patient failed conservative care. She underwent   prior surgeries elsewhere, but has remained with persistent stenosis, worse on   the left and right with adjacent level stenosis at L3-L4 and L5-S1.  The   patient failed extensive conservative care.  She was miserable.  Her quality   of life was markedly interfered with.  She is unable to mobilize using a   mobility chair at this stage for assistance.  Hence, I  discussed surgical   procedure, alternatives, goals, risks, benefits, complications in detail with   the patient.  The patient understood and consented to surgery for above listed   procedure.  Details are well contained in the office visit notes.     DESCRIPTION OF PROCEDURE:  The patient was brought to the operating room and   placed under general anesthesia.  She was placed prone on the operating OSI   table with care taken to the bony prominences, peripheral nerves.  Lumbar   region was prepped and draped in the usual sterile fashion.  Following   localization of cross-table fluoroscopy, prior incision was reopened.  I   exposed the L3-S1 levels using the same incision.  Self-retaining retractors   applied.  Intraoperative x-ray confirmed appropriate levels.  Using a   combination of double action rongeurs, Kerrison rongeurs and curettes,   decompressive lumbar laminectomy of L3 was completed. Facet and ligamentous   hypertrophy was undercut and removed.  There was considerable stenosis on the   left side.  It is appreciated on the MRI and discussed with the family prior   to surgery, that we would include this level as well.  I drilled down the left   L3 pedicle for transpedicular decompression of left L3 nerve root.    Separately and distinctly, I drilled down the left L4 pedicle for   transpedicular decompression of left L4 nerve root.  Marked facet and   ligamentous hypertrophy was undercut and removed.  I incised the disk at L3-L4   and lateral foramen and removed several disk fragments, freeing up the thecal   sac and the exiting left L3-L4 nerve roots, which were being affected   clearly. At this stage bilateral redo L4 laminotomies, foraminotomies   completed, decompressing bilateral L4 nerve roots.  Separately and distinctly,   bilateral redo L5 laminotomies and foraminotomies were completed,   decompressing bilateral L5 nerve roots.  The S1 decompressive laminectomy was   completed with a combination  of curettes and Kerrison rongeurs and Midas Ganga   AM-8 drill bit, bilateral S1 decompression was achieved over the S1 nerve   roots.  Thecal sac and nerve roots nice and freed up throughout.  I was very   satisfied with the degree of decompression was achieved at this stage.  Wound   was irrigated, hemostasis achieved.  I placed an epidural catheter with   Marcaine and fentanyl for postoperative analgesia.  I infiltrated the muscle   with local anesthetic.  I placed a drain in the deep space and closed the   wound with multiple layers with 0 Vicryl deep fascia, 2-0 Vicryl deep dermal   layer, Steri-Strips to the skin.  Small sterile dressing was applied.  Swabs,   needles, instruments correct by two-count.  No complications were encountered.    The patient tolerated the procedure well and neurologically intact in   recovery room, much stronger than preoperatively.  The patient will be   observed at [facility] until she meets discharge criteria.    The patient will ultimately be following up at [facility] in 2   weeks and 6 weeks' time as arranged.        ______________________________  MD LYN ALVAREZ/MARTINA/TMA    DD:  11/26/2023 15:14  DT:  11/26/2023 17:50    Job#:  [id number]    CC:YASMEEN VILLARREAL MD

## 2023-11-28 ENCOUNTER — APPOINTMENT (OUTPATIENT)
Dept: RADIOLOGY | Facility: MEDICAL CENTER | Age: 83
DRG: 522 | End: 2023-11-28
Attending: STUDENT IN AN ORGANIZED HEALTH CARE EDUCATION/TRAINING PROGRAM
Payer: MEDICARE

## 2023-11-28 PROBLEM — I95.9 HYPOTENSION: Status: ACTIVE | Noted: 2023-11-28

## 2023-11-28 LAB
ALBUMIN SERPL BCP-MCNC: 3.1 G/DL (ref 3.2–4.9)
APTT PPP: 42.2 SEC (ref 24.7–36)
BUN SERPL-MCNC: 19 MG/DL (ref 8–22)
CALCIUM ALBUM COR SERPL-MCNC: 9.5 MG/DL (ref 8.5–10.5)
CALCIUM SERPL-MCNC: 8.8 MG/DL (ref 8.5–10.5)
CHLORIDE SERPL-SCNC: 109 MMOL/L (ref 96–112)
CO2 SERPL-SCNC: 22 MMOL/L (ref 20–33)
CREAT SERPL-MCNC: 0.95 MG/DL (ref 0.5–1.4)
ERYTHROCYTE [DISTWIDTH] IN BLOOD BY AUTOMATED COUNT: 52.4 FL (ref 35.9–50)
GFR SERPLBLD CREATININE-BSD FMLA CKD-EPI: 60 ML/MIN/1.73 M 2
GLUCOSE SERPL-MCNC: 114 MG/DL (ref 65–99)
GRAM STN SPEC: NORMAL
GRAM STN SPEC: NORMAL
HCT VFR BLD AUTO: 29.4 % (ref 37–47)
HGB BLD-MCNC: 9.2 G/DL (ref 12–16)
MCH RBC QN AUTO: 27.7 PG (ref 27–33)
MCHC RBC AUTO-ENTMCNC: 31.3 G/DL (ref 32.2–35.5)
MCV RBC AUTO: 88.6 FL (ref 81.4–97.8)
PHOSPHATE SERPL-MCNC: 2.2 MG/DL (ref 2.5–4.5)
PLATELET # BLD AUTO: 214 K/UL (ref 164–446)
PMV BLD AUTO: 10.5 FL (ref 9–12.9)
POTASSIUM SERPL-SCNC: 4.8 MMOL/L (ref 3.6–5.5)
RBC # BLD AUTO: 3.32 M/UL (ref 4.2–5.4)
SIGNIFICANT IND 70042: NORMAL
SIGNIFICANT IND 70042: NORMAL
SITE SITE: NORMAL
SITE SITE: NORMAL
SODIUM SERPL-SCNC: 140 MMOL/L (ref 135–145)
SOURCE SOURCE: NORMAL
SOURCE SOURCE: NORMAL
WBC # BLD AUTO: 7.8 K/UL (ref 4.8–10.8)

## 2023-11-28 PROCEDURE — 36415 COLL VENOUS BLD VENIPUNCTURE: CPT

## 2023-11-28 PROCEDURE — 97535 SELF CARE MNGMENT TRAINING: CPT

## 2023-11-28 PROCEDURE — 700111 HCHG RX REV CODE 636 W/ 250 OVERRIDE (IP): Performed by: ORTHOPAEDIC SURGERY

## 2023-11-28 PROCEDURE — 99233 SBSQ HOSP IP/OBS HIGH 50: CPT | Performed by: STUDENT IN AN ORGANIZED HEALTH CARE EDUCATION/TRAINING PROGRAM

## 2023-11-28 PROCEDURE — A9270 NON-COVERED ITEM OR SERVICE: HCPCS | Performed by: STUDENT IN AN ORGANIZED HEALTH CARE EDUCATION/TRAINING PROGRAM

## 2023-11-28 PROCEDURE — 700105 HCHG RX REV CODE 258: Performed by: STUDENT IN AN ORGANIZED HEALTH CARE EDUCATION/TRAINING PROGRAM

## 2023-11-28 PROCEDURE — 700105 HCHG RX REV CODE 258: Performed by: ORTHOPAEDIC SURGERY

## 2023-11-28 PROCEDURE — 700102 HCHG RX REV CODE 250 W/ 637 OVERRIDE(OP): Performed by: STUDENT IN AN ORGANIZED HEALTH CARE EDUCATION/TRAINING PROGRAM

## 2023-11-28 PROCEDURE — A9270 NON-COVERED ITEM OR SERVICE: HCPCS | Performed by: HOSPITALIST

## 2023-11-28 PROCEDURE — 85027 COMPLETE CBC AUTOMATED: CPT

## 2023-11-28 PROCEDURE — 700102 HCHG RX REV CODE 250 W/ 637 OVERRIDE(OP): Performed by: HOSPITALIST

## 2023-11-28 PROCEDURE — 80069 RENAL FUNCTION PANEL: CPT

## 2023-11-28 PROCEDURE — 770001 HCHG ROOM/CARE - MED/SURG/GYN PRIV*

## 2023-11-28 PROCEDURE — 97163 PT EVAL HIGH COMPLEX 45 MIN: CPT

## 2023-11-28 PROCEDURE — 85730 THROMBOPLASTIN TIME PARTIAL: CPT

## 2023-11-28 PROCEDURE — 700111 HCHG RX REV CODE 636 W/ 250 OVERRIDE (IP): Performed by: STUDENT IN AN ORGANIZED HEALTH CARE EDUCATION/TRAINING PROGRAM

## 2023-11-28 RX ORDER — SODIUM CHLORIDE 9 MG/ML
500 INJECTION, SOLUTION INTRAVENOUS ONCE
Status: COMPLETED | OUTPATIENT
Start: 2023-11-28 | End: 2023-11-28

## 2023-11-28 RX ORDER — CALCIUM CARBONATE 500 MG/1
1000 TABLET, CHEWABLE ORAL 2 TIMES DAILY PRN
Status: DISCONTINUED | OUTPATIENT
Start: 2023-11-28 | End: 2023-11-29 | Stop reason: HOSPADM

## 2023-11-28 RX ORDER — FAMOTIDINE 20 MG/1
20 TABLET, FILM COATED ORAL DAILY
Status: DISCONTINUED | OUTPATIENT
Start: 2023-11-28 | End: 2023-11-29 | Stop reason: HOSPADM

## 2023-11-28 RX ADMIN — OXYCODONE 5 MG: 5 TABLET ORAL at 04:41

## 2023-11-28 RX ADMIN — ATORVASTATIN CALCIUM 40 MG: 40 TABLET, FILM COATED ORAL at 19:22

## 2023-11-28 RX ADMIN — OXYCODONE 5 MG: 5 TABLET ORAL at 10:00

## 2023-11-28 RX ADMIN — ACETAMINOPHEN 1000 MG: 500 TABLET, FILM COATED ORAL at 19:22

## 2023-11-28 RX ADMIN — Medication 1 APPLICATOR: at 04:47

## 2023-11-28 RX ADMIN — OXYCODONE 5 MG: 5 TABLET ORAL at 19:22

## 2023-11-28 RX ADMIN — LEVOTHYROXINE SODIUM 88 MCG: 0.09 TABLET ORAL at 04:41

## 2023-11-28 RX ADMIN — OXYCODONE 5 MG: 5 TABLET ORAL at 14:24

## 2023-11-28 RX ADMIN — ACETAMINOPHEN 1000 MG: 500 TABLET, FILM COATED ORAL at 14:23

## 2023-11-28 RX ADMIN — LOSARTAN POTASSIUM 50 MG: 50 TABLET, FILM COATED ORAL at 04:41

## 2023-11-28 RX ADMIN — OXYCODONE 5 MG: 5 TABLET ORAL at 01:37

## 2023-11-28 RX ADMIN — CEFAZOLIN 2 G: 2 INJECTION, POWDER, FOR SOLUTION INTRAMUSCULAR; INTRAVENOUS at 05:56

## 2023-11-28 RX ADMIN — LIOTHYRONINE SODIUM 5 MCG: 5 TABLET ORAL at 04:41

## 2023-11-28 RX ADMIN — APIXABAN 5 MG: 5 TABLET, FILM COATED ORAL at 16:13

## 2023-11-28 RX ADMIN — Medication 1 APPLICATOR: at 16:13

## 2023-11-28 RX ADMIN — METHOCARBAMOL 1000 MG: 500 TABLET ORAL at 19:22

## 2023-11-28 RX ADMIN — POLYETHYLENE GLYCOL 3350 1 PACKET: 17 POWDER, FOR SOLUTION ORAL at 16:13

## 2023-11-28 RX ADMIN — HEPARIN SODIUM 700 UNITS/HR: 5000 INJECTION, SOLUTION INTRAVENOUS at 06:46

## 2023-11-28 RX ADMIN — SODIUM CHLORIDE 500 ML: 9 INJECTION, SOLUTION INTRAVENOUS at 16:36

## 2023-11-28 RX ADMIN — SPIRONOLACTONE 25 MG: 25 TABLET ORAL at 04:41

## 2023-11-28 RX ADMIN — FAMOTIDINE 20 MG: 20 TABLET ORAL at 08:55

## 2023-11-28 RX ADMIN — ANTACID TABLETS 1000 MG: 500 TABLET, CHEWABLE ORAL at 08:55

## 2023-11-28 RX ADMIN — PREGABALIN 100 MG: 100 CAPSULE ORAL at 19:22

## 2023-11-28 ASSESSMENT — PAIN DESCRIPTION - PAIN TYPE
TYPE: ACUTE PAIN;SURGICAL PAIN
TYPE: ACUTE PAIN
TYPE: ACUTE PAIN;SURGICAL PAIN
TYPE: ACUTE PAIN
TYPE: ACUTE PAIN

## 2023-11-28 ASSESSMENT — ACTIVITIES OF DAILY LIVING (ADL): TOILETING: INDEPENDENT

## 2023-11-28 ASSESSMENT — ENCOUNTER SYMPTOMS
PSYCHIATRIC NEGATIVE: 1
COUGH: 0
MYALGIAS: 1
FOCAL WEAKNESS: 1
PALPITATIONS: 0
HEMOPTYSIS: 0
CARDIOVASCULAR NEGATIVE: 1
GASTROINTESTINAL NEGATIVE: 1

## 2023-11-28 ASSESSMENT — COGNITIVE AND FUNCTIONAL STATUS - GENERAL
TURNING FROM BACK TO SIDE WHILE IN FLAT BAD: UNABLE
MOBILITY SCORE: 8
WALKING IN HOSPITAL ROOM: TOTAL
STANDING UP FROM CHAIR USING ARMS: A LITTLE
DRESSING REGULAR LOWER BODY CLOTHING: A LOT
HELP NEEDED FOR BATHING: A LOT
DRESSING REGULAR UPPER BODY CLOTHING: A LITTLE
CLIMB 3 TO 5 STEPS WITH RAILING: TOTAL
SUGGESTED CMS G CODE MODIFIER MOBILITY: CM
DAILY ACTIVITIY SCORE: 17
SUGGESTED CMS G CODE MODIFIER DAILY ACTIVITY: CK
MOVING FROM LYING ON BACK TO SITTING ON SIDE OF FLAT BED: UNABLE
TOILETING: A LOT
MOVING TO AND FROM BED TO CHAIR: UNABLE

## 2023-11-28 ASSESSMENT — GAIT ASSESSMENTS: GAIT LEVEL OF ASSIST: UNABLE TO PARTICIPATE

## 2023-11-28 NOTE — CARE PLAN
The patient is Watcher - Medium risk of patient condition declining or worsening    Shift Goals  Clinical Goals: pain management, stable BP, PT/OT, heparin drip  Patient Goals: heartburn relief  Family Goals: not present    Progress made toward(s) clinical / shift goals:  yes      Problem: Knowledge Deficit - Standard  Goal: Patient and family/care givers will demonstrate understanding of plan of care, disease process/condition, diagnostic tests and medications  Outcome: Progressing     Problem: Pain - Standard  Goal: Alleviation of pain or a reduction in pain to the patient’s comfort goal  Outcome: Progressing     Problem: Fall Risk  Goal: Patient will remain free from falls  Outcome: Progressing     Problem: Mobility  Goal: Patient's capacity to carry out activities will improve  Outcome: Progressing  Flowsheets (Taken 11/28/2023 1103)  Mobility:   Encouraged mobilization per interdisciplinary team recommendations   Monitored for signs of activity intolerance   Provided assistive devices   Administered pain management to allow progressive mobilization   Provided rest periods between activities   Collaborated with PT/OT     Problem: Wound/ / Incision Healing  Goal: Patient's wound/surgical incision will decrease in size and heals properly  Outcome: Progressing     Problem: Hemodynamics  Goal: Patient's hemodynamics, fluid balance and neurologic status will be stable or improve  Outcome: Progressing     Problem: Fluid Volume  Goal: Fluid volume balance will be maintained  Outcome: Progressing     Problem: Infection - Standard  Goal: Patient will remain free from infection  Outcome: Progressing  Flowsheets (Taken 11/28/2023 1103)  Standard Infection Interventions:   Implemented standard precautions   Assessed for signs and symptoms of infection   Instructed patient/family on signs and symptoms of infection   Provided education on proper hand hygiene and infection prevention measures   Assessed for removal IV, central  lines, intra-arterial or urinary catheters

## 2023-11-28 NOTE — THERAPY
Occupational Therapy Contact Note    Patient Name: Sheyla Gauthier  Age:  83 y.o., Sex:  female  Medical Record #: [id number]  Today's Date: 11/28/2023 11/28/23 1001   Initial Contact Note    Initial Contact Note Order Received and Verified, Occupational Therapy Evaluation in Progress with Full Report to Follow.   Prior Living Situation   Housing / Facility 1 Story House   Steps Into Home 0  (Pt has a ramp)   Bathroom Set up Walk In Shower;Shower Chair  (Pt reports she has a shower stool without a back.)   Equipment Owned Front-Wheel Walker;Tub / Shower Seat;Single Point Cane;Wheelchair   Lives with - Patient's Self Care Capacity Alone and Able to Care For Self   Comments Pt reports that she is in the process of selling her home as she is relocating to [place], [place] to live in [facility]. Pt reports that her dtr lives nearby the facility and will be available to assist her with any needs.   Prior Level of ADL Function   Self Feeding Independent   Grooming / Hygiene Independent   Bathing Independent   Dressing Independent   Toileting Independent   Prior Level of IADL Function   Medication Management Independent   Laundry Independent   Kitchen Mobility Independent   Finances Independent   Home Management Independent   Shopping Independent   Prior Level Of Mobility Independent With Device in Home  (Pt reports that she utilizes FWW throughout her home.)   Occupation (Pre-Hospital Vocational) Retired Due To Age   History of Falls   History of Falls No   Precautions   Precautions Fall Risk;Posterior Hip Precautions;Weight Bearing As Tolerated Left Lower Extremity   Comments WBAT LLE   Vitals   O2 (LPM) 0.5   O2 Delivery Device Nasal Cannula   Pain 0 - 10 Group   Therapist Pain Assessment Post Activity Pain Same as Prior to Activity;Nurse Notified  (Pt reporting significant pain in L hip. RN notified and present to provide pain medication. Pt did not provide numerical pain rating.)   Cognition    Cognition /  Consciousness X   Level of Consciousness Alert   Safety Awareness Impaired   New Learning Impaired   Attention Impaired   Sequencing Impaired   Comments Pt very fearful of movement when completing bed mobility w/x2 person assistance. Pt noted to firmly grab therapists arm and demand to not be rolled onto L hip in order for taps system to be placed underneath her. Pt required vc's for sequencing and tactile cues to roll L/R.  Pt tearful after completing rolling and scooting in bed.   Active ROM Upper Body   Active ROM Upper Body  WDL   Strength Upper Body   Upper Body Strength  X   Gross Strength Generalized Weakness, Equal Bilaterally.    Balance Assessment   Comments bed mobility only   Bed Mobility    Scooting Maximal Assist   Rolling Maximal Assist to Rt.;Maximum Assist to Lt.   Comments Pt required maxA x2 person assistance to roll L/R in bed as she was offcentered. Pt declined to sit EOB secondary to pain. Pt required mod vc's for hand placement on bedrails and body mechanics for rolling. Pt required extended time as she was upset and requested intermittent periods of rest.  (x2 person assist)   ADL Assessment   Comments No ADLs were performed as pt reported she was in signficant pain and could not participate at this time.   How much help from another person does the patient currently need...   Putting on and taking off regular lower body clothing? 2   Bathing (including washing, rinsing, and drying)? 2   Toileting, which includes using a toilet, bedpan, or urinal? 2   Putting on and taking off regular upper body clothing? 3   Taking care of personal grooming such as brushing teeth? 4   Eating meals? 4   6 Clicks Daily Activity Score 17   Functional Mobility   Comments Bed mobility only   Education Group   Education Provided Role of Occupational Therapist;Activities of Daily Living;Hip Precautions;Adaptive Equipment;Weight Bearing Precautions   Role of Occupational Therapist Patient Response  "Patient;Acceptance;Explanation;Verbal Demonstration   Hip Precautions Patient Response Patient;Acceptance;Explanation;Demonstration;Handout;Verbal Demonstration   ADL Patient Response Patient;Refuses;Teaching Refused   Adaptive Equipment Patient Response Patient;Refuses;Teaching Refused   Pathology of Bedrest Patient Response Patient;Acceptance;Explanation;No Learning Evidence   Additional Comments Pt received educational handout regarding posterior hip precautions for LLE. OT demo'd positions on handout to illustrate which positions should not be performed. Pt verbalized understanding. OT brought in sock aid and reacher to educate pt on use for LBD. Pt verbalized \"I've been using those already over at [facility]\". Pt declined to get OOB secondary to pain and was educated on the importance of frequent OOB activity for pain management. Pt did not display any learning evidence.   Anticipated Discharge Equipment and Recommendations   DC Equipment Recommendations Unable to determine at this time   Discharge Recommendations Recommend post-acute placement for additional occupational therapy services prior to discharge home   Interdisciplinary Plan of Care Collaboration   IDT Collaboration with  Nursing   Patient Position at End of Therapy In Bed;Bed Alarm On;Call Light within Reach;Tray Table within Reach;Phone within Reach   Collaboration Comments RN updated   Session Information   Date / Session Number  11/28 (travonal still needed)     "

## 2023-11-28 NOTE — PROGRESS NOTES
Hospital Medicine Daily Progress Note    Date of Service  11/28/2023    Chief Complaint  Sheyla Gauthier is a 83 y.o. female admitted 11/22/2023 with left displaced femoral neck fracture     Hospital Course  This is a 83-year-old female with a past medical significant for hypertension,, dyslipidemia, recent lumbar laminectomy by Dr. Silva on 11/10/2023 presented to ER on 11/22/2023 primary nausea with a complaint of left ear pain.  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    Patient report that she has been having difficulty walking and pain in her groin before her lumbar surgery.  Orthopedic surgery Dr. Ko was consulted who recommended IR guided patient medically care discussed with IR who stated there is not enough fluid to do aspiration.    Patient underwent left total hip arthroplasty on 11/27/2023; per orthopedic surgery, patient is weightbearing as tolerated on the left lower extremity with posterior hip precautions.  We will obtain PT/OT, DVT prophylaxis, multimodal pain management      S/p 11/27/23: Left total hip arthroplasty     Interval events :  Patient was seen and examined at bedside    Patient reported of dizziness while standing, orthostatic vital signs /49> 94/49, however, patient cannot tolerate standing for a few minutes    BP low, I put losartan and spironolactone on hold  I ordered 500 cc IV fluid bolus    S/p left total hip arthroplasty 11/27, Prevena drain in place, WBAT with posterior hip precaution  Okay to resume Eliquis per surgery, I have ordered      I have discussed this patient's plan of care and discharge plan at IDT rounds today with Case Management, Nursing, Nursing leadership, and other members of the IDT team.    Consultants/Specialty  Orthopedic surgery    Code Status  Full Code    Disposition  The patient is not medically cleared for discharge to home or a post-acute facility.  Anticipate discharge to: [facility]    I have placed the  appropriate orders for post-discharge needs.    Review of Systems  Review of Systems   Respiratory:  Negative for cough and hemoptysis.    Cardiovascular: Negative.  Negative for chest pain and palpitations.   Gastrointestinal: Negative.    Genitourinary: Negative.    Musculoskeletal:  Positive for joint pain and myalgias.   Neurological:  Positive for focal weakness.   Endo/Heme/Allergies: Negative.    Psychiatric/Behavioral: Negative.          Physical Exam  Temp:  [36.3 °C (97.3 °F)-37.1 °C (98.8 °F)] 36.6 °C (97.9 °F)  Pulse:  [73-93] 93  Resp:  [15-18] 16  BP: ()/(48-76) 97/49  SpO2:  [91 %-95 %] 92 %    Physical Exam  Constitutional:       Appearance: Normal appearance.   HENT:      Head: Normocephalic and atraumatic.      Mouth/Throat:      Mouth: Mucous membranes are moist.   Eyes:      Extraocular Movements: Extraocular movements intact.      Pupils: Pupils are equal, round, and reactive to light.   Cardiovascular:      Rate and Rhythm: Normal rate and regular rhythm.      Pulses: Normal pulses.      Heart sounds:      No friction rub.   Pulmonary:      Effort: Pulmonary effort is normal.   Abdominal:      General: Bowel sounds are normal.      Palpations: Abdomen is soft.   Musculoskeletal:         General: Tenderness present. No swelling.      Cervical back: Neck supple.      Comments: Left hip tenderness to palpation.  No erythema, swelling or lesions/secretions noted   Skin:     General: Skin is warm.   Neurological:      Mental Status: She is alert and oriented to person, place, and time. Mental status is at baseline.      Cranial Nerves: No cranial nerve deficit.      Motor: Weakness (LLE weakness) present.   Psychiatric:         Mood and Affect: Mood normal.         Behavior: Behavior normal.         Thought Content: Thought content normal.         Judgment: Judgment normal.         Fluids  No intake or output data in the 24 hours ending 11/28/23 7045      Laboratory  Recent Labs      11/26/23 0320 11/28/23  0522   WBC 4.8 7.8   RBC 3.91* 3.32*   HEMOGLOBIN 10.9* 9.2*   HEMATOCRIT 34.6* 29.4*   MCV 88.5 88.6   MCH 27.9 27.7   MCHC 31.5* 31.3*   RDW 53.2* 52.4*   PLATELETCT 304 214   MPV 10.1 10.5     Recent Labs     11/26/23 0320 11/28/23  0522   SODIUM 139 140   POTASSIUM 4.1 4.8   CHLORIDE 110 109   CO2 19* 22   GLUCOSE 88 114*   BUN 20 19   CREATININE 0.92 0.95   CALCIUM 9.7 8.8     Recent Labs     11/25/23 2004 11/26/23 0320 11/28/23  1255   APTT 70.3* 62.5* 42.2*               Imaging  IR-US GUIDED PIV   Final Result    Ultrasound-guided PERIPHERAL IV INSERTION performed by    qualified nursing staff as above.      IR-US GUIDED PIV   Final Result    Ultrasound-guided PERIPHERAL IV INSERTION performed by    qualified nursing staff as above.      DX-PELVIS-1 OR 2 VIEWS   Final Result      Status post left hip arthroplasty without immediate postoperative hardware complication.      DX-HIP-UNILATERAL-W/O PELVIS-2/3 VIEWS LEFT   Final Result      Digitized intraoperative radiograph is submitted for review. This examination is not for diagnostic purpose but for guidance during a surgical procedure. Please see the patient's chart for full procedural details.         INTERPRETING LOCATION: [address] [facility], [address]      DX-PORTABLE FLUORO > 1 HOUR   Final Result      Portable fluoroscopy utilized for 29 seconds.      INTERPRETING LOCATION: [address] [facility], [address]      CT-HIP WITH LEFT   Final Result      1.  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 There is fragmentation of the femoral head with multiple bone fragments seen within the joint space. There is secondary    osteoarthritis of the hip articulation. There is minimal joint fluid within the left hip articulation. No surrounding fluid collections.      2.  Multiple calcific fragments seen extending into the area of the left iliopsoas bursa and  extending along the course of the left iliopsoas muscle and tendon into the left iliacus muscle likely representing either myositis ossificans or extrusion of    ossific fragments along the course of the iliopsoas muscle and tendon.      3.  No evidence of inflammatory process seen within the pelvis.      4.  Sigmoid diverticulosis.      US-EXTREMITY NON VASCULAR UNILATERAL LEFT   Final Result      No evidence of left hip joint effusion or fluid in the area of the left hip.           Assessment/Plan  * Closed fracture of neck of left femur (HCC)- (present on admission)  Assessment & Plan  X-ray showing left femoral neck fracture   --Left hip CT concerning for avascular necrosis  --Dr Iyer discussed case with interventional radiology (Dr. Banerjee), who reviewed left hip ultrasound, left hip CT and does not recommend IR aspiration as there is no to minimal fluid in the joint space.  Orthopedic Surgery following,  Patient underwent left total hip arthroplasty on 11/27/2023; per orthopedic surgery, patient is weightbearing as tolerated on the left lower extremity with posterior hip precautions.  We will obtain PT/OT, multimodal pain management  -- Discussed with Dr. Ko who recommended starting heparin drip 18 hours after the surgery    Hypotension  Assessment & Plan  Bp 90/49  Symptomatic, dizziness, not able to tolerate standing for a few minutes  orthostatic vital signs /49> 94/49    I put losartan and spironolactone on hold  I ordered 500 cc IV fluid bolus    Continue to monitor.  Encourage the patient oral intake    Acute deep vein thrombosis (DVT) of right lower extremity (HCC)- (present on admission)  Assessment & Plan  Noted on 11/2023 ultrasound  Has been on Eliquis outpatient  Hold Eliquis  Heparin drip, possible procedure  Likely switch back to Eliquis after completion of surgery/procedures    S/P lumbar laminectomy- (present on admission)  Assessment & Plan  History of recent lumbar laminectomy  on 11/10/2023 by Dr. Earnest Silva    11/28 S/p left total hip arthroplasty 11/27, Prevena drain in place, WBAT with posterior hip precaution  Okay to resume Eliquis per surgery, I have ordered    Chronic kidney disease, stage 3a (HCC)- (present on admission)  Assessment & Plan  Avoid nephrotoxic agents and renally dose all medications  Monitor creatinine    Peripheral edema- (present on admission)  Assessment & Plan  Resume spirnolactone     Acquired hypothyroidism- (present on admission)  Assessment & Plan  Resume home thyroid medications    Mixed hyperlipidemia- (present on admission)  Assessment & Plan  Statin     HTN (hypertension)- (present on admission)  Assessment & Plan  Resume home losartan, spironolactone    11/28 now BP low.  Hold HTN meds         VTE prophylaxis: no scd  Heparin drip can be started 18 hours after the surgery  I have performed a physical exam and reviewed and updated ROS and Plan today (11/28/2023). In review of yesterday's note (11/27/2023), there are no changes except as documented above.     The patient is not medically cleared for discharge to home or a post-acute facility.    I spent greater than 52 minutes for chart review, obtaining history independently, performing medically appropriate examination,  documenting , ordering medications, tests, or procedures, referring and communicating with other health care professionals, Independently interpreting results and communicating results with patient/family/caregiver. More than 50% of time was spent in face-to-face clinical encounter.

## 2023-11-28 NOTE — ASSESSMENT & PLAN NOTE
Bp 90/49  Symptomatic, dizziness, not able to tolerate standing for a few minutes  orthostatic vital signs /49> 94/49    I put losartan and spironolactone on hold  I ordered 500 cc IV fluid bolus    Continue to monitor.  Encourage the patient oral intake

## 2023-11-28 NOTE — DISCHARGE PLANNING
KATIE completed Dr. Chavis to review in anticipation of medical clearance to transfer back to rehab 11/29.

## 2023-11-28 NOTE — DISCHARGE PLANNING
[facility] Transitional Care Coordination    Referral from:  Dr. Sachi Levine   Insurance Provider on Facesheet: Medicare/[employer]  Potential Rehab diagnosis:  Other Ortho    Chart review indicates patient has ongoing medical management and may have therapy needs to possibly meet inpatient rehab facility criteria with the goal of returning to community.      D/C Support:    Physiatry to consult per protocol.  Would welcome PT as clinically appropriate.  Last Covid test:    Thank you for the referral.

## 2023-11-28 NOTE — PROGRESS NOTES
"Ortho Progress Note    Subjective:  S/p 11/27/23: Left total hip arthroplasty    Seen in T310. Pain controlled. Discussed post-op plan and answered questions. No acute events.       /64   Pulse 80   Temp 36.7 °C (98.1 °F) (Temporal)   Resp 16   Ht 1.575 m (5' 2\")   Wt 68.2 kg (150 lb 5.7 oz)   SpO2 92%     Patient seen and examined  No acute distress  Breathing non labored  RRR  LLE: Provena dressings intact. Motor and sensory exam intact. Cap refill brisk    Recent Labs     11/26/23  0320 11/28/23  0522   WBC 4.8 7.8   RBC 3.91* 3.32*   HEMOGLOBIN 10.9* 9.2*   HEMATOCRIT 34.6* 29.4*   MCV 88.5 88.6   MCH 27.9 27.7   MCHC 31.5* 31.3*   RDW 53.2* 52.4*   PLATELETCT 304 214   MPV 10.1 10.5       Active Hospital Problems    Diagnosis     Left displaced femoral neck fracture (HCC) [S72.002A]     Acute deep vein thrombosis (DVT) of right lower extremity (HCC) [I82.401]     S/P lumbar laminectomy [Z98.890]     Closed fracture of neck of left femur (HCC) [S72.002A]     Chronic kidney disease, stage 3a (HCC) [N18.31]     Peripheral edema [R60.9]     Acquired hypothyroidism [E03.9]     HTN (hypertension) [I10]     Mixed hyperlipidemia [E78.2]        Assessment/Plan:      Chronic left femoral neck fracture  S/p 11/27/23: Left total hip arthroplasty    Wt bearing status - WBAT with posterior hip precautions  PT/OT-initiated  Wound care: none, Provena on until follow up  Sutures/Staples out- 10-14 days post operatively  Antibiotics: 24 hours post-op abx  DVT Prophylaxis- current heparin drip, ok to transition back to Eliquis from ortho standpoint when primary team deems appropriate    Ok to shower with prevena vac in place    Follow up outpatient in 2 weeks for wound check and staple removal      Case Coordination for Discharge Planning - Disposition: dispo planning and outpatient follow up      Tu Rizzo MD  Ortho Trauma Fellow   [phone] or voalte   "

## 2023-11-28 NOTE — PROGRESS NOTES
"0400 Inquired [facility] hospitalist Jenny if heparin protocol will be initiated for infusion restart. Per pharmacist recommendations: \"continue with previous rate\". [facility] hospitalist agreed to recommendations.    0645 Heparin restarted.    "

## 2023-11-28 NOTE — PREADMISSION SCREENING NOTE
Pre-Admission Screening Form    Patient Information:   Name: Sheyla Gauthier     MRN: [id number]       : 1940      Age: 83 y.o.   Gender: female      Race: White [7]       Marital Status:  [5]  Family Contact: MiracleLuhAde        Relationship: Daughter [2]  Daughter [2]  Home Phone:              Cell Phone: [phone]  Advanced Directives: Copy in Chart  Code Status:  FULL  Current Attending Provider: Sachi Levine M.D.  Referring Physician: Dr. Levine      Physiatrist Consult: Dr. Chavis       Referral Date:    Primary Payor Source:  MEDICARE  Secondary Payor Source:  [facility]    Medical Information:   Date of Admission to Acute Care Sett[place]2023  Room Number: T310/00  Rehabilitation Diagnosis: 0008.11 - Orthopaedic Disorders: Status Post Unilateral Hip Fracture  Immunization History   Administered Date(s) Administered    COVID-19 Vaccine, unspecified - HISTORICAL DATA 2022    Comirnaty (Covid-19 Vaccine, Mrna, [id number] Formula) 10/28/2023    Influenza (IM) Preservative Free - HISTORICAL DATA 2014    Influenza Seasonal Injectable - Historical Data 2011, 2012    Influenza Vaccine Adult HD 10/09/2010, 2015, 10/25/2016, 2017, 2017, 2018, 2019, 10/26/2021, 10/20/2022, 10/28/2023    Influenza Vaccine Pediatric Split - Historical Data 2011, 2012    Influenza Vaccine Quad Inj (Pf) 2020    Influenza, Unspecified - HISTORICAL DATA 10/29/2023    PFIZER BIVALENT SARS-COV-2 VACCINE (12+) 10/26/2022    PFIZER PURPLE CAP SARS-COV-2 VACCINATION (12+) 2021, 2021, 10/09/2021, 2022    Pneumococcal Conjugate Vaccine (Prevnar/PCV-13) 2015, 2015    Pneumococcal polysaccharide vaccine (PPSV-23) 2017    Tdap Vaccine 2014    Zoster Vaccine Live (ZVL) (Zostavax) - HISTORICAL DATA 10/26/2010    Zoster Vaccine Recombinant (RZV) (SHINGRIX) 10/11/2018, 2019, 2020      Allergies   Allergen Reactions    Trazodone Swelling     Pt stated that she had swelling on her hands and feet's     Crestor [Rosuvastatin Calcium]      Myalgia      Past Medical History:   Diagnosis Date    Anxiety     Arthritis     osteoarthritis    Back pain     CAD (coronary artery disease) 10/2018    Abnormal CTCS. PCI/FARZAD ([place] 2.5 x 2mm) to the mid LAD. Cardic stent    Cataract     alex IOL    Daytime sleepiness     Depression     Dyslipidemia     Frequent headaches     Herpes zoster     High cholesterol     Hypertension     MHA (microangiopathic hemolytic anemia)     Migraine syndrome      Mumps     as a child    Osteopathia     Osteoporosis     Painful joint     Post-menopause on HRT (hormone replacement therapy)     Renal disorder     chronic kidney disease stage 3    Sleep apnea     no longer uses cpap    Snoring     Thyroid disease     hypothyroid    Urinary incontinence     White matter abnormality on MRI of brain      Past Surgical History:   Procedure Laterality Date    PB PARTIAL HIP REPLACEMENT Left 11/27/2023    Procedure: LEFT TOTAL HIP ARTHROPLASTY;  Surgeon: Parveen Ko M.D.;  Location: [facility];  Service: Orthopedics    LUMBAR LAMINECTOMY DISKECTOMY N/A 11/10/2023    Procedure: LUMBAR 5-SACRAL 1 REDO LAMINECTOMY;  Surgeon: Earnest Silva M.D.;  Location: [facility];  Service: Neurosurgery    LUMBAR LAMINECTOMY DISKECTOMY N/A 2/6/2023    Procedure: POSTERIOR REMOVAL OF INTERSPINOUS DEVICE WITH L4-S1 LAMINECTOMY;  Surgeon: Mannie Connor M.D.;  Location: [facility];  Service: Neurosurgery    HARDWARE REMOVAL ORTHO N/A 2/6/2023    Procedure: REMOVAL, HARDWARE;  Surgeon: Mannie Connor M.D.;  Location: [facility];  Service: Neurosurgery    LAMINOTOMY  01/28/2022    L4-L5 and L5-S1 posterior fusion with instrumentation and laminotomy by Dr. Benitez.    BUNIONECTOMY Left 2003    FOOT SURGERY  2002    SC BREAST REDUCTION  1997    CHOLECYSTECTOMY  1996     "ABDOMINAL HYSTERECTOMY TOTAL  11/1986    ARTHROSCOPY, KNEE      EYE SURGERY      cataracts    HYSTERECTOMY LAPAROSCOPY      MAMMOPLASTY AUGMENTATION      NM BREAST AUGMENTATION WITH IMPLANT         History Leading to Admission, Conditions that Caused the Need for Rehab (CMS):     Parveen Ko M.D.  Physician  Surgery Orthopedic     Interval H&P Note     Signed     Date of Service: 11/27/2023  7:15 AM      H&P reviewed. The patient was examined and there are no changes to the H&P  Hep gtt paused. To OR for L david vs SRIDEVI         Parveen Ko M.D.  Physician  Surgery Orthopedic     H&P (View-Only)     Signed     Date of Service: 11/23/2023  4:09 PM    Expand All Collapse All    11/23/2023     HPI: Sheyla Gauthier is a 83 y.o. female who presents with chronic left femoral neck fracture.  She has had left hip pain and left leg pain for \"a long time \".  She is unable to tell me when she last walked.  She reports she underwent extensive workup with [facility] neurosurgery regarding her back pain that included imaging of her left hip which apparently did not demonstrate any fracture at that time.  This was beginning in February of this year.  Most recently she underwent lumbar spine surgery with [facility] which did not improve her pain.  She was at rehab and found to have a chronic appearing left femoral neck fracture.  Currently she complains of left hip and groin pain.   Plan:   1. This is an unusual situation.  The patient is unable to tell me when she last walked but has had chronic pain in her hip and back for \"a long time\".  She denies any trauma or fall that may have caused the femoral neck fracture.  2. We will attempt to obtain imaging from her prior workup at [facility] neurosurgery to see if we can establish a timeline for when this happened  3. We will obtain advanced imaging of the pelvis and I recommend IR guided aspiration of the left hip to rule out potential chronic infection  4. If IR guided aspirate " shows no concern for infection or is a dry tap and then we can likely move forward with left hip hemiarthroplasty  5. N.p.o. at midnight for the case that her workup can be completed        Parveen Ko MD  Orthopedic Trauma           Parveen Ko M.D.  Physician  Surgery Orthopedic     OP Report     Signed     Date of Service: 11/27/2023  7:30 AM      DATE OF OPERATION: 11/27/2023     PREOPERATIVE DIAGNOSIS:  1. Chronic left femoral neck fracture     POSTOPERATIVE DIAGNOSIS: Same     PROCEDURE PERFORMED:   1. Left total hip arthroplasty     SURGEON: Parveen Ko M.D.      ASSISTANT: Williams Rizzo MD - ortho trauma fellow  The use of the fellow as a surgical assistant was necessary for assistance with exposure, retraction, fracture reduction, instrumentation, and closure.     POSTOPERATIVE PLAN:       Inpatient plan: PACU low AP pelvis.  PACU to floor.  24 hours of antibiotics.  Monitor cultures and pathology results.  Mobilize with PT and OT when able.  Weightbearing status: Weightbearing as tolerated left lower extremity with posterior hip precautions  DVT prophylaxis: Okay to resume baseline anticoagulation in 24 to 48 hours based on medical need  Outpatient plan: The patient will follow up in clinic in 2 weeks for wound check, suture/staple removal (if applicable), and xrays.  If the patient is at a facility at that time, wound check and suture/staple removal may be performed by nursing care and the patient should instead follow up at the 6 week post operative ari for repeat clinical check and xrays.        ____________________________________   Parveen Ko M.D.   DD: 11/27/2023  10:14 AM                 Co-morbidities:  as listed above and below   Potential Risk - Complications: Contractures, Deep Vein Thrombosis, Incontinence, Pain, Perceptual Impairment, Pneumonia, Pressure Ulcer, and wound risk infection risk   Level of Risk: High    Ongoing Medical Management Needed  "(Medical/Nursing Needs):   Patient Active Problem List    Diagnosis Date Noted    Hypotension 11/28/2023    Left displaced femoral neck fracture ([facility]) 11/23/2023    Acute deep vein thrombosis (DVT) of right lower extremity ([facility]) 11/23/2023    S/P lumbar laminectomy 11/22/2023    Closed fracture of neck of left femur ([facility]) 11/22/2023    Radiculopathy 11/13/2023    Spinal stenosis, lumbar region with neurogenic claudication 11/10/2023    Peripheral edema 10/13/2023    Chronic kidney disease, stage 3a ([facility]) 10/13/2023    Major depressive disorder with single episode, in partial remission ([facility]) 08/30/2023    Lumbar stenosis with neurogenic claudication 02/06/2023    Decreased hearing of right ear 10/20/2022    Dizziness 08/03/2022    Stage 2 chronic kidney disease 04/20/2022    Arthritis 11/03/2021    Memory loss 08/20/2020    Conductive hearing loss of right ear 08/20/2020    Left wrist pain 12/19/2019    Myalgia 10/01/2019    Former smoker 07/02/2019    Other insomnia 06/14/2019    Coronary artery disease involving native coronary artery of native heart without angina pectoris 03/26/2019    Bladder prolapse, female, acquired 03/07/2018    JOHN (obstructive sleep apnea) 02/22/2018    Age related osteoporosis 07/26/2017    Major depressive disorder 06/29/2017    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/08/2016    Spinal stenosis of lumbar region 11/14/2016    Migraine syndrome 02/11/2013    Acquired hypothyroidism 03/08/2011    HTN (hypertension) 10/21/2009    Postmenopausal hormone therapy 10/21/2009    Mixed hyperlipidemia     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[facility])        Current Vital Signs:   Temperature: 36.6 °C (97.9 °F) Pulse: 93 Respiration: 16 Blood Pressure : 97/49  Weight: 68.2 kg (150 lb 5.7 oz) Height: 157.5 cm (5' 2\")  Pulse Oximetry: 92 % O2 (LPM): 0.5      Completed Laboratory Reports:  Recent Labs     11/26/23  0320 11/28/23  0522   WBC 4.8 7.8   HEMOGLOBIN 10.9* 9.2*   HEMATOCRIT 34.6* 29.4* "   PLATELETCT 304 214   SODIUM 139 140   POTASSIUM 4.1 4.8   BUN 20 19   CREATININE 0.92 0.95   ALBUMIN 3.6 3.1*   GLUCOSE 88 114*     Additional Labs: Not Applicable    Prior Living Situation:   Housing / Facility: [address] [facility]  Steps Into Home: 0  Lives with - Patient's Self Care Capacity: Alone and Able to Care For Self  Equipment Owned: Front-Wheel Walker, Wheelchair (pt's wheelchair is in her room)    Prior Level of Function / Living Situation:   Physical Therapy: Prior Services: Housekeeping / Homemaker Services  Housing / Facility: 1 [facility]  Steps Into Home: 0  Bathroom Set up: Walk In Shower, Shower Chair (Pt reports she has a shower stool without a back.)  Equipment Owned: Front-Wheel Walker, Wheelchair (pt's wheelchair is in her room)  Lives with - Patient's Self Care Capacity: Alone and Able to Care For Self  Ambulation: Required Assist  Assistive Devices Used: Front-Wheel Walker  Current Level of Function:      Scooting: Maximal Assist  Rolling: Maximal Assist to Rt., Maximum Assist to Lt.  Comments: Pt required maxA x2 person assistance to roll L/R in bed as she was offcentered. Pt declined to sit EOB secondary to pain. Pt required mod vc's for hand placement on bedrails and body mechanics for rolling. (x2 person assist)        Occupational Therapy:   Self Feeding: Independent  Grooming / Hygiene: Independent  Bathing: Independent  Dressing: Independent  Toileting: Independent  Medication Management: Independent  Laundry: Independent  Kitchen Mobility: Independent  Finances: Independent  Home Management: Independent  Shopping: Independent  Prior Level Of Mobility: Independent With Device in Home (Pt reports that she utilizes FWW throughout her home.)  Prior Services: Housekeeping / Homemaker Services  Housing / Facility: 1 [facility]  Occupation (Pre-Hospital Vocational): Retired Due To Age  Current Level of Function:      Speech Language Pathology:      Rehabilitation Prognosis/Potential:  Good  Estimated Length of Stay: 10-14 days    Nursing:      Continent    Scope/Intensity of Services Recommended:  Physical Therapy: 1.5 hr / day  5 days / week. Therapeutic Interventions Required: Maximize Endurance, Mobility, Strength, and Safety  Occupational Therapy: 1.5 hr / day 5 days / week. Therapeutic Interventions Required: Maximize Self Care, ADLs, IADLs, and Energy Conservation  Rehabilitation Nursin/7. Therapeutic Interventions Required: Monitor Pain, Skin, Wound(s), Vital Signs, Intake and Output, Labs, Safety, and Family Training  Rehabilitation Physician: 3 - 5 days / week. Therapeutic Interventions Required: Medical Management  Respiratory Care: evaluate . Therapeutic Interventions Required: Pulmonary Toileting and O2 Weaning  Dietician: consult . Therapeutic Interventions Required: nutritional need     She requires 24-hour rehabilitation nursing to manage bowel and bladder function, skin care, surgical incision, nutrition and fluid intake, pulmonary hygiene, pain control, safety, medication management, and patient/family goals. In addition, rehabilitation nursing will reiterate and reinforce therapy skills and equipment use, including ADLs, as well as provide education to the patient and family. Sheyla Waller Disha is willing to participate in and is able to tolerate the proposed plan of care.    Rehabilitation Goals and Plan (Expected frequency & duration of treatment in the IRF):   Return to the Community, Supervised Level of Care, Modified Independent Level of Care, Minimal Assist Level of Care, Outpatient Support, and Family Able to Provide 24/7 Assistance  Anticipated Date of Rehabilitation Admission: 2023  Patient/Family oriented IRF level of care/facility/plan: Yes  Patient/Family willing to participate in IRF care/facility/plan: Yes  Patient able to tolerate IRF level of care proposed: Yes  Patient has potential to benefit IRF level of care proposed: Yes  Comments: Not  Applicable    Special Needs or Precautions - Medical Necessity:  Safety Concerns/Precautions:  Fall Risk / High Risk for Falls and Balance  Complex Wound Care: post surgical   Pain Management  Requires Oxygen  Total Hip Precaution: Posterior  Weight-bearing Status: As Tolerated  Cardiac Precautions  Current Medications:    Current Facility-Administered Medications Ordered in Epic   Medication Dose Route Frequency Provider Last Rate Last Admin    Nozin nasal  swab  1 Applicator Each Nostril BID Umang Monsivais M.D.   1 Applicator at 11/28/23 0447    calcium carbonate (Tums) chewable tab 1,000 mg  1,000 mg Oral BID PRN Sachi Levine M.D.   1,000 mg at 11/28/23 0855    famotidine (Pepcid) tablet 20 mg  20 mg Oral DAILY Sachi Levine M.D.   20 mg at 11/28/23 0855    NS (Bolus) 0.9 % infusion 500 mL  500 mL Intravenous Once Sachi Levine M.D.        morphine 4 MG/ML injection 2-4 mg  2-4 mg Intravenous Q4HRS PRN Umang Monsivais M.D.   4 mg at 11/27/23 2310    oxyCODONE immediate-release (Roxicodone) tablet 5 mg  5 mg Oral Q3HRS PRN Umang Monsivais M.D.   5 mg at 11/28/23 1424    lactated ringers infusion   Intravenous Continuous Umang Monsivais M.D. 100 mL/hr at 11/27/23 1552 New Bag at 11/27/23 1552    LORazepam (Ativan) tablet 0.5 mg  0.5 mg Oral TID PRN Umang Monsivais M.D.   0.5 mg at 11/25/23 2222    apixaban (Eliquis) tablet 5 mg  5 mg Oral BID Janes Mayfield M.D.        acetaminophen (Tylenol) tablet 1,000 mg  1,000 mg Oral TID Owen Zamudio M.D.   1,000 mg at 11/28/23 1423    atorvastatin (Lipitor) tablet 40 mg  40 mg Oral QHS Owen Zamudio M.D.   40 mg at 11/27/23 2010    levothyroxine (Synthroid) tablet 88 mcg  88 mcg Oral AM ES Owen Zamudio M.D.   88 mcg at 11/28/23 0441    liothyronine (Cytomel) tablet 5 mcg  5 mcg Oral LUZ ELENA Zamudio M.D.   5 mcg at 11/28/23 0441    [Held by provider] losartan (Cozaar) tablet 50 mg  50 mg Oral LUZ ELENA Mayfield M.D.   50 mg at 11/28/23 0441     methocarbamol (Robaxin) tablet 1,000 mg  1,000 mg Oral TID Owen Zamudio M.D.   1,000 mg at 11/26/23 2040    pregabalin (Lyrica) capsule 100 mg  100 mg Oral BID Owen Zamudio M.D.   100 mg at 11/26/23 2041    [Held by provider] spironolactone (Aldactone) tablet 25 mg  25 mg Oral DAILY Janes Mayfield M.D.   25 mg at 11/28/23 0441    senna-docusate (Pericolace Or Senokot S) 8.6-50 MG per tablet 2 Tablet  2 Tablet Oral BID Owen Zamudio M.D.   2 Tablet at 11/27/23 1752    And    polyethylene glycol/lytes (Miralax) PACKET 1 Packet  1 Packet Oral QDAY TEJAL Zamudio M.D.        And    magnesium hydroxide (Milk Of Magnesia) suspension 30 mL  30 mL Oral QDAY TEJAL Zamudio M.D.        And    bisacodyl (Dulcolax) suppository 10 mg  10 mg Rectal QDAY TEJAL Zamudio M.D.        ondansetron (Zofran) syringe/vial injection 4 mg  4 mg Intravenous Q4HRS TEJAL Zamudio M.D.        ondansetron (Zofran ODT) dispertab 4 mg  4 mg Oral Q4HRS TEJAL Zamudio M.D.         No current [facility]-ordered outpatient medications on file.     Diet:   DIET ORDERS (From admission to next 24h)       Start     Ordered    11/27/23 1013  Diet Order Diet: Regular  ALL MEALS        Question:  Diet:  Answer:  Regular    11/27/23 1013                    Anticipated Discharge Destination / Patient/Family Goal:  Destination: Home with Assistance Support System: Family   Anticipated home health services: OT and PT  Previously used HH service/ provider: Not Applicable  Anticipated DME Needs: Walker  Outpatient Services: PT  Alternative resources to address additional identified needs:   No future appointments.  Clinical Admissions Coordinator     Discharge Planning     Signed     Date of Service: 11/28/2023  3:48 PM      Call out to Sheyla to discuss post acute options Per Sheyla she would like to return to [facility] when medically cleared.         Pre-Screen Completed: 11/28/2023 3:49 PM Esequiel Gomez

## 2023-11-29 ENCOUNTER — HOSPITAL ENCOUNTER (INPATIENT)
Facility: REHABILITATION | Age: 83
LOS: 12 days | DRG: 560 | End: 2023-12-11
Attending: PHYSICAL MEDICINE & REHABILITATION | Admitting: PHYSICAL MEDICINE & REHABILITATION
Payer: MEDICARE

## 2023-11-29 VITALS
SYSTOLIC BLOOD PRESSURE: 124 MMHG | WEIGHT: 150.35 LBS | BODY MASS INDEX: 27.67 KG/M2 | RESPIRATION RATE: 17 BRPM | HEIGHT: 62 IN | HEART RATE: 91 BPM | TEMPERATURE: 98.2 F | DIASTOLIC BLOOD PRESSURE: 61 MMHG | OXYGEN SATURATION: 93 %

## 2023-11-29 DIAGNOSIS — Z96.642 HISTORY OF LEFT HIP REPLACEMENT: ICD-10-CM

## 2023-11-29 DIAGNOSIS — S72.002A LEFT DISPLACED FEMORAL NECK FRACTURE (HCC): ICD-10-CM

## 2023-11-29 LAB
ALBUMIN SERPL BCP-MCNC: 2.6 G/DL (ref 3.2–4.9)
BUN SERPL-MCNC: 14 MG/DL (ref 8–22)
CALCIUM ALBUM COR SERPL-MCNC: 9.9 MG/DL (ref 8.5–10.5)
CALCIUM SERPL-MCNC: 8.8 MG/DL (ref 8.5–10.5)
CHLORIDE SERPL-SCNC: 112 MMOL/L (ref 96–112)
CO2 SERPL-SCNC: 23 MMOL/L (ref 20–33)
CREAT SERPL-MCNC: 0.71 MG/DL (ref 0.5–1.4)
EKG IMPRESSION: NORMAL
ERYTHROCYTE [DISTWIDTH] IN BLOOD BY AUTOMATED COUNT: 54.3 FL (ref 35.9–50)
FERRITIN SERPL-MCNC: 221 NG/ML (ref 10–291)
GFR SERPLBLD CREATININE-BSD FMLA CKD-EPI: 85 ML/MIN/1.73 M 2
GLUCOSE SERPL-MCNC: 95 MG/DL (ref 65–99)
HCT VFR BLD AUTO: 23.9 % (ref 37–47)
HCT VFR BLD AUTO: 26.7 % (ref 37–47)
HGB BLD-MCNC: 7.7 G/DL (ref 12–16)
HGB BLD-MCNC: 8.3 G/DL (ref 12–16)
IRON SATN MFR SERPL: 9 % (ref 15–55)
IRON SERPL-MCNC: 17 UG/DL (ref 40–170)
MAGNESIUM SERPL-MCNC: 1.7 MG/DL (ref 1.5–2.5)
MCH RBC QN AUTO: 28.5 PG (ref 27–33)
MCHC RBC AUTO-ENTMCNC: 32.2 G/DL (ref 32.2–35.5)
MCV RBC AUTO: 88.5 FL (ref 81.4–97.8)
PHOSPHATE SERPL-MCNC: 1.8 MG/DL (ref 2.5–4.5)
PLATELET # BLD AUTO: 152 K/UL (ref 164–446)
PMV BLD AUTO: 10.8 FL (ref 9–12.9)
POTASSIUM SERPL-SCNC: 3.9 MMOL/L (ref 3.6–5.5)
RBC # BLD AUTO: 2.7 M/UL (ref 4.2–5.4)
SODIUM SERPL-SCNC: 142 MMOL/L (ref 135–145)
TIBC SERPL-MCNC: 195 UG/DL (ref 250–450)
UIBC SERPL-MCNC: 178 UG/DL (ref 110–370)
VIT B12 SERPL-MCNC: 612 PG/ML (ref 211–911)
WBC # BLD AUTO: 5.9 K/UL (ref 4.8–10.8)

## 2023-11-29 PROCEDURE — A9270 NON-COVERED ITEM OR SERVICE: HCPCS | Performed by: HOSPITALIST

## 2023-11-29 PROCEDURE — 82728 ASSAY OF FERRITIN: CPT

## 2023-11-29 PROCEDURE — 700102 HCHG RX REV CODE 250 W/ 637 OVERRIDE(OP): Performed by: STUDENT IN AN ORGANIZED HEALTH CARE EDUCATION/TRAINING PROGRAM

## 2023-11-29 PROCEDURE — 83550 IRON BINDING TEST: CPT

## 2023-11-29 PROCEDURE — 83735 ASSAY OF MAGNESIUM: CPT

## 2023-11-29 PROCEDURE — 99223 1ST HOSP IP/OBS HIGH 75: CPT | Performed by: PHYSICAL MEDICINE & REHABILITATION

## 2023-11-29 PROCEDURE — 99239 HOSP IP/OBS DSCHRG MGMT >30: CPT | Performed by: STUDENT IN AN ORGANIZED HEALTH CARE EDUCATION/TRAINING PROGRAM

## 2023-11-29 PROCEDURE — 83540 ASSAY OF IRON: CPT

## 2023-11-29 PROCEDURE — 82607 VITAMIN B-12: CPT

## 2023-11-29 PROCEDURE — 85027 COMPLETE CBC AUTOMATED: CPT

## 2023-11-29 PROCEDURE — 700102 HCHG RX REV CODE 250 W/ 637 OVERRIDE(OP): Performed by: HOSPITALIST

## 2023-11-29 PROCEDURE — 700105 HCHG RX REV CODE 258: Performed by: HOSPITALIST

## 2023-11-29 PROCEDURE — 36415 COLL VENOUS BLD VENIPUNCTURE: CPT

## 2023-11-29 PROCEDURE — 94760 N-INVAS EAR/PLS OXIMETRY 1: CPT

## 2023-11-29 PROCEDURE — A9270 NON-COVERED ITEM OR SERVICE: HCPCS | Performed by: STUDENT IN AN ORGANIZED HEALTH CARE EDUCATION/TRAINING PROGRAM

## 2023-11-29 PROCEDURE — 80069 RENAL FUNCTION PANEL: CPT

## 2023-11-29 PROCEDURE — 93010 ELECTROCARDIOGRAM REPORT: CPT | Performed by: INTERNAL MEDICINE

## 2023-11-29 PROCEDURE — A9270 NON-COVERED ITEM OR SERVICE: HCPCS | Performed by: PHYSICAL MEDICINE & REHABILITATION

## 2023-11-29 PROCEDURE — 97530 THERAPEUTIC ACTIVITIES: CPT | Mod: CQ

## 2023-11-29 PROCEDURE — 700102 HCHG RX REV CODE 250 W/ 637 OVERRIDE(OP): Performed by: PHYSICAL MEDICINE & REHABILITATION

## 2023-11-29 PROCEDURE — 770010 HCHG ROOM/CARE - REHAB SEMI PRIVAT*

## 2023-11-29 PROCEDURE — 97116 GAIT TRAINING THERAPY: CPT | Mod: CQ

## 2023-11-29 PROCEDURE — 85014 HEMATOCRIT: CPT

## 2023-11-29 PROCEDURE — 93005 ELECTROCARDIOGRAM TRACING: CPT | Performed by: PHYSICAL MEDICINE & REHABILITATION

## 2023-11-29 PROCEDURE — 85018 HEMOGLOBIN: CPT

## 2023-11-29 RX ORDER — ONDANSETRON 2 MG/ML
4 INJECTION INTRAMUSCULAR; INTRAVENOUS 4 TIMES DAILY PRN
Status: DISCONTINUED | OUTPATIENT
Start: 2023-11-29 | End: 2023-12-11 | Stop reason: HOSPADM

## 2023-11-29 RX ORDER — AMOXICILLIN 250 MG
2 CAPSULE ORAL 2 TIMES DAILY
Status: DISCONTINUED | OUTPATIENT
Start: 2023-11-29 | End: 2023-12-11 | Stop reason: HOSPADM

## 2023-11-29 RX ORDER — FERROUS SULFATE 325(65) MG
325 TABLET ORAL
Status: CANCELLED | OUTPATIENT
Start: 2023-11-30

## 2023-11-29 RX ORDER — LIOTHYRONINE SODIUM 5 UG/1
5 TABLET ORAL EVERY MORNING
Status: CANCELLED | OUTPATIENT
Start: 2023-11-30

## 2023-11-29 RX ORDER — LIOTHYRONINE SODIUM 5 UG/1
5 TABLET ORAL EVERY MORNING
Status: DISCONTINUED | OUTPATIENT
Start: 2023-11-30 | End: 2023-12-11 | Stop reason: HOSPADM

## 2023-11-29 RX ORDER — ONDANSETRON 4 MG/1
4 TABLET, ORALLY DISINTEGRATING ORAL 4 TIMES DAILY PRN
Status: DISCONTINUED | OUTPATIENT
Start: 2023-11-29 | End: 2023-12-11 | Stop reason: HOSPADM

## 2023-11-29 RX ORDER — FERROUS SULFATE 325(65) MG
325 TABLET ORAL
Status: DISCONTINUED | OUTPATIENT
Start: 2023-11-30 | End: 2023-11-29 | Stop reason: HOSPADM

## 2023-11-29 RX ORDER — OXYCODONE HYDROCHLORIDE 5 MG/1
5 TABLET ORAL EVERY 6 HOURS PRN
Status: ON HOLD
Start: 2023-11-29 | End: 2023-12-11

## 2023-11-29 RX ORDER — POLYETHYLENE GLYCOL 3350 17 G/17G
1 POWDER, FOR SOLUTION ORAL
Status: DISCONTINUED | OUTPATIENT
Start: 2023-11-29 | End: 2023-12-11 | Stop reason: HOSPADM

## 2023-11-29 RX ORDER — FAMOTIDINE 20 MG/1
20 TABLET, FILM COATED ORAL DAILY
Status: CANCELLED | OUTPATIENT
Start: 2023-11-30

## 2023-11-29 RX ORDER — METHOCARBAMOL 500 MG/1
1000 TABLET, FILM COATED ORAL 3 TIMES DAILY
Status: DISCONTINUED | OUTPATIENT
Start: 2023-11-29 | End: 2023-12-11 | Stop reason: HOSPADM

## 2023-11-29 RX ORDER — FERROUS SULFATE 325(65) MG
325 TABLET ORAL
Qty: 30 TABLET | Status: SHIPPED
Start: 2023-11-30

## 2023-11-29 RX ORDER — FAMOTIDINE 20 MG/1
20 TABLET, FILM COATED ORAL DAILY
Status: DISCONTINUED | OUTPATIENT
Start: 2023-11-30 | End: 2023-12-11 | Stop reason: HOSPADM

## 2023-11-29 RX ORDER — LEVOTHYROXINE SODIUM 88 UG/1
88 TABLET ORAL
Status: DISCONTINUED | OUTPATIENT
Start: 2023-11-30 | End: 2023-12-11 | Stop reason: HOSPADM

## 2023-11-29 RX ORDER — ATORVASTATIN CALCIUM 40 MG/1
40 TABLET, FILM COATED ORAL
Status: CANCELLED | OUTPATIENT
Start: 2023-11-29

## 2023-11-29 RX ORDER — ACETAMINOPHEN 500 MG
1000 TABLET ORAL 3 TIMES DAILY
Status: CANCELLED | OUTPATIENT
Start: 2023-11-29

## 2023-11-29 RX ORDER — FAMOTIDINE 20 MG/1
20 TABLET, FILM COATED ORAL DAILY
Qty: 60 TABLET | Status: ON HOLD
Start: 2023-11-29 | End: 2023-12-11

## 2023-11-29 RX ORDER — PREGABALIN 100 MG/1
100 CAPSULE ORAL 2 TIMES DAILY
Status: CANCELLED | OUTPATIENT
Start: 2023-11-29

## 2023-11-29 RX ORDER — ATORVASTATIN CALCIUM 40 MG/1
40 TABLET, FILM COATED ORAL
Status: DISCONTINUED | OUTPATIENT
Start: 2023-11-29 | End: 2023-12-11 | Stop reason: HOSPADM

## 2023-11-29 RX ORDER — LEVOTHYROXINE SODIUM 88 UG/1
88 TABLET ORAL
Status: CANCELLED | OUTPATIENT
Start: 2023-11-30

## 2023-11-29 RX ORDER — OXYCODONE HYDROCHLORIDE 5 MG/1
5 TABLET ORAL 3 TIMES DAILY
Status: DISCONTINUED | OUTPATIENT
Start: 2023-11-29 | End: 2023-12-07

## 2023-11-29 RX ORDER — HYDRALAZINE HYDROCHLORIDE 25 MG/1
25 TABLET, FILM COATED ORAL EVERY 8 HOURS PRN
Status: DISCONTINUED | OUTPATIENT
Start: 2023-11-29 | End: 2023-12-11 | Stop reason: HOSPADM

## 2023-11-29 RX ORDER — OXYCODONE HYDROCHLORIDE 5 MG/1
5 TABLET ORAL
Status: DISCONTINUED | OUTPATIENT
Start: 2023-11-29 | End: 2023-12-11 | Stop reason: HOSPADM

## 2023-11-29 RX ORDER — OXYCODONE HYDROCHLORIDE 5 MG/1
5 TABLET ORAL
Status: CANCELLED | OUTPATIENT
Start: 2023-11-29

## 2023-11-29 RX ORDER — ECHINACEA PURPUREA EXTRACT 125 MG
2 TABLET ORAL PRN
Status: DISCONTINUED | OUTPATIENT
Start: 2023-11-29 | End: 2023-12-11 | Stop reason: HOSPADM

## 2023-11-29 RX ORDER — FERROUS SULFATE 325(65) MG
325 TABLET ORAL
Status: DISCONTINUED | OUTPATIENT
Start: 2023-11-30 | End: 2023-12-11 | Stop reason: HOSPADM

## 2023-11-29 RX ORDER — PREGABALIN 100 MG/1
100 CAPSULE ORAL 2 TIMES DAILY
Status: DISCONTINUED | OUTPATIENT
Start: 2023-11-29 | End: 2023-12-11 | Stop reason: HOSPADM

## 2023-11-29 RX ORDER — BISACODYL 10 MG
10 SUPPOSITORY, RECTAL RECTAL
Status: DISCONTINUED | OUTPATIENT
Start: 2023-11-29 | End: 2023-12-11 | Stop reason: HOSPADM

## 2023-11-29 RX ORDER — METHOCARBAMOL 500 MG/1
1000 TABLET, FILM COATED ORAL 3 TIMES DAILY
Status: CANCELLED | OUTPATIENT
Start: 2023-11-29

## 2023-11-29 RX ORDER — ACETAMINOPHEN 500 MG
1000 TABLET ORAL 3 TIMES DAILY
Status: DISCONTINUED | OUTPATIENT
Start: 2023-11-29 | End: 2023-12-11 | Stop reason: HOSPADM

## 2023-11-29 RX ADMIN — ATORVASTATIN CALCIUM 40 MG: 40 TABLET, FILM COATED ORAL at 21:34

## 2023-11-29 RX ADMIN — ACETAMINOPHEN 1000 MG: 500 TABLET ORAL at 21:34

## 2023-11-29 RX ADMIN — OXYCODONE 5 MG: 5 TABLET ORAL at 21:34

## 2023-11-29 RX ADMIN — LIOTHYRONINE SODIUM 5 MCG: 5 TABLET ORAL at 04:50

## 2023-11-29 RX ADMIN — PREGABALIN 100 MG: 100 CAPSULE ORAL at 21:34

## 2023-11-29 RX ADMIN — APIXABAN 5 MG: 5 TABLET, FILM COATED ORAL at 04:50

## 2023-11-29 RX ADMIN — FAMOTIDINE 20 MG: 20 TABLET ORAL at 04:50

## 2023-11-29 RX ADMIN — MAGNESIUM HYDROXIDE 30 ML: 1200 LIQUID ORAL at 08:19

## 2023-11-29 RX ADMIN — SODIUM CHLORIDE, POTASSIUM CHLORIDE, SODIUM LACTATE AND CALCIUM CHLORIDE: 600; 310; 30; 20 INJECTION, SOLUTION INTRAVENOUS at 00:43

## 2023-11-29 RX ADMIN — Medication 1 APPLICATOR: at 04:51

## 2023-11-29 RX ADMIN — METHOCARBAMOL TABLETS 1000 MG: 500 TABLET, COATED ORAL at 21:34

## 2023-11-29 RX ADMIN — DIBASIC SODIUM PHOSPHATE, MONOBASIC POTASSIUM PHOSPHATE AND MONOBASIC SODIUM PHOSPHATE 500 MG: 852; 155; 130 TABLET ORAL at 21:34

## 2023-11-29 RX ADMIN — DIBASIC SODIUM PHOSPHATE, MONOBASIC POTASSIUM PHOSPHATE AND MONOBASIC SODIUM PHOSPHATE 500 MG: 852; 155; 130 TABLET ORAL at 11:41

## 2023-11-29 RX ADMIN — OXYCODONE 5 MG: 5 TABLET ORAL at 18:26

## 2023-11-29 RX ADMIN — METHOCARBAMOL 1000 MG: 500 TABLET ORAL at 08:09

## 2023-11-29 RX ADMIN — PREGABALIN 100 MG: 100 CAPSULE ORAL at 08:09

## 2023-11-29 RX ADMIN — METHOCARBAMOL TABLETS 1000 MG: 500 TABLET, COATED ORAL at 14:04

## 2023-11-29 RX ADMIN — ACETAMINOPHEN 1000 MG: 500 TABLET, FILM COATED ORAL at 08:09

## 2023-11-29 RX ADMIN — OXYCODONE 5 MG: 5 TABLET ORAL at 09:18

## 2023-11-29 RX ADMIN — OXYCODONE 5 MG: 5 TABLET ORAL at 14:04

## 2023-11-29 RX ADMIN — LEVOTHYROXINE SODIUM 88 MCG: 0.09 TABLET ORAL at 04:50

## 2023-11-29 RX ADMIN — DIBASIC SODIUM PHOSPHATE, MONOBASIC POTASSIUM PHOSPHATE AND MONOBASIC SODIUM PHOSPHATE 500 MG: 852; 155; 130 TABLET ORAL at 08:09

## 2023-11-29 RX ADMIN — OXYCODONE 5 MG: 5 TABLET ORAL at 01:44

## 2023-11-29 RX ADMIN — APIXABAN 5 MG: 5 TABLET, FILM COATED ORAL at 21:34

## 2023-11-29 ASSESSMENT — LIFESTYLE VARIABLES
EVER_SMOKED: YES
EVER HAD A DRINK FIRST THING IN THE MORNING TO STEADY YOUR NERVES TO GET RID OF A HANGOVER: NO
HAVE YOU EVER FELT YOU SHOULD CUT DOWN ON YOUR DRINKING: NO
AVERAGE NUMBER OF DAYS PER WEEK YOU HAVE A DRINK CONTAINING ALCOHOL: 0
TOTAL SCORE: 0
HOW MANY TIMES IN THE PAST YEAR HAVE YOU HAD 5 OR MORE DRINKS IN A DAY: 0
EVER FELT BAD OR GUILTY ABOUT YOUR DRINKING: NO
HAVE PEOPLE ANNOYED YOU BY CRITICIZING YOUR DRINKING: NO
CONSUMPTION TOTAL: NEGATIVE
ON A TYPICAL DAY WHEN YOU DRINK ALCOHOL HOW MANY DRINKS DO YOU HAVE: 0
ALCOHOL_USE: NO

## 2023-11-29 ASSESSMENT — COGNITIVE AND FUNCTIONAL STATUS - GENERAL
WALKING IN HOSPITAL ROOM: A LOT
TURNING FROM BACK TO SIDE WHILE IN FLAT BAD: UNABLE
SUGGESTED CMS G CODE MODIFIER MOBILITY: CL
MOVING FROM LYING ON BACK TO SITTING ON SIDE OF FLAT BED: A LOT
STANDING UP FROM CHAIR USING ARMS: A LITTLE
MOVING TO AND FROM BED TO CHAIR: UNABLE
MOBILITY SCORE: 10
CLIMB 3 TO 5 STEPS WITH RAILING: TOTAL

## 2023-11-29 ASSESSMENT — PATIENT HEALTH QUESTIONNAIRE - PHQ9
2. FEELING DOWN, DEPRESSED, IRRITABLE, OR HOPELESS: NOT AT ALL
2. FEELING DOWN, DEPRESSED, IRRITABLE, OR HOPELESS: NOT AT ALL
SUM OF ALL RESPONSES TO PHQ9 QUESTIONS 1 AND 2: 0
SUM OF ALL RESPONSES TO PHQ9 QUESTIONS 1 AND 2: 0
1. LITTLE INTEREST OR PLEASURE IN DOING THINGS: NOT AT ALL
1. LITTLE INTEREST OR PLEASURE IN DOING THINGS: NOT AT ALL

## 2023-11-29 ASSESSMENT — GAIT ASSESSMENTS
ASSISTIVE DEVICE: FRONT WHEEL WALKER
GAIT LEVEL OF ASSIST: MINIMAL ASSIST
DISTANCE (FEET): 2
DEVIATION: ANTALGIC;STEP TO;DECREASED HEEL STRIKE;DECREASED TOE OFF

## 2023-11-29 ASSESSMENT — FIBROSIS 4 INDEX: FIB4 SCORE: 2.23

## 2023-11-29 ASSESSMENT — PAIN DESCRIPTION - PAIN TYPE
TYPE: SURGICAL PAIN;ACUTE PAIN
TYPE: ACUTE PAIN;SURGICAL PAIN

## 2023-11-29 NOTE — FLOWSHEET NOTE
11/29/23 1255   Events/Summary/Plan   Events/Summary/Plan RT Consult   Vital Signs   Pulse 64   Respiration 18   Pulse Oximetry 96 %  (Earclip pulse ox reading)   $ Pulse Oximetry (Spot Check) Yes   Respiratory Assessment   Level of Consciousness Alert   Chest Exam   Work Of Breathing / Effort Within Normal Limits   Breath Sounds   RUL Breath Sounds Clear   RML Breath Sounds Clear   RLL Breath Sounds Diminished   TYE Breath Sounds Clear   LLL Breath Sounds Diminished   Oxygen   O2 Delivery Device Room air w/o2 available   Smoking History   Have you ever smoked Yes   Have you smoked in the last 12 months No   Confirm Quit Date   (Quit 30-40yrs ago)

## 2023-11-29 NOTE — THERAPY
"Physical Therapy   Daily Treatment     Patient Name: Sheyla Gauthier  Age:  83 y.o., Sex:  female  Medical Record #: [id number]  Today's Date: 11/29/2023     Precautions  Precautions: Fall Risk;Posterior Hip Precautions;Weight Bearing As Tolerated Left Lower Extremity  Comments: wbat w/ PHP LLE s/p L SRIDEVI    Assessment    Pt greeted and seen for PT treatment. Pt reported decreased pain from previous day. She stood twice w/ CGA and took steps back and forth w/ FWW and Nicholas, decreased WC tolerance on L LE. She was limited by nausea. She was unable to recall hip precautions, pt ed provided.   Pt currently limited by impaired balance 2/2 pain, decreased strength and decreased activity tolerance which negatively impacts functional mobility. Pt will continue to benefit from skilled PT to address deficits.       Plan    Treatment Plan Status: Continue Current Treatment Plan  Type of Treatment: Bed Mobility, Gait Training, Neuro Re-Education / Balance, Self Care / Home Evaluation, Stair Training, Therapeutic Activities, Therapeutic Exercise  Treatment Frequency: 5 Times per Week  Treatment Duration: Until Therapy Goals Met    DC Equipment Recommendations: Unable to determine at this time  Discharge Recommendations: Recommend post-acute placement for additional physical therapy services prior to discharge home (return to [facility])      Subjective    \" No bending or crossing legs\"         11/29/23 0853   Cognition    Comments cooperative, recall 1/3 spinal precautions   Other Treatments   Other Treatments Provided Discussed hip precautions and pain.   Balance   Sitting Balance (Static) Fair +   Sitting Balance (Dynamic) Fair   Standing Balance (Static) Fair -   Standing Balance (Dynamic) Fair -   Weight Shift Sitting Fair   Weight Shift Standing Fair   Skilled Intervention Verbal Cuing;Compensatory Strategies   Comments w/ FWW   Bed Mobility    Comments pt sitting EOB, pre/post session, declined transfer to chair. Pt stated she " was more comfortable sitting EOB.   Gait Analysis   Gait Level Of Assist Minimal Assist   Assistive Device Front Wheel Walker   Distance (Feet) 2   # of Times Distance was Traveled 2   Deviation Antalgic;Step To;Decreased Heel Strike;Decreased Toe Off   Weight Bearing Status WBAT L LE   Skilled Intervention Verbal Cuing;Compensatory Strategies   Comments pt was able to take a two steps forward/back x2. She was limited by nausea, requested to return to EOB.   Functional Mobility   Sit to Stand Contact Guard Assist   Bed, Chair, Wheelchair Transfer Contact Guard Assist   Transfer Method Stand Step   Mobility STSx2 w/ steps   Skilled Intervention Verbal Cuing   Comments w/ FWW   Short Term Goals    Short Term Goal # 1 supine<>sit min A in 6 visits to progress with bed mobility   Goal Outcome # 1 goal not met   Short Term Goal # 2 sit<>stand with FWW EOB SBA in 6 visits to progress transfers   Goal Outcome # 2 Progressing as expected   Short Term Goal # 3 amb with FWW 50ft CGA in 6 visits for bathroom distances   Goal Outcome # 3 Goal not met   Short Term Goal # 4 independent with teach back 3/3 posterior hip precautions   Goal Outcome # 4 Goal not met   Supervising Physical Therapist (PTA Treatments Only)   Supervising Physical Therapist Domonique Patterson

## 2023-11-29 NOTE — DISCHARGE SUMMARY
Discharge Summary    CHIEF COMPLAINT ON ADMISSION  No chief complaint on file.      Reason for Admission  femur fracture    Admission Date  11/22/2023     CODE STATUS  Full Code    HPI & HOSPITAL COURSE  This is a 83-year-old female with a past medical significant for hypertension,, dyslipidemia, recent lumbar laminectomy by Dr. Sivla, recent DVT on eliquis admitted 11/22/2023 with left displaced femoral neck fracture.  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   Orthopedic surgery Dr. Ko was consulted who recommended IR guided patient medically care discussed with IR who stated there is not enough fluid to do aspiration. S/p left total hip arthroplasty 11/27, Prevena drain in place, WBAT with posterior hip precaution. Eliquis was resumed per surgery.  Patient is cleared for discharge by orthopedic surgeon.    Patient had episodes of hypotension, symptomatic.  Orthostatic vital signs negative.  Blood pressure improved with IV fluid.  I discontinued her home losartan and spironolactone.     Acute on chronic anemia, likely due to expected blood loss from surgery and IV fluid dilution.  No active bleeding signs.  Anemia workup significant for iron deficiency.  Started on iron supplement.     PMR evaluated. Patient was accepted by inpatient rehab    Therefore, she is discharged in good and stable condition to an inpatient rehabilitation hospital.    The patient met 2-midnight criteria for an inpatient stay at the time of discharge.      FOLLOW UP ITEMS POST DISCHARGE  - Follow up with primary care physician in 1 week.   - Follow up with orthopedics as instructed    - Sutures/Staples out- 10-14 days post operatively (surgery on 11/27)    - Please take the medications as instructed    - Go to the local Emergency Department if you have any worsening condition.       DISCHARGE DIAGNOSES  Principal Problem:    Closed fracture of neck of left femur (HCC) (POA: Yes)  Active Problems:     HTN (hypertension) (POA: Yes)    Mixed hyperlipidemia (POA: Yes)    Acquired hypothyroidism (POA: Yes)    Peripheral edema (POA: Yes)    Chronic kidney disease, stage 3a (HCC) (POA: Yes)    S/P lumbar laminectomy (POA: Yes)    Left displaced femoral neck fracture (HCC) (POA: Yes)    Acute deep vein thrombosis (DVT) of right lower extremity (HCC) (POA: Yes)    Hypotension (POA: Unknown)  Resolved Problems:    * No resolved hospital problems. *      FOLLOW UP  No future appointments.  Darius Morataya M.D.  [address] [facility] [address] [place] [address]  [phone]    Follow up in 1 week(s)  Please call your primary care provider to schedule, recent hospitalization follow up    Parveen Ko M.D.  [address] [place] [id number] [phone]    Follow up in 2 week(s)  follow up recommended by specialist, sutures removal      MEDICATIONS ON DISCHARGE     Medication List        START taking these medications        Instructions   famotidine 20 MG Tabs  Commonly known as: Pepcid   Take 1 Tablet by mouth every day.  Dose: 20 mg     ferrous sulfate 325 (65 Fe) MG tablet  Start taking on: November 30, 2023   Take 1 Tablet by mouth every morning with breakfast.  Dose: 325 mg     oxyCODONE immediate-release 5 MG Tabs  Commonly known as: Roxicodone   Take 1 Tablet by mouth every 6 hours as needed for Severe Pain for up to 5 days.  Dose: 5 mg     phosphorus 250 MG tablet  Commonly known as: K-Phos-Neutral   Take 1 Tablet by mouth 3 times a day for 1 day.  Dose: 1 Tablet            CONTINUE taking these medications        Instructions   acetaminophen 500 MG Tabs  Commonly known as: Tylenol   Take 2 Tablets by mouth in the morning, at noon, and at bedtime.  Dose: 1,000 mg     apixaban 5mg Tabs  Commonly known as: Eliquis   Take 1 Tablet by mouth 2 times a day.  Dose: 5 mg     atorvastatin 40 MG Tabs  Commonly known as: Lipitor   Take 1 Tablet by mouth at bedtime.  Dose: 40 mg     Estriol 10 % Crea   Insert 1 Application into  the vagina as needed.  Dose: 1 Application      levothyroxine 88 MCG Tabs  Commonly known as: Synthroid   Take 1 Tablet by mouth every morning on an empty stomach.  Dose: 88 mcg     liothyronine 5 MCG Tabs  Commonly known as: Cytomel   Take 1 Tablet by mouth every morning.  Dose: 5 mcg     Magnesium 200 MG Tabs   Take 200 mg by mouth every evening.  Dose: 200 mg     Methocarbamol 1000 MG Tabs   Take 1,000 mg by mouth in the morning, at noon, and at bedtime.  Dose: 1,000 mg     polyethylene glycol/lytes Pack  Commonly known as: Miralax   Take 1 Packet by mouth 1 time a day as needed (constipation).  Dose: 17 g     pregabalin 100 MG Caps  Commonly known as: Lyrica   Take 1 Capsule by mouth 2 times a day for 30 days.  Dose: 100 mg            STOP taking these medications      losartan 50 MG Tabs  Commonly known as: Cozaar     spironolactone 25 MG Tabs  Commonly known as: Aldactone              Allergies  Allergies   Allergen Reactions    Trazodone Swelling     Pt stated that she had swelling on her hands and feet's     Crestor [Rosuvastatin Calcium]      Myalgia        DIET  Orders Placed This Encounter   Procedures    Diet Order Diet: Regular     Standing Status:   Standing     Number of Occurrences:   1     Order Specific Question:   Diet:     Answer:   Regular [1]       ACTIVITY  As tolerated.  Weight bearing as tolerated with posterior hip precaution    LINES, DRAINS, AND WOUNDS  This is an automated list. Peripheral IVs will be removed prior to discharge.  Peripheral IV 11/23/23 20 G Anterior;Left Forearm (Active)   Site Assessment Clean;Dry;Intact 11/28/23 0819   Dressing Type Transparent 11/28/23 0819   Line Status Infusing 11/28/23 0819   Dressing Status Clean;Dry;Intact 11/28/23 0819   Dressing Intervention N/A 11/28/23 0819   Infiltration Grading ([facility], [facility]) 0 11/28/23 0819   Phlebitis Scale ([facility] Only) 0 11/28/23 0819       Peripheral IV 11/28/23 20 G Anterior;Right Forearm (Active)   Site Assessment  Clean;Dry;Intact 11/28/23 1115   Dressing Type Transparent 11/28/23 1115   Line Status Blood return noted;Flushed;Saline locked 11/28/23 1115   Dressing Status Clean;Dry;Intact 11/28/23 1115   Dressing Intervention Initial dressing 11/28/23 1115   Dressing Change Due 12/02/23 11/28/23 1115   Infiltration Grading ([facility], [facility]) 0 11/28/23 1115   Phlebitis Scale ([facility] Only) 0 11/28/23 1115       Peripheral IV 11/28/23 20 G Anterior;Distal;Right Upper arm (Active)   Site Assessment Clean;Dry;Intact 11/28/23 1424   Dressing Type Transparent 11/28/23 1424   Line Status Blood return noted;Flushed;Saline locked 11/28/23 1424   Dressing Status Clean;Dry;Intact 11/28/23 1424   Dressing Intervention Initial dressing 11/28/23 1424   Dressing Change Due 12/05/23 11/28/23 1424   Infiltration Grading ([facility], [facility]) 0 11/28/23 1424   Phlebitis Scale ([facility] Only) 0 11/28/23 1424       Wound 11/10/23 Incision Closed Incision Back Lower;Medial (Active)   Site Assessment GEOVANNA 11/27/23 2000   Periwound Assessment GEOVANNA 11/27/23 2000   Margins GEOVANNA 11/27/23 2000   Closure GEOVANNA 11/27/23 2000   Drainage Amount GEOVANNA 11/27/23 2000   Drainage Description GEOVANNA 11/27/23 2000   Treatments Offloading 11/26/23 2041   Wound Cleansing Soap and Water 11/22/23 0900   Dressing Status Dry;Old drainage;Intact 11/27/23 2000   Dressing Changed Observed 11/27/23 2000   Dressing Options [place] Dressing 11/27/23 2000   Dressing Change/Treatment Frequency As Needed 11/27/23 1051   NEXT Weekly Photo (Inpatient Only) 11/27/23 11/24/23 2134       Wound 11/27/23 Incision Hip Left Staples, Prevena (Active)   Site Assessment GEOVANNA 11/28/23 0819   Periwound Assessment GEOVANNA 11/28/23 0819   Margins GEOVANNA 11/28/23 0819   Closure GEOVANNA 11/28/23 0819   Drainage Amount GEOVANNA 11/28/23 0819   Treatments Offloading 11/27/23 2000   Offloading/DME Other (comment) 11/27/23 2000   Dressing Status Clean;Dry;Intact 11/28/23 0819   Dressing Changed Observed 11/28/23 0819   Dressing Options  Wound Vac 11/28/23 0819       Peripheral IV 11/23/23 20 G Anterior;Left Forearm (Active)   Site Assessment Clean;Dry;Intact 11/28/23 0819   Dressing Type Transparent 11/28/23 0819   Line Status Infusing 11/28/23 0819   Dressing Status Clean;Dry;Intact 11/28/23 0819   Dressing Intervention N/A 11/28/23 0819   Infiltration Grading ([facility], [facility]) 0 11/28/23 0819   Phlebitis Scale ([facility] Only) 0 11/28/23 0819       Peripheral IV 11/28/23 20 G Anterior;Right Forearm (Active)   Site Assessment Clean;Dry;Intact 11/28/23 1115   Dressing Type Transparent 11/28/23 1115   Line Status Blood return noted;Flushed;Saline locked 11/28/23 1115   Dressing Status Clean;Dry;Intact 11/28/23 1115   Dressing Intervention Initial dressing 11/28/23 1115   Dressing Change Due 12/02/23 11/28/23 1115   Infiltration Grading ([facility], [facility]) 0 11/28/23 1115   Phlebitis Scale ([facility] Only) 0 11/28/23 1115       Peripheral IV 11/28/23 20 G Anterior;Distal;Right Upper arm (Active)   Site Assessment Clean;Dry;Intact 11/28/23 1424   Dressing Type Transparent 11/28/23 1424   Line Status Blood return noted;Flushed;Saline locked 11/28/23 1424   Dressing Status Clean;Dry;Intact 11/28/23 1424   Dressing Intervention Initial dressing 11/28/23 1424   Dressing Change Due 12/05/23 11/28/23 1424   Infiltration Grading ([facility], [facility]) 0 11/28/23 1424   Phlebitis Scale ([facility] Only) 0 11/28/23 1424               MENTAL STATUS ON TRANSFER             CONSULTATIONS  orthopedics    PROCEDURES   S/p left total hip arthroplasty 11/27,    LABORATORY  Lab Results   Component Value Date    SODIUM 142 11/29/2023    POTASSIUM 3.9 11/29/2023    CHLORIDE 112 11/29/2023    CO2 23 11/29/2023    GLUCOSE 95 11/29/2023    BUN 14 11/29/2023    CREATININE 0.71 11/29/2023        Lab Results   Component Value Date    WBC 5.9 11/29/2023    HEMOGLOBIN 8.3 (L) 11/29/2023    HEMATOCRIT 26.7 (L) 11/29/2023    PLATELETCT 152 (L) 11/29/2023        Total time of the discharge process exceeds  36 minutes.

## 2023-11-29 NOTE — PROGRESS NOTES
"    Orthopedic PA Progress Note    Interval changes:  Patient doing well pod2. Mobilized with therapy today.  LLE dressings are CDI  WBAT LLE with posterior hip precautions  No pending ortho procedures  Eliquis resumed  H&H improved  Pathology results: Benign femoral head without evidence of neoplasm or osteomyelitis.  OR cx NGTD  Follow up with Dr. Ko 2 weeks postop  Cleared for DC from orthopedic standpoint pending therapy recs and medical optimization    ROS - Patient denies any new issues.  Denies any numbness or tingling. Pain well controlled.    /61   Pulse 91   Temp 36.8 °C (98.2 °F) (Temporal)   Resp 17   Ht 1.575 m (5' 2\")   Wt 68.2 kg (150 lb 5.7 oz)   SpO2 93%     Patient seen and examined  No acute distress  Breathing non labored  RRR  LLE: Surgical dressing is clean, dry, and intact. Patient clearly fires tibialis anterior, EHL, and gastrocnemius/soleus. Sensation is intact to light touch throughout superficial peroneal, deep peroneal, tibial, saphenous, and sural nerve distributions. Strong and palpable 2+ dorsalis pedis and posterior tibial pulses with capillary refill less than 2 seconds.     Recent Labs     11/28/23  0522 11/29/23  0355   WBC 7.8 5.9   RBC 3.32* 2.70*   HEMOGLOBIN 9.2* 7.7*   HEMATOCRIT 29.4* 23.9*   MCV 88.6 88.5   MCH 27.7 28.5   MCHC 31.3* 32.2   RDW 52.4* 54.3*   PLATELETCT 214 152*   MPV 10.5 10.8       Active Hospital Problems    Diagnosis     HTN (hypertension) [I10]      Priority: High    Mixed hyperlipidemia [E78.2]      Priority: High    Acquired hypothyroidism [E03.9]      Priority: Medium    Hypotension [I95.9]     Left displaced femoral neck fracture (HCC) [S72.002A]     Acute deep vein thrombosis (DVT) of right lower extremity (HCC) [I82.401]     S/P lumbar laminectomy [Z98.890]     Closed fracture of neck of left femur (HCC) [S72.002A]     Chronic kidney disease, stage 3a (HCC) [N18.31]     Peripheral edema [R60.9]        Assessment/Plan:  Patient " doing well pod2.  LLE dressings are CDI  WBAT LLE with posterior hip precautions  No pending ortho procedures  Eliquis resumed  Pathology results: Benign femoral head without evidence of neoplasm or osteomyelitis.  OR cx NGTD  Follow up with Dr. Ko 2 weeks postop  Cleared for DC from orthopedic standpoint pending therapy recs and medical optimization    POD#2 S/p  Left total hip arthroplasty   Wt bearing status - WBAT LLE with posterior hip precautions  Wound care/Drains - Dressings to be changed every other day by nursing. Or PRN for saturation starting POD#2  Future Procedures - None planned   Lovenox: On eliquis  Sutures/Staples out- 14-21 days post operatively. Removal will completed by ortho [facility] unless transferred.  DVT Prophylaxis outpatient: ASA 81 mg PO BID x4 weeks  PT/OT-initiated  Antibiotics:  Perioperative completed  DVT Prophylaxis- TEDS/SCDs/Foot pumps  Kothari-not needed per ortho  Case Coordination for Discharge Planning - Disposition per therapy recs.

## 2023-11-29 NOTE — CARE PLAN
The patient is Stable - Low risk of patient condition declining or worsening    Shift Goals  Clinical Goals: pain management, stable BP, PT/OT, heparin drip  Patient Goals: heartburn relief  Family Goals: not present    Progress made toward(s) clinical / shift goals:  yes  Problem: Pain - Standard  Goal: Alleviation of pain or a reduction in pain to the patient’s comfort goal  Outcome: Progressing     Problem: Fall Risk  Goal: Patient will remain free from falls  Outcome: Progressing  Calls properly     Problem: Mobility  Goal: Patient's capacity to carry out activities will improve  Outcome: Progressing       Patient is not progressing towards the following goals:

## 2023-11-29 NOTE — PROGRESS NOTES
Patient admitted to facility at 1235 via GMT; accompanied by hospital transport.  Patient assisted to room and positioned in bed for comfort and safety; call light within reach.  Patient assisted with stowing belongings and oriented to room and facility.  Admission assessment performed and documented in computer.      Bed alarms in place and activated.    Patient would like RSV vaccination

## 2023-11-29 NOTE — DISCHARGE PLANNING
Approved transfer to [facility], Dr. Chavis accepting. [facility] transport arranged for  . Attending team notified. Voice mail left with Sheyla.

## 2023-11-29 NOTE — H&P
Physical Medicine & Rehabilitation  History and Physical (H&P)  &     Post Admission Physician Evaluation (PAXTON)       Date of Admission: 11/29/2023  Date of Service: 11/29/2023   Sheyla Gauthier  [birth year]    [facility] Code to Support Admission: 0008.51 - Orthopaedic Disorders: Status Post Unilateral Hip Replacement  Etiologic Diagnosis: History of left hip replacement    _______________________________________________    Chief Complaint: Weakness    History of Present Illness:  The patient is a 83 y.o.  female with a past medical history of hypothyroidism, hypertension and low back pain and lower extremity weakness;  who presented on 11/10/2023  5:51 AM for scheduled lumbar surgery.  Per documentation, patient has been followed in the outpatient setting by Dr. Silva for ongoing back pain.  Patient was evaluated in the outpatient setting on 11/8/2023 due to worsening back pain impairing her function.  Patient recently required the use of a wheelchair due to her limited mobility.  An MRI of the lumbar spine was obtained which showed prior history of an L4-L5 surgical changes and evidence of spondylolisthesis at L4-5 as well as facet joint arthropathy at L5-S1.  Patient was deemed appropriate for intervention for her L5-S1 stenosis.  Patient was taken to the OR on 11/10 for an L5-S1 redo laminectomy performed by Dr. Silva.  She then came to [facility] rehabilitation from 11/13-11/22. Left leg pain continued with minimal ambulation potencial. Xray of hip taken 11/22 showed Left femoral neck fracture. No clear history of fall since march/April 2023. Patient has not walked pain free since February 2023.   Dr Blancas took to surgery on 11/27.     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   Orthopedic surgery Dr. Ko was consulted who recommended IR guided patient medically care discussed with IR who stated there is not enough fluid to do aspiration. S/p left total hip arthroplasty 11/27,  Prevena drain in place, WBAT with posterior hip precaution. Eliquis was resumed per surgery.  Patient is cleared for discharge by orthopedic surgeon.     Patient had episodes of hypotension, symptomatic.  Orthostatic vital signs negative.  Blood pressure improved with IV fluid. discontinued her home losartan and spironolactone.      Acute on chronic anemia, likely due to expected blood loss from surgery and IV fluid dilution.  No active bleeding signs.  Anemia workup significant for iron deficiency.  Started on iron supplement.         PROCEDURES:  11/10 L5-S1 redo laminectomy performed by Dr. Silva  11/27 Left Total Hip arthroplasty Dr Ko    Review of Systems:     Comprehensive 14 point ROS was reviewed and all were negative except as noted elsewhere in this document.     Past Medical History:  Past Medical History:   Diagnosis Date    Anxiety     Arthritis     osteoarthritis    Back pain     CAD (coronary artery disease) 10/2018    Abnormal CTCS. PCI/FARZAD ([place] 2.5 x 2mm) to the mid LAD. Cardic stent    Cataract     alex IOL    Daytime sleepiness     Depression     Dyslipidemia     Frequent headaches     Herpes zoster     High cholesterol     Hypertension     MHA (microangiopathic hemolytic anemia)     Migraine syndrome      Mumps     as a child    Osteopathia     Osteoporosis     Painful joint     Post-menopause on HRT (hormone replacement therapy)     Renal disorder     chronic kidney disease stage 3    Sleep apnea     no longer uses cpap    Snoring     Thyroid disease     hypothyroid    Urinary incontinence     White matter abnormality on MRI of brain        Past Surgical History:  Past Surgical History:   Procedure Laterality Date    PB PARTIAL HIP REPLACEMENT Left 11/27/2023    Procedure: LEFT TOTAL HIP ARTHROPLASTY;  Surgeon: Parveen Ko M.D.;  Location: SURGERY [facility];  Service: Orthopedics    LUMBAR LAMINECTOMY DISKECTOMY N/A 11/10/2023    Procedure: LUMBAR 5-SACRAL 1 REDO LAMINECTOMY;   Surgeon: Earnest Silva M.D.;  Location: SURGERY [facility];  Service: Neurosurgery    LUMBAR LAMINECTOMY DISKECTOMY N/A 2/6/2023    Procedure: POSTERIOR REMOVAL OF INTERSPINOUS DEVICE WITH L4-S1 LAMINECTOMY;  Surgeon: Mannie Connor M.D.;  Location: SURGERY [facility];  Service: Neurosurgery    HARDWARE REMOVAL ORTHO N/A 2/6/2023    Procedure: REMOVAL, HARDWARE;  Surgeon: Mannie Connor M.D.;  Location: SURGERY [facility];  Service: Neurosurgery    LAMINOTOMY  01/28/2022    L4-L5 and L5-S1 posterior fusion with instrumentation and laminotomy by Dr. Benitez.    BUNIONECTOMY Left 2003    FOOT SURGERY  2002    [place] BREAST REDUCTION  1997    CHOLECYSTECTOMY  1996    ABDOMINAL HYSTERECTOMY TOTAL  11/1986    ARTHROSCOPY, KNEE      EYE SURGERY      cataracts    HYSTERECTOMY LAPAROSCOPY      MAMMOPLASTY AUGMENTATION      [place] BREAST AUGMENTATION WITH IMPLANT         Family History:  Family History   Problem Relation Age of Onset    Stroke Father     Arthritis Father     Hyperlipidemia Father     Hypertension Father     Cancer Sister         breast    Arthritis Sister     Arthritis Mother     Hypertension Mother     Hyperlipidemia Mother     Stroke Mother     Arthritis Brother     Cancer Brother     Hypertension Brother     Hyperlipidemia Brother     Heart Disease Brother         CABG for prox LAD in late 40s    Cancer Daughter         breast       Medications:  Current Facility-Administered Medications   Medication Dose    Respiratory Therapy Consult      Pharmacy Consult Request ...Pain Management Review 1 Each  1 Each    hydrALAZINE (Apresoline) tablet 25 mg  25 mg    senna-docusate (Pericolace Or Senokot S) 8.6-50 MG per tablet 2 Tablet  2 Tablet    And    polyethylene glycol/lytes (Miralax) PACKET 1 Packet  1 Packet    And    magnesium hydroxide (Milk Of Magnesia) suspension 30 mL  30 mL    And    bisacodyl (Dulcolax) suppository 10 mg  10 mg    ondansetron (Zofran ODT) dispertab 4 mg  4 mg    Or    ondansetron (Zofran)  syringe/vial injection 4 mg  4 mg    sodium chloride (Ocean) 0.65 % nasal spray 2 Spray  2 Spray    acetaminophen (Tylenol) tablet 1,000 mg  1,000 mg    apixaban (Eliquis) tablet 5 mg  5 mg    atorvastatin (Lipitor) tablet 40 mg  40 mg    [START ON 11/30/2023] famotidine (Pepcid) tablet 20 mg  20 mg    [START ON 11/30/2023] ferrous sulfate tablet 325 mg  325 mg    [START ON 11/30/2023] levothyroxine (Synthroid) tablet 88 mcg  88 mcg    [START ON 11/30/2023] liothyronine (Cytomel) tablet 5 mcg  5 mcg    methocarbamol (Robaxin) tablet 1,000 mg  1,000 mg    phosphorus (K-Phos-Neutral) per tablet 500 mg  500 mg    pregabalin (Lyrica) capsule 100 mg  100 mg    oxyCODONE immediate-release (Roxicodone) tablet 5 mg  5 mg    oxyCODONE immediate-release (Roxicodone) tablet 5 mg  5 mg       Allergies:  Trazodone and Crestor [rosuvastatin calcium]    Psychosocial History:  Prior Level of Function / Living Situation:   Physical Therapy: Prior Services: Housekeeping / Homemaker Services  Housing / Facility: [address] [facility]  Steps Into Home: 0  Bathroom Set up: Walk In Shower, Shower Chair (Pt reports she has a shower stool without a back.)  Equipment Owned: Front-Wheel Walker, Wheelchair (pt's wheelchair is in her room)  Lives with - Patient's Self Care Capacity: Alone and Able to Care For Self  Ambulation: Required Assist  Assistive Devices Used: Front-Wheel Walker  Current Level of Function:      Scooting: Maximal Assist  Rolling: Maximal Assist to Rt., Maximum Assist to Lt.  Comments: Pt required maxA x2 person assistance to roll L/R in bed as she was offcentered. Pt declined to sit EOB secondary to pain. Pt required mod vc's for hand placement on bedrails and body mechanics for rolling. (x2 person assist)        Occupational Therapy:   Self Feeding: Independent  Grooming / Hygiene: Independent  Bathing: Independent  Dressing: Independent  Toileting: Independent  Medication Management: Independent  Laundry: Independent  Kitchen  "Mobility: Independent  Finances: Independent  Home Management: Independent  Shopping: Independent  Prior Level Of Mobility: Independent With Device in Home (Pt reports that she utilizes FWW throughout her home.)  Prior Services: Housekeeping / Homemaker Services  Housing / Facility: 1 [facility]  Occupation (Pre-Hospital Vocational): Retired Due To Age  Current Level of Function:        CURRENT LEVEL OF FUNCTION:   Same as level of function prior to admission to [facility]    Physical Examination:     VITAL SIGNS:   height is 1.575 m (5' 2\") and weight is 68.9 kg (152 lb). Her oral temperature is 36.4 °C (97.6 °F). Her blood pressure is 98/60 and her pulse is 64. Her respiration is 18 and oxygen saturation is 96%.   General: Well developed, Well nourished, No Acute Distress  HEENT: Normocephalic, atraumatic. Anicteric sclera  Cardiac: Physiologic rate and regular rhythm, no murmurs  Pulmonary: Lungs are clear to auscultation bilaterally, comfortable on room air  Abdomen: Soft, non-tender, non-distended. Bowel sounds normoactive.   Extremities: Warm and well perfused, no clubbing, cyanosis. Minimal left edema.   Skin: No visible rashes or lesions.   Psych: calm, no agitation, congruent mood/affect    NEUROLOGIC EXAM:  Gen:  Alert and oriented to person, place, date, and circumstance. Appropriately interactive  Speech/Language/Comprehension: Fluent speech w/o paraphasic errors, follows simple and complex commands without L/R confusion .?  Attention: Attentive to conversation. Names MOYB correctly.?? ??  Memory: Recalls her primary reason for being in the hospital  Judgment: Demonstrates appropriate use of call light       Cranial Nerves:   II:   Visual fields full to confrontation. Pupils equally round & reactive to light. ??  III, IV, VI:  EOMI w/o nystagmus or diplopia. No ptosis.????  V:  Sensation intact to light touch. ??  VII:  Face symmetric without weakness.????  VIII:  Hears " functionally.????  IX, X:  Voice normal. Palate elevates symmetrically.????  XI:  Trapezii full.????  XII:  Tongue protrudes midline.????    Motor:?  ?? Delt???? Bi???? Tri???? Wrist ??  Ext?? DIP flex ??  (FDP)?? Finger ABd?? IP???? Quad???? Hamst???? TibAnt???? EHL???? Plantar  flexion   ???? C5???? C6???? C7???? ?C6?? C8/T1?? C8/T1???? L2???? L3???? L4-S1???? L4???? L5???? S1   L???? 5/5 5/5 5/5 5/5 5/5 5/5 2/5 2/5 2/5 4/5 4/5 4/5   R???? 5/5 5/5 5/5 5/5 5/5 5/5 4/5 4/5 4/5 4/5 4/5 4/5       Sensation: Intact to light touch in the major dermatomes of the upper and lower extremities.     Coordination: Normal finger-to-nose testing without dysmetria. Normal rapid alternating movements.     Reflexes: Reflexes are 2+ at the biceps, triceps, brachioradialis, patella, and achilles.       Laboratory Values:  Recent Labs     11/28/23  0522 11/29/23  0355   SODIUM 140 142   POTASSIUM 4.8 3.9   CHLORIDE 109 112   CO2 22 23   GLUCOSE 114* 95   BUN 19 14   CREATININE 0.95 0.71   CALCIUM 8.8 8.8     Recent Labs     11/28/23  0522 11/29/23  0355 11/29/23  0949   WBC 7.8 5.9  --    RBC 3.32* 2.70*  --    HEMOGLOBIN 9.2* 7.7* 8.3*   HEMATOCRIT 29.4* 23.9* 26.7*   MCV 88.6 88.5  --    MCH 27.7 28.5  --    MCHC 31.3* 32.2  --    RDW 52.4* 54.3*  --    PLATELETCT 214 152*  --    MPV 10.5 10.8  --      Recent Labs     11/28/23  1255   APTT 42.2*       Primary Rehabilitation Diagnosis:    This patient is a 83 y.o. female admitted for acute inpatient rehabilitation with History of left hip replacement.    Impairments:   ADLs/IADLs  Mobility    Secondary Diagnosis/Medical Co-morbidities Affecting Function  Lumbar surgery, chronic pain, hypotension    Relevant Changes Since Preadmission Evaluation:    Status unchanged    The patient's rehabilitation potential is Good  The patient's medical prognosis is good    Rehabilitation Plan:   Discussion and Recommendations:   1. The patient requires an acute inpatient rehabilitation program with  a coordinated program of care at an intensity and frequency not available at a lower level of care. This recommendation is substantiated by the patient's medical physicians who recommend that the patient's intervention and assessment of medical issues needs to be done at an acute level of care for patient's safety and maximum outcome.   2. A coordinated program of care will be supplied by an interdisciplinary team of physical therapy, occupational therapy, rehab physician, rehab nursing, and, if needed, speech therapy and rehab psychology. Rehab team presents a patient-specific rehabilitation and education program concentrating on prevention of future problems related to accessibility, mobility, skin, bowel, bladder, sexuality, and psychosocial and medical/surgical problems.   3. Need for Rehabilitation Physician: The rehab physician will be evaluating the patient on a multi-weekly basis to help coordinate the program of care. The rehab physician communicates between medical physicians, therapists, and nurses to maximize the patient's potential outcome. Specific areas in which the rehab physician will be providing daily assessment include the following:   A. Assessing the patient's heart rate and blood pressure response (vitals monitoring) to activity and making adjustments in medications or conservative measures as needed.   B. The rehab physician will be assessing the frequency at which the program can be increased to allow the patient to reach optimal functional outcome.   C. The rehab physician will also provide assessments in daily skin care, especially in light of patient's impairments in mobility.   D. The rehab physician will provide special expertise in understanding how to work with functional impairment and recommend appropriate interventions, compensatory techniques, and education that will facilitate the patient's outcome.   4. Rehab R.N.   The rehab RN will be working with patient to carry over in room  mobility and activities of daily living when the patient is not in 3 hours of skilled therapy. Rehab nursing will be working in conjunction with rehab physician to address all the medical issues above and continue to assess laboratory work and discuss abnormalities with the treating physicians, assess vitals, and response to activity, and discuss and report abnormalities with the rehab physician. Rehab RN will also continue daily skin care, supervise bladder/bowel program, instruct in medication administration, and ensure patient safety.   5. Rehab Therapy: Therapies to treat at intensity and frequency of (may change after completion of evaluation by all therapeutic disciplines):       PT:  Physical therapy to address mobility, transfer, gait training and evaluation for adaptive equipment needs 1.5 hour/day at least 5 days/week for the duration of the ELOS (see below)       OT:  Occupational therapy to address ADLs, self-care, home management training, functional mobility/transfers and assistive device evaluation, and community re-integration 1 .5 hour/day at least 5 days/week for the duration of the ELOS (see below).       Medical management / Rehabilitation Issues/ Adverse Potential as part of rehabilitation plan     Rehabilitation Issues/Adverse Potential  1.  Left Hip arthroplasty: Patient demonstrates functional deficits in strength, balance, coordination, and ADL's. Patient [facility] to [facility] for comprehensive rehabilitation therapy as described below.   Rehabilitation nursing monitors bowel and bladder control, educates on medication administration, co-morbidities and monitors patient safety.      2.  Neurostimulants: None at this time but continue to assess daily for need to initiate should status change.    3.  DVT prophylaxis:  Patient is on Eliquis for anticoagulation upon transfer. Encourage OOB. Monitor daily for signs and symptoms of DVT including but not limited to swelling  and pain to prevent the development of DVT that may interfere with therapies.    4.  GI prophylaxis:  On prilosec to prevent gastritis/dyspepsia which may interfere with therapies.    5.  Pain: No issues with pain currently / Controlled with Oxycodone    6.  Nutrition/Dysphagia: Dietician monitors nutrient intake, recommend supplements prn and provide nutrition education to pt/family to promote optimal nutrition for wound healing/recovery.     7.  Bladder/bowel:  Start bowel and bladder program as described below, to prevent constipation, urinary retention (which may lead to UTI), and urinary incontinence (which will impact upon pt's functional independence).   - TV Q3h while awake with post void bladder scans, I&O cath for PVRs >400  - up to commode after meal     8.  Skin/dermal ulcer prophylaxis: Monitor for new skin conditions with q.2 h. turns as required to prevent the development of skin breakdown.     9.  Cognition/Behavior: As needed psychologist provides adjustment counseling to illness and psychosocial barriers that may be potential barriers to rehabilitation.     10. Respiratory therapy: RT performs O2 management prn, breathing retraining, pulmonary hygiene and bronchospasm management prn to optimize participation in therapies.     Medical Co-Morbidities/Adverse Potential Affecting Function:  Left Femoral Neck fracture with superior displacement- diagnosed 11/22, unknown chronicity  Seen on Xray of hip taken 11/22  S/P hip arthroplasty on 11/27 by Dr Ko  No clear history of fall. Patient has not walked pain free since February 2023  Last Fall noted in Spring of 2023  -PT/OT  -Requesting imaging from Dr Connor and Dr Ocampo office to establish chronicity        Polyradiculopathy 2/2 to bilateral Lumbar foraminal stenosis  Status post lumbar laminectomy  -Patient has history of prior L4-L5 decompression  - Recent worsening of back pain with radicular pain into right lower extremity, impairing  function and recently utilizing a wheelchair in the last month  - 11/10 patient taken to the OR for L5-S1 laminectomy performed by Dr. Silva  - Spinal precautions in place, does not require bracing     Right Popliteal DVT  Provoked from surgery and immobility  -Eliquis 5mg BID for 3 months from 11/15/23       Hypothyroidism  -On home dose Synthroid     Hypertension  - Holding losartan and spironolactone  -home dose losartan and spironolactone      Pain:  - Neuroceptic - On Tylenol TID, oxycodone 5mg PRN, robaxin 1g TID  -Oxycodone 5mg TID scheduled     Circadian Rhythm disorder:   Recommend lights on during the day/off at night, minimize nighttime interruptions as able.     Mood  - at risk of adjustment disorder, depression, and anxiety due to functional decline    ID:  - at risk for Urinary tract infection    Skin/Wounds:  - Pressure relief q2h while in bed. Close monitoring for signs of breakdown    DVT prophylaxis:  Patient is on Eliquis.     GI prophylaxis:  On Pepcid      -Follow-up Ortho, NS, PCP    I personally performed a complete drug regimen review and no potential clinically significant medication issues were identified.     Goals/Expected Level of Function Based on Current Medical and Functional Status:  (may change based on patient's medical status and rate of impairment recovery)  Transfers:   Supervision  Mobility/Gait:   Supervision  ADL's:   Supervision    DISPOSITION: Discharge to pre-morbid independent living setting with the supportive care of patient's family.    ELOS: 10-14 days  ____________________________________    Mitchell Chavis MD  Physical Medicine & Rehabilitation   Brain Injury Medicine   ____________________________________    Pt was seen today for 79 min, and entire time spent in face-to-face contact was >50% in counseling and coordination of care as detailed in A/P above.

## 2023-11-29 NOTE — DISCHARGE PLANNING
Case Management Discharge Planning    Admission Date: 11/22/2023  GMLOS: 4.1  ALOS: 7    6-Clicks ADL Score: 17  6-Clicks Mobility Score: 8  PT and/or OT Eval ordered: Yes  Post-acute Referrals Ordered: Yes  Post-acute Choice Obtained: Yes  Has referral(s) been sent to post-acute provider:  Yes      Anticipated Discharge Dispo: Discharge Disposition: Disch to  rehab facility or distinct part unit (62)  Discharge Address: [address] [place] [id number]  Discharge Contact Phone Number: [phone]    DME Needed: No    Action(s) Taken: Updated Provider/Nurse on Discharge Plan    Escalations Completed: None    Medically Clear: No, pending 10:45 rounds    Next Steps: f/u medical clearance    Barriers to Discharge: Medical clearance and Pending Placement    Is the patient up for discharge tomorrow: No      CM was informed by Esequiel Gomez through [employer], that Rehab is waiting for clearance for admission to rehab for this patient.   [facility] with GMT between 12:00 - 12:30.

## 2023-11-29 NOTE — CARE PLAN
The patient is Watcher - Medium risk of patient condition declining or worsening    Shift Goals  Clinical Goals: pain control, mobility, BM, trend hgb  Patient Goals: rest  Family Goals: not present    Progress made toward(s) clinical / shift goals:  yes      Problem: Knowledge Deficit - Standard  Goal: Patient and family/care givers will demonstrate understanding of plan of care, disease process/condition, diagnostic tests and medications  Outcome: Progressing     Problem: Pain - Standard  Goal: Alleviation of pain or a reduction in pain to the patient’s comfort goal  Outcome: Progressing     Problem: Fall Risk  Goal: Patient will remain free from falls  Outcome: Progressing     Problem: Mobility  Goal: Patient's capacity to carry out activities will improve  Outcome: Progressing  Flowsheets (Taken 11/28/2023 1103)  Mobility:   Encouraged mobilization per interdisciplinary team recommendations   Monitored for signs of activity intolerance   Provided assistive devices   Administered pain management to allow progressive mobilization   Provided rest periods between activities   Collaborated with PT/OT     Problem: Wound/ / Incision Healing  Goal: Patient's wound/surgical incision will decrease in size and heals properly  Outcome: Progressing     Problem: Hemodynamics  Goal: Patient's hemodynamics, fluid balance and neurologic status will be stable or improve  Outcome: Progressing     Problem: Fluid Volume  Goal: Fluid volume balance will be maintained  Outcome: Progressing     Problem: Infection - Standard  Goal: Patient will remain free from infection  Outcome: Progressing  Flowsheets (Taken 11/28/2023 1103)  Standard Infection Interventions:   Implemented standard precautions   Assessed for signs and symptoms of infection   Instructed patient/family on signs and symptoms of infection   Provided education on proper hand hygiene and infection prevention measures   Assessed for removal IV, central lines, intra-arterial  or urinary catheters     Problem: Bowel Elimination  Goal: Establish and maintain regular bowel function  Outcome: Progressing

## 2023-11-29 NOTE — DISCHARGE INSTRUCTIONS
- Follow up with primary care physician in 1 week.   - Follow up with orthopedics as instructed    - Sutures/Staples out- 10-14 days post operatively (surgery on 11/27)    - Please take the medications as instructed    - Go to the local Emergency Department if you have any worsening condition.     Total Hip Replacement, Posterior, Care After  This sheet gives you information about how to care for yourself after your procedure. Your health care provider may also give you more specific instructions. If you have problems or questions, contact your health care provider.  What can I expect after the procedure?  After the procedure, it is common to have:  Redness, pain, and swelling in your incision area.  Stiffness.  Discomfort.  A small amount of blood or clear fluid coming from your incision.  Follow these instructions at home:  Medicines  Take over-the-counter and prescription medicines only as told by your health care provider.  If you were prescribed a blood thinner (anticoagulant) to help prevent blood clots, take it as told by your health care provider.  Ask your health care provider if the medicine prescribed to you:  Requires you to avoid driving or using machinery.  Can cause constipation. You may need to take these actions to prevent or treat constipation:  Drink enough fluid to keep your urine pale yellow.  Take over-the-counter or prescription medicines.  Eat foods that are high in fiber, such as beans, whole grains, and fresh fruits and vegetables.  Limit foods that are high in fat and processed sugars, such as fried or sweet foods.  Incision care    Follow instructions from your health care provider about caring for your incision. Make sure you:  Wash your hands with soap and water for at least 20 seconds before and after you change your bandage (dressing). If soap and water are not available, use hand .  Change your dressing as told by your health care provider.  Leave stitches (sutures),  staples, skin glue, or adhesive strips in place. These skin closures may need to stay in place for 2 weeks or longer. If adhesive strip edges start to loosen and curl up, you may trim the loose edges. Do not remove adhesive strips completely unless your health care provider tells you to do that.  Do not take baths, swim, or use a hot tub until your health care provider approves.  Check your incision area every day for signs of infection. Check for:  More redness, swelling, or pain.  More fluid or blood.  Warmth.  Pus or a bad smell.  Managing pain, stiffness, and swelling    If directed, put ice on the affected area. To do this:  Put ice in a plastic bag.  Place a towel between your skin and the bag.  Leave the ice on for 20 minutes, 2-3 times a day.  Remove the ice if your skin turns bright red. This is very important. If you cannot feel pain, heat, or cold, you have a greater risk of damage to the area.  Move your toes often to reduce stiffness and swelling.  Raise (elevate) your leg above the level of your heart while you are lying down.  Activity  Ask your health care provider what activities are safe for you.  Avoid sitting for a long time without moving. Get up to take short walks every 1-2 hours. This is important to improve blood flow and breathing. Ask for help if you feel weak or unsteady.  Do exercises as told by your health care provider.  Do not use your legs to support (bear) your body weight until your health care provider says that you can. Follow instructions about how much weight you may safely support on your affected leg (weight-bearing restrictions).  Use a walker, crutches, or a cane as told by your health care provider.  Return to your normal activities as told by your health care provider.  Movement restrictions    To help prevent hip dislocation, follow instructions about movement restrictions as told. For example:  Do not cross your legs at the knees. To remind yourself about this, you may  keep a pillow between your legs while lying in bed.  Do not bend at the hip and waist or bend farther than 90 degrees of hip flexion. To avoid bending this far:  Do not bring your knees higher than your hips.  Do not  something from the floor while sitting in a chair.  Avoid sitting in low chairs.  Use a raised toilet seat.  When standing up from a seated position, keep the injured leg out in front of you.  Avoid twisting at your waist and reaching across your body to the side of the affected leg.  Avoid rotating your toes inward.  When getting into a car:  Raise the seat as high as possible, move the seat as far back as it will go, and recline the upper part of the seat slightly.  Sit down into the seat with your injured leg extended out of the car.  Scoot back into the seat as you move the lower half of your body into the car. Try to avoid bumping your foot or leg as you bring it into the car.  Safety  To help prevent falls, keep floors clear of objects you may trip over, and place items that you may need within easy reach.  Wear an apron or tool belt with pockets for carrying objects. This leaves your hands free to help with your balance.  General instructions  Ask your health care provider when it is safe to drive.  Wear compression stockings as told by your health care provider. These stockings help to prevent blood clots and reduce swelling in your legs.  Keep doing breathing exercises as told. This helps prevent lung infection.  Do not use any products that contain nicotine or tobacco, such as cigarettes, e-cigarettes, and chewing tobacco. These can delay healing after surgery. If you need help quitting, ask your health care provider.  Tell your health care provider if you plan to have dental work. Also:  Tell your dentist about your joint replacement.  Ask your health care provider if there are any special instructions you need to follow before having dental care and routine cleanings.  Keep all  follow-up visits. This is important.  Contact a health care provider if:  You have a fever or chills.  You have a cough.  Your medicine is not controlling your pain.  You have more redness, swelling, or pain around your incision.  You have more fluid or blood coming from your incision.  Your incision feels warm to the touch.  You have pus or a bad smell coming from your incision.  Get help right away if:  You have severe pain.  You have shortness of breath or trouble breathing.  You have chest pain.  You have redness, swelling, pain, or warmth in your calf or leg.  Your incision breaks open after sutures or staples are removed.  These symptoms may represent a serious problem that is an emergency. Do not wait to see if the symptoms will go away. Get medical help right away. Call your local emergency services (911 in the [place] Do not drive yourself to the hospital.  Summary  Do not use your legs to support your body weight until your health care provider says that you can. Use a walker, crutches, or a cane as told.  To help prevent hip dislocation, follow instructions about movement restrictions as told.  Keep all follow-up visits. This is important.  This information is not intended to replace advice given to you by your health care provider. Make sure you discuss any questions you have with your health care provider.  Document Revised: 05/13/2021 Document Reviewed: 05/13/2021  [employer] Patient Education © 2023 [employer]

## 2023-11-29 NOTE — FLOWSHEET NOTE
11/29/23 1319   Patient Events   Interdisciplinary Rounds Attendance at Rounds (30 Min)  (EKG)

## 2023-11-29 NOTE — THERAPY
Physical Therapy   Initial Evaluation     Patient Name: Sheyla Gauthier  Age:  83 y.o., Sex:  female  Medical Record #: [id number]  Today's Date: 11/28/2023     Precautions  Precautions: Fall Risk;Posterior Hip Precautions;Weight Bearing As Tolerated Left Lower Extremity  Comments: wbat w/ PHP LLE s/p L SRIDEVI    Assessment  Patient is 83 y.o. female reports she has been having difficulty walking and funny gait even before the spinal surgery. recent lumbar laminectomy 11/10/23.   X-ray showing chronic left femoral neck fracture   --Left hip CT concerning for avascular necrosis  Acute R LE DVT  S/P left hip hemiarthroplasty.  Hx of CKD, peripheral edema, hypothyroidism, hyperlipidemia, HTN.    Pt was at [facility] prior to admission. Pt able to sit up at EOB with mod A and stand at FWW. Limited mobility due to pain and min lethargy. Pt directs her care on how she likes to be assisted. Patient with decreased activity tolerance, high fall risk, decreased standing balance, gait deviations, decreased sitting balance, and pain and will continue to benefit from Acute Care Physical Therapy to assist towards established goals. Patient is currently functioning below their level of baseline. Pt would benefit from PM&R consult. Pt can tolerate up to 15 hours of therapies a week, is motivated and willing to participate and has a good and safe discharge plan.         Plan    Physical Therapy Initial Treatment Plan   Treatment Plan : Bed Mobility, Gait Training, Neuro Re-Education / Balance, Self Care / Home Evaluation, Stair Training, Therapeutic Activities, Therapeutic Exercise  Treatment Frequency: 5 Times per Week  Duration: Until Therapy Goals Met    DC Equipment Recommendations: Unable to determine at this time  Discharge Recommendations: Recommend post-acute placement for additional physical therapy services prior to discharge home       Subjective    Pt resting in bed, initially stated that she did not feel up to any  mobility. RN requested sitting up for BP and pt agreed to sit at EOB.      Objective       11/28/23 1450   Initial Contact Note    Initial Contact Note Order Received and Verified, Physical Therapy Evaluation in Progress with Full Report to Follow.   Precautions   Precautions Fall Risk;Posterior Hip Precautions;Weight Bearing As Tolerated Left Lower Extremity   Comments wbat w/ PHP LLE s/p L SRIDEVI   Vitals   Vitals Comments supine BP 94/49, Sitting EOB /49   Pain 0 - 10 Group   Location Leg   Location Orientation Left   Description Aching   Therapist Pain Assessment During Activity;Post Activity Pain Same as Prior to Activity;Nurse Notified  (RN aware and in room during eval)   Prior Living Situation   Prior Services Housekeeping / Homemaker Services   Housing / Facility 1 Story House   Steps Into Home 0   Equipment Owned Front-Wheel Walker;Wheelchair  (pt's wheelchair is in her room)   Lives with - Patient's Self Care Capacity Alone and Able to Care For Self   Comments Pt reports that she is in the process of selling her home as she is relocating to [place], [place] to live in [facility]. Pt reports that her dtr lives nearby the facility and will be available to assist her with any needs.   Pt was a inpt rehab prior to admission.   Prior Level of Functional Mobility   Ambulation Required Assist   Assistive Devices Used Front-Wheel Walker   Cognition    Cognition / Consciousness X   Speech/ Communication Slurred  (intiially slurred speech, improved with EOB sitting and alertness)   Level of Consciousness Alert   Comments pt does well with directing her care and how she needs assistance with mobility   Active ROM Lower Body    Active ROM Lower Body  Not Tested   Comments NT due to posterior hip precautions L LE.   Strength Lower Body   Lower Body Strength  X   Comments pt requried Max A with LEs in and out of bed   Coordination Lower Body    Coordination Lower Body  WDL   Balance Assessment   Sitting Balance (Static)  Fair   Sitting Balance (Dynamic) Fair -   Standing Balance (Static) Fair -   Standing Balance (Dynamic) Fair -   Weight Shift Sitting Fair   Weight Shift Standing Fair   Comments standing assessed with FWW   Bed Mobility    Supine to Sit Moderate Assist  (assistance with LEs, pt requests assistance at near her ankles and moving both legs together to/from EOB)   Sit to Supine Moderate Assist  (assistance with LEs, pt requests assistance at near her ankles and moving both legs together to/from EOB)   Scooting Moderate Assist   Comments max A to scoot up in bed   Gait Analysis   Gait Level Of Assist Unable to Participate   Weight Bearing Status WBAT L LE   Functional Mobility   Sit to Stand Minimal Assist   Bed, Chair, Wheelchair Transfer Contact Guard Assist   Transfer Method Stand Step   Mobility with FWW   How much difficulty does the patient currently have...   Turning over in bed (including adjusting bedclothes, sheets and blankets)? 1   Sitting down on and standing up from a chair with arms (e.g., wheelchair, bedside commode, etc.) 1   Moving from lying on back to sitting on the side of the bed? 1   How much help from another person does the patient currently need...   Moving to and from a bed to a chair (including a wheelchair)? 3   Need to walk in a hospital room? 1   Climbing 3-5 steps with a railing? 1   6 clicks Mobility Score 8   Activity Tolerance   Comments limited due to fatigue, min lethargy and pain   Edema / Skin Assessment   Comments prevena in place   Patient / Family Goals    Patient / Family Goal #1 return to rehab   Short Term Goals    Short Term Goal # 1 supine<>sit min A in 6 visits to progress with bed mobility   Short Term Goal # 2 sit<>stand with FWW EOB SBA in 6 visits to progress transfers   Short Term Goal # 3 amb with FWW 50ft CGA in 6 visits for bathroom distances   Short Term Goal # 4 independent with teach back 3/3 posterior hip precautions   Education Group   Education Provided Role  of Physical Therapist;Use of Assistive Device;Hip Precautions Posterior;Weight Bearing Status   Hip Precautions Posterior Patient Response Patient;Acceptance;Explanation;Handout;Reinforcement Needed;Verbal Demonstration  (pt will continue to benefit from education on hip precautions)   Role of Physical Therapist Patient Response Patient;Acceptance;Explanation;Verbal Demonstration   Use of Assistive Device Patient Response Patient;Acceptance;Explanation;Action Demonstration   Weight Bearing Status Patient Response Patient;Acceptance;Explanation;Action Demonstration   Physical Therapy Initial Treatment Plan    Treatment Plan  Bed Mobility;Gait Training;Neuro Re-Education / Balance;Self Care / Home Evaluation;Stair Training;Therapeutic Activities;Therapeutic Exercise   Treatment Frequency 5 Times per Week   Duration Until Therapy Goals Met   Problem List    Problems Pain;Impaired Bed Mobility;Impaired Transfers;Impaired Ambulation;Functional Strength Deficit;Impaired Balance;Decreased Activity Tolerance   Anticipated Discharge Equipment and Recommendations   DC Equipment Recommendations Unable to determine at this time   Discharge Recommendations Recommend post-acute placement for additional physical therapy services prior to discharge home   Interdisciplinary Plan of Care Collaboration   IDT Collaboration with  Nursing   Patient Position at End of Therapy In Bed;Bed Alarm On;Call Light within Reach;Tray Table within Reach;Phone within Reach   Collaboration Comments RN in room during eval to assist with linen change

## 2023-11-29 NOTE — PROGRESS NOTES
4 Eyes Skin Assessment Completed by PAOLA Chinchilla and PAOLA De Jesus.    Head WDL  Ears WDL  Nose WDL  Mouth WDL  Neck WDL  Breast/Chest WDL  Shoulder Blades WDL  Spine incision see pic  (R) Arm/Elbow/Hand WDL  (L) Arm/Elbow/Hand WDL  Abdomen birth ari  Groin Bruising  Scrotum/Coccyx/Buttocks WDL  (R) Leg WDL  (L) Leg Redness, Bruising, and Incision wound vac  (R) Heel/Foot/Toe WDL  (L) Heel/Foot/Toe WDL          Devices In Places wound vac L hip      Interventions In Place Waffle Overlay and Pillows      Pictures Uploaded Into Epic Yes  Wound Consult Placed N/A  RN Wound Prevention Protocol Ordered Yes

## 2023-11-29 NOTE — PROGRESS NOTES
Patient A&OX4. Patient stated pain. Meds administered per MAR as well as pain med.  Wound vac on left leg.  Water and call light within reach.

## 2023-11-30 ENCOUNTER — APPOINTMENT (OUTPATIENT)
Dept: PHYSICAL THERAPY | Facility: REHABILITATION | Age: 83
DRG: 560 | End: 2023-11-30
Attending: PHYSICAL MEDICINE & REHABILITATION
Payer: MEDICARE

## 2023-11-30 ENCOUNTER — APPOINTMENT (OUTPATIENT)
Dept: OCCUPATIONAL THERAPY | Facility: REHABILITATION | Age: 83
DRG: 560 | End: 2023-11-30
Attending: PHYSICAL MEDICINE & REHABILITATION
Payer: MEDICARE

## 2023-11-30 LAB
ALBUMIN SERPL BCP-MCNC: 2.5 G/DL (ref 3.2–4.9)
ALBUMIN/GLOB SERPL: 1 G/DL
ALP SERPL-CCNC: 72 U/L (ref 30–99)
ALT SERPL-CCNC: 13 U/L (ref 2–50)
ANION GAP SERPL CALC-SCNC: 6 MMOL/L (ref 7–16)
AST SERPL-CCNC: 23 U/L (ref 12–45)
BACTERIA TISS AEROBE CULT: NORMAL
BACTERIA TISS AEROBE CULT: NORMAL
BASOPHILS # BLD AUTO: 0.6 % (ref 0–1.8)
BASOPHILS # BLD: 0.03 K/UL (ref 0–0.12)
BILIRUB SERPL-MCNC: 0.4 MG/DL (ref 0.1–1.5)
BUN SERPL-MCNC: 16 MG/DL (ref 8–22)
CALCIUM ALBUM COR SERPL-MCNC: 9.7 MG/DL (ref 8.5–10.5)
CALCIUM SERPL-MCNC: 8.5 MG/DL (ref 8.5–10.5)
CHLORIDE SERPL-SCNC: 109 MMOL/L (ref 96–112)
CO2 SERPL-SCNC: 26 MMOL/L (ref 20–33)
CREAT SERPL-MCNC: 0.78 MG/DL (ref 0.5–1.4)
EOSINOPHIL # BLD AUTO: 0.25 K/UL (ref 0–0.51)
EOSINOPHIL NFR BLD: 4.8 % (ref 0–6.9)
ERYTHROCYTE [DISTWIDTH] IN BLOOD BY AUTOMATED COUNT: 55.4 FL (ref 35.9–50)
GFR SERPLBLD CREATININE-BSD FMLA CKD-EPI: 76 ML/MIN/1.73 M 2
GLOBULIN SER CALC-MCNC: 2.4 G/DL (ref 1.9–3.5)
GLUCOSE SERPL-MCNC: 95 MG/DL (ref 65–99)
GRAM STN SPEC: NORMAL
GRAM STN SPEC: NORMAL
HCT VFR BLD AUTO: 23.9 % (ref 37–47)
HGB BLD-MCNC: 7.6 G/DL (ref 12–16)
IMM GRANULOCYTES # BLD AUTO: 0.06 K/UL (ref 0–0.11)
IMM GRANULOCYTES NFR BLD AUTO: 1.2 % (ref 0–0.9)
LYMPHOCYTES # BLD AUTO: 1.54 K/UL (ref 1–4.8)
LYMPHOCYTES NFR BLD: 29.8 % (ref 22–41)
MAGNESIUM SERPL-MCNC: 1.8 MG/DL (ref 1.5–2.5)
MCH RBC QN AUTO: 28.7 PG (ref 27–33)
MCHC RBC AUTO-ENTMCNC: 31.8 G/DL (ref 32.2–35.5)
MCV RBC AUTO: 90.2 FL (ref 81.4–97.8)
MONOCYTES # BLD AUTO: 0.43 K/UL (ref 0–0.85)
MONOCYTES NFR BLD AUTO: 8.3 % (ref 0–13.4)
NEUTROPHILS # BLD AUTO: 2.86 K/UL (ref 1.82–7.42)
NEUTROPHILS NFR BLD: 55.3 % (ref 44–72)
NRBC # BLD AUTO: 0 K/UL
NRBC BLD-RTO: 0 /100 WBC (ref 0–0.2)
PLATELET # BLD AUTO: 152 K/UL (ref 164–446)
PMV BLD AUTO: 10.5 FL (ref 9–12.9)
POTASSIUM SERPL-SCNC: 4 MMOL/L (ref 3.6–5.5)
PROT SERPL-MCNC: 4.9 G/DL (ref 6–8.2)
RBC # BLD AUTO: 2.65 M/UL (ref 4.2–5.4)
SIGNIFICANT IND 70042: NORMAL
SIGNIFICANT IND 70042: NORMAL
SITE SITE: NORMAL
SITE SITE: NORMAL
SODIUM SERPL-SCNC: 141 MMOL/L (ref 135–145)
SOURCE SOURCE: NORMAL
SOURCE SOURCE: NORMAL
WBC # BLD AUTO: 5.2 K/UL (ref 4.8–10.8)

## 2023-11-30 PROCEDURE — 99232 SBSQ HOSP IP/OBS MODERATE 35: CPT | Performed by: PHYSICAL MEDICINE & REHABILITATION

## 2023-11-30 PROCEDURE — 700102 HCHG RX REV CODE 250 W/ 637 OVERRIDE(OP): Performed by: PHYSICAL MEDICINE & REHABILITATION

## 2023-11-30 PROCEDURE — 36415 COLL VENOUS BLD VENIPUNCTURE: CPT

## 2023-11-30 PROCEDURE — 80053 COMPREHEN METABOLIC PANEL: CPT

## 2023-11-30 PROCEDURE — 85025 COMPLETE CBC W/AUTO DIFF WBC: CPT

## 2023-11-30 PROCEDURE — 97535 SELF CARE MNGMENT TRAINING: CPT

## 2023-11-30 PROCEDURE — 97530 THERAPEUTIC ACTIVITIES: CPT

## 2023-11-30 PROCEDURE — 97162 PT EVAL MOD COMPLEX 30 MIN: CPT

## 2023-11-30 PROCEDURE — 770010 HCHG ROOM/CARE - REHAB SEMI PRIVAT*

## 2023-11-30 PROCEDURE — A9270 NON-COVERED ITEM OR SERVICE: HCPCS | Performed by: PHYSICAL MEDICINE & REHABILITATION

## 2023-11-30 PROCEDURE — 83735 ASSAY OF MAGNESIUM: CPT

## 2023-11-30 PROCEDURE — 97165 OT EVAL LOW COMPLEX 30 MIN: CPT

## 2023-11-30 PROCEDURE — 97116 GAIT TRAINING THERAPY: CPT

## 2023-11-30 RX ADMIN — SENNOSIDES AND DOCUSATE SODIUM 2 TABLET: 50; 8.6 TABLET ORAL at 08:45

## 2023-11-30 RX ADMIN — OXYCODONE 5 MG: 5 TABLET ORAL at 05:37

## 2023-11-30 RX ADMIN — OXYCODONE 5 MG: 5 TABLET ORAL at 13:55

## 2023-11-30 RX ADMIN — ACETAMINOPHEN 1000 MG: 500 TABLET ORAL at 07:48

## 2023-11-30 RX ADMIN — LEVOTHYROXINE SODIUM 88 MCG: 0.09 TABLET ORAL at 05:37

## 2023-11-30 RX ADMIN — APIXABAN 5 MG: 5 TABLET, FILM COATED ORAL at 19:47

## 2023-11-30 RX ADMIN — METHOCARBAMOL TABLETS 1000 MG: 500 TABLET, COATED ORAL at 14:02

## 2023-11-30 RX ADMIN — METHOCARBAMOL TABLETS 1000 MG: 500 TABLET, COATED ORAL at 07:48

## 2023-11-30 RX ADMIN — OXYCODONE 5 MG: 5 TABLET ORAL at 08:58

## 2023-11-30 RX ADMIN — SENNOSIDES AND DOCUSATE SODIUM 2 TABLET: 50; 8.6 TABLET ORAL at 19:46

## 2023-11-30 RX ADMIN — DIBASIC SODIUM PHOSPHATE, MONOBASIC POTASSIUM PHOSPHATE AND MONOBASIC SODIUM PHOSPHATE 500 MG: 852; 155; 130 TABLET ORAL at 08:45

## 2023-11-30 RX ADMIN — ACETAMINOPHEN 1000 MG: 500 TABLET ORAL at 14:02

## 2023-11-30 RX ADMIN — FAMOTIDINE 20 MG: 20 TABLET, FILM COATED ORAL at 08:45

## 2023-11-30 RX ADMIN — ATORVASTATIN CALCIUM 40 MG: 40 TABLET, FILM COATED ORAL at 19:47

## 2023-11-30 RX ADMIN — LIOTHYRONINE SODIUM 5 MCG: 5 TABLET ORAL at 08:45

## 2023-11-30 RX ADMIN — FERROUS SULFATE TAB 325 MG (65 MG ELEMENTAL FE) 325 MG: 325 (65 FE) TAB at 08:46

## 2023-11-30 RX ADMIN — OXYCODONE 5 MG: 5 TABLET ORAL at 17:11

## 2023-11-30 RX ADMIN — ACETAMINOPHEN 1000 MG: 500 TABLET ORAL at 19:46

## 2023-11-30 RX ADMIN — PREGABALIN 100 MG: 100 CAPSULE ORAL at 19:47

## 2023-11-30 RX ADMIN — APIXABAN 5 MG: 5 TABLET, FILM COATED ORAL at 08:46

## 2023-11-30 RX ADMIN — PREGABALIN 100 MG: 100 CAPSULE ORAL at 08:46

## 2023-11-30 RX ADMIN — METHOCARBAMOL TABLETS 1000 MG: 500 TABLET, COATED ORAL at 19:47

## 2023-11-30 RX ADMIN — OXYCODONE 5 MG: 5 TABLET ORAL at 19:47

## 2023-11-30 ASSESSMENT — GAIT ASSESSMENTS
DISTANCE (FEET): 50
DISTANCE (FEET): 35
ASSISTIVE DEVICE: FRONT WHEEL WALKER
GAIT LEVEL OF ASSIST: CONTACT GUARD ASSIST
ASSISTIVE DEVICE: FRONT WHEEL WALKER
DEVIATION: ANTALGIC;TRENDELENBERG;BRADYKINETIC
DEVIATION: ANTALGIC;TRENDELENBERG;BRADYKINETIC
GAIT LEVEL OF ASSIST: MINIMAL ASSIST

## 2023-11-30 ASSESSMENT — PAIN SCALES - WONG BAKER: WONGBAKER_NUMERICALRESPONSE: DOESN'T HURT AT ALL

## 2023-11-30 ASSESSMENT — ACTIVITIES OF DAILY LIVING (ADL)
TOILET_TRANSFER_DESCRIPTION: GRAB BAR;INCREASED TIME;SET-UP OF EQUIPMENT;SUPERVISION FOR SAFETY
BED_CHAIR_WHEELCHAIR_TRANSFER_DESCRIPTION: ADAPTIVE EQUIPMENT;INITIAL PREPARATION FOR TASK;SET-UP OF EQUIPMENT;SUPERVISION FOR SAFETY;VERBAL CUEING
BED_CHAIR_WHEELCHAIR_TRANSFER_DESCRIPTION: ADAPTIVE EQUIPMENT;INCREASED TIME;SET-UP OF EQUIPMENT;SUPERVISION FOR SAFETY
BED_CHAIR_WHEELCHAIR_TRANSFER_DESCRIPTION: ADAPTIVE EQUIPMENT;INITIAL PREPARATION FOR TASK;SET-UP OF EQUIPMENT;SUPERVISION FOR SAFETY;VERBAL CUEING
BED_CHAIR_WHEELCHAIR_TRANSFER_DESCRIPTION: INCREASED TIME;SUPERVISION FOR SAFETY;VERBAL CUEING
TOILET_TRANSFER_DESCRIPTION: GRAB BAR;INCREASED TIME
TOILETING: INDEPENDENT

## 2023-11-30 ASSESSMENT — PAIN DESCRIPTION - PAIN TYPE
TYPE: SURGICAL PAIN;ACUTE PAIN
TYPE: SURGICAL PAIN

## 2023-11-30 ASSESSMENT — PATIENT HEALTH QUESTIONNAIRE - PHQ9
1. LITTLE INTEREST OR PLEASURE IN DOING THINGS: NOT AT ALL
2. FEELING DOWN, DEPRESSED, IRRITABLE, OR HOPELESS: NOT AT ALL
SUM OF ALL RESPONSES TO PHQ9 QUESTIONS 1 AND 2: 0

## 2023-11-30 ASSESSMENT — BRIEF INTERVIEW FOR MENTAL STATUS (BIMS)
WHAT YEAR IS IT: CORRECT
INITIAL REPETITION OF BED BLUE SOCK - FIRST ATTEMPT: 3
ASKED TO RECALL SOCK: YES, NO CUE REQUIRED
BIMS SUMMARY SCORE: 15
ASKED TO RECALL BED: YES, NO CUE REQUIRED
WHAT DAY OF THE WEEK IS IT: CORRECT
WHAT MONTH IS IT: ACCURATE WITHIN 5 DAYS
ASKED TO RECALL BLUE: YES, NO CUE REQUIRED

## 2023-11-30 ASSESSMENT — PAIN SCALES - PAIN ASSESSMENT IN ADVANCED DEMENTIA (PAINAD)
BREATHING: NORMAL
TOTALSCORE: 0
BODYLANGUAGE: RELAXED
FACIALEXPRESSION: SMILING OR INEXPRESSIVE
CONSOLABILITY: NO NEED TO CONSOLE

## 2023-11-30 NOTE — CARE PLAN
The patient is Stable - Low risk of patient condition declining or worsening    Shift Goals  Clinical Goals: Monitor    Progress made toward(s) clinical / shift goals:    Patient educated on the POC and medications administered. All questions and concerns addressed at this time. Patient has scheduled pain medications, per patient her pain is controlled. Patient takes proper safety precautions when transferring.  Problem: Knowledge Deficit - Standard  Goal: Patient and family/care givers will demonstrate understanding of plan of care, disease process/condition, diagnostic tests and medications  Outcome: Progressing   Problem: Fall Risk - Rehab  Goal: Patient will remain free from falls  Outcome: Progressing   Problem: Pain - Standard  Goal: Alleviation of pain or a reduction in pain to the patient’s comfort goal  Outcome: Progressing   Patient is not progressing towards the following goals:

## 2023-11-30 NOTE — PROGRESS NOTES
"  Physical Medicine & Rehabilitation Progress Note    Encounter Date: 11/30/2023    Chief Complaint: Weakness    Interval Events (Subjective):  Seen in therapy. Moving better. Tremors. Looks anemia and fatigued    Objective:  VITAL SIGNS: /63   Pulse 79   Temp 36.6 °C (97.9 °F) (Oral)   Resp 16   Ht 1.575 m (5' 2\")   Wt 68.9 kg (152 lb)   LMP 11/01/1986   SpO2 94%   BMI 27.80 kg/m²   Gen: No acute distress, well developed well nourished adult  HEENT: Normal Cephalic Atraumatic, Normal conjunctiva.   CV: warm extremities, well perfused, no edema  Resp: symmetric chest rise, breathing comfortably on room air  Abd: Soft, Non distended  Extremities: normal bulk, no atrophy  Skin: no visible rashes or lesions.   Neuro: alert, awake  Psych: Mood and affect appropriate and congruent    Laboratory Values:  Recent Results (from the past 72 hour(s))   POCT glucose device results    Collection Time: 11/27/23  2:46 PM   Result Value Ref Range    POC Glucose, Blood 167 (H) 65 - 99 mg/dL   CBC WITHOUT DIFFERENTIAL    Collection Time: 11/28/23  5:22 AM   Result Value Ref Range    WBC 7.8 4.8 - 10.8 K/uL    RBC 3.32 (L) 4.20 - 5.40 M/uL    Hemoglobin 9.2 (L) 12.0 - 16.0 g/dL    Hematocrit 29.4 (L) 37.0 - 47.0 %    MCV 88.6 81.4 - 97.8 fL    MCH 27.7 27.0 - 33.0 pg    MCHC 31.3 (L) 32.2 - 35.5 g/dL    RDW 52.4 (H) 35.9 - 50.0 fL    Platelet Count 214 164 - 446 K/uL    MPV 10.5 9.0 - 12.9 fL   Renal Function Panel    Collection Time: 11/28/23  5:22 AM   Result Value Ref Range    Sodium 140 135 - 145 mmol/L    Potassium 4.8 3.6 - 5.5 mmol/L    Chloride 109 96 - 112 mmol/L    Co2 22 20 - 33 mmol/L    Glucose 114 (H) 65 - 99 mg/dL    Creatinine 0.95 0.50 - 1.40 mg/dL    Bun 19 8 - 22 mg/dL    Calcium 8.8 8.5 - 10.5 mg/dL    Correct Calcium 9.5 8.5 - 10.5 mg/dL    Phosphorus 2.2 (L) 2.5 - 4.5 mg/dL    Albumin 3.1 (L) 3.2 - 4.9 g/dL   ESTIMATED GFR    Collection Time: 11/28/23  5:22 AM   Result Value Ref Range    GFR " (CKD-EPI) 60 >60 mL/min/1.73 m 2   APTT    Collection Time: 11/28/23 12:55 PM   Result Value Ref Range    APTT 42.2 (H) 24.7 - 36.0 sec   CBC WITHOUT DIFFERENTIAL    Collection Time: 11/29/23  3:55 AM   Result Value Ref Range    WBC 5.9 4.8 - 10.8 K/uL    RBC 2.70 (L) 4.20 - 5.40 M/uL    Hemoglobin 7.7 (L) 12.0 - 16.0 g/dL    Hematocrit 23.9 (L) 37.0 - 47.0 %    MCV 88.5 81.4 - 97.8 fL    MCH 28.5 27.0 - 33.0 pg    MCHC 32.2 32.2 - 35.5 g/dL    RDW 54.3 (H) 35.9 - 50.0 fL    Platelet Count 152 (L) 164 - 446 K/uL    MPV 10.8 9.0 - 12.9 fL   Renal Function Panel    Collection Time: 11/29/23  3:55 AM   Result Value Ref Range    Sodium 142 135 - 145 mmol/L    Potassium 3.9 3.6 - 5.5 mmol/L    Chloride 112 96 - 112 mmol/L    Co2 23 20 - 33 mmol/L    Glucose 95 65 - 99 mg/dL    Creatinine 0.71 0.50 - 1.40 mg/dL    Bun 14 8 - 22 mg/dL    Calcium 8.8 8.5 - 10.5 mg/dL    Correct Calcium 9.9 8.5 - 10.5 mg/dL    Phosphorus 1.8 (L) 2.5 - 4.5 mg/dL    Albumin 2.6 (L) 3.2 - 4.9 g/dL   MAGNESIUM    Collection Time: 11/29/23  3:55 AM   Result Value Ref Range    Magnesium 1.7 1.5 - 2.5 mg/dL   ESTIMATED GFR    Collection Time: 11/29/23  3:55 AM   Result Value Ref Range    GFR (CKD-EPI) 85 >60 mL/min/1.73 m 2   IRON/TOTAL IRON BIND    Collection Time: 11/29/23  9:49 AM   Result Value Ref Range    Iron 17 (L) 40 - 170 ug/dL    Total Iron Binding 195 (L) 250 - 450 ug/dL    Unsat Iron Binding 178 110 - 370 ug/dL    % Saturation 9 (L) 15 - 55 %   VITAMIN B12    Collection Time: 11/29/23  9:49 AM   Result Value Ref Range    Vitamin B12 -True Cobalamin 612 211 - 911 pg/mL   FERRITIN    Collection Time: 11/29/23  9:49 AM   Result Value Ref Range    Ferritin 221.0 10.0 - 291.0 ng/mL   HEMOGLOBIN AND HEMATOCRIT    Collection Time: 11/29/23  9:49 AM   Result Value Ref Range    Hemoglobin 8.3 (L) 12.0 - 16.0 g/dL    Hematocrit 26.7 (L) 37.0 - 47.0 %   EKG    Collection Time: 11/29/23  1:19 PM   Result Value Ref Range    Report       [facility]    Test Date:  2023  Pt Name:    MELISSA PATEL               Department: [facility]  MRN:        [id number]                      Room:       [facility]  Gender:     Female                       Technician: [id number]  :        [id number]                   Requested By:PATTY NAYAK  Order #:    [id number]                    Reading MD: Khoa Durham MD    Measurements  Intervals                                Axis  Rate:       86                           P:          47  MI:         126                          QRS:        -8  QRSD:       82                           T:          30  QT:         326  QTc:        390    Interpretive Statements  Sinus rhythm  Borderline R wave progression  Low voltage, precordial leads  Electronically Signed On 2023 13:44:54 PST by Khoa Durham MD     CBC with Differential    Collection Time: 23  5:43 AM   Result Value Ref Range    WBC 5.2 4.8 - 10.8 K/uL    RBC 2.65 (L) 4.20 - 5.40 M/uL    Hemoglobin 7.6 (L) 12.0 - 16.0 g/dL    Hematocrit 23.9 (L) 37.0 - 47.0 %    MCV 90.2 81.4 - 97.8 fL    MCH 28.7 27.0 - 33.0 pg    MCHC 31.8 (L) 32.2 - 35.5 g/dL    RDW 55.4 (H) 35.9 - 50.0 fL    Platelet Count 152 (L) 164 - 446 K/uL    MPV 10.5 9.0 - 12.9 fL    Neutrophils-Polys 55.30 44.00 - 72.00 %    Lymphocytes 29.80 22.00 - 41.00 %    Monocytes 8.30 0.00 - 13.40 %    Eosinophils 4.80 0.00 - 6.90 %    Basophils 0.60 0.00 - 1.80 %    Immature Granulocytes 1.20 (H) 0.00 - 0.90 %    Nucleated RBC 0.00 0.00 - 0.20 /100 WBC    Neutrophils (Absolute) 2.86 1.82 - 7.42 K/uL    Lymphs (Absolute) 1.54 1.00 - 4.80 K/uL    Monos (Absolute) 0.43 0.00 - 0.85 K/uL    Eos (Absolute) 0.25 0.00 - 0.51 K/uL    Baso (Absolute) 0.03 0.00 - 0.12 K/uL    Immature Granulocytes (abs) 0.06 0.00 - 0.11 K/uL    NRBC (Absolute) 0.00 K/uL   Comp Metabolic Panel (CMP)    Collection Time: 23  5:43 AM   Result Value Ref Range    Sodium 141 135 - 145 mmol/L    Potassium 4.0 3.6 - 5.5 mmol/L    Chloride 109  96 - 112 mmol/L    Co2 26 20 - 33 mmol/L    Anion Gap 6.0 (L) 7.0 - 16.0    Glucose 95 65 - 99 mg/dL    Bun 16 8 - 22 mg/dL    Creatinine 0.78 0.50 - 1.40 mg/dL    Calcium 8.5 8.5 - 10.5 mg/dL    Correct Calcium 9.7 8.5 - 10.5 mg/dL    AST(SGOT) 23 12 - 45 U/L    ALT(SGPT) 13 2 - 50 U/L    Alkaline Phosphatase 72 30 - 99 U/L    Total Bilirubin 0.4 0.1 - 1.5 mg/dL    Albumin 2.5 (L) 3.2 - 4.9 g/dL    Total Protein 4.9 (L) 6.0 - 8.2 g/dL    Globulin 2.4 1.9 - 3.5 g/dL    A-G Ratio 1.0 g/dL   Magnesium    Collection Time: 11/30/23  5:43 AM   Result Value Ref Range    Magnesium 1.8 1.5 - 2.5 mg/dL   ESTIMATED GFR    Collection Time: 11/30/23  5:43 AM   Result Value Ref Range    GFR (CKD-EPI) 76 >60 mL/min/1.73 m 2       Medications:  Scheduled Medications   Medication Dose Frequency    Pharmacy Consult Request  1 Each PHARMACY TO DOSE    senna-docusate  2 Tablet BID    acetaminophen  1,000 mg TID    apixaban  5 mg BID    atorvastatin  40 mg QHS    famotidine  20 mg DAILY    ferrous sulfate  325 mg QDAY with Breakfast    levothyroxine  88 mcg AM ES    liothyronine  5 mcg QAM    methocarbamol  1,000 mg TID    pregabalin  100 mg BID    oxyCODONE immediate-release  5 mg TID     PRN medications: Respiratory Therapy Consult, hydrALAZINE, senna-docusate **AND** polyethylene glycol/lytes **AND** magnesium hydroxide **AND** bisacodyl, ondansetron **OR** ondansetron, sodium chloride, oxyCODONE immediate-release    Diet:  Current Diet Order   Procedures    Diet Order Diet: Regular       Medical Decision Making and Plan:  Left Femoral Neck fracture with superior displacement- diagnosed 11/22, unknown chronicity  Seen on Xray of hip taken 11/22  S/P hip arthroplasty on 11/27 by Dr Ko  No clear history of fall. 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 Silva  - Spinal precautions in place, does not require bracing     Right Popliteal DVT  Provoked from surgery and immobility  -Eliquis 5mg BID for 3 months from 11/15/23  -continue eliquis    Acute Blood loss anemia  -7.6 Hb today, repeat tomorrow  -consider US of hip if needed        Hypothyroidism  -On home dose Synthroid     Hypertension  - holding losartan and spiranoloactone  -home dose losartan and spironolactone      Pain:  - Neuroceptic - On Tylenol TID, oxycodone 5mg PRN, robaxin 1g TID  -11/30- startedOxycodone 5mg TID scheduled  -continue oxycodone 5mg TID scheduled     Circadian Rhythm disorder:   Recommend lights on during the day/off at night, minimize nighttime interruptions as able.     Mood  - at risk of adjustment disorder, depression, and anxiety due to functional decline     ID:  - at risk for Urinary tract infection     Skin/Wounds:  - Pressure relief q2h while in bed. Close monitoring for signs of breakdown     DVT prophylaxis:  Patient is on Eliquis.      GI prophylaxis:  On Pepcid        -Follow-up Ortho, NS, PCP       ____________________________________    Mitchell Chavis MD  Physical Medicine & Rehabilitation   Brain Injury Medicine   ____________________________________

## 2023-11-30 NOTE — PROGRESS NOTES
.                                                         NURSING DAILY NOTE    Name: Sheyla Gauthier   Date of Admission: 11/29/2023   Admitting Diagnosis: History of left hip replacement  Attending Physician: Mitchell Chavis M.d.  Allergies: Trazodone and Crestor [rosuvastatin calcium]    Safety  Patient Assist     Patient Precautions     Precaution Comments     Bed Transfer Status     Toilet Transfer Status      Assistive Devices  Wheelchair  Oxygen  Room air w/o2 available  Diet/Therapeutic Dining  Current Diet Order   Procedures    Diet Order Diet: Regular     Pill Administration  whole  Agitated Behavioral Scale     ABS Level of Severity       Fall Risk  Has the patient had a fall this admission?   No  Lexy Garcia Fall Risk Scoring  17, HIGH RISK  Fall Risk Safety Measures  bed alarm and chair alarm    Vitals  Temperature: 36.4 °C (97.6 °F)  Temp src: Oral  Pulse: 64  Respiration: 18  Blood Pressure : 98/60  Blood Pressure MAP (Calculated): 73 MM HG  BP Location: Left, Upper Arm  Patient BP Position: Supine     Oxygen  Pulse Oximetry: 96 % (Earclip pulse ox reading)  O2 (LPM): 0  O2 Delivery Device: Room air w/o2 available    Bowel and Bladder  Last Bowel Movement  11/29/23  Stool Type     Bowel Device     Continent  Bladder: Stress incontinence   Bowel: Continent movement  Bladder Function     Genitourinary Assessment   Bladder Assessment (WDL):  WDL Except  Bladder Device: Bathroom    Skin  Wyatt Score   15  Sensory Interventions   Bed Types: Standard/Trauma Mattress  Skin Preventative Measures: Pillows in Use for Support / Positioning, Waffle Overlay  Moisture Interventions         Pain  Pain Rating Scale  7 - Focus of attention, prevents doing daily activities  Pain Location     Pain Location Orientation     Pain Interventions   Medication (see MAR)    ADLs    Bathing      Linen Change      Personal Hygiene     Chlorhexidine Bath      Oral Care     Teeth/Dentures     Shave     Nutrition Percentage  Eaten     Environmental Precautions     Patient Turns/Positioning  Patient Turns Self from Side to Side  Patient Turns Assistance/Tolerance     Bed Positions     Head of Bed Elevated         Psychosocial/Neurologic Assessment  Psychosocial Assessment  Psychosocial (WDL):  Within Defined Limits  Neurologic Assessment  Neuro (WDL): Exceptions to WDL  Level of Consciousness: Alert  Orientation Level: Oriented X4  Cognition: Follows commands  Speech: Clear  Muscle Strength Right Arm: Good Strength Against Gravity and Moderate Resistance  Muscle Strength Left Arm: Good Strength Against Gravity and Moderate Resistance  Muscle Strength Right Leg: Good Strength Against Gravity and Moderate Resistance  Muscle Strength Left Leg: Weak Movement but Not Against Gravity or Resistance  EENT (WDL):  WDL Except    Cardio/Pulmonary Assessment  Edema   RLE Edema: 2+  LLE Edema: 2+  Respiratory Breath Sounds  RUL Breath Sounds: Clear  RML Breath Sounds: Clear  RLL Breath Sounds: Diminished  TYE Breath Sounds: Clear  LLL Breath Sounds: Diminished  Cardiac Assessment   Cardiac (WDL):  (HX: HTN)

## 2023-11-30 NOTE — PROGRESS NOTES
Assumed care of patient. Bedside report received from Liza GUEVARA. Patient is in bed. Fall precautions in place. Call light and belongings within reach. Updated on POC, all questions and concerns answered at this time. No other needs at this time.

## 2023-11-30 NOTE — CARE PLAN
"  Problem: Fall Risk - Rehab  Goal: Patient will remain free from falls  Note: Lexy Garcia Fall risk Assessment Score: 17    High fall risk Interventions   - Bed and strip alarm   - Yellow sign by the door   - Yellow wrist band \"Fall risk\"  - Room near to the nurse station  - Do not leave patient unattended in the bathroom  - Fall risk education provided     The patient is Stable - Low risk of patient condition declining or worsening    Shift Goals  Clinical Goals: Monitor  "

## 2023-11-30 NOTE — PROGRESS NOTES
NURSING DAILY NOTE    Name: Sheyla Gauthier   Date of Admission: 11/29/2023   Admitting Diagnosis: History of left hip replacement  Attending Physician: Mitchell Chavis M.d.  Allergies: Trazodone and Crestor [rosuvastatin calcium]    Safety  Patient Assist  mod assist  Patient Precautions     Precaution Comments     Bed Transfer Status     Toilet Transfer Status      Assistive Devices  Rails, Wheelchair  Oxygen  Nasal Cannula  Diet/Therapeutic Dining  Current Diet Order   Procedures    Diet Order Diet: Regular     Pill Administration  whole  Agitated Behavioral Scale     ABS Level of Severity       Fall Risk  Has the patient had a fall this admission?   No  Lexy Garcia Fall Risk Scoring  16, HIGH RISK  Fall Risk Safety Measures  bed alarm, chair alarm, and poor balance    Vitals  Temperature: 36.6 °C (97.8 °F)  Temp src: Oral  Pulse: 68  Respiration: 18  Blood Pressure : 114/75  Blood Pressure MAP (Calculated): 88 MM HG  BP Location: Left, Upper Arm  Patient BP Position: Supine     Oxygen  Pulse Oximetry: 95 %  O2 (LPM): 1  O2 Delivery Device: Nasal Cannula    Bowel and Bladder  Last Bowel Movement  11/29/23  Stool Type     Bowel Device     Continent  Bladder: Stress incontinence   Bowel: Continent movement  Bladder Function  Urine Void (mL):  (moderate)  Number of Times Voided: 1  Urine Color: Yellow  Genitourinary Assessment   Bladder Assessment (WDL):  WDL Except  Urine Color: Yellow  Bladder Device: Bathroom  Bladder Scan: Post Void  $ Bladder Scan Results (mL): 60    Skin  Wyatt Score   16  Sensory Interventions   Bed Types: Standard/Trauma Mattress with Overlay  Skin Preventative Measures: Pillows in Use for Support / Positioning  Moisture Interventions  Moisturizers/Barriers: Barrier Wipes      Pain  Pain Rating Scale  4 - Distracts me, can do usual activities  Pain Location  Leg  Pain Location Orientation  Left  Pain Interventions   Medication  (see MAR)    ADLs    Bathing    (no shower,newly admit)  Linen Change      Personal Hygiene  Moist Klarissa Wipes, Perineal Care  Chlorhexidine Bath      Oral Care     Teeth/Dentures     Shave     Nutrition Percentage Eaten     Environmental Precautions  Bed in Low Position, Treaded Slipper Socks on Patient  Patient Turns/Positioning  Patient Turns Self from Side to Side  Patient Turns Assistance/Tolerance  Assistance of One  Bed Positions  Bed Controls On  Head of Bed Elevated  Self regulated      Psychosocial/Neurologic Assessment  Psychosocial Assessment  Psychosocial (WDL):  Within Defined Limits  Neurologic Assessment  Neuro (WDL): Exceptions to WDL  Level of Consciousness: Alert  Orientation Level: Oriented X4  Cognition: Follows commands  Speech: Clear  Muscle Strength Right Arm: Good Strength Against Gravity and Moderate Resistance  Muscle Strength Left Arm: Good Strength Against Gravity and Moderate Resistance  Muscle Strength Right Leg: Good Strength Against Gravity and Moderate Resistance  Muscle Strength Left Leg: Weak Movement but Not Against Gravity or Resistance  EENT (WDL):  WDL Except    Cardio/Pulmonary Assessment  Edema   RLE Edema: 2+  LLE Edema: 2+  Respiratory Breath Sounds  RUL Breath Sounds: Clear  RML Breath Sounds: Clear  RLL Breath Sounds: Diminished  TYE Breath Sounds: Clear  LLL Breath Sounds: Diminished  Cardiac Assessment   Cardiac (WDL):  (HX: HTN)

## 2023-11-30 NOTE — THERAPY
Physical Therapy   Initial Evaluation     Patient Name: Sheyla Gauthier  Age:  83 y.o., Sex:  female  Medical Record #: [id number]  Today's Date: 11/30/2023     Subjective    Pt pleasant and eager to participate. Initially apprehensive regarding initiating gait training however pleased with ease of ambulation compared to pre-operatively     Objective       11/30/23 0930   PT Charge Group   PT Therapeutic Activities (Units) 1   PT Evaluation PT Evaluation Mod   PT Total Time Spent   PT Individual Total Time Spent (Mins) 60   Prior Living Situation   Prior Services [facility]   Housing / Facility 1 Story House   Steps Into Home 0   Steps In Home 0   Bathroom Set up Walk In Shower;Grab Bars;Shower Chair   Equipment Owned Front-Wheel Walker;Quad Cane   Lives with - Patient's Self Care Capacity   (Plan to rel[facility] to [facility])   Prior Level of Functional Mobility   Bed Mobility Independent   Transfer Status Independent   Ambulation Independent   Distance Ambulation (Feet)   (limited community prior to 10/7, limited household since then)   Assistive Devices Used Wheelchair   Wheelchair Independent   Stairs Required Assist   Vitals   Vitals Comments pt desaturated to 88% on 1L O2 with activity, improvde with 2L   Pain 0 - 10 Group   Location Hip   Location Orientation Left   Pain Rating Scale (NPRS) 5   Description Burning   Therapist Pain Assessment Post Activity Pain Same as Prior to Activity   Cognition    Comments A&O, pleasant, significant reduction in anxiety from previous admission   Passive ROM Lower Body   Passive ROM Lower Body WDL   Strength Lower Body   Lt Hip Flexion Strength 2 (P)   Lt Knee Flexion Strength 3+ (F+)   Lt Knee Extension Strength 3+ (F+)   Comments R LE grossly 4/5   Lower Body Muscle Tone   Lower Body Muscle Tone  WDL   Balance Assessment   Sitting Balance (Static) Normal   Sitting Balance (Dynamic) Good   Standing Balance (Static) Fair -   Standing Balance  (Dynamic) Poor -   Weight Shift Sitting Good   Weight Shift Standing Poor   Bed Mobility    Supine to Sit Minimal Assist   Sit to Supine Standby Assist   Sit to Stand Minimal Assist   Rolling Contact Guard Assist   Roll Left and Right   Assistance Needed Incidental touching   CARE Score - Roll Left and Right 4   Roll Left and Right Discharge Goal   Discharge Goal 6   Sit to Lying   Assistance Needed Physical assistance   Physical Assistance Level 25% or less   CARE Score - Sit to Lying 3   Sit to Lying Discharge Goal   Discharge Goal 6   Lying to Sitting on Side of Bed   Assistance Needed Incidental touching   CARE Score - Lying to Sitting on Side of Bed 4   Lying to Sitting on Side of Bed Discharge Goal   Discharge Goal 6   Sit to Stand   Assistance Needed Physical assistance   Physical Assistance Level 25% or less   CARE Score - Sit to Stand 3   Sit to Stand Discharge Goal   Discharge Goal 6   Chair/Bed-to-Chair Transfer   Assistance Needed Physical assistance   Physical Assistance Level 25% or less   CARE Score - Chair/Bed-to-Chair Transfer 3   Chair/Bed-to-Chair Transfer Discharge Goal   Discharge Goal 6   Toilet Transfer   Reason if not Attempted Refused to perform   CARE Score - Toilet Transfer 7   Toilet Transfer Discharge Goal   Discharge Goal 6   Car Transfer   Reason if not Attempted Safety concerns   CARE Score - Car Transfer 88   Car Transfer Discharge Goal   Discharge Goal 4   Walk 10 Feet   Assistance Needed Physical assistance   Physical Assistance Level 25% or less   CARE Score - Walk 10 Feet 3   Walk 10 Feet Discharge Goal   Discharge Goal 6   Walk 50 Feet with Two Turns   Assistance Needed Physical assistance   Physical Assistance Level 25% or less   CARE Score - Walk 50 Feet with Two Turns 3   Walk 50 Feet with Two Turns Discharge Goal   Discharge Goal 6   Walk 150 Feet   Reason if not Attempted Safety concerns   CARE Score - Walk 150 Feet 88   Walk 150 Feet Discharge Goal   Discharge Goal 6    Walking 10 Feet on Uneven Surfaces   Reason if not Attempted Safety concerns   CARE Score - Walking 10 Feet on Uneven Surfaces 88   Walking 10 Feet on Uneven Surfaces Discharge Goal   Discharge Goal 6   1 Step (Curb)   Assistance Needed Physical assistance   Physical Assistance Level 25% or less   CARE Score - 1 Step (Curb) 3   1 Step (Curb) Discharge Goal   Discharge Goal 6   4 Steps   Assistance Needed Physical assistance   Physical Assistance Level 25% or less   CARE Score - 4 Steps 3   4 Steps Discharge Goal   Discharge Goal 6   12 Steps   Reason if not Attempted Safety concerns   CARE Score - 12 Steps 88   12 Steps Discharge Goal   Discharge Goal 3   Picking Up Object   Reason if not Attempted Safety concerns   CARE Score - Picking Up Object 88   Picking Up Object Discharge Goal   Discharge Goal 5   Wheel 50 Feet with Two Turns   Reason if not Attempted Activity not applicable   CARE Score - Wheel 50 Feet with Two Turns 9   Wheel 50 Feet with Two Turns Discharge Goal   Discharge Goal 9   Wheel 150 Feet   Reason if not Attempted Activity not applicable   CARE Score - Wheel 150 Feet 9   Wheel 150 Feet Discharge Goal   Discharge Goal 9   Gait Functional Level of Assist    Gait Level Of Assist Minimal Assist   Assistive Device Front Wheel Walker   Distance (Feet) 50  (as well as 25ft x1 and 10ftx1 //bars)   # of Times Distance was Traveled 1   Deviation Antalgic;Trendelenberg;Bradykinetic   Stairs Functional Level of Assist   Level of Assist with Stairs Minimal Assist   # of Stairs Climbed 4   Stairs Description Extra time;Oxygen;Supervision for safety;Verbal cueing   Transfer Functional Level of Assist   Bed, Chair, Wheelchair Transfer Minimal Assist   Bed Chair Wheelchair Transfer Description Adaptive equipment;Initial preparation for task;Set-up of equipment;Supervision for safety;Verbal cueing  (stand step FWW)   Problem List    Problems Pain;Impaired Bed Mobility;Impaired Transfers;Impaired  Ambulation;Functional Strength Deficit;Impaired Balance;Decreased Activity Tolerance;Limited Knowledge of Post-Op Precautions   Precautions   Precautions Fall Risk;Posterior Hip Precautions;Weight Bearing As Tolerated Right Lower Extremity;Spinal / Back Precautions    Current Discharge Plan   Current Discharge Plan Assisted Living Facility   Interdisciplinary Plan of Care Collaboration   IDT Collaboration with  Family / Caregiver;Physical Therapist   Patient Position at End of Therapy Seated;Chair Alarm On;Self Releasing Lap Belt Applied;Family / Friend in Room;Call Light within Reach   Collaboration Comments CLOF/POC   PT DME Recommendations   Wheelchair   (likely will be ambulatory and will not require WC)   Cushion   (n/a)   Assistive Device Front Wheeled Walker  (pt has FWW)   Physical Therapist Assigned   Assigned PT / Treatment Time / Comments AGUSTÍN   Benefit   Therapy Benefit Patient Would Benefit from Inpatient Rehabilitation Physical Therapy to Maximize Functional [employer] with ADLs, IADLs and Mobility.   Strengths & Barriers   Strengths Able to follow instructions;Independent prior level of function;Making steady progress towards goals;Motivated for self care and independence;Pleasant and cooperative;Supportive family;Willingly participates in therapeutic activities   Barriers Generalized weakness;Impaired activity tolerance;Impaired balance;Pain       Patient education: reviewed PT plan of care, rehab expectations, mobility needs and patient passport, fall risk and use of call light.     Assessment    The patient is a 83 y.o. female admitted to [facility] inpatient rehabilitation 11/29/2023 with severe functional debility after R SRIDEVI with posterior approach on 11/27. Pt was admitted recently s/p L 4/5 L5/S1 redo laminectomy and was not a functional ambulator for > 1month prior to admission. Upon this admission she was found to have avascular necrosis and displaced L femoral neck fracture    Patient's  PMH includes:  Past Medical History:   Diagnosis Date    Anxiety     Arthritis     osteoarthritis    Back pain     CAD (coronary artery disease) 10/2018    Abnormal CTCS. PCI/FARZAD (Russell 2.5 x 2mm) to the mid LAD. Cardic stent    Cataract     alex IOL    Daytime sleepiness     Depression     Dyslipidemia     Frequent headaches     Herpes zoster     High cholesterol     Hypertension     MHA (microangiopathic hemolytic anemia)     Migraine syndrome      Mumps     as a child    Osteopathia     Osteoporosis     Painful joint     Post-menopause on HRT (hormone replacement therapy)     Renal disorder     chronic kidney disease stage 3    Sleep apnea     no longer uses cpap    Snoring     Thyroid disease     hypothyroid    Urinary incontinence     White matter abnormality on MRI of brain          PT evaluation performed today; functional performance at today's assessment is as above.     Additional factors influencing patient status and prognosis include: prior level of function, history of chronic pain, and the above comorbidities.     The patient is performing well below their baseline level of function and will benefit from an interdisciplinary high intensity rehabilitation program to maximize functional independence, decrease burden of care, and support a safe return to [facility] in [place] with hired CG support.     Plan  Recommend Physical Therapy  minutes per day 5-6 days per week for 7-10 days for the following treatments:  PT Gait Training, PT Therapeutic Exercises, PT Neuro Re-Ed/Balance, PT Therapeutic Activity, PT Manual Therapy, and PT Evaluation.    Passport items to be completed:  Get in/out of bed safely, in/out of a vehicle, safely use mobility device, walk or wheel around home/community, navigate up and down stairs, show how to get up/down from the ground, ensure home is accessible, demonstrate HEP, complete caregiver training    Goals:  Long term and short term goals have been discussed with patient and they are  in agreement.          Physical Therapy Problems (Active)       Problem: Mobility       Dates: Start:  11/30/23         Goal: STG-Within one week, patient will ambulate 100ft FWW SBA       Dates: Start:  11/30/23            Goal: STG-Within one week, patient will ambulate up/down a curb SBA FWW       Dates: Start:  11/30/23               Problem: Mobility Transfers       Dates: Start:  11/30/23         Goal: STG-Within one week, patient will perform bed mobility Sherman       Dates: Start:  11/30/23            Goal: STG-Within one week, patient will transfer bed to chair SBA       Dates: Start:  11/30/23               Problem: PT-Long Term Goals       Dates: Start:  11/30/23         Goal: LTG-By discharge, patient will ambulate 150ft Sherman FWW       Dates: Start:  11/30/23            Goal: LTG-By discharge, patient will transfer one surface to another Sherman       Dates: Start:  11/30/23            Goal: LTG-By discharge, patient will perform home exercise program independently       Dates: Start:  11/30/23            Goal: LTG-By discharge, patient will transfer in/out of a car SBA       Dates: Start:  11/30/23

## 2023-12-01 ENCOUNTER — APPOINTMENT (OUTPATIENT)
Dept: OCCUPATIONAL THERAPY | Facility: REHABILITATION | Age: 83
DRG: 560 | End: 2023-12-01
Attending: PHYSICAL MEDICINE & REHABILITATION
Payer: MEDICARE

## 2023-12-01 ENCOUNTER — APPOINTMENT (OUTPATIENT)
Dept: PHYSICAL THERAPY | Facility: REHABILITATION | Age: 83
DRG: 560 | End: 2023-12-01
Attending: PHYSICAL MEDICINE & REHABILITATION
Payer: MEDICARE

## 2023-12-01 ENCOUNTER — HOSPITAL ENCOUNTER (OUTPATIENT)
Dept: RADIOLOGY | Facility: MEDICAL CENTER | Age: 83
End: 2023-12-01
Attending: NURSE PRACTITIONER

## 2023-12-01 ENCOUNTER — HOSPITAL ENCOUNTER (OUTPATIENT)
Dept: RADIOLOGY | Facility: MEDICAL CENTER | Age: 83
End: 2023-12-01
Attending: NEUROLOGICAL SURGERY

## 2023-12-01 ENCOUNTER — HOSPITAL ENCOUNTER (OUTPATIENT)
Dept: RADIOLOGY | Facility: MEDICAL CENTER | Age: 83
End: 2023-12-01
Attending: FAMILY MEDICINE

## 2023-12-01 LAB
ERYTHROCYTE [DISTWIDTH] IN BLOOD BY AUTOMATED COUNT: 53.6 FL (ref 35.9–50)
HCT VFR BLD AUTO: 24.7 % (ref 37–47)
HGB BLD-MCNC: 7.9 G/DL (ref 12–16)
MCH RBC QN AUTO: 28.3 PG (ref 27–33)
MCHC RBC AUTO-ENTMCNC: 32 G/DL (ref 32.2–35.5)
MCV RBC AUTO: 88.5 FL (ref 81.4–97.8)
PLATELET # BLD AUTO: 162 K/UL (ref 164–446)
PMV BLD AUTO: 11 FL (ref 9–12.9)
RBC # BLD AUTO: 2.79 M/UL (ref 4.2–5.4)
WBC # BLD AUTO: 4.8 K/UL (ref 4.8–10.8)

## 2023-12-01 PROCEDURE — 85027 COMPLETE CBC AUTOMATED: CPT

## 2023-12-01 PROCEDURE — 97110 THERAPEUTIC EXERCISES: CPT

## 2023-12-01 PROCEDURE — A9270 NON-COVERED ITEM OR SERVICE: HCPCS | Performed by: PHYSICAL MEDICINE & REHABILITATION

## 2023-12-01 PROCEDURE — 97116 GAIT TRAINING THERAPY: CPT

## 2023-12-01 PROCEDURE — 97530 THERAPEUTIC ACTIVITIES: CPT

## 2023-12-01 PROCEDURE — 700102 HCHG RX REV CODE 250 W/ 637 OVERRIDE(OP): Performed by: PHYSICAL MEDICINE & REHABILITATION

## 2023-12-01 PROCEDURE — 36415 COLL VENOUS BLD VENIPUNCTURE: CPT

## 2023-12-01 PROCEDURE — 94760 N-INVAS EAR/PLS OXIMETRY 1: CPT

## 2023-12-01 PROCEDURE — 99232 SBSQ HOSP IP/OBS MODERATE 35: CPT | Performed by: PHYSICAL MEDICINE & REHABILITATION

## 2023-12-01 PROCEDURE — 770010 HCHG ROOM/CARE - REHAB SEMI PRIVAT*

## 2023-12-01 PROCEDURE — 97535 SELF CARE MNGMENT TRAINING: CPT

## 2023-12-01 RX ADMIN — ACETAMINOPHEN 1000 MG: 500 TABLET ORAL at 08:41

## 2023-12-01 RX ADMIN — OXYCODONE 5 MG: 5 TABLET ORAL at 22:25

## 2023-12-01 RX ADMIN — APIXABAN 5 MG: 5 TABLET, FILM COATED ORAL at 08:43

## 2023-12-01 RX ADMIN — PREGABALIN 100 MG: 100 CAPSULE ORAL at 08:42

## 2023-12-01 RX ADMIN — FERROUS SULFATE TAB 325 MG (65 MG ELEMENTAL FE) 325 MG: 325 (65 FE) TAB at 08:43

## 2023-12-01 RX ADMIN — PREGABALIN 100 MG: 100 CAPSULE ORAL at 19:44

## 2023-12-01 RX ADMIN — ACETAMINOPHEN 1000 MG: 500 TABLET ORAL at 14:02

## 2023-12-01 RX ADMIN — OXYCODONE 5 MG: 5 TABLET ORAL at 08:46

## 2023-12-01 RX ADMIN — LIOTHYRONINE SODIUM 5 MCG: 5 TABLET ORAL at 08:43

## 2023-12-01 RX ADMIN — OXYCODONE 5 MG: 5 TABLET ORAL at 12:19

## 2023-12-01 RX ADMIN — OXYCODONE 5 MG: 5 TABLET ORAL at 05:15

## 2023-12-01 RX ADMIN — OXYCODONE 5 MG: 5 TABLET ORAL at 19:44

## 2023-12-01 RX ADMIN — APIXABAN 5 MG: 5 TABLET, FILM COATED ORAL at 19:44

## 2023-12-01 RX ADMIN — FAMOTIDINE 20 MG: 20 TABLET, FILM COATED ORAL at 08:43

## 2023-12-01 RX ADMIN — ATORVASTATIN CALCIUM 40 MG: 40 TABLET, FILM COATED ORAL at 19:44

## 2023-12-01 RX ADMIN — METHOCARBAMOL TABLETS 1000 MG: 500 TABLET, COATED ORAL at 14:02

## 2023-12-01 RX ADMIN — METHOCARBAMOL TABLETS 1000 MG: 500 TABLET, COATED ORAL at 21:00

## 2023-12-01 RX ADMIN — METHOCARBAMOL TABLETS 1000 MG: 500 TABLET, COATED ORAL at 08:43

## 2023-12-01 RX ADMIN — LEVOTHYROXINE SODIUM 88 MCG: 0.09 TABLET ORAL at 05:10

## 2023-12-01 ASSESSMENT — ACTIVITIES OF DAILY LIVING (ADL)
BED_CHAIR_WHEELCHAIR_TRANSFER_DESCRIPTION: ADAPTIVE EQUIPMENT;INCREASED TIME;INITIAL PREPARATION FOR TASK;SET-UP OF EQUIPMENT;SUPERVISION FOR SAFETY;VERBAL CUEING
TOILET_TRANSFER_DESCRIPTION: GRAB BAR;ADAPTIVE EQUIPMENT;SET-UP OF EQUIPMENT;SUPERVISION FOR SAFETY;VERBAL CUEING
TOILET_TRANSFER_DESCRIPTION: GRAB BAR;SUPERVISION FOR SAFETY;VERBAL CUEING
TOILET_TRANSFER_DESCRIPTION: GRAB BAR;ADAPTIVE EQUIPMENT;SET-UP OF EQUIPMENT;SUPERVISION FOR SAFETY;VERBAL CUEING
BED_CHAIR_WHEELCHAIR_TRANSFER_DESCRIPTION: ADAPTIVE EQUIPMENT;INITIAL PREPARATION FOR TASK;INCREASED TIME;SET-UP OF EQUIPMENT;SUPERVISION FOR SAFETY;VERBAL CUEING
TUB_SHOWER_TRANSFER_DESCRIPTION: GRAB BAR;SHOWER BENCH

## 2023-12-01 ASSESSMENT — GAIT ASSESSMENTS
DISTANCE (FEET): 25
ASSISTIVE DEVICE: FRONT WHEEL WALKER
GAIT LEVEL OF ASSIST: CONTACT GUARD ASSIST
DEVIATION: ANTALGIC;TRENDELENBERG;BRADYKINETIC

## 2023-12-01 ASSESSMENT — PAIN DESCRIPTION - PAIN TYPE
TYPE: SURGICAL PAIN
TYPE: SURGICAL PAIN

## 2023-12-01 NOTE — THERAPY
Physical Therapy   Daily Treatment     Patient Name: Sheyla Gauthier  Age:  83 y.o., Sex:  female  Medical Record #: [id number]  Today's Date: 12/1/2023     Precautions  Precautions: Fall Risk, Posterior Hip Precautions, Weight Bearing As Tolerated Right Lower Extremity, Spinal / Back Precautions   Comments: Wound vac, no back brace    Subjective    Pt reporting continued burning sensation at wound vac site     Objective       12/01/23 0830   PT Charge Group   PT Therapeutic Activities (Units) 2   PT Total Time Spent   PT Individual Total Time Spent (Mins) 30   Pain 0 - 10 Group   Location Hip   Location Orientation Left   Pain Rating Scale (NPRS) 6   Description Burning   Transfer Functional Level of Assist   Bed, Chair, Wheelchair Transfer Contact Guard Assist   Bed Chair Wheelchair Transfer Description Adaptive equipment;Increased time;Initial preparation for task;Set-up of equipment;Supervision for safety;Verbal cueing  (stand step FWW)   Toilet Transfers Contact Guard Assist   Toilet Transfer Description Grab bar;Adaptive equipment;Set-up of equipment;Supervision for safety;Verbal cueing   Bed Mobility    Supine to Sit Minimal Assist   Sit to Supine Standby Assist   Sit to Stand Contact Guard Assist   Interdisciplinary Plan of Care Collaboration   IDT Collaboration with  Nursing;Family / Caregiver;Certified Nursing Assistant   Patient Position at End of Therapy Seated;Chair Alarm On;Self Releasing Lap Belt Applied;Call Light within Reach;Tray Table within Reach;Phone within Reach   Collaboration Comments AM meds, notified CNA re BM, initiated family training     Assisted pt with toileting, continent B/B. ModA for pants management, pt able to perform pericare with set up assist    Initiated FT with pt's daughter, Ade, per request for WC<>bed stand step transfers FWW and bed mobility with leg . Recommend further training before clearing       12/01/23 1301   PT Charge Group   PT Gait Training (Units) 1    PT Therapeutic Exercise (Units) 2   PT Therapeutic Activities (Units) 1   PT Total Time Spent   PT Individual Total Time Spent (Mins) 60   Gait Functional Level of Assist    Gait Level Of Assist Contact Guard Assist   Assistive Device Front Wheel Walker   Distance (Feet) 25   # of Times Distance was Traveled 3   Deviation Antalgic;Trendelenberg;Bradykinetic  (heavy use of UEs)   Transfer Functional Level of Assist   Bed, Chair, Wheelchair Transfer Contact Guard Assist   Bed Chair Wheelchair Transfer Description Adaptive equipment;Initial preparation for task;Increased time;Set-up of equipment;Supervision for safety;Verbal cueing  (stand step FWW vs stand pivot)   Sitting Lower Body Exercises   Hip Abduction 2 sets of 10;Bilateral  (no reistance)   Long Arc Quad 2 sets of 10;Left;Right   Marching Reciprocal;1 set of 10   Nustep Resistance Level 1  (5min BUE/ steps close SPV to ensure maintainence of posterior hip precautions)   Bed Mobility    Supine to Sit Minimal Assist   Sit to Supine Standby Assist   Sit to Stand Contact Guard Assist   Interdisciplinary Plan of Care Collaboration   IDT Collaboration with  Family / Caregiver;Nursing;Occupational Therapist   Patient Position at End of Therapy In Bed;Bed Alarm On;Call Light within Reach;Tray Table within Reach;Phone within Reach  (B LEs elevated, instructed to perform ankle pumps and remain elevated for 10 min)   Collaboration Comments reviewed WC<>bed transfers and bed mobility with tarun Booker for bed<>WC transfers, handoff of care from OT     Reviewed transfer training with pt and daughter and cleared for bed<>WC transfers. Recommend returning to sitting and calling RN if pt feels dizzy or lightheaded in standing. Reiterated to call RN if pt's dtr is not present or does not feel confident with transfer set up    Instructed pt in seated HEP to perform between therapy sessions daily. Pt demonstrated understanding    Reviewed bed mobility with leg   at end of session. Pt left supine with B LE elevated and performing ankle pumps for edema management    Assessment    Pt is making steady progress toward her goals. She continues to be limited by L hip pain and hip abd/ext weakness.  Strengths: Able to follow instructions, Independent prior level of function, Making steady progress towards goals, Motivated for self care and independence, Pleasant and cooperative, Supportive family, Willingly participates in therapeutic activities  Barriers: Generalized weakness, Impaired activity tolerance, Impaired balance, Pain    Plan    Gait training, stair training, LE/hip/glute strengthening/ROM, balance training, overall activity tolerance/endurance, bed mobility training     DME  PT DME Recommendations  Wheelchair:  (likely will be ambulatory and will not require WC)  Cushion:  (n/a)  Assistive Device: Front Wheeled Walker (pt has FWW)    Passport items to be completed:  Get in/out of bed safely, in/out of a vehicle, safely use mobility device, walk or wheel around home/community, navigate up and down stairs, show how to get up/down from the ground, ensure home is accessible, demonstrate HEP, complete caregiver training    Physical Therapy Problems (Active)       Problem: Mobility       Dates: Start:  11/30/23         Goal: STG-Within one week, patient will ambulate 100ft FWW SBA       Dates: Start:  11/30/23            Goal: STG-Within one week, patient will ambulate up/down a curb SBA FWW       Dates: Start:  11/30/23               Problem: Mobility Transfers       Dates: Start:  11/30/23         Goal: STG-Within one week, patient will perform bed mobility Sherman       Dates: Start:  11/30/23            Goal: STG-Within one week, patient will transfer bed to chair SBA       Dates: Start:  11/30/23               Problem: PT-Long Term Goals       Dates: Start:  11/30/23         Goal: LTG-By discharge, patient will ambulate 150ft Sherman FWW       Dates: Start:  11/30/23             Goal: LTG-By discharge, patient will transfer one surface to another Sherman       Dates: Start:  11/30/23            Goal: LTG-By discharge, patient will perform home exercise program independently       Dates: Start:  11/30/23            Goal: LTG-By discharge, patient will transfer in/out of a car SBA       Dates: Start:  11/30/23

## 2023-12-01 NOTE — THERAPY
Occupational Therapy   Initial Evaluation     Patient Name: Sheyla Gauthier  Age:  83 y.o., Sex:  female  Medical Record #: [id number]  Today's Date: 11/30/2023     Subjective    Pt seen 2x, initially for OT eval and tx at 7am then agreeable to 1315 tx for 30 min. Dtr Ade present for second session.     Objective       11/30/23 0701 11/30/23 1315   OT Charge Group   OT Self Care / ADL (Units) 1 1   OT Therapy Activity (Units)  --  2   OT Evaluation OT Evaluation Low  --    OT Total Time Spent   OT Individual Total Time Spent (Mins) 60 45   Prior Living Situation   Prior Services [facility]  (thru private caregiving agency.)  --    Housing / Facility 1 Story House  (in [place])  --    Steps Into Home 0  --    Steps In Home 0  --    Bathroom Set up Walk In Shower;Grab Bars;Shower Chair  --    Equipment Owned Front-Wheel Walker;Quad Cane;Tub / Shower Seat;Grab Bar(s) In Tub / Shower;Grab Bar(s) By Toilet;Reacher  (Front-Wheel Walker; Quad Cane; Tub / Shower Seat; Grab Bar(s) In Tub / Shower; Grab Bar(s) By Toilet; Reacher)  --    Lives with - Patient's Self Care Capacity Other (Comments)  (Pt was living @ home w/ 20 hrs of assistance; plan to relocated to [facility] in [facility].)  --    Prior Level of ADL Function   Self Feeding Independent  --    Grooming / Hygiene Independent  --    Bathing Independent  --    Dressing Independent  --    Toileting Independent  --    Prior Level of IADL Function   Medication Management Independent  --    Laundry Independent  --    Kitchen Mobility Independent  --    Finances Independent  --    Home Management Independent  --    Shopping Independent  --    Prior Level Of Mobility Independent With Device in Community;Independent With Steps in Community;Independent With Device in Home;Independent With Steps in Home  --    Driving / Transportation Driving Independent  --    Occupation (Pre-Hospital Vocational) Retired Due To Age  --    Leisure Interests Family  --   "  Prior Functioning: Everyday Activities   Self Care Independent  --    Indoor Mobility (Ambulation) Independent  --    Stairs Independent  --    Functional Cognition Independent  --    Prior Device Use Manual wheelchair  --    Vitals   Pulse Oximetry  --  97 %   O2 (LPM)  --  1   O2 Delivery Device  --  Nasal Cannula   Pain   Intervention Medication (see MAR);Emotional Support;Nurse Notified;Repositioned;Rest  --    Pain 0 - 10 Group   Location Hip  --    Location Orientation Left  --    Pain Rating Scale (NPRS) 7  --    Description Burning  --    Comfort Goal Perform Activity;Comfort with Movement  --    Therapist Pain Assessment During Activity;Nurse Notified  --    Cognition    Cognition / Consciousness WDL  --    Level of Consciousness Alert  --    ABS (Agitated Behavior Scale)   Agitated Behavior Scale Performed No  --    Cognitive Pattern Assessment   Cognitive Pattern Assessment Used BIMS  --    Brief Interview for Mental Status (BIMS)   Repetition of Three Words (First Attempt) 3  --    Temporal Orientation: Year Correct  --    Temporal Orientation: Month Accurate within 5 days  --    Temporal Orientation: Day Correct  --    Recall: \"Sock\" Yes, no cue required  --    Recall: \"Blue\" Yes, no cue required  --    Recall: \"Bed\" Yes, no cue required  --    BIMS Summary Score 15  --    Confusion Assessment Method (CAM)   Is there evidence of an acute change in mental status from the patient's baseline? No  --    Inattention Behavior not present  --    Disorganized thinking Behavior not present  --    Altered level of consciousness Behavior not present  --    Vision Screen   Vision Not tested  --    Passive ROM Upper Body   Passive ROM Upper Body WDL  --    Comments BUES  --    Active ROM Upper Body   Active ROM Upper Body  X  --    Dominant Hand Right  --    Comments BUES  --    Strength Upper Body   Upper Body Strength  WDL  --    Comments BUES  --    Sensation Upper Body   Upper Extremity Sensation  WDL  --  "   Comments BUES  --    Upper Body Muscle Tone   Upper Body Muscle Tone  WDL  --    Comments BUES  --    Balance Assessment   Sitting Balance (Static) Normal  --    Sitting Balance (Dynamic) Good  --    Standing Balance (Static) Fair -  --    Standing Balance (Dynamic) Poor -  --    Weight Shift Sitting Good  --    Weight Shift Standing Poor  --    Bed Mobility    Supine to Sit Contact Guard Assist  --    Sit to Stand Minimal Assist  --    Coordination Upper Body   Coordination WDL  --    Comments BUES  --    Eating   Assistance Needed Independent  --    CARE Score - Eating 6  --    Eating Discharge Goal   Discharge Goal 6  --    Oral Hygiene   Assistance Needed Independent  --    CARE Score - Oral Hygiene 6  --    Oral Hygiene Discharge Goal   Discharge Goal 6  --    Shower/Bathe Self   Assistance Needed Physical assistance  --    Physical Assistance Level 25% or less  --    CARE Score - Shower/Bathe Self 3  --    Shower/Bathe Self Discharge Goal   Discharge Goal 6  --    Upper Body Dressing   Assistance Needed Set-up / clean-up  --    CARE Score - Upper Body Dressing 5  --    Upper Body Dressing Discharge Goal   Discharge Goal 6  --    Lower Body Dressing   Assistance Needed Physical assistance  --    Physical Assistance Level 51%-75%  --    CARE Score - Lower Body Dressing 2  --    Lower Body Dressing Discharge Goal   Discharge Goal 4  --    Putting On/Taking Off Footwear   Assistance Needed Physical assistance  --    Physical Assistance Level Total assistance  --    CARE Score - Putting On/Taking Off Footwear 1  --    Putting On/Taking Off Footwear Discharge Goal   Discharge Goal 6  --    Toileting Hygiene   Assistance Needed Incidental touching  --    CARE Score - Toileting Hygiene 4  --    Toileting Hygiene Discharge Goal   Discharge Goal 4  --    Toilet Transfer   Assistance Needed Incidental touching  --    CARE Score - Toilet Transfer 4  --    Toilet Transfer Discharge Goal   Discharge Goal 4  --    Hearing,  Speech, and Vision   Ability to Hear Adequate  --    Ability to See in Adequate Light Adequate  --    Expression of Ideas and Wants Without difficulty  --    Understanding Verbal and Non-Verbal Content Understands  --    Functional Level of Assist   Eating Independent  --    Grooming Independent;Seated  --    Grooming Description Increased time;Seated in wheelchair at sink  (For oral care)  --    Bathing Minimal Assist  --    Bathing Description Other (comment)  (Bed bath completed seated in w/c and toilet with pt able to reach all parts except Knee to toe due to posterior hip and spinal precautions.)  --    Upper Body Dressing Supervision  --    Upper Body Dressing Description Set-up of equipment  --    Lower Body Dressing Maximal Assist  --    Lower Body Dressing Description Grab bar;Assist with threading into pant leg;Cues for spinal precautions;Increased time;Set-up of equipment;Supervision for safety;Verbal cueing  (Pt able to complete hip hike up/down only, required assist with all remaining tasks due to posterior hip and spinal precautions. GB for UB support in standing with alternating UB support.) Reacher;Dressing stick;Sock aid;Supervision for safety;Verbal cueing  (Issued reacher, dressing stick, and sock aid with block practice of sock don/doff at SPV level and cues for sequencing.)   Toileting Contact Guard Assist  --    Toileting Description Grab bar;Assist for standing balance;Supervision for safety  --    Bed, Chair, Wheelchair Transfer Minimal Assist Contact Guard Assist   Bed Chair Wheelchair Transfer Description Increased time;Supervision for safety;Verbal cueing  (HHA Min A for stand step EOB>w/c) Adaptive equipment;Initial preparation for task;Set-up of equipment;Supervision for safety;Verbal cueing  (stand step FWW)   Toilet Transfers Contact Guard Assist  --    Toilet Transfer Description Grab bar;Increased time  --    Comprehension Independent  --    Expression Independent  --    Problem  Solving Supervision  --    Problem Solving Description Verbal cueing  --    Memory Independent  --    Problem List   Problem List Decreased Active Daily Living Skills;Decreased Homemaking Skills;Decreased Upper Extremity Strength Right;Decreased Upper Extremity Strength Left;Decreased Functional Mobility;Decreased Activity Tolerance;Impaired Postural Control / Balance;Limited Knowledge of Post Op Precautions  --    Precautions   Precautions Fall Risk;Posterior Hip Precautions;Weight Bearing As Tolerated Right Lower Extremity;Spinal / Back Precautions   --    Comments Wound vac, no back brace  --    Current Discharge Plan   Current Discharge Plan Assisted Living Facility  --    Benefit    Therapy Benefit Patient Would Benefit from Inpatient Rehab Occupational Therapy to Maximize [place] with ADLs, IADLs and Functional Mobility.  --    Interdisciplinary Plan of Care Collaboration   IDT Collaboration with  Nursing;Physical Therapist Family / Caregiver   Patient Position at End of Therapy Seated;Chair Alarm On;Self Releasing Lap Belt Applied;Tray Table within Reach;Call Light within Reach In Bed;Bed Alarm On;Call Light within Reach;Family / Friend in Room   Collaboration Comments CLOF/POC Javierr Ade present during session.   Strengths & Barriers   Strengths Able to follow instructions;Alert and oriented;Effective communication skills;Good insight into deficits/needs;Independent prior level of function;Making steady progress towards goals;Manages pain appropriately;Motivated for self care and independence;Pleasant and cooperative;Supportive family;Willingly participates in therapeutic activities  --    Barriers Fatigue;Generalized weakness;Impaired activity tolerance;Impaired balance;Limited mobility;Pain  --    Occupational Therapist Assigned   Assigned OT / Treatment Time / Comments KT 30/60 then transition to TJ  --        Patient education: reviewed OT plan of care, rehab expectations, goal setting, ADL needs,  orientation to schedule, role of OT in recovery, fall risk and use of call light. Also discussed posterior hip precautions and provided handout for continuous reference in room. Educated on purpose of O2 and risks of falling below 90% SpO2. Discussed DC plan with pt, which continues to be senior care.      Observed some mild tremors in function, though unknown if it was due to cold temperatures, fear of movement, or pain level.     Assessment  Patient is 83 y.o. Female with a diagnosis of Left femoral neck fx, s/p THR with posterior precautions and recent Lumbar laminectomy.  Additional factors influencing patient status / progress (ie: cognitive factors, co-morbidities, social support, etc): Per Dr. Chavis H&P - Pt with a past medical history of hypothyroidism, hypertension and low back pain and lower extremity weakness;  who presented on 11/10/2023  5:51 AM for scheduled lumbar surgery.  Per documentation, patient has been followed in the outpatient setting by Dr. Silva for ongoing back pain.  Patient was evaluated in the outpatient setting on 11/8/2023 due to worsening back pain impairing her function.  Patient recently required the use of a wheelchair due to her limited mobility.  An MRI of the lumbar spine was obtained which showed prior history of an L4-L5 surgical changes and evidence of spondylolisthesis at L4-5 as well as facet joint arthropathy at L5-S1.  Patient was deemed appropriate for intervention for her L5-S1 stenosis.  Patient was taken to the OR on 11/10 for an L5-S1 redo laminectomy performed by Dr. Silva.  She then came to [facility] rehabilitation from 11/13-11/22. Left leg pain continued with minimal ambulation potencial. Xray of hip taken 11/22 showed Left femoral neck fracture. No clear history of fall since march/April 2023. Patient has not walked pain free since February 2023.   Dr Blancas took to surgery on 11/27.      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    Orthopedic surgery Dr. Ko was consulted who recommended IR guided patient medically care discussed with IR who stated there is not enough fluid to do aspiration. S/p left total hip arthroplasty 11/27, Prevena drain in place, WBAT with posterior hip precaution. Eliquis was resumed per surgery.  Patient is cleared for discharge by orthopedic surgeon.     Patient had episodes of hypotension, symptomatic.  Orthostatic vital signs negative.  Blood pressure improved with IV fluid..     OT evaluation performed today; functional performance at today's assessment is as above. Pt presents to rehab below her normal baseline ind level, currently functioning between Max A-SPV for ADLs, and CGA-Min A for functional transfers. Pt is limited by decreased strength, decreased balance, decreased endurance, pain, problem solving, limited knowledge of post-op precautions, and decreased LB strength and new posterior hip precautions. Pt lives in a HOUSING FACILITY: Assisted Living Residence with 0STE and staff and dtr who lives nearby who can assist intermittently. Pt will benefit from ongoing care from this skilled interdisciplinary high intensity rehabilitation program to address the aforementioned deficits in order to maximize ind with ADLs, IADLs, and household/community mobility while reducing burden of care, supporting a safe return nursing home upon DC.      Plan  Recommend Occupational Therapy  minutes per day 5-7 days per week for 7-10 days for the following treatments:  OT Group Therapy, OT Self Care/ADL, OT Community Reintegration, OT Neuro Re-Ed/Balance, OT Therapeutic Activity, OT Evaluation, and OT Therapeutic Exercise.    Passport items to be completed:  Perform bathroom transfers, complete dressing, complete feeding, get ready for the day, prepare a simple meal, participate in household tasks, adapt home for safety needs, demonstrate home exercise program, complete caregiver training     Goals:  Long term and short term  goals have been discussed with patient and dtr and they are in agreement.    Occupational Therapy Goals (Active)       Problem: Bathing       Dates: Start:  11/30/23         Goal: STG-Within one week, patient will bathe at SPV level using AE/DME PRN.       Dates: Start:  11/30/23               Problem: Dressing       Dates: Start:  11/30/23         Goal: STG-Within one week, patient will dress LB at SBA level using AE/DME PRN.       Dates: Start:  11/30/23               Problem: Functional Transfers       Dates: Start:  11/30/23         Goal: STG-Within one week, patient will transfer to toilet at SBA level using DME PRN.       Dates: Start:  11/30/23            Goal: STG-Within one week, patient will transfer to step in shower at SBA level using DME PRN.       Dates: Start:  11/30/23               Problem: OT Long Term Goals       Dates: Start:  11/30/23         Goal: LTG-By discharge, patient will complete basic self care tasks at SBA-Mod I level using DME PRN.       Dates: Start:  11/30/23            Goal: LTG-By discharge, patient will perform bathroom transfers at SBA-Mod I level using DME PRN.       Dates: Start:  11/30/23               Problem: Toileting       Dates: Start:  11/30/23         Goal: STG-Within one week, patient will complete toileting tasks at SBA level using AE/DME PRN.       Dates: Start:  11/30/23

## 2023-12-01 NOTE — PROGRESS NOTES
"  Physical Medicine & Rehabilitation Progress Note    Encounter Date: 12/1/2023    Chief Complaint: Weakness    Interval Events (Subjective):  Seen in bed. Pain controlled. Hb stable. IV to be removed today    Objective:  VITAL SIGNS: /64   Pulse 74   Temp 37.5 °C (99.5 °F) (Temporal)   Resp 14   Ht 1.575 m (5' 2\")   Wt 68.9 kg (152 lb)   LMP 11/01/1986   SpO2 97%   BMI 27.80 kg/m²   Gen: No acute distress, well developed well nourished adult  HEENT: Normal Cephalic Atraumatic, Normal conjunctiva.   CV: warm extremities, well perfused, minimal edema  Resp: symmetric chest rise, breathing comfortably on room air  Abd: Soft, Non distended  Extremities: normal bulk, no atrophy  Skin: no visible rashes or lesions.   Neuro: alert, awake  Psych: Mood and affect appropriate and congruent    Laboratory Values:  Recent Results (from the past 72 hour(s))   CBC WITHOUT DIFFERENTIAL    Collection Time: 11/29/23  3:55 AM   Result Value Ref Range    WBC 5.9 4.8 - 10.8 K/uL    RBC 2.70 (L) 4.20 - 5.40 M/uL    Hemoglobin 7.7 (L) 12.0 - 16.0 g/dL    Hematocrit 23.9 (L) 37.0 - 47.0 %    MCV 88.5 81.4 - 97.8 fL    MCH 28.5 27.0 - 33.0 pg    MCHC 32.2 32.2 - 35.5 g/dL    RDW 54.3 (H) 35.9 - 50.0 fL    Platelet Count 152 (L) 164 - 446 K/uL    MPV 10.8 9.0 - 12.9 fL   Renal Function Panel    Collection Time: 11/29/23  3:55 AM   Result Value Ref Range    Sodium 142 135 - 145 mmol/L    Potassium 3.9 3.6 - 5.5 mmol/L    Chloride 112 96 - 112 mmol/L    Co2 23 20 - 33 mmol/L    Glucose 95 65 - 99 mg/dL    Creatinine 0.71 0.50 - 1.40 mg/dL    Bun 14 8 - 22 mg/dL    Calcium 8.8 8.5 - 10.5 mg/dL    Correct Calcium 9.9 8.5 - 10.5 mg/dL    Phosphorus 1.8 (L) 2.5 - 4.5 mg/dL    Albumin 2.6 (L) 3.2 - 4.9 g/dL   MAGNESIUM    Collection Time: 11/29/23  3:55 AM   Result Value Ref Range    Magnesium 1.7 1.5 - 2.5 mg/dL   ESTIMATED GFR    Collection Time: 11/29/23  3:55 AM   Result Value Ref Range    GFR (CKD-EPI) 85 >60 mL/min/1.73 m 2 "   IRON/TOTAL IRON BIND    Collection Time: 23  9:49 AM   Result Value Ref Range    Iron 17 (L) 40 - 170 ug/dL    Total Iron Binding 195 (L) 250 - 450 ug/dL    Unsat Iron Binding 178 110 - 370 ug/dL    % Saturation 9 (L) 15 - 55 %   VITAMIN B12    Collection Time: 23  9:49 AM   Result Value Ref Range    Vitamin B12 -True Cobalamin 612 211 - 911 pg/mL   FERRITIN    Collection Time: 23  9:49 AM   Result Value Ref Range    Ferritin 221.0 10.0 - 291.0 ng/mL   HEMOGLOBIN AND HEMATOCRIT    Collection Time: 23  9:49 AM   Result Value Ref Range    Hemoglobin 8.3 (L) 12.0 - 16.0 g/dL    Hematocrit 26.7 (L) 37.0 - 47.0 %   EKG    Collection Time: 23  1:19 PM   Result Value Ref Range    Report       [facility]    Test Date:  2023  Pt Name:    MELISSA PATEL               Department: [facility]  MRN:        [id number]                      Room:       [facility]  Gender:     Female                       Technician: [id number]  :        [id number]                   Requested By:PATTY NAYAK  Order #:    [id number]                    Reading MD: Khoa Durham MD    Measurements  Intervals                                Axis  Rate:       86                           P:          47  NE:         126                          QRS:        -8  QRSD:       82                           T:          30  QT:         326  QTc:        390    Interpretive Statements  Sinus rhythm  Borderline R wave progression  Low voltage, precordial leads  Electronically Signed On 2023 13:44:54 PST by Khoa Durham MD     CBC with Differential    Collection Time: 23  5:43 AM   Result Value Ref Range    WBC 5.2 4.8 - 10.8 K/uL    RBC 2.65 (L) 4.20 - 5.40 M/uL    Hemoglobin 7.6 (L) 12.0 - 16.0 g/dL    Hematocrit 23.9 (L) 37.0 - 47.0 %    MCV 90.2 81.4 - 97.8 fL    MCH 28.7 27.0 - 33.0 pg    MCHC 31.8 (L) 32.2 - 35.5 g/dL    RDW 55.4 (H) 35.9 - 50.0 fL    Platelet Count 152 (L) 164 - 446 K/uL    MPV 10.5 9.0 - 12.9 fL     Neutrophils-Polys 55.30 44.00 - 72.00 %    Lymphocytes 29.80 22.00 - 41.00 %    Monocytes 8.30 0.00 - 13.40 %    Eosinophils 4.80 0.00 - 6.90 %    Basophils 0.60 0.00 - 1.80 %    Immature Granulocytes 1.20 (H) 0.00 - 0.90 %    Nucleated RBC 0.00 0.00 - 0.20 /100 WBC    Neutrophils (Absolute) 2.86 1.82 - 7.42 K/uL    Lymphs (Absolute) 1.54 1.00 - 4.80 K/uL    Monos (Absolute) 0.43 0.00 - 0.85 K/uL    Eos (Absolute) 0.25 0.00 - 0.51 K/uL    Baso (Absolute) 0.03 0.00 - 0.12 K/uL    Immature Granulocytes (abs) 0.06 0.00 - 0.11 K/uL    NRBC (Absolute) 0.00 K/uL   Comp Metabolic Panel (CMP)    Collection Time: 11/30/23  5:43 AM   Result Value Ref Range    Sodium 141 135 - 145 mmol/L    Potassium 4.0 3.6 - 5.5 mmol/L    Chloride 109 96 - 112 mmol/L    Co2 26 20 - 33 mmol/L    Anion Gap 6.0 (L) 7.0 - 16.0    Glucose 95 65 - 99 mg/dL    Bun 16 8 - 22 mg/dL    Creatinine 0.78 0.50 - 1.40 mg/dL    Calcium 8.5 8.5 - 10.5 mg/dL    Correct Calcium 9.7 8.5 - 10.5 mg/dL    AST(SGOT) 23 12 - 45 U/L    ALT(SGPT) 13 2 - 50 U/L    Alkaline Phosphatase 72 30 - 99 U/L    Total Bilirubin 0.4 0.1 - 1.5 mg/dL    Albumin 2.5 (L) 3.2 - 4.9 g/dL    Total Protein 4.9 (L) 6.0 - 8.2 g/dL    Globulin 2.4 1.9 - 3.5 g/dL    A-G Ratio 1.0 g/dL   Magnesium    Collection Time: 11/30/23  5:43 AM   Result Value Ref Range    Magnesium 1.8 1.5 - 2.5 mg/dL   ESTIMATED GFR    Collection Time: 11/30/23  5:43 AM   Result Value Ref Range    GFR (CKD-EPI) 76 >60 mL/min/1.73 m 2   CBC WITHOUT DIFFERENTIAL    Collection Time: 12/01/23  5:59 AM   Result Value Ref Range    WBC 4.8 4.8 - 10.8 K/uL    RBC 2.79 (L) 4.20 - 5.40 M/uL    Hemoglobin 7.9 (L) 12.0 - 16.0 g/dL    Hematocrit 24.7 (L) 37.0 - 47.0 %    MCV 88.5 81.4 - 97.8 fL    MCH 28.3 27.0 - 33.0 pg    MCHC 32.0 (L) 32.2 - 35.5 g/dL    RDW 53.6 (H) 35.9 - 50.0 fL    Platelet Count 162 (L) 164 - 446 K/uL    MPV 11.0 9.0 - 12.9 fL       Medications:  Scheduled Medications   Medication Dose Frequency    Pharmacy  Consult Request  1 Each PHARMACY TO DOSE    senna-docusate  2 Tablet BID    acetaminophen  1,000 mg TID    apixaban  5 mg BID    atorvastatin  40 mg QHS    famotidine  20 mg DAILY    ferrous sulfate  325 mg QDAY with Breakfast    levothyroxine  88 mcg AM ES    liothyronine  5 mcg QAM    methocarbamol  1,000 mg TID    pregabalin  100 mg BID    oxyCODONE immediate-release  5 mg TID     PRN medications: Respiratory Therapy Consult, hydrALAZINE, senna-docusate **AND** polyethylene glycol/lytes **AND** magnesium hydroxide **AND** bisacodyl, ondansetron **OR** ondansetron, sodium chloride, oxyCODONE immediate-release    Diet:  Current Diet Order   Procedures    Diet Order Diet: Regular       Medical Decision Making and Plan:  Left Femoral Neck fracture with superior displacement- diagnosed 11/22, unknown chronicity  Seen on Xray of hip taken 11/22  S/P hip arthroplasty on 11/27 by Dr Ko  No clear history of fall. Patient has not walked pain free since February 2023  Last Fall noted in Spring of 2023  -PT/OT  -Requesting imaging from Dr Connor and Dr Ocampo office to establish chronicity        Polyradiculopathy 2/2 to bilateral Lumbar foraminal stenosis  Status post lumbar laminectomy  -Patient has history of prior L4-L5 decompression  - Recent worsening of back pain with radicular pain into right lower extremity, impairing function and recently utilizing a wheelchair in the last month  - 11/10 patient taken to the OR for L5-S1 laminectomy performed by Dr. Silva  - Spinal precautions in place, does not require bracing     Right Popliteal DVT  Provoked from surgery and immobility  -Eliquis 5mg BID for 3 months from 11/15/23  -continue eliquis     Acute Blood loss anemia  -7.6 Hb today  -12/1- 7.9 Hb  -consider US of hip if needed        Hypothyroidism  -On home dose Synthroid     Hypertension  - hold losartan and spironolactone  -home dose losartan and spironolactone      Pain:  - Neuroceptic - On Tylenol TID,  oxycodone 5mg PRN, robaxin 1g TID  -11/30- startedOxycodone 5mg TID scheduled  -continue oxycodone 5mg TID scheduled     Circadian Rhythm disorder:   Recommend lights on during the day/off at night, minimize nighttime interruptions as able.     Mood  - at risk of adjustment disorder, depression, and anxiety due to functional decline     ID:  - at risk for Urinary tract infection     Skin/Wounds:  - Pressure relief q2h while in bed. Close monitoring for signs of breakdown     DVT prophylaxis:  continue eliquis     GI prophylaxis:  On Pepcid        -Follow-up Ortho, NS, PCP    ____________________________________    Mitchell Chavis MD  Physical Medicine & Rehabilitation   Brain Injury Medicine   ____________________________________

## 2023-12-01 NOTE — DISCHARGE PLANNING
CASE MANAGEMENT INITIAL ASSESSMENT    Admit Date:  11/29/2023     I am familiar w/ pt and her dtrs from previous recent stay @  [facility].  Imet with pt and dtr Ade @ bedside; they are familiar w/ rehab process. PT was at [facility] from 11/113 to 11/22; sent back to  [facility] due left femoral neck fracture; s/p surgery on 11/27.     Patient is a  83 y.o. female transferred from [facility]     DC disposition:  Dc to [facility] in [facility]; dtr to provide transportation; pt has a wc/cushion; wants [id number]; no able to receive home health in [place] until she is established w/ a PCP in [place]; she has appt middle of January.     Diagnosis: History of left hip replacement [Z96.642]    Co-morbidities:   Patient Active Problem List    Diagnosis Date Noted    History of left hip replacement 11/29/2023    Hypotension 11/28/2023    Left displaced femoral neck fracture (HCC) 11/23/2023    Acute deep vein thrombosis (DVT) of right lower extremity (HCC) 11/23/2023    S/P lumbar laminectomy 11/22/2023    Closed fracture of neck of left femur (HCC) 11/22/2023    Radiculopathy 11/13/2023    Spinal stenosis, lumbar region with neurogenic claudication 11/10/2023    Peripheral edema 10/13/2023    Chronic kidney disease, stage 3a (HCC) 10/13/2023    Major depressive disorder with single episode, in partial remission (HCC) 08/30/2023    Lumbar stenosis with neurogenic claudication 02/06/2023    Decreased hearing of right ear 10/20/2022    Dizziness 08/03/2022    Stage 2 chronic kidney disease 04/20/2022    Arthritis 11/03/2021    Memory loss 08/20/2020    Conductive hearing loss of right ear 08/20/2020    Left wrist pain 12/19/2019    Myalgia 10/01/2019    Former smoker 07/02/2019    Other insomnia 06/14/2019    Coronary artery disease involving native coronary artery of native heart without angina pectoris 03/26/2019    Bladder prolapse, female, acquired 03/07/2018    JOHN (obstructive sleep apnea) 02/22/2018    Age related osteoporosis 07/26/2017     Major depressive disorder 06/29/2017    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/08/2016    Spinal stenosis of lumbar region 11/14/2016    Migraine syndrome 02/11/2013    Acquired hypothyroidism 03/08/2011    HTN (hypertension) 10/21/2009    Postmenopausal hormone therapy 10/21/2009    Mixed hyperlipidemia     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HCC)      Prior Living Situation:  Housing / Facility: 1 Story House in [place]; ramp was returned in preparation of pt relocating to [facility]  Lives with - Patient's Self Care Capacity: Other (Comments) (Plans to relocate to [facility] in [place], [place])    Prior Level of Function:  Medication Management: Independent  Finances: Independent  Home Management: Independent  Shopping: Independent  Prior Level Of Mobility: Independent With Device in Community, Independent With Steps in Community, Independent With Device in Home, Independent With Steps in Home  Driving / Transportation: Driving Independent    Support Systems:  Primary : Luh Rausch  Dtr in [facility]  Other support systems:Ade Partida dtr in [place] area        Previous Services Utilized:   Equipment Owned: Front-Wheel Walker, Wheelchair  Prior Services: halfway Home Aide Services    Other Information:  Occupation (Pre-Hospital Vocational): Retired Due To Age  Pharmacy: [email or web address]  Primary Payor Source: Medicare A  Secondary Payor Source: Medicare B  Primary Care Practitioner : Darius Morataya Md  Other MDs: Dr Earnest Silva; Dr Maikel KIRKPATRICK    Patient / Family Goal:  Patient / Family Goal: mobilize; dc to [facility]    Plan:  1. Continue to follow patient through hospitalization and provide discharge planning in collaboration with patient, family, physicians and ancillary services.     2. Utilize community resources to ensure a safe discharge.

## 2023-12-01 NOTE — CARE PLAN
"The patient is Stable - Low risk of patient condition declining or worsening    Shift Goals  Clinical Goals: safety  Patient Goals: safety, sleep/rest    Problem: Fall Risk - Rehab  Goal: Patient will remain free from falls  Outcome: Progressing  Note: Lexy Garcia Fall risk Assessment Score: 16    High fall risk Interventions   - Alarming seatbelt  - Bed and strip alarm   - Yellow sign by the door   - Yellow wrist band \"Fall risk\"  - Room near to the nurse station  - Do not leave patient unattended in the bathroom  - Fall risk education provided  Patient uses call light consistently and appropriately this shift.  Waits for assistance when needed and does not attempt self transfer.  Able to verbalize needs.  Will continue to monitor.     Problem: Pain - Standard  Goal: Alleviation of pain or a reduction in pain to the patient’s comfort goal  Outcome: Progressing  Flowsheets (Taken 11/30/2023 2200)  OB Pain Intervention: Medication - See MAR  Note: Patient able to verbalize pain level and verbalize an acceptable level of pain.      "

## 2023-12-01 NOTE — PROGRESS NOTES
NURSING DAILY NOTE    Name: Sheyla Gauthier   Date of Admission: 11/29/2023   Admitting Diagnosis: History of left hip replacement  Attending Physician: Mitchell Chavis M.d.  Allergies: Trazodone and Crestor [rosuvastatin calcium]    Safety  Patient Assist  mod assist  Patient Precautions  Fall Risk, Posterior Hip Precautions, Weight Bearing As Tolerated Right Lower Extremity, Spinal / Back Precautions   Precaution Comments  Wound vac, no back brace  Bed Transfer Status  Minimal Assist  Toilet Transfer Status   Contact Guard Assist  Assistive Devices  Wheelchair  Oxygen  Nasal Cannula  Diet/Therapeutic Dining  Current Diet Order   Procedures    Diet Order Diet: Regular     Pill Administration  whole  Agitated Behavioral Scale  14  ABS Level of Severity  No Agitation    Fall Risk  Has the patient had a fall this admission?   No  Lexy Garcia Fall Risk Scoring  16, HIGH RISK  Fall Risk Safety Measures  bed alarm, chair alarm, and poor balance    Vitals  Temperature: 36.7 °C (98 °F)  Temp src: Oral  Pulse: 72  Respiration: 18  Blood Pressure : 118/60  Blood Pressure MAP (Calculated): 79 MM HG  BP Location: Left, Upper Arm  Patient BP Position: Supine     Oxygen  Pulse Oximetry: 96 %  O2 (LPM): 1  O2 Delivery Device: Nasal Cannula    Bowel and Bladder  Last Bowel Movement  11/29/23  Stool Type     Bowel Device  Bathroom  Continent  Bladder: Stress incontinence   Bowel: Continent movement  Bladder Function  Urine Void (mL):  (moderate)  Number of Times Voided: 1  Urine Color: Unable To Evaluate  Genitourinary Assessment   Bladder Assessment (WDL):  WDL Except  Kothari Catheter: Not Applicable  Urine Color: Unable To Evaluate  Bladder Device: Bathroom  Time Void: Yes  Bladder Scan: Post Void  $ Bladder Scan Results (mL): 34    Skin  Wyatt Score   16  Sensory Interventions   Bed Types: Standard/Trauma Mattress  Skin Preventative Measures: Pillows in Use for  Support / Positioning  Moisture Interventions  Moisturizers/Barriers: Barrier Wipes      Pain  Pain Rating Scale  6 - Hard to ignore, avoid usual activities  Pain Location  Hip  Pain Location Orientation  Left  Pain Interventions   Medication (see MAR)    ADLs    Bathing   Shower, Staff (Pt shower with OT)  Linen Change      Personal Hygiene  Moist Klarissa Wipes, Perineal Care  Chlorhexidine Bath      Oral Care  Brushed Teeth (Self)  Teeth/Dentures     Shave     Nutrition Percentage Eaten  *  * Meal *  *, Lunch, Between 50-75% Consumed  Environmental Precautions  Bed in Low Position, Treaded Slipper Socks on Patient  Patient Turns/Positioning  Patient Turns Self from Side to Side  Patient Turns Assistance/Tolerance  Assistance of One  Bed Positions  Bed Controls On  Head of Bed Elevated  Self regulated      Psychosocial/Neurologic Assessment  Psychosocial Assessment  Psychosocial (WDL):  Within Defined Limits  Neurologic Assessment  Neuro (WDL): Exceptions to WDL  Level of Consciousness: Alert  Orientation Level: Oriented X4  Cognition: Follows commands  Speech: Clear  Muscle Strength Right Arm: Good Strength Against Gravity and Moderate Resistance  Muscle Strength Left Arm: Good Strength Against Gravity and Moderate Resistance  Muscle Strength Right Leg: Good Strength Against Gravity and Moderate Resistance  Muscle Strength Left Leg: Weak Movement but Not Against Gravity or Resistance  EENT (WDL):  WDL Except    Cardio/Pulmonary Assessment  Edema   RLE Edema: 2+  LLE Edema: 2+  Respiratory Breath Sounds  RUL Breath Sounds: Clear  RML Breath Sounds: Clear  RLL Breath Sounds: Diminished  TYE Breath Sounds: Clear  LLL Breath Sounds: Diminished  Cardiac Assessment   Cardiac (WDL):  (HX: HTN)

## 2023-12-01 NOTE — CARE PLAN
Problem: Knowledge Deficit - Standard  Goal: Patient and family/care givers will demonstrate understanding of plan of care, disease process/condition, diagnostic tests and medications  Outcome: Progressing     Problem: Skin Integrity  Goal: Patient's skin integrity will be maintained or improve  Note: Patient's skin is maintained and free from new or accidental injury this shift.  Will continue to monitor.   The patient is Stable - Low risk of patient condition declining or worsening    Shift Goals  Clinical Goals: Safety, pain control  Patient Goals: pain control

## 2023-12-01 NOTE — THERAPY
Physical Therapy   Daily Treatment     Patient Name: Sheyla Gauthier  Age:  83 y.o., Sex:  female  Medical Record #: [id number]  Today's Date: 11/30/2023     Precautions  Precautions: Fall Risk, Posterior Hip Precautions, Weight Bearing As Tolerated Right Lower Extremity, Spinal / Back Precautions     Subjective    Patient pleasant and agreeable to participate, requests to use bathroom prior to therapy.      Objective       11/30/23 1501   PT Charge Group   PT Gait Training (Units) 1   PT Therapeutic Activities (Units) 2   PT Total Time Spent   PT Individual Total Time Spent (Mins) 45   Gait Functional Level of Assist    Gait Level Of Assist Contact Guard Assist   Assistive Device Front Wheel Walker   Distance (Feet) 35   # of Times Distance was Traveled 1   Deviation Antalgic;Trendelenberg;Bradykinetic  (step-to due to pain)   Transfer Functional Level of Assist   Bed, Chair, Wheelchair Transfer Minimal Assist   Bed Chair Wheelchair Transfer Description Adaptive equipment;Increased time;Set-up of equipment;Supervision for safety  (FWW)   Toilet Transfers Contact Guard Assist   Toilet Transfer Description Grab bar;Increased time;Set-up of equipment;Supervision for safety   Bed Mobility    Supine to Sit Minimal Assist   Sit to Supine Minimal Assist   Sit to Stand Contact Guard Assist   Interdisciplinary Plan of Care Collaboration   IDT Collaboration with  Family / Caregiver;Physical Therapist   Patient Position at End of Therapy In Bed;Call Light within Reach;Tray Table within Reach;Phone within Reach;Family / Friend in Room   Collaboration Comments Patient's daughter present and supportive; Collaborated with primary PT re: CLOF/POC         Assessment    Patient requires increased time to complete tasks this afternoon, but required only CGA with occasional min A for mobility. She had increased pain with ambulation this afternoon, reverting to step-to pattern and with heavy UE support on FWW. Patient very motivated  to participate.     Strengths: Able to follow instructions, Independent prior level of function, Making steady progress towards goals, Motivated for self care and independence, Pleasant and cooperative, Supportive family, Willingly participates in therapeutic activities  Barriers: Generalized weakness, Impaired activity tolerance, Impaired balance, Pain    Plan    Gait training, stair training, LE/hip/glute strengthening/ROM, balance training, overall activity tolerance/endurance, bed mobility training    DME  PT DME Recommendations  Wheelchair:  (likely will be ambulatory and will not require WC)  Cushion:  (n/a)  Assistive Device: Front Wheeled Walker (pt has FWW)    Passport items to be completed:  Get in/out of bed safely, in/out of a vehicle, safely use mobility device, walk or wheel around home/community, navigate up and down stairs, show how to get up/down from the ground, ensure home is accessible, demonstrate HEP, complete caregiver training    Physical Therapy Problems (Active)       Problem: Mobility       Dates: Start:  11/30/23         Goal: STG-Within one week, patient will ambulate 100ft FWW SBA       Dates: Start:  11/30/23            Goal: STG-Within one week, patient will ambulate up/down a curb SBA FWW       Dates: Start:  11/30/23               Problem: Mobility Transfers       Dates: Start:  11/30/23         Goal: STG-Within one week, patient will perform bed mobility Sherman       Dates: Start:  11/30/23            Goal: STG-Within one week, patient will transfer bed to chair SBA       Dates: Start:  11/30/23               Problem: PT-Long Term Goals       Dates: Start:  11/30/23         Goal: LTG-By discharge, patient will ambulate 150ft Sherman FWW       Dates: Start:  11/30/23            Goal: LTG-By discharge, patient will transfer one surface to another Sherman       Dates: Start:  11/30/23            Goal: LTG-By discharge, patient will perform home exercise program independently       Dates:  Start:  11/30/23            Goal: LTG-By discharge, patient will transfer in/out of a car SBA       Dates: Start:  11/30/23

## 2023-12-01 NOTE — CARE PLAN
Problem: Knowledge Deficit - Standard  Goal: Patient and family/care givers will demonstrate understanding of plan of care, disease process/condition, diagnostic tests and medications  Outcome: Progressing     Problem: Infection  Goal: Patient will remain free from infection  Note: Patient remains free from s/s infection; afebrile.  Will continue to monitor.   The patient is Stable - Low risk of patient condition declining or worsening    Shift Goals  Clinical Goals: Safety  Patient Goals: Safety, pain control, rest

## 2023-12-01 NOTE — PROGRESS NOTES
NURSING DAILY NOTE    Name: Sheyla Gauthier   Date of Admission: 11/29/2023   Admitting Diagnosis: History of left hip replacement  Attending Physician: Mitchell Chavis M.d.  Allergies: Trazodone and Crestor [rosuvastatin calcium]    Safety  Patient Assist  min assist  Patient Precautions  Fall Risk, Posterior Hip Precautions, Weight Bearing As Tolerated Right Lower Extremity, Spinal / Back Precautions   Precaution Comments  Wound vac, no back brace  Bed Transfer Status  Contact Guard Assist  Toilet Transfer Status   Contact Guard Assist  Assistive Devices  Wheelchair  Oxygen  Nasal Cannula  Diet/Therapeutic Dining  Current Diet Order   Procedures    Diet Order Diet: Regular     Pill Administration  whole  Agitated Behavioral Scale  14  ABS Level of Severity  No Agitation    Fall Risk  Has the patient had a fall this admission?   No  Lexy Garcia Fall Risk Scoring  16, HIGH RISK  Fall Risk Safety Measures  bed alarm, chair alarm, and poor balance    Vitals  Temperature: 37.5 °C (99.5 °F)  Temp src: Temporal  Pulse: 74  Respiration: 14  Blood Pressure : 106/64  Blood Pressure MAP (Calculated): 78 MM HG  BP Location: Left, Upper Arm  Patient BP Position: Sitting     Oxygen  Pulse Oximetry: 97 %  O2 (LPM): 1  O2 Delivery Device: Nasal Cannula    Bowel and Bladder  Last Bowel Movement  12/01/23  Stool Type  Type 4: Like a sausage or snake, smooth and soft  Bowel Device  Bathroom  Continent  Bladder: Stress incontinence   Bowel: Continent movement  Bladder Function  Urine Void (mL):  (moderate)  Number of Times Voided: 1  Urine Color: Unable To Evaluate  Genitourinary Assessment   Bladder Assessment (WDL):  WDL Except  Kothari Catheter: Not Applicable  Urine Color: Unable To Evaluate  Bladder Device: Bathroom  Time Void: Yes  Bladder Scan: Post Void  $ Bladder Scan Results (mL): 34    Skin  Wyatt Score   16  Sensory Interventions   Bed Types: Standard/Trauma  Mattress  Skin Preventative Measures: Pillows in Use for Support / Positioning  Moisture Interventions  Moisturizers/Barriers: Barrier Wipes      Pain  Pain Rating Scale  4 - Distracts me, can do usual activities  Pain Location  Hip  Pain Location Orientation  Left  Pain Interventions   Medication (see MAR)    ADLs    Bathing   Shower, Staff (Pt shower with OT)  Linen Change      Personal Hygiene  Moist Klarissa Wipes, Perineal Care  Chlorhexidine Bath      Oral Care  Brushed Teeth (Self)  Teeth/Dentures     Shave     Nutrition Percentage Eaten  *  * Meal *  *, Dinner, Between % Consumed  Environmental Precautions  Treaded Slipper Socks on Patient, Personal Belongings, Wastebasket, Call Bell etc. in Easy Reach, Bed in Low Position  Patient Turns/Positioning  Patient Turns Self from Side to Side  Patient Turns Assistance/Tolerance  Assistance of One  Bed Positions  Bed Controls On  Head of Bed Elevated  Self regulated      Psychosocial/Neurologic Assessment  Psychosocial Assessment  Psychosocial (WDL):  Within Defined Limits  Neurologic Assessment  Neuro (WDL): Exceptions to WDL  Level of Consciousness: Alert  Orientation Level: Oriented X4  Cognition: Follows commands  Speech: Clear  Pupil Assesment: No  Muscle Strength Right Arm: Good Strength Against Gravity and Moderate Resistance  Muscle Strength Left Arm: Good Strength Against Gravity and Moderate Resistance  Muscle Strength Right Leg: Good Strength Against Gravity and Moderate Resistance  Muscle Strength Left Leg: Weak Movement but Not Against Gravity or Resistance  EENT (WDL):  WDL Except    Cardio/Pulmonary Assessment  Edema   RLE Edema: 2+  LLE Edema: 2+  Respiratory Breath Sounds  RUL Breath Sounds: Clear  RML Breath Sounds: Clear  RLL Breath Sounds: Diminished  TYE Breath Sounds: Clear  LLL Breath Sounds: Diminished  Cardiac Assessment   Cardiac (WDL):  (HX: HTN)

## 2023-12-01 NOTE — PROGRESS NOTES
NURSING DAILY NOTE    Name: Sheyla Gauthier   Date of Admission: 11/29/2023   Admitting Diagnosis: History of left hip replacement  Attending Physician: Mitchell Chavis M.d.  Allergies: Trazodone and Crestor [rosuvastatin calcium]    Safety  Patient Assist  min assist  Patient Precautions  Fall Risk, Posterior Hip Precautions, Weight Bearing As Tolerated Right Lower Extremity, Spinal / Back Precautions   Precaution Comments  Wound vac, no back brace  Bed Transfer Status  Minimal Assist  Toilet Transfer Status   Contact Guard Assist  Assistive Devices  Rails, Wheelchair  Oxygen  Nasal Cannula  Diet/Therapeutic Dining  Current Diet Order   Procedures    Diet Order Diet: Regular     Pill Administration  whole  Agitated Behavioral Scale  14  ABS Level of Severity  No Agitation    Fall Risk  Has the patient had a fall this admission?   No  eLxy Garcia Fall Risk Scoring  16, HIGH RISK  Fall Risk Safety Measures  bed alarm, chair alarm, seatbelt alarm, and poor balance    Vitals  Temperature: 36.9 °C (98.4 °F)  Temp src: Oral  Pulse: 83  Respiration: 16  Blood Pressure : (!) 141/67  Blood Pressure MAP (Calculated): 92 MM HG  BP Location: Left, Upper Arm  Patient BP Position: Sitting     Oxygen  Pulse Oximetry: 92 %  O2 (LPM): 1  O2 Delivery Device: Nasal Cannula    Bowel and Bladder  Last Bowel Movement  11/30/23  Stool Type  Type 5: Soft blob with clear cut edges (passed easily)  Bowel Device  Bathroom  Continent  Bladder: Stress incontinence   Bowel: Continent movement  Bladder Function  Urine Void (mL):  (moderate)  Number of Times Voided: 1  Urine Color: Yellow  Genitourinary Assessment   Bladder Assessment (WDL):  Within Defined Limits  Kothari Catheter: Not Applicable  Urine Color: Yellow  Bladder Device: Bathroom  Time Void: Yes  Bladder Scan: Post Void  $ Bladder Scan Results (mL): 34    Skin  Wyatt Score   16  Sensory Interventions   Bed Types:  Standard/Trauma Mattress  Skin Preventative Measures: Pillows in Use for Support / Positioning  Moisture Interventions  Moisturizers/Barriers: Barrier Wipes      Pain  Pain Rating Scale  6 - Hard to ignore, avoid usual activities  Pain Location  Hip  Pain Location Orientation  Left  Pain Interventions   Medication (see MAR)    ADLs    Bathing   Shower, Staff (Pt shower with OT)  Linen Change      Personal Hygiene  Moist Klarissa Wipes, Perineal Care  Chlorhexidine Bath      Oral Care  Brushed Teeth (Self)  Teeth/Dentures     Shave     Nutrition Percentage Eaten  *  * Meal *  *, Dinner, Between % Consumed  Environmental Precautions  Bed in Low Position, Treaded Slipper Socks on Patient  Patient Turns/Positioning  Patient Turns Self from Side to Side  Patient Turns Assistance/Tolerance  Assistance of One  Bed Positions  Bed Controls On  Head of Bed Elevated  Self regulated      Psychosocial/Neurologic Assessment  Psychosocial Assessment  Psychosocial (WDL):  Within Defined Limits  Neurologic Assessment  Neuro (WDL): Exceptions to WDL  Level of Consciousness: Alert  Orientation Level: Oriented X4  Cognition: Follows commands  Speech: Clear  Pupil Assesment: No  Muscle Strength Right Arm: Good Strength Against Gravity and Moderate Resistance  Muscle Strength Left Arm: Good Strength Against Gravity and Moderate Resistance  Muscle Strength Right Leg: Good Strength Against Gravity and Moderate Resistance  Muscle Strength Left Leg: Weak Movement but Not Against Gravity or Resistance  EENT (WDL):  WDL Except    Cardio/Pulmonary Assessment  Edema   RLE Edema: 2+  LLE Edema: 2+  Respiratory Breath Sounds  RUL Breath Sounds: Clear  RML Breath Sounds: Clear  RLL Breath Sounds: Diminished  TYE Breath Sounds: Clear  LLL Breath Sounds: Diminished  Cardiac Assessment   Cardiac (WDL):  (HX: HTN)

## 2023-12-02 ENCOUNTER — APPOINTMENT (OUTPATIENT)
Dept: OCCUPATIONAL THERAPY | Facility: REHABILITATION | Age: 83
DRG: 560 | End: 2023-12-02
Attending: PHYSICAL MEDICINE & REHABILITATION
Payer: MEDICARE

## 2023-12-02 LAB
BACTERIA SPEC ANAEROBE CULT: NORMAL
BACTERIA SPEC ANAEROBE CULT: NORMAL
SIGNIFICANT IND 70042: NORMAL
SIGNIFICANT IND 70042: NORMAL
SITE SITE: NORMAL
SITE SITE: NORMAL
SOURCE SOURCE: NORMAL
SOURCE SOURCE: NORMAL

## 2023-12-02 PROCEDURE — A9270 NON-COVERED ITEM OR SERVICE: HCPCS | Performed by: PHYSICAL MEDICINE & REHABILITATION

## 2023-12-02 PROCEDURE — 97530 THERAPEUTIC ACTIVITIES: CPT

## 2023-12-02 PROCEDURE — 770010 HCHG ROOM/CARE - REHAB SEMI PRIVAT*

## 2023-12-02 PROCEDURE — 700102 HCHG RX REV CODE 250 W/ 637 OVERRIDE(OP): Performed by: PHYSICAL MEDICINE & REHABILITATION

## 2023-12-02 RX ADMIN — FERROUS SULFATE TAB 325 MG (65 MG ELEMENTAL FE) 325 MG: 325 (65 FE) TAB at 08:25

## 2023-12-02 RX ADMIN — APIXABAN 5 MG: 5 TABLET, FILM COATED ORAL at 08:25

## 2023-12-02 RX ADMIN — ACETAMINOPHEN 1000 MG: 500 TABLET ORAL at 08:24

## 2023-12-02 RX ADMIN — LEVOTHYROXINE SODIUM 88 MCG: 0.09 TABLET ORAL at 05:40

## 2023-12-02 RX ADMIN — METHOCARBAMOL TABLETS 1000 MG: 500 TABLET, COATED ORAL at 15:21

## 2023-12-02 RX ADMIN — OXYCODONE 5 MG: 5 TABLET ORAL at 05:40

## 2023-12-02 RX ADMIN — ATORVASTATIN CALCIUM 40 MG: 40 TABLET, FILM COATED ORAL at 19:56

## 2023-12-02 RX ADMIN — PREGABALIN 100 MG: 100 CAPSULE ORAL at 19:56

## 2023-12-02 RX ADMIN — FAMOTIDINE 20 MG: 20 TABLET, FILM COATED ORAL at 08:25

## 2023-12-02 RX ADMIN — LIOTHYRONINE SODIUM 5 MCG: 5 TABLET ORAL at 08:26

## 2023-12-02 RX ADMIN — OXYCODONE 5 MG: 5 TABLET ORAL at 12:44

## 2023-12-02 RX ADMIN — SENNOSIDES AND DOCUSATE SODIUM 2 TABLET: 50; 8.6 TABLET ORAL at 21:00

## 2023-12-02 RX ADMIN — PREGABALIN 100 MG: 100 CAPSULE ORAL at 08:26

## 2023-12-02 RX ADMIN — OXYCODONE 5 MG: 5 TABLET ORAL at 19:56

## 2023-12-02 RX ADMIN — SENNOSIDES AND DOCUSATE SODIUM 2 TABLET: 50; 8.6 TABLET ORAL at 08:25

## 2023-12-02 RX ADMIN — ACETAMINOPHEN 1000 MG: 500 TABLET ORAL at 15:21

## 2023-12-02 RX ADMIN — METHOCARBAMOL TABLETS 1000 MG: 500 TABLET, COATED ORAL at 08:29

## 2023-12-02 RX ADMIN — ACETAMINOPHEN 1000 MG: 500 TABLET ORAL at 19:58

## 2023-12-02 RX ADMIN — METHOCARBAMOL TABLETS 1000 MG: 500 TABLET, COATED ORAL at 19:56

## 2023-12-02 RX ADMIN — APIXABAN 5 MG: 5 TABLET, FILM COATED ORAL at 19:56

## 2023-12-02 ASSESSMENT — ACTIVITIES OF DAILY LIVING (ADL): TOILET_TRANSFER_DESCRIPTION: GRAB BAR;INCREASED TIME;SUPERVISION FOR SAFETY;VERBAL CUEING;SET-UP OF EQUIPMENT

## 2023-12-02 ASSESSMENT — PAIN SCALES - WONG BAKER
WONGBAKER_NUMERICALRESPONSE: HURTS JUST A LITTLE BIT
WONGBAKER_NUMERICALRESPONSE: DOESN'T HURT AT ALL

## 2023-12-02 ASSESSMENT — PAIN DESCRIPTION - PAIN TYPE
TYPE: ACUTE PAIN

## 2023-12-02 NOTE — CARE PLAN
The patient is Stable - Low risk of patient condition declining or worsening    Shift Goals  Clinical Goals: Safety  Patient Goals: sleep, pain control    Progress made toward(s) clinical / shift goals:    Problem: Fall Risk - Rehab  Goal: Patient will remain free from falls  Outcome: Progressing. Patient bed in lowest locked position with bed alarm on and call light within reach. Patient calls appropriately with call light for needs. Patient has not attempted to self transfer over shift.      Problem: Pain - Standard  Goal: Alleviation of pain or a reduction in pain to the patient’s comfort goal  Outcome: Progressing. Patient requested scheduled 2100 oxy as well as another 5 mg dose of oxycodone for left hip/groin pain overnight and stated adequate relief after both doses were given. Patient has scheduled tid 5mg oxy as well as prn q3 oxy 5 mg for breakthrough pain. Patient stated pain at 2100 was 6/10 and after pain medication x2, patient stated pain reduced to 4/10.

## 2023-12-02 NOTE — CARE PLAN
"The patient is Stable - Low risk of patient condition declining or worsening    Shift Goals  Clinical Goals: safety  Patient Goals: sleep, pain control    Progress made toward(s) clinical / shift goals:  2    Problem: Fall Risk - Rehab  Goal: Patient will remain free from falls  Outcome: Progressing  Note: Lexy Garcia Fall risk Assessment Score: 15    High fall risk Interventions  - Bed and strip alarm   - Yellow sign by the door   - Yellow wrist band \"Fall risk\"  - Do not leave patient unattended in the bathroom  - Fall risk education provided  Verbal fall prevention education given with encounters, including, but not limited to call light use, waiting for staff assist for transfers and personal needs. Pt uses call light appropriately for assist with transfers and personal needs. Remains free from falls.    Problem: Skin Integrity  Goal: Skin integrity is maintained or improved  Outcome: Progressing: [place] wound vac to left hip incision with staples stopped working, dressing removed. Scant SS drainage observed. Island dressing placed. Updated photo in epic. Low back incision is glued and renan.  Scant SS drainage noted. incision     "

## 2023-12-02 NOTE — FLOWSHEET NOTE
12/01/23 1715   Events/Summary/Plan   Events/Summary/Plan Room air SpO2 check   Vital Signs   Pulse 72   Respiration 16   Pulse Oximetry 95 %   $ Pulse Oximetry (Spot Check) Yes   Respiratory Assessment   Respiratory Pattern Within Normal Limits   Level of Consciousness Alert   Chest Exam   Work Of Breathing / Effort Within Normal Limits   Oxygen   O2 Delivery Device None - Room Air

## 2023-12-02 NOTE — THERAPY
Occupational Therapy  Daily Treatment     Patient Name: Sheyla Gauthier  Age:  83 y.o., Sex:  female  Medical Record #: [id number]  Today's Date: 12/1/2023     Precautions  Precautions: Fall Risk, Posterior Hip Precautions, Weight Bearing As Tolerated Right Lower Extremity, Spinal / Back Precautions   Comments: Wound vac, no back brace         Subjective    Pt seen 2x this day as listed below.     Objective       12/01/23 0931 12/01/23 1231   OT Charge Group   OT Self Care / ADL (Units) 3 1   OT Therapy Activity (Units) 1  --    OT Therapeutic Exercise (Units)  --  1   OT Total Time Spent   OT Individual Total Time Spent (Mins) 60 30   Vitals   O2 (LPM) 1  --    O2 Delivery Device Nasal Cannula  --    Cognition    Level of Consciousness Alert Alert   Functional Level of Assist   Bathing Standby Assist  --    Bathing Description Grab bar;Long handled bath tool;Hand held shower;Assit with back;Increased time;Initial preparation for task;Set-up of equipment;Set up for wound protection;Supervision for safety;Verbal cueing  (SBA in standing for GB while washing rear. TA for waterproofing wound vac. COmpleted seated on padded commode for comfort)  --    Upper Body Dressing Supervision  --    Upper Body Dressing Description Set-up of equipment  --    Lower Body Dressing Minimal Assist  --    Lower Body Dressing Description Grab bar;Reacher;Cues for spinal precautions;Increased time;Initial preparation for task;Set-up of equipment;Supervision for safety;Verbal cueing  (Assist for donning shoes and socks due to time constraints. Pt able to thread/unthread using reacher/ dressing stick for pants/UW. Gb for UB support)  --    Toileting Contact Guard Assist Contact Guard Assist   Toileting Description Assist for standing balance;Grab bar Grab bar;Assist for standing balance;Supervision for safety   Toilet Transfers Standby Assist Contact Guard Assist   Toilet Transfer Description Grab bar;Supervision for safety;Verbal cueing  Grab bar;Adaptive equipment;Set-up of equipment;Supervision for safety;Verbal cueing   Tub / Shower Transfers Standby Assist  --    Tub Shower Transfer Description Grab bar;Shower bench  --    Sitting Upper Body Exercises   Chest Fly  --  Bilateral;Medium Resistance Theraband;3 sets of 15   Bilateral Row  --  Bilateral;Medium Resistance Theraband;3 sets of 15   Interdisciplinary Plan of Care Collaboration   IDT Collaboration with  Family / Caregiver Family / Caregiver   Patient Position at End of Therapy Seated;Chair Alarm On;Self Releasing Lap Belt Applied;Tray Table within Reach;Call Light within Reach;Family / Friend in Room Seated  (Pass off to PT at end of OT session on toilet.)   Collaboration Comments dtr present during session dtr present for initiation of family training for toilet transfers.   Occupational Therapist Assigned   Assigned OT / Treatment Time / Comments Keep with Valeria during team change.  --          Assessment    Pt tolerated both sessions well with progress made towards functional goals with ADLs and transfers. Pt did report that she was having more pain this day, but continues to make progress.  Strengths: Able to follow instructions, Alert and oriented, Effective communication skills, Good insight into deficits/needs, Independent prior level of function, Making steady progress towards goals, Manages pain appropriately, Motivated for self care and independence, Pleasant and cooperative, Supportive family, Willingly participates in therapeutic activities  Barriers: Fatigue, Generalized weakness, Impaired activity tolerance, Impaired balance, Limited mobility, Pain    Plan    ADLs with AE, UB strengthening, functional transfers/balance within precautions    DME       Passport items to be completed:  Perform bathroom transfers, complete dressing, complete feeding, get ready for the day, prepare a simple meal, participate in household tasks, adapt home for safety needs, demonstrate home  exercise program, complete caregiver training     Occupational Therapy Goals (Active)       Problem: Bathing       Dates: Start:  11/30/23         Goal: STG-Within one week, patient will bathe at SPV level using AE/DME PRN.       Dates: Start:  11/30/23               Problem: Dressing       Dates: Start:  11/30/23         Goal: STG-Within one week, patient will dress LB at SBA level using AE/DME PRN.       Dates: Start:  11/30/23               Problem: Functional Transfers       Dates: Start:  11/30/23         Goal: STG-Within one week, patient will transfer to toilet at SBA level using DME PRN.       Dates: Start:  11/30/23            Goal: STG-Within one week, patient will transfer to step in shower at SBA level using DME PRN.       Dates: Start:  11/30/23               Problem: OT Long Term Goals       Dates: Start:  11/30/23         Goal: LTG-By discharge, patient will complete basic self care tasks at SBA-Mod I level using DME PRN.       Dates: Start:  11/30/23            Goal: LTG-By discharge, patient will perform bathroom transfers at SBA-Mod I level using DME PRN.       Dates: Start:  11/30/23               Problem: Toileting       Dates: Start:  11/30/23         Goal: STG-Within one week, patient will complete toileting tasks at SBA level using AE/DME PRN.       Dates: Start:  11/30/23

## 2023-12-02 NOTE — PROGRESS NOTES
NURSING DAILY NOTE    Name: Sheyla Gauthier  Date of Admission: 11/29/2023  Admitting Diagnosis: History of left hip replacement  Attending Physician: Mitchell Chavis M.d.  Allergies: Trazodone and Crestor [rosuvastatin calcium]    Safety  Patient Assist  omin  Patient Precautions  oFall Risk, Posterior Hip Precautions, Weight Bearing As Tolerated Right Lower Extremity, Spinal / Back Precautions   Precaution Comments  oWound vac, no back brace  Bed Transfer Status  oContact Guard Assist  Toilet Transfer Status  oContact Guard Assist  Assistive Devices  oRails, Wheelchair  Oxygen  oNone - Room Air  Diet/Therapeutic Dining  o  Current Diet Order   Procedures    Diet Order Diet: Regular     Pill Administration  owhole  Agitated Behavioral Scale  o14  ABS Level of Severity  Tracy Agitation    Fall Risk  Has the patient had a fall this admission?  Tracy  Lexy Garcia Fall Risk Scoring  o16, HIGH RISK  Fall Risk Safety Measures  anahi alarm and chair alarm    Vitals  Temperature: 37.2 °C (99 °F)  Temp src: Oral  Pulse: 82  Respiration: 16  Blood Pressure : 109/75  Blood Pressure MAP (Calculated): 86 MM HG  BP Location: Right, Upper Arm  Patient BP Position: Sitting    Oxygen  Pulse Oximetry: 92 %  O2 (LPM): 1  O2 Delivery Device: None - Room Air    Bowel and Bladder  Last Bowel Movement  o12/01/23  Stool Type  oType 4: Like a sausage or snake, smooth and soft  Bowel Device  oBathroom  Continent  oBladder: Stress incontinence  oBowel: Continent movement  Bladder Function  oUrine Void (mL):  (Moderate)  Number of Times Voided: 1  Urine Color: Yellow  Genitourinary Assessment  oBladder Assessment (WDL):  WDL Except  Kothari Catheter: Not Applicable  Urine Color: Yellow  Bladder Device: Absorbent Brief (Pull Up), Bathroom  Time Void: Yes  Bladder Scan: Post Void  $ Bladder Scan Results (mL): 34    Skin  Wyatt Score  o 16  Sensory Interventions  o Bed Types: Standard/Trauma Mattress  Skin Preventative Measures: Pillows in Use  for Support / Positioning  Moisture Interventions  oMoisturizers/Barriers: Barrier Wipes      Pain  Pain Rating Scale  o4 - Distracts me, can do usual activities  Pain Location  oHip  Pain Location Orientation  oLeft  Pain Interventions  oMedication (see MAR), Repositioned    ADLs    Bathing  oShower, Staff (Pt shower with OT)  Linen Change  o   Personal Hygiene  oMoist Klarissa Wipes, Perineal Care  Chlorhexidine Bath  o   Oral Care  oBrushed Teeth (Self)  Teeth/Dentures  o   Shave  o   Nutrition Percentage Eaten  oDinner, Between % Consumed  Environmental Precautions  oTreaded Slipper Socks on Patient, Bed in Low Position  Patient Turns/Positioning  oPatient Turns Self from Side to Side  Patient Turns Assistance/Tolerance  oAssistance of One  Bed Positions  Tao Controls On, Bed Locked  Head of Bed Elevated  oLess than 30 degrees      Psychosocial/Neurologic Assessment  Psychosocial Assessment  oPsychosocial (WDL):  Within Defined Limits  Neurologic Assessment  oNeuro (WDL): Exceptions to WDL  Level of Consciousness: Alert  Orientation Level: Oriented X4  Cognition: Follows commands  Speech: Clear  Pupil Assesment: No  Motor Function/Sensation Assessment: Motor strength  Muscle Strength Right Arm: Good Strength Against Gravity and Moderate Resistance  Muscle Strength Left Arm: Good Strength Against Gravity and Moderate Resistance  Muscle Strength Right Leg: Good Strength Against Gravity and Moderate Resistance  Muscle Strength Left Leg: Weak Movement but Not Against Gravity or Resistance  oEENT (WDL):  WDL Except    Cardio/Pulmonary Assessment  Edema  oRLE Edema: 2+  LLE Edema: 2+  Respiratory Breath Sounds  oRUL Breath Sounds: Clear  RML Breath Sounds: Clear  RLL Breath Sounds: Diminished  TYE Breath Sounds: Clear  LLL Breath Sounds: Diminished  Cardiac Assessment  oCardiac (WDL):  WDL Except

## 2023-12-03 ENCOUNTER — APPOINTMENT (OUTPATIENT)
Dept: PHYSICAL THERAPY | Facility: REHABILITATION | Age: 83
DRG: 560 | End: 2023-12-03
Attending: PHYSICAL MEDICINE & REHABILITATION
Payer: MEDICARE

## 2023-12-03 PROCEDURE — 770010 HCHG ROOM/CARE - REHAB SEMI PRIVAT*

## 2023-12-03 PROCEDURE — A9270 NON-COVERED ITEM OR SERVICE: HCPCS | Performed by: PHYSICAL MEDICINE & REHABILITATION

## 2023-12-03 PROCEDURE — 700102 HCHG RX REV CODE 250 W/ 637 OVERRIDE(OP): Performed by: PHYSICAL MEDICINE & REHABILITATION

## 2023-12-03 PROCEDURE — 97110 THERAPEUTIC EXERCISES: CPT

## 2023-12-03 PROCEDURE — 97530 THERAPEUTIC ACTIVITIES: CPT

## 2023-12-03 PROCEDURE — 97116 GAIT TRAINING THERAPY: CPT

## 2023-12-03 RX ADMIN — METHOCARBAMOL TABLETS 1000 MG: 500 TABLET, COATED ORAL at 21:30

## 2023-12-03 RX ADMIN — ACETAMINOPHEN 1000 MG: 500 TABLET ORAL at 21:31

## 2023-12-03 RX ADMIN — OXYCODONE 5 MG: 5 TABLET ORAL at 05:22

## 2023-12-03 RX ADMIN — OXYCODONE 5 MG: 5 TABLET ORAL at 17:32

## 2023-12-03 RX ADMIN — FERROUS SULFATE TAB 325 MG (65 MG ELEMENTAL FE) 325 MG: 325 (65 FE) TAB at 09:38

## 2023-12-03 RX ADMIN — FAMOTIDINE 20 MG: 20 TABLET, FILM COATED ORAL at 09:38

## 2023-12-03 RX ADMIN — OXYCODONE 5 MG: 5 TABLET ORAL at 09:37

## 2023-12-03 RX ADMIN — ACETAMINOPHEN 1000 MG: 500 TABLET ORAL at 09:37

## 2023-12-03 RX ADMIN — OXYCODONE 5 MG: 5 TABLET ORAL at 21:30

## 2023-12-03 RX ADMIN — LIOTHYRONINE SODIUM 5 MCG: 5 TABLET ORAL at 09:38

## 2023-12-03 RX ADMIN — OXYCODONE 5 MG: 5 TABLET ORAL at 00:14

## 2023-12-03 RX ADMIN — LEVOTHYROXINE SODIUM 88 MCG: 0.09 TABLET ORAL at 05:22

## 2023-12-03 RX ADMIN — METHOCARBAMOL TABLETS 1000 MG: 500 TABLET, COATED ORAL at 15:02

## 2023-12-03 RX ADMIN — SENNOSIDES AND DOCUSATE SODIUM 2 TABLET: 50; 8.6 TABLET ORAL at 21:30

## 2023-12-03 RX ADMIN — ATORVASTATIN CALCIUM 40 MG: 40 TABLET, FILM COATED ORAL at 21:31

## 2023-12-03 RX ADMIN — PREGABALIN 100 MG: 100 CAPSULE ORAL at 09:38

## 2023-12-03 RX ADMIN — APIXABAN 5 MG: 5 TABLET, FILM COATED ORAL at 21:31

## 2023-12-03 RX ADMIN — APIXABAN 5 MG: 5 TABLET, FILM COATED ORAL at 09:38

## 2023-12-03 RX ADMIN — OXYCODONE 5 MG: 5 TABLET ORAL at 13:13

## 2023-12-03 RX ADMIN — ACETAMINOPHEN 1000 MG: 500 TABLET ORAL at 15:01

## 2023-12-03 RX ADMIN — METHOCARBAMOL TABLETS 1000 MG: 500 TABLET, COATED ORAL at 09:37

## 2023-12-03 RX ADMIN — PREGABALIN 100 MG: 100 CAPSULE ORAL at 21:31

## 2023-12-03 ASSESSMENT — PAIN SCALES - WONG BAKER: WONGBAKER_NUMERICALRESPONSE: HURTS JUST A LITTLE BIT

## 2023-12-03 ASSESSMENT — GAIT ASSESSMENTS
DEVIATION: ANTALGIC
DISTANCE (FEET): 25
GAIT LEVEL OF ASSIST: STANDBY ASSIST
GAIT LEVEL OF ASSIST: REFUSED
ASSISTIVE DEVICE: FRONT WHEEL WALKER

## 2023-12-03 ASSESSMENT — PAIN DESCRIPTION - PAIN TYPE
TYPE: ACUTE PAIN
TYPE: ACUTE PAIN

## 2023-12-03 ASSESSMENT — FIBROSIS 4 INDEX: FIB4 SCORE: 3.23

## 2023-12-03 ASSESSMENT — ACTIVITIES OF DAILY LIVING (ADL): BED_CHAIR_WHEELCHAIR_TRANSFER_DESCRIPTION: INCREASED TIME;SET-UP OF EQUIPMENT

## 2023-12-03 NOTE — PROGRESS NOTES
NURSING DAILY NOTE    Name: Sheyla Gauthier   Date of Admission: 11/29/2023   Admitting Diagnosis: History of left hip replacement  Attending Physician: Mitchell Chavis M.d.  Allergies: Trazodone and Crestor [rosuvastatin calcium]    Safety  Patient Assist  min a  Patient Precautions  Fall Risk, Posterior Hip Precautions, Weight Bearing As Tolerated Right Lower Extremity, Spinal / Back Precautions   Precaution Comments  Wound vac, no back brace  Bed Transfer Status  Contact Guard Assist  Toilet Transfer Status   Standby Assist  Assistive Devices  Wheelchair, Rails  Oxygen  None - Room Air  Diet/Therapeutic Dining  Current Diet Order   Procedures    Diet Order Diet: Regular     Pill Administration  whole and floated, quarter robaxin and tylenol and float in applesauce. Other meds whole.  Agitated Behavioral Scale  14  ABS Level of Severity  No Agitation    Fall Risk  Has the patient had a fall this admission?   No  Lexy Garcia Fall Risk Scoring  12, MODERATE RISK  Fall Risk Safety Measures  bed alarm, chair alarm, and low vision/ hearing    Vitals  Temperature: 36.9 °C (98.5 °F)  Temp src: Oral  Pulse: 77  Respiration: 16  Blood Pressure : 110/70  Blood Pressure MAP (Calculated): 83 MM HG  BP Location: Left, Upper Arm  Patient BP Position: Sitting     Oxygen  Pulse Oximetry: 96 %  O2 (LPM): 0  O2 Delivery Device: None - Room Air    Bowel and Bladder  Last Bowel Movement  12/01/23  Stool Type  Type 4: Like a sausage or snake, smooth and soft  Bowel Device  Bathroom  Continent  Bladder: Stress incontinence   Bowel: Continent movement  Bladder Function  Urine Void (mL):  (Moderate)  Number of Times Voided: 1  Urine Color: Unable To Evaluate  Genitourinary Assessment   Bladder Assessment (WDL):  WDL Except  Kothari Catheter: Not Applicable  Urine Color: Unable To Evaluate  Bladder Device: Bathroom  Time Void: Yes  Bladder Scan: Post Void  $ Bladder Scan Results  (mL): 34    Skin  Wyatt Score   16  Sensory Interventions   Bed Types: Standard/Trauma Mattress  Skin Preventative Measures: Waffle Overlay, Pillows in Use for Support / Positioning  Moisture Interventions  Moisturizers/Barriers: Barrier Wipes      Pain  Pain Rating Scale  4 - Distracts me, can do usual activities  Pain Location  Hip  Pain Location Orientation  Left  Pain Interventions   Medication (see MAR)    ADLs    Bathing   Shower, Staff (Pt shower with OT)  Linen Change      Personal Hygiene  Moist Klarissa Wipes, Perineal Care  Chlorhexidine Bath      Oral Care  Brushed Teeth (Self)  Teeth/Dentures     Shave     Nutrition Percentage Eaten  Breakfast, *  * Meal *  *, Between % Consumed  Environmental Precautions  Treaded Slipper Socks on Patient, Bed in Low Position  Patient Turns/Positioning  Patient Turns Self from Side to Side  Patient Turns Assistance/Tolerance  Assistance of One, Tolerates Well, General Weakness  Bed Positions  Bed Controls On, Bed Locked  Head of Bed Elevated  Less than 30 degrees      Psychosocial/Neurologic Assessment  Psychosocial Assessment  Psychosocial (WDL):  Within Defined Limits  Neurologic Assessment  Neuro (WDL): Exceptions to WDL  Level of Consciousness: Alert  Orientation Level: Oriented X4  Cognition: Follows commands  Speech: Clear  Pupil Assesment: No  Motor Function/Sensation Assessment: Motor strength  Muscle Strength Right Arm: Good Strength Against Gravity and Moderate Resistance  Muscle Strength Left Arm: Good Strength Against Gravity and Moderate Resistance  Muscle Strength Right Leg: Good Strength Against Gravity and Moderate Resistance  Muscle Strength Left Leg: Weak Movement but Not Against Gravity or Resistance  EENT (WDL):  WDL Except    Cardio/Pulmonary Assessment  Edema   RLE Edema: 2+  LLE Edema: 1+  Respiratory Breath Sounds  RUL Breath Sounds: Clear  RML Breath Sounds: Clear  RLL Breath Sounds: Clear  TYE Breath Sounds: Clear  LLL Breath Sounds:  Clear  Cardiac Assessment   Cardiac (WDL):  (HX: HTN)

## 2023-12-03 NOTE — PROGRESS NOTES
NURSING DAILY NOTE    Name: Sheyla Gauthier  Date of Admission: 11/29/2023  Admitting Diagnosis: History of left hip replacement  Attending Physician: Mitchell Chavis M.d.  Allergies: Trazodone and Crestor [rosuvastatin calcium]    Safety  Patient Assist  omin  Patient Precautions  oFall Risk, Posterior Hip Precautions, Weight Bearing As Tolerated Right Lower Extremity, Spinal / Back Precautions   Precaution Comments  oWound vac, no back brace  Bed Transfer Status  oContact Guard Assist  Toilet Transfer Status  oStandby Assist  Assistive Devices  oRails, Wheelchair  Oxygen  oNone - Room Air  Diet/Therapeutic Dining  o  Current Diet Order   Procedures    Diet Order Diet: Regular     Pill Administration  owhole  Agitated Behavioral Scale  o14  ABS Level of Severity  Tracy Agitation    Fall Risk  Has the patient had a fall this admission?  Tracy  Lexy Garcia Fall Risk Scoring  o12, MODERATE RISK  Fall Risk Safety Measures  anahi alarm and chair alarm    Vitals  Temperature: 37.1 °C (98.7 °F)  Temp src: Temporal  Pulse: 78  Respiration: 18  Blood Pressure : 119/61  Blood Pressure MAP (Calculated): 80 MM HG  BP Location: Right, Upper Arm  Patient BP Position: Supine    Oxygen  Pulse Oximetry: 96 %  O2 (LPM): 0  O2 Delivery Device: None - Room Air    Bowel and Bladder  Last Bowel Movement  o12/02/23  Stool Type  oType 4: Like a sausage or snake, smooth and soft  Bowel Device  oBathroom  Continent  oBladder: Stress incontinence  oBowel: Continent movement  Bladder Function  oUrine Void (mL):  (MOderate)  Number of Times Voided: 1  Urine Color: Yellow  Genitourinary Assessment  oBladder Assessment (WDL):  WDL Except  Kothari Catheter: Not Applicable  Urine Color: Yellow  Bladder Device: Bathroom  Time Void: Yes  Bladder Scan: Post Void  $ Bladder Scan Results (mL): 34    Skin  Wyatt Score  o 16  Sensory Interventions  o Bed Types: Standard/Trauma Mattress with Overlay  Skin Preventative Measures: Waffle Overlay, Pillows in  Use for Support / Positioning  Moisture Interventions  oMoisturizers/Barriers: Barrier Wipes      Pain  Pain Rating Scale  o4 - Distracts me, can do usual activities  Pain Location  oHip  Pain Location Orientation  oLeft  Pain Interventions  oMedication (see MAR)    ADLs    Bathing  oShower, Staff (Pt shower with OT)  Linen Change  o   Personal Hygiene  oMoist Klarissa Wipes, Perineal Care  Chlorhexidine Bath  o   Oral Care  oBrushed Teeth (Self)  Teeth/Dentures  o   Shave  o   Nutrition Percentage Eaten  o*  * Meal *  *, Dinner, Between % Consumed  Environmental Precautions  oTreaded Slipper Socks on Patient, Bed in Low Position  Patient Turns/Positioning  oPatient Turns Self from Side to Side  Patient Turns Assistance/Tolerance  oAssistance of One, Tolerates Well, General Weakness  Bed Positions  Tao Controls On, Bed Locked  Head of Bed Elevated  oLess than 30 degrees      Psychosocial/Neurologic Assessment  Psychosocial Assessment  oPsychosocial (WDL):  Within Defined Limits  Neurologic Assessment  oNeuro (WDL): Exceptions to WDL  Level of Consciousness: Alert  Orientation Level: Oriented X4  Cognition: Follows commands  Speech: Clear  Pupil Assesment: No  Motor Function/Sensation Assessment: Motor strength  Muscle Strength Right Arm: Good Strength Against Gravity and Moderate Resistance  Muscle Strength Left Arm: Good Strength Against Gravity and Moderate Resistance  Muscle Strength Right Leg: Good Strength Against Gravity and Moderate Resistance  Muscle Strength Left Leg: Weak Movement but Not Against Gravity or Resistance  oEENT (WDL):  WDL Except    Cardio/Pulmonary Assessment  Edema  oRLE Edema: 2+  LLE Edema: 1+  Respiratory Breath Sounds  oRUL Breath Sounds: Clear  RML Breath Sounds: Clear  RLL Breath Sounds: Clear  TYE Breath Sounds: Clear  LLL Breath Sounds: Clear  Cardiac Assessment  oCardiac (WDL):  WDL Except

## 2023-12-03 NOTE — CARE PLAN
The patient is Stable - Low risk of patient condition declining or worsening    Shift Goals  Clinical Goals: safety  Patient Goals: sleep well, pain control    Progress made toward(s) clinical / shift goals:    Problem: Fall Risk - Rehab  Goal: Patient will remain free from falls  Outcome: Progressing. Patient bed in lowest locked position with bed alarm on and call light within reach. Patient calls appropriately with call light for needs. Patient has not attempted to self transfer over shift.      Problem: Pain - Standard  Goal: Alleviation of pain or a reduction in pain to the patient’s comfort goal  Outcome: Progressing. Patient has scheduled tid 5 mg oxy as well as prn 5 mg oxy q3. Patient stated left leg pain over shift approximately 6-7/10 prior to pain medication.

## 2023-12-03 NOTE — THERAPY
"Occupational Therapy  Daily Treatment     Patient Name: Sheyla Gauthier  Age:  83 y.o., Sex:  female  Medical Record #: [id number]  Today's Date: 12/2/2023     Precautions  Precautions: Fall Risk, Posterior Hip Precautions, Weight Bearing As Tolerated Right Lower Extremity, Spinal / Back Precautions   Comments: Wound vac, no back brace         Subjective    \"I just noticed that its a little rotated in.\" \"It's pulling a little on my inner knee.\" OTR noted LLE adduction at knee in sitting upon arrival. Placed rolled blanket to maintain neutral and provided an abduction pillow for night use with education to dtr and pt on how to place. RN and Dr. Partha ruiz.      Objective       12/02/23 0958   OT Charge Group   OT Therapy Activity (Units) 4   OT Total Time Spent   OT Individual Total Time Spent (Mins) 60   Cognition    Level of Consciousness Alert   Functional Level of Assist   Toilet Transfers Standby Assist   Toilet Transfer Description Grab bar;Increased time;Supervision for safety;Verbal cueing;Set-up of equipment  (FT transfer to toilet completed with dtr Ade.)   Standing Upper Body Exercises   Standing Upper Body Exercises Yes   Bicep Curls 2 sets of 10;Bilateral;Weight (See Comments for lbs)  (5# dowel with mirror for feedback)   IADL Treatments   IADL Treatments Home management   Home Management Pt participated in standing laundry task at SBA level using FWW and washer for support. No LOB and v/c for novel washer.   Sitting Lower Body Exercises   Nustep Resistance Level 1;Time (See Comments)  (8 min BLE with CGA on LLE as tactile reminder for maintaining neutral hip position with noted adduction. Able to maintain posterior hip precautions.)   Interdisciplinary Plan of Care Collaboration   IDT Collaboration with  Family / Caregiver;Nursing;Physician   Patient Position at End of Therapy Seated;Chair Alarm On;Self Releasing Lap Belt Applied;Family / Friend in Room;Call Light within Reach   Collaboration " Comments FT completed with dtr Ade cleared for toilet and bed transfer assist in room. RN and Dr. Chavis notfied of LLE adduction.         Assessment    Pt tolerated OT session well, making good progress towards goals with functional transfers and cleared dtr for toilet and bed transfers. Observed LLE adduction with pt reporting some medial knee discomfort when maintaining neutral positioning. With abduction pillow for night, rolled blanket when seated, and strengthening, anticipate that pt will be able to maintain posterior hip precautions going forward.  Strengths: Able to follow instructions, Alert and oriented, Effective communication skills, Good insight into deficits/needs, Independent prior level of function, Making steady progress towards goals, Manages pain appropriately, Motivated for self care and independence, Pleasant and cooperative, Supportive family, Willingly participates in therapeutic activities  Barriers: Fatigue, Generalized weakness, Impaired activity tolerance, Impaired balance, Limited mobility, Pain    Plan    ADLs with AE, UB strengthening, functional transfers/balance within precautions     DME        Passport items to be completed:  Perform bathroom transfers, complete dressing, complete feeding, get ready for the day, prepare a simple meal, participate in household tasks, adapt home for safety needs, demonstrate home exercise program, complete caregiver training     Occupational Therapy Goals (Active)       Problem: Bathing       Dates: Start:  11/30/23         Goal: STG-Within one week, patient will bathe at SPV level using AE/DME PRN.       Dates: Start:  11/30/23               Problem: Dressing       Dates: Start:  11/30/23         Goal: STG-Within one week, patient will dress LB at SBA level using AE/DME PRN.       Dates: Start:  11/30/23               Problem: Functional Transfers       Dates: Start:  11/30/23         Goal: STG-Within one week, patient will transfer to toilet at SBA  level using DME PRN.       Dates: Start:  11/30/23            Goal: STG-Within one week, patient will transfer to step in shower at SBA level using DME PRN.       Dates: Start:  11/30/23               Problem: OT Long Term Goals       Dates: Start:  11/30/23         Goal: LTG-By discharge, patient will complete basic self care tasks at SBA-Mod I level using DME PRN.       Dates: Start:  11/30/23            Goal: LTG-By discharge, patient will perform bathroom transfers at SBA-Mod I level using DME PRN.       Dates: Start:  11/30/23               Problem: Toileting       Dates: Start:  11/30/23         Goal: STG-Within one week, patient will complete toileting tasks at SBA level using AE/DME PRN.       Dates: Start:  11/30/23

## 2023-12-04 ENCOUNTER — APPOINTMENT (OUTPATIENT)
Dept: OCCUPATIONAL THERAPY | Facility: REHABILITATION | Age: 83
DRG: 560 | End: 2023-12-04
Attending: PHYSICAL MEDICINE & REHABILITATION
Payer: MEDICARE

## 2023-12-04 ENCOUNTER — APPOINTMENT (OUTPATIENT)
Dept: PHYSICAL THERAPY | Facility: REHABILITATION | Age: 83
DRG: 560 | End: 2023-12-04
Attending: PHYSICAL MEDICINE & REHABILITATION
Payer: MEDICARE

## 2023-12-04 PROCEDURE — 97530 THERAPEUTIC ACTIVITIES: CPT

## 2023-12-04 PROCEDURE — 97535 SELF CARE MNGMENT TRAINING: CPT

## 2023-12-04 PROCEDURE — 99232 SBSQ HOSP IP/OBS MODERATE 35: CPT | Performed by: PHYSICAL MEDICINE & REHABILITATION

## 2023-12-04 PROCEDURE — A9270 NON-COVERED ITEM OR SERVICE: HCPCS | Performed by: PHYSICAL MEDICINE & REHABILITATION

## 2023-12-04 PROCEDURE — 770010 HCHG ROOM/CARE - REHAB SEMI PRIVAT*

## 2023-12-04 PROCEDURE — 97116 GAIT TRAINING THERAPY: CPT

## 2023-12-04 PROCEDURE — 97110 THERAPEUTIC EXERCISES: CPT

## 2023-12-04 PROCEDURE — 700102 HCHG RX REV CODE 250 W/ 637 OVERRIDE(OP): Performed by: PHYSICAL MEDICINE & REHABILITATION

## 2023-12-04 RX ADMIN — APIXABAN 5 MG: 5 TABLET, FILM COATED ORAL at 20:37

## 2023-12-04 RX ADMIN — OXYCODONE 5 MG: 5 TABLET ORAL at 10:11

## 2023-12-04 RX ADMIN — PREGABALIN 100 MG: 100 CAPSULE ORAL at 09:14

## 2023-12-04 RX ADMIN — LIOTHYRONINE SODIUM 5 MCG: 5 TABLET ORAL at 09:14

## 2023-12-04 RX ADMIN — OXYCODONE 5 MG: 5 TABLET ORAL at 05:20

## 2023-12-04 RX ADMIN — ACETAMINOPHEN 1000 MG: 500 TABLET ORAL at 20:37

## 2023-12-04 RX ADMIN — LEVOTHYROXINE SODIUM 88 MCG: 0.09 TABLET ORAL at 05:20

## 2023-12-04 RX ADMIN — PREGABALIN 100 MG: 100 CAPSULE ORAL at 20:37

## 2023-12-04 RX ADMIN — ACETAMINOPHEN 1000 MG: 500 TABLET ORAL at 14:57

## 2023-12-04 RX ADMIN — SENNOSIDES AND DOCUSATE SODIUM 2 TABLET: 50; 8.6 TABLET ORAL at 09:15

## 2023-12-04 RX ADMIN — METHOCARBAMOL TABLETS 1000 MG: 500 TABLET, COATED ORAL at 09:16

## 2023-12-04 RX ADMIN — OXYCODONE 5 MG: 5 TABLET ORAL at 20:37

## 2023-12-04 RX ADMIN — METHOCARBAMOL TABLETS 1000 MG: 500 TABLET, COATED ORAL at 14:57

## 2023-12-04 RX ADMIN — METHOCARBAMOL TABLETS 1000 MG: 500 TABLET, COATED ORAL at 20:37

## 2023-12-04 RX ADMIN — FERROUS SULFATE TAB 325 MG (65 MG ELEMENTAL FE) 325 MG: 325 (65 FE) TAB at 09:14

## 2023-12-04 RX ADMIN — APIXABAN 5 MG: 5 TABLET, FILM COATED ORAL at 09:14

## 2023-12-04 RX ADMIN — FAMOTIDINE 20 MG: 20 TABLET, FILM COATED ORAL at 09:14

## 2023-12-04 RX ADMIN — ACETAMINOPHEN 1000 MG: 500 TABLET ORAL at 09:16

## 2023-12-04 RX ADMIN — OXYCODONE 5 MG: 5 TABLET ORAL at 13:23

## 2023-12-04 RX ADMIN — ATORVASTATIN CALCIUM 40 MG: 40 TABLET, FILM COATED ORAL at 20:37

## 2023-12-04 ASSESSMENT — PAIN DESCRIPTION - PAIN TYPE
TYPE: ACUTE PAIN
TYPE: ACUTE PAIN

## 2023-12-04 ASSESSMENT — ACTIVITIES OF DAILY LIVING (ADL)
BED_CHAIR_WHEELCHAIR_TRANSFER_DESCRIPTION: ADAPTIVE EQUIPMENT;SET-UP OF EQUIPMENT;SUPERVISION FOR SAFETY
BED_CHAIR_WHEELCHAIR_TRANSFER_DESCRIPTION: ADAPTIVE EQUIPMENT;SET-UP OF EQUIPMENT;SUPERVISION FOR SAFETY;VERBAL CUEING
TOILET_TRANSFER_DESCRIPTION: GRAB BAR;SET-UP OF EQUIPMENT;SUPERVISION FOR SAFETY;VERBAL CUEING

## 2023-12-04 ASSESSMENT — GAIT ASSESSMENTS
GAIT LEVEL OF ASSIST: STANDBY ASSIST
ASSISTIVE DEVICE: FRONT WHEEL WALKER
DEVIATION: ANTALGIC;BRADYKINETIC
DISTANCE (FEET): 35

## 2023-12-04 NOTE — CARE PLAN
The patient is Stable - Low risk of patient condition declining or worsening    Shift Goals  Clinical Goals: safety  Patient Goals: pain mgt    Progress made toward(s) clinical / shift goals:  2    Problem: Knowledge Deficit - Standard  Goal: Patient and family/care givers will demonstrate understanding of plan of care, disease process/condition, diagnostic tests and medications  Outcome: Progressing: Pt verbalizes and demonstrate understanding of POC, therapies, body mechanics, posterior hip precautions, medications. Her daughters have been actively present last 3 days a nd demonstar     Problem: Bladder / Voiding  Goal: Patient will establish and maintain regular urinary output  Outcome: Progressing: Voids to toilet as needed.

## 2023-12-04 NOTE — CARE PLAN
"  Problem: Fall Risk - Rehab  Goal: Patient will remain free from falls  Note: Lexy Garcia Fall risk Assessment Score: 15    High fall risk Interventions   - Alarming seatbelt  - Wander guard  - Bed and strip alarm   - Yellow sign by the door   - Yellow wrist band \"Fall risk\"  - Room near to the nurse station  - Do not leave patient unattended in the bathroom  - Fall risk education provided      Problem: Pain - Standard  Goal: Alleviation of pain or a reduction in pain to the patient’s comfort goal  Note: Educate patient of non-pharmacological comfort measures: repositioning, relaxation/breathing technique, cold compress and activities. On scheduled pain medication.         The patient is Stable - Low risk of patient condition declining or worsening    Shift Goals  Clinical Goals: safety  Patient Goals: sleep well, pain control      "

## 2023-12-04 NOTE — THERAPY
Physical Therapy   Daily Treatment     Patient Name: Sheyla Gauthier  Age:  83 y.o., Sex:  female  Medical Record #: [id number]  Today's Date: 12/4/2023     Precautions  Precautions: Fall Risk, Posterior Hip Precautions, Spinal / Back Precautions   Comments: no back brace    Subjective    Pt reporting continued burning sensation on lateral L thigh with hip abduction. Did not enjoy sleeping with abduction pillow due to feeling restrained     Objective       12/04/23 0830   PT Charge Group   PT Therapeutic Exercise (Units) 1   PT Therapeutic Activities (Units) 1   PT Total Time Spent   PT Individual Total Time Spent (Mins) 30   Transfer Functional Level of Assist   Toilet Transfers Standby Assist   Toilet Transfer Description Grab bar;Set-up of equipment;Supervision for safety;Verbal cueing   Sitting Lower Body Exercises   Hip Abduction 1 set of 15;Bilateral;Light Resistance Theraband   Long Arc Quad 1 set of 10;Left  (2lb ankle weight)   Standing Lower Body Exercises   Hamstring Curl 1 set of 10;Left   Hip Flexion 1 set of 10;Left  (to 70 deg)   Hip Extension 1 set of 10;Left   Hip Abduction 1 set of 10;Left   Comments B UE support // bars   Bed Mobility    Sit to Stand Standby Assist   Interdisciplinary Plan of Care Collaboration   IDT Collaboration with  Occupational Therapist   Patient Position at End of Therapy Seated;Chair Alarm On;Self Releasing Lap Belt Applied   Collaboration Comments handoff of care     Assisted with toileting per pt request. Extra time due to difficulty voiding bladder. Continent of urine  Educated pt in use of 2 pillows or hip abduction wedge without straps for improved comfort with sleeping       12/04/23 1301   PT Charge Group   PT Gait Training (Units) 1   PT Therapeutic Exercise (Units) 2   PT Therapeutic Activities (Units) 1   PT Total Time Spent   PT Individual Total Time Spent (Mins) 60   Gait Functional Level of Assist    Gait Level Of Assist Standby Assist   Assistive Device  Front Wheel Walker   Distance (Feet) 35   # of Times Distance was Traveled 1  (as well as 25ftx1)   Deviation Antalgic;Bradykinetic  (moderate use of UEs, standing RB every 10-15ft, step through pattern)   Transfer Functional Level of Assist   Bed, Chair, Wheelchair Transfer Standby Assist   Bed Chair Wheelchair Transfer Description Adaptive equipment;Set-up of equipment;Supervision for safety;Verbal cueing   Supine Lower Body Exercise   Bridges Two Legged;2 sets of 15  (Blue TB around knees for hip abductor facilitation)   Hip Abduction Hook Lying;2 sets of 10;Bilateral;Medium Resistance Theraband   Ankle Pumps Reciprocal;2 sets of 10;Bilateral   Other Exercises supine hip/knee extension to neutral [id number] with expressed hip flexor tightness   Sitting Lower Body Exercises   Hip Abduction 2 sets of 10;Bilateral  (manual resistance at distal femur)   Sit to Stand 1 set of 10  (Blue TB around knees for hip abd/ER facilitation to FWW)   Standing Lower Body Exercises   Other Exercises sidestepping BUE support on // bars 10ftx2 ea direction   Bed Mobility    Supine to Sit Standby Assist   Sit to Supine Minimal Assist   Sit to Stand Standby Assist   Interdisciplinary Plan of Care Collaboration   IDT Collaboration with  Nursing   Patient Position at End of Therapy In Bed;Bed Alarm On;Call Light within Reach;Tray Table within Reach;Phone within Reach   Collaboration Comments pt left in slight trendelenberg with B LEs on pillow for edema management, instructed to call nursing if unable to use bed functions [facility], RN notified     RN present for scheduled pain med during session. Pt left supine with LEs elevated as described above for edema management    Assessment    Pt demonstrated improved pelvic stability and ability to perform active L hip abduction in standing and seated against light resistance. Pt also demonstrated step through pattern with gait training without need for verbal cueing. Pt continues  to be limited by L hip pain and decreased standing tolerance at this time.  Strengths: Able to follow instructions, Independent prior level of function, Making steady progress towards goals, Motivated for self care and independence, Pleasant and cooperative, Supportive family, Willingly participates in therapeutic activities  Barriers: Generalized weakness, Impaired activity tolerance, Impaired balance, Pain    Plan    Gait training, stair training, LE/hip/glute strengthening/ROM, balance training, overall activity tolerance/endurance, bed mobility training      DME  PT DME Recommendations  Wheelchair:  (likely will be ambulatory and will not require WC)  Cushion:  (n/a)  Assistive Device: Front Wheeled Walker (pt has FWW)    Passport items to be completed:  Get in/out of bed safely, in/out of a vehicle, safely use mobility device, walk or wheel around home/community, navigate up and down stairs, show how to get up/down from the ground, ensure home is accessible, demonstrate HEP, complete caregiver training    Physical Therapy Problems (Active)       Problem: Mobility       Dates: Start:  11/30/23         Goal: STG-Within one week, patient will ambulate 100ft FWW SBA       Dates: Start:  11/30/23            Goal: STG-Within one week, patient will ambulate up/down a curb SBA FWW       Dates: Start:  11/30/23               Problem: Mobility Transfers       Dates: Start:  11/30/23         Goal: STG-Within one week, patient will perform bed mobility Sherman       Dates: Start:  11/30/23            Goal: STG-Within one week, patient will transfer bed to chair SBA       Dates: Start:  11/30/23               Problem: PT-Long Term Goals       Dates: Start:  11/30/23         Goal: LTG-By discharge, patient will ambulate 150ft Sherman FWW       Dates: Start:  11/30/23            Goal: LTG-By discharge, patient will transfer one surface to another Sherman       Dates: Start:  11/30/23            Goal: LTG-By discharge, patient will  perform home exercise program independently       Dates: Start:  11/30/23            Goal: LTG-By discharge, patient will transfer in/out of a car SBA       Dates: Start:  11/30/23

## 2023-12-04 NOTE — THERAPY
Occupational Therapy  Daily Treatment     Patient Name: Sheyla Gauthier  Age:  83 y.o., Sex:  female  Medical Record #: [id number]  Today's Date: 12/4/2023     Precautions  Precautions: (P) Fall Risk, Posterior Hip Precautions, Spinal / Back Precautions   Comments: (P) no back brace    Subjective    Patient seated in w/c upon arrival, w/ daughter present. Agreeable to participate in OT, requested to shower however indicated preference for shower cap vs washing hair.      Objective     12/04/23 1031   OT Charge Group   OT Self Care / ADL (Units) 4   OT Total Time Spent   OT Individual Total Time Spent (Mins) 60   Precautions   Precautions Fall Risk;Posterior Hip Precautions;Spinal / Back Precautions    Comments no back brace   Vitals   O2 Delivery Device None - Room Air   Cognition    Level of Consciousness Alert   Sleep/Wake Cycle   Sleep & Rest Awake   Functional Level of Assist   Bathing Standby Assist  (while incorporating sitting and standing w/ GB)   Upper Body Dressing Supervision  (set-up to don shar and long sleeve shirt while seated)   Lower Body Dressing Contact Guard Assist  (to don underwear, elastic waist pants and non skid socks w/ AE while incorporating sitting and standing)   Tub / Shower Transfers Standby Assist  (FWW mobility room <> shower w/ SBA; no overt LOB noted)   Interdisciplinary Plan of Care Collaboration   IDT Collaboration with  Family / Caregiver   Patient Position at End of Therapy Seated;Self Releasing Lap Belt Applied;Family / Friend in Room   Collaboration Comments Daughter (Ade) present at start and end of OT session       Assessment    Patient demo's good functional progress toward DC goals; able to complete ADL routine @ CGA to supervision level while incorporating sitting/standing and FWW mobility @ SBA level w/ no overt LOB observed (although gait cont to be antalgic and increased time required). Benefits from using AE to maximize LB ADL independence; assisted daughter w/  locating hip kit on [place] to purchase for patient's use post DC from rehab.   Strengths: Able to follow instructions, Alert and oriented, Effective communication skills, Good insight into deficits/needs, Independent prior level of function, Making steady progress towards goals, Manages pain appropriately, Motivated for self care and independence, Pleasant and cooperative, Supportive family, Willingly participates in therapeutic activities  Barriers: Fatigue, Generalized weakness, Impaired activity tolerance, Impaired balance, Limited mobility, Pain    Plan    ADLs with AE, UB strengthening, functional transfers/balance within precautions      DME     Passport items to be completed:  Perform bathroom transfers, complete dressing, complete feeding, get ready for the day, prepare a simple meal, participate in household tasks, adapt home for safety needs, demonstrate home exercise program, complete caregiver training     Occupational Therapy Goals (Active)       Problem: Bathing       Dates: Start:  11/30/23         Goal: STG-Within one week, patient will bathe at SPV level using AE/DME PRN.       Dates: Start:  11/30/23               Problem: Dressing       Dates: Start:  11/30/23         Goal: STG-Within one week, patient will dress LB at SBA level using AE/DME PRN.       Dates: Start:  11/30/23               Problem: Functional Transfers       Dates: Start:  11/30/23         Goal: STG-Within one week, patient will transfer to toilet at SBA level using DME PRN.       Dates: Start:  11/30/23            Goal: STG-Within one week, patient will transfer to step in shower at SBA level using DME PRN.       Dates: Start:  11/30/23               Problem: OT Long Term Goals       Dates: Start:  11/30/23         Goal: LTG-By discharge, patient will complete basic self care tasks at SBA-Mod I level using DME PRN.       Dates: Start:  11/30/23            Goal: LTG-By discharge, patient will perform bathroom transfers at SBA-Mod  I level using DME PRN.       Dates: Start:  11/30/23               Problem: Toileting       Dates: Start:  11/30/23         Goal: STG-Within one week, patient will complete toileting tasks at SBA level using AE/DME PRN.       Dates: Start:  11/30/23

## 2023-12-04 NOTE — PROGRESS NOTES
NURSING DAILY NOTE    Name: Sheyla Gauthier   Date of Admission: 11/29/2023   Admitting Diagnosis: History of left hip replacement  Attending Physician: Mitchell Chavis M.d.  Allergies: Trazodone and Crestor [rosuvastatin calcium]    Safety  Patient Assist  min a  Patient Precautions  Fall Risk, Posterior Hip Precautions  Precaution Comments  no brace  Bed Transfer Status  Contact Guard Assist  Toilet Transfer Status   Standby Assist  Assistive Devices  Rails  Oxygen  None - Room Air  Diet/Therapeutic Dining  Current Diet Order   Procedures    Diet Order Diet: Regular     Pill Administration  whole, floated, and one at a time   Agitated Behavioral Scale  14  ABS Level of Severity  No Agitation    Fall Risk  Has the patient had a fall this admission?   No  Lexy Garcia Fall Risk Scoring  15, HIGH RISK  Fall Risk Safety Measures  bed alarm, chair alarm, and low vision/ hearing    Vitals  Temperature: 36.7 °C (98.1 °F)  Temp src: Oral  Pulse: 73  Respiration: 17  Blood Pressure : 117/60  Blood Pressure MAP (Calculated): 79 MM HG  BP Location: Left, Upper Arm  Patient BP Position: Sitting     Oxygen  Pulse Oximetry: 93 %  O2 (LPM): 0  O2 Delivery Device: None - Room Air    Bowel and Bladder  Last Bowel Movement  12/03/23  Stool Type  Type 2: Sausage shaped, but lumpy  Bowel Device  Bathroom  Continent  Bladder: Stress incontinence   Bowel: Continent movement  Bladder Function  Urine Void (mL):  (MOderate)  Number of Times Voided: 1  Urine Color: Yellow  Genitourinary Assessment   Bladder Assessment (WDL):  WDL Except  Kothari Catheter: Not Applicable  Urine Color: Yellow  Bladder Device: Bathroom  Time Void: Yes  Bladder Scan: Post Void  $ Bladder Scan Results (mL): 34    Skin  Wyatt Score   16  Sensory Interventions   Bed Types: Standard/Trauma Mattress  Skin Preventative Measures: Pillows in Use for Support / Positioning, Waffle Overlay  Moisture  Interventions  Moisturizers/Barriers: Barrier Wipes      Pain  Pain Rating Scale  0 - No Pain  Pain Location  Hip  Pain Location Orientation  Left  Pain Interventions   Medication (see MAR) (scheduled)    ADLs    Bathing   Shower, Staff (Pt shower with OT)  Linen Change      Personal Hygiene  Moist Klarissa Wipes, Perineal Care  Chlorhexidine Bath      Oral Care  Brushed Teeth (Self)  Teeth/Dentures     Shave     Nutrition Percentage Eaten  *  * Meal *  *, Dinner, Between 50-75% Consumed  Environmental Precautions  Treaded Slipper Socks on Patient  Patient Turns/Positioning  Patient Turns Self from Side to Side  Patient Turns Assistance/Tolerance  Assistance of One, Tolerates Well, General Weakness  Bed Positions  Bed Controls On, Bed Locked  Head of Bed Elevated  Less than 30 degrees      Psychosocial/Neurologic Assessment  Psychosocial Assessment  Psychosocial (WDL):  Within Defined Limits  Neurologic Assessment  Neuro (WDL): Exceptions to WDL  Level of Consciousness: Alert  Orientation Level: Oriented X4  Cognition: Follows commands  Speech: Clear  Pupil Assesment: No  Motor Function/Sensation Assessment: Motor strength  Muscle Strength Right Arm: Good Strength Against Gravity and Moderate Resistance  Muscle Strength Left Arm: Good Strength Against Gravity and Moderate Resistance  Muscle Strength Right Leg: Good Strength Against Gravity and Moderate Resistance  Muscle Strength Left Leg: Weak Movement but Not Against Gravity or Resistance  EENT (WDL):  WDL Except    Cardio/Pulmonary Assessment  Edema   RLE Edema: 1+, Pitting  LLE Edema: 1+, Pitting  Respiratory Breath Sounds  RUL Breath Sounds: Clear  RML Breath Sounds: Clear  RLL Breath Sounds: Clear  TYE Breath Sounds: Clear  LLL Breath Sounds: Clear  Cardiac Assessment   Cardiac (WDL):  Within Defined Limits

## 2023-12-04 NOTE — PROGRESS NOTES
NURSING DAILY NOTE    Name: Sheyla Gauthier   Date of Admission: 11/29/2023   Admitting Diagnosis: History of left hip replacement  Attending Physician: Mitchell Chavis M.d.  Allergies: Trazodone and Crestor [rosuvastatin calcium]    Safety  Patient Assist  min a  Patient Precautions  Fall Risk, Posterior Hip Precautions  Precaution Comments  no brace  Bed Transfer Status  Contact Guard Assist  Toilet Transfer Status   Standby Assist  Assistive Devices  Wheelchair  Oxygen  None - Room Air  Diet/Therapeutic Dining  Current Diet Order   Procedures    Diet Order Diet: Regular     Pill Administration  whole and floated, tylenol and robaxin quartered and floated, other meds taken whole.  Agitated Behavioral Scale  14  ABS Level of Severity  No Agitation    Fall Risk  Has the patient had a fall this admission?   No  Lexy Garcia Fall Risk Scoring  15, HIGH RISK  Fall Risk Safety Measures  Bed alarm, chair alarm, low vision/hearing    Vitals  Temperature: 36.7 °C (98.1 °F)  Temp src: Oral  Pulse: 73  Respiration: 17  Blood Pressure : 117/60  Blood Pressure MAP (Calculated): 79 MM HG  BP Location: Left, Upper Arm  Patient BP Position: Sitting     Oxygen  Pulse Oximetry: 93 %  O2 (LPM): 0  O2 Delivery Device: None - Room Air    Bowel and Bladder  Last Bowel Movement  12/03/23  Stool Type  Type 2: Sausage shaped, but lumpy  Bowel Device  Bathroom  Continent  Bladder: Stress incontinence   Bowel: Continent movement  Bladder Function  Urine Void (mL):  (MOderate)  Number of Times Voided: 1  Urine Color: Unable To Evaluate  Genitourinary Assessment   Bladder Assessment (WDL):  WDL Except  Kothari Catheter: Not Applicable  Urine Color: Unable To Evaluate  Bladder Device: Bathroom  Time Void: Yes  Bladder Scan: Post Void  $ Bladder Scan Results (mL): 34    Skin  Wyatt Score   16  Sensory Interventions   Bed Types: Standard/Trauma Mattress  Skin Preventative Measures:  Pillows in Use for Support / Positioning, Waffle Overlay  Moisture Interventions  Moisturizers/Barriers: Barrier Wipes      Pain  Pain Rating Scale  6 - Hard to ignore, avoid usual activities  Pain Location  Hip  Pain Location Orientation  Left  Pain Interventions   Declines    ADLs    Bathing   Shower, Staff (Pt shower with OT)  Linen Change      Personal Hygiene  Moist Klarissa Wipes, Perineal Care  Chlorhexidine Bath      Oral Care  Brushed Teeth (Self)  Teeth/Dentures     Shave     Nutrition Percentage Eaten  *  * Meal *  *, Dinner, Between % Consumed  Environmental Precautions  Treaded Slipper Socks on Patient  Patient Turns/Positioning  Patient Turns Self from Side to Side  Patient Turns Assistance/Tolerance  Assistance of One, Tolerates Well, General Weakness  Bed Positions  Bed Controls On, Bed Locked  Head of Bed Elevated  Less than 30 degrees      Psychosocial/Neurologic Assessment  Psychosocial Assessment  Psychosocial (WDL):  Within Defined Limits  Neurologic Assessment  Neuro (WDL): Exceptions to WDL  Level of Consciousness: Alert  Orientation Level: Oriented X4  Cognition: Follows commands  Speech: Clear  Pupil Assesment: No  Motor Function/Sensation Assessment: Motor strength  Muscle Strength Right Arm: Good Strength Against Gravity and Moderate Resistance  Muscle Strength Left Arm: Good Strength Against Gravity and Moderate Resistance  Muscle Strength Right Leg: Good Strength Against Gravity and Moderate Resistance  Muscle Strength Left Leg: Weak Movement but Not Against Gravity or Resistance  EENT (WDL):  WDL Except    Cardio/Pulmonary Assessment  Edema   RLE Edema: 1+, Pitting  LLE Edema: 1+, Pitting  Respiratory Breath Sounds  RUL Breath Sounds: Clear  RML Breath Sounds: Clear  RLL Breath Sounds: Clear  TYE Breath Sounds: Clear  LLL Breath Sounds: Clear  Cardiac Assessment   Cardiac (WDL):  Within Defined Limits

## 2023-12-04 NOTE — THERAPY
Physical Therapy   Daily Treatment     Patient Name: Sheyla Gauthier  Age:  83 y.o., Sex:  female  Medical Record #: [id number]  Today's Date: 12/3/2023     Precautions  Precautions: Fall Risk, Posterior Hip Precautions  Comments: no brace    Subjective    Patient reports she is sore but feels better after movement     Objective       12/03/23 0831 12/03/23 1531   PT Charge Group   PT Gait Training (Units) 1  --    PT Therapeutic Exercise (Units) 2 1   PT Therapeutic Activities (Units) 1 1   PT Total Time Spent   PT Individual Total Time Spent (Mins) 60 30   Precautions   Precautions Fall Risk;Posterior Hip Precautions Fall Risk;Posterior Hip Precautions   Comments no brace no brace   Gait Functional Level of Assist    Gait Level Of Assist Standby Assist Refused   Assistive Device Front Wheel Walker  --    Distance (Feet) 25  --    # of Times Distance was Traveled 2  --    Deviation Antalgic  --    Transfer Functional Level of Assist   Bed, Chair, Wheelchair Transfer Contact Guard Assist  --    Bed Chair Wheelchair Transfer Description Increased time;Set-up of equipment  --    Supine Lower Body Exercise   Hip Abduction 1 set of 15;Right   --    Heel Slide 1 set of 15;Bilateral;Right  --    Ankle Pumps 1 set of 10;Bilateral  --    Gluteal Isometrics 1 set of 10;Bilateral  --    Quadriceps Isometrics 1 set of 10;Bilateral  --    Comments supine PROM with gait belt assist 2 sets of 30sec right:1. hamstring 2.groin 3.ITB 4.calf 5.gluts; left- 1.hamstring 2.groin 3.calf  --    Sitting Lower Body Exercises   Other Exercises  --  B LE Motomed 3min forward, 3min backward   Bed Mobility    Supine to Sit Standby Assist  --    Sit to Supine Minimal Assist  --    Interdisciplinary Plan of Care Collaboration   IDT Collaboration with   --  Family / Caregiver;Certified Nursing Assistant   Collaboration Comments  --  pleased with recent progress; vitals check       Assessment    Ambulation distance limited to 25ft based on pain;  able to demonstrate 25ft bouts with SBA and no immediate increase in pain     Able to demonstrate supine PROM and strengthening program, maintaining posterior hip precautions and reports constant right groin ache with supine there-ex    Strengths: Able to follow instructions, Independent prior level of function, Making steady progress towards goals, Motivated for self care and independence, Pleasant and cooperative, Supportive family, Willingly participates in therapeutic activities  Barriers: Generalized weakness, Impaired activity tolerance, Impaired balance, Pain    Plan    Gait training, stair training, LE/hip/glute strengthening/ROM, balance training, overall activity tolerance/endurance, bed mobility training      DME  PT DME Recommendations  Wheelchair:  (likely will be ambulatory and will not require WC)  Cushion:  (n/a)  Assistive Device: Front Wheeled Walker (pt has FWW)     Passport items to be completed:  Get in/out of bed safely, in/out of a vehicle, safely use mobility device, walk or wheel around home/community, navigate up and down stairs, show how to get up/down from the ground, ensure home is accessible, demonstrate HEP, complete caregiver training    Physical Therapy Problems (Active)       Problem: Mobility       Dates: Start:  11/30/23         Goal: STG-Within one week, patient will ambulate 100ft FWW SBA       Dates: Start:  11/30/23            Goal: STG-Within one week, patient will ambulate up/down a curb SBA FWW       Dates: Start:  11/30/23               Problem: Mobility Transfers       Dates: Start:  11/30/23         Goal: STG-Within one week, patient will perform bed mobility Sherman       Dates: Start:  11/30/23            Goal: STG-Within one week, patient will transfer bed to chair SBA       Dates: Start:  11/30/23               Problem: PT-Long Term Goals       Dates: Start:  11/30/23         Goal: LTG-By discharge, patient will ambulate 150ft Sherman FWW       Dates: Start:  11/30/23             Goal: LTG-By discharge, patient will transfer one surface to another Sherman       Dates: Start:  11/30/23            Goal: LTG-By discharge, patient will perform home exercise program independently       Dates: Start:  11/30/23            Goal: LTG-By discharge, patient will transfer in/out of a car SBA       Dates: Start:  11/30/23

## 2023-12-04 NOTE — PROGRESS NOTES
"  Physical Medicine & Rehabilitation Progress Note    Encounter Date: 12/4/2023    Chief Complaint: Weakness    Interval Events (Subjective):  Seen in chair. Pain controlled    Objective:  VITAL SIGNS: /66   Pulse 76   Temp 36.7 °C (98 °F) (Oral)   Resp 18   Ht 1.575 m (5' 2\")   Wt 68.9 kg (152 lb)   LMP 11/01/1986   SpO2 95%   BMI 27.80 kg/m²   Gen: No acute distress, well developed well nourished adult  HEENT: Normal Cephalic Atraumatic, Normal conjunctiva.   CV: warm extremities, well perfused, no edema  Resp: symmetric chest rise, breathing comfortably on room air  Abd: Soft, Non distended  Extremities: normal bulk, no atrophy  Skin: no visible rashes or lesions.   Neuro: alert, awake  Psych: Mood and affect appropriate and congruent    Laboratory Values:  No results found for this or any previous visit (from the past 72 hour(s)).    Medications:  Scheduled Medications   Medication Dose Frequency    Pharmacy Consult Request  1 Each PHARMACY TO DOSE    senna-docusate  2 Tablet BID    acetaminophen  1,000 mg TID    apixaban  5 mg BID    atorvastatin  40 mg QHS    famotidine  20 mg DAILY    ferrous sulfate  325 mg QDAY with Breakfast    levothyroxine  88 mcg AM ES    liothyronine  5 mcg QAM    methocarbamol  1,000 mg TID    pregabalin  100 mg BID    oxyCODONE immediate-release  5 mg TID     PRN medications: Respiratory Therapy Consult, hydrALAZINE, senna-docusate **AND** polyethylene glycol/lytes **AND** magnesium hydroxide **AND** bisacodyl, ondansetron **OR** ondansetron, sodium chloride, oxyCODONE immediate-release    Diet:  Current Diet Order   Procedures    Diet Order Diet: Regular       Medical Decision Making and Plan:  Left Femoral Neck fracture with superior displacement- diagnosed 11/22, unknown chronicity  Seen on Xray of hip taken 11/22  S/P hip arthroplasty on 11/27 by Dr Ko  No clear history of fall. Patient has not walked pain free since February 2023  Last Fall noted in Spring of " 2023  -PT/OT  -Requesting imaging from Dr Connor and Dr Ocampo office to establish chronicity        Polyradiculopathy 2/2 to bilateral Lumbar foraminal stenosis  Status post lumbar laminectomy  -Patient has history of prior L4-L5 decompression  - Recent worsening of back pain with radicular pain into right lower extremity, impairing function and recently utilizing a wheelchair in the last month  - 11/10 patient taken to the OR for L5-S1 laminectomy performed by Dr. Silva  - Spinal precautions in place, does not require bracing     Right Popliteal DVT  Provoked from surgery and immobility  -Eliquis 5mg BID for 3 months from 11/15/23  -continue eliquis     Acute Blood loss anemia  -7.6 Hb today  -12/1- 7.9 Hb  -consider US of hip if needed        Hypothyroidism  -On home dose Synthroid     Hypertension  - hold losartan and spironolactone  -home dose losartan and spironolactone      Pain:  - Neuroceptic - On Tylenol TID, oxycodone 5mg PRN, robaxin 1g TID  -11/30- startedOxycodone 5mg TID scheduled  -continue oxycodone 5mg TID scheduled     Circadian Rhythm disorder:   Recommend lights on during the day/off at night, minimize nighttime interruptions as able.     Mood  - at risk of adjustment disorder, depression, and anxiety due to functional decline     ID:  - at risk for Urinary tract infection     Skin/Wounds:  - Pressure relief q2h while in bed. Close monitoring for signs of breakdown     DVT prophylaxis:  continue eliquis     GI prophylaxis:  On Pepcid        -Follow-up Ortho, NS, PCP    ____________________________________    Mitchell Chavis MD  Physical Medicine & Rehabilitation   Brain Injury Medicine   ____________________________________

## 2023-12-04 NOTE — THERAPY
Occupational Therapy  Daily Treatment     Patient Name: Sheyla Gauthier  Age:  83 y.o., Sex:  female  Medical Record #: [id number]  Today's Date: 12/4/2023     Precautions  Precautions: Fall Risk, Posterior Hip Precautions, Spinal / Back Precautions   Comments: no back brace         Subjective    Patient stated she would like to be able to roll in bed to be able to sleep on her side.  Agreeable to practice this.     Objective       12/04/23 0901   OT Charge Group   OT Therapy Activity (Units) 2   OT Total Time Spent   OT Individual Total Time Spent (Mins) 30   Precautions   Precautions Fall Risk;Posterior Hip Precautions;Spinal / Back Precautions    Comments no back brace   Pain 0 - 10 Group   Location Hip   Location Orientation Left   Functional Level of Assist   Upper Body Dressing Supervision   Upper Body Dressing Description   (to don/doff vest)   Bed, Chair, Wheelchair Transfer Standby Assist   Bed Chair Wheelchair Transfer Description Adaptive equipment;Set-up of equipment;Supervision for safety  (SBA SPT with FWW w/c <> bed)   Bed Mobility    Supine to Sit Standby Assist  (x 2 on regular bed)   Sit to Supine Minimal Assist  (x 2 on regular bed)   Scooting Standby Assist  (x 2 on regular bed)   Rolling Supervised  (left and right x 2 on regular bed with pillow between knees on one attempt and abduction pillow between knees on other attempt)   Skilled Intervention Verbal Cuing   Interdisciplinary Plan of Care Collaboration   Patient Position at End of Therapy Seated;Call Light within Reach;Tray Table within Reach;Phone within Reach     Education provided to patient to have a pillow between her knees when sleeping on her L side do protect from too much hip adduction and internal rotation.      Assessment    Patient was able to roll from side to side with supervision with a pillow or an abduction pillow between knees.  She is happy she is able to do this as this will help with her sleep comfort.    Strengths:  Able to follow instructions, Alert and oriented, Effective communication skills, Good insight into deficits/needs, Independent prior level of function, Making steady progress towards goals, Manages pain appropriately, Motivated for self care and independence, Pleasant and cooperative, Supportive family, Willingly participates in therapeutic activities  Barriers: Fatigue, Generalized weakness, Impaired activity tolerance, Impaired balance, Limited mobility, Pain    Plan    ADLs with AE, UB strengthening, functional transfers/balance within precautions     Occupational Therapy Goals (Active)       Problem: Bathing       Dates: Start:  11/30/23         Goal: STG-Within one week, patient will bathe at SPV level using AE/DME PRN.       Dates: Start:  11/30/23               Problem: Dressing       Dates: Start:  11/30/23         Goal: STG-Within one week, patient will dress LB at SBA level using AE/DME PRN.       Dates: Start:  11/30/23               Problem: Functional Transfers       Dates: Start:  11/30/23         Goal: STG-Within one week, patient will transfer to toilet at SBA level using DME PRN.       Dates: Start:  11/30/23            Goal: STG-Within one week, patient will transfer to step in shower at SBA level using DME PRN.       Dates: Start:  11/30/23               Problem: OT Long Term Goals       Dates: Start:  11/30/23         Goal: LTG-By discharge, patient will complete basic self care tasks at SBA-Mod I level using DME PRN.       Dates: Start:  11/30/23            Goal: LTG-By discharge, patient will perform bathroom transfers at SBA-Mod I level using DME PRN.       Dates: Start:  11/30/23               Problem: Toileting       Dates: Start:  11/30/23         Goal: STG-Within one week, patient will complete toileting tasks at SBA level using AE/DME PRN.       Dates: Start:  11/30/23

## 2023-12-05 ENCOUNTER — APPOINTMENT (OUTPATIENT)
Dept: PHYSICAL THERAPY | Facility: REHABILITATION | Age: 83
DRG: 560 | End: 2023-12-05
Attending: PHYSICAL MEDICINE & REHABILITATION
Payer: MEDICARE

## 2023-12-05 ENCOUNTER — APPOINTMENT (OUTPATIENT)
Dept: OCCUPATIONAL THERAPY | Facility: REHABILITATION | Age: 83
DRG: 560 | End: 2023-12-05
Attending: PHYSICAL MEDICINE & REHABILITATION
Payer: MEDICARE

## 2023-12-05 ENCOUNTER — HOSPITAL ENCOUNTER (OUTPATIENT)
Dept: RADIOLOGY | Facility: MEDICAL CENTER | Age: 83
End: 2023-12-05

## 2023-12-05 LAB
ALBUMIN SERPL BCP-MCNC: 3 G/DL (ref 3.2–4.9)
ALBUMIN/GLOB SERPL: 1 G/DL
ALP SERPL-CCNC: 83 U/L (ref 30–99)
ALT SERPL-CCNC: 10 U/L (ref 2–50)
ANION GAP SERPL CALC-SCNC: 13 MMOL/L (ref 7–16)
AST SERPL-CCNC: 17 U/L (ref 12–45)
BILIRUB SERPL-MCNC: 0.3 MG/DL (ref 0.1–1.5)
BUN SERPL-MCNC: 17 MG/DL (ref 8–22)
CALCIUM ALBUM COR SERPL-MCNC: 10.2 MG/DL (ref 8.5–10.5)
CALCIUM SERPL-MCNC: 9.4 MG/DL (ref 8.5–10.5)
CHLORIDE SERPL-SCNC: 109 MMOL/L (ref 96–112)
CO2 SERPL-SCNC: 19 MMOL/L (ref 20–33)
CREAT SERPL-MCNC: 0.93 MG/DL (ref 0.5–1.4)
ERYTHROCYTE [DISTWIDTH] IN BLOOD BY AUTOMATED COUNT: 55.4 FL (ref 35.9–50)
GFR SERPLBLD CREATININE-BSD FMLA CKD-EPI: 61 ML/MIN/1.73 M 2
GLOBULIN SER CALC-MCNC: 2.9 G/DL (ref 1.9–3.5)
GLUCOSE SERPL-MCNC: 99 MG/DL (ref 65–99)
HCT VFR BLD AUTO: 27.1 % (ref 37–47)
HGB BLD-MCNC: 8.4 G/DL (ref 12–16)
MCH RBC QN AUTO: 28.2 PG (ref 27–33)
MCHC RBC AUTO-ENTMCNC: 31 G/DL (ref 32.2–35.5)
MCV RBC AUTO: 90.9 FL (ref 81.4–97.8)
PLATELET # BLD AUTO: 251 K/UL (ref 164–446)
PMV BLD AUTO: 10.7 FL (ref 9–12.9)
POTASSIUM SERPL-SCNC: 3.8 MMOL/L (ref 3.6–5.5)
PROT SERPL-MCNC: 5.9 G/DL (ref 6–8.2)
RBC # BLD AUTO: 2.98 M/UL (ref 4.2–5.4)
SODIUM SERPL-SCNC: 141 MMOL/L (ref 135–145)
WBC # BLD AUTO: 4.9 K/UL (ref 4.8–10.8)

## 2023-12-05 PROCEDURE — 36415 COLL VENOUS BLD VENIPUNCTURE: CPT

## 2023-12-05 PROCEDURE — 99232 SBSQ HOSP IP/OBS MODERATE 35: CPT | Performed by: PHYSICAL MEDICINE & REHABILITATION

## 2023-12-05 PROCEDURE — 97530 THERAPEUTIC ACTIVITIES: CPT

## 2023-12-05 PROCEDURE — 97110 THERAPEUTIC EXERCISES: CPT

## 2023-12-05 PROCEDURE — 85027 COMPLETE CBC AUTOMATED: CPT

## 2023-12-05 PROCEDURE — 700111 HCHG RX REV CODE 636 W/ 250 OVERRIDE (IP): Performed by: PHYSICAL MEDICINE & REHABILITATION

## 2023-12-05 PROCEDURE — 700102 HCHG RX REV CODE 250 W/ 637 OVERRIDE(OP): Performed by: PHYSICAL MEDICINE & REHABILITATION

## 2023-12-05 PROCEDURE — 80053 COMPREHEN METABOLIC PANEL: CPT

## 2023-12-05 PROCEDURE — A9270 NON-COVERED ITEM OR SERVICE: HCPCS | Performed by: PHYSICAL MEDICINE & REHABILITATION

## 2023-12-05 PROCEDURE — 97116 GAIT TRAINING THERAPY: CPT

## 2023-12-05 PROCEDURE — 770010 HCHG ROOM/CARE - REHAB SEMI PRIVAT*

## 2023-12-05 RX ADMIN — PREGABALIN 100 MG: 100 CAPSULE ORAL at 08:06

## 2023-12-05 RX ADMIN — ONDANSETRON 4 MG: 4 TABLET, ORALLY DISINTEGRATING ORAL at 09:15

## 2023-12-05 RX ADMIN — METHOCARBAMOL TABLETS 1000 MG: 500 TABLET, COATED ORAL at 08:06

## 2023-12-05 RX ADMIN — OXYCODONE 5 MG: 5 TABLET ORAL at 02:52

## 2023-12-05 RX ADMIN — OXYCODONE 5 MG: 5 TABLET ORAL at 12:59

## 2023-12-05 RX ADMIN — ACETAMINOPHEN 1000 MG: 500 TABLET ORAL at 20:33

## 2023-12-05 RX ADMIN — SENNOSIDES AND DOCUSATE SODIUM 2 TABLET: 50; 8.6 TABLET ORAL at 08:07

## 2023-12-05 RX ADMIN — ACETAMINOPHEN 1000 MG: 500 TABLET ORAL at 15:54

## 2023-12-05 RX ADMIN — METHOCARBAMOL TABLETS 1000 MG: 500 TABLET, COATED ORAL at 15:54

## 2023-12-05 RX ADMIN — OXYCODONE 5 MG: 5 TABLET ORAL at 06:15

## 2023-12-05 RX ADMIN — SENNOSIDES AND DOCUSATE SODIUM 2 TABLET: 50; 8.6 TABLET ORAL at 20:33

## 2023-12-05 RX ADMIN — LEVOTHYROXINE SODIUM 88 MCG: 0.09 TABLET ORAL at 06:16

## 2023-12-05 RX ADMIN — ACETAMINOPHEN 1000 MG: 500 TABLET ORAL at 08:07

## 2023-12-05 RX ADMIN — ATORVASTATIN CALCIUM 40 MG: 40 TABLET, FILM COATED ORAL at 20:33

## 2023-12-05 RX ADMIN — FERROUS SULFATE TAB 325 MG (65 MG ELEMENTAL FE) 325 MG: 325 (65 FE) TAB at 08:06

## 2023-12-05 RX ADMIN — OXYCODONE 5 MG: 5 TABLET ORAL at 10:10

## 2023-12-05 RX ADMIN — PREGABALIN 100 MG: 100 CAPSULE ORAL at 20:33

## 2023-12-05 RX ADMIN — FAMOTIDINE 20 MG: 20 TABLET, FILM COATED ORAL at 08:06

## 2023-12-05 RX ADMIN — APIXABAN 5 MG: 5 TABLET, FILM COATED ORAL at 08:07

## 2023-12-05 RX ADMIN — METHOCARBAMOL TABLETS 1000 MG: 500 TABLET, COATED ORAL at 20:33

## 2023-12-05 RX ADMIN — APIXABAN 5 MG: 5 TABLET, FILM COATED ORAL at 20:33

## 2023-12-05 RX ADMIN — LIOTHYRONINE SODIUM 5 MCG: 5 TABLET ORAL at 08:06

## 2023-12-05 RX ADMIN — OXYCODONE 5 MG: 5 TABLET ORAL at 20:31

## 2023-12-05 ASSESSMENT — PAIN DESCRIPTION - PAIN TYPE
TYPE: SURGICAL PAIN
TYPE: SURGICAL PAIN;ACUTE PAIN
TYPE: SURGICAL PAIN

## 2023-12-05 ASSESSMENT — GAIT ASSESSMENTS
DISTANCE (FEET): 20
ASSISTIVE DEVICE: PARALLEL BARS
GAIT LEVEL OF ASSIST: STANDBY ASSIST
DEVIATION: ANTALGIC;BRADYKINETIC

## 2023-12-05 ASSESSMENT — ACTIVITIES OF DAILY LIVING (ADL)
BED_CHAIR_WHEELCHAIR_TRANSFER_DESCRIPTION: ADAPTIVE EQUIPMENT;SET-UP OF EQUIPMENT
BED_CHAIR_WHEELCHAIR_TRANSFER_DESCRIPTION: ADAPTIVE EQUIPMENT;SET-UP OF EQUIPMENT;SUPERVISION FOR SAFETY

## 2023-12-05 NOTE — PROGRESS NOTES
"  Physical Medicine & Rehabilitation Progress Note  _____________________________________  Interdisciplinary Team Conference   Most recent IDT on 12/5/2023    IMitchell M.D., was present and led the interdisciplinary team conference on 12/5/2023.  I led the IDT conference and agree with the IDT conference documentation and plan of care as noted below.     Nursing:  Diet Current Diet Order   Procedures    Diet Order Diet: Regular       Eating ADL Independent      % of Last Meal  Oral Nutrition: Lunch, Between 50-75% Consumed   Sleep    Bowel Last BM: 12/05/23   Bladder    Barriers to Discharge Home: Weakness, pain      Physical Therapy:  Bed Mobility    Transfers Standby Assist   (stand pivot WC to bed (using bed rail and WC arm rests))   Mobility Standby Assist   Stairs    Barriers to Discharge Home: weakness      Occupational Therapy:  Grooming Independent, Seated   Bathing Standby Assist (while incorporating sitting and standing w/ GB)   UB Dressing Supervision (set-up to don shar and long sleeve shirt while seated)   LB Dressing Contact Guard Assist (to don underwear, elastic waist pants and non skid socks w/ AE while incorporating sitting and standing)   Toileting Contact Guard Assist   Shower & Transfer    Barriers to Discharge Home: weakness      Respiratory Therapy:  O2 (LPM): 0  O2 Delivery Device: None - Room Air    Case Management:  Continues to work on disposition and DME needs.      Discharge Date/Disposition:  12/11/23  _____________________________________   Encounter Date: 12/5/2023    Chief Complaint: Weakness    Interval Events (Subjective):  Seen in therapy. No issues.     Objective:  VITAL SIGNS: /62   Pulse 81   Temp 36.8 °C (98.3 °F) (Oral)   Resp 18   Ht 1.575 m (5' 2\")   Wt 68.9 kg (152 lb)   LMP 11/01/1986   SpO2 95%   BMI 27.80 kg/m²   Gen: No acute distress, well developed well nourished adult  HEENT: Normal Cephalic Atraumatic, Normal conjunctiva.   CV: warm " extremities, well perfused, no edema  Resp: symmetric chest rise, breathing comfortably on room air  Abd: Soft, Non distended  Extremities: normal bulk, no atrophy  Skin: no visible rashes or lesions.   Neuro: alert, awake  Psych: Mood and affect appropriate and congruent    Laboratory Values:  Recent Results (from the past 72 hour(s))   CBC WITHOUT DIFFERENTIAL    Collection Time: 12/05/23  6:09 AM   Result Value Ref Range    WBC 4.9 4.8 - 10.8 K/uL    RBC 2.98 (L) 4.20 - 5.40 M/uL    Hemoglobin 8.4 (L) 12.0 - 16.0 g/dL    Hematocrit 27.1 (L) 37.0 - 47.0 %    MCV 90.9 81.4 - 97.8 fL    MCH 28.2 27.0 - 33.0 pg    MCHC 31.0 (L) 32.2 - 35.5 g/dL    RDW 55.4 (H) 35.9 - 50.0 fL    Platelet Count 251 164 - 446 K/uL    MPV 10.7 9.0 - 12.9 fL   Comp Metabolic Panel    Collection Time: 12/05/23  6:09 AM   Result Value Ref Range    Sodium 141 135 - 145 mmol/L    Potassium 3.8 3.6 - 5.5 mmol/L    Chloride 109 96 - 112 mmol/L    Co2 19 (L) 20 - 33 mmol/L    Anion Gap 13.0 7.0 - 16.0    Glucose 99 65 - 99 mg/dL    Bun 17 8 - 22 mg/dL    Creatinine 0.93 0.50 - 1.40 mg/dL    Calcium 9.4 8.5 - 10.5 mg/dL    Correct Calcium 10.2 8.5 - 10.5 mg/dL    AST(SGOT) 17 12 - 45 U/L    ALT(SGPT) 10 2 - 50 U/L    Alkaline Phosphatase 83 30 - 99 U/L    Total Bilirubin 0.3 0.1 - 1.5 mg/dL    Albumin 3.0 (L) 3.2 - 4.9 g/dL    Total Protein 5.9 (L) 6.0 - 8.2 g/dL    Globulin 2.9 1.9 - 3.5 g/dL    A-G Ratio 1.0 g/dL   ESTIMATED GFR    Collection Time: 12/05/23  6:09 AM   Result Value Ref Range    GFR (CKD-EPI) 61 >60 mL/min/1.73 m 2       Medications:  Scheduled Medications   Medication Dose Frequency    Pharmacy Consult Request  1 Each PHARMACY TO DOSE    senna-docusate  2 Tablet BID    acetaminophen  1,000 mg TID    apixaban  5 mg BID    atorvastatin  40 mg QHS    famotidine  20 mg DAILY    ferrous sulfate  325 mg QDAY with Breakfast    levothyroxine  88 mcg AM ES    liothyronine  5 mcg QAM    methocarbamol  1,000 mg TID    pregabalin  100 mg  BID    oxyCODONE immediate-release  5 mg TID     PRN medications: Respiratory Therapy Consult, hydrALAZINE, senna-docusate **AND** polyethylene glycol/lytes **AND** magnesium hydroxide **AND** bisacodyl, ondansetron **OR** ondansetron, sodium chloride, oxyCODONE immediate-release    Diet:  Current Diet Order   Procedures    Diet Order Diet: Regular       Medical Decision Making and Plan:  Left Femoral Neck fracture with superior displacement- diagnosed 11/22, unknown chronicity  Seen on Xray of hip taken 11/22  S/P hip arthroplasty on 11/27 by Dr Ko  No clear history of fall. Patient has not walked pain free since February 2023  Last Fall noted in Spring of 2023  -PT/OT  -Requesting imaging from Dr Connor and Dr Ocampo office to establish chronicity  [facility] Code / Diagnosis to Support: 0008.11 - Orthopaedic Disorders: Status Post Unilateral Hip Fracture         Polyradiculopathy 2/2 to bilateral Lumbar foraminal stenosis  Status post lumbar laminectomy  -Patient has history of prior L4-L5 decompression  - Recent worsening of back pain with radicular pain into right lower extremity, impairing function and recently utilizing a wheelchair in the last month  - 11/10 patient taken to the OR for L5-S1 laminectomy performed by Dr. Silva  - Spinal precautions in place, does not require bracing     Right Popliteal DVT  Provoked from surgery and immobility  -Eliquis 5mg BID for 3 months from 11/15/23  -continue eliquis     Acute Blood loss anemia  -7.6 Hb today  -12/1- 7.9 Hb  -consider US of hip if needed        Hypothyroidism  -On home dose Synthroid     Hypertension  - hold losartan and spironolactone  -home dose losartan and spironolactone      Pain:  - Neuroceptic - On Tylenol TID, oxycodone 5mg PRN, robaxin 1g TID  -11/30- startedOxycodone 5mg TID scheduled  -continue oxycodone 5mg TID scheduled     Circadian Rhythm disorder:   Recommend lights on during the day/off at night, minimize nighttime interruptions as  able.     Mood  - at risk of adjustment disorder, depression, and anxiety due to functional decline     ID:  - at risk for Urinary tract infection     Skin/Wounds:  - Pressure relief q2h while in bed. Close monitoring for signs of breakdown     DVT prophylaxis:  continue eliquis     GI prophylaxis:  On Pepcid        -Follow-up Ortho, NS, PCP    ____________________________________    Mitchlel Chavis MD  Physical Medicine & Rehabilitation   Brain Injury Medicine   ____________________________________

## 2023-12-05 NOTE — CARE PLAN
Problem: Dressing  Goal: STG-Within one week, patient will dress LB at SBA level using AE/DME PRN.  Outcome: Not Met     Problem: Toileting  Goal: STG-Within one week, patient will complete toileting tasks at SBA level using AE/DME PRN.  Outcome: Not Met     Problem: Bathing  Goal: STG-Within one week, patient will bathe at SPV level using AE/DME PRN.  Outcome: Met     Problem: Functional Transfers  Goal: STG-Within one week, patient will transfer to toilet at SBA level using DME PRN.  Outcome: Met  Goal: STG-Within one week, patient will transfer to step in shower at SBA level using DME PRN.  Outcome: Met

## 2023-12-05 NOTE — PROGRESS NOTES
NURSING DAILY NOTE    Name: Sheyla Gauthier   Date of Admission: 11/29/2023   Admitting Diagnosis: History of left hip replacement  Attending Physician: Mitchell Chavis M.d.  Allergies: Trazodone and Crestor [rosuvastatin calcium]    Safety  Patient Assist  sba-min  Patient Precautions  Fall Risk, Posterior Hip Precautions, Spinal / Back Precautions   Precaution Comments  no back brace  Bed Transfer Status  Standby Assist  Toilet Transfer Status   Standby Assist  Assistive Devices  Wheelchair  Oxygen  None - Room Air  Diet/Therapeutic Dining  Current Diet Order   Procedures    Diet Order Diet: Regular     Pill Administration  whole  Agitated Behavioral Scale  14  ABS Level of Severity  No Agitation    Fall Risk  Has the patient had a fall this admission?   No  Lexy Garcia Fall Risk Scoring  12, MODERATE RISK  Fall Risk Safety Measures  bed alarm, chair alarm, and poor balance    Vitals  Temperature: 36.8 °C (98.3 °F)  Temp src: Oral  Pulse: 81  Respiration: 18  Blood Pressure : 121/62  Blood Pressure MAP (Calculated): 82 MM HG  BP Location: Left, Upper Arm  Patient BP Position: Sitting     Oxygen  Pulse Oximetry: 95 %  O2 (LPM): 0  O2 Delivery Device: None - Room Air    Bowel and Bladder  Last Bowel Movement  12/05/23  Stool Type  Type 3: Like a sausage, but with cracks on its surface  Bowel Device  Bathroom  Continent  Bladder: Stress incontinence   Bowel: Continent movement  Bladder Function  Urine Void (mL):  (MOderate)  Number of Times Voided: 1  Urine Color: Yellow  Genitourinary Assessment   Bladder Assessment (WDL):  WDL Except  Kothari Catheter: Not Applicable  Urinary Elimination: Incontinence  Urine Color: Yellow  Bladder Device: Bathroom  Time Void: Yes  Bladder Scan: Post Void  $ Bladder Scan Results (mL): 34    Skin  Wyatt Score   16  Sensory Interventions   Bed Types: Standard/Trauma Mattress  Skin Preventative Measures: Pillows in Use for  Support / Positioning  Moisture Interventions  Moisturizers/Barriers: Barrier Wipes      Pain  Pain Rating Scale  6 - Hard to ignore, avoid usual activities  Pain Location  Hip  Pain Location Orientation  Left  Pain Interventions   Medication (see MAR)    ADLs    Bathing   Staff, Shower  Linen Change      Personal Hygiene  Moist Klarissa Wipes, Perineal Care  Chlorhexidine Bath      Oral Care  Brushed Teeth (Self)  Teeth/Dentures     Shave     Nutrition Percentage Eaten  Lunch, Between 50-75% Consumed  Environmental Precautions  Treaded Slipper Socks on Patient, Personal Belongings, Wastebasket, Call Bell etc. in Easy Reach, Transferred to Stronger Side, Bed in Low Position  Patient Turns/Positioning  Patient Turns Self from Side to Side  Patient Turns Assistance/Tolerance  General Weakness  Bed Positions  Bed Controls On, Bed Locked  Head of Bed Elevated  Less than 30 degrees      Psychosocial/Neurologic Assessment  Psychosocial Assessment  Psychosocial (WDL):  Within Defined Limits  Neurologic Assessment  Neuro (WDL): Exceptions to WDL  Level of Consciousness: Alert  Orientation Level: Oriented X4  Cognition: Other (Comment) (forgetful at times)  Speech: Clear  Pupil Assesment: No  Motor Function/Sensation Assessment: Motor strength  Muscle Strength Right Arm: Good Strength Against Gravity and Moderate Resistance  Muscle Strength Left Arm: Good Strength Against Gravity and Moderate Resistance  Muscle Strength Right Leg: Good Strength Against Gravity and Moderate Resistance  Muscle Strength Left Leg: Weak Movement but Not Against Gravity or Resistance  EENT (WDL):  WDL Except    Cardio/Pulmonary Assessment  Edema   RLE Edema: 1+  LLE Edema: 1+  Respiratory Breath Sounds  RUL Breath Sounds: Clear  RML Breath Sounds: Clear  RLL Breath Sounds: Clear  TYE Breath Sounds: Clear  LLL Breath Sounds: Clear  Cardiac Assessment   Cardiac (WDL):  Within Defined Limits

## 2023-12-05 NOTE — PROGRESS NOTES
NURSING DAILY NOTE    Name: Sheyla Gauthier  Date of Admission: 11/29/2023  Admitting Diagnosis: History of left hip replacement  Attending Physician: Mitchell Chavis M.d.  Allergies: Trazodone and Crestor [rosuvastatin calcium]    Safety  Patient Assist  osba-min  Patient Precautions  oFall Risk, Posterior Hip Precautions, Spinal / Back Precautions   Precaution Comments  geraldo back brace  Bed Transfer Status  oStandby Assist  Toilet Transfer Status  oStandby Assist  Assistive Devices  oRails, Walker - front wheel, Wheelchair  Oxygen  oNone - Room Air  Diet/Therapeutic Dining  o  Current Diet Order   Procedures    Diet Order Diet: Regular     Pill Administration  owhole  Agitated Behavioral Scale  o14  ABS Level of Severity  Geraldo Agitation    Fall Risk  Has the patient had a fall this admission?  Geraldo  Lexy Garcia Fall Risk Scoring  o12, MODERATE RISK  Fall Risk Safety Measures  anahi alarm and chair alarm    Vitals  Temperature: 36.3 °C (97.4 °F)  Temp src: Oral  Pulse: 77  Respiration: 18  Blood Pressure : 119/70  Blood Pressure MAP (Calculated): 86 MM HG  BP Location: Left, Upper Arm  Patient BP Position: Sitting    Oxygen  Pulse Oximetry: 96 %  O2 (LPM): 0  O2 Delivery Device: None - Room Air    Bowel and Bladder  Last Bowel Movement  o12/03/23  Stool Type  oType 2: Sausage shaped, but lumpy  Bowel Device  oBathroom  Continent  oBladder: Stress incontinence  oBowel: Continent movement  Bladder Function  oUrine Void (mL):  (MOderate)  Number of Times Voided: 1  Urine Color: Yellow  Genitourinary Assessment  oBladder Assessment (WDL):  WDL Except  Kothari Catheter: Not Applicable  Urine Color: Yellow  Bladder Device: Bathroom  Time Void: Yes  Bladder Scan: Post Void  $ Bladder Scan Results (mL): 34    Skin  Wyatt Score  o 16  Sensory Interventions  o Bed Types: Standard/Trauma Mattress  Skin Preventative Measures: Pillows in Use for Support / Positioning, Waffle Overlay  Moisture  Interventions  oMoisturizers/Barriers: Barrier Wipes      Pain  Pain Rating Scale  o6 - Hard to ignore, avoid usual activities  Pain Location  oHip  Pain Location Orientation  oLeft  Pain Interventions  oMedication (see MAR)    ADLs    Bathing  oStaff, Shower  Linen Change  o   Personal Hygiene  oMoist Klarissa Wipes, Perineal Care  Chlorhexidine Bath  o   Oral Care  oBrushed Teeth (Self)  Teeth/Dentures  o   Shave  o   Nutrition Percentage Eaten  oLunch, Between 50-75% Consumed  Environmental Precautions  oTreaded Slipper Socks on Patient, Personal Belongings, Wastebasket, Call Bell etc. in Easy Reach, Transferred to Stronger Side, Bed in Low Position  Patient Turns/Positioning  oPatient Turns Self from Side to Side  Patient Turns Assistance/Tolerance  oTolerates Well, General Weakness  Bed Positions  Tao Controls On, Bed Locked  Head of Bed Elevated  oLess than 30 degrees      Psychosocial/Neurologic Assessment  Psychosocial Assessment  oPsychosocial (WDL):  Within Defined Limits  Neurologic Assessment  oNeuro (WDL): Exceptions to WDL  Level of Consciousness: Alert  Orientation Level: Oriented X4  Cognition: Other (Comment) (forgetful at times)  Speech: Clear  Pupil Assesment: No  Motor Function/Sensation Assessment: Motor strength  Muscle Strength Right Arm: Good Strength Against Gravity and Moderate Resistance  Muscle Strength Left Arm: Good Strength Against Gravity and Moderate Resistance  Muscle Strength Right Leg: Good Strength Against Gravity and Moderate Resistance  Muscle Strength Left Leg: Weak Movement but Not Against Gravity or Resistance  oEENT (WDL):  WDL Except    Cardio/Pulmonary Assessment  Edema  oRLE Edema: 1+  LLE Edema: 1+  Respiratory Breath Sounds  oRUL Breath Sounds: Clear  RML Breath Sounds: Clear  RLL Breath Sounds: Clear  TYE Breath Sounds: Clear  LLL Breath Sounds: Clear  Cardiac Assessment  oCardiac (WDL):  Within Defined Limits

## 2023-12-05 NOTE — THERAPY
"Occupational Therapy  Daily Treatment     Patient Name: Sheyla Gauthier  Age:  83 y.o., Sex:  female  Medical Record #: [id number]  Today's Date: 12/5/2023     Precautions  Precautions: Fall Risk, Posterior Hip Precautions, Spinal / Back Precautions   Comments: no back brace         Subjective    \"Someone said that pillow is too big for me.\" Pt referring to leg abductor wedge pillow for hip precautions. OTR removed straps for pt to trial this evening without need for attachment. Also recommended that pt trial a standard pillow if wedge is not comfortable.     Objective       12/05/23 0831   OT Charge Group   OT Therapy Activity (Units) 2   OT Therapeutic Exercise (Units) 2   OT Total Time Spent   OT Individual Total Time Spent (Mins) 60   Pain   Intervention Medication (see MAR);Emotional Support;Repositioned;Rest   Pain 0 - 10 Group   Location Hip   Location Orientation Left   Pain Rating Scale (NPRS) 3  (progressing to 6/10 with movement.)   Description Burning   Comfort Goal Comfort with Movement;Perform Activity   Therapist Pain Assessment During Activity;Prior to Activity   Cognition    Level of Consciousness Alert   Sitting Lower Body Exercises   Hip Abduction 2 sets of 10;Left;Other Resistance (See Comments)  (Pt help wedge pillow while seated over to R-side and was instructed to complete hip abduction while resting between sessions to build strength to maintain precautions and complete functional transfers safely.)   Standing Lower Body Exercises   Hip Abduction 3 sets of 10;Left;Other Resistance (See Comments)  (No resistance standing in //bars)   Mini Squat Partial;2 sets of 10   Other Exercises 2 sets of 20 reps alternating toe taps to 5\" high surface with limited BUE support in //bars.   Balance   Comments Pt completed short distance mobility in //bars while completing simulated step in shower transfers over a 5\" barrier, 5x with seated RB following 2 bouts. Pt trialed stepping over with surgical leg " and then non-surgical leg, preferring non-surgical leg to lead. Completed at SBA with no LOB. Pt also completed with single UE support with increased time to simulate GB.   Interdisciplinary Plan of Care Collaboration   Patient Position at End of Therapy Seated;Chair Alarm On;Self Releasing Lap Belt Applied;Call Light within Reach;Tray Table within Reach     Pt engaged in tossing task without LOB with step toss to ladder ball in //bars.    Assessment    Pt tolerated OT session well with focus on simulated shower transfers, strengthening, and standing balance. Only mild increases in pain with movement. No LOB.  Strengths: Able to follow instructions, Alert and oriented, Effective communication skills, Good insight into deficits/needs, Independent prior level of function, Making steady progress towards goals, Manages pain appropriately, Motivated for self care and independence, Pleasant and cooperative, Supportive family, Willingly participates in therapeutic activities  Barriers: Fatigue, Generalized weakness, Impaired activity tolerance, Impaired balance, Limited mobility, Pain    Plan    ADLs with AE, UB strengthening, functional transfers/balance within precautions      Occupational Therapy Goals (Active)       Problem: Dressing       Dates: Start:  11/30/23         Goal: STG-Within one week, patient will dress LB at SBA level using AE/DME PRN.       Dates: Start:  11/30/23               Problem: OT Long Term Goals       Dates: Start:  11/30/23         Goal: LTG-By discharge, patient will complete basic self care tasks at SBA-Mod I level using DME PRN.       Dates: Start:  11/30/23            Goal: LTG-By discharge, patient will perform bathroom transfers at SBA-Mod I level using DME PRN.       Dates: Start:  11/30/23               Problem: Toileting       Dates: Start:  11/30/23         Goal: STG-Within one week, patient will complete toileting tasks at SBA level using AE/DME PRN.       Dates: Start:  11/30/23

## 2023-12-05 NOTE — CARE PLAN
The patient is Stable - Low risk of patient condition declining or worsening    Shift Goals  Clinical Goals: safety  Patient Goals: pain control, sleep well    Progress made toward(s) clinical / shift goals:    Problem: Fall Risk - Rehab  Goal: Patient will remain free from falls  Outcome: Progressing. Patient bed in lowest locked position with bed alarm on and call light within reach. Patient calls appropriately with call light for needs. Patient has not attempted to self transfer over shift.      Problem: Pain - Standard  Goal: Alleviation of pain or a reduction in pain to the patient’s comfort goal  Outcome: Progressing. Patient states 6-7/10 pain over shift, receives scheduled 5mg oxycodone tid and asked for prn oxycodone 5 mg once over shift for 6/10 pain. Patient states adequate relief after receiving oxycodone for pain.

## 2023-12-05 NOTE — THERAPY
Occupational Therapy  Daily Treatment     Patient Name: Sheyla Gauthier  Age:  83 y.o., Sex:  female  Medical Record #: [id number]  Today's Date: 12/5/2023     Precautions  Precautions: Fall Risk, Posterior Hip Precautions, Spinal / Back Precautions   Comments: no back brace         Subjective    Patient was resting in bed with ice on L hip.  She was agreeable to therapy and stated she had some laundry to do.     Objective       12/05/23 1101   OT Charge Group   OT Therapy Activity (Units) 2   OT Total Time Spent   OT Individual Total Time Spent (Mins) 30   Precautions   Precautions Fall Risk;Posterior Hip Precautions;Spinal / Back Precautions    Pain 0 - 10 Group   Location Hip   Location Orientation Left   Therapist Pain Assessment Prior to Activity;During Activity   Functional Level of Assist   Bed, Chair, Wheelchair Transfer Standby Assist   Bed Chair Wheelchair Transfer Description Adaptive equipment;Set-up of equipment;Supervision for safety  (fww)   Sitting Upper Body Exercises   Upper Extremity Bike Minutes / Rest Breaks (See Comments)  (6.5 minutes on UB motomed cycle with one rest break)   IADL Treatments   Home Management Patient stood at washing machine with SBA to load dirty clothes, soap and start machine with min verbal cues due to novel washing machine.   Bed Mobility    Supine to Sit Standby Assist   Scooting Standby Assist   Interdisciplinary Plan of Care Collaboration   Patient Position at End of Therapy Seated;Chair Alarm On;Self Releasing Lap Belt Applied;Call Light within Reach;Tray Table within Reach;Phone within Reach     Functional mobility with FWW from room to therapy gym and into adl kitchen/laundry room with CGA/SBA.      Cut abduction pillow into smaller wedge per patient request so it is not so bulky.      Assessment    Patient with increased left hip pain complaints during this session, though stated she just had an oxy a half hour previous.  She tolerated session well even though  in pain.    Strengths: Able to follow instructions, Alert and oriented, Effective communication skills, Good insight into deficits/needs, Independent prior level of function, Making steady progress towards goals, Manages pain appropriately, Motivated for self care and independence, Pleasant and cooperative, Supportive family, Willingly participates in therapeutic activities  Barriers: Fatigue, Generalized weakness, Impaired activity tolerance, Impaired balance, Limited mobility, Pain    Plan    ADLs with AE, UB strengthening, functional transfers/balance within precautions     Occupational Therapy Goals (Active)       Problem: Dressing       Dates: Start:  11/30/23         Goal: STG-Within one week, patient will dress LB at SBA level using AE/DME PRN.       Dates: Start:  11/30/23               Problem: OT Long Term Goals       Dates: Start:  11/30/23         Goal: LTG-By discharge, patient will complete basic self care tasks at SBA-Mod I level using DME PRN.       Dates: Start:  11/30/23            Goal: LTG-By discharge, patient will perform bathroom transfers at SBA-Mod I level using DME PRN.       Dates: Start:  11/30/23               Problem: Toileting       Dates: Start:  11/30/23         Goal: STG-Within one week, patient will complete toileting tasks at SBA level using AE/DME PRN.       Dates: Start:  11/30/23

## 2023-12-05 NOTE — CARE PLAN
Problem: Knowledge Deficit - Standard  Goal: Patient and family/care givers will demonstrate understanding of plan of care, disease process/condition, diagnostic tests and medications  Outcome: Progressing     Problem: Skin Integrity  Goal: Skin integrity is maintained or improved  Note: Patient's skin is maintained and free from new or accidental injury this shift.  Will continue to monitor.   The patient is Stable - Low risk of patient condition declining or worsening    Shift Goals  Clinical Goals: safety  Patient Goals: pain control, sleep well

## 2023-12-05 NOTE — THERAPY
Physical Therapy   Daily Treatment     Patient Name: Sheyla Gauthier  Age:  83 y.o., Sex:  female  Medical Record #: [id number]  Today's Date: 12/5/2023     Precautions  Precautions: Fall Risk, Posterior Hip Precautions, Spinal / Back Precautions   Comments: no back brace    Subjective    Patient reports significant increase in pain when approached for therapy, contacted RN who provided pain medication. Patient requested to get back into bed.      Objective    Ice pack provided, utilized during supine exercises.        12/05/23 1001   PT Charge Group   PT Therapeutic Exercise (Units) 1   PT Therapeutic Activities (Units) 1   PT Total Time Spent   PT Individual Total Time Spent (Mins) 30   Transfer Functional Level of Assist   Bed, Chair, Wheelchair Transfer Standby Assist   Bed Chair Wheelchair Transfer Description   (stand pivot WC to bed (using bed rail and WC arm rests))   Supine Lower Body Exercise   Hip Abduction Hook Lying;2 sets of 10;Bilateral  (isometric against therapist resistance)   Ankle Pumps Reciprocal;2 sets of 15   Quadriceps Isometrics 2 sets of 15;Left  (10 second hold)   Bed Mobility    Sit to Supine Minimal Assist   Sit to Stand Standby Assist   Interdisciplinary Plan of Care Collaboration   IDT Collaboration with  Nursing   Patient Position at End of Therapy In Bed;Call Light within Reach;Tray Table within Reach;Phone within Reach   Collaboration Comments Collaborated with RN re: patient's request for pain medication         Assessment    Patient limited this session due to pain, required increased time for transfer and bed mobility. Patient remains well motivated despite increased pain.     Strengths: Able to follow instructions, Independent prior level of function, Making steady progress towards goals, Motivated for self care and independence, Pleasant and cooperative, Supportive family, Willingly participates in therapeutic activities  Barriers: Generalized weakness, Impaired activity  tolerance, Impaired balance, Pain    Plan    Gait training, stair training, LE/hip/glute strengthening/ROM, balance training, overall activity tolerance/endurance, bed mobility training      DME  PT DME Recommendations  Wheelchair:  (likely will be ambulatory and will not require WC)  Cushion:  (n/a)  Assistive Device: Front Wheeled Walker (pt has FWW)    Passport items to be completed:  Get in/out of bed safely, in/out of a vehicle, safely use mobility device, walk or wheel around home/community, navigate up and down stairs, show how to get up/down from the ground, ensure home is accessible, demonstrate HEP, complete caregiver training    Physical Therapy Problems (Active)       Problem: Mobility       Dates: Start:  11/30/23         Goal: STG-Within one week, patient will ambulate 100ft FWW SBA       Dates: Start:  11/30/23         Goal Note filed on 12/05/23 0822 by Alan Schaefer, PT       Limited to 35-50ft SBA FWW due to pain and fatigue              Goal: STG-Within one week, patient will ambulate up/down a curb SBA FWW       Dates: Start:  11/30/23         Goal Note filed on 12/05/23 0822 by Alan Schaefer, PT       CGA for consecutive steps, has not completed curb step yet                 Problem: Mobility Transfers       Dates: Start:  11/30/23         Goal: STG-Within one week, patient will perform bed mobility Sherman       Dates: Start:  11/30/23         Goal Note filed on 12/05/23 0822 by Alan Schaefer, PT       Inconsistent, occasionally requires Rajan for sit>supine                 Problem: PT-Long Term Goals       Dates: Start:  11/30/23         Goal: LTG-By discharge, patient will ambulate 150ft Sherman FWW       Dates: Start:  11/30/23            Goal: LTG-By discharge, patient will transfer one surface to another Sherman       Dates: Start:  11/30/23            Goal: LTG-By discharge, patient will perform home exercise program independently       Dates: Start:  11/30/23            Goal: LTG-By discharge,  patient will transfer in/out of a car SBA       Dates: Start:  11/30/23

## 2023-12-05 NOTE — CARE PLAN
Problem: Mobility  Goal: STG-Within one week, patient will ambulate 100ft FWW SBA  Outcome: Progressing  Note: Limited to 35-50ft SBA FWW due to pain and fatigue  Goal: STG-Within one week, patient will ambulate up/down a curb SBA FWW  Outcome: Progressing  Note: CGA for consecutive steps, has not completed curb step yet     Problem: Mobility Transfers  Goal: STG-Within one week, patient will perform bed mobility Sherman  Outcome: Progressing  Note: Inconsistent, occasionally requires Rajan for sit>supine     Problem: Mobility Transfers  Goal: STG-Within one week, patient will transfer bed to chair SBA  Outcome: Met

## 2023-12-06 ENCOUNTER — APPOINTMENT (OUTPATIENT)
Dept: OCCUPATIONAL THERAPY | Facility: REHABILITATION | Age: 83
DRG: 560 | End: 2023-12-06
Attending: PHYSICAL MEDICINE & REHABILITATION
Payer: MEDICARE

## 2023-12-06 ENCOUNTER — APPOINTMENT (OUTPATIENT)
Dept: PHYSICAL THERAPY | Facility: REHABILITATION | Age: 83
DRG: 560 | End: 2023-12-06
Attending: PHYSICAL MEDICINE & REHABILITATION
Payer: MEDICARE

## 2023-12-06 PROCEDURE — 97530 THERAPEUTIC ACTIVITIES: CPT

## 2023-12-06 PROCEDURE — 99232 SBSQ HOSP IP/OBS MODERATE 35: CPT | Performed by: PHYSICAL MEDICINE & REHABILITATION

## 2023-12-06 PROCEDURE — A9270 NON-COVERED ITEM OR SERVICE: HCPCS | Performed by: PHYSICAL MEDICINE & REHABILITATION

## 2023-12-06 PROCEDURE — 97116 GAIT TRAINING THERAPY: CPT

## 2023-12-06 PROCEDURE — 97535 SELF CARE MNGMENT TRAINING: CPT | Mod: CO

## 2023-12-06 PROCEDURE — 770010 HCHG ROOM/CARE - REHAB SEMI PRIVAT*

## 2023-12-06 PROCEDURE — 97110 THERAPEUTIC EXERCISES: CPT | Mod: CO

## 2023-12-06 PROCEDURE — 97110 THERAPEUTIC EXERCISES: CPT

## 2023-12-06 PROCEDURE — 700102 HCHG RX REV CODE 250 W/ 637 OVERRIDE(OP): Performed by: PHYSICAL MEDICINE & REHABILITATION

## 2023-12-06 RX ADMIN — APIXABAN 5 MG: 5 TABLET, FILM COATED ORAL at 08:05

## 2023-12-06 RX ADMIN — OXYCODONE 5 MG: 5 TABLET ORAL at 12:15

## 2023-12-06 RX ADMIN — PREGABALIN 100 MG: 100 CAPSULE ORAL at 07:59

## 2023-12-06 RX ADMIN — OXYCODONE 5 MG: 5 TABLET ORAL at 22:50

## 2023-12-06 RX ADMIN — SENNOSIDES AND DOCUSATE SODIUM 2 TABLET: 50; 8.6 TABLET ORAL at 07:58

## 2023-12-06 RX ADMIN — ACETAMINOPHEN 1000 MG: 500 TABLET ORAL at 15:14

## 2023-12-06 RX ADMIN — METHOCARBAMOL TABLETS 1000 MG: 500 TABLET, COATED ORAL at 07:59

## 2023-12-06 RX ADMIN — PREGABALIN 100 MG: 100 CAPSULE ORAL at 19:52

## 2023-12-06 RX ADMIN — OXYCODONE 5 MG: 5 TABLET ORAL at 09:44

## 2023-12-06 RX ADMIN — SENNOSIDES AND DOCUSATE SODIUM 2 TABLET: 50; 8.6 TABLET ORAL at 19:54

## 2023-12-06 RX ADMIN — METHOCARBAMOL TABLETS 1000 MG: 500 TABLET, COATED ORAL at 15:14

## 2023-12-06 RX ADMIN — ACETAMINOPHEN 1000 MG: 500 TABLET ORAL at 07:59

## 2023-12-06 RX ADMIN — ATORVASTATIN CALCIUM 40 MG: 40 TABLET, FILM COATED ORAL at 19:52

## 2023-12-06 RX ADMIN — OXYCODONE 5 MG: 5 TABLET ORAL at 05:17

## 2023-12-06 RX ADMIN — APIXABAN 5 MG: 5 TABLET, FILM COATED ORAL at 19:53

## 2023-12-06 RX ADMIN — LEVOTHYROXINE SODIUM 88 MCG: 0.09 TABLET ORAL at 05:16

## 2023-12-06 RX ADMIN — METHOCARBAMOL TABLETS 1000 MG: 500 TABLET, COATED ORAL at 19:53

## 2023-12-06 RX ADMIN — LIOTHYRONINE SODIUM 5 MCG: 5 TABLET ORAL at 08:25

## 2023-12-06 RX ADMIN — FERROUS SULFATE TAB 325 MG (65 MG ELEMENTAL FE) 325 MG: 325 (65 FE) TAB at 07:57

## 2023-12-06 RX ADMIN — FAMOTIDINE 20 MG: 20 TABLET, FILM COATED ORAL at 07:59

## 2023-12-06 RX ADMIN — OXYCODONE 5 MG: 5 TABLET ORAL at 19:53

## 2023-12-06 RX ADMIN — ACETAMINOPHEN 1000 MG: 500 TABLET ORAL at 19:55

## 2023-12-06 ASSESSMENT — ACTIVITIES OF DAILY LIVING (ADL)
BED_CHAIR_WHEELCHAIR_TRANSFER_DESCRIPTION: ADAPTIVE EQUIPMENT;SET-UP OF EQUIPMENT;SUPERVISION FOR SAFETY;VERBAL CUEING
BED_CHAIR_WHEELCHAIR_TRANSFER_DESCRIPTION: ADAPTIVE EQUIPMENT
TOILET_TRANSFER_DESCRIPTION: GRAB BAR;ADAPTIVE EQUIPMENT;INCREASED TIME;SUPERVISION FOR SAFETY
BED_CHAIR_WHEELCHAIR_TRANSFER_DESCRIPTION: ADAPTIVE EQUIPMENT
TOILETING_LEVEL_OF_ASSIST_DESCRIPTION: ADAPTIVE EQUIPMENT;GRAB BAR;INCREASED TIME;SUPERVISION FOR SAFETY
TUB_SHOWER_TRANSFER_DESCRIPTION: GRAB BAR;INCREASED TIME;SUPERVISION FOR SAFETY;VERBAL CUEING
TOILET_TRANSFER_DESCRIPTION: GRAB BAR;SUPERVISION FOR SAFETY;VERBAL CUEING

## 2023-12-06 ASSESSMENT — GAIT ASSESSMENTS
GAIT LEVEL OF ASSIST: STANDBY ASSIST
GAIT LEVEL OF ASSIST: STANDBY ASSIST
DISTANCE (FEET): 70
DEVIATION: ANTALGIC;BRADYKINETIC
DISTANCE (FEET): 150
ASSISTIVE DEVICE: FRONT WHEEL WALKER
ASSISTIVE DEVICE: FRONT WHEEL WALKER
DEVIATION: BRADYKINETIC

## 2023-12-06 ASSESSMENT — PAIN DESCRIPTION - PAIN TYPE
TYPE: ACUTE PAIN
TYPE: SURGICAL PAIN

## 2023-12-06 NOTE — PROGRESS NOTES
NURSING DAILY NOTE    Name: Sheyla Gauthier  Date of Admission: 11/29/2023  Admitting Diagnosis: History of left hip replacement  Attending Physician: Mitchell Chavis M.d.  Allergies: Trazodone and Crestor [rosuvastatin calcium]    Safety  Patient Assist  osba  Patient Precautions  oFall Risk, Posterior Hip Precautions, Spinal / Back Precautions   Precaution Comments  geraldo back brace  Bed Transfer Status  oStandby Assist  Toilet Transfer Status  oStandby Assist  Assistive Devices  oWalker - front wheel, Wheelchair  Oxygen  oNone - Room Air  Diet/Therapeutic Dining  o  Current Diet Order   Procedures    Diet Order Diet: Regular     Pill Administration  owhole  Agitated Behavioral Scale  o14  ABS Level of Severity  Geraldo Agitation    Fall Risk  Has the patient had a fall this admission?  Geraldo  Lexy Garcia Fall Risk Scoring  o12, MODERATE RISK  Fall Risk Safety Measures  anahi alarm and chair alarm    Vitals  Temperature: 36.8 °C (98.2 °F)  Temp src: Oral  Pulse: 63  Respiration: 20  Blood Pressure : 117/59  Blood Pressure MAP (Calculated): 78 MM HG  BP Location: Right, Upper Arm  Patient BP Position: Supine    Oxygen  Pulse Oximetry: 94 %  O2 (LPM): 0  O2 Delivery Device: None - Room Air    Bowel and Bladder  Last Bowel Movement  o12/05/23  Stool Type  oType 3: Like a sausage, but with cracks on its surface  Bowel Device  oBathroom  Continent  oBladder: Stress incontinence  oBowel: Continent movement  Bladder Function  oUrine Void (mL):  (MOderate)  Number of Times Voided: 1  Urine Color: Yellow  Genitourinary Assessment  oBladder Assessment (WDL):  WDL Except  Kothari Catheter: Not Applicable  Urinary Elimination: Stress Incontinence  Urine Color: Yellow  Bladder Device: Bathroom  Time Void: Yes  Bladder Scan: Post Void  $ Bladder Scan Results (mL): 34    Skin  Wyatt Score  o 17  Sensory Interventions  o Bed Types: Standard/Trauma Mattress  Skin Preventative Measures: Pillows in Use for Support /  Positioning  Moisture Interventions  oMoisturizers/Barriers: Barrier Wipes      Pain  Pain Rating Scale  o3 - Sometimes distracts me  Pain Location  oHip  Pain Location Orientation  oLeft  Pain Interventions  oMedication (see MAR), Emotional Support, Environmental Changes, Heat Applied, Repositioned    ADLs    Bathing  oPatient Refused Bathing  Linen Change  o   Personal Hygiene  oPerineal Care, Moist Klarissa Wipes  Chlorhexidine Bath  o   Oral Care  oBrushed Teeth (Self)  Teeth/Dentures  o   Shave  o   Nutrition Percentage Eaten  o*  * Meal *  *, Dinner, Between % Consumed  Environmental Precautions  oTreaded Slipper Socks on Patient, Personal Belongings, Wastebasket, Call Bell etc. in Easy Reach, Transferred to Stronger Side, Bed in Low Position  Patient Turns/Positioning  oPatient Turns Self from Side to Side  Patient Turns Assistance/Tolerance  oGeneral Weakness  Bed Positions  Tao Controls On, Bed Locked  Head of Bed Elevated  oLess than 30 degrees      Psychosocial/Neurologic Assessment  Psychosocial Assessment  oPsychosocial (WDL):  Within Defined Limits  Neurologic Assessment  oNeuro (WDL): Exceptions to WDL  Level of Consciousness: Alert  Orientation Level: Oriented X4  Cognition: Other (Comment) (forgetful at times)  Speech: Clear  Pupil Assesment: No  Motor Function/Sensation Assessment: Motor strength  Muscle Strength Right Arm: Good Strength Against Gravity and Moderate Resistance  Muscle Strength Left Arm: Good Strength Against Gravity and Moderate Resistance  Muscle Strength Right Leg: Good Strength Against Gravity and Moderate Resistance  Muscle Strength Left Leg: Weak Movement but Not Against Gravity or Resistance  oEENT (WDL):  WDL Except    Cardio/Pulmonary Assessment  Edema  oRLE Edema: 1+  LLE Edema: 1+  Respiratory Breath Sounds  oRUL Breath Sounds: Clear  RML Breath Sounds: Clear  RLL Breath Sounds: Clear  TYE Breath Sounds: Clear  LLL Breath Sounds: Clear  Cardiac Assessment  oCardiac  (WDL):  Within Defined Limits

## 2023-12-06 NOTE — THERAPY
Occupational Therapy  Daily Treatment     Patient Name: Sheyla Gauthier  Age:  83 y.o., Sex:  female  Medical Record #: [id number]  Today's Date: 12/6/2023     Precautions  Precautions: Fall Risk, Posterior Hip Precautions, Spinal / Back Precautions   Comments: no back brace         Subjective    Pt was supine in bed ready for OT session.      Objective       12/06/23 0931   OT Charge Group   OT Self Care / ADL (Units) 1   OT Therapeutic Exercise (Units) 2   OT Total Time Spent   OT Individual Total Time Spent (Mins) 30   Functional Level of Assist   Toileting Standby Assist   Toileting Description Adaptive equipment;Grab bar;Increased time;Supervision for safety   Bed, Chair, Wheelchair Transfer Standby Assist   Bed Chair Wheelchair Transfer Description Adaptive equipment   Toilet Transfers Standby Assist   Toilet Transfer Description Grab bar;Adaptive equipment;Increased time;Supervision for safety   Sitting Upper Body Exercises   Lat Pull 2 sets of 10;Bilateral;Weight (See Comments for lbs)  (15lbs (#2) on equalizer)   Internal Shoulder Rotation 2 sets of 10;Bilateral;Weight (See Comments for lbs)  (3lbs hand weight)   External Shoulder Rotation 2 sets of 10;Bilateral;Weight (See Comments for lbs)  (3lbs hand weight)   Bicep Curls 2 sets of 10;Bilateral;Weight (See Comments for lbs)  (3lbs hand weight)   Bed Mobility    Supine to Sit Supervised   Sit to Supine Supervised  (using leg )   Interdisciplinary Plan of Care Collaboration   Patient Position at End of Therapy In Bed;Call Light within Reach;Tray Table within Reach;Phone within Reach     SBA for functional amb from bed>restroom for toileting using FWW and no LOB. Pt was able to pull pants up and down with sup while holding onto grab bar for increase stability.      Assessment    Pt tolerated OT session well with no increase c/o of pain.     Strengths: Able to follow instructions, Alert and oriented, Effective communication skills, Good insight into  deficits/needs, Independent prior level of function, Making steady progress towards goals, Manages pain appropriately, Motivated for self care and independence, Pleasant and cooperative, Supportive family, Willingly participates in therapeutic activities  Barriers: Fatigue, Generalized weakness, Impaired activity tolerance, Impaired balance, Limited mobility, Pain    Plan    Standing tolerance/endurance   I/ADLs.  Strengthening/endurance    DME       Passport items to be completed:  Perform bathroom transfers, complete dressing, complete feeding, get ready for the day, prepare a simple meal, participate in household tasks, adapt home for safety needs, demonstrate home exercise program, complete caregiver training     Occupational Therapy Goals (Active)       Problem: Dressing       Dates: Start:  11/30/23         Goal: STG-Within one week, patient will dress LB at SBA level using AE/DME PRN.       Dates: Start:  11/30/23               Problem: OT Long Term Goals       Dates: Start:  11/30/23         Goal: LTG-By discharge, patient will complete basic self care tasks at SBA-Mod I level using DME PRN.       Dates: Start:  11/30/23            Goal: LTG-By discharge, patient will perform bathroom transfers at SBA-Mod I level using DME PRN.       Dates: Start:  11/30/23               Problem: Toileting       Dates: Start:  11/30/23         Goal: STG-Within one week, patient will complete toileting tasks at SBA level using AE/DME PRN.       Dates: Start:  11/30/23

## 2023-12-06 NOTE — DISCHARGE PLANNING
Cm  Idt held yesterday; dc date confirmed for 12/11; dtr to  at 11 on Monday and travel to Assisted Living in [facility]; pt has all dme; xray to be done @ rehab on Friday 12/8; tc to [facility] confirming Md is able to read xray that are completed @ [facility]; staples to be removed prior to dc.     Plan:  Refer to out pt PT/OT in [place] - pending choice; complete 602 form which is required for admission to [facility] in [place].

## 2023-12-06 NOTE — THERAPY
Physical Therapy   Daily Treatment     Patient Name: Sheyla Gauthier  Age:  83 y.o., Sex:  female  Medical Record #: [id number]  Today's Date: 12/6/2023     Precautions  Precautions: Fall Risk, Posterior Hip Precautions, Spinal / Back Precautions   Comments: no back brace    Subjective    Pt agreeable to tx. Is concerned that she may be developing pressure injury     Objective       12/06/23 0830   PT Charge Group   PT Gait Training (Units) 1   PT Therapeutic Exercise (Units) 1   PT Total Time Spent   PT Individual Total Time Spent (Mins) 30   Gait Functional Level of Assist    Gait Level Of Assist Standby Assist   Assistive Device Front Wheel Walker   Distance (Feet) 70   # of Times Distance was Traveled 1   Deviation Antalgic;Bradykinetic  (improved L LE weight acceptance)   Transfer Functional Level of Assist   Bed, Chair, Wheelchair Transfer Standby Assist   Bed Chair Wheelchair Transfer Description Adaptive equipment;Set-up of equipment;Supervision for safety;Verbal cueing   Sitting Lower Body Exercises   Long Arc Quad 1 set of 10;Left   Sit to Stand 1 set of 10   Standing Lower Body Exercises   Hamstring Curl 2 sets of 10;Left   Hip Flexion 2 sets of 10;Left   Hip Extension 2 sets of 10;Left   Hip Abduction 2 sets of 10;Left   Bed Mobility    Sit to Stand Modified Independent  (at grab bar in bathroom)   Interdisciplinary Plan of Care Collaboration   IDT Collaboration with  Occupational Therapist;Nursing   Patient Position at End of Therapy In Bed;Bed Alarm On;Call Light within Reach;Tray Table within Reach;Phone within Reach   Collaboration Comments cleared Sherman to stand at grab bar for pressure relief     Assessed pt for Sherman pull to stand at GB for pressure relief, pt cleared to complete and verbalized understanding to call RN for transfers       12/06/23 1401   PT Charge Group   PT Gait Training (Units) 2   PT Therapeutic Exercise (Units) 1   PT Therapeutic Activities (Units) 1   PT Total Time Spent   PT  Individual Total Time Spent (Mins) 60   Gait Functional Level of Assist    Gait Level Of Assist Standby Assist   Assistive Device Front Wheel Walker   Distance (Feet) 150   # of Times Distance was Traveled 2   Deviation Bradykinetic   Transfer Functional Level of Assist   Bed, Chair, Wheelchair Transfer Supervised   Bed Chair Wheelchair Transfer Description Adaptive equipment   Toilet Transfers Supervised   Toilet Transfer Description Grab bar;Supervision for safety;Verbal cueing   Sitting Lower Body Exercises   Nustep Resistance Level 1  (B UE/LE 10 min 0.15 mile)   Bed Mobility    Supine to Sit Supervised   Sit to Supine Modified Independent   Sit to Stand Modified Independent   Interdisciplinary Plan of Care Collaboration   Patient Position at End of Therapy In Bed;Bed Alarm On;Call Light within Reach;Tray Table within Reach;Phone within Reach     Assisted with toileting per request for continent BM and standing hand hygiene    Extra time spent educating pt on positioning in bed and positioning in 1/4 R sidelying with home abduction pillow between thighs and standard pilloe between lower legs    Assessment    Pt demonstrated improved standing tolerance and L LE weight acceptance today. She demonstrated good safety awareness with WC brake and foot rest management to stand at grab bar Sherman for pressure relief. Pt also progressed to the farthest ambulation distance to date without needing a seated rest break.  Strengths: Able to follow instructions, Independent prior level of function, Making steady progress towards goals, Motivated for self care and independence, Pleasant and cooperative, Supportive family, Willingly participates in therapeutic activities  Barriers: Generalized weakness, Impaired activity tolerance, Impaired balance, Pain    Plan    L hip HEP, gait training, dynamic standing balance     DME  PT DME Recommendations  Wheelchair:  (likely will be ambulatory and will not require WC)  Cushion:   (n/a)  Assistive Device: Front Wheeled Walker (pt has FWW)    Passport items to be completed:  Get in/out of bed safely, in/out of a vehicle, safely use mobility device, walk or wheel around home/community, navigate up and down stairs, show how to get up/down from the ground, ensure home is accessible, demonstrate HEP, complete caregiver training    Physical Therapy Problems (Active)       Problem: Mobility       Dates: Start:  11/30/23         Goal: STG-Within one week, patient will ambulate 100ft FWW SBA       Dates: Start:  11/30/23         Goal Note filed on 12/05/23 0822 by Alan Schaefer, PT       Limited to 35-50ft SBA FWW due to pain and fatigue              Goal: STG-Within one week, patient will ambulate up/down a curb SBA FWW       Dates: Start:  11/30/23         Goal Note filed on 12/05/23 0822 by Alan Schaefer, PT       CGA for consecutive steps, has not completed curb step yet                 Problem: Mobility Transfers       Dates: Start:  11/30/23         Goal: STG-Within one week, patient will perform bed mobility Sherman       Dates: Start:  11/30/23         Goal Note filed on 12/05/23 0822 by Alan Schaefer, PT       Inconsistent, occasionally requires Rajan for sit>supine                 Problem: PT-Long Term Goals       Dates: Start:  11/30/23         Goal: LTG-By discharge, patient will ambulate 150ft Sherman FWW       Dates: Start:  11/30/23            Goal: LTG-By discharge, patient will transfer one surface to another Sherman       Dates: Start:  11/30/23            Goal: LTG-By discharge, patient will perform home exercise program independently       Dates: Start:  11/30/23            Goal: LTG-By discharge, patient will transfer in/out of a car SBA       Dates: Start:  11/30/23

## 2023-12-06 NOTE — THERAPY
Physical Therapy   Daily Treatment     Patient Name: Sheyla Gauthier  Age:  83 y.o., Sex:  female  Medical Record #: [id number]  Today's Date: 12/5/2023     Precautions  Precautions: Fall Risk, Posterior Hip Precautions, Spinal / Back Precautions   Comments: no back brace    Subjective    Pt reporting that she was not able to get in to see orthopedic surgeon as anticipated on 12/11     Objective       12/05/23 1301   PT Charge Group   PT Gait Training (Units) 1   PT Therapeutic Exercise (Units) 2   PT Therapeutic Activities (Units) 1   PT Total Time Spent   PT Individual Total Time Spent (Mins) 60   Gait Functional Level of Assist    Gait Level Of Assist Standby Assist   Assistive Device Parallel Bars   Distance (Feet) 20   # of Times Distance was Traveled 2   Deviation Antalgic;Bradykinetic   Wheelchair Functional Level of Assist   Wheelchair Assist Modified Independent   Distance Wheelchair (Feet or Distance) 150   Transfer Functional Level of Assist   Bed, Chair, Wheelchair Transfer Standby Assist   Bed Chair Wheelchair Transfer Description Adaptive equipment;Set-up of equipment   Sitting Lower Body Exercises   Sit to Stand 1 set of 10  (pink TB around knees)   Nustep Resistance Level 3  (15 min 251 steps, care to maintain L LE posterior hip precautions)   Standing Lower Body Exercises   Other Exercises sidestepping Pink TB around knees 10ftx2   Bed Mobility    Sit to Stand Standby Assist   Interdisciplinary Plan of Care Collaboration   IDT Collaboration with  Family / Caregiver;Physician   Patient Position at End of Therapy Seated;Chair Alarm On;Self Releasing Lap Belt Applied;Call Light within Reach;Tray Table within Reach;Phone within Reach  (ice pack to L hip)     Extra time for rest breaks due to increased L hip pain today    Discussed POC with pt and MD. Plan to complete imaging and suture removal on site prior to DC on 12/11 pending confirmation from surgeon    Discussed OPPT recommendation due to direct  access in [place] vs needing PCP referral to initiate HHPT    Assessment    Pt was limited by increased L hip pain today. No observed change in leg length, decreased ability to maintain L hip in neutral due to muscle fatigue which was corrected with positioning pillow in sitting.  Strengths: Able to follow instructions, Independent prior level of function, Making steady progress towards goals, Motivated for self care and independence, Pleasant and cooperative, Supportive family, Willingly participates in therapeutic activities  Barriers: Generalized weakness, Impaired activity tolerance, Impaired balance, Pain    Plan    L hip HEP, gait training, dynamic standing balance    DME  PT DME Recommendations  Wheelchair:  (likely will be ambulatory and will not require WC)  Cushion:  (n/a)  Assistive Device: Front Wheeled Walker (pt has FWW)    Passport items to be completed:  Get in/out of bed safely, in/out of a vehicle, safely use mobility device, walk or wheel around home/community, navigate up and down stairs, show how to get up/down from the ground, ensure home is accessible, demonstrate HEP, complete caregiver training    Physical Therapy Problems (Active)       Problem: Mobility       Dates: Start:  11/30/23         Goal: STG-Within one week, patient will ambulate 100ft FWW SBA       Dates: Start:  11/30/23         Goal Note filed on 12/05/23 0822 by Alan Schaefer, PT       Limited to 35-50ft SBA FWW due to pain and fatigue              Goal: STG-Within one week, patient will ambulate up/down a curb SBA FWW       Dates: Start:  11/30/23         Goal Note filed on 12/05/23 0822 by Alan Schaefer, PT       CGA for consecutive steps, has not completed curb step yet                 Problem: Mobility Transfers       Dates: Start:  11/30/23         Goal: STG-Within one week, patient will perform bed mobility Sherman       Dates: Start:  11/30/23         Goal Note filed on 12/05/23 0822 by Alan Schaefer, PT       Inconsistent,  occasionally requires Rajan for sit>supine                 Problem: PT-Long Term Goals       Dates: Start:  11/30/23         Goal: LTG-By discharge, patient will ambulate 150ft Sherman FWW       Dates: Start:  11/30/23            Goal: LTG-By discharge, patient will transfer one surface to another Sherman       Dates: Start:  11/30/23            Goal: LTG-By discharge, patient will perform home exercise program independently       Dates: Start:  11/30/23            Goal: LTG-By discharge, patient will transfer in/out of a car SBA       Dates: Start:  11/30/23

## 2023-12-06 NOTE — CARE PLAN
Problem: Knowledge Deficit - Standard  Goal: Patient and family/care givers will demonstrate understanding of plan of care, disease process/condition, diagnostic tests and medications  Outcome: Progressing     Problem: Bowel Elimination  Goal: Patient will participate in bowel management program  Note: Patient uses bathroom during bowel elimination. LBM 12/5/23. Will continue to monitor   The patient is Stable - Low risk of patient condition declining or worsening    Shift Goals  Clinical Goals: Safety  Patient Goals: pain control, sleep well, gain strength

## 2023-12-06 NOTE — THERAPY
Occupational Therapy  Daily Treatment     Patient Name: Sheyla Gauthier  Age:  83 y.o., Sex:  female  Medical Record #: [id number]  Today's Date: 12/6/2023     Precautions  Precautions: Fall Risk, Posterior Hip Precautions, Spinal / Back Precautions   Comments: no back brace         Subjective    Pt supine in bed and reporting using seated hip abductor pillow at night successfully. Pt agreeable to OT tx.     Objective       12/06/23 1031   OT Charge Group   OT Therapy Activity (Units) 3   OT Therapeutic Exercise (Units) 1   OT Total Time Spent   OT Individual Total Time Spent (Mins) 60   Pain   Intervention Emotional Support;Declines;Repositioned;Rest   Pain 0 - 10 Group   Location Hip   Location Orientation Left   Pain Rating Scale (NPRS) 3   Description Aching   Comfort Goal Perform Activity;Comfort with Movement   Therapist Pain Assessment Prior to Activity   Cognition    Level of Consciousness Alert   Functional Level of Assist   Tub / Shower Transfers Supervised   Tub Shower Transfer Description Grab bar;Increased time;Supervision for safety;Verbal cueing  (**Dry reverse step in shower transfer using FWW, maintain hip and back precautions, using deep GB and shower chair to simulate home setup.)   Standing Upper Body Exercises   Standing Upper Body Exercises Yes   Chest Press 1 set of 10;Bilateral;Weight (See Comments for lbs)  (2#)   Front Arm Raise 1 set of 10;Bilateral;Weight (See Comments for lbs)  (2#)   External Shoulder Rotation 1 set of 10;Bilateral;Weight (See Comments for lbs)  (2#)   Comments Pt completed while standing on air-ex pad with mirror for feed back. Seated breaks between sets and intermittent CGA-SBA.   IADL Treatments   Kitchen Mobility Education Completd kitchen mobility education and training from a FWW level with SPV-Mod I required. Cues for sequencing, body mechanics, and problem solving. Pt will have a narrow kitchen space in [facility] and likely will only be making coffee in the AM and  snacks during the day. Educated on increasing accessibility all over the apt for spinal and hip precautions with FWW level access as well as use of a reacher for items out of safe reach. Pt able to access counter tops, accessing upper/lower cabinets, and fridge/freezer.   Bed Mobility    Supine to Sit Supervised   Interdisciplinary Plan of Care Collaboration   IDT Collaboration with  Physical Therapist   Patient Position at End of Therapy Seated;Chair Alarm On;Self Releasing Lap Belt Applied;Tray Table within Reach;Call Light within Reach   Collaboration Comments T re: Clearing pt for Mod I when stending at  for pressure relief. RN re: turning and propping in supine for pressure relief from rear.     Pt completed functional mobility at [facility] for entire session, sitting for breaks periodically. SPV-Mod I for mobility on unit. Educated pt on FWW baskets, encouraging her to shop around with dtr.    Assessment    Pt tolerated session very well, standing for 90% of the time. Significant improvements made with standing balance overall and pt is now able to access kitchen spaces, similar to her FCI from a FWW level. Her step in shower transfers have also progressed to SPV with FWW and GB.  Strengths: Able to follow instructions, Alert and oriented, Effective communication skills, Good insight into deficits/needs, Independent prior level of function, Making steady progress towards goals, Manages pain appropriately, Motivated for self care and independence, Pleasant and cooperative, Supportive family, Willingly participates in therapeutic activities  Barriers: Fatigue, Generalized weakness, Impaired activity tolerance, Impaired balance, Limited mobility, Pain    Plan    ADLs with AE, UB strengthening, functional transfers/balance within precautions    Shower in AM with primary    DME       Passport items to be completed:  Perform bathroom transfers, complete dressing, complete feeding, get ready for the day, prepare a simple  meal, participate in household tasks, adapt home for safety needs, demonstrate home exercise program, complete caregiver training     Occupational Therapy Goals (Active)       Problem: Dressing       Dates: Start:  11/30/23         Goal: STG-Within one week, patient will dress LB at SBA level using AE/DME PRN.       Dates: Start:  11/30/23               Problem: OT Long Term Goals       Dates: Start:  11/30/23         Goal: LTG-By discharge, patient will complete basic self care tasks at SBA-Mod I level using DME PRN.       Dates: Start:  11/30/23            Goal: LTG-By discharge, patient will perform bathroom transfers at SBA-Mod I level using DME PRN.       Dates: Start:  11/30/23               Problem: Toileting       Dates: Start:  11/30/23         Goal: STG-Within one week, patient will complete toileting tasks at SBA level using AE/DME PRN.       Dates: Start:  11/30/23

## 2023-12-06 NOTE — PROGRESS NOTES
"  Physical Medicine & Rehabilitation Progress Note    Encounter Date: 12/6/2023    Chief Complaint: Weakness    Interval Events (Subjective):  Seen in bed. No concerns    Objective:  VITAL SIGNS: /61   Pulse 65   Temp 36.5 °C (97.7 °F) (Oral)   Resp 18   Ht 1.575 m (5' 2\")   Wt 68.9 kg (152 lb)   LMP 11/01/1986   SpO2 94%   BMI 27.80 kg/m²   Gen: No acute distress, well developed well nourished adult  HEENT: Normal Cephalic Atraumatic, Normal conjunctiva.   CV: warm extremities, well perfused, 1+ edema  Resp: symmetric chest rise, breathing comfortably on room air  Abd: Soft, Non distended  Extremities: normal bulk, no atrophy  Skin: no visible rashes or lesions.   Neuro: alert, awake  Psych: Mood and affect appropriate and congruent    Laboratory Values:  Recent Results (from the past 72 hour(s))   CBC WITHOUT DIFFERENTIAL    Collection Time: 12/05/23  6:09 AM   Result Value Ref Range    WBC 4.9 4.8 - 10.8 K/uL    RBC 2.98 (L) 4.20 - 5.40 M/uL    Hemoglobin 8.4 (L) 12.0 - 16.0 g/dL    Hematocrit 27.1 (L) 37.0 - 47.0 %    MCV 90.9 81.4 - 97.8 fL    MCH 28.2 27.0 - 33.0 pg    MCHC 31.0 (L) 32.2 - 35.5 g/dL    RDW 55.4 (H) 35.9 - 50.0 fL    Platelet Count 251 164 - 446 K/uL    MPV 10.7 9.0 - 12.9 fL   Comp Metabolic Panel    Collection Time: 12/05/23  6:09 AM   Result Value Ref Range    Sodium 141 135 - 145 mmol/L    Potassium 3.8 3.6 - 5.5 mmol/L    Chloride 109 96 - 112 mmol/L    Co2 19 (L) 20 - 33 mmol/L    Anion Gap 13.0 7.0 - 16.0    Glucose 99 65 - 99 mg/dL    Bun 17 8 - 22 mg/dL    Creatinine 0.93 0.50 - 1.40 mg/dL    Calcium 9.4 8.5 - 10.5 mg/dL    Correct Calcium 10.2 8.5 - 10.5 mg/dL    AST(SGOT) 17 12 - 45 U/L    ALT(SGPT) 10 2 - 50 U/L    Alkaline Phosphatase 83 30 - 99 U/L    Total Bilirubin 0.3 0.1 - 1.5 mg/dL    Albumin 3.0 (L) 3.2 - 4.9 g/dL    Total Protein 5.9 (L) 6.0 - 8.2 g/dL    Globulin 2.9 1.9 - 3.5 g/dL    A-G Ratio 1.0 g/dL   ESTIMATED GFR    Collection Time: 12/05/23  6:09 AM "   Result Value Ref Range    GFR (CKD-EPI) 61 >60 mL/min/1.73 m 2       Medications:  Scheduled Medications   Medication Dose Frequency    Pharmacy Consult Request  1 Each PHARMACY TO DOSE    senna-docusate  2 Tablet BID    acetaminophen  1,000 mg TID    apixaban  5 mg BID    atorvastatin  40 mg QHS    famotidine  20 mg DAILY    ferrous sulfate  325 mg QDAY with Breakfast    levothyroxine  88 mcg AM ES    liothyronine  5 mcg QAM    methocarbamol  1,000 mg TID    pregabalin  100 mg BID    oxyCODONE immediate-release  5 mg TID     PRN medications: Respiratory Therapy Consult, hydrALAZINE, senna-docusate **AND** polyethylene glycol/lytes **AND** magnesium hydroxide **AND** bisacodyl, ondansetron **OR** ondansetron, sodium chloride, oxyCODONE immediate-release    Diet:  Current Diet Order   Procedures    Diet Order Diet: Regular       Medical Decision Making and Plan:  Left Femoral Neck fracture with superior displacement- diagnosed 11/22, unknown chronicity  Seen on Xray of hip taken 11/22  S/P hip arthroplasty on 11/27 by Dr Ko  No clear history of fall. Patient has not walked pain free since February 2023  Last Fall noted in Spring of 2023  -PT/OT  -Requesting imaging from Dr Connor and Dr Ocampo office to establish chronicity  [facility] Code / Diagnosis to Support: 0008.11 - Orthopaedic Disorders: Status Post Unilateral Hip Fracture         Polyradiculopathy 2/2 to bilateral Lumbar foraminal stenosis  Status post lumbar laminectomy  -Patient has history of prior L4-L5 decompression  - Recent worsening of back pain with radicular pain into right lower extremity, impairing function and recently utilizing a wheelchair in the last month  - 11/10 patient taken to the OR for L5-S1 laminectomy performed by Dr. Silva  - Spinal precautions in place, does not require bracing     Right Popliteal DVT  Provoked from surgery and immobility  -Eliquis 5mg BID for 3 months from 11/15/23  -continue eliquis     Acute Blood loss  anemia  -7.6 Hb today  -12/1- 7.9 Hb  -consider US of hip if needed        Hypothyroidism  -On home dose Synthroid     Hypertension  - hold losartan and spironolactone  -home dose losartan and spironolactone      Pain:  - Neuroceptic - On Tylenol TID, oxycodone 5mg PRN, robaxin 1g TID  -11/30- startedOxycodone 5mg TID scheduled  -continue oxycodone 5mg TID scheudled     Circadian Rhythm disorder:   Recommend lights on during the day/off at night, minimize nighttime interruptions as able.     Mood  - at risk of adjustment disorder, depression, and anxiety due to functional decline     ID:  - at risk for Urinary tract infection     Skin/Wounds:  - Pressure relief q2h while in bed. Close monitoring for signs of breakdown     DVT prophylaxis:  continue eliquis     GI prophylaxis:  On Pepcid        -Follow-up Ortho, NS, PCP    ____________________________________    Mitchell Chavis MD  Physical Medicine & Rehabilitation   Brain Injury Medicine   ____________________________________

## 2023-12-06 NOTE — CARE PLAN
The patient is Stable - Low risk of patient condition declining or worsening    Shift Goals  Clinical Goals: Safety  Patient Goals: pain control, sleep well    Progress made toward(s) clinical / shift goals:    Problem: Fall Risk - Rehab  Goal: Patient will remain free from falls  Outcome: Progressing. Patient bed in lowest locked position with bed alarm on and call light within reach. Patient calls appropriately with call light for needs. Patient has not attempted to self transfer over shift.      Problem: Pain - Standard  Goal: Alleviation of pain or a reduction in pain to the patient’s comfort goal  Outcome: Progressing. Patient stated 9/10 pain at 830 pm last night to left hip and right buttock. Patient received her scheduled tid 5 mg oxycodone for pain, as well as multiple heat packs. Patient stated adequate relief. Patient has scheduled as well as prn oxycodone 5 mg for pain as needed.

## 2023-12-07 ENCOUNTER — APPOINTMENT (OUTPATIENT)
Dept: OCCUPATIONAL THERAPY | Facility: REHABILITATION | Age: 83
DRG: 560 | End: 2023-12-07
Attending: PHYSICAL MEDICINE & REHABILITATION
Payer: MEDICARE

## 2023-12-07 ENCOUNTER — APPOINTMENT (OUTPATIENT)
Dept: PHYSICAL THERAPY | Facility: REHABILITATION | Age: 83
DRG: 560 | End: 2023-12-07
Attending: PHYSICAL MEDICINE & REHABILITATION
Payer: MEDICARE

## 2023-12-07 ENCOUNTER — APPOINTMENT (OUTPATIENT)
Dept: RADIOLOGY | Facility: REHABILITATION | Age: 83
DRG: 560 | End: 2023-12-07
Attending: PHYSICAL MEDICINE & REHABILITATION
Payer: MEDICARE

## 2023-12-07 PROCEDURE — 97116 GAIT TRAINING THERAPY: CPT

## 2023-12-07 PROCEDURE — 97110 THERAPEUTIC EXERCISES: CPT

## 2023-12-07 PROCEDURE — 97530 THERAPEUTIC ACTIVITIES: CPT

## 2023-12-07 PROCEDURE — 73502 X-RAY EXAM HIP UNI 2-3 VIEWS: CPT | Mod: LT

## 2023-12-07 PROCEDURE — 770010 HCHG ROOM/CARE - REHAB SEMI PRIVAT*

## 2023-12-07 PROCEDURE — 99232 SBSQ HOSP IP/OBS MODERATE 35: CPT | Performed by: PHYSICAL MEDICINE & REHABILITATION

## 2023-12-07 PROCEDURE — A9270 NON-COVERED ITEM OR SERVICE: HCPCS | Performed by: PHYSICAL MEDICINE & REHABILITATION

## 2023-12-07 PROCEDURE — 97535 SELF CARE MNGMENT TRAINING: CPT

## 2023-12-07 PROCEDURE — 700102 HCHG RX REV CODE 250 W/ 637 OVERRIDE(OP): Performed by: PHYSICAL MEDICINE & REHABILITATION

## 2023-12-07 RX ORDER — OXYCODONE HYDROCHLORIDE 5 MG/1
5 TABLET ORAL 2 TIMES DAILY
Status: DISCONTINUED | OUTPATIENT
Start: 2023-12-08 | End: 2023-12-11 | Stop reason: HOSPADM

## 2023-12-07 RX ADMIN — FAMOTIDINE 20 MG: 20 TABLET, FILM COATED ORAL at 08:37

## 2023-12-07 RX ADMIN — LEVOTHYROXINE SODIUM 88 MCG: 0.09 TABLET ORAL at 05:19

## 2023-12-07 RX ADMIN — OXYCODONE 5 MG: 5 TABLET ORAL at 05:19

## 2023-12-07 RX ADMIN — METHOCARBAMOL TABLETS 1000 MG: 500 TABLET, COATED ORAL at 08:37

## 2023-12-07 RX ADMIN — METHOCARBAMOL TABLETS 1000 MG: 500 TABLET, COATED ORAL at 16:13

## 2023-12-07 RX ADMIN — POLYETHYLENE GLYCOL 3350 1 PACKET: 17 POWDER, FOR SOLUTION ORAL at 05:19

## 2023-12-07 RX ADMIN — METHOCARBAMOL TABLETS 1000 MG: 500 TABLET, COATED ORAL at 21:39

## 2023-12-07 RX ADMIN — PREGABALIN 100 MG: 100 CAPSULE ORAL at 08:37

## 2023-12-07 RX ADMIN — ACETAMINOPHEN 1000 MG: 500 TABLET ORAL at 16:13

## 2023-12-07 RX ADMIN — LIOTHYRONINE SODIUM 5 MCG: 5 TABLET ORAL at 08:37

## 2023-12-07 RX ADMIN — OXYCODONE 5 MG: 5 TABLET ORAL at 08:36

## 2023-12-07 RX ADMIN — SENNOSIDES AND DOCUSATE SODIUM 2 TABLET: 50; 8.6 TABLET ORAL at 21:39

## 2023-12-07 RX ADMIN — APIXABAN 5 MG: 5 TABLET, FILM COATED ORAL at 21:39

## 2023-12-07 RX ADMIN — ATORVASTATIN CALCIUM 40 MG: 40 TABLET, FILM COATED ORAL at 21:40

## 2023-12-07 RX ADMIN — APIXABAN 5 MG: 5 TABLET, FILM COATED ORAL at 08:37

## 2023-12-07 RX ADMIN — ACETAMINOPHEN 1000 MG: 500 TABLET ORAL at 08:37

## 2023-12-07 RX ADMIN — FERROUS SULFATE TAB 325 MG (65 MG ELEMENTAL FE) 325 MG: 325 (65 FE) TAB at 08:37

## 2023-12-07 RX ADMIN — PREGABALIN 100 MG: 100 CAPSULE ORAL at 21:40

## 2023-12-07 RX ADMIN — OXYCODONE 5 MG: 5 TABLET ORAL at 12:57

## 2023-12-07 RX ADMIN — ACETAMINOPHEN 1000 MG: 500 TABLET ORAL at 21:39

## 2023-12-07 RX ADMIN — OXYCODONE 5 MG: 5 TABLET ORAL at 16:17

## 2023-12-07 RX ADMIN — OXYCODONE 5 MG: 5 TABLET ORAL at 20:55

## 2023-12-07 ASSESSMENT — GAIT ASSESSMENTS
DEVIATION: BRADYKINETIC;ANTALGIC
GAIT LEVEL OF ASSIST: CONTACT GUARD ASSIST
ASSISTIVE DEVICE: FRONT WHEEL WALKER
GAIT LEVEL OF ASSIST: STANDBY ASSIST
DISTANCE (FEET): 125
ASSISTIVE DEVICE: FRONT WHEEL WALKER
DISTANCE (FEET): 150

## 2023-12-07 ASSESSMENT — ACTIVITIES OF DAILY LIVING (ADL)
TOILET_TRANSFER_DESCRIPTION: GRAB BAR;SUPERVISION FOR SAFETY;VERBAL CUEING
BED_CHAIR_WHEELCHAIR_TRANSFER_DESCRIPTION: ADAPTIVE EQUIPMENT;SET-UP OF EQUIPMENT;SUPERVISION FOR SAFETY;VERBAL CUEING
TUB_SHOWER_TRANSFER_DESCRIPTION: GRAB BAR;SUPERVISION FOR SAFETY;SET-UP OF EQUIPMENT

## 2023-12-07 NOTE — PROGRESS NOTES
NURSING DAILY NOTE    Name: Sheyla Gauthier   Date of Admission: 11/29/2023   Admitting Diagnosis: History of left hip replacement  Attending Physician: Mitchell Chavis M.d.  Allergies: Trazodone and Crestor [rosuvastatin calcium]    Safety  Patient Assist  sba  Patient Precautions  Fall Risk, Posterior Hip Precautions, Spinal / Back Precautions   Precaution Comments  no back brace  Bed Transfer Status  Supervised  Toilet Transfer Status   Supervised  Assistive Devices  Wheelchair  Oxygen  None - Room Air  Diet/Therapeutic Dining  Current Diet Order   Procedures    Diet Order Diet: Regular     Pill Administration  whole  Agitated Behavioral Scale  14  ABS Level of Severity  No Agitation    Fall Risk  Has the patient had a fall this admission?   No  Lexy Garcia Fall Risk Scoring  12, MODERATE RISK  Fall Risk Safety Measures  bed alarm, chair alarm, and poor balance    Vitals  Temperature: 36.6 °C (97.8 °F)  Temp src: Oral  Pulse: 75  Respiration: 18  Blood Pressure : 121/64  Blood Pressure MAP (Calculated): 83 MM HG  BP Location: Right, Upper Arm  Patient BP Position: Supine     Oxygen  Pulse Oximetry: 94 %  O2 (LPM): 0  O2 Delivery Device: None - Room Air    Bowel and Bladder  Last Bowel Movement  12/05/23  Stool Type  Type 3: Like a sausage, but with cracks on its surface  Bowel Device  Bathroom  Continent  Bladder: Stress incontinence   Bowel: Continent movement  Bladder Function  Urine Void (mL):  (MOderate)  Number of Times Voided: 1  Urine Color: Unable To Evaluate  Genitourinary Assessment   Bladder Assessment (WDL):  WDL Except  Kothari Catheter: Not Applicable  Urinary Elimination: Incontinence  Urine Color: Unable To Evaluate  Bladder Device: Bathroom  Time Void: Yes  Bladder Scan: Post Void  $ Bladder Scan Results (mL): 34    Skin  Wyatt Score   17  Sensory Interventions   Bed Types: Standard/Trauma Mattress  Skin Preventative Measures: Pillows  Subjective:     Uriel Funk is a 47 y.o.     Fever    This is a new problem. The current episode started in the past 7 days. The maximum temperature noted was 103 to 103.9 F. Associated symptoms include ear pain (right) and headaches. Pertinent negatives include no congestion, nausea, sore throat or wheezing. Cough: minimal. Muscle aches: resolved. He has tried acetaminophen, NSAIDs and fluids for the symptoms. The treatment provided no relief.         The following portions of the patient's history were reviewed and updated as appropriate: allergies, current medications, past family history, past medical history, past social history, past surgical history and problem list.      Review of Systems   Constitutional: Positive for fever. Negative for appetite change.   HENT: Positive for ear pain (right). Negative for congestion and sore throat.    Respiratory: Negative for shortness of breath and wheezing. Cough: minimal.    Cardiovascular: Negative.    Gastrointestinal: Negative for nausea.   Musculoskeletal: Positive for myalgias.   Neurological: Positive for headaches.         Objective:      Physical Exam   Constitutional: He appears well-developed and well-nourished.   Patient put in supine position due to feeling so sick, wife present for this visit   HENT:   Head: Normocephalic and atraumatic.   Right Ear: Ear canal normal. Right ear middle ear effusion: mild mucoid.   Left Ear: Ear canal normal. Left ear middle ear effusion: mild mucoid.   Nose: Nose normal.   Mouth/Throat: No oropharyngeal exudate or posterior oropharyngeal erythema.   Cardiovascular: Normal rate, regular rhythm, S1 normal, S2 normal and normal heart sounds.   Pulmonary/Chest: Effort normal and breath sounds normal.   Lymphadenopathy:     He has no cervical adenopathy.   Vitals reviewed.          Diagnoses and all orders for this visit:    Fever, unspecified fever cause  -     POC Influenza A / B  -     POC Rapid Strep A    Influenza  A    Other orders  -     oseltamivir (TAMIFLU) 75 MG capsule; Take 1 capsule by mouth 2 (Two) Times a Day for 5 days.  -     ondansetron (ZOFRAN) 8 MG tablet; Take 1 tablet by mouth Every 8 (Eight) Hours As Needed for Nausea or Vomiting for up to 4 days.  -     brompheniramine-pseudoephedrine-DM (BROMFED DM) 30-2-10 MG/5ML syrup; 5 to 10 cc every 4 hours as needed for cough, congestion, allergies    Hydrate. If no improvement in symptoms in 24 hours proceed to ER. Wife to transport patient home   in Use for Support / Positioning  Moisture Interventions  Moisturizers/Barriers: Barrier Wipes      Pain  Pain Rating Scale  7 - Focus of attention, prevents doing daily activities  Pain Location  Hip  Pain Location Orientation  Left  Pain Interventions   Emotional Support, Declines, Repositioned, Rest    ADLs    Bathing   Patient Refused Bathing  Linen Change      Personal Hygiene  Perineal Care, Moist Klarissa Wipes  Chlorhexidine Bath      Oral Care  Brushed Teeth (Self)  Teeth/Dentures     Shave     Nutrition Percentage Eaten  Between 25-50% Consumed  Environmental Precautions  Treaded Slipper Socks on Patient, Personal Belongings, Wastebasket, Call Bell etc. in Easy Reach, Bed in Low Position  Patient Turns/Positioning  Patient Turns Self from Side to Side  Patient Turns Assistance/Tolerance  General Weakness  Bed Positions  Bed Controls On, Bed Locked  Head of Bed Elevated  Less than 30 degrees      Psychosocial/Neurologic Assessment  Psychosocial Assessment  Psychosocial (WDL):  Within Defined Limits  Neurologic Assessment  Neuro (WDL): Exceptions to WDL  Level of Consciousness: Alert  Orientation Level: Oriented X4  Cognition: Other (Comment) (forgetful at times)  Speech: Clear  Pupil Assesment: No  Motor Function/Sensation Assessment: Motor strength  Muscle Strength Right Arm: Good Strength Against Gravity and Moderate Resistance  Muscle Strength Left Arm: Good Strength Against Gravity and Moderate Resistance  Muscle Strength Right Leg: Good Strength Against Gravity and Moderate Resistance  Muscle Strength Left Leg: Weak Movement but Not Against Gravity or Resistance  EENT (WDL):  WDL Except    Cardio/Pulmonary Assessment  Edema   RLE Edema: 1+  LLE Edema: 1+  Respiratory Breath Sounds  RUL Breath Sounds: Clear  RML Breath Sounds: Clear  RLL Breath Sounds: Clear  TYE Breath Sounds: Clear  LLL Breath Sounds: Clear  Cardiac Assessment   Cardiac (WDL):  Within Defined Limits

## 2023-12-07 NOTE — THERAPY
Physical Therapy   Daily Treatment     Patient Name: Sheyla Gauthier  Age:  83 y.o., Sex:  female  Medical Record #: [id number]  Today's Date: 12/7/2023     Precautions  Precautions: Fall Risk, Posterior Hip Precautions, Spinal / Back Precautions   Comments: no back brace    Subjective    Patient is found seated up in chair, is agreeable to participate. Demonstrates seated HEP accurately, teaches back hip precautions with pillow for bracing while seated and supine.      Objective       12/07/23 1101   PT Charge Group   PT Gait Training (Units) 2   PT Total Time Spent   PT Individual Total Time Spent (Mins) 30   Gait Functional Level of Assist    Gait Level Of Assist Contact Guard Assist   Assistive Device Front Wheel Walker   Distance (Feet) 125   # of Times Distance was Traveled 1   Deviation Bradykinetic;Antalgic  (demonstrates intermittent step through pattern with even stride length. Becomes more antalgic as she fatigues)   Sitting Lower Body Exercises   Comments reviewed seated HEP, demonstrates 100% recall without cuing for all exercises.   Bed Mobility    Sit to Stand Modified Independent   Interdisciplinary Plan of Care Collaboration   Patient Position at End of Therapy Seated   Collaboration Comments in dining room for lunch         Assessment    Patient demonstrates good kinematics with gait, demonstrates even stride length with step through gait pattern with less reliance on FWW. As she fatigues, becomes more antalgic/ step to with increased reliance on FWW. Demonstrates good awareness for activity pacing and taking seated rest breaks. Teaches back HEP and hip precautions well. Is making good progress.     Strengths: Able to follow instructions, Independent prior level of function, Making steady progress towards goals, Motivated for self care and independence, Pleasant and cooperative, Supportive family, Willingly participates in therapeutic activities  Barriers: Generalized weakness, Impaired activity  tolerance, Impaired balance, Pain    Plan    L hip HEP, gait training, dynamic standing balance      DME  PT DME Recommendations  Wheelchair:  (likely will be ambulatory and will not require WC)  Cushion:  (n/a)  Assistive Device: Front Wheeled Walker (pt has FWW)    Passport items to be completed:  Get in/out of bed safely, in/out of a vehicle, safely use mobility device, walk or wheel around home/community, navigate up and down stairs, show how to get up/down from the ground, ensure home is accessible, demonstrate HEP, complete caregiver training    Physical Therapy Problems (Active)       Problem: Mobility       Dates: Start:  11/30/23         Goal: STG-Within one week, patient will ambulate 100ft FWW SBA       Dates: Start:  11/30/23         Goal Note filed on 12/05/23 0822 by Alan Schaefer, PT       Limited to 35-50ft SBA FWW due to pain and fatigue              Goal: STG-Within one week, patient will ambulate up/down a curb SBA FWW       Dates: Start:  11/30/23         Goal Note filed on 12/05/23 0822 by Alan Schaefer, PT       CGA for consecutive steps, has not completed curb step yet                 Problem: Mobility Transfers       Dates: Start:  11/30/23         Goal: STG-Within one week, patient will perform bed mobility Sherman       Dates: Start:  11/30/23         Goal Note filed on 12/05/23 0822 by Alan Schaefer, PT       Inconsistent, occasionally requires Rajan for sit>supine                 Problem: PT-Long Term Goals       Dates: Start:  11/30/23         Goal: LTG-By discharge, patient will ambulate 150ft Sherman FWW       Dates: Start:  11/30/23            Goal: LTG-By discharge, patient will transfer one surface to another Sherman       Dates: Start:  11/30/23            Goal: LTG-By discharge, patient will perform home exercise program independently       Dates: Start:  11/30/23            Goal: LTG-By discharge, patient will transfer in/out of a car SBA       Dates: Start:  11/30/23

## 2023-12-07 NOTE — CARE PLAN
Problem: Skin Integrity  Goal: Patient's skin integrity will be maintained or improve  Outcome: Progressing  Incision without redness, swelling or drainage and skin edges are approximated

## 2023-12-07 NOTE — THERAPY
Occupational Therapy  Daily Treatment     Patient Name: Sheyla Gauthier  Age:  83 y.o., Sex:  female  Medical Record #: [id number]  Today's Date: 12/7/2023     Precautions  Precautions: (P) Fall Risk, Posterior Hip Precautions, Spinal / Back Precautions   Comments: (P) no back brace         Subjective  Patient seated in w/c upon arrival, agreeable to OT session.      Objective     12/07/23 1031   OT Charge Group   OT Self Care / ADL (Units) 1   OT Therapeutic Exercise (Units) 1   OT Total Time Spent   OT Individual Total Time Spent (Mins) 30   Precautions   Precautions Fall Risk;Posterior Hip Precautions;Spinal / Back Precautions    Comments no back brace   Functional Level of Assist   Grooming Independent;Seated   Interdisciplinary Plan of Care Collaboration   IDT Collaboration with  Occupational Therapist   Patient Position at End of Therapy Seated  (in bathroom brushing teeth while seated)   Collaboration Comments treatment planning     Patient participated in dynamic standing balance activity while standing in // bars. She completed ring toss with SBA without UE support on // bars. She demo'd fxl mobility in // bars with SBA-CGA 10 feet x 4. She used a reacher to retrieve rings from floor with good adherence to spinal precautions.     Assessment  Patient had good tolerance to session. She had excellent adherence to spinal precautions throughout session and made good use of AE to support her independence with therapy tasks.   Strengths: Able to follow instructions, Alert and oriented, Effective communication skills, Good insight into deficits/needs, Independent prior level of function, Making steady progress towards goals, Manages pain appropriately, Motivated for self care and independence, Pleasant and cooperative, Supportive family, Willingly participates in therapeutic activities  Barriers: Fatigue, Generalized weakness, Impaired activity tolerance, Impaired balance, Limited mobility, Pain    Plan  ADLs  with AE, UB strengthening, functional transfers/balance within precautions       DME       Passport items to be completed:  Perform bathroom transfers, complete dressing, complete feeding, get ready for the day, prepare a simple meal, participate in household tasks, adapt home for safety needs, demonstrate home exercise program, complete caregiver training     Occupational Therapy Goals (Active)       Problem: Dressing       Dates: Start:  11/30/23         Goal: STG-Within one week, patient will dress LB at SBA level using AE/DME PRN.       Dates: Start:  11/30/23               Problem: OT Long Term Goals       Dates: Start:  11/30/23         Goal: LTG-By discharge, patient will complete basic self care tasks at SBA-Mod I level using DME PRN.       Dates: Start:  11/30/23            Goal: LTG-By discharge, patient will perform bathroom transfers at SBA-Mod I level using DME PRN.       Dates: Start:  11/30/23               Problem: Toileting       Dates: Start:  11/30/23         Goal: STG-Within one week, patient will complete toileting tasks at SBA level using AE/DME PRN.       Dates: Start:  11/30/23

## 2023-12-07 NOTE — THERAPY
"Occupational Therapy  Daily Treatment     Patient Name: Sheyla Gauthier  Age:  83 y.o., Sex:  female  Medical Record #: [id number]  Today's Date: 12/7/2023     Precautions  Precautions: Fall Risk, Posterior Hip Precautions, Spinal / Back Precautions   Comments: no back brace         Subjective    \"I pulled my clothes out already.\" Pt had retrieved her clean clothes from w/c level and was ready for tx.     Objective       12/07/23 0901   OT Charge Group   OT Self Care / ADL (Units) 4   OT Total Time Spent   OT Individual Total Time Spent (Mins) 60   Pain   Intervention Declines   Pain 0 - 10 Group   Location Hip   Location Orientation Left   Pain Rating Scale (NPRS) 2   Description Aching   Comfort Goal Comfort with Movement;Perform Activity   Therapist Pain Assessment Prior to Activity   Cognition    Level of Consciousness Alert   Functional Level of Assist   Grooming Independent;Seated   Grooming Description Increased time;Seated in wheelchair at sink  (for hair care)   Bathing Supervision  (Dist-SPV)   Bathing Description Increased time;Long handled bath tool;Grab bar;Hand held shower;Supervision for safety   Upper Body Dressing Modified Independent   Upper Body Dressing Description Increased time   Lower Body Dressing Standby Assist   Lower Body Dressing Description Grab bar;Sock aid;Reacher;Increased time;Supervision for safety   Tub / Shower Transfers Supervised   Tub Shower Transfer Description Grab bar;Supervision for safety;Set-up of equipment  (FWW for stand step)   Interdisciplinary Plan of Care Collaboration   IDT Collaboration with  Nursing   Patient Position at End of Therapy Seated;Chair Alarm On;Self Releasing Lap Belt Applied;Call Light within Reach;Tray Table within Reach   Collaboration Comments RN exchanged hip dressing during session.     Educated pt not to walk in room unattended with FWW, as she is only cleared to stand at GB from w/c for pressure relief. Pt receptive to education and " reported that she will continue to use call light PRN.    Assessment    Pt continues to make steady progress towards functional goals with self-care skills, progressing to SPV-Mod I for bathing, dressing, and transfers. With pt's pain managed, her safety and ind have made significant improvements, reducing her burden of care.  Strengths: Able to follow instructions, Alert and oriented, Effective communication skills, Good insight into deficits/needs, Independent prior level of function, Making steady progress towards goals, Manages pain appropriately, Motivated for self care and independence, Pleasant and cooperative, Supportive family, Willingly participates in therapeutic activities  Barriers: Fatigue, Generalized weakness, Impaired activity tolerance, Impaired balance, Limited mobility, Pain    Plan    ADLs with AE, UB strengthening, functional transfers/balance within precautions    **Sunday [facility]-DESTINI shower.      DME        Passport items to be completed:  Perform bathroom transfers, complete dressing, complete feeding, get ready for the day, prepare a simple meal, participate in household tasks, adapt home for safety needs, demonstrate home exercise program, complete caregiver training     Occupational Therapy Goals (Active)       Problem: Dressing       Dates: Start:  11/30/23         Goal: STG-Within one week, patient will dress LB at SBA level using AE/DME PRN.       Dates: Start:  11/30/23               Problem: OT Long Term Goals       Dates: Start:  11/30/23         Goal: LTG-By discharge, patient will complete basic self care tasks at SBA-Mod I level using DME PRN.       Dates: Start:  11/30/23            Goal: LTG-By discharge, patient will perform bathroom transfers at SBA-Mod I level using DME PRN.       Dates: Start:  11/30/23               Problem: Toileting       Dates: Start:  11/30/23         Goal: STG-Within one week, patient will complete toileting tasks at SBA level using AE/DME PRN.        Dates: Start:  11/30/23

## 2023-12-07 NOTE — CARE PLAN
The patient is Stable - Low risk of patient condition declining or worsening    Shift Goals  Clinical Goals: Safety  Patient Goals: pain control, safety    Progress made toward(s) clinical / shift goals:    Problem: Fall Risk - Rehab  Goal: Patient will remain free from falls  Outcome: Progressing. Patient bed in lowest locked position with bed alarm on and call light within reach. Patient calls appropriately with call light for needs and has not attempted to self transfer over shift.      Problem: Pain - Standard  Goal: Alleviation of pain or a reduction in pain to the patient’s comfort goal  Outcome: Progressing. Patient states pain to left hip area 7/10 over shift. Patient requested heat packs prn and is receiving scheduled tid oxycodone for pain with prn oxycodone for breakthrough pain. After receiving pain medication patient states pain reduces to a 4-5/10.

## 2023-12-07 NOTE — THERAPY
Physical Therapy   Daily Treatment     Patient Name: Sheyla Gauthier  Age:  83 y.o., Sex:  female  Medical Record #: [id number]  Today's Date: 12/7/2023     Precautions  Precautions: Fall Risk, Posterior Hip Precautions, Spinal / Back Precautions   Comments: no back brace    Subjective    Pt expressing frustration due to staff taking >30 min to answer call light for toileting, has had incontinence in the past due to excessive time to get to bathroom. Agreeable to tx after active listening     Objective       12/07/23 1430   PT Charge Group   PT Gait Training (Units) 2   PT Therapeutic Exercise (Units) 1   PT Therapeutic Activities (Units) 1   PT Total Time Spent   PT Individual Total Time Spent (Mins) 60   Gait Functional Level of Assist    Gait Level Of Assist Standby Assist   Assistive Device Front Wheel Walker   Distance (Feet) 150   # of Times Distance was Traveled 3   Deviation Bradykinetic;Antalgic;Trendelenberg   Transfer Functional Level of Assist   Bed, Chair, Wheelchair Transfer Supervised  (progressing toward Sherman)   Bed Chair Wheelchair Transfer Description Adaptive equipment;Set-up of equipment;Supervision for safety;Verbal cueing   Toilet Transfers Supervised   Toilet Transfer Description Grab bar;Supervision for safety;Verbal cueing   Sitting Lower Body Exercises   Other Exercises B UE rows blue TB 2x15, B shoulder ER blue TB 2x15, tricep press rickshaw 15lbs 2x20   Standing Lower Body Exercises   Mini Squat Partial;1 set of 10  (pink TB above knees)   Other Exercises sidestepping 10ftx2 pink TB above knees   Bed Mobility    Supine to Sit Modified Independent   Sit to Supine Modified Independent   Sit to Stand Modified Independent   Neuro-Muscular Treatments   Comments L LE step ups 4 inch step B UE support 2x10 with tactile facilitation for hip abductors to maintain femur in neutral   Interdisciplinary Plan of Care Collaboration   Patient Position at End of Therapy In Bed;Bed Alarm On;Call Light  within Reach;Tray Table within Reach;Phone within Reach         Assessment    Pt demonstrated ability to actively achieve and maintain neutral L hip rotation and limit excessive adduction with tactile facilitation at lateral thigh during step ups. Pt has the strength to to maintain neutral hi alignment with ambulation though fatigues quickly and requires moderate concentration/verbal cuing.  Strengths: Able to follow instructions, Independent prior level of function, Making steady progress towards goals, Motivated for self care and independence, Pleasant and cooperative, Supportive family, Willingly participates in therapeutic activities  Barriers: Generalized weakness, Impaired activity tolerance, Impaired balance, Pain    Plan    Standing HEP with handout, progress independence with transfers and ambulation, curb step to address STG    DME  PT DME Recommendations  Wheelchair:  (likely will be ambulatory and will not require WC)  Cushion:  (n/a)  Assistive Device: Front Wheeled Walker (pt has FWW)    Passport items to be completed:  Get in/out of bed safely, in/out of a vehicle, safely use mobility device, walk or wheel around home/community, navigate up and down stairs, show how to get up/down from the ground, ensure home is accessible, demonstrate HEP, complete caregiver training    Physical Therapy Problems (Active)       Problem: Mobility       Dates: Start:  11/30/23         Goal: STG-Within one week, patient will ambulate 100ft FWW SBA       Dates: Start:  11/30/23         Goal Note filed on 12/05/23 0822 by Alan Schaefer, PT       Limited to 35-50ft SBA FWW due to pain and fatigue              Goal: STG-Within one week, patient will ambulate up/down a curb SBA FWW       Dates: Start:  11/30/23         Goal Note filed on 12/05/23 0822 by Alan Schaefer, PT       CGA for consecutive steps, has not completed curb step yet                 Problem: Mobility Transfers       Dates: Start:  11/30/23         Goal:  STG-Within one week, patient will perform bed mobility Sherman       Dates: Start:  11/30/23         Goal Note filed on 12/05/23 0822 by Alan Schaefer, PT       Inconsistent, occasionally requires Rajan for sit>supine                 Problem: PT-Long Term Goals       Dates: Start:  11/30/23         Goal: LTG-By discharge, patient will ambulate 150ft Sherman FWW       Dates: Start:  11/30/23            Goal: LTG-By discharge, patient will transfer one surface to another Sherman       Dates: Start:  11/30/23            Goal: LTG-By discharge, patient will perform home exercise program independently       Dates: Start:  11/30/23            Goal: LTG-By discharge, patient will transfer in/out of a car SBA       Dates: Start:  11/30/23

## 2023-12-07 NOTE — PROGRESS NOTES
"  Physical Medicine & Rehabilitation Progress Note    Encounter Date: 12/7/2023    Chief Complaint: Weakness    Interval Events (Subjective):  Seen in bed. No concerns. Xray of hip done today. Plan for staple removal on day of dc 12/11.    Objective:  VITAL SIGNS: /63   Pulse 67   Temp 36.6 °C (97.8 °F) (Oral)   Resp 18   Ht 1.575 m (5' 2\")   Wt 68.9 kg (152 lb)   LMP 11/01/1986   SpO2 95%   BMI 27.80 kg/m²   Gen: No acute distress, well developed well nourished adult  HEENT: Normal Cephalic Atraumatic, Normal conjunctiva.   CV: warm extremities, well perfused, no edema  Resp: symmetric chest rise, breathing comfortably on room air  Abd: Soft, Non distended  Extremities: normal bulk, no atrophy  Skin: no visible rashes or lesions.   Neuro: alert, awake  Psych: Mood and affect appropriate and congruent    Laboratory Values:  Recent Results (from the past 72 hour(s))   CBC WITHOUT DIFFERENTIAL    Collection Time: 12/05/23  6:09 AM   Result Value Ref Range    WBC 4.9 4.8 - 10.8 K/uL    RBC 2.98 (L) 4.20 - 5.40 M/uL    Hemoglobin 8.4 (L) 12.0 - 16.0 g/dL    Hematocrit 27.1 (L) 37.0 - 47.0 %    MCV 90.9 81.4 - 97.8 fL    MCH 28.2 27.0 - 33.0 pg    MCHC 31.0 (L) 32.2 - 35.5 g/dL    RDW 55.4 (H) 35.9 - 50.0 fL    Platelet Count 251 164 - 446 K/uL    MPV 10.7 9.0 - 12.9 fL   Comp Metabolic Panel    Collection Time: 12/05/23  6:09 AM   Result Value Ref Range    Sodium 141 135 - 145 mmol/L    Potassium 3.8 3.6 - 5.5 mmol/L    Chloride 109 96 - 112 mmol/L    Co2 19 (L) 20 - 33 mmol/L    Anion Gap 13.0 7.0 - 16.0    Glucose 99 65 - 99 mg/dL    Bun 17 8 - 22 mg/dL    Creatinine 0.93 0.50 - 1.40 mg/dL    Calcium 9.4 8.5 - 10.5 mg/dL    Correct Calcium 10.2 8.5 - 10.5 mg/dL    AST(SGOT) 17 12 - 45 U/L    ALT(SGPT) 10 2 - 50 U/L    Alkaline Phosphatase 83 30 - 99 U/L    Total Bilirubin 0.3 0.1 - 1.5 mg/dL    Albumin 3.0 (L) 3.2 - 4.9 g/dL    Total Protein 5.9 (L) 6.0 - 8.2 g/dL    Globulin 2.9 1.9 - 3.5 g/dL    A-G " Ratio 1.0 g/dL   ESTIMATED GFR    Collection Time: 12/05/23  6:09 AM   Result Value Ref Range    GFR (CKD-EPI) 61 >60 mL/min/1.73 m 2       Medications:  Scheduled Medications   Medication Dose Frequency    Pharmacy Consult Request  1 Each PHARMACY TO DOSE    senna-docusate  2 Tablet BID    acetaminophen  1,000 mg TID    apixaban  5 mg BID    atorvastatin  40 mg QHS    famotidine  20 mg DAILY    ferrous sulfate  325 mg QDAY with Breakfast    levothyroxine  88 mcg AM ES    liothyronine  5 mcg QAM    methocarbamol  1,000 mg TID    pregabalin  100 mg BID    oxyCODONE immediate-release  5 mg TID     PRN medications: Respiratory Therapy Consult, hydrALAZINE, senna-docusate **AND** polyethylene glycol/lytes **AND** magnesium hydroxide **AND** bisacodyl, ondansetron **OR** ondansetron, sodium chloride, oxyCODONE immediate-release    Diet:  Current Diet Order   Procedures    Diet Order Diet: Regular       Medical Decision Making and Plan:  Left Femoral Neck fracture with superior displacement- diagnosed 11/22, unknown chronicity  Seen on Xray of hip taken 11/22  S/P hip arthroplasty on 11/27 by Dr Ko  No clear history of fall. Patient has not walked pain free since February 2023  Last Fall noted in Spring of 2023  -PT/OT  -Requesting imaging from Dr Connor and Dr Ocampo office to establish chronicity  [facility] Code / Diagnosis to Support: 0008.11 - Orthopaedic Disorders: Status Post Unilateral Hip Fracture         Polyradiculopathy 2/2 to bilateral Lumbar foraminal stenosis  Status post lumbar laminectomy  -Patient has history of prior L4-L5 decompression  - Recent worsening of back pain with radicular pain into right lower extremity, impairing function and recently utilizing a wheelchair in the last month  - 11/10 patient taken to the OR for L5-S1 laminectomy performed by Dr. Silva  - Spinal precautions in place, does not require bracing     Right Popliteal DVT  Provoked from surgery and immobility  -Eliquis 5mg BID for  3 months from 11/15/23  -continue eliquis     Acute Blood loss anemia  -7.6 Hb today  -12/1- 7.9 Hb  -consider US of hip if needed        Hypothyroidism  -On home dose Synthroid     Hypertension  - continue to hold losartan and spironolactone  -home dose losartan and spironolactone      Pain:  - Neuroceptic - On Tylenol TID, oxycodone 5mg PRN, robaxin 1g TID  -11/30- started Oxycodone 5mg TID scheduled  -continue oxycodone 5mg TID scheudled     Circadian Rhythm disorder:   Recommend lights on during the day/off at night, minimize nighttime interruptions as able.     Mood  - at risk of adjustment disorder, depression, and anxiety due to functional decline     ID:  - at risk for Urinary tract infection     Skin/Wounds:  - Pressure relief q2h while in bed. Close monitoring for signs of breakdown     DVT prophylaxis:  continue eliquis     GI prophylaxis:  On Pepcid        -Follow-up Ortho, NS, PCP    ____________________________________    Mitchell Chavis MD  Physical Medicine & Rehabilitation   Brain Injury Medicine   ____________________________________

## 2023-12-07 NOTE — PROGRESS NOTES
NURSING DAILY NOTE    Name: Sheyla Gauthier  Date of Admission: 11/29/2023  Admitting Diagnosis: History of left hip replacement  Attending Physician: Mitchell Chavis M.d.  Allergies: Trazodone and Crestor [rosuvastatin calcium]    Safety  Patient Assist  osba-min  Patient Precautions  oFall Risk, Posterior Hip Precautions, Spinal / Back Precautions   Precaution Comments  geraldo back brace  Bed Transfer Status  oSupervised  Toilet Transfer Status  oSupervised  Assistive Devices  oWheelchair  Oxygen  oNone - Room Air  Diet/Therapeutic Dining  o  Current Diet Order   Procedures    Diet Order Diet: Regular     Pill Administration  owhole  Agitated Behavioral Scale  o14  ABS Level of Severity  Geraldo Agitation    Fall Risk  Has the patient had a fall this admission?  Geraldo  Lexy Garcia Fall Risk Scoring  o12, MODERATE RISK  Fall Risk Safety Measures  anahi alarm and chair alarm    Vitals  Temperature: 36.8 °C (98.3 °F)  Temp src: Oral  Pulse: 60  Respiration: 18  Blood Pressure : 125/61  Blood Pressure MAP (Calculated): 82 MM HG  BP Location: Right, Upper Arm  Patient BP Position: Supine    Oxygen  Pulse Oximetry: 95 %  O2 (LPM): 0  O2 Delivery Device: None - Room Air    Bowel and Bladder  Last Bowel Movement  o12/06/23  Stool Type  oType 3: Like a sausage, but with cracks on its surface  Bowel Device  oBathroom  Continent  oBladder: Stress incontinence  oBowel: Continent movement  Bladder Function  oUrine Void (mL):  (MOderate)  Number of Times Voided: 1  Urine Color: Yellow  Genitourinary Assessment  oBladder Assessment (WDL):  WDL Except  Kothari Catheter: Not Applicable  Urinary Elimination: Stress Incontinence  Urine Color: Yellow  Bladder Device: Bathroom  Time Void: Yes  Bladder Scan: Post Void  $ Bladder Scan Results (mL): 34    Skin  Wyatt Score  o 17  Sensory Interventions  o Bed Types: Standard/Trauma Mattress  Skin Preventative Measures: Pillows in Use for Support / Positioning  Moisture  Interventions  oMoisturizers/Barriers: Barrier Wipes      Pain  Pain Rating Scale  o4 - Distracts me, can do usual activities  Pain Location  oHip  Pain Location Orientation  oLeft  Pain Interventions  oMedication (see MAR), Emotional Support, Environmental Changes, Heat Applied, Repositioned, Rest    ADLs    Bathing  oPatient Refused Bathing  Linen Change  o   Personal Hygiene  oPerineal Care, Moist Klarissa Wipes  Chlorhexidine Bath  o   Oral Care  oBrushed Teeth (Self)  Teeth/Dentures  o   Shave  o   Nutrition Percentage Eaten  oBetween 25-50% Consumed  Environmental Precautions  oTreaded Slipper Socks on Patient, Personal Belongings, Wastebasket, Call Bell etc. in Easy Reach, Bed in Low Position  Patient Turns/Positioning  oPatient Turns Self from Side to Side  Patient Turns Assistance/Tolerance  oGeneral Weakness  Bed Positions  Tao Controls On, Bed Locked  Head of Bed Elevated  oLess than 30 degrees      Psychosocial/Neurologic Assessment  Psychosocial Assessment  oPsychosocial (WDL):  Within Defined Limits  Neurologic Assessment  oNeuro (WDL): Exceptions to WDL  Level of Consciousness: Alert  Orientation Level: Oriented X4  Cognition: Follows commands, Other (Comment) (forgetful at times)  Speech: Clear  Pupil Assesment: No  Motor Function/Sensation Assessment: Motor strength  Muscle Strength Right Arm: Good Strength Against Gravity and Moderate Resistance  Muscle Strength Left Arm: Good Strength Against Gravity and Moderate Resistance  Muscle Strength Right Leg: Good Strength Against Gravity and Moderate Resistance  Muscle Strength Left Leg: Weak Movement but Not Against Gravity or Resistance  oEENT (WDL):  WDL Except    Cardio/Pulmonary Assessment  Edema  oRLE Edema: 1+  LLE Edema: 1+  Respiratory Breath Sounds  oRUL Breath Sounds: Clear  RML Breath Sounds: Clear  RLL Breath Sounds: Clear  TYE Breath Sounds: Clear  LLL Breath Sounds: Clear  Cardiac Assessment  oCardiac (WDL):  Within Defined Limits

## 2023-12-08 ENCOUNTER — APPOINTMENT (OUTPATIENT)
Dept: PHYSICAL THERAPY | Facility: REHABILITATION | Age: 83
DRG: 560 | End: 2023-12-08
Attending: PHYSICAL MEDICINE & REHABILITATION
Payer: MEDICARE

## 2023-12-08 ENCOUNTER — APPOINTMENT (OUTPATIENT)
Dept: OCCUPATIONAL THERAPY | Facility: REHABILITATION | Age: 83
DRG: 560 | End: 2023-12-08
Attending: PHYSICAL MEDICINE & REHABILITATION
Payer: MEDICARE

## 2023-12-08 PROCEDURE — 700102 HCHG RX REV CODE 250 W/ 637 OVERRIDE(OP): Performed by: PHYSICAL MEDICINE & REHABILITATION

## 2023-12-08 PROCEDURE — 97530 THERAPEUTIC ACTIVITIES: CPT

## 2023-12-08 PROCEDURE — 97116 GAIT TRAINING THERAPY: CPT

## 2023-12-08 PROCEDURE — A9270 NON-COVERED ITEM OR SERVICE: HCPCS | Performed by: PHYSICAL MEDICINE & REHABILITATION

## 2023-12-08 PROCEDURE — 97110 THERAPEUTIC EXERCISES: CPT

## 2023-12-08 PROCEDURE — 97535 SELF CARE MNGMENT TRAINING: CPT

## 2023-12-08 PROCEDURE — 99232 SBSQ HOSP IP/OBS MODERATE 35: CPT | Performed by: PHYSICAL MEDICINE & REHABILITATION

## 2023-12-08 PROCEDURE — 770010 HCHG ROOM/CARE - REHAB SEMI PRIVAT*

## 2023-12-08 RX ADMIN — LEVOTHYROXINE SODIUM 88 MCG: 0.09 TABLET ORAL at 05:27

## 2023-12-08 RX ADMIN — FERROUS SULFATE TAB 325 MG (65 MG ELEMENTAL FE) 325 MG: 325 (65 FE) TAB at 08:08

## 2023-12-08 RX ADMIN — PREGABALIN 100 MG: 100 CAPSULE ORAL at 08:06

## 2023-12-08 RX ADMIN — ACETAMINOPHEN 1000 MG: 500 TABLET ORAL at 20:50

## 2023-12-08 RX ADMIN — METHOCARBAMOL TABLETS 1000 MG: 500 TABLET, COATED ORAL at 20:50

## 2023-12-08 RX ADMIN — PREGABALIN 100 MG: 100 CAPSULE ORAL at 20:50

## 2023-12-08 RX ADMIN — ACETAMINOPHEN 1000 MG: 500 TABLET ORAL at 08:07

## 2023-12-08 RX ADMIN — METHOCARBAMOL TABLETS 1000 MG: 500 TABLET, COATED ORAL at 14:26

## 2023-12-08 RX ADMIN — LIOTHYRONINE SODIUM 5 MCG: 5 TABLET ORAL at 08:07

## 2023-12-08 RX ADMIN — ACETAMINOPHEN 1000 MG: 500 TABLET ORAL at 14:26

## 2023-12-08 RX ADMIN — ATORVASTATIN CALCIUM 40 MG: 40 TABLET, FILM COATED ORAL at 20:50

## 2023-12-08 RX ADMIN — SENNOSIDES AND DOCUSATE SODIUM 2 TABLET: 50; 8.6 TABLET ORAL at 08:08

## 2023-12-08 RX ADMIN — OXYCODONE 5 MG: 5 TABLET ORAL at 11:30

## 2023-12-08 RX ADMIN — OXYCODONE 5 MG: 5 TABLET ORAL at 19:27

## 2023-12-08 RX ADMIN — POLYETHYLENE GLYCOL 3350 1 PACKET: 17 POWDER, FOR SOLUTION ORAL at 12:14

## 2023-12-08 RX ADMIN — METHOCARBAMOL TABLETS 1000 MG: 500 TABLET, COATED ORAL at 08:08

## 2023-12-08 RX ADMIN — OXYCODONE 5 MG: 5 TABLET ORAL at 05:27

## 2023-12-08 RX ADMIN — FAMOTIDINE 20 MG: 20 TABLET, FILM COATED ORAL at 08:07

## 2023-12-08 RX ADMIN — APIXABAN 5 MG: 5 TABLET, FILM COATED ORAL at 08:06

## 2023-12-08 RX ADMIN — APIXABAN 5 MG: 5 TABLET, FILM COATED ORAL at 20:51

## 2023-12-08 ASSESSMENT — GAIT ASSESSMENTS
DISTANCE (FEET): 140
GAIT LEVEL OF ASSIST: STANDBY ASSIST
ASSISTIVE DEVICE: FRONT WHEEL WALKER
DEVIATION: BRADYKINETIC;ANTALGIC

## 2023-12-08 ASSESSMENT — ACTIVITIES OF DAILY LIVING (ADL)
TOILETING_LEVEL_OF_ASSIST_DESCRIPTION: GRAB BAR;SUPERVISION FOR SAFETY
BED_CHAIR_WHEELCHAIR_TRANSFER_DESCRIPTION: ADAPTIVE EQUIPMENT;SUPERVISION FOR SAFETY
BED_CHAIR_WHEELCHAIR_TRANSFER_DESCRIPTION: ADAPTIVE EQUIPMENT;SUPERVISION FOR SAFETY;INCREASED TIME
TOILET_TRANSFER_DESCRIPTION: GRAB BAR;SET-UP OF EQUIPMENT;SUPERVISION FOR SAFETY
BED_CHAIR_WHEELCHAIR_TRANSFER_DESCRIPTION: ADAPTIVE EQUIPMENT;SET-UP OF EQUIPMENT;SUPERVISION FOR SAFETY
TOILET_TRANSFER_DESCRIPTION: GRAB BAR;SUPERVISION FOR SAFETY

## 2023-12-08 NOTE — PROGRESS NOTES
NURSING DAILY NOTE    Name: Sheyla Gauthier   Date of Admission: 11/29/2023   Admitting Diagnosis: History of left hip replacement  Attending Physician: Mitchell Chavis M.d.  Allergies: Trazodone and Crestor [rosuvastatin calcium]    Safety  Patient Assist  CGA  Patient Precautions  Fall Risk, Posterior Hip Precautions, Spinal / Back Precautions   Precaution Comments  no back brace  Bed Transfer Status  Supervised (progressing toward [place])  Toilet Transfer Status   Supervised  Assistive Devices  Rails, Wheelchair  Oxygen  None - Room Air  Diet/Therapeutic Dining  Current Diet Order   Procedures    Diet Order Diet: Regular     Pill Administration  whole, floated, and applesauce  Agitated Behavioral Scale  17  ABS Level of Severity  No Agitation    Fall Risk  Has the patient had a fall this admission?   No  Lexy Garcia Fall Risk Scoring  12, MODERATE RISK  Fall Risk Safety Measures  bed alarm, chair alarm, poor balance, and low vision/ hearing    Vitals  Temperature: 36.3 °C (97.4 °F)  Temp src: Oral  Pulse: (Abnormal) 58  Respiration: 18  Blood Pressure : 114/66  Blood Pressure MAP (Calculated): 82 MM HG  BP Location: Right, Upper Arm  Patient BP Position: Supine     Oxygen  Pulse Oximetry: 95 %  O2 (LPM): 0  O2 Delivery Device: None - Room Air    Bowel and Bladder  Last Bowel Movement  12/06/23  Stool Type  Type 3: Like a sausage, but with cracks on its surface  Bowel Device  Bathroom  Continent  Bladder: Stress incontinence   Bowel: Continent movement  Bladder Function  Urine Void (mL):  (MOderate)  Number of Times Voided: 1  Urine Color: Unable To Evaluate  Genitourinary Assessment   Bladder Assessment (WDL):  WDL Except  Kothari Catheter: Not Applicable  Urinary Elimination: Incontinence  Urine Color: Unable To Evaluate  Bladder Device: Bathroom  Time Void: Yes  Bladder Scan: Post Void  $ Bladder Scan Results (mL): 34    Skin  Wyatt Score   17  Sensory  Interventions   Bed Types: Standard/Trauma Mattress  Skin Preventative Measures: Pillows in Use for Support / Positioning  Moisture Interventions  Moisturizers/Barriers: Barrier Wipes      Pain  Pain Rating Scale  1 - Hardly Notice Pain  Pain Location  Hip  Pain Location Orientation  Left  Pain Interventions   Rest    ADLs    Bathing    (had shower already)  Linen Change      Personal Hygiene  Perineal Care, Moist Klarissa Wipes  Chlorhexidine Bath      Oral Care  Brushed Teeth (Self)  Teeth/Dentures     Shave     Nutrition Percentage Eaten  Dinner, Between 25-50% Consumed  Environmental Precautions  Treaded Slipper Socks on Patient, Personal Belongings, Wastebasket, Call Bell etc. in Easy Reach, Bed in Low Position  Patient Turns/Positioning  Patient Turns Self from Side to Side  Patient Turns Assistance/Tolerance  Assistance of One  Bed Positions  Bed Controls On, Bed Locked  Head of Bed Elevated  Less than 30 degrees      Psychosocial/Neurologic Assessment  Psychosocial Assessment  Psychosocial (WDL):  Within Defined Limits  Neurologic Assessment  Neuro (WDL): Exceptions to WDL  Level of Consciousness: Alert  Orientation Level: Oriented X4  Cognition: Follows commands, Other (Comment) (forgetful at times)  Speech: Clear  Pupil Assesment: No  Motor Function/Sensation Assessment: Motor strength  Muscle Strength Right Arm: Good Strength Against Gravity and Moderate Resistance  Muscle Strength Left Arm: Good Strength Against Gravity and Moderate Resistance  Muscle Strength Right Leg: Good Strength Against Gravity and Moderate Resistance  Muscle Strength Left Leg: Weak Movement but Not Against Gravity or Resistance  EENT (WDL):  WDL Except    Cardio/Pulmonary Assessment  Edema   RLE Edema: 1+  LLE Edema: 1+  Respiratory Breath Sounds  RUL Breath Sounds: Clear  RML Breath Sounds: Clear  RLL Breath Sounds: Clear  TYE Breath Sounds: Clear  LLL Breath Sounds: Clear  Cardiac Assessment   Cardiac (WDL):  Within Defined  Limits

## 2023-12-08 NOTE — PROGRESS NOTES
"  Physical Medicine & Rehabilitation Progress Note    Encounter Date: 12/8/2023    Chief Complaint: Low back pain altered sensation lower extremities    Interval Events (Subjective):    She was evaluated in her room laying comfortably in bed.  She reports improving tingling sensation in bilateral lower extremities.  She is happy with her progression of lower extremity strength.  She was able to ambulate outside for short distances with therapy today.    She denies any change in strength chest pain shortness of breath vomiting    ROS:  Gen: No fatigue, confusion, significant weight loss  Eyes: no blurry vision, double vision or pain in eyes  ENT: no changes in hearing, runny nose, nose bleeds, sinus pain  CV: No CP, palpitations  Lungs: no shortness of breath, changes in secretions, changes in cough, pain with coughing  Abd: No abd pain, nausea, vomiting, pain with eating  : no blood in urine, pain during storage phase, bladder spasms, suprapubic pain  Ext: No swelling in legs, asymmetric atrophy  Neuro: no changes in strength or sensation  Skin: no new ulcers/skin breakdown appreciated by patient  Mood: No changes in mood today, increase in depression or anxiety  Heme: no bruising, or bleeding    Objective:  Vitals: /53   Pulse 67   Temp 36.8 °C (98.3 °F) (Oral)   Resp 18   Ht 1.575 m (5' 2\")   Wt 68.9 kg (152 lb)   SpO2 94%   Gen: NAD, Body mass index is 27.8 kg/m².  HEENT:  NC/AT, PERRLA, moist mucous membranes  Cardio: RRR, no mumurs  Pulm: CTAB, with normal respiratory effort  Abd: Soft NTND, active bowel sounds,   Ext: No peripheral edema. No calf tenderness. No clubbing/cyanosis. +dorsal pedalis pulses bilaterally.      Laboratory Values:  No results found for this or any previous visit (from the past 72 hour(s)).    Medications:  Scheduled Medications   Medication Dose Frequency    oxyCODONE immediate-release  5 mg BID    Pharmacy Consult Request  1 Each PHARMACY TO DOSE    senna-docusate  2 " Tablet BID    acetaminophen  1,000 mg TID    apixaban  5 mg BID    atorvastatin  40 mg QHS    famotidine  20 mg DAILY    ferrous sulfate  325 mg QDAY with Breakfast    levothyroxine  88 mcg AM ES    liothyronine  5 mcg QAM    methocarbamol  1,000 mg TID    pregabalin  100 mg BID     PRN medications: Respiratory Therapy Consult, hydrALAZINE, senna-docusate **AND** polyethylene glycol/lytes **AND** magnesium hydroxide **AND** bisacodyl, ondansetron **OR** ondansetron, sodium chloride, oxyCODONE immediate-release    Diet:  Current Diet Order   Procedures    Diet Order Diet: Regular       Assessment:  Active Hospital Problems    Diagnosis     *History of left hip replacement        Medical Decision Making and Plan:    Modified from Dr. Chavis's progress note on 12/7:  Left Femoral Neck fracture with superior displacement- diagnosed 11/22, unknown chronicity  Seen on Xray of hip taken 11/22  S/P hip arthroplasty on 11/27 by Dr Ko  No clear history of fall. Patient has not walked pain free since February 2023  Last Fall noted in Spring of 2023  -Continue comprehensive acute inpatient rehabilitation  -Requesting imaging from Dr Connor and Dr Ocampo office to establish chronicity  [facility] Code / Diagnosis to Support: 0008.11 - Orthopaedic Disorders: Status Post Unilateral Hip Fracture         Polyradiculopathy 2/2 to bilateral Lumbar foraminal stenosis  Status post lumbar laminectomy  -Patient has history of prior L4-L5 decompression  - Recent worsening of back pain with radicular pain into right lower extremity, impairing function and recently utilizing a wheelchair in the last month  - 11/10 patient taken to the OR for L5-S1 laminectomy performed by Dr. Silva  - Spinal precautions in place, does not require bracing     Right Popliteal DVT  Provoked from surgery and immobility  -Eliquis 5mg BID for 3 months from 11/15/23  -Continue Eliquis, discussed with patient     Acute Blood loss anemia  -7.6 Hb today  -12/1- 7.9  Hb  -consider US of hip if needed        Hypothyroidism  -On home dose Synthroid     Hypertension  - Hold losartan and spironolactone, not uncommon for her blood pressure to be slightly lower postoperatively.  -home dose losartan and spironolactone would likely have to be restarted by primary care      Pain:  #Neuroceptic - On Tylenol TID, oxycodone 5mg PRN, robaxin 1g TID  -11/30- started Oxycodone 5mg TID scheduled  -Decrease oxycodone 5 mg twice daily scheduled  -Robaxin 1000 mg 3 times daily     #Neuropathic pain:  - Lyrica 100 mg twice daily  - Tylenol 1000 mg 3 times daily    Circadian Rhythm disorder:   Recommend lights on during the day/off at night, minimize nighttime interruptions as able.     Mood  - at risk of adjustment disorder, depression, and anxiety due to functional decline     ID:  - at risk for Urinary tract infection     Skin/Wounds:  - Pressure relief q2h while in bed. Close monitoring for signs of breakdown     DVT prophylaxis:  continue eliquis     GI prophylaxis:  On Pepcid        -Follow-up Ortho, NS, PCP  ____________________________________    Herminio Clay DO  ABPMR - Physical Medicine & Rehabilitation   ABPMR - Spinal Cord Injury Medicine  ____________________________________    Total time:  38 minutes.  I spent greater than 50% of the time for patient care and coordination on this date, including unit/floor time, and face-to-face time with the patient as per assessment and plan above.  Discussion included neuropathic pain, Lyrica 100 mg twice daily, postoperative pain, decrease oxycodone to twice daily

## 2023-12-08 NOTE — THERAPY
Occupational Therapy  Daily Treatment     Patient Name: Sheyla Gauthier  Age:  83 y.o., Sex:  female  Medical Record #: [id number]  Today's Date: 12/8/2023     Precautions  Precautions: Fall Risk, Posterior Hip Precautions, Weight Bearing As Tolerated Left Lower Extremity  Comments: no back brace         Subjective    Patient was resting in bed and agreeable to OT.  She mentioned she walked outside today and is visiting with friends later this afternoon.     Objective       12/08/23 1301   OT Charge Group   OT Self Care / ADL (Units) 3   OT Therapy Activity (Units) 1   OT Total Time Spent   OT Individual Total Time Spent (Mins) 60   Precautions   Precautions Fall Risk;Posterior Hip Precautions;Weight Bearing As Tolerated Left Lower Extremity   Functional Level of Assist   Grooming Modified Independent;Seated   Grooming Description   (to wash hands)   Lower Body Dressing Standby Assist   Lower Body Dressing Description Reacher;Shoe horn;Sock aid;Set-up of equipment;Supervision for safety;Verbal cueing  (doffed slippers, donned socks, don/doff shoes x 2 attempts)   Toileting Supervision   Toileting Description Grab bar;Supervision for safety   Bed, Chair, Wheelchair Transfer Supervised   Bed Chair Wheelchair Transfer Description Adaptive equipment;Set-up of equipment;Supervision for safety  (fww)   Toilet Transfers Supervised   Toilet Transfer Description Grab bar;Set-up of equipment;Supervision for safety   Bed Mobility    Supine to Sit Modified Independent   Scooting Supervised   Interdisciplinary Plan of Care Collaboration   Patient Position at End of Therapy Seated;Chair Alarm On;Self Releasing Lap Belt Applied  (at front lobby waiting for visit with friends)     Problem solved ways for patient to don/doff shoes with laces while using AE and maintaining posterior hip and spinal precautions.  She would benefit from practice for carryover with techniques.      Assessment    Patient able to don/doff socks and shoes  with AE within precautions.  She would benefit from further practice for carryover.    Strengths: Able to follow instructions, Alert and oriented, Effective communication skills, Good insight into deficits/needs, Independent prior level of function, Making steady progress towards goals, Manages pain appropriately, Motivated for self care and independence, Pleasant and cooperative, Supportive family, Willingly participates in therapeutic activities  Barriers: Fatigue, Generalized weakness, Impaired activity tolerance, Impaired balance, Limited mobility, Pain    Plan    D/C [facility] this weekend for d/c Monday    Occupational Therapy Goals (Active)       Problem: Dressing       Dates: Start:  11/30/23         Goal: STG-Within one week, patient will dress LB at SBA level using AE/DME PRN.       Dates: Start:  11/30/23               Problem: OT Long Term Goals       Dates: Start:  11/30/23         Goal: LTG-By discharge, patient will complete basic self care tasks at SBA-Mod I level using DME PRN.       Dates: Start:  11/30/23            Goal: LTG-By discharge, patient will perform bathroom transfers at SBA-Mod I level using DME PRN.       Dates: Start:  11/30/23               Problem: Toileting       Dates: Start:  11/30/23         Goal: STG-Within one week, patient will complete toileting tasks at SBA level using AE/DME PRN.       Dates: Start:  11/30/23

## 2023-12-08 NOTE — CARE PLAN
"The patient is Stable - Low risk of patient condition declining or worsening    Shift Goals  Clinical Goals: Safety  Patient Goals: pain control, safety    Progress made toward(s) clinical / shift goals:      Problem: Fall Risk - Rehab  Goal: Patient will remain free from falls  Outcome: Progressing  Note: Lexy Garcia Fall risk Assessment Score: 12    Moderate fall risk Interventions  - Bed and strip alarm   - Yellow sign by the door   - Yellow wrist band \"Fall risk\"  - Room near to the nurse station  - Do not leave patient unattended in the bathroom  - Fall risk education provided    Pt calls appropriately when in need of assistance.     Problem: Pain - Standard  Goal: Alleviation of pain or a reduction in pain to the patient’s comfort goal  Outcome: Progressing  Note: Roxicodone 5mg given PRN per MAR for c/o  left hip pain with adequate relief. Pt sleeps good. Will continue to monitor.       Patient is not progressing towards the following goals:      "

## 2023-12-08 NOTE — THERAPY
Physical Therapy   Daily Treatment     Patient Name: Sheyla Gauthier  Age:  83 y.o., Sex:  female  Medical Record #: [id number]  Today's Date: 12/8/2023     Precautions  Precautions: (P) Fall Risk, Posterior Hip Precautions, Spinal / Back Precautions   Comments: (P) no back brace    Subjective    I am very excited for my move to the [place]. I've just felt very tired lately.     Objective       12/08/23 0831   PT Charge Group   PT Gait Training (Units) 2   PT Therapeutic Exercise (Units) 1   PT Therapeutic Activities (Units) 1   PT Total Time Spent   PT Individual Total Time Spent (Mins) 60   Precautions   Precautions Fall Risk;Posterior Hip Precautions;Spinal / Back Precautions    Comments no back brace   Vitals   Pulse 79   Patient BP Position Sitting   Pulse Oximetry 99 %   O2 Delivery Device None - Room Air   Vitals Comments post gait training   Pain   Intervention Declines   Non Verbal Descriptors   Non Verbal Scale  Calm   Cognition    Level of Consciousness Alert   Comments very pleasant to work with- excited for move   Sleep/Wake Cycle   Sleep & Rest Awake   Gait Functional Level of Assist    Gait Level Of Assist Standby Assist   Assistive Device Front Wheel Walker   Distance (Feet) 140   # of Times Distance was Traveled 2   Deviation Bradykinetic;Antalgic   Transfer Functional Level of Assist   Bed, Chair, Wheelchair Transfer Supervised   Bed Chair Wheelchair Transfer Description Adaptive equipment;Supervision for safety;Increased time  (leg )   Toilet Transfers Supervised   Toilet Transfer Description Grab bar;Supervision for safety   Supine Lower Body Exercise   Abdominal Bracing 1 set of 10   Hip Adduction  2 sets of 10;Bilateral  (ball for resistance)   Heel Slide 2 sets of 10;Left   Ankle Pumps Reciprocal;2 sets of 10;Bilateral   Gluteal Isometrics 2 sets of 10   Quadriceps Isometrics 2 sets of 10;Left   Sitting Lower Body Exercises   Ankle Pumps 2 sets of 10   Long Arc Quad 2 sets of  10;Bilateral   Marching Reciprocal;2 sets of 10   Sit to Stand 1 set of 10  (set of 7, set of 5)   Bed Mobility    Supine to Sit Modified Independent   Sit to Supine Modified Independent   Neuro-Muscular Treatments   Neuro-Muscular Treatments Compensatory Strategies;Postural Facilitation;Sequencing   Interdisciplinary Plan of Care Collaboration   IDT Collaboration with  Physical Therapist   Patient Position at End of Therapy Seated   Collaboration Comments transfer to PT in gym         Assessment    Patient able to complete household distance gait from gym to cafeteria, mimicking ambulation required at assisted living facility in [place]. Patient able to complete safely but VC for safe stand to sit transfer when fatigued at end of walk. Energy conservation strategies reviewed, including LE elevation when resting. Patient continues to have modified independent bed mobility with leg . Only pain noted at suture site during hip abduction.    Strengths: Able to follow instructions, Independent prior level of function, Making steady progress towards goals, Motivated for self care and independence, Pleasant and cooperative, Supportive family, Willingly participates in therapeutic activities  Barriers: Generalized weakness, Impaired activity tolerance, Impaired balance, Pain    Plan    Standing HEP with handout, progress independence with transfers and ambulation, curb step to address STG     DME  PT DME Recommendations  Wheelchair:  (likely will be ambulatory and will not require WC)  Cushion:  (n/a)  Assistive Device: Front Wheeled Walker (pt has FWW)    Passport items to be completed:  in/out of a vehicle, navigate up and down stairs, show how to get up/down from the ground, ensure home is accessible, demonstrate HEP, complete caregiver training    Physical Therapy Problems (Active)       Problem: Mobility       Dates: Start:  11/30/23         Goal: STG-Within one week, patient will ambulate 100ft FWW SBA        Dates: Start:  11/30/23         Goal Note filed on 12/05/23 0822 by Alan Schaefer, PT       Limited to 35-50ft SBA FWW due to pain and fatigue              Goal: STG-Within one week, patient will ambulate up/down a curb SBA FWW       Dates: Start:  11/30/23         Goal Note filed on 12/05/23 0822 by Alan Schaefer, PT       CGA for consecutive steps, has not completed curb step yet                 Problem: Mobility Transfers       Dates: Start:  11/30/23         Goal: STG-Within one week, patient will perform bed mobility Sherman       Dates: Start:  11/30/23         Goal Note filed on 12/05/23 0822 by Alan Schaefer, PT       Inconsistent, occasionally requires Rajan for sit>supine                 Problem: PT-Long Term Goals       Dates: Start:  11/30/23         Goal: LTG-By discharge, patient will ambulate 150ft Sherman FWW       Dates: Start:  11/30/23            Goal: LTG-By discharge, patient will transfer one surface to another Sherman       Dates: Start:  11/30/23            Goal: LTG-By discharge, patient will perform home exercise program independently       Dates: Start:  11/30/23            Goal: LTG-By discharge, patient will transfer in/out of a car SBA       Dates: Start:  11/30/23

## 2023-12-08 NOTE — DISCHARGE PLANNING
Cm  Contact @ [facility] in [place] provided contact of Sara Stack @ [phone] as a possibility to coordinate out pt PT/OT in [place];  called number; not available; left message.     I spoke with Eliza worley for ortho /dr rodriguez; she reports md reviewed xray-no concerns;  Eliza has send a referral to [facility] [facility].     Updated pt's dtr Ade HERR     Plan:  will completed [place] 602 form tomorrow and send to [facility] in [place]

## 2023-12-08 NOTE — THERAPY
"Occupational Therapy  Daily Treatment     Patient Name: Sheyla Gauthier  Age:  83 y.o., Sex:  female  Medical Record #: [id number]  Today's Date: 12/8/2023     Precautions  Precautions: Fall Risk, Posterior Hip Precautions, Weight Bearing As Tolerated Left Lower Extremity  Comments: no back brace         Subjective    \"I want to check out downtown [place] to see if I can walk around shopping with my daughter.\" Pt requested outdoor mobility with OT using FWW.     Objective       12/08/23 1101   OT Charge Group   OT Therapy Activity (Units) 2   OT Total Time Spent   OT Individual Total Time Spent (Mins) 30   Cognition    Level of Consciousness Alert   Functional Level of Assist   Bed, Chair, Wheelchair Transfer Supervised  (to Mod I)   Bed Chair Wheelchair Transfer Description Adaptive equipment;Supervision for safety  (FWW with stand step)   Sitting Upper Body Exercises   Other Exercise Pt participated in seated weighted ball toss at rebounder using a 2lb ball for 2 sets of 10 reps with cues for sequencing. Pt with improvement over time of tossing. Pt seated edge of chair to account for hip precautions and to engage her core for strengthening and balance, in preparation for ADLs and functional transfers.   Balance   Comments Pt participated in FWW functional mobility outdoors at SBA level without LOB over various textured surfaces including cobblestone, paved, and wooden with incline and decline. Pt would complete a setup-to pattern on the cobblestones then would maintain and even movement pattern over remaining surfaces.   Interdisciplinary Plan of Care Collaboration   Patient Position at End of Therapy Seated;Chair Alarm On;Self Releasing Lap Belt Applied  (Pt left seated in dining room for lunch with warmed blanket.)         Assessment    Pt tolerated session well completing functional mobility in preparation for navigating around [place]. No LOB noted in mobility.  Strengths: Able to follow instructions, Alert " and oriented, Effective communication skills, Good insight into deficits/needs, Independent prior level of function, Making steady progress towards goals, Manages pain appropriately, Motivated for self care and independence, Pleasant and cooperative, Supportive family, Willingly participates in therapeutic activities  Barriers: Fatigue, Generalized weakness, Impaired activity tolerance, Impaired balance, Limited mobility, Pain    Plan    Simple meal prep Saturday, DC shower Sunday      DME        Passport items to be completed:  Perform bathroom transfers, complete dressing, complete feeding, get ready for the day, prepare a simple meal, participate in household tasks, adapt home for safety needs, demonstrate home exercise program, complete caregiver training     Occupational Therapy Goals (Active)       Problem: Dressing       Dates: Start:  11/30/23         Goal: STG-Within one week, patient will dress LB at SBA level using AE/DME PRN.       Dates: Start:  11/30/23               Problem: OT Long Term Goals       Dates: Start:  11/30/23         Goal: LTG-By discharge, patient will complete basic self care tasks at SBA-Mod I level using DME PRN.       Dates: Start:  11/30/23            Goal: LTG-By discharge, patient will perform bathroom transfers at SBA-Mod I level using DME PRN.       Dates: Start:  11/30/23               Problem: Toileting       Dates: Start:  11/30/23         Goal: STG-Within one week, patient will complete toileting tasks at SBA level using AE/DME PRN.       Dates: Start:  11/30/23

## 2023-12-08 NOTE — PROGRESS NOTES
NURSING DAILY NOTE    Name: Sheyla Gauthier   Date of Admission: 11/29/2023   Admitting Diagnosis: History of left hip replacement  Attending Physician: Mitchell Chavis M.d.  Allergies: Trazodone and Crestor [rosuvastatin calcium]    Safety  Patient Assist  CGA  Patient Precautions  Fall Risk, Posterior Hip Precautions, Spinal / Back Precautions   Precaution Comments  no back brace  Bed Transfer Status  Supervised (progressing toward Sherman)  Toilet Transfer Status   Supervised  Assistive Devices  Wheelchair  Oxygen  None - Room Air  Diet/Therapeutic Dining  Current Diet Order   Procedures    Diet Order Diet: Regular     Pill Administration  whole and floated  Agitated Behavioral Scale  14  ABS Level of Severity  No Agitation    Fall Risk  Has the patient had a fall this admission?   No  Lexy Garcia Fall Risk Scoring  12, MODERATE RISK  Fall Risk Safety Measures  ok to leave pt in bathroom    Vitals  Temperature: 36.3 °C (97.4 °F)  Temp src: Oral  Pulse: (!) 58  Respiration: 18  Blood Pressure : 114/66  Blood Pressure MAP (Calculated): 82 MM HG  BP Location: Right, Upper Arm  Patient BP Position: Supine     Oxygen  Pulse Oximetry: 95 %  O2 (LPM): 0  O2 Delivery Device: None - Room Air    Bowel and Bladder  Last Bowel Movement  12/06/23  Stool Type  Type 3: Like a sausage, but with cracks on its surface  Bowel Device  Bathroom  Continent  Bladder: Stress incontinence   Bowel: Continent movement  Bladder Function  Urine Void (mL):  (MOderate)  Number of Times Voided: 1  Urine Color: Yellow  Genitourinary Assessment   Bladder Assessment (WDL):  WDL Except  Kothari Catheter: Not Applicable  Urinary Elimination: Incontinence  Urine Color: Yellow  Bladder Device: Bathroom  Time Void: Yes  Bladder Scan: Post Void  $ Bladder Scan Results (mL): 34    Skin  Wyatt Score   17  Sensory Interventions   Bed Types: Standard/Trauma Mattress  Skin Preventative Measures:  Pillows in Use for Support / Positioning  Moisture Interventions  Moisturizers/Barriers: Barrier Wipes      Pain  Pain Rating Scale  7 - Focus of attention, prevents doing daily activities  Pain Location  Hip  Pain Location Orientation  Left  Pain Interventions   Declines    ADLs    Bathing   Patient Refused Bathing  Linen Change      Personal Hygiene  Perineal Care, Moist Klarissa Wipes  Chlorhexidine Bath      Oral Care  Brushed Teeth (Self)  Teeth/Dentures     Shave     Nutrition Percentage Eaten  Dinner, Between 25-50% Consumed  Environmental Precautions  Treaded Slipper Socks on Patient, Personal Belongings, Wastebasket, Call Bell etc. in Easy Reach, Bed in Low Position  Patient Turns/Positioning  Patient Turns Self from Side to Side  Patient Turns Assistance/Tolerance  General Weakness  Bed Positions  Bed Controls On, Bed Locked  Head of Bed Elevated  Less than 30 degrees      Psychosocial/Neurologic Assessment  Psychosocial Assessment  Psychosocial (WDL):  Within Defined Limits  Neurologic Assessment  Neuro (WDL): Exceptions to WDL  Level of Consciousness: Alert  Orientation Level: Oriented X4  Cognition: Other (Comment) (forgetful at times)  Speech: Clear  Pupil Assesment: No  Motor Function/Sensation Assessment: Motor strength  Muscle Strength Right Arm: Good Strength Against Gravity and Moderate Resistance  Muscle Strength Left Arm: Good Strength Against Gravity and Moderate Resistance  Muscle Strength Right Leg: Good Strength Against Gravity and Moderate Resistance  Muscle Strength Left Leg: Weak Movement but Not Against Gravity or Resistance  EENT (WDL):  WDL Except    Cardio/Pulmonary Assessment  Edema   RLE Edema: 1+  LLE Edema: 1+  Respiratory Breath Sounds  RUL Breath Sounds: Clear  RML Breath Sounds: Clear  RLL Breath Sounds: Clear  TYE Breath Sounds: Clear  LLL Breath Sounds: Clear  Cardiac Assessment   Cardiac (WDL):  Within Defined Limits

## 2023-12-08 NOTE — THERAPY
"Physical Therapy   Daily Treatment     Patient Name: Sheyla Gauthier  Age:  83 y.o., Sex:  female  Medical Record #: [id number]  Today's Date: 12/8/2023     Precautions  Precautions: Fall Risk, Posterior Hip Precautions  Comments: no back brace    Subjective    Patient reports \"wooziness\" this morning, all vitals WNL     Objective       12/08/23 0931   PT Charge Group   PT Gait Training (Units) 1   PT Therapeutic Activities (Units) 1   PT Total Time Spent   PT Individual Total Time Spent (Mins) 30   Precautions   Precautions Fall Risk;Posterior Hip Precautions   Comments no back brace   Stairs Functional Level of Assist   Level of Assist with Stairs Contact Guard Assist   # of Stairs Climbed 2  (over 2in threshold block with FWW CGA, over curb with FWW CGA)   Stairs Description Verbal cueing  (needs reinforcement with sequencing for \"up with good, down with bad\")   Transfer Functional Level of Assist   Toilet Transfers Supervised   Toilet Transfer Description   (able to change clothes and perform grooming tasks at SPV level)   Interdisciplinary Plan of Care Collaboration   IDT Collaboration with  Physical Therapist   Collaboration Comments treatment planning         Assessment    Tendency to go up/ down obstacles with right leg with no increase in leg pain; would benefit from reinforcement of stair sequencing to \"go up with good, down with the bad\"    Strengths: Able to follow instructions, Independent prior level of function, Making steady progress towards goals, Motivated for self care and independence, Pleasant and cooperative, Supportive family, Willingly participates in therapeutic activities  Barriers: Generalized weakness, Impaired activity tolerance, Impaired balance, Pain    Plan    Standing HEP with handout, progress independence with transfers and ambulation, reinforces stair sequencing    Prepare for discharge Monday     DME  PT DME Recommendations  Wheelchair:  (likely will be ambulatory and will not " require WC)  Cushion:  (n/a)  Assistive Device: Front Wheeled Walker (pt has FWW)     Passport items to be completed:  Get in/out of bed safely, in/out of a vehicle, safely use mobility device, walk or wheel around home/community, navigate up and down stairs, show how to get up/down from the ground, ensure home is accessible, demonstrate HEP, complete caregiver training    Physical Therapy Problems (Active)       Problem: Mobility       Dates: Start:  11/30/23         Goal: STG-Within one week, patient will ambulate 100ft FWW SBA       Dates: Start:  11/30/23         Goal Note filed on 12/05/23 0822 by Alan Schaefer, PT       Limited to 35-50ft SBA FWW due to pain and fatigue              Goal: STG-Within one week, patient will ambulate up/down a curb SBA FWW       Dates: Start:  11/30/23         Goal Note filed on 12/05/23 0822 by Alan Schaefer, PT       CGA for consecutive steps, has not completed curb step yet                 Problem: Mobility Transfers       Dates: Start:  11/30/23         Goal: STG-Within one week, patient will perform bed mobility Sherman       Dates: Start:  11/30/23         Goal Note filed on 12/05/23 0822 by Alan Schaefer, PT       Inconsistent, occasionally requires Rajan for sit>supine                 Problem: PT-Long Term Goals       Dates: Start:  11/30/23         Goal: LTG-By discharge, patient will ambulate 150ft Sherman FWW       Dates: Start:  11/30/23            Goal: LTG-By discharge, patient will transfer one surface to another Sherman       Dates: Start:  11/30/23            Goal: LTG-By discharge, patient will perform home exercise program independently       Dates: Start:  11/30/23            Goal: LTG-By discharge, patient will transfer in/out of a car SBA       Dates: Start:  11/30/23

## 2023-12-08 NOTE — DISCHARGE PLANNING
CARI.   Appt is on Thursday, Jan 11 2024 at 1pm  Pt's pcp will be:   Dr. Xiao Kern  [facility] [address] [place] [address]  [phone] press 2    [email or web address]    Referral sent to Sara AMATO/ Beryl CURIEL for PT/OT    F   P     I have been in communication w/ both of pt's dtr re dc planning/ referrals/     Pt has fww/wc w/ cushion.

## 2023-12-08 NOTE — DISCHARGE PLANNING
Cm  Late entry from 12/7  Provided by [facility] in [place], tc to Sara Stack OT @ [phone] to inquire about making a referral for PT/OT @ [facility]; not available ; left message.    12/8  Tc from pt's dtr Luh; she voiced concern about challenges w/ finding PT/OT to work w/ pt @ [facility] in [place]; confirmed frustration as there is lag w/ therapy in [place] also; informed her I reached out to Sara R/ OT - left message;  also, pt does not have an appt w/ a PCP until January; they are aware pt will need to be established w/ pcp in [place] to coordinate home health.  Informed her pt will be given an exercise program @ time of dc; dtr indicates they will hire a personal   to supervised exercise  program.          2nd tc to Sara Stack OT in [place]; left message.   [place] form 602 compelted and emailed to [facility] in [place].

## 2023-12-08 NOTE — CARE PLAN
The patient is Watcher - Medium risk of patient condition declining or worsening    Shift Goals  Clinical Goals: Safety    Problem: Bowel Elimination  Goal: Patient will participate in bowel management program  Note: Patients LBM 12/6, prn miralax given, no abdominal discomfort     Problem: Pain - Standard  Goal: Alleviation of pain or a reduction in pain to the patient’s comfort goal  Note: Patient is having back pain, prn oxy was given

## 2023-12-09 ENCOUNTER — APPOINTMENT (OUTPATIENT)
Dept: OCCUPATIONAL THERAPY | Facility: REHABILITATION | Age: 83
DRG: 560 | End: 2023-12-09
Attending: PHYSICAL MEDICINE & REHABILITATION
Payer: MEDICARE

## 2023-12-09 PROCEDURE — 99231 SBSQ HOSP IP/OBS SF/LOW 25: CPT | Performed by: STUDENT IN AN ORGANIZED HEALTH CARE EDUCATION/TRAINING PROGRAM

## 2023-12-09 PROCEDURE — 700102 HCHG RX REV CODE 250 W/ 637 OVERRIDE(OP): Performed by: PHYSICAL MEDICINE & REHABILITATION

## 2023-12-09 PROCEDURE — 770010 HCHG ROOM/CARE - REHAB SEMI PRIVAT*

## 2023-12-09 PROCEDURE — 97535 SELF CARE MNGMENT TRAINING: CPT

## 2023-12-09 PROCEDURE — 97530 THERAPEUTIC ACTIVITIES: CPT

## 2023-12-09 PROCEDURE — A9270 NON-COVERED ITEM OR SERVICE: HCPCS | Performed by: PHYSICAL MEDICINE & REHABILITATION

## 2023-12-09 RX ADMIN — ACETAMINOPHEN 1000 MG: 500 TABLET ORAL at 14:24

## 2023-12-09 RX ADMIN — OXYCODONE 5 MG: 5 TABLET ORAL at 05:43

## 2023-12-09 RX ADMIN — PREGABALIN 100 MG: 100 CAPSULE ORAL at 20:29

## 2023-12-09 RX ADMIN — METHOCARBAMOL TABLETS 1000 MG: 500 TABLET, COATED ORAL at 20:27

## 2023-12-09 RX ADMIN — METHOCARBAMOL TABLETS 1000 MG: 500 TABLET, COATED ORAL at 09:02

## 2023-12-09 RX ADMIN — OXYCODONE 5 MG: 5 TABLET ORAL at 23:31

## 2023-12-09 RX ADMIN — ACETAMINOPHEN 1000 MG: 500 TABLET ORAL at 09:01

## 2023-12-09 RX ADMIN — SENNOSIDES AND DOCUSATE SODIUM 2 TABLET: 50; 8.6 TABLET ORAL at 20:30

## 2023-12-09 RX ADMIN — OXYCODONE 5 MG: 5 TABLET ORAL at 01:27

## 2023-12-09 RX ADMIN — OXYCODONE 5 MG: 5 TABLET ORAL at 12:26

## 2023-12-09 RX ADMIN — SENNOSIDES AND DOCUSATE SODIUM 2 TABLET: 50; 8.6 TABLET ORAL at 09:01

## 2023-12-09 RX ADMIN — APIXABAN 5 MG: 5 TABLET, FILM COATED ORAL at 20:28

## 2023-12-09 RX ADMIN — LIOTHYRONINE SODIUM 5 MCG: 5 TABLET ORAL at 09:12

## 2023-12-09 RX ADMIN — FAMOTIDINE 20 MG: 20 TABLET, FILM COATED ORAL at 09:02

## 2023-12-09 RX ADMIN — METHOCARBAMOL TABLETS 1000 MG: 500 TABLET, COATED ORAL at 14:24

## 2023-12-09 RX ADMIN — FERROUS SULFATE TAB 325 MG (65 MG ELEMENTAL FE) 325 MG: 325 (65 FE) TAB at 09:01

## 2023-12-09 RX ADMIN — APIXABAN 5 MG: 5 TABLET, FILM COATED ORAL at 09:02

## 2023-12-09 RX ADMIN — ATORVASTATIN CALCIUM 40 MG: 40 TABLET, FILM COATED ORAL at 20:28

## 2023-12-09 RX ADMIN — PREGABALIN 100 MG: 100 CAPSULE ORAL at 09:02

## 2023-12-09 RX ADMIN — OXYCODONE 5 MG: 5 TABLET ORAL at 20:29

## 2023-12-09 RX ADMIN — LEVOTHYROXINE SODIUM 88 MCG: 0.09 TABLET ORAL at 05:43

## 2023-12-09 RX ADMIN — ACETAMINOPHEN 1000 MG: 500 TABLET ORAL at 20:27

## 2023-12-09 ASSESSMENT — PATIENT HEALTH QUESTIONNAIRE - PHQ9
SUM OF ALL RESPONSES TO PHQ9 QUESTIONS 1 AND 2: 0
SUM OF ALL RESPONSES TO PHQ9 QUESTIONS 1 AND 2: 0
2. FEELING DOWN, DEPRESSED, IRRITABLE, OR HOPELESS: NOT AT ALL
2. FEELING DOWN, DEPRESSED, IRRITABLE, OR HOPELESS: NOT AT ALL
1. LITTLE INTEREST OR PLEASURE IN DOING THINGS: NOT AT ALL
1. LITTLE INTEREST OR PLEASURE IN DOING THINGS: NOT AT ALL

## 2023-12-09 ASSESSMENT — ACTIVITIES OF DAILY LIVING (ADL)
BED_CHAIR_WHEELCHAIR_TRANSFER_DESCRIPTION: ADAPTIVE EQUIPMENT;SUPERVISION FOR SAFETY;SET-UP OF EQUIPMENT
TOILET_TRANSFER_DESCRIPTION: ADAPTIVE EQUIPMENT;SET-UP OF EQUIPMENT;SUPERVISION FOR SAFETY
TOILETING_LEVEL_OF_ASSIST_DESCRIPTION: SUPERVISION FOR SAFETY;ADAPTIVE EQUIPMENT

## 2023-12-09 ASSESSMENT — PAIN DESCRIPTION - PAIN TYPE: TYPE: ACUTE PAIN

## 2023-12-09 NOTE — PROGRESS NOTES
NURSING DAILY NOTE    Name: Sheyla Gauthier   Date of Admission: 11/29/2023   Admitting Diagnosis: History of left hip replacement  Attending Physician: Herminio Clay D.o.  Allergies: Trazodone and Crestor [rosuvastatin calcium]    Safety  Patient Assist  SBA  Patient Precautions  Fall Risk, Posterior Hip Precautions, Weight Bearing As Tolerated Left Lower Extremity  Precaution Comments  no back brace  Bed Transfer Status  Supervised  Toilet Transfer Status   Supervised  Assistive Devices  Wheelchair  Oxygen  None - Room Air  Diet/Therapeutic Dining  Current Diet Order   Procedures    Diet Order Diet: Regular     Pill Administration  whole, floated, and applesauce  Agitated Behavioral Scale  17  ABS Level of Severity  No Agitation    Fall Risk  Has the patient had a fall this admission?   No  Lexy Garcia Fall Risk Scoring  13, MODERATE RISK  Fall Risk Safety Measures  bed alarm, chair alarm, and low vision/ hearing    Vitals  Temperature: 37.1 °C (98.7 °F)  Temp src: Oral  Pulse: 64  Respiration: 18  Blood Pressure : 109/57  Blood Pressure MAP (Calculated): 74 MM HG  BP Location: Left, Upper Arm  Patient BP Position: Supine     Oxygen  Pulse Oximetry: 95 %  O2 (LPM): 0  O2 Delivery Device: None - Room Air    Bowel and Bladder  Last Bowel Movement  12/08/23  Stool Type  Type 1: Separate hard lumps (hard to pass)  Bowel Device  Bathroom  Continent  Bladder: Stress incontinence   Bowel: Continent movement  Bladder Function  Urine Void (mL):  (MOderate)  Number of Times Voided: 1  Urine Color: Yellow  Genitourinary Assessment   Bladder Assessment (WDL):  WDL Except  Kothari Catheter: Not Applicable  Urinary Elimination: Incontinence  Urine Color: Yellow  Bladder Device: Bathroom  Time Void: Yes  Bladder Scan: Post Void  $ Bladder Scan Results (mL): 34    Skin  Wyatt Score   17  Sensory Interventions   Bed Types: Standard/Trauma Mattress  Skin Preventative  Measures: Waffle Overlay  Moisture Interventions  Moisturizers/Barriers: Barrier Wipes      Pain  Pain Rating Scale  7 - Focus of attention, prevents doing daily activities  Pain Location  Back, Hip  Pain Location Orientation  Left  Pain Interventions   Medication (see MAR)    ADLs    Bathing    (had shower already)  Linen Change      Personal Hygiene  Perineal Care, Moist Klarissa Wipes  Chlorhexidine Bath      Oral Care  Brushed Teeth (Self)  Teeth/Dentures     Shave     Nutrition Percentage Eaten  Lunch, Between 50-75% Consumed  Environmental Precautions  Treaded Slipper Socks on Patient, Personal Belongings, Wastebasket, Call Bell etc. in Easy Reach, Bed in Low Position  Patient Turns/Positioning  Patient Turns Self from Side to Side  Patient Turns Assistance/Tolerance  Assistance of One  Bed Positions  Bed Controls On, Bed Locked  Head of Bed Elevated  Less than 30 degrees      Psychosocial/Neurologic Assessment  Psychosocial Assessment  Psychosocial (WDL):  WDL Except  Patient Behaviors: Anxious, Forgetful  Neurologic Assessment  Neuro (WDL): Exceptions to WDL  Level of Consciousness: Alert  Orientation Level: Oriented X4  Cognition: Follows commands, Appropriate attention/concentration  Speech: Clear  Pupil Assesment: No  Motor Function/Sensation Assessment: Motor strength  Muscle Strength Right Arm: Good Strength Against Gravity and Moderate Resistance  Muscle Strength Left Arm: Good Strength Against Gravity and Moderate Resistance  Muscle Strength Right Leg: Good Strength Against Gravity and Moderate Resistance  Muscle Strength Left Leg: Weak Movement but Not Against Gravity or Resistance  EENT (WDL):  WDL Except    Cardio/Pulmonary Assessment  Edema   RLE Edema: 1+  LLE Edema: 1+  Respiratory Breath Sounds  RUL Breath Sounds: Clear  RML Breath Sounds: Clear  RLL Breath Sounds: Clear  TYE Breath Sounds: Clear  LLL Breath Sounds: Clear  Cardiac Assessment   Cardiac (WDL):  WDL Except (htn)

## 2023-12-09 NOTE — CARE PLAN
The patient is Stable - Low risk of patient condition declining or worsening    Shift Goals  Clinical Goals: Safety  Patient Goals: pain control, safety    Progress made toward(s) clinical / shift goals:      Problem: Bladder / Voiding  Goal: Patient will establish and maintain regular urinary output  Outcome: Progressing  Note: Pt continent of bladder. Voiding adequately in the BR. She denies dysuria.     Problem: Bowel Elimination  Goal: Patient will participate in bowel management program  Outcome: Progressing  Note: Pt continent of bowel. On bowel meds. LBM 12/8/23.       Patient is not progressing towards the following goals:

## 2023-12-09 NOTE — PROGRESS NOTES
NURSING DAILY NOTE    Name: Sheyla Gauthier   Date of Admission: 11/29/2023   Admitting Diagnosis: History of left hip replacement  Attending Physician: Herminio Clay D.o.  Allergies: Trazodone and Crestor [rosuvastatin calcium]    Safety  Patient Assist  CGA  Patient Precautions  Fall Risk, Posterior Hip Precautions, Weight Bearing As Tolerated Left Lower Extremity  Precaution Comments  no back brace  Bed Transfer Status  Supervised  Toilet Transfer Status   Supervised  Assistive Devices  Wheelchair  Oxygen  None - Room Air  Diet/Therapeutic Dining  Current Diet Order   Procedures    Diet Order Diet: Regular     Pill Administration  whole and floated  Agitated Behavioral Scale  17  ABS Level of Severity  No Agitation    Fall Risk  Has the patient had a fall this admission?   No  Lexy Garcia Fall Risk Scoring  13, MODERATE RISK  Fall Risk Safety Measures  bed alarm and chair alarm    Vitals  Temperature: 36.8 °C (98.3 °F)  Temp src: Oral  Pulse: 67  Respiration: 18  Blood Pressure : 103/53  Blood Pressure MAP (Calculated): 70 MM HG  BP Location: Right, Upper Arm  Patient BP Position: Sitting     Oxygen  Pulse Oximetry: 94 %  O2 (LPM): 0  O2 Delivery Device: None - Room Air    Bowel and Bladder  Last Bowel Movement  12/06/23  Stool Type  Type 3: Like a sausage, but with cracks on its surface  Bowel Device  Bathroom  Continent  Bladder: Stress incontinence   Bowel: Continent movement  Bladder Function  Urine Void (mL):  (MOderate)  Number of Times Voided: 1  Urine Color: Unable To Evaluate  Genitourinary Assessment   Bladder Assessment (WDL):  WDL Except  Kothari Catheter: Not Applicable  Urinary Elimination: Incontinence  Urine Color: Unable To Evaluate  Bladder Device: Bathroom  Time Void: Yes  Bladder Scan: Post Void  $ Bladder Scan Results (mL): 34    Skin  Wyatt Score   17  Sensory Interventions   Bed Types: Standard/Trauma Mattress with  Overlay  Skin Preventative Measures: Waffle Overlay  Moisture Interventions  Moisturizers/Barriers: Barrier Wipes      Pain  Pain Rating Scale  6 - Hard to ignore, avoid usual activities  Pain Location  Back  Pain Location Orientation  Left  Pain Interventions   Medication (see MAR)    ADLs    Bathing    (had shower already)  Linen Change      Personal Hygiene  Perineal Care, Moist Klarissa Wipes  Chlorhexidine Bath      Oral Care  Brushed Teeth (Self)  Teeth/Dentures     Shave     Nutrition Percentage Eaten  Lunch, Between 50-75% Consumed  Environmental Precautions  Treaded Slipper Socks on Patient, Personal Belongings, Wastebasket, Call Bell etc. in Easy Reach, Bed in Low Position  Patient Turns/Positioning  Patient Turns Self from Side to Side  Patient Turns Assistance/Tolerance  Assistance of One  Bed Positions  Bed Controls On, Bed Locked  Head of Bed Elevated  Less than 30 degrees      Psychosocial/Neurologic Assessment  Psychosocial Assessment  Psychosocial (WDL):  WDL Except  Patient Behaviors: Anxious, Forgetful  Neurologic Assessment  Neuro (WDL): Exceptions to WDL  Level of Consciousness: Alert  Orientation Level: Oriented X4  Cognition: Follows commands, Appropriate attention/concentration  Speech: Clear  Pupil Assesment: No  Motor Function/Sensation Assessment: Motor strength  Muscle Strength Right Arm: Good Strength Against Gravity and Moderate Resistance  Muscle Strength Left Arm: Good Strength Against Gravity and Moderate Resistance  Muscle Strength Right Leg: Good Strength Against Gravity and Moderate Resistance  Muscle Strength Left Leg: Weak Movement but Not Against Gravity or Resistance  EENT (WDL):  WDL Except    Cardio/Pulmonary Assessment  Edema   RLE Edema: 1+  LLE Edema: 1+  Respiratory Breath Sounds  RUL Breath Sounds: Clear  RML Breath Sounds: Clear  RLL Breath Sounds: Clear  TYE Breath Sounds: Clear  LLL Breath Sounds: Clear  Cardiac Assessment   Cardiac (WDL):  WDL Except (htn)

## 2023-12-09 NOTE — THERAPY
"Occupational Therapy  Daily Treatment     Patient Name: Sheyla Gauthier  Age:  83 y.o., Sex:  female  Medical Record #: [id number]  Today's Date: 12/9/2023     Precautions  Precautions: Fall Risk, Posterior Hip Precautions, Weight Bearing As Tolerated Left Lower Extremity  Comments: no back brace         Subjective    \"Can we do some laundry?\"     Objective       12/09/23 0931   OT Charge Group   OT Self Care / ADL (Units) 1   OT Therapy Activity (Units) 3   OT Total Time Spent   OT Individual Total Time Spent (Mins) 60   Pain   Intervention Declines   Cognition    Level of Consciousness Alert   Functional Level of Assist   Grooming Supervision;Standing   Grooming Description Standing at sink  (to wash hands and oral care)   Toileting Supervision   Toileting Description Supervision for safety;Adaptive equipment  (FWW for UB stabilization during clothing management.)   Bed, Chair, Wheelchair Transfer Supervised   Bed Chair Wheelchair Transfer Description Adaptive equipment;Supervision for safety;Set-up of equipment  (Setup FWW for stand step.)   Toilet Transfers Supervised   Toilet Transfer Description Adaptive equipment;Set-up of equipment;Supervision for safety  (Setup FWW for stand step.)   IADL Treatments   IADL Treatments Meal preparation   Meal Preparation Pt completed simple meal prep of microwave oatmeal at FWW level. Pt required SPV-Mod I assist due to novel kitchen environment and a few cues for spinal precautions, otherwise able to complete task without incident. Educated on energy conservation and body mechanics.   Home Management Patient stood at washing machine with SPV-Mod I to load dirty clothes, soap and start machine without v/c on machine operation.   Interdisciplinary Plan of Care Collaboration   Patient Position at End of Therapy Seated;Call Light within Reach;Tray Table within Reach     Pt reported para-vaginal discomfort from sitting for a prolonged time with guests the previous day. RN " informed. Pt also now with bed alarm back in place, as she self-transferred overnight. Educated pt that she must continue to use call light, as she is not cleared for self-transfers due to fall risk. Pt verbalized understanding.   Pt completed functional mobility throughout session using FWW at SBA-SPV level without LOB.    Assessment    Pt continues to make good progress towards goals with self-care skills, functional transfers, and now simple meal prep. Pt plans on completing some meal prep in her new apartment, but will rely on facility initially for meals.   Strengths: Able to follow instructions, Alert and oriented, Effective communication skills, Good insight into deficits/needs, Independent prior level of function, Making steady progress towards goals, Manages pain appropriately, Motivated for self care and independence, Pleasant and cooperative, Supportive family, Willingly participates in therapeutic activities  Barriers: Fatigue, Generalized weakness, Impaired activity tolerance, Impaired balance, Limited mobility, Pain    Plan    DC shower and IRF-Raad including BIMS    DME  Tub transfer bench, GB near toilet and in shower, FWW, hip kit.       Occupational Therapy Goals (Active)       Problem: Dressing       Dates: Start:  11/30/23         Goal: STG-Within one week, patient will dress LB at SBA level using AE/DME PRN.       Dates: Start:  11/30/23               Problem: OT Long Term Goals       Dates: Start:  11/30/23         Goal: LTG-By discharge, patient will complete basic self care tasks at SBA-Mod I level using DME PRN.       Dates: Start:  11/30/23            Goal: LTG-By discharge, patient will perform bathroom transfers at SBA-Mod I level using DME PRN.       Dates: Start:  11/30/23               Problem: Toileting       Dates: Start:  11/30/23         Goal: STG-Within one week, patient will complete toileting tasks at SBA level using AE/DME PRN.       Dates: Start:  11/30/23

## 2023-12-09 NOTE — PROGRESS NOTES
"  Physical Medicine & Rehabilitation Progress Note    Encounter Date: 12/9/2023    Chief Complaint: doing well    Interval Events (Subjective):  NAEO.  Patient feeling well.  No report of fevers, chills, nausea, vomiting, chest pain, shortness of breath.  Pain tolerable    Objective:  Vitals: /56   Pulse 75   Temp 36.7 °C (98 °F) (Oral)   Resp 16   Ht 1.575 m (5' 2\")   Wt 68.9 kg (152 lb)   SpO2 97%   Gen: NAD, Body mass index is 27.8 kg/m².  HEENT:  NC/AT, PERRLA, moist mucous membranes  Cardio: RRR, no mumurs  Pulm: CTAB, with normal respiratory effort  Abd: Soft NTND, active bowel sounds,   Ext: No peripheral edema. No calf tenderness. No clubbing/cyanosis. +dorsal pedalis pulses bilaterally.      Laboratory Values:  No results found for this or any previous visit (from the past 72 hour(s)).    Medications:  Scheduled Medications   Medication Dose Frequency    oxyCODONE immediate-release  5 mg BID    Pharmacy Consult Request  1 Each PHARMACY TO DOSE    senna-docusate  2 Tablet BID    acetaminophen  1,000 mg TID    apixaban  5 mg BID    atorvastatin  40 mg QHS    famotidine  20 mg DAILY    ferrous sulfate  325 mg QDAY with Breakfast    levothyroxine  88 mcg AM ES    liothyronine  5 mcg QAM    methocarbamol  1,000 mg TID    pregabalin  100 mg BID     PRN medications: Respiratory Therapy Consult, hydrALAZINE, senna-docusate **AND** polyethylene glycol/lytes **AND** magnesium hydroxide **AND** bisacodyl, ondansetron **OR** ondansetron, sodium chloride, oxyCODONE immediate-release    Diet:  Current Diet Order   Procedures    Diet Order Diet: Regular       Assessment:  Active Hospital Problems    Diagnosis     *History of left hip replacement        Medical Decision Making and Plan:    Modified from Dr. Chavis's progress note on 12/7:  Left Femoral Neck fracture with superior displacement- diagnosed 11/22, unknown chronicity  Seen on Xray of hip taken 11/22  S/P hip arthroplasty on 11/27 by Dr Maikel Abel " clear history of fall. Patient has not walked pain free since February 2023  Last Fall noted in Spring of 2023  -Continue comprehensive acute inpatient rehabilitation  -Requesting imaging from Dr Connor and Dr Ocampo office to establish chronicity  [facility] Code / Diagnosis to Support: 0008.11 - Orthopaedic Disorders: Status Post Unilateral Hip Fracture         Polyradiculopathy 2/2 to bilateral Lumbar foraminal stenosis  Status post lumbar laminectomy  -Patient has history of prior L4-L5 decompression  - Recent worsening of back pain with radicular pain into right lower extremity, impairing function and recently utilizing a wheelchair in the last month  - 11/10 patient taken to the OR for L5-S1 laminectomy performed by Dr. Silva  - Spinal precautions in place, does not require bracing     Right Popliteal DVT  Provoked from surgery and immobility  -Eliquis 5mg BID for 3 months from 11/15/23  -Continue Eliquis     Acute Blood loss anemia  -7.6 Hb today  -12/1- 7.9 Hb  -consider US of hip if needed        Hypothyroidism  -On home dose Synthroid     Hypertension  - Hold losartan and spironolactone, not uncommon for her blood pressure to be slightly lower postoperatively.  -home dose losartan and spironolactone would likely have to be restarted by primary care      Pain:  #Neuroceptic - On Tylenol TID, oxycodone 5mg PRN, robaxin 1g TID  -11/30- started Oxycodone 5mg TID scheduled  -Decrease oxycodone 5 mg twice daily scheduled  -Robaxin 1000 mg 3 times daily     #Neuropathic pain:  - Lyrica 100 mg twice daily  - Tylenol 1000 mg 3 times daily    Circadian Rhythm disorder:   Recommend lights on during the day/off at night, minimize nighttime interruptions as able.     Mood  - at risk of adjustment disorder, depression, and anxiety due to functional decline     ID:  - at risk for Urinary tract infection     Skin/Wounds:  - Pressure relief q2h while in bed. Close monitoring for signs of breakdown     DVT prophylaxis:  continue  eliquis     GI prophylaxis:  On Pepcid        -Follow-up MYRA Salmeron, PCP  ____________________________________    Melida Mendoza MD  Interventional Pain and Spine  Physical Medicine and Rehabilitation  [facility]

## 2023-12-10 ENCOUNTER — APPOINTMENT (OUTPATIENT)
Dept: PHYSICAL THERAPY | Facility: REHABILITATION | Age: 83
DRG: 560 | End: 2023-12-10
Attending: PHYSICAL MEDICINE & REHABILITATION
Payer: MEDICARE

## 2023-12-10 ENCOUNTER — APPOINTMENT (OUTPATIENT)
Dept: OCCUPATIONAL THERAPY | Facility: REHABILITATION | Age: 83
DRG: 560 | End: 2023-12-10
Attending: PHYSICAL MEDICINE & REHABILITATION
Payer: MEDICARE

## 2023-12-10 PROCEDURE — 770010 HCHG ROOM/CARE - REHAB SEMI PRIVAT*

## 2023-12-10 PROCEDURE — 97116 GAIT TRAINING THERAPY: CPT

## 2023-12-10 PROCEDURE — 97110 THERAPEUTIC EXERCISES: CPT

## 2023-12-10 PROCEDURE — 700102 HCHG RX REV CODE 250 W/ 637 OVERRIDE(OP): Performed by: PHYSICAL MEDICINE & REHABILITATION

## 2023-12-10 PROCEDURE — A9270 NON-COVERED ITEM OR SERVICE: HCPCS | Performed by: PHYSICAL MEDICINE & REHABILITATION

## 2023-12-10 PROCEDURE — 97535 SELF CARE MNGMENT TRAINING: CPT

## 2023-12-10 PROCEDURE — 97530 THERAPEUTIC ACTIVITIES: CPT

## 2023-12-10 RX ADMIN — FERROUS SULFATE TAB 325 MG (65 MG ELEMENTAL FE) 325 MG: 325 (65 FE) TAB at 09:22

## 2023-12-10 RX ADMIN — METHOCARBAMOL TABLETS 1000 MG: 500 TABLET, COATED ORAL at 09:22

## 2023-12-10 RX ADMIN — METHOCARBAMOL TABLETS 1000 MG: 500 TABLET, COATED ORAL at 20:55

## 2023-12-10 RX ADMIN — OXYCODONE 5 MG: 5 TABLET ORAL at 14:04

## 2023-12-10 RX ADMIN — BISACODYL 10 MG: 10 SUPPOSITORY RECTAL at 19:26

## 2023-12-10 RX ADMIN — SENNOSIDES AND DOCUSATE SODIUM 2 TABLET: 50; 8.6 TABLET ORAL at 09:21

## 2023-12-10 RX ADMIN — APIXABAN 5 MG: 5 TABLET, FILM COATED ORAL at 09:21

## 2023-12-10 RX ADMIN — PREGABALIN 100 MG: 100 CAPSULE ORAL at 09:21

## 2023-12-10 RX ADMIN — APIXABAN 5 MG: 5 TABLET, FILM COATED ORAL at 20:59

## 2023-12-10 RX ADMIN — PREGABALIN 100 MG: 100 CAPSULE ORAL at 20:59

## 2023-12-10 RX ADMIN — OXYCODONE 5 MG: 5 TABLET ORAL at 20:59

## 2023-12-10 RX ADMIN — FAMOTIDINE 20 MG: 20 TABLET, FILM COATED ORAL at 09:22

## 2023-12-10 RX ADMIN — ATORVASTATIN CALCIUM 40 MG: 40 TABLET, FILM COATED ORAL at 20:59

## 2023-12-10 RX ADMIN — LIOTHYRONINE SODIUM 5 MCG: 5 TABLET ORAL at 09:22

## 2023-12-10 RX ADMIN — OXYCODONE 5 MG: 5 TABLET ORAL at 10:43

## 2023-12-10 RX ADMIN — ACETAMINOPHEN 1000 MG: 500 TABLET ORAL at 14:04

## 2023-12-10 RX ADMIN — ACETAMINOPHEN 1000 MG: 500 TABLET ORAL at 09:22

## 2023-12-10 RX ADMIN — OXYCODONE 5 MG: 5 TABLET ORAL at 05:42

## 2023-12-10 RX ADMIN — LEVOTHYROXINE SODIUM 88 MCG: 0.09 TABLET ORAL at 05:42

## 2023-12-10 RX ADMIN — ACETAMINOPHEN 1000 MG: 500 TABLET ORAL at 20:58

## 2023-12-10 RX ADMIN — METHOCARBAMOL TABLETS 1000 MG: 500 TABLET, COATED ORAL at 14:04

## 2023-12-10 ASSESSMENT — ACTIVITIES OF DAILY LIVING (ADL)
TOILET_TRANSFER_DESCRIPTION: INCREASED TIME;GRAB BAR
BED_CHAIR_WHEELCHAIR_TRANSFER_DESCRIPTION: INCREASED TIME
TOILETING_LEVEL_OF_ASSIST: ABLE TO COMPLETE TOILETING WITHOUT ASSIST
TOILET_TRANSFER_DESCRIPTION: ADAPTIVE EQUIPMENT;GRAB BAR
BED_CHAIR_WHEELCHAIR_TRANSFER_DESCRIPTION: ADAPTIVE EQUIPMENT;INCREASED TIME
BED_CHAIR_WHEELCHAIR_TRANSFER_DESCRIPTION: ADAPTIVE EQUIPMENT;INCREASED TIME
TUB_SHOWER_TRANSFER_DESCRIPTION: GRAB BAR;SUPERVISION FOR SAFETY;VERBAL CUEING
TOILET_TRANSFER_DESCRIPTION: INCREASED TIME
TOILETING_LEVEL_OF_ASSIST_DESCRIPTION: GRAB BAR;INCREASED TIME
TUB_SHOWER_TRANSFER_DESCRIPTION: GRAB BAR;SHOWER BENCH
SHOWER_TRANSFER_LEVEL_OF_ASSIST: REQUIRES SUPERVISION WITH SHOWER TRANSFER
TOILET_TRANSFER_LEVEL_OF_ASSIST: ABLE TO COMPLETE TOILET TRANSFER WITHOUT ASSIST

## 2023-12-10 ASSESSMENT — BRIEF INTERVIEW FOR MENTAL STATUS (BIMS)
WHAT MONTH IS IT: ACCURATE WITHIN 5 DAYS
ASKED TO RECALL SOCK: NO, COULD NOT RECALL
ASKED TO RECALL BLUE: YES, NO CUE REQUIRED
INITIAL REPETITION OF BED BLUE SOCK - FIRST ATTEMPT: 3
WHAT DAY OF THE WEEK IS IT: CORRECT
WHAT YEAR IS IT: CORRECT
BIMS SUMMARY SCORE: 13
ASKED TO RECALL BED: YES, NO CUE REQUIRED

## 2023-12-10 ASSESSMENT — GAIT ASSESSMENTS
ASSISTIVE DEVICE: FRONT WHEEL WALKER
DEVIATION: ANTALGIC;BRADYKINETIC
GAIT LEVEL OF ASSIST: MODIFIED INDEPENDENT
ASSISTIVE DEVICE: FRONT WHEEL WALKER
DISTANCE (FEET): 150
DISTANCE (FEET): 150
GAIT LEVEL OF ASSIST: SUPERVISED
DEVIATION: ANTALGIC;BRADYKINETIC

## 2023-12-10 ASSESSMENT — PAIN DESCRIPTION - PAIN TYPE
TYPE: ACUTE PAIN

## 2023-12-10 NOTE — PROGRESS NOTES
NURSING DAILY NOTE    Name: Sheyla Gauthier   Date of Admission: 11/29/2023   Admitting Diagnosis: History of left hip replacement  Attending Physician: Herminio Clay D.o.  Allergies: Trazodone and Crestor [rosuvastatin calcium]    Safety  Patient Assist  SBA  Patient Precautions  Fall Risk, Posterior Hip Precautions, Weight Bearing As Tolerated Left Lower Extremity  Precaution Comments  no back brace  Bed Transfer Status  Supervised  Toilet Transfer Status   Supervised  Assistive Devices  Wheelchair  Oxygen  None - Room Air  Diet/Therapeutic Dining  Current Diet Order   Procedures    Diet Order Diet: Regular     Pill Administration  whole  Agitated Behavioral Scale  17  ABS Level of Severity  No Agitation    Fall Risk  Has the patient had a fall this admission?   No  Lexy Garcia Fall Risk Scoring  13, MODERATE RISK  Fall Risk Safety Measures  bed alarm, chair alarm, and poor balance    Vitals  Temperature: 36.8 °C (98.2 °F)  Temp src: Oral  Pulse: 63  Respiration: 16  Blood Pressure : 113/55  Blood Pressure MAP (Calculated): 74 MM HG  BP Location: Left, Upper Arm  Patient BP Position: Sitting     Oxygen  Pulse Oximetry: 96 %  O2 (LPM): 0  O2 Delivery Device: None - Room Air    Bowel and Bladder  Last Bowel Movement  12/08/23  Stool Type  Type 1: Separate hard lumps (hard to pass)  Bowel Device  Bathroom  Continent  Bladder: Stress incontinence   Bowel: Continent movement  Bladder Function  Urine Void (mL):  (Moderate)  Number of Times Voided: 1  Urine Color: Yellow  Genitourinary Assessment   Bladder Assessment (WDL):  WDL Except  Kothari Catheter: Not Applicable  Urinary Elimination: Incontinence  Urine Color: Yellow  Bladder Device: Bathroom  Time Void: Yes  Bladder Scan: Post Void  $ Bladder Scan Results (mL): 34    Skin  Wyatt Score   17  Sensory Interventions   Bed Types: Standard/Trauma Mattress  Skin Preventative Measures: Waffle  EKG completed prior to triage      Patient arrives ambulatory to ED with complaints of irregular heart beat, nausea, vomiting, and diarrhea     Patient is covid vaccinated     Denies any sick exposures    Patient concerned as she has family history with mumurs and SVT Overlay  Moisture Interventions  Moisturizers/Barriers: Moisturizer       Pain  Pain Rating Scale  5 - Interrupts some activities  Pain Location  Back, Hip  Pain Location Orientation  Left  Pain Interventions   Medication (see MAR)    ADLs    Bathing    (had shower already)  Linen Change      Personal Hygiene  Perineal Care, Moist Klarissa Wipes  Chlorhexidine Bath      Oral Care  Brushed Teeth (Self)  Teeth/Dentures     Shave     Nutrition Percentage Eaten  Dinner, Between 25-50% Consumed  Environmental Precautions  Treaded Slipper Socks on Patient, Personal Belongings, Wastebasket, Call Bell etc. in Easy Reach, Bed in Low Position  Patient Turns/Positioning  Patient Turns Self from Side to Side  Patient Turns Assistance/Tolerance  Assistance of One  Bed Positions  Bed Controls On, Bed Locked  Head of Bed Elevated  Less than 30 degrees      Psychosocial/Neurologic Assessment  Psychosocial Assessment  Psychosocial (WDL):  WDL Except  Patient Behaviors: Anxious, Forgetful  Neurologic Assessment  Neuro (WDL): Exceptions to WDL  Level of Consciousness: Alert  Orientation Level: Oriented X4  Cognition: Follows commands, Appropriate attention/concentration  Speech: Clear  Pupil Assesment: No  Motor Function/Sensation Assessment: Motor strength  Muscle Strength Right Arm: Good Strength Against Gravity and Moderate Resistance  Muscle Strength Left Arm: Good Strength Against Gravity and Moderate Resistance  Muscle Strength Right Leg: Good Strength Against Gravity and Moderate Resistance  Muscle Strength Left Leg: Weak Movement but Not Against Gravity or Resistance  EENT (WDL):  WDL Except    Cardio/Pulmonary Assessment  Edema   RLE Edema: 1+  LLE Edema: 1+  Respiratory Breath Sounds  RUL Breath Sounds: Clear  RML Breath Sounds: Clear  RLL Breath Sounds: Clear  TYE Breath Sounds: Clear  LLL Breath Sounds: Clear  Cardiac Assessment   Cardiac (WDL):  WDL Except (htn)

## 2023-12-10 NOTE — CARE PLAN
The patient is Stable - Low risk of patient condition declining or worsening    Shift Goals  Clinical Goals: Safety, pain control  Patient Goals: Safety  Family Goals: Education    Progress made toward(s) clinical / shift goals: VSS, pain controlled with scheduled pain medication, able to participate in therapy, continent x2.

## 2023-12-10 NOTE — THERAPY
Physical Therapy   Daily Treatment     Patient Name: Sheyla Gauthier  Age:  83 y.o., Sex:  female  Medical Record #: [id number]  Today's Date: 12/10/2023     Precautions  Precautions: Fall Risk, Posterior Hip Precautions, Weight Bearing As Tolerated Left Lower Extremity  Comments: no back brace    Subjective    Pt up in wc, willing to participate.     Objective       12/10/23 1101   PT Charge Group   PT Gait Training (Units) 1   PT Therapeutic Activities (Units) 1   PT Total Time Spent   PT Individual Total Time Spent (Mins) 30   Gait Functional Level of Assist    Gait Level Of Assist Supervised  (SPV / modified independent)   Assistive Device Front Wheel Walker   Distance (Feet) 150   # of Times Distance was Traveled 2   Deviation Antalgic;Bradykinetic   Transfer Functional Level of Assist   Bed, Chair, Wheelchair Transfer Supervised  (to modified independent)   Bed Chair Wheelchair Transfer Description Adaptive equipment;Increased time   Bed Mobility    Supine to Sit Modified Independent   Sit to Supine Modified Independent   Sit to Stand Modified Independent   Scooting Modified Independent   Rolling Modified Independent         Assessment    Pt demonstrated modified independence with basic household ambulation/ transfers and bed mobility with FWW and leg , pt enters bed through R sidelying to maintain back and hip precautions.  Strengths: Able to follow instructions, Independent prior level of function, Making steady progress towards goals, Motivated for self care and independence, Pleasant and cooperative, Supportive family, Willingly participates in therapeutic activities  Barriers: Generalized weakness, Impaired activity tolerance, Impaired balance, Pain    Plan    Complete d/c IRF DESTINI's/ passport and HEP    DME  PT DME Recommendations  Wheelchair:  (likely will be ambulatory and will not require WC)  Cushion:  (n/a)  Assistive Device: Front Wheeled Walker (pt has FWW)    Passport items to be  completed:  Get in/out of bed safely, in/out of a vehicle, safely use mobility device, walk or wheel around home/community, navigate up and down stairs, show how to get up/down from the ground, ensure home is accessible, demonstrate HEP, complete caregiver training    Physical Therapy Problems (Active)       Problem: Mobility       Dates: Start:  11/30/23         Goal: STG-Within one week, patient will ambulate 100ft FWW SBA       Dates: Start:  11/30/23    Expected End:  12/11/23         Goal Note filed on 12/05/23 0822 by Alan Schaefer, PT       Limited to 35-50ft SBA FWW due to pain and fatigue              Goal: STG-Within one week, patient will ambulate up/down a curb SBA FWW       Dates: Start:  11/30/23    Expected End:  12/11/23         Goal Note filed on 12/05/23 0822 by Alan Schaefer, PT       CGA for consecutive steps, has not completed curb step yet                 Problem: Mobility Transfers       Dates: Start:  11/30/23         Goal: STG-Within one week, patient will perform bed mobility Sherman       Dates: Start:  11/30/23    Expected End:  12/11/23         Goal Note filed on 12/05/23 0822 by Alan Schaefer, PT       Inconsistent, occasionally requires Rajan for sit>supine                 Problem: PT-Long Term Goals       Dates: Start:  11/30/23         Goal: LTG-By discharge, patient will ambulate 150ft Sherman FWW       Dates: Start:  11/30/23    Expected End:  12/11/23            Goal: LTG-By discharge, patient will transfer one surface to another Sherman       Dates: Start:  11/30/23    Expected End:  12/11/23            Goal: LTG-By discharge, patient will perform home exercise program independently       Dates: Start:  11/30/23    Expected End:  12/11/23            Goal: LTG-By discharge, patient will transfer in/out of a car SBA       Dates: Start:  11/30/23    Expected End:  12/11/23

## 2023-12-10 NOTE — CARE PLAN
"The patient is Stable - Low risk of patient condition declining or worsening    Shift Goals  Clinical Goals: Safety, pain control  Patient Goals: Safety  Family Goals: Education  Problem: Skin Integrity  Goal: Patient's skin integrity will be maintained or improve  Outcome: Progressing  Note:   Wyatt Score: 17    Patient's skin remains intact and free from new or accidental injury this shift; no s/s of infection. RN wound protocol checked. Encouraged hydration and educated about the importance of nutrition to keep skin integrity. Will continue to monitor.      Problem: Fall Risk - Rehab  Goal: Patient will remain free from falls  Outcome: Progressing  Note: Lexy Garcia Fall risk Assessment Score: 13    Moderate fall risk Interventions  - Bed and strip alarm   - Yellow sign by the door   - Yellow wrist band \"Fall risk\"  - Room near to the nurse station  - Do not leave patient unattended in the bathroom  - Fall risk education provided     "

## 2023-12-10 NOTE — THERAPY
Physical Therapy   Daily Treatment     Patient Name: Sheyla Gauthier  Age:  83 y.o., Sex:  female  Medical Record #: [id number]  Today's Date: 12/10/2023     Precautions  Precautions: Fall Risk, Posterior Hip Precautions, Weight Bearing As Tolerated Left Lower Extremity  Comments: no back brace    Subjective    Pt up in wc, willing to participate.     Objective       12/10/23 1231   PT Charge Group   PT Gait Training (Units) 1   PT Therapeutic Exercise (Units) 1   PT Therapeutic Activities (Units) 2   PT Total Time Spent   PT Individual Total Time Spent (Mins) 60   Gait Functional Level of Assist    Gait Level Of Assist Modified Independent   Assistive Device Front Wheel Walker   Distance (Feet) 150   # of Times Distance was Traveled 1   Deviation Antalgic;Bradykinetic   Stairs Functional Level of Assist   Level of Assist with Stairs Contact Guard Assist   # of Stairs Climbed 12   Stairs Description Extra time;Hand rails;Verbal cueing   Transfer Functional Level of Assist   Bed, Chair, Wheelchair Transfer Modified Independent   Bed Chair Wheelchair Transfer Description Adaptive equipment;Increased time   Toilet Transfers Modified Independent   Toilet Transfer Description Adaptive equipment;Grab bar   Supine Lower Body Exercise   Pelvic Tilt 1 set of 10   Hip Abduction 1 set of 10   Hip Adduction  1 set of 10   Straight Leg Raises 1 set of 10   Heel Slide 1 set of 10   Ankle Pumps 1 set of 10   Gluteal Isometrics 1 set of 10   Quadriceps Isometrics 1 set of 10   Car Transfer   Assistance Needed Adaptive equipment;Verbal cues   CARE Score - Car Transfer 4   1 Step (Curb)   Assistance Needed Adaptive equipment;Incidental touching   CARE Score - 1 Step (Curb) 4   4 Steps   Assistance Needed Adaptive equipment;Incidental touching   CARE Score - 4 Steps 4   12 Steps   Assistance Needed Adaptive equipment;Incidental touching   CARE Score - 12 Steps 4        12/10/23 1231   PT Charge Group   PT Gait Training (Units) 1    PT Therapeutic Exercise (Units) 1   PT Therapeutic Activities (Units) 2   PT Total Time Spent   PT Individual Total Time Spent (Mins) 60   Gait Functional Level of Assist    Gait Level Of Assist Modified Independent   Assistive Device Front Wheel Walker   Distance (Feet) 150   # of Times Distance was Traveled 1   Deviation Antalgic;Bradykinetic   Stairs Functional Level of Assist   Level of Assist with Stairs Contact Guard Assist   # of Stairs Climbed 12   Stairs Description Extra time;Hand rails;Verbal cueing   Transfer Functional Level of Assist   Bed, Chair, Wheelchair Transfer Modified Independent   Bed Chair Wheelchair Transfer Description Adaptive equipment;Increased time   Toilet Transfers Modified Independent   Toilet Transfer Description Adaptive equipment;Grab bar   Supine Lower Body Exercise   Pelvic Tilt 1 set of 10   Hip Abduction 1 set of 10   Hip Adduction  1 set of 10   Straight Leg Raises 1 set of 10   Heel Slide 1 set of 10   Ankle Pumps 1 set of 10   Gluteal Isometrics 1 set of 10   Quadriceps Isometrics 1 set of 10   Car Transfer   Assistance Needed Adaptive equipment;Verbal cues   CARE Score - Car Transfer 4   1 Step (Curb)   Assistance Needed Adaptive equipment;Incidental touching   CARE Score - 1 Step (Curb) 4   4 Steps   Assistance Needed Adaptive equipment;Incidental touching   CARE Score - 4 Steps 4   12 Steps   Assistance Needed Adaptive equipment;Incidental touching   CARE Score - 12 Steps 4     Pt completed 4 standard rise steps + 6 short rise steps with step-to pattern and B hand rails with close SBA / light CGA. Pt then completed 6 short rise steps with B hand rails and reciprocal pattern with CGA/ min A for LLE stabilization.    Assessment    Pt completed mobility IRF-DESTINI assessment, completed stairs with appropriate step to sequencing. Reviewed hip precaution/ back precaution and fall prevention/ home safety education with hand outs provided. Pt completed supine HEP with incidental  assist for LLE. Fatigued post-tx but prepared for d/c tomorrow.  Strengths: Able to follow instructions, Independent prior level of function, Making steady progress towards goals, Motivated for self care and independence, Pleasant and cooperative, Supportive family, Willingly participates in therapeutic activities  Barriers: Generalized weakness, Impaired activity tolerance, Impaired balance, Pain    Plan    D/c tomorrow    DME  PT DME Recommendations  Wheelchair:  (likely will be ambulatory and will not require WC)  Cushion:  (n/a)  Assistive Device: Front Wheeled Walker (pt has FWW)      Physical Therapy Problems (Active)       Problem: Mobility       Dates: Start:  11/30/23         Goal: STG-Within one week, patient will ambulate 100ft FWW SBA       Dates: Start:  11/30/23    Expected End:  12/11/23         Goal Note filed on 12/05/23 0822 by Alan Schaefer, PT       Limited to 35-50ft SBA FWW due to pain and fatigue              Goal: STG-Within one week, patient will ambulate up/down a curb SBA FWW       Dates: Start:  11/30/23    Expected End:  12/11/23         Goal Note filed on 12/05/23 0822 by Alan Schaefer, PT       CGA for consecutive steps, has not completed curb step yet                 Problem: Mobility Transfers       Dates: Start:  11/30/23         Goal: STG-Within one week, patient will perform bed mobility Sherman       Dates: Start:  11/30/23    Expected End:  12/11/23         Goal Note filed on 12/05/23 0822 by Alan Schaefer, PT       Inconsistent, occasionally requires Rajan for sit>supine                 Problem: PT-Long Term Goals       Dates: Start:  11/30/23         Goal: LTG-By discharge, patient will ambulate 150ft Sherman FWW       Dates: Start:  11/30/23    Expected End:  12/11/23            Goal: LTG-By discharge, patient will transfer one surface to another Sherman       Dates: Start:  11/30/23    Expected End:  12/11/23            Goal: LTG-By discharge, patient will perform home exercise  program independently       Dates: Start:  11/30/23    Expected End:  12/11/23            Goal: LTG-By discharge, patient will transfer in/out of a car SBA       Dates: Start:  11/30/23    Expected End:  12/11/23

## 2023-12-10 NOTE — CARE PLAN
Problem: Self Care  Goal: Patient will have the ability to perform ADLs independently or with assistance  Outcome: Progressing  Note: Patient able to perform regular activities this shift.  Pain controlled this shift.  Pain management includes PRN pain meds as well as non-pharmacological measures such as emotional support, rest, and repositioning.  Will continue to monitor.   The patient is Stable - Low risk of patient condition declining or worsening    Shift Goals  Clinical Goals: Safety, pain control  Patient Goals: Safety  Family Goals: Education    Progress made toward(s) clinical / shift goals:  pt participates with therapy and is cooperative with nursing    Patient is not progressing towards the following goals:

## 2023-12-10 NOTE — THERAPY
"Occupational Therapy   Discharge Summary     Patient Name: Sheyla Gauthier  Age:  83 y.o., Sex:  female  Medical Record #: [id number]  Today's Date: 12/10/2023     Precautions  Precautions: Fall Risk, Posterior Hip Precautions, Weight Bearing As Tolerated Left Lower Extremity  Comments: no back brace         Subjective   This note represents 2 separate treatment sessions.   \"Can I please take a shower?\"     Objective       12/10/23 1001 12/10/23 1401   OT Charge Group   Charges Yes Yes   OT Self Care / ADL (Units) 4 2   OT Total Time Spent   OT Individual Total Time Spent (Mins) 60 30   Precautions   Precautions Fall Risk;Posterior Hip Precautions;Weight Bearing As Tolerated Left Lower Extremity Fall Risk;Posterior Hip Precautions;Weight Bearing As Tolerated Left Lower Extremity   Pain   Intervention Declines  --    Pain 0 - 10 Group   Location Back;Hip;Leg  --    Location Orientation Left  --    Pain Rating Scale (NPRS) 6 6   Description Aching Aching   Comfort Goal Comfort with Movement Comfort with Movement   Therapist Pain Assessment  --  Prior to Activity   Non Verbal Descriptors   Non Verbal Scale  Calm Calm   Cognition    Cognition / Consciousness  --  WDL   Level of Consciousness Alert Alert   ABS (Agitated Behavior Scale)   Agitated Behavior Scale Performed No  --    Short Attention Span, Easy Distractibility, Inability to Concentrate 1  --    Impulsive, Impatient, Low Tolerance for Pain or Frustration 2  --    Uncooperative, Resistant to Care, Demanding 1  --    Violent and/or Threatening Violence Toward People or Property 1  --    Explosive and/or Unpredictable Anger 1  --    Rocking, Rubbing, Moaning, Other Self-Stimulating Behavior 1  --    Pulling at Tubes, Restraints, etc. 1  --    Wandering from Treatment Area 1  --    Restlessness, Pacing, Excessive Movement 1  --    Repetitive Behaviors, Motor and/or Verbal 1  --    Rapid, Loud or Excessive Talking 1  --    Sudden Changes of Mood 1  --    Easily " Initiated - Excessive Crying and/or Laughter 1  --    Self-Abusiveness, Physical and/or Verbal 1  --    Agitated Behavior Scale Total Score 15  --    Level of Severity No Agitation  --    Sleep/Wake Cycle   Sleep & Rest Awake  --    Vision Screen   Vision Not tested  --    Passive ROM Upper Body   Passive ROM Upper Body WDL  --    Active ROM Upper Body   Active ROM Upper Body  WDL  --    Strength Upper Body   Upper Body Strength  WDL  --    Functional Level of Assist   Eating Independent  --    Grooming Modified Independent  --    Grooming Description Standing at sink  --    Bathing Supervision  --    Bathing Description Set-up of equipment;Grab bar;Hand held shower;Tub bench;Supervision for safety  --    Upper Body Dressing Modified Independent  --    Upper Body Dressing Description Increased time  --    Lower Body Dressing Modified Independent  --    Lower Body Dressing Description Reacher;Sock aid;Shoe horn;Increased time  --    Toileting Modified Independent  --    Toileting Description Grab bar;Increased time  --    Bed, Chair, Wheelchair Transfer Modified Independent  --    Bed Chair Wheelchair Transfer Description Increased time  --    Toilet Transfers Modified Independent Modified Independent   Toilet Transfer Description Increased time;Grab bar Increased time   Tub / Shower Transfers Supervised Supervised   Tub Shower Transfer Description Grab bar;Shower bench Grab bar;Supervision for safety;Verbal cueing   IADL Treatments   Home Management  --  Facilitated use of FWW to gather items and pack to prepare for next day discharge with supervision for safety due to tight space requiring tight turns and obstacles in the room.   Interdisciplinary Plan of Care Collaboration   IDT Collaboration with  Physical Therapist  --    Patient Position at End of Therapy In Bed;Family / Friend in Room;Tray Table within Reach;Call Light within Reach  --    Strengths & Barriers   Strengths Able to follow instructions;Alert and  "oriented;Good carryover of learning;Good insight into deficits/needs;Making steady progress towards goals;Manages pain appropriately;Motivated for self care and independence;Willingly participates in therapeutic activities;Pleasant and cooperative;Supportive family  --    Barriers Fatigue;Home accessibility;Pain  --    Eating   Assistance Needed Independent  --    CARE Score - Eating 6  --    Oral Hygiene   Assistance Needed Independent  --    CARE Score - Oral Hygiene 6  --    Toileting Hygiene   Assistance Needed Independent  --    CARE Score - Toileting Hygiene 6  --    Shower/Bathe Self   Assistance Needed Set-up / clean-up  --    Physical Assistance Level No physical assistance  --    CARE Score - Shower/Bathe Self 5  --    Upper Body Dressing   Assistance Needed Independent  --    CARE Score - Upper Body Dressing 6  --    Lower Body Dressing   Assistance Needed Independent  --    CARE Score - Lower Body Dressing 6  --    Putting On/Taking Off Footwear   Assistance Needed Independent  --    CARE Score - Putting On/Taking Off Footwear 6  --    Toilet Transfer   Assistance Needed Independent  --    CARE Score - Toilet Transfer 6  --    Cognitive Pattern Assessment   Cognitive Pattern Assessment Used BIMS  --    Brief Interview for Mental Status (BIMS)   Repetition of Three Words (First Attempt) 3  --    Temporal Orientation: Year Correct  --    Temporal Orientation: Month Accurate within 5 days  --    Temporal Orientation: Day Correct  --    Recall: \"Sock\" No, could not recall  --    Recall: \"Blue\" Yes, no cue required  --    Recall: \"Bed\" Yes, no cue required  --    BIMS Summary Score 13  --    Confusion Assessment Method (CAM)   Is there evidence of an acute change in mental status from the patient's baseline? No  --    Inattention Behavior not present  --    Disorganized thinking Behavior not present  --    Altered level of consciousness Behavior not present  --    Discharge Summary    Discharge Location  " Assisted Living  --    Patient Discharging with Assist of Caregiver;Family   --    Level of Supervision Required Intermittent Supervision  --    Recommended Equipment for Discharge Front-Wheeled Walker;Raised Toilet Seat with Arms;Shower Chair;Grab Bars in Tub / Shower;Sock Aid;Reacher;Hand Held Shower Head;Dressing Stick  --    Recommended Services Upon Discharge Home Health Occupational Therapy  --    Long Term Goals Met 2  --    Long Term Goals Not Met 0  --    Reason(s) for Goals Not Met NA  --    Criteria for Termination of Services Maximum Function Achieved for Inpatient Rehabilitation  --    Discharge Instructions to Patient   Level of Assist Required for Eating Able to Complete Eating without Assist  --    Level of Assist Required for Grooming Able to Complete Grooming without Assist  --    Level of Assist Required for Dressing Able to Complete Dressing without Assist  --    Equipment for Dressing Sock Aid;Reacher;Dressing Stick  --    Level of Assist Required for Toileting Able to Complete Toileting without Assist  --    Level of Assist Required for Toilet Transfer Able to Complete Toilet Transfer without Assist  --    Equipment for Toilet Transfer Raised Toilet Seat with Arms  --    Level of Assist Required for Bathing Requires Supervision with Bathing  --    Equipment for Bathing Shower Chair;Grab Bars in Tub / Shower;Hand Held Shower Head;Long Handled Sponge  --    Level of Assist Required for Shower Transfer Requires Supervision with Shower Transfer  --    Equipment for Shower Transfer Shower Chair;Grab Bars in Tub / Shower  --    Level of Assist Required for Home Mgmt Requires Supervision with Home Management  --    Level of Assist Required for Meal Prep Requires Supervision with Meal Preparation  --    Driving Please Contact Physician Prior to Driving  --    Home Exercise Program Refer to Home Exercise Program Handout for Details  --        Assessment    Patient tolerated dc assessments well. She is  excited about going home and moving into her new [facility] apartment. She is able to complete all ADL tasks at a SBA/Supervision level-Mod I. She requires AE to complete LB tasks due to posterior hip precautions and she does adhere to her precautions without cues. She demonstrates good safety awareness with no LOB during sessions.   Strengths: Able to follow instructions, Alert and oriented, Good carryover of learning, Good insight into deficits/needs, Making steady progress towards goals, Manages pain appropriately, Motivated for self care and independence, Willingly participates in therapeutic activities, Pleasant and cooperative, Supportive family  Barriers: Fatigue, Home accessibility, Pain    Plan    DC to [facility] Monday    Passport items to be completed:  Perform bathroom transfers, complete dressing, complete feeding, get ready for the day, prepare a simple meal, participate in household tasks, adapt home for safety needs, demonstrate home exercise program, complete caregiver training     Occupational Therapy Goals (Active)       There are no active problems.

## 2023-12-10 NOTE — PROGRESS NOTES
NURSING DAILY NOTE    Name: Sheyla Gauthier   Date of Admission: 11/29/2023   Admitting Diagnosis: History of left hip replacement  Attending Physician: Herminio Clay D.o.  Allergies: Trazodone and Crestor [rosuvastatin calcium]    Safety  Patient Assist  SBA  Patient Precautions  Fall Risk, Posterior Hip Precautions, Weight Bearing As Tolerated Left Lower Extremity  Precaution Comments  no back brace  Bed Transfer Status  Supervised  Toilet Transfer Status   Supervised  Assistive Devices  Wheelchair  Oxygen  None - Room Air  Diet/Therapeutic Dining  Current Diet Order   Procedures    Diet Order Diet: Regular     Pill Administration  whole floated in apple sauce  Agitated Behavioral Scale  17  ABS Level of Severity  No Agitation    Fall Risk  Has the patient had a fall this admission?   No  Lexy Garcia Fall Risk Scoring  13, MODERATE RISK  Fall Risk Safety Measures  bed alarm and chair alarm    Vitals  Temperature: 37 °C (98.6 °F)  Temp src: Oral  Pulse: 61  Respiration: 18  Blood Pressure : 121/59  Blood Pressure MAP (Calculated): 80 MM HG  BP Location: Right, Upper Arm  Patient BP Position: Supine     Oxygen  Pulse Oximetry: 94 %  O2 (LPM): 0  O2 Delivery Device: None - Room Air    Bowel and Bladder  Last Bowel Movement  12/08/23  Stool Type  Type 1: Separate hard lumps (hard to pass)  Bowel Device  Bathroom  Continent  Bladder: Stress incontinence   Bowel: Continent movement  Bladder Function  Urine Void (mL):  (Moderate)  Number of Times Voided: 1  Urine Color: Yellow  Genitourinary Assessment   Bladder Assessment (WDL):  WDL Except  Kothari Catheter: Not Applicable  Urinary Elimination: Incontinence  Urine Color: Yellow  Bladder Device: Bathroom  Time Void: Yes  Bladder Scan: Post Void  $ Bladder Scan Results (mL): 34    Skin  Wyatt Score   17  Sensory Interventions   Bed Types: Standard/Trauma Mattress  Skin Preventative Measures: Waffle  Overlay  Moisture Interventions  Moisturizers/Barriers: Moisturizer       Pain  Pain Rating Scale  0 - No Pain  Pain Location  Back, Hip, Leg  Pain Location Orientation  Left  Pain Interventions   Medication (see MAR)    ADLs    Bathing    (had shower already)  Linen Change      Personal Hygiene  Perineal Care, Moist Klarissa Wipes  Chlorhexidine Bath      Oral Care  Brushed Teeth (Self)  Teeth/Dentures     Shave     Nutrition Percentage Eaten  Dinner, Between 25-50% Consumed  Environmental Precautions  Treaded Slipper Socks on Patient  Patient Turns/Positioning  Patient Turns Self from Side to Side  Patient Turns Assistance/Tolerance  Assistance of One  Bed Positions  Bed Controls On, Bed Locked  Head of Bed Elevated  Less than 30 degrees      Psychosocial/Neurologic Assessment  Psychosocial Assessment  Psychosocial (WDL):  WDL Except  Patient Behaviors: Anxious, Forgetful  Neurologic Assessment  Neuro (WDL): Exceptions to WDL  Level of Consciousness: Alert  Orientation Level: Oriented X4  Cognition: Follows commands, Appropriate attention/concentration  Speech: Clear  Pupil Assesment: No  Motor Function/Sensation Assessment: Motor strength  Muscle Strength Right Arm: Good Strength Against Gravity and Moderate Resistance  Muscle Strength Left Arm: Good Strength Against Gravity and Moderate Resistance  Muscle Strength Right Leg: Good Strength Against Gravity and Moderate Resistance  Muscle Strength Left Leg: Weak Movement but Not Against Gravity or Resistance  EENT (WDL):  WDL Except    Cardio/Pulmonary Assessment  Edema   RLE Edema: 1+  LLE Edema: 1+  Respiratory Breath Sounds  RUL Breath Sounds: Clear  RML Breath Sounds: Clear  RLL Breath Sounds: Clear  TYE Breath Sounds: Clear  LLL Breath Sounds: Clear  Cardiac Assessment   Cardiac (WDL):  WDL Except (htn)

## 2023-12-11 VITALS
HEIGHT: 62 IN | HEART RATE: 67 BPM | RESPIRATION RATE: 18 BRPM | DIASTOLIC BLOOD PRESSURE: 59 MMHG | WEIGHT: 152 LBS | SYSTOLIC BLOOD PRESSURE: 126 MMHG | OXYGEN SATURATION: 94 % | BODY MASS INDEX: 27.97 KG/M2 | TEMPERATURE: 98.5 F

## 2023-12-11 PROCEDURE — 700102 HCHG RX REV CODE 250 W/ 637 OVERRIDE(OP): Performed by: PHYSICAL MEDICINE & REHABILITATION

## 2023-12-11 PROCEDURE — A9270 NON-COVERED ITEM OR SERVICE: HCPCS | Performed by: PHYSICAL MEDICINE & REHABILITATION

## 2023-12-11 PROCEDURE — 99239 HOSP IP/OBS DSCHRG MGMT >30: CPT | Performed by: PHYSICAL MEDICINE & REHABILITATION

## 2023-12-11 RX ORDER — ACETAMINOPHEN 500 MG
1000 TABLET ORAL 3 TIMES DAILY
Qty: 30 TABLET | Refills: 0 | Status: CANCELLED | OUTPATIENT
Start: 2023-12-11

## 2023-12-11 RX ORDER — POLYETHYLENE GLYCOL 3350 17 G/17G
17 POWDER, FOR SOLUTION ORAL
Qty: 30 PACKET | Refills: 3 | Status: CANCELLED | OUTPATIENT
Start: 2023-12-11

## 2023-12-11 RX ORDER — ACETAMINOPHEN 500 MG
1000 TABLET ORAL 3 TIMES DAILY
Qty: 30 TABLET | Refills: 0
Start: 2023-12-11

## 2023-12-11 RX ADMIN — FERROUS SULFATE TAB 325 MG (65 MG ELEMENTAL FE) 325 MG: 325 (65 FE) TAB at 08:44

## 2023-12-11 RX ADMIN — APIXABAN 5 MG: 5 TABLET, FILM COATED ORAL at 08:43

## 2023-12-11 RX ADMIN — OXYCODONE 5 MG: 5 TABLET ORAL at 10:38

## 2023-12-11 RX ADMIN — METHOCARBAMOL TABLETS 1000 MG: 500 TABLET, COATED ORAL at 08:43

## 2023-12-11 RX ADMIN — OXYCODONE 5 MG: 5 TABLET ORAL at 05:39

## 2023-12-11 RX ADMIN — LEVOTHYROXINE SODIUM 88 MCG: 0.09 TABLET ORAL at 05:39

## 2023-12-11 RX ADMIN — LIOTHYRONINE SODIUM 5 MCG: 5 TABLET ORAL at 08:44

## 2023-12-11 RX ADMIN — PREGABALIN 100 MG: 100 CAPSULE ORAL at 08:43

## 2023-12-11 RX ADMIN — FAMOTIDINE 20 MG: 20 TABLET, FILM COATED ORAL at 08:44

## 2023-12-11 RX ADMIN — ACETAMINOPHEN 1000 MG: 500 TABLET ORAL at 08:43

## 2023-12-11 NOTE — DISCHARGE SUMMARY
Physical Medicine & Rehabilitation Discharge Summary    Admission Date: 11/29/2023    Discharge Date: 12/11/2023    Attending Provider: Mitchell Chavis MD    Admission Diagnosis:   Active Hospital Problems    Diagnosis     *History of left hip replacement     Acute deep vein thrombosis (DVT) of right lower extremity (HCC)     S/P lumbar laminectomy     Spinal stenosis of lumbar region     HTN (hypertension)        Discharge Diagnosis:  Active Hospital Problems    Diagnosis     *History of left hip replacement     Acute deep vein thrombosis (DVT) of right lower extremity (HCC)     S/P lumbar laminectomy     Spinal stenosis of lumbar region     HTN (hypertension)        HPI per Admission History & Physical:  The patient is a 83 y.o.  female with a past medical history of hypothyroidism, hypertension and low back pain and lower extremity weakness;  who presented on 11/10/2023  5:51 AM for scheduled lumbar surgery.  Per documentation, patient has been followed in the outpatient setting by Dr. Silva for ongoing back pain.  Patient was evaluated in the outpatient setting on 11/8/2023 due to worsening back pain impairing her function.  Patient recently required the use of a wheelchair due to her limited mobility.  An MRI of the lumbar spine was obtained which showed prior history of an L4-L5 surgical changes and evidence of spondylolisthesis at L4-5 as well as facet joint arthropathy at L5-S1.  Patient was deemed appropriate for intervention for her L5-S1 stenosis.  Patient was taken to the OR on 11/10 for an L5-S1 redo laminectomy performed by Dr. Silva.  She then came to [facility] rehabilitation from 11/13-11/22. Left leg pain continued with minimal ambulation potencial. Xray of hip taken 11/22 showed Left femoral neck fracture. No clear history of fall since march/April 2023. Patient has not walked pain free since February 2023.   Dr Blancas took to surgery on 11/27.      Imaging showed displaced left femoral neck fracture  and avascular necrosis.   Orthopedic surgery Dr. Ko was consulted who recommended IR guided patient medically care discussed with IR who stated there is not enough fluid to do aspiration. S/p left total hip arthroplasty 11/27, Prevena drain in place, WBAT with posterior hip precaution. Eliquis was resumed per surgery.  Patient is cleared for discharge by orthopedic surgeon.     Patient had episodes of hypotension, symptomatic.  Orthostatic vital signs negative.  Blood pressure improved with IV fluid. discontinued her home losartan and spironolactone.      Acute on chronic anemia, likely due to expected blood loss from surgery and IV fluid dilution.  No active bleeding signs.  Anemia workup significant for iron deficiency.  Started on iron supplement.     Patient was admitted to [facility] on 11/29/2023.     Hospital Course by Problem List:  Left Femoral Neck fracture with superior displacement- diagnosed 11/22, unknown chronicity  Seen on Xray of hip taken 11/22  S/P hip arthroplasty on 11/27 by Dr Ko  No clear history of fall. Patient has not walked pain free since February 2023  Last Fall noted in Spring of 2023  Imaging of MRI of pelvis in April 2023 shows no fracture  -continue PT outpatient  Repeat Xrays of hip taken 12/7 were reviewed by Dr Ko. Imaging shows proper healing.  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 Silva  - Spinal precautions in place, does not require bracing     Right Popliteal DVT  Provoked from surgery and  immobility  -Eliquis 5mg BID for 3 months from 11/15/23  -continue eliquis     Acute Blood loss anemia  -7.6 Hb today  -12/1- 7.9 Hb  -12/5 Hb 8.4     Hypothyroidism  -On home dose Synthroid     Hypertension  - continue to hold home dose of losartan and spironolactone      Pain:  - Neuroceptic - On Tylenol TID, robaxin 1g TID  -continue oxycodone 5mg TID scheduled  -Discussed Wean: Patient will use home Oxycodone 5mg tablets. Did not prescribe further pain medications  -Oxycodone 5mg 3x daily for 1 week. Then decrease to Oxycodone 5mg 2x Daily for one week. Then decrease to Oxycodone 5mg daily for 1 week. Then may cut pill in half and take Oxycodone 2.5mg Daily for 1 week, then off.   If any step of the wean cause intolerable pain stop the taper for 1 week and continue on previous weeks dosage for an addition week before restarting above taper  -I discussed the risks and benefits of using opiate medications for pain control.  I discussed the risk of addiction, potential for overdose, and respiratory depression (and the potential need for opiate antagonist therapy if this occurs).  I encouraged the patient to take this medication sparingly with the expressed goal of weaning off the medication as soon as is clinically appropriate. We discussed the need to safely secure these medications to prevent theft, inadvertent ingestion, or misuse.  Any unused medication should be immediately disposed of through a sanctioned medication disposal program.  We discussed adjunctive pain medications and conservative therapies at length.I answered the patient's questions regarding this treatment, and the patient indicated understanding and willingness to proceed.   .  ID:  - at risk for Urinary tract infection     Skin/Wounds:  - Pressure relief q2h while in bed. Close monitoring for signs of breakdown     DVT prophylaxis:  continue eliquis       -Follow-up Ortho, NS, PCP, Ortho    Functional Status at Discharge  Eating:   Independent  Eating Description:     Grooming:  Modified Independent  Grooming Description:  Standing at sink  Bathing:  Supervision  Bathing Description:  Set-up of equipment, Grab bar, Hand held shower, Tub bench, Supervision for safety  Upper Body Dressing:  Modified Independent  Upper Body Dressing Description:  Increased time  Lower Body Dressing:  Modified Independent  Lower Body Dressing Description:  Reacher, Sock aid, Shoe horn, Increased time  Discharge Location : Assisted Living  Patient Discharging with Assist of: Caregiver;Family   Level of Supervision Required: Intermittent Supervision  Recommended Equipment for Discharge: Front-Wheeled Walker;Raised Toilet Seat with Arms;Shower Chair;Grab Bars in Tub / Shower;Sock Aid;Reacher;Hand Held Shower Head;Dressing Stick  Recommended Services Upon Discharge: Home Health Occupational Therapy  Long Term Goals Met: 2  Long Term Goals Not Met: 0  Reason(s) for Goals Not Met: NA  Criteria for Termination of Services: Maximum Function Achieved for Inpatient Rehabilitation  Walk:  Modified Independent  Distance Walked:  150  Number of Times Distance Was Traveled:  1  Assistive Device:  Front Wheel Walker  Gait Deviation:  Antalgic, Bradykinetic  Wheelchair:  Modified Independent  Distance Propelled:  150   Wheelchair Description:     Stairs Contact Guard Assist  Stairs Description Extra time, Hand rails, Verbal cueing     Comprehension:  Independent  Comprehension Description:     Expression:  Independent  Expression Description:     Social Interaction:     Social Interaction Description:     Problem Solving:  Supervision  Problem Solving Description:  Verbal cueing  Memory:  Independent  Memory Description:          Mitchell MITTAL M.D., personally performed a complete drug regimen review and no potential clinically significant medication issues were identified.   Discharge Medication:     Medication List        CONTINUE taking these medications         Instructions   acetaminophen 500 MG Tabs  Commonly known as: Tylenol   Take 2 Tablets by mouth in the morning, at noon, and at bedtime.  Dose: 1,000 mg     apixaban 5mg Tabs  Commonly known as: Eliquis   Take 1 Tablet by mouth 2 times a day.  Dose: 5 mg     atorvastatin 40 MG Tabs  Commonly known as: Lipitor   Take 1 Tablet by mouth at bedtime.  Dose: 40 mg     ferrous sulfate 325 (65 Fe) MG tablet   Take 1 Tablet by mouth every morning with breakfast.  Dose: 325 mg     levothyroxine 88 MCG Tabs  Commonly known as: Synthroid   Take 1 Tablet by mouth every morning on an empty stomach.  Dose: 88 mcg     liothyronine 5 MCG Tabs  Commonly known as: Cytomel   Take 1 Tablet by mouth every morning.  Dose: 5 mcg     Methocarbamol 1000 MG Tabs   Take 1,000 mg by mouth in the morning, at noon, and at bedtime.  Dose: 1,000 mg     pregabalin 100 MG Caps  Commonly known as: Lyrica   Take 1 Capsule by mouth 2 times a day for 30 days.  Dose: 100 mg            STOP taking these medications      Estriol 10 % Crea     famotidine 20 MG Tabs  Commonly known as: Pepcid     Magnesium 200 MG Tabs     oxyCODONE immediate-release 5 MG Tabs  Commonly known as: Roxicodone     phosphorus 250 MG tablet  Commonly known as: K-Phos-Neutral     polyethylene glycol/lytes Pack  Commonly known as: Miralax              Discharge Diet:  Current Diet Order   Procedures    Diet Order Diet: Regular       Discharge Activity:  As tolerated. Spinal and hip percausions     Disposition:  Patient to discharge home with family support and community resources.      Follow-up & Discharge Instructions:  Follow up with your primary care provider (PCP) within 7-10 days of discharge to review your medications and take over your care.     If you develop chest pain, fever, chills, change in neurologic function (weakness, sensation changes, vision changes), or other concerning sxs, seek immediate medical attention or call 911.      No future appointments.    Condition on  Discharge:  Good    More than 35 minutes was spent on discharging this patient, including face-to-face time, prescription management, and the dictation of this note.    ____________________________________     Mitchell Chavis MD  Physical Medicine & Rehabilitation   Brain Injury Medicine   ____________________________________    Date of Service: 12/11/2023

## 2023-12-11 NOTE — PROGRESS NOTES
Patient discharged to home per order.  Discharge instructions reviewed with patient and daughter; they verbalize understanding and signed copies placed in chart.  Patient has all belongings; signed copy of form in chart.  Patient left facility at 1050 in stable condition.

## 2023-12-11 NOTE — CARE PLAN
Problem: Knowledge Deficit - Standard  Goal: Patient and family/care givers will demonstrate understanding of plan of care, disease process/condition, diagnostic tests and medications  Outcome: Met     Problem: Discharge Barriers/Planning  Goal: Patient's continuum of care needs are met  Outcome: Met     Problem: Psychosocial  Goal: Patient's level of anxiety will decrease  Outcome: Met  Goal: Patient's ability to verbalize feelings about condition will improve  Outcome: Met  Goal: Patient's ability to re-evaluate and adapt role responsibilities will improve  Outcome: Met  Goal: Patient and family will demonstrate ability to cope with life altering diagnosis and/or procedure  Outcome: Met  Goal: Spiritual and cultural needs incorporated into hospitalization  Outcome: Met     Problem: Hemodynamics  Goal: Patient's hemodynamics, fluid balance and neurologic status will be stable or improve  Outcome: Met     Problem: Respiratory  Goal: Patient will understand use and administration of respiratory medications to improve respiratory function  Outcome: Met     Problem: Risk for Aspiration  Goal: Patient's risk for aspiration will be absent or decrease  Outcome: Met     Problem: Bladder / Voiding  Goal: Patient will establish and maintain regular urinary output  Outcome: Met  Goal: Patient will establish and maintain bladder regimen  Outcome: Met     Problem: Neurogenic Bladder  Goal: Patient will demonstrate ability to take care of indwelling catheter  Outcome: Met  Goal: Patient will demonstrate self-cath technique using clean technique and care of the catheter  Outcome: Met     Problem: Bowel Elimination  Goal: Patient will participate in bowel management program  Outcome: Met     Problem: Neurogenic Bowel  Goal: Patient will perform adequate hygiene with incontinent episodes  Outcome: Met  Goal: Patient will verbalize signs and symptoms of constipation and how to prevent/alleviate  Outcome: Met  Goal: Patient will  demonstrates ability to complete digital stimulation technique  Outcome: Met     Problem: Skin Integrity  Goal: Patient's skin integrity will be maintained or improve  Outcome: Met     Problem: Nutrition  Goal: Patient's nutritional and fluid intake will be adequate or improve  Outcome: Met  Goal: Patient will display progressive weight gain toward goal have adequate food and fluid intake  Outcome: Met  Goal: Patient will demonstrate ability to administer respiratory medications  Outcome: Met     Problem: Self Care  Goal: Patient will have the ability to perform ADLs independently or with assistance  Outcome: Met     Problem: Mobility  Goal: Patient's capacity to carry out activities will improve  Outcome: Met     Problem: Infection  Goal: Patient will remain free from infection  Outcome: Met     Problem: VTE Prevention  Goal: Patient will remain free from venous thromboembolism (VTE)  Outcome: Met     Problem: Skin Integrity  Goal: Skin integrity is maintained or improved  Outcome: Met     Problem: Fall Risk - Rehab  Goal: Patient will remain free from falls  Outcome: Met     Problem: Pain - Standard  Goal: Alleviation of pain or a reduction in pain to the patient’s comfort goal  Outcome: Met       The patient is Stable - Low risk of patient condition declining or worsening    Shift Goals  Clinical Goals: Safety, pain control  Patient Goals: Safety  Family Goals: Education

## 2023-12-11 NOTE — PROGRESS NOTES
NURSING DAILY NOTE    Name: Sheyla Gauthier   Date of Admission: 11/29/2023   Admitting Diagnosis: History of left hip replacement  Attending Physician: Herminio Clay D.o.  Allergies: Trazodone and Crestor [rosuvastatin calcium]    Safety  Patient Assist  CGA  Patient Precautions  Fall Risk, Posterior Hip Precautions, Weight Bearing As Tolerated Left Lower Extremity  Precaution Comments  no back brace  Bed Transfer Status  Modified Independent  Toilet Transfer Status   Modified Independent  Assistive Devices  Rails, Wheelchair  Oxygen  None - Room Air  Diet/Therapeutic Dining  Current Diet Order   Procedures    Diet Order Diet: Regular     Pill Administration  whole and floated apple sauce  Agitated Behavioral Scale  15  ABS Level of Severity  No Agitation    Fall Risk  Has the patient had a fall this admission?   No  Lexy Garcia Fall Risk Scoring  13, MODERATE RISK  Fall Risk Safety Measures  bed alarm and chair alarm    Vitals  Temperature: 36.8 °C (98.2 °F)  Temp src: Oral  Pulse: 62  Respiration: 15  Blood Pressure : 111/57  Blood Pressure MAP (Calculated): 75 MM HG  BP Location: Right, Upper Arm  Patient BP Position: Supine     Oxygen  Pulse Oximetry: 94 %  O2 (LPM): 0  O2 Delivery Device: None - Room Air    Bowel and Bladder  Last Bowel Movement  12/10/23  Stool Type  Type 3: Like a sausage, but with cracks on its surface  Bowel Device  Bathroom  Continent  Bladder: Continent void   Bowel: Continent movement  Bladder Function  Urine Void (mL):  (Moderate)  Number of Times Voided: 1  Urine Color: Yellow  Genitourinary Assessment   Bladder Assessment (WDL):  WDL Except  Kothari Catheter: Not Applicable  Urinary Elimination: Incontinence  Urine Color: Yellow  Bladder Device: Bathroom  Time Void: Yes  Bladder Scan: Post Void  $ Bladder Scan Results (mL): 34    Skin  Wyatt Score   17  Sensory Interventions   Bed Types: Standard/Trauma Mattress  Skin  Preventative Measures: Pillows in Use for Support / Positioning  Moisture Interventions  Moisturizers/Barriers: Barrier Paste      Pain  Pain Rating Scale  0 - No Pain  Pain Location  Back, Hip, Leg  Pain Location Orientation  Left  Pain Interventions   Medication (see MAR)    ADLs    Bathing   Patient Refused Bathing (Pt took a shower earlier during OT session)  Linen Change      Personal Hygiene  Perineal Care, Moist Klarissa Wipes  Chlorhexidine Bath      Oral Care  Brushed Teeth (Self)  Teeth/Dentures     Shave     Nutrition Percentage Eaten  *  * Meal *  *, Dinner, Between % Consumed  Environmental Precautions  Treaded Slipper Socks on Patient, Bed in Low Position  Patient Turns/Positioning  Patient Turns Self from Side to Side  Patient Turns Assistance/Tolerance  Assistance of One  Bed Positions  Bed Controls On, Bed Locked  Head of Bed Elevated  Self regulated      Psychosocial/Neurologic Assessment  Psychosocial Assessment  Psychosocial (WDL):  WDL Except  Patient Behaviors: Anxious, Forgetful  Neurologic Assessment  Neuro (WDL): Exceptions to WDL  Level of Consciousness: Alert  Orientation Level: Oriented X4  Cognition: Follows commands, Appropriate attention/concentration  Speech: Clear  Pupil Assesment: No  Motor Function/Sensation Assessment: Motor strength  Muscle Strength Right Arm: Good Strength Against Gravity and Moderate Resistance  Muscle Strength Left Arm: Good Strength Against Gravity and Moderate Resistance  Muscle Strength Right Leg: Good Strength Against Gravity and Moderate Resistance  Muscle Strength Left Leg: Weak Movement but Not Against Gravity or Resistance  EENT (WDL):  WDL Except    Cardio/Pulmonary Assessment  Edema   RLE Edema: 1+  LLE Edema: 1+  Respiratory Breath Sounds  RUL Breath Sounds: Clear  RML Breath Sounds: Clear  RLL Breath Sounds: Clear  TYE Breath Sounds: Clear  LLL Breath Sounds: Clear  Cardiac Assessment   Cardiac (WDL):  WDL Except (htn)

## 2023-12-11 NOTE — DISCHARGE INSTRUCTIONS
[facility] NURSING DISCHARGE INSTRUCTIONS    Blood Pressure : 111/57  Weight: 68.9 kg (152 lb)  Nursing recommendations for Sheyla Gauthier at time of discharge are as follows:  Client verbalized understanding of all discharge instructions and prescriptions.     Review all your home medications and newly ordered medications with your doctor and/or pharmacist. Follow medication instructions as directed by your doctor and/or pharmacist.    Pain Management:   Discharge Pain Medication Instructions:  Comfort Goal: Comfort with Movement, Perform Activity, Stay Alert  Intervention: Medication (see MAR)  Notify your primary care provider if pain is unrelieved with these measures, if the pain is new, or increased in intensity.    Discharge Skin Characteristics: Warm, Dry  Discharge Skin Exam:      Skin / Wound Care Instructions: Please contact your primary care physician for any change in skin integrity.     If You Have Surgical Incisions / Wounds:  Monitor surgical site(s) for signs of increased swelling, redness or symptoms of drainage from the site or fever as this could indicate signs and symptoms of infection. If these symptoms are noted, notifiy your primary care provider.      Discharge Safety Instructions: No Supervision Needed     Discharge Safety Concerns: No Concerns Noted  The interdisciplinary team has made recommendation that you do not require supervision in the house due to demonstration of safety with ADL's and IADLS and problem solving skills  Anti-embolic stockings are not required to increase circulation to the lower extremities.    Discharge Diet: Regular diet     Discharge Liquids: Thin Liquids  Discharge Bowel Function: Continent  Please contact your primary care physician for any changes in bowel habits.  Discharge Bowel Program:    Discharge Bladder Function: Continent  Discharge Urinary Devices: None      Nursing Discharge Plan:   Influenza Vaccine Indication: Not  indicated: Previously immunized this influenza season and > 8 years of age    Case Management Discharge Instructions:   Discharge Location:    Agency Name/Address/Phone:    Home Health:    Outpatient Services:    DME Provider/Phone:    Medical Equipment Ordered:    Prescription Faxed to:        Discharge Medication Instructions:  Below are the medications your physician expects you to take upon discharge:      Fall Prevention in the Home, Adult  Falls can cause injuries and affect people of all ages. There are many simple things that you can do to make your home safe and to help prevent falls. Ask for help when making these changes, if needed.  What actions can I take to prevent falls?  General instructions  Use good lighting in all rooms. Replace any light bulbs that burn out, turn on lights if it is dark, and use night-lights.  Place frequently used items in easy-to-reach places. Lower the shelves around your home if necessary.  Set up furniture so that there are clear paths around it. Avoid moving your furniture around.  Remove throw rugs and other tripping hazards from the floor.  Avoid walking on wet floors.  Fix any uneven floor surfaces.  Add color or contrast paint or tape to grab bars and handrails in your home. Place contrasting color strips on the first and last steps of staircases.  When you use a stepladder, make sure that it is completely opened and that the sides and supports are firmly locked. Have someone hold the ladder while you are using it. Do not climb a closed stepladder.  Know where your pets are when moving through your home.  What can I do in the bathroom?         Keep the floor dry. Immediately clean up any water that is on the floor.  Remove soap buildup in the tub or shower regularly.  Use nonskid mats or decals on the floor of the tub or shower.  Attach bath mats securely with double-sided, nonslip rug tape.  If you need to sit down while you are in the shower, use a plastic, nonslip  stool.  Install grab bars by the toilet and in the tub and shower. Do not use towel bars as grab bars.  What can I do in the bedroom?  Make sure that a bedside light is easy to reach.  Do not use oversized bedding that reaches the floor.  Have a firm chair that has side arms to use for getting dressed.  What can I do in the kitchen?  Clean up any spills right away.  If you need to reach for something above you, use a sturdy step stool that has a grab bar.  Keep electrical cables out of the way.  Do not use floor polish or wax that makes floors slippery. If you must use wax, make sure that it is non-skid floor wax.  What can I do with my stairs?  Do not leave any items on the stairs.  Make sure that you have a light switch at the top and the bottom of the stairs. Have them installed if you do not have them.  Make sure that there are handrails on both sides of the stairs. Fix handrails that are broken or loose. Make sure that handrails are as long as the staircases.  Install non-slip stair treads on all stairs in your home.  Avoid having throw rugs at the top or bottom of stairs, or secure the rugs with carpet tape to prevent them from moving.  Choose a carpet design that does not hide the edge of steps on the stairs.  Check any carpeting to make sure that it is firmly attached to the stairs. Fix any carpet that is loose or worn.  What can I do on the outside of my home?  Use bright outdoor lighting.  Regularly repair the edges of walkways and driveways and fix any cracks.  Remove high doorway thresholds.  Trim any shrubbery on the main path into your home.  Regularly check that handrails are securely fastened and in good repair. Both sides of all steps should have handrails.  Install guardrails along the edges of any raised decks or porches.  Clear walkways of debris and clutter, including tools and rocks.  Have leaves, snow, and ice cleared regularly.  Use sand or salt on walkways during winter months.  In the  garage, clean up any spills right away, including grease or oil spills.  What other actions can I take?  Wear closed-toe shoes that fit well and support your feet. Wear shoes that have rubber soles or low heels.  Use mobility aids as needed, such as canes, walkers, scooters, and crutches.  Review your medicines with your health care provider. Some medicines can cause dizziness or changes in blood pressure, which increase your risk of falling.  Talk with your health care provider about other ways that you can decrease your risk of falls. This may include working with a physical therapist or  to improve your strength, balance, and endurance.  Where to find more information  Centers for Disease Control and Prevention, [place]AD[place]: www.cdc.gov  National [place] on Aging: www.milly.nih.gov  Contact a health care provider if:  You are afraid of falling at home.  You feel weak, drowsy, or dizzy at home.  You fall at home.  Summary  There are many simple things that you can do to make your home safe and to help prevent falls.  Ways to make your home safe include removing tripping hazards and installing grab bars in the bathroom.  Ask for help when making these changes in your home.  This information is not intended to replace advice given to you by your health care provider. Make sure you discuss any questions you have with your health care provider.  Document Revised: 09/19/2022 Document Reviewed: 07/21/2021  [employer] Patient Education © 2023 [employer]      Depression / Suicide Risk    As you are discharged from this [facility] facility, it is important to learn how to keep safe from harming yourself.    Recognize the warning signs:  Abrupt changes in personality, positive or negative- including increase in energy   Giving away possessions  Change in eating patterns- significant weight changes-  positive or negative  Change in sleeping patterns- unable to sleep or sleeping all the time   Unwillingness or inability  to communicate  Depression  Unusual sadness, discouragement and loneliness  Talk of wanting to die  Neglect of personal appearance   Rebelliousness- reckless behavior  Withdrawal from people/activities they love  Confusion- inability to concentrate     If you or a loved one observes any of these behaviors or has concerns about self-harm, here's what you can do:  Talk about it- your feelings and reasons for harming yourself  Remove any means that you might use to hurt yourself (examples: pills, rope, extension cords, firearm)  Get professional help from the community (Mental Health, Substance Abuse, psychological counseling)  Do not be alone:Call your Safe Contact- someone whom you trust who will be there for you.  Call your local CRISIS HOTLINE 985-2032 or 734-082-5009  Call your local Children's Mobile Crisis Response Team [place] (483) 767-1261 or www.[employer]  Call the toll free National Suicide Prevention Hotlines   National Suicide Prevention Lifeline 370-046-BIEZ (8997)  National Hope Line Network 800-SUICIDE (195-9263)

## 2023-12-11 NOTE — PROGRESS NOTES
NURSING DAILY NOTE    Name: Sheyla Gauthier   Date of Admission: 11/29/2023   Admitting Diagnosis: History of left hip replacement  Attending Physician: Herminio Clay D.o.  Allergies: Trazodone and Crestor [rosuvastatin calcium]    Safety  Patient Assist  cga  Patient Precautions  Fall Risk, Posterior Hip Precautions, Weight Bearing As Tolerated Left Lower Extremity  Precaution Comments  no back brace  Bed Transfer Status  Modified Independent  Toilet Transfer Status   Modified Independent  Assistive Devices  Wheelchair, Rails  Oxygen  None - Room Air  Diet/Therapeutic Dining  Current Diet Order   Procedures    Diet Order Diet: Regular     Pill Administration  whole and float large pills in applesauce  Agitated Behavioral Scale  15  ABS Level of Severity  No Agitation    Fall Risk  Has the patient had a fall this admission?   No  Lexy Garcia Fall Risk Scoring  13, MODERATE RISK  Fall Risk Safety Measures  bed alarm and poor balance    Vitals  Temperature: 36.8 °C (98.2 °F)  Temp src: Oral  Pulse: 62  Respiration: 15  Blood Pressure : 111/57  Blood Pressure MAP (Calculated): 75 MM HG  BP Location: Right, Upper Arm  Patient BP Position: Supine     Oxygen  Pulse Oximetry: 94 %  O2 (LPM): 0  O2 Delivery Device: None - Room Air    Bowel and Bladder  Last Bowel Movement  12/08/23  Stool Type  Type 1: Separate hard lumps (hard to pass)  Bowel Device  Bathroom  Continent  Bladder: Stress incontinence   Bowel: Continent movement  Bladder Function  Urine Void (mL):  (Moderate)  Number of Times Voided: 1  Urine Color: Yellow  Genitourinary Assessment   Bladder Assessment (WDL):  WDL Except  Kothari Catheter: Not Applicable  Urinary Elimination: Incontinence  Urine Color: Yellow  Bladder Device: Bathroom  Time Void: Yes  Bladder Scan: Post Void  $ Bladder Scan Results (mL): 34    Skin  Wyatt Score   17  Sensory Interventions   Bed Types: Standard/Trauma  Mattress  Skin Preventative Measures: Pillows in Use for Support / Positioning  Moisture Interventions  Moisturizers/Barriers: Barrier Wipes      Pain  Pain Rating Scale  6 - Hard to ignore, avoid usual activities  Pain Location  Back, Hip, Leg  Pain Location Orientation  Left  Pain Interventions   Medication (see MAR)    ADLs    Bathing    (had shower already)  Linen Change      Personal Hygiene  Perineal Care, Moist Klarissa Wipes  Chlorhexidine Bath      Oral Care  Brushed Teeth (Self)  Teeth/Dentures     Shave     Nutrition Percentage Eaten  *  * Meal *  *, Dinner, Between % Consumed  Environmental Precautions  Treaded Slipper Socks on Patient, Personal Belongings, Wastebasket, Call Bell etc. in Easy Reach, Transferred to Stronger Side, Report Given to Other Health Care Providers Regarding Fall Risk, Bed in Low Position  Patient Turns/Positioning  Patient Turns Self from Side to Side  Patient Turns Assistance/Tolerance  Assistance of One  Bed Positions  Bed Controls On  Head of Bed Elevated  Self regulated      Psychosocial/Neurologic Assessment  Psychosocial Assessment  Psychosocial (WDL):  WDL Except  Patient Behaviors: Anxious, Forgetful  Neurologic Assessment  Neuro (WDL): Exceptions to WDL  Level of Consciousness: Alert  Orientation Level: Oriented X4  Cognition: Follows commands, Appropriate attention/concentration  Speech: Clear  Pupil Assesment: No  Motor Function/Sensation Assessment: Motor strength  Muscle Strength Right Arm: Good Strength Against Gravity and Moderate Resistance  Muscle Strength Left Arm: Good Strength Against Gravity and Moderate Resistance  Muscle Strength Right Leg: Good Strength Against Gravity and Moderate Resistance  Muscle Strength Left Leg: Weak Movement but Not Against Gravity or Resistance  EENT (WDL):  WDL Except    Cardio/Pulmonary Assessment  Edema   RLE Edema: 1+  LLE Edema: 1+  Respiratory Breath Sounds  RUL Breath Sounds: Clear  RML Breath Sounds: Clear  RLL Breath  Sounds: Clear  TYE Breath Sounds: Clear  LLL Breath Sounds: Clear  Cardiac Assessment   Cardiac (WDL):  WDL Except (hx htn)

## 2023-12-11 NOTE — DISCHARGE PLANNING
Case management  Reviewed signed copy of IMM and answered all questions.  Dtr Luh will provide transportation. Pt has fww and wc/cushion.   Home exercise program provided      Follow-up Information:   Xiao PICKARD MD @ [facility] Provider  [address], [address]  [place]  [address]  P:  [phone]  F: [phone]  Go on 1/11/2024 Thursday @ 4:oopm to establish with a new primary care.   A discharge summary will be faxed to this PCP.     [facility]  [facility] [address] [phone]  A referral has been sent by the [facility] to [facility] for follow up.     [address] [id number] [phone]  Follow up  Provider for wheelchair and cushion    Out patient PT/OT  A referral has been faxed to Sara Stack   P: [phone]  F:  [phone]      DE date /disposition:  Pt to [place] today; dc disposition is [facility] in [place], [place]

## 2023-12-11 NOTE — DISCHARGE PLANNING
"Cm  I emailled signed dc summary to Jazmin VELAZQUEZ @ [facility]    Tc to Alan @  [facility]; I faxed h/p, dc summary, face sheet, and copy of hip xray; they also have \"care everywhere\" w/ EPIC.      I faxed dc summary to pt's new pcp Dr Xiao Kern @ [phone] along w/ sending dc summary to Sara Quinn OT.     No further intervention.           "

## 2023-12-27 LAB
FUNGUS SPEC CULT: NORMAL
FUNGUS SPEC CULT: NORMAL
FUNGUS SPEC FUNGUS STN: NORMAL
FUNGUS SPEC FUNGUS STN: NORMAL
SIGNIFICANT IND 70042: NORMAL
SIGNIFICANT IND 70042: NORMAL
SITE SITE: NORMAL
SITE SITE: NORMAL
SOURCE SOURCE: NORMAL
SOURCE SOURCE: NORMAL

## 2024-01-08 LAB
MYCOBACTERIUM SPEC CULT: NORMAL
MYCOBACTERIUM SPEC CULT: NORMAL
RHODAMINE-AURAMINE STN SPEC: NORMAL
RHODAMINE-AURAMINE STN SPEC: NORMAL
SIGNIFICANT IND 70042: NORMAL
SIGNIFICANT IND 70042: NORMAL
SITE SITE: NORMAL
SITE SITE: NORMAL
SOURCE SOURCE: NORMAL
SOURCE SOURCE: NORMAL

## 2024-03-25 ENCOUNTER — HOSPITAL ENCOUNTER (OUTPATIENT)
Facility: MEDICAL CENTER | Age: 84
End: 2024-03-25
Attending: STUDENT IN AN ORGANIZED HEALTH CARE EDUCATION/TRAINING PROGRAM
Payer: MEDICARE

## 2024-03-25 PROCEDURE — 87077 CULTURE AEROBIC IDENTIFY: CPT

## 2024-03-25 PROCEDURE — 87086 URINE CULTURE/COLONY COUNT: CPT

## 2024-03-25 PROCEDURE — 87186 SC STD MICRODIL/AGAR DIL: CPT

## 2024-04-12 NOTE — CARE PLAN
April 12, 2024    Emerita Gomez  [address] [address]              [facility]  [address] [address]  Phone: [phone]  Fax: [phone]    To Whom It May Concern:    Ms. Gomez is currently under our care for pregnancy.  Estimated Date of Delivery: 10/25/24        Sincerely,    Jamie Kruse MA            The patient is Stable - Low risk of patient condition declining or worsening    Shift Goals  Clinical Goals: safety  Patient Goals: sleep well, pain control    Progress made toward(s) clinical / shift goals:  2    Problem: Skin Integrity  Goal: Patient's skin integrity will be maintained or improve  Outcome: Progressing: Island dressing to approximated surgical incision w/ staples to left hip changed. Minimal SS drainage noted. Site care performed with NS and sterile gauze.       Problem: Pain - Standard  Goal: Alleviation of pain or a reduction in pain to the patient’s comfort goal  Outcome: Progressing:Scheduled and prn oxycodone 5 mg administered per MAR, as well as scheduled lyrica, tylenol, and robaxin. Repositioning and intermittent resting for pain relief, also. Pt reports pain levels as high as 7/10 to left hip, diminishing to 3/10 post analgesic. Pt participating in therapies. Left knee turns inward, pillow between knees to prevent adduction of LLE.

## 2024-07-15 ENCOUNTER — HOSPITAL ENCOUNTER (OUTPATIENT)
Dept: LAB | Facility: MEDICAL CENTER | Age: 84
End: 2024-07-15
Attending: INTERNAL MEDICINE
Payer: MEDICARE

## 2024-07-15 LAB
T4 FREE SERPL-MCNC: 1.77 NG/DL (ref 0.93–1.7)
TSH SERPL-ACNC: 0.5 UIU/ML (ref 0.35–5.5)

## 2024-07-15 PROCEDURE — 84443 ASSAY THYROID STIM HORMONE: CPT

## 2024-07-15 PROCEDURE — 36415 COLL VENOUS BLD VENIPUNCTURE: CPT

## 2024-07-15 PROCEDURE — 84439 ASSAY OF FREE THYROXINE: CPT

## 2024-08-02 ENCOUNTER — HOSPITAL ENCOUNTER (OUTPATIENT)
Facility: MEDICAL CENTER | Age: 84
End: 2024-08-02
Attending: STUDENT IN AN ORGANIZED HEALTH CARE EDUCATION/TRAINING PROGRAM
Payer: MEDICARE

## 2024-08-02 PROCEDURE — 87077 CULTURE AEROBIC IDENTIFY: CPT

## 2024-08-02 PROCEDURE — 87086 URINE CULTURE/COLONY COUNT: CPT

## 2024-08-21 ENCOUNTER — APPOINTMENT (OUTPATIENT)
Dept: MEDICAL GROUP | Age: 84
End: 2024-08-21
Payer: MEDICARE

## 2024-08-23 ENCOUNTER — OFFICE VISIT (OUTPATIENT)
Dept: MEDICAL GROUP | Age: 84
End: 2024-08-23
Payer: MEDICARE

## 2024-08-23 VITALS
HEIGHT: 62 IN | TEMPERATURE: 97.6 F | WEIGHT: 120 LBS | OXYGEN SATURATION: 96 % | DIASTOLIC BLOOD PRESSURE: 52 MMHG | RESPIRATION RATE: 16 BRPM | HEART RATE: 66 BPM | BODY MASS INDEX: 22.08 KG/M2 | SYSTOLIC BLOOD PRESSURE: 108 MMHG

## 2024-08-23 DIAGNOSIS — E78.00 PURE HYPERCHOLESTEROLEMIA: ICD-10-CM

## 2024-08-23 DIAGNOSIS — Z23 NEED FOR VACCINATION: ICD-10-CM

## 2024-08-23 DIAGNOSIS — Z87.828 H/O LACERATION OF SKIN: ICD-10-CM

## 2024-08-23 DIAGNOSIS — I10 PRIMARY HYPERTENSION: ICD-10-CM

## 2024-08-23 DIAGNOSIS — E55.9 VITAMIN D DEFICIENCY: ICD-10-CM

## 2024-08-23 PROCEDURE — 90715 TDAP VACCINE 7 YRS/> IM: CPT | Performed by: FAMILY MEDICINE

## 2024-08-23 PROCEDURE — 3074F SYST BP LT 130 MM HG: CPT | Performed by: FAMILY MEDICINE

## 2024-08-23 PROCEDURE — 90471 IMMUNIZATION ADMIN: CPT | Performed by: FAMILY MEDICINE

## 2024-08-23 PROCEDURE — 3078F DIAST BP <80 MM HG: CPT | Performed by: FAMILY MEDICINE

## 2024-08-23 PROCEDURE — 99214 OFFICE O/P EST MOD 30 MIN: CPT | Mod: 25 | Performed by: FAMILY MEDICINE

## 2024-08-23 RX ORDER — SPIRONOLACTONE 25 MG/1
TABLET ORAL
COMMUNITY

## 2024-08-23 RX ORDER — LOSARTAN POTASSIUM 50 MG/1
50 TABLET ORAL DAILY
COMMUNITY

## 2024-08-23 ASSESSMENT — FIBROSIS 4 INDEX: FIB4 SCORE: 1.76

## 2024-08-23 ASSESSMENT — PATIENT HEALTH QUESTIONNAIRE - PHQ9: CLINICAL INTERPRETATION OF PHQ2 SCORE: 0

## 2024-08-23 NOTE — PROGRESS NOTES
This medical record contains text that has been entered with the assistance of computer voice recognition and dictation software.  Therefore, it may contain unintended errors in text, spelling, punctuation, or grammar      Verbal consent was acquired by the patient to use [facility] ambient listening note generation during this visit Yes       Chief Complaint   Patient presents with    Requesting Labs     Lab Request for specific labs     Follow-Up     Urinary retention from medication          Sheyla Gauthier is a 83 y.o. female here evaluation and management of: routine follow-up      HPI:       History of Present Illness  The patient is an 83-year-old female who presents for evaluation of multiple medical concerns.    She has requested lab work to be conducted today. She was previously prescribed oxycodone by Dr. Moya's office for pain management. However, she experienced bladder retention as a side effect, which led her to discontinue the medication. She did not receive any specific instructions on how to taper off the medication. She denies experiencing constipation. Despite discontinuing oxycodone, she continues to experience pain on her left side. She recalls having taken gabapentin in the past and is considering resuming it to manage her pain.        1. Primary hypertension  Losartan 50 mg p.o. daily  Aldactone 25 mg p.o. daily      Overall the patient has been compliant with his medical regimen, denies any adverse side effects such as cough, no syncope, no presyncope, no excessive fatigue.  The patient denies any chest pain today no headaches.      2. Pure hypercholesterolemia  Atorvastatin 40 mg p.o. nightly      The patient has been on a statin for years and tolerating this fine. The patient denies any muscle aches, no abdominal pain and no history of elevated liver enzymes.      3. H/O laceration of skin  She experienced a skin laceration on her right forearm 2 days ago in her garden she is also  due for Tdap        Current medicines (including changes today)  Current Outpatient Medications   Medication Sig Dispense Refill    losartan (COZAAR) 50 MG Tab Take 50 mg by mouth every day.      acetaminophen (TYLENOL) 500 MG Tab Take 2 Tablets by mouth in the morning, at noon, and at bedtime. 30 Tablet 0    Methocarbamol 1000 MG Tab Take 1,000 mg by mouth in the morning, at noon, and at bedtime. 270 Tablet 0    atorvastatin (LIPITOR) 40 MG Tab Take 1 Tablet by mouth at bedtime. 90 Tablet 0    levothyroxine (SYNTHROID) 88 MCG Tab Take 1 Tablet by mouth every morning on an empty stomach. 90 Tablet 0    liothyronine (CYTOMEL) 5 MCG Tab Take 1 Tablet by mouth every morning. 90 Tablet 0    apixaban (ELIQUIS) 5mg Tab Take 1 Tablet by mouth 2 times a day. 60 Tablet 3    spironolactone (ALDACTONE) 25 MG Tab Spironolactone       No current facility-administered medications for this visit.     She  has a past medical history of Anxiety, Arthritis, Back pain, CAD (coronary artery disease) (10/2018), Cataract, Daytime sleepiness, Depression, Dyslipidemia, Frequent headaches, Herpes zoster, High cholesterol, Hypertension, MHA (microangiopathic hemolytic anemia), Migraine syndrome ( ), Mumps, Osteopathia, Osteoporosis, Painful joint, Post-menopause on HRT (hormone replacement therapy), Renal disorder, Sleep apnea, Snoring, Thyroid disease, Urinary incontinence, and White matter abnormality on MRI of brain.  She  has a past surgical history that includes cholecystectomy (1996); abdominal hysterectomy total (11/1986); pr breast reduction (1997); foot surgery (2002); pr breast augmentation with implant; mammoplasty augmentation; bunionectomy (Left, 2003); eye surgery; hysterectomy laparoscopy; arthroscopy, knee; laminotomy (01/28/2022); lumbar laminectomy diskectomy (N/A, 2/6/2023); hardware removal ortho (N/A, 2/6/2023); lumbar laminectomy diskectomy (N/A, 11/10/2023); and pr partial hip replacement (Left, 11/27/2023).  Social  "History     Tobacco Use    Smoking status: Former     Current packs/day: 0.00     Types: Cigarettes     Quit date: 1979     Years since quittin.1    Smokeless tobacco: Never    Tobacco comments:     smoked off & on for 10 yrs   Vaping Use    Vaping status: Never Used   Substance Use Topics    Alcohol use: Yes     Comment: occassional    Drug use: No     Social History     Social History Narrative    Not on file     Family History   Problem Relation Age of Onset    Stroke Father     Arthritis Father     Hyperlipidemia Father     Hypertension Father     Cancer Sister         breast    Arthritis Sister     Arthritis Mother     Hypertension Mother     Hyperlipidemia Mother     Stroke Mother     Arthritis Brother     Cancer Brother     Hypertension Brother     Hyperlipidemia Brother     Heart Disease Brother         CABG for prox LAD in late 40s    Cancer Daughter         breast     Family Status   Relation Name Status    Fa      Sis  Alive    Mo          tia    Bro  Alive    Ayesha  (Not Specified)   No partnership data on file         ROS    The pertinent  ROS findings can be seen in the HPI above.     All other systems reviewed and are negative     Objective:     /52 (BP Location: Left arm, Patient Position: Sitting, BP Cuff Size: Adult)   Pulse 66   Temp 36.4 °C (97.6 °F) (Temporal)   Resp 16   Ht 1.575 m (5' 2\")   Wt 54.4 kg (120 lb)   SpO2 96%  Body mass index is 21.95 kg/m².      Physical Exam:    Constitutional: Alert, no distress.  Skin: No suspicious lesions  Eye: Equal, round and reactive, conjunctiva clear, lids normal.  ENMT: Lips without lesions, good dentition, oropharynx clear.  Neck: Trachea midline, no masses, no thyromegaly. No cervical or supraclavicular lymphadenopathy.  Respiratory: Unlabored respiratory effort, lungs clear to auscultation, no wheezes, no ronchi.  Cardiovascular: Normal S1, S2, no murmur, no edema  Abdomen: Soft, non-tender, no masses, no " hepatosplenomegaly.        Assessment and Plan:   The following treatment plan was discussed    All recent labs and provider notes reviewed    1. Primary hypertension    Patient has been stable with current management  We will make no changes for now    - losartan (COZAAR) 50 MG Tab; Take 50 mg by mouth every day.  - spironolactone (ALDACTONE) 25 MG Tab; Spironolactone    2. Pure hypercholesterolemia    We will obtain new labs to update clinical profile.  Then we will adjust therapy as needed.    - CBC WITH DIFFERENTIAL; Future  - Comp Metabolic Panel; Future  - Lipid Profile; Future  - TSH+FREE T4  - T3 FREE; Future    3. H/O laceration of skin    Vaccine was administered today without adverse event.    - Tdap Vaccine =>8YO IM    4. Vitamin D deficiency  - VITAMIN D,25 HYDROXY (DEFICIENCY); Future    5. Need for vaccination  - Tdap Vaccine =>8YO IM             Instructed to Follow up in clinic or ER for worsening symptoms, difficulty breathing, lack of expected recovery, or should new symptoms or problems arise.    Followup: Return in about 6 months (around 2/23/2025) for Reevaluation, labs.

## 2024-08-26 ENCOUNTER — TELEPHONE (OUTPATIENT)
Dept: MEDICAL GROUP | Age: 84
End: 2024-08-26
Payer: MEDICARE

## 2024-08-26 NOTE — TELEPHONE ENCOUNTER
Caller Name: MELISSA   Call Back Number: [phone]    How would the patient prefer to be contacted with a response: Phone call OK to leave a detailed message    PATIENT CALLED STATING SHE IS HAVING SOB SINCE IN LAST WEEK TO GET THE TDAP VACCINE AND WOULD LIKE ASSISTANCE. PLEASE ADVISE

## 2024-08-27 NOTE — DISCHARGE PLANNING
1. Cardiac exam today is NSR.  2. Continue carvedilol 3.125 mg qAM.  3. Continue anticoagulation with warfarin; INR has been therapeutic.  The patient checks it at home; most recent INR check is 2.4.   Call out to Sheyla to discuss post acute options Per Sheyla she would like to return to [facility] when medically cleared.

## 2024-08-30 ENCOUNTER — HOSPITAL ENCOUNTER (OUTPATIENT)
Dept: LAB | Facility: MEDICAL CENTER | Age: 84
End: 2024-08-30
Attending: FAMILY MEDICINE
Payer: MEDICARE

## 2024-08-30 DIAGNOSIS — E78.00 PURE HYPERCHOLESTEROLEMIA: ICD-10-CM

## 2024-08-30 DIAGNOSIS — E55.9 VITAMIN D DEFICIENCY: ICD-10-CM

## 2024-08-30 LAB
25(OH)D3 SERPL-MCNC: 52 NG/ML (ref 30–100)
ALBUMIN SERPL BCP-MCNC: 4 G/DL (ref 3.2–4.9)
ALBUMIN/GLOB SERPL: 1.4 G/DL
ALP SERPL-CCNC: 103 U/L (ref 30–99)
ALT SERPL-CCNC: 18 U/L (ref 2–50)
ANION GAP SERPL CALC-SCNC: 13 MMOL/L (ref 7–16)
AST SERPL-CCNC: 24 U/L (ref 12–45)
BASOPHILS # BLD AUTO: 0.6 % (ref 0–1.8)
BASOPHILS # BLD: 0.03 K/UL (ref 0–0.12)
BILIRUB SERPL-MCNC: 0.5 MG/DL (ref 0.1–1.5)
BUN SERPL-MCNC: 39 MG/DL (ref 8–22)
CALCIUM ALBUM COR SERPL-MCNC: 10.4 MG/DL (ref 8.5–10.5)
CALCIUM SERPL-MCNC: 10.4 MG/DL (ref 8.5–10.5)
CHLORIDE SERPL-SCNC: 102 MMOL/L (ref 96–112)
CHOLEST SERPL-MCNC: 117 MG/DL (ref 100–199)
CO2 SERPL-SCNC: 23 MMOL/L (ref 20–33)
CREAT SERPL-MCNC: 1.21 MG/DL (ref 0.5–1.4)
EOSINOPHIL # BLD AUTO: 0.07 K/UL (ref 0–0.51)
EOSINOPHIL NFR BLD: 1.4 % (ref 0–6.9)
ERYTHROCYTE [DISTWIDTH] IN BLOOD BY AUTOMATED COUNT: 45.2 FL (ref 35.9–50)
GFR SERPLBLD CREATININE-BSD FMLA CKD-EPI: 45 ML/MIN/1.73 M 2
GLOBULIN SER CALC-MCNC: 2.9 G/DL (ref 1.9–3.5)
GLUCOSE SERPL-MCNC: 95 MG/DL (ref 65–99)
HCT VFR BLD AUTO: 39.4 % (ref 37–47)
HDLC SERPL-MCNC: 43 MG/DL
HGB BLD-MCNC: 13.6 G/DL (ref 12–16)
IMM GRANULOCYTES # BLD AUTO: 0.02 K/UL (ref 0–0.11)
IMM GRANULOCYTES NFR BLD AUTO: 0.4 % (ref 0–0.9)
LDLC SERPL CALC-MCNC: 53 MG/DL
LYMPHOCYTES # BLD AUTO: 1.59 K/UL (ref 1–4.8)
LYMPHOCYTES NFR BLD: 32.8 % (ref 22–41)
MCH RBC QN AUTO: 31.1 PG (ref 27–33)
MCHC RBC AUTO-ENTMCNC: 34.5 G/DL (ref 32.2–35.5)
MCV RBC AUTO: 90.2 FL (ref 81.4–97.8)
MONOCYTES # BLD AUTO: 0.4 K/UL (ref 0–0.85)
MONOCYTES NFR BLD AUTO: 8.2 % (ref 0–13.4)
NEUTROPHILS # BLD AUTO: 2.74 K/UL (ref 1.82–7.42)
NEUTROPHILS NFR BLD: 56.6 % (ref 44–72)
NRBC # BLD AUTO: 0 K/UL
NRBC BLD-RTO: 0 /100 WBC (ref 0–0.2)
PLATELET # BLD AUTO: 273 K/UL (ref 164–446)
PMV BLD AUTO: 10.5 FL (ref 9–12.9)
POTASSIUM SERPL-SCNC: 4.6 MMOL/L (ref 3.6–5.5)
PROT SERPL-MCNC: 6.9 G/DL (ref 6–8.2)
RBC # BLD AUTO: 4.37 M/UL (ref 4.2–5.4)
SODIUM SERPL-SCNC: 138 MMOL/L (ref 135–145)
T3FREE SERPL-MCNC: 2.61 PG/ML (ref 2–4.4)
T4 FREE SERPL-MCNC: 2.14 NG/DL (ref 0.93–1.7)
TRIGL SERPL-MCNC: 107 MG/DL (ref 0–149)
TSH SERPL-ACNC: 0.67 UIU/ML (ref 0.35–5.5)
WBC # BLD AUTO: 4.9 K/UL (ref 4.8–10.8)

## 2024-08-30 PROCEDURE — 82306 VITAMIN D 25 HYDROXY: CPT

## 2024-08-30 PROCEDURE — 80061 LIPID PANEL: CPT

## 2024-08-30 PROCEDURE — 84443 ASSAY THYROID STIM HORMONE: CPT

## 2024-08-30 PROCEDURE — 85025 COMPLETE CBC W/AUTO DIFF WBC: CPT

## 2024-08-30 PROCEDURE — 36415 COLL VENOUS BLD VENIPUNCTURE: CPT

## 2024-08-30 PROCEDURE — 84481 FREE ASSAY (FT-3): CPT

## 2024-08-30 PROCEDURE — 80053 COMPREHEN METABOLIC PANEL: CPT

## 2024-08-30 PROCEDURE — 84439 ASSAY OF FREE THYROXINE: CPT

## 2024-09-04 ENCOUNTER — OFFICE VISIT (OUTPATIENT)
Dept: MEDICAL GROUP | Age: 84
End: 2024-09-04
Payer: MEDICARE

## 2024-09-04 VITALS
WEIGHT: 119 LBS | DIASTOLIC BLOOD PRESSURE: 54 MMHG | OXYGEN SATURATION: 97 % | HEART RATE: 58 BPM | TEMPERATURE: 97.5 F | SYSTOLIC BLOOD PRESSURE: 110 MMHG | BODY MASS INDEX: 21.9 KG/M2 | HEIGHT: 62 IN

## 2024-09-04 DIAGNOSIS — N28.9 RENAL INSUFFICIENCY: ICD-10-CM

## 2024-09-04 DIAGNOSIS — R25.1 TREMOR: ICD-10-CM

## 2024-09-04 PROCEDURE — 3078F DIAST BP <80 MM HG: CPT | Performed by: FAMILY MEDICINE

## 2024-09-04 PROCEDURE — 99214 OFFICE O/P EST MOD 30 MIN: CPT | Performed by: FAMILY MEDICINE

## 2024-09-04 PROCEDURE — 3074F SYST BP LT 130 MM HG: CPT | Performed by: FAMILY MEDICINE

## 2024-09-04 ASSESSMENT — FIBROSIS 4 INDEX: FIB4 SCORE: 1.7

## 2024-09-05 ENCOUNTER — HOSPITAL ENCOUNTER (OUTPATIENT)
Dept: LAB | Facility: MEDICAL CENTER | Age: 84
End: 2024-09-05
Attending: FAMILY MEDICINE
Payer: MEDICARE

## 2024-09-05 DIAGNOSIS — N28.9 RENAL INSUFFICIENCY: ICD-10-CM

## 2024-09-05 LAB
ALBUMIN SERPL BCP-MCNC: 4.3 G/DL (ref 3.2–4.9)
ALBUMIN/GLOB SERPL: 1.6 G/DL
ALP SERPL-CCNC: 89 U/L (ref 30–99)
ALT SERPL-CCNC: 30 U/L (ref 2–50)
ANION GAP SERPL CALC-SCNC: 12 MMOL/L (ref 7–16)
AST SERPL-CCNC: 32 U/L (ref 12–45)
BILIRUB SERPL-MCNC: 0.4 MG/DL (ref 0.1–1.5)
BUN SERPL-MCNC: 46 MG/DL (ref 8–22)
CALCIUM ALBUM COR SERPL-MCNC: 9.9 MG/DL (ref 8.5–10.5)
CALCIUM SERPL-MCNC: 10.1 MG/DL (ref 8.5–10.5)
CHLORIDE SERPL-SCNC: 103 MMOL/L (ref 96–112)
CO2 SERPL-SCNC: 21 MMOL/L (ref 20–33)
CREAT SERPL-MCNC: 1.03 MG/DL (ref 0.5–1.4)
GFR SERPLBLD CREATININE-BSD FMLA CKD-EPI: 54 ML/MIN/1.73 M 2
GLOBULIN SER CALC-MCNC: 2.7 G/DL (ref 1.9–3.5)
GLUCOSE SERPL-MCNC: 100 MG/DL (ref 65–99)
POTASSIUM SERPL-SCNC: 4.7 MMOL/L (ref 3.6–5.5)
PROT SERPL-MCNC: 7 G/DL (ref 6–8.2)
PROT UR-MCNC: 15 MG/DL (ref 0–15)
SODIUM SERPL-SCNC: 136 MMOL/L (ref 135–145)

## 2024-09-05 PROCEDURE — 36415 COLL VENOUS BLD VENIPUNCTURE: CPT

## 2024-09-05 PROCEDURE — 84300 ASSAY OF URINE SODIUM: CPT

## 2024-09-05 PROCEDURE — 84156 ASSAY OF PROTEIN URINE: CPT

## 2024-09-05 PROCEDURE — 80053 COMPREHEN METABOLIC PANEL: CPT

## 2024-09-05 NOTE — PROGRESS NOTES
This medical record contains text that has been entered with the assistance of computer voice recognition and dictation software.  Therefore, it may contain unintended errors in text, spelling, punctuation, or grammar      Verbal consent was acquired by the patient to use [employer] ambient listening note generation during this visit Yes       Chief Complaint   Patient presents with    Lab Results     Lab review    Medication Refill     Med review    Tremors     Started noticing and has family HX          Sheyla Gauthier is a 83 y.o. female here evaluation and management of: labs      HPI:           1. Renal insufficiency      2. Tremor    History of Present Illness  The patient is an 83-year-old female who presents for evaluation of multiple medical concerns.    She is currently on medication for lumbar pain and cholesterol management and is questioning the necessity of the cholesterol medication. Her thyroid medication dosage is 88 mcg. She reports that she does not consume any NSAIDs. She was prescribed Eliquis during a hospital stay but has since discontinued its use. She also mentions a recent urological (bladder prolapse) issue.    She expresses concern about experiencing tremors, which were severe in her father and his sisters, but not on her mother's side. These tremors have become noticeable recently, leading her to question if they are a side effect of her medications. The tremors occasionally intensify, causing her to trip or knock things over. She describes feeling shaky internally.    She has recently relocated to [place] and is making efforts to maintain a positive attitude. However, she admits to feelings of anxiety and depression. 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 She spent 4 to 5 months in [place] near one of her daughters, and another daughter from [place] recently visited her. She maintains an active social life, participating in a book club  and other activities.     Latest Reference Range & Units 08/30/24 08:27   Bun 8 - 22 mg/dL 39 (H)   Creatinine 0.50 - 1.40 mg/dL 1.21   GFR (CKD-EPI) >60 mL/min/1.73 m 2 45 !   Calcium 8.5 - 10.5 mg/dL 10.4   Correct Calcium 8.5 - 10.5 mg/dL 10.4   AST(SGOT) 12 - 45 U/L 24   ALT(SGPT) 2 - 50 U/L 18   Alkaline Phosphatase 30 - 99 U/L 103 (H)   Total Bilirubin 0.1 - 1.5 mg/dL 0.5   Albumin 3.2 - 4.9 g/dL 4.0   Total Protein 6.0 - 8.2 g/dL 6.9   Globulin 1.9 - 3.5 g/dL 2.9   A-G Ratio g/dL 1.4   (H): Data is abnormally high  !: Data is abnormal    Current medicines (including changes today)  Current Outpatient Medications   Medication Sig Dispense Refill    losartan (COZAAR) 50 MG Tab Take 50 mg by mouth every day.      acetaminophen (TYLENOL) 500 MG Tab Take 2 Tablets by mouth in the morning, at noon, and at bedtime. 30 Tablet 0    atorvastatin (LIPITOR) 40 MG Tab Take 1 Tablet by mouth at bedtime. 90 Tablet 0    levothyroxine (SYNTHROID) 88 MCG Tab Take 1 Tablet by mouth every morning on an empty stomach. 90 Tablet 0    spironolactone (ALDACTONE) 25 MG Tab Spironolactone      Methocarbamol 1000 MG Tab Take 1,000 mg by mouth in the morning, at noon, and at bedtime. 270 Tablet 0    liothyronine (CYTOMEL) 5 MCG Tab Take 1 Tablet by mouth every morning. 90 Tablet 0     No current facility-administered medications for this visit.     She  has a past medical history of Anxiety, Arthritis, Back pain, CAD (coronary artery disease) (10/2018), Cataract, Daytime sleepiness, Depression, Dyslipidemia, Frequent headaches, Herpes zoster, High cholesterol, Hypertension, MHA (microangiopathic hemolytic anemia), Migraine syndrome ( ), Mumps, Osteopathia, Osteoporosis, Painful joint, Post-menopause on HRT (hormone replacement therapy), Renal disorder, Sleep apnea, Snoring, Thyroid disease, Urinary incontinence, and White matter abnormality on MRI of brain.  She  has a past surgical history that includes cholecystectomy (1996);  "abdominal hysterectomy total (1986); pr breast reduction (); foot surgery (); pr breast augmentation with implant; mammoplasty augmentation; bunionectomy (Left, ); eye surgery; hysterectomy laparoscopy; arthroscopy, knee; laminotomy (2022); lumbar laminectomy diskectomy (N/A, 2023); hardware removal ortho (N/A, 2023); lumbar laminectomy diskectomy (N/A, 11/10/2023); and pr partial hip replacement (Left, 2023).  Social History     Tobacco Use    Smoking status: Former     Current packs/day: 0.00     Types: Cigarettes     Quit date: 1979     Years since quittin.1    Smokeless tobacco: Never    Tobacco comments:     smoked off & on for 10 yrs   Vaping Use    Vaping status: Never Used   Substance Use Topics    Alcohol use: Yes     Comment: occassional    Drug use: No     Social History     Social History Narrative    Not on file     Family History   Problem Relation Age of Onset    Stroke Father     Arthritis Father     Hyperlipidemia Father     Hypertension Father     Cancer Sister         breast    Arthritis Sister     Arthritis Mother     Hypertension Mother     Hyperlipidemia Mother     Stroke Mother     Arthritis Brother     Cancer Brother     Hypertension Brother     Hyperlipidemia Brother     Heart Disease Brother         CABG for prox LAD in late 40s    Cancer Daughter         breast     Family Status   Relation Name Status    Fa      Sis  Alive    Mo          tia    Bro  Alive    Ayesha  (Not Specified)   No partnership data on file         ROS    The pertinent  ROS findings can be seen in the HPI above.     All other systems reviewed and are negative     Objective:     /54 (BP Location: Left arm, Patient Position: Sitting, BP Cuff Size: Adult)   Pulse (!) 58   Temp 36.4 °C (97.5 °F) (Temporal)   Ht 1.575 m (5' 2\")   Wt 54 kg (119 lb)   SpO2 97%  Body mass index is 21.77 kg/m².      Physical Exam:    Constitutional: Alert, no distress.  Skin: " No suspicious lesions  Eye: Equal, round and reactive, conjunctiva clear, lids normal.  ENMT: Lips without lesions, good dentition, oropharynx clear.  Neck: Trachea midline, no masses, no thyromegaly. No cervical or supraclavicular lymphadenopathy.  Respiratory: Unlabored respiratory effort, lungs clear to auscultation, no wheezes, no ronchi.  Cardiovascular: Normal S1, S2, no murmur, no edema  Abdomen: Soft, non-tender, no masses, no hepatosplenomegaly.        Assessment and Plan:   The following treatment plan was discussed    All recent labs and provider notes reviewed    1. Renal insufficiency    Continue bp control  Avoid Nsaids and all nephrotoxins  Renally dose all medications when indicated      - PROTEIN TOTAL, URINE; Future  - URINE SODIUM RANDOM; Future  - Comp Metabolic Panel; Future    2. Tremor     Does not want pharmacotherapy yet.        Instructed to Follow up in clinic or ER for worsening symptoms, difficulty breathing, lack of expected recovery, or should new symptoms or problems arise.    Followup: Return in about 6 months (around 3/4/2025) for Reevaluation, labs.

## 2024-09-06 ENCOUNTER — TELEPHONE (OUTPATIENT)
Dept: MEDICAL GROUP | Age: 84
End: 2024-09-06
Payer: MEDICARE

## 2024-09-06 LAB — SODIUM UR-SCNC: 96 MMOL/L

## 2024-09-06 NOTE — TELEPHONE ENCOUNTER
Phone Number Called: [phone]    Call outcome: Spoke to patient regarding message below.    Message: PATIENT TOOK PREGABALIN AND IS NOT FEELING GOOD. SHE HAS A REACTION TO IT AND SHE WAS TOLD IT WAS SUPPOSED TO HELP WITH HER PAIN. SHE WANTS IT ADDED TO HER SENSITIVITY ALLERGIES

## 2024-11-01 ENCOUNTER — TELEPHONE (OUTPATIENT)
Dept: MEDICAL GROUP | Age: 84
End: 2024-11-01
Payer: MEDICARE

## 2024-11-01 DIAGNOSIS — M54.40 CHRONIC LOW BACK PAIN WITH SCIATICA, SCIATICA LATERALITY UNSPECIFIED, UNSPECIFIED BACK PAIN LATERALITY: ICD-10-CM

## 2024-11-01 DIAGNOSIS — G89.29 CHRONIC LOW BACK PAIN WITH SCIATICA, SCIATICA LATERALITY UNSPECIFIED, UNSPECIFIED BACK PAIN LATERALITY: ICD-10-CM

## 2024-11-01 NOTE — TELEPHONE ENCOUNTER
Caller Name:MELISSA  Call Back Number: [phone]    How would the patient prefer to be contacted with a response: Phone call OK to leave a detailed message    PATIENT IS REQUESTING A  PHYSICAL THERAPY FOR A LUMBAR STENOSIS AND SPINAL STENOSIS

## 2024-11-13 ENCOUNTER — OFFICE VISIT (OUTPATIENT)
Dept: MEDICAL GROUP | Age: 84
End: 2024-11-13
Payer: MEDICARE

## 2024-11-13 VITALS
HEIGHT: 62 IN | HEART RATE: 68 BPM | OXYGEN SATURATION: 100 % | TEMPERATURE: 97.5 F | DIASTOLIC BLOOD PRESSURE: 60 MMHG | WEIGHT: 135 LBS | SYSTOLIC BLOOD PRESSURE: 126 MMHG | BODY MASS INDEX: 24.84 KG/M2

## 2024-11-13 DIAGNOSIS — G31.84 MCI (MILD COGNITIVE IMPAIRMENT): ICD-10-CM

## 2024-11-13 DIAGNOSIS — R41.3 MEMORY DEFICIT: ICD-10-CM

## 2024-11-13 PROCEDURE — 3078F DIAST BP <80 MM HG: CPT | Performed by: FAMILY MEDICINE

## 2024-11-13 PROCEDURE — 99214 OFFICE O/P EST MOD 30 MIN: CPT | Performed by: FAMILY MEDICINE

## 2024-11-13 PROCEDURE — 3074F SYST BP LT 130 MM HG: CPT | Performed by: FAMILY MEDICINE

## 2024-11-13 RX ORDER — HYDROCODONE BITARTRATE AND ACETAMINOPHEN 10; 325 MG/1; MG/1
1-2 TABLET ORAL EVERY 8 HOURS PRN
COMMUNITY

## 2024-11-13 RX ORDER — PREGABALIN 75 MG/1
1 CAPSULE ORAL 2 TIMES DAILY
COMMUNITY

## 2024-11-13 RX ORDER — DONEPEZIL HYDROCHLORIDE 5 MG/1
5 TABLET, FILM COATED ORAL NIGHTLY
Qty: 90 TABLET | Refills: 0 | Status: SHIPPED | OUTPATIENT
Start: 2024-11-13

## 2024-11-13 ASSESSMENT — FIBROSIS 4 INDEX: FIB4 SCORE: 1.78

## 2024-11-13 NOTE — PROGRESS NOTES
This medical record contains text that has been entered with the assistance of computer voice recognition and dictation software.  Therefore, it may contain unintended errors in text, spelling, punctuation, or grammar      Verbal consent was acquired by the patient to use [employer] ambient listening note generation during this visit Yes       Chief Complaint   Patient presents with    Follow-Up     New Health Concern    Medication Follow-up     Medication review         Sheyla Gauthier is a 84 y.o. female here evaluation and management of: memory      HPI:           1. MCI (mild cognitive impairment)      2. Memory deficit    History of Present Illness  The patient is an 84-year-old female who presents for evaluation of memory issues. She is accompanied by her daughter.    She has been experiencing some short-term memory issues, which have also been noticed by her daughter. Her daughter notes that she often repeats herself during conversations, forgetting that she has already mentioned certain topics. Despite living alone in a longterm community, she manages well. She has a habit of writing things down to aid her memory.    She is currently on a regimen of losartan, taken twice daily, once in the morning and once at night, to manage her blood pressure. She reports no feelings of fatigue.    FAMILY HISTORY  Her father had memory issues.        Current medicines (including changes today)  Current Outpatient Medications   Medication Sig Dispense Refill    HYDROcodone/acetaminophen (NORCO)  MG Tab Take 1-2 Tablets by mouth every 8 hours as needed.      pregabalin (LYRICA) 75 MG Cap Take 1 Capsule by mouth 2 times a day.      donepezil (ARICEPT) 5 MG Tab Take 1 Tablet by mouth every evening. 90 Tablet 0    losartan (COZAAR) 50 MG Tab Take 50 mg by mouth every day.      spironolactone (ALDACTONE) 25 MG Tab Spironolactone      acetaminophen (TYLENOL) 500 MG Tab Take 2 Tablets by mouth in the morning, at  noon, and at bedtime. 30 Tablet 0    Methocarbamol 1000 MG Tab Take 1,000 mg by mouth in the morning, at noon, and at bedtime. 270 Tablet 0    atorvastatin (LIPITOR) 40 MG Tab Take 1 Tablet by mouth at bedtime. 90 Tablet 0    levothyroxine (SYNTHROID) 88 MCG Tab Take 1 Tablet by mouth every morning on an empty stomach. 90 Tablet 0    liothyronine (CYTOMEL) 5 MCG Tab Take 1 Tablet by mouth every morning. 90 Tablet 0     No current facility-administered medications for this visit.     She  has a past medical history of Anxiety, Arthritis, Back pain, CAD (coronary artery disease) (10/2018), Cataract, Daytime sleepiness, Depression, Dyslipidemia, Frequent headaches, Herpes zoster, High cholesterol, Hypertension, MHA (microangiopathic hemolytic anemia), Migraine syndrome ( ), Mumps, Osteopathia, Osteoporosis, Painful joint, Post-menopause on HRT (hormone replacement therapy), Renal disorder, Sleep apnea, Snoring, Thyroid disease, Urinary incontinence, and White matter abnormality on MRI of brain.  She  has a past surgical history that includes cholecystectomy (); abdominal hysterectomy total (1986); pr breast reduction (); foot surgery (); pr breast augmentation with implant; mammoplasty augmentation; bunionectomy (Left, ); eye surgery; hysterectomy laparoscopy; arthroscopy, knee; laminotomy (2022); lumbar laminectomy diskectomy (N/A, 2023); hardware removal ortho (N/A, 2023); lumbar laminectomy diskectomy (N/A, 11/10/2023); and pr partial hip replacement (Left, 2023).  Social History     Tobacco Use    Smoking status: Former     Current packs/day: 0.00     Types: Cigarettes     Quit date: 1979     Years since quittin.3    Smokeless tobacco: Never    Tobacco comments:     smoked off & on for 10 yrs   Vaping Use    Vaping status: Never Used   Substance Use Topics    Alcohol use: Yes     Comment: occassional    Drug use: No     Social History     Social History Narrative     "Not on file     Family History   Problem Relation Age of Onset    Stroke Father     Arthritis Father     Hyperlipidemia Father     Hypertension Father     Cancer Sister         breast    Arthritis Sister     Arthritis Mother     Hypertension Mother     Hyperlipidemia Mother     Stroke Mother     Arthritis Brother     Cancer Brother     Hypertension Brother     Hyperlipidemia Brother     Heart Disease Brother         CABG for prox LAD in late 40s    Cancer Daughter         breast     Family Status   Relation Name Status    Fa      Sis  Alive    Mo          tia    Bro  Alive    Ayesha  (Not Specified)   No partnership data on file         ROS    The pertinent  ROS findings can be seen in the HPI above.     All other systems reviewed and are negative     Objective:     /60 (BP Location: Left arm, Patient Position: Sitting, BP Cuff Size: Adult)   Pulse 68   Temp 36.4 °C (97.5 °F) (Temporal)   Ht 1.575 m (5' 2\")   Wt 61.2 kg (135 lb)   SpO2 100%  Body mass index is 24.69 kg/m².      Physical Exam:    Constitutional: Alert, no distress.  Skin: No suspicious lesions  Eye: Equal, round and reactive, conjunctiva clear, lids normal.  ENMT: Lips without lesions, good dentition, oropharynx clear.  Neck: Trachea midline, no masses, no thyromegaly. No cervical or supraclavicular lymphadenopathy.  Respiratory: Unlabored respiratory effort, lungs clear to auscultation, no wheezes, no ronchi.  Cardiovascular: Normal S1, S2, no murmur, no edema  Abdomen: Soft, non-tender, no masses, no hepatosplenomegaly.    MOCA = 24    Assessment and Plan:   The following treatment plan was discussed    All recent labs and provider notes reviewed    1. MCI (mild cognitive impairment)    Neuroimaging not clinically indicated as this is not an acute onset of cognitive impairment nor a rapid neurologic deterioration.   Encouraged patient to play brain games  Learn a new language, use  opposite hand,  Also cardivascular risk " stratification    - Referral to Neurology  - donepezil (ARICEPT) 5 MG Tab; Take 1 Tablet by mouth every evening.  Dispense: 90 Tablet; Refill: 0        Other orders  - HYDROcodone/acetaminophen (NORCO)  MG Tab; Take 1-2 Tablets by mouth every 8 hours as needed.  - pregabalin (LYRICA) 75 MG Cap; Take 1 Capsule by mouth 2 times a day.             Instructed to Follow up in clinic or ER for worsening symptoms, difficulty breathing, lack of expected recovery, or should new symptoms or problems arise.    Followup: Return in about 3 months (around 2/13/2025) for Reevaluation, labs.

## 2024-11-21 ENCOUNTER — TELEPHONE (OUTPATIENT)
Dept: HEALTH INFORMATION MANAGEMENT | Facility: OTHER | Age: 84
End: 2024-11-21
Payer: MEDICARE

## 2025-01-16 DIAGNOSIS — M48.062 LUMBAR STENOSIS WITH NEUROGENIC CLAUDICATION: ICD-10-CM

## 2025-01-16 NOTE — PROGRESS NOTES
1. Caller Name: MELISSA                        Call Back Number: [phone]      How would the patient prefer to be contacted with a response: Phone call OK to leave a detailed message    PATIENT CALLED REQUESTING A REFERRAL FOR PT FOR LUMBAR AND LEFT LEG CONCERN

## 2025-01-22 ENCOUNTER — TELEPHONE (OUTPATIENT)
Dept: MEDICAL GROUP | Age: 85
End: 2025-01-22
Payer: MEDICARE

## 2025-01-23 NOTE — TELEPHONE ENCOUNTER
Phone Number Called: [phone]    Call outcome: Left detailed message for patient. Informed to call back with any additional questions.    Message: LVM FOR PATIENTS DAUGHTER ANGELITO TO CONFIRM REFERRAL WAS SENT TO [facility] AS PATIENT STATED

## 2025-01-28 ENCOUNTER — TELEPHONE (OUTPATIENT)
Dept: MEDICAL GROUP | Age: 85
End: 2025-01-28
Payer: MEDICARE

## 2025-01-28 ENCOUNTER — TELEPHONE (OUTPATIENT)
Dept: HEALTH INFORMATION MANAGEMENT | Facility: OTHER | Age: 85
End: 2025-01-28
Payer: MEDICARE

## 2025-01-28 NOTE — TELEPHONE ENCOUNTER
Phone Number Called: [phone]    Call outcome: Spoke to patient regarding message below.    Message: PATIENTS DAUGHTER CALLED STATING THAT SHE IS HAVING A MORE FREQUENT EPISODES OF MEMORY LOSS AND DEMENTIA. PATIENTS DAUGHTER WOULD LIKE TO HAVE AN OPINION ON HOW TO HELP. PATIENTS DAUGHTER WAS REFERRED TO NEUROLOGY AND PATIENTS DAUGHTER  WAS NOT  INFORMED ABOUT REFERRAL AND WILL CONTACT NEUROLOGY.

## 2025-02-21 NOTE — TELEPHONE ENCOUNTER
BEEN OUT OF OFFICE ON 2/28. LVM FOR PT TO CALL BACK AND R/S. OK FOR HUB TO SCHEDULE NEXT AVAILABLE.    From: Sheyla Gauthier  To: RINA Garcia  Sent: 12/14/2020 1:04 PM PST  Subject: Non-Urgent Medical Question    Good Afternoon Kezia,  I am having a problem w/ the M DreamWear Under the Nose Nasal Mask. I know the air is set to accommodate my Sleep [place] issues but my nose and face are uncomfortably cold. To be hosest my very cold nose and face make it difficult to rest easy and fall asleep. I care for my equipment and sleep has been pretty good w/ only slight headaches. Can I change to a nasal pillow system?   Thank you,  Sheyla Gauthier

## 2025-03-05 ENCOUNTER — TELEPHONE (OUTPATIENT)
Dept: MEDICAL GROUP | Age: 85
End: 2025-03-05
Payer: MEDICARE

## 2025-03-05 ENCOUNTER — HOSPITAL ENCOUNTER (OUTPATIENT)
Facility: MEDICAL CENTER | Age: 85
End: 2025-03-05
Attending: INTERNAL MEDICINE
Payer: MEDICARE

## 2025-03-05 LAB
T4 FREE SERPL-MCNC: 1.33 NG/DL (ref 0.93–1.7)
TSH SERPL-ACNC: 0.63 UIU/ML (ref 0.35–5.5)

## 2025-03-05 PROCEDURE — 84443 ASSAY THYROID STIM HORMONE: CPT

## 2025-03-05 PROCEDURE — 84439 ASSAY OF FREE THYROXINE: CPT

## 2025-03-06 NOTE — TELEPHONE ENCOUNTER
Phone Number Called: [phone]    Call outcome: Spoke to patient regarding message below.    Message: PATIENTS DAUGHTERS WERE INQUIRING INFORMATION ABOUT ADVANCE DIRECTIVE, POWER OF , AND LIVING WILL. PTS DAUGHTERS WILL WORK ON HER POLST. PRIOR TO HER SURGERY PROCEDURE

## 2025-03-25 ENCOUNTER — TELEPHONE (OUTPATIENT)
Dept: MEDICAL GROUP | Age: 85
End: 2025-03-25
Payer: MEDICARE

## 2025-03-25 DIAGNOSIS — H91.91 HEARING LOSS OF RIGHT EAR, UNSPECIFIED HEARING LOSS TYPE: ICD-10-CM

## 2025-03-25 NOTE — TELEPHONE ENCOUNTER
Phone Number Called: [phone]    Call outcome: Spoke to patient regarding message below.    Message: PATIENT CALLED REQUESTING A REFERRAL TO ENT TO HAVE EARS LOOKED AT. LAST TIME SHE WAS SEEN WAS BACK IN 08/2020. SHE WANTS TO BE SEEN FOR HEARING LOSS DUE TO EAR WAX BUILD UP.

## 2025-03-26 ENCOUNTER — TELEPHONE (OUTPATIENT)
Dept: MEDICAL GROUP | Age: 85
End: 2025-03-26
Payer: MEDICARE

## 2025-03-26 NOTE — TELEPHONE ENCOUNTER
Phone Number Called: [phone]    Call outcome: Left detailed message for patient. Informed to call back with any additional questions.    Message: lvm to request more clarification about a request for a referral to PT. She has an active referral already

## 2025-03-27 ENCOUNTER — OFFICE VISIT (OUTPATIENT)
Dept: MEDICAL GROUP | Age: 85
End: 2025-03-27
Payer: MEDICARE

## 2025-03-27 VITALS
OXYGEN SATURATION: 96 % | DIASTOLIC BLOOD PRESSURE: 60 MMHG | WEIGHT: 140 LBS | SYSTOLIC BLOOD PRESSURE: 106 MMHG | BODY MASS INDEX: 25.76 KG/M2 | HEIGHT: 62 IN | TEMPERATURE: 97.4 F | HEART RATE: 62 BPM

## 2025-03-27 DIAGNOSIS — E55.9 VITAMIN D DEFICIENCY: ICD-10-CM

## 2025-03-27 DIAGNOSIS — G31.84 MCI (MILD COGNITIVE IMPAIRMENT): ICD-10-CM

## 2025-03-27 DIAGNOSIS — Z78.0 POSTMENOPAUSAL: ICD-10-CM

## 2025-03-27 DIAGNOSIS — M79.671 RIGHT FOOT PAIN: ICD-10-CM

## 2025-03-27 DIAGNOSIS — E78.2 MIXED HYPERLIPIDEMIA: ICD-10-CM

## 2025-03-27 PROCEDURE — 3074F SYST BP LT 130 MM HG: CPT | Performed by: FAMILY MEDICINE

## 2025-03-27 PROCEDURE — 99214 OFFICE O/P EST MOD 30 MIN: CPT | Performed by: FAMILY MEDICINE

## 2025-03-27 PROCEDURE — 3078F DIAST BP <80 MM HG: CPT | Performed by: FAMILY MEDICINE

## 2025-03-27 RX ORDER — DONEPEZIL HYDROCHLORIDE 10 MG/1
10 TABLET, FILM COATED ORAL NIGHTLY
Qty: 90 TABLET | Refills: 2 | Status: SHIPPED | OUTPATIENT
Start: 2025-03-27

## 2025-03-27 ASSESSMENT — PATIENT HEALTH QUESTIONNAIRE - PHQ9
7. TROUBLE CONCENTRATING ON THINGS, SUCH AS READING THE NEWSPAPER OR WATCHING TELEVISION: NOT AT ALL
9. THOUGHTS THAT YOU WOULD BE BETTER OFF DEAD, OR OF HURTING YOURSELF: NOT AT ALL
5. POOR APPETITE OR OVEREATING: NOT AT ALL
2. FEELING DOWN, DEPRESSED, IRRITABLE, OR HOPELESS: NOT AT ALL
3. TROUBLE FALLING OR STAYING ASLEEP OR SLEEPING TOO MUCH: NOT AT ALL
SUM OF ALL RESPONSES TO PHQ9 QUESTIONS 1 AND 2: 0
6. FEELING BAD ABOUT YOURSELF - OR THAT YOU ARE A FAILURE OR HAVE LET YOURSELF OR YOUR FAMILY DOWN: NOT AL ALL
8. MOVING OR SPEAKING SO SLOWLY THAT OTHER PEOPLE COULD HAVE NOTICED. OR THE OPPOSITE, BEING SO FIGETY OR RESTLESS THAT YOU HAVE BEEN MOVING AROUND A LOT MORE THAN USUAL: NOT AT ALL
SUM OF ALL RESPONSES TO PHQ QUESTIONS 1-9: 0
1. LITTLE INTEREST OR PLEASURE IN DOING THINGS: NOT AT ALL
4. FEELING TIRED OR HAVING LITTLE ENERGY: NOT AT ALL

## 2025-03-27 ASSESSMENT — FIBROSIS 4 INDEX: FIB4 SCORE: 1.78

## 2025-03-27 NOTE — PROGRESS NOTES
This medical record contains text that has been entered with the assistance of computer voice recognition and dictation software.  Therefore, it may contain unintended errors in text, spelling, punctuation, or grammar      Verbal consent was acquired by the patient to use [employer] ambient listening note generation during this visit Yes       Chief Complaint   Patient presents with    Foot Problem     Started about a month ago         Sheyla Gauthier is a 84 y.o. female here evaluation and management of: foot pain      HPI:           1. Right foot pain      2. MCI (mild cognitive impairment)      3. Postmenopausal    History of Present Illness  The patient presents for evaluation of memory issues, lower extremity edema, and left leg pain.    She expresses concern regarding her memory, despite being on a nightly medication regimen. She has not experienced any adverse effects such as diarrhea from the current medication. She engages in cognitive activities such as playing bridge and word games on her mobile device.    She reports experiencing tingling sensations in her legs, which she attributes to the spinal stimulator she is currently using. This device is intended to alleviate gait-related pain in her spine. She also notes significant swelling in her feet and legs, a condition she has previously encountered. She has attempted to manage this with compression stockings but finds them irritating and ineffective, even exacerbating the swelling.    Her mobility is further compromised by pain in her left leg, which limits her ability to walk. [place] in [place] community and utilizes a walker for support, particularly when feeling unstable on her leg. She has been using the walker since a surgical procedure performed a few years ago.              Current medicines (including changes today)  Current Outpatient Medications   Medication Sig Dispense Refill    donepezil (ARICEPT) 10 MG tablet Take 1 Tablet by  mouth every evening. 90 Tablet 2    HYDROcodone/acetaminophen (NORCO)  MG Tab Take 1-2 Tablets by mouth every 8 hours as needed.      pregabalin (LYRICA) 75 MG Cap Take 1 Capsule by mouth 2 times a day.      donepezil (ARICEPT) 5 MG Tab Take 1 Tablet by mouth every evening. 90 Tablet 0    losartan (COZAAR) 50 MG Tab Take 50 mg by mouth every day.      spironolactone (ALDACTONE) 25 MG Tab Spironolactone      acetaminophen (TYLENOL) 500 MG Tab Take 2 Tablets by mouth in the morning, at noon, and at bedtime. 30 Tablet 0    Methocarbamol 1000 MG Tab Take 1,000 mg by mouth in the morning, at noon, and at bedtime. 270 Tablet 0    atorvastatin (LIPITOR) 40 MG Tab Take 1 Tablet by mouth at bedtime. 90 Tablet 0    levothyroxine (SYNTHROID) 88 MCG Tab Take 1 Tablet by mouth every morning on an empty stomach. 90 Tablet 0    liothyronine (CYTOMEL) 5 MCG Tab Take 1 Tablet by mouth every morning. 90 Tablet 0     No current facility-administered medications for this visit.     She  has a past medical history of Anxiety, Arthritis, Back pain, CAD (coronary artery disease) (10/2018), Cataract, Daytime sleepiness, Depression, Dyslipidemia, Frequent headaches, Herpes zoster, High cholesterol, Hypertension, MHA (microangiopathic hemolytic anemia), Migraine syndrome ( ), Mumps, Osteopathia, Osteoporosis, Painful joint, Post-menopause on HRT (hormone replacement therapy), Renal disorder, Sleep apnea, Snoring, Thyroid disease, Urinary incontinence, and White matter abnormality on MRI of brain.  She  has a past surgical history that includes cholecystectomy (1996); abdominal hysterectomy total (11/1986); pr breast reduction (1997); foot surgery (2002); pr breast augmentation with implant; mammoplasty augmentation; bunionectomy (Left, 2003); eye surgery; hysterectomy laparoscopy; arthroscopy, knee; laminotomy (01/28/2022); lumbar laminectomy diskectomy (N/A, 2/6/2023); hardware removal ortho (N/A, 2/6/2023); lumbar laminectomy  "diskectomy (N/A, 11/10/2023); and pr partial hip replacement (Left, 2023).  Social History     Tobacco Use    Smoking status: Former     Current packs/day: 0.00     Types: Cigarettes     Quit date: 1979     Years since quittin.7    Smokeless tobacco: Never    Tobacco comments:     smoked off & on for 10 yrs   Vaping Use    Vaping status: Never Used   Substance Use Topics    Alcohol use: Yes     Comment: occassional    Drug use: No     Social History     Social History Narrative    Not on file     Family History   Problem Relation Age of Onset    Stroke Father     Arthritis Father     Hyperlipidemia Father     Hypertension Father     Cancer Sister         breast    Arthritis Sister     Arthritis Mother     Hypertension Mother     Hyperlipidemia Mother     Stroke Mother     Arthritis Brother     Cancer Brother     Hypertension Brother     Hyperlipidemia Brother     Heart Disease Brother         CABG for prox LAD in late 40s    Cancer Daughter         breast     Family Status   Relation Name Status    Fa      Sis  Alive    Mo          tia    Bro  Alive    Ayesha  (Not Specified)   No partnership data on file         ROS    The pertinent  ROS findings can be seen in the HPI above.     All other systems reviewed and are negative     Objective:     /60 (BP Location: Left arm, Patient Position: Sitting, BP Cuff Size: Adult)   Pulse 62   Temp 36.3 °C (97.4 °F) (Temporal)   Ht 1.575 m (5' 2\")   Wt 63.5 kg (140 lb)   SpO2 96%  Body mass index is 25.61 kg/m².      Physical Exam:    Constitutional: Alert, no distress.  Skin: No suspicious lesions  Eye: Equal, round and reactive, conjunctiva clear, lids normal.  ENMT: Lips without lesions, good dentition, oropharynx clear.  Neck: Trachea midline, no masses, no thyromegaly. No cervical or supraclavicular lymphadenopathy.  Respiratory: Unlabored respiratory effort, lungs clear to auscultation, no wheezes, no ronchi.  Cardiovascular: Normal " S1, S2, no murmur, no edema  Abdomen: Soft, non-tender, no masses, no hepatosplenomegaly.  EXT--there is a 2 cm callus on the sole of right foot      Assessment and Plan:   The following treatment plan was discussed    All recent labs and provider notes reviewed    Assessment & Plan  1. Memory issues.  The dosage of Aricept will be escalated to 10 mg. She has been informed about the potential side effect of diarrhea. She is encouraged to engage in cognitive activities such as playing brain games, learning a new language, and practicing writing the alphabet with her non-dominant hand.    2. Lower extremity edema.  She is advised to utilize compression stockings and increase her physical activity, specifically walking. Annual laboratory tests have been ordered in preparation for her upcoming visit. A referral to podiatry has been made, and she will be contacted within a week. She is instructed to schedule a bone density test.    3. Left leg pain.  She reports pain in her left leg, which affects her ability to walk. She uses a walker when necessary, especially [place]. Further evaluation and management will be considered based on the outcome of the podiatry referral.        1. Right foot pain  - Referral to Podiatry    2. MCI (mild cognitive impairment)  - donepezil (ARICEPT) 10 MG tablet; Take 1 Tablet by mouth every evening.  Dispense: 90 Tablet; Refill: 2    3. Postmenopausal  - DS-BONE DENSITY STUDY (DEXA); Future    4. Mixed hyperlipidemia  - CBC WITH DIFFERENTIAL; Future  - Comp Metabolic Panel; Future  - Lipid Profile; Future  - TSH+FREE T4  - T3 FREE; Future    5. Vitamin D deficiency  - VITAMIN D,25 HYDROXY (DEFICIENCY); Future             Instructed to Follow up in clinic or ER for worsening symptoms, difficulty breathing, lack of expected recovery, or should new symptoms or problems arise.    Followup: Return in about 3 months (around 6/27/2025) for Reevaluation.

## 2025-03-31 NOTE — Clinical Note
REFERRAL APPROVAL NOTICE         Sent on March 31, 2025                   Sheyla Gauthier  [address]  [address]  [address] [facility]  [place] [address]                   Dear Ms. Gauthier,    After a careful review of the medical information and benefit coverage, [facility] has processed your referral. See below for additional details.    If applicable, you must be actively enrolled with your insurance for coverage of the authorized service. If you have any questions regarding your coverage, please contact your insurance directly.    REFERRAL INFORMATION   Referral #:  [id number]  Referred-To Provider    Referred-By Provider:  Podiatry    MADHAV Alcantar JOSEPH F      [address] [place] [address]  [phone] [id number]  [place] [address]  [phone]    Referral Start Date:  03/27/2025  Referral End Date:   03/27/2026             SCHEDULING  If you do not already have an appointment, please call [phone] to make an appointment.     MORE INFORMATION  If you do not already have a [facility] account, sign up at: [email or web address]  You can access your medical information, make appointments, see lab results, billing information, and more.  If you have questions regarding this referral, please contact  the [facility] Referrals department at:             [phone]. Monday - Friday 8:00AM - 5:00PM.     Sincerely,    [facility]

## 2025-03-31 NOTE — Clinical Note
REFERRAL APPROVAL NOTICE         Sent on March 31, 2025                   Sheyla Gauthier  [address]  [address]  [place] [facility]  [place] [address]                   Dear Ms. Gauthier,    After a careful review of the medical information and benefit coverage, [facility] has processed your referral. See below for additional details.    If applicable, you must be actively enrolled with your insurance for coverage of the authorized service. If you have any questions regarding your coverage, please contact your insurance directly.    REFERRAL INFORMATION   Referral #:  [id number]  Referred-To Provider    Referred-By Provider:  Otolaryngology    Darius Morataya M.D.   [facility]      [address] [place] [id number] [phone] [address] [place] [address]  [phone]    Referral Start Date:  03/25/2025  Referral End Date:   03/25/2026             SCHEDULING  If you do not already have an appointment, please call [phone] to make an appointment.     MORE INFORMATION  If you do not already have a [facility] account, sign up at: [email or web address]  You can access your medical information, make appointments, see lab results, billing information, and more.  If you have questions regarding this referral, please contact  the [facility] [place] Referrals department at:             [phone]. Monday - Friday 8:00AM - 5:00PM.     Sincerely,    [facility]

## 2025-04-22 ENCOUNTER — APPOINTMENT (OUTPATIENT)
Dept: MEDICAL GROUP | Age: 85
End: 2025-04-22
Payer: MEDICARE

## 2025-05-02 ENCOUNTER — HOSPITAL ENCOUNTER (OUTPATIENT)
Dept: RADIOLOGY | Facility: MEDICAL CENTER | Age: 85
End: 2025-05-02
Payer: MEDICARE

## 2025-05-02 DIAGNOSIS — R60.0 LOCALIZED EDEMA: ICD-10-CM

## 2025-05-02 PROCEDURE — 93971 EXTREMITY STUDY: CPT | Mod: LT

## 2025-05-08 ENCOUNTER — OFFICE VISIT (OUTPATIENT)
Dept: NEUROLOGY | Facility: MEDICAL CENTER | Age: 85
End: 2025-05-08
Attending: PSYCHIATRY & NEUROLOGY
Payer: MEDICARE

## 2025-05-08 VITALS
HEART RATE: 57 BPM | DIASTOLIC BLOOD PRESSURE: 60 MMHG | OXYGEN SATURATION: 98 % | SYSTOLIC BLOOD PRESSURE: 118 MMHG | BODY MASS INDEX: 25.52 KG/M2 | HEIGHT: 62 IN | WEIGHT: 138.67 LBS | TEMPERATURE: 97.9 F

## 2025-05-08 DIAGNOSIS — G31.84 AMNESTIC MCI (MILD COGNITIVE IMPAIRMENT WITH MEMORY LOSS): ICD-10-CM

## 2025-05-08 DIAGNOSIS — R41.3 MEMORY CHANGES: ICD-10-CM

## 2025-05-08 PROCEDURE — 3074F SYST BP LT 130 MM HG: CPT | Performed by: PSYCHIATRY & NEUROLOGY

## 2025-05-08 PROCEDURE — 99214 OFFICE O/P EST MOD 30 MIN: CPT

## 2025-05-08 PROCEDURE — 3078F DIAST BP <80 MM HG: CPT | Performed by: PSYCHIATRY & NEUROLOGY

## 2025-05-08 PROCEDURE — 99205 OFFICE O/P NEW HI 60 MIN: CPT | Performed by: PSYCHIATRY & NEUROLOGY

## 2025-05-08 RX ORDER — CHLORTHALIDONE 25 MG/1
25 TABLET ORAL DAILY
COMMUNITY

## 2025-05-08 RX ORDER — CITALOPRAM HYDROBROMIDE 20 MG/1
20 TABLET ORAL DAILY
COMMUNITY

## 2025-05-08 RX ORDER — SPIRONOLACTONE 25 MG/1
25 TABLET ORAL DAILY
COMMUNITY
Start: 2025-03-20

## 2025-05-08 RX ORDER — ROSUVASTATIN CALCIUM 5 MG/1
5 TABLET, COATED ORAL DAILY
COMMUNITY

## 2025-05-08 RX ORDER — AMLODIPINE BESYLATE 5 MG/1
5 TABLET ORAL DAILY
COMMUNITY

## 2025-05-08 RX ORDER — LOSARTAN POTASSIUM 50 MG/1
1 TABLET ORAL 2 TIMES DAILY
COMMUNITY

## 2025-05-08 RX ORDER — LEVOTHYROXINE SODIUM 50 UG/1
50 TABLET ORAL
COMMUNITY

## 2025-05-08 RX ORDER — ATORVASTATIN CALCIUM 40 MG/1
1 TABLET, FILM COATED ORAL DAILY
COMMUNITY

## 2025-05-08 RX ORDER — LEVOTHYROXINE SODIUM 88 UG/1
1 TABLET ORAL
COMMUNITY
End: 2025-05-22

## 2025-05-08 RX ORDER — CLONIDINE HYDROCHLORIDE 0.1 MG/1
0.1 TABLET ORAL 2 TIMES DAILY
COMMUNITY
Start: 2025-05-07

## 2025-05-08 RX ORDER — EZETIMIBE 10 MG/1
10 TABLET ORAL DAILY
COMMUNITY

## 2025-05-08 RX ORDER — ESCITALOPRAM OXALATE 20 MG/1
1 TABLET ORAL
COMMUNITY

## 2025-05-08 RX ORDER — OXYCODONE AND ACETAMINOPHEN 5; 325 MG/1; MG/1
1 TABLET ORAL EVERY 6 HOURS PRN
COMMUNITY

## 2025-05-08 ASSESSMENT — FIBROSIS 4 INDEX: FIB4 SCORE: 1.78

## 2025-05-08 ASSESSMENT — PATIENT HEALTH QUESTIONNAIRE - PHQ9: CLINICAL INTERPRETATION OF PHQ2 SCORE: 0

## 2025-05-08 NOTE — PROGRESS NOTES
Reason for Neurology Consult: memory disturbances for 6-12 months.    History of present illness:    Sheyla Gauthier 84 y.o. right handed woman who is here with her daughters on the phone. She grew up in the [facility] and did 2 years of college of [employer]. 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  She lived in the [facility] x 40 years and has been living at [address] [place] [address].    Problems List.  Aricept prescribed in 2024 and/or 3/2025> it is unclear whether she is taking it.      Sheyla has noticed in the last 6-9 months that she is more fearful and it bothers her that she has lost some of her memory and has fear of making a mistake (and not being me).     The daughters have noticed for the last 12-16 months there has been increasing forgetfulness and with more noticeable repeating what was just said within 2-5 minutes (such as when covering a topic, she with often repeat the conversation more than once in a conversation).  This repeating of information over 20 minutes, the information can be repeated and asks the same question over again.    Speech-communication- articulation seems good but there has been more word finding difficulty in routine conversations on the phone.      Quit smoker>  1 to 1.5 packs per day > has not smoked in 30 years > smoked for about 20 years.  Alcohol- vodka on the rocks (1.5 ounces)> a few times  a week or red wine a few times a week in her 40s and 50s.  No illicit drug use in her adult life.    Family Hx:    Mother:   at age 86  (ischemic stroke)- no clear evidence for stroke events.  Father's side: 3 of his  sisters (late onset)- Alzheimer's Disease   Brother: Vascular Dementia->  at age 87      Patient Active Problem List    Diagnosis Date Noted    Right foot pain 2025    Memory deficit 2024    MCI (mild cognitive impairment) 2024    Renal insufficiency  09/04/2024    Tremor 09/04/2024    History of left hip replacement 11/29/2023    Hypotension 11/28/2023    Left displaced femoral neck fracture (HCC) 11/23/2023    Acute deep vein thrombosis (DVT) of right lower extremity ([facility]) 11/23/2023    S/P lumbar laminectomy 11/22/2023    Closed fracture of neck of left femur ([facility]) 11/22/2023    Radiculopathy 11/13/2023    Spinal stenosis, lumbar region with neurogenic claudication 11/10/2023    Peripheral edema 10/13/2023    Chronic kidney disease, stage 3a 10/13/2023    Major depressive disorder with single episode, in partial remission ([facility]) 08/30/2023    Lumbar stenosis with neurogenic claudication 02/06/2023    Decreased hearing of right ear 10/20/2022    Dizziness 08/03/2022    Stage 2 chronic kidney disease 04/20/2022    Arthritis 11/03/2021    Memory loss 08/20/2020    Conductive hearing loss of right ear 08/20/2020    Left wrist pain 12/19/2019    Myalgia 10/01/2019    Former smoker 07/02/2019    Other insomnia 06/14/2019    Coronary artery disease involving native coronary artery of native heart without angina pectoris 03/26/2019    Bladder prolapse, female, acquired 03/07/2018    JOHN (obstructive sleep apnea) 02/22/2018    Age related osteoporosis 07/26/2017    Major depressive disorder 06/29/2017    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/08/2016    Spinal stenosis of lumbar region 11/14/2016    Migraine syndrome 02/11/2013    Acquired hypothyroidism 03/08/2011    HTN (hypertension) 10/21/2009    Postmenopausal hormone therapy 10/21/2009    Mixed hyperlipidemia     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 PCI/FARZAD ([place] 2.5 x 2mm) to the mid LAD. Cardic stent    Cataract     alex IOL    Daytime sleepiness     Depression     Dyslipidemia     Frequent headaches     Herpes zoster      High cholesterol     Hypertension     MHA (microangiopathic hemolytic anemia)     Migraine syndrome      Mumps     as a child    Osteopathia     Osteoporosis     Painful joint     Post-menopause on HRT (hormone replacement therapy)     Renal disorder     chronic kidney disease stage 3    Sleep apnea     no longer uses cpap    Snoring     Thyroid disease     hypothyroid    Urinary incontinence     White matter abnormality on MRI of brain        Past surgical history:   Past Surgical History:   Procedure Laterality Date    PB PARTIAL HIP REPLACEMENT Left 11/27/2023    Procedure: LEFT TOTAL HIP ARTHROPLASTY;  Surgeon: Parveen Ko M.D.;  Location: SURGERY [facility];  Service: Orthopedics    LUMBAR LAMINECTOMY DISKECTOMY N/A 11/10/2023    Procedure: LUMBAR 5-SACRAL 1 REDO LAMINECTOMY;  Surgeon: Earnest Silva M.D.;  Location: SURGERY [facility];  Service: Neurosurgery    LUMBAR LAMINECTOMY DISKECTOMY N/A 2/6/2023    Procedure: POSTERIOR REMOVAL OF INTERSPINOUS DEVICE WITH L4-S1 LAMINECTOMY;  Surgeon: Mannie Connor M.D.;  Location: SURGERY [facility];  Service: Neurosurgery    HARDWARE REMOVAL ORTHO N/A 2/6/2023    Procedure: REMOVAL, HARDWARE;  Surgeon: Mannie Connor M.D.;  Location: SURGERY [facility];  Service: Neurosurgery    LAMINOTOMY  01/28/2022    L4-L5 and L5-S1 posterior fusion with instrumentation and laminotomy by Dr. Benitez.    BUNIONECTOMY Left 2003    FOOT SURGERY  2002    [place] BREAST REDUCTION  1997    CHOLECYSTECTOMY  1996    ABDOMINAL HYSTERECTOMY TOTAL  11/1986    ARTHROSCOPY, KNEE      EYE SURGERY      cataracts    HYSTERECTOMY LAPAROSCOPY      MAMMOPLASTY AUGMENTATION      N[place] BREAST AUGMENTATION WITH IMPLANT           Social history:   Social History     Socioeconomic History    Marital status:      Spouse name: Not on file    Number of children: Not on file    Years of education: Not on file    Highest education level: Not on file   Occupational History    Not on file   Tobacco Use     Smoking status: Former     Current packs/day: 0.00     Types: Cigarettes     Quit date: 1979     Years since quittin.8    Smokeless tobacco: Never    Tobacco comments:     smoked off & on for 10 yrs   Vaping Use    Vaping status: Never Used   Substance and Sexual Activity    Alcohol use: Yes     Comment: occassional    Drug use: No    Sexual activity: Not Currently     Comment: , 3 kids, retired   Other Topics Concern    Not on file   Social History Narrative    Not on file     Social Drivers of Health     Financial Resource Strain: Not on file   Food Insecurity: Not on file   Transportation Needs: No Transportation Needs (2023)    PRAPARE - Transportation     Lack of Transportation (Medical): No     Lack of Transportation (Non-Medical): No   Physical Activity: Not on file   Stress: Not on file   Social Connections: Not on file   Intimate Partner Violence: Not on file   Housing Stability: Not on file       Family history:   Family History   Problem Relation Age of Onset    Stroke Father     Arthritis Father     Hyperlipidemia Father     Hypertension Father     Cancer Sister         breast    Arthritis Sister     Arthritis Mother     Hypertension Mother     Hyperlipidemia Mother     Stroke Mother     Arthritis Brother     Cancer Brother     Hypertension Brother     Hyperlipidemia Brother     Heart Disease Brother         CABG for prox LAD in late 40s    Cancer Daughter         breast         Current medications:   Current Outpatient Medications   Medication    cloNIDine (CATAPRES) 0.1 MG Tab    donepezil (ARICEPT) 10 MG tablet    HYDROcodone-acetaminophen (NORCO) 5-325 MG Tab per tablet    donepezil (ARICEPT) 5 MG Tab    losartan (COZAAR) 50 MG Tab    spironolactone (ALDACTONE) 25 MG Tab    atorvastatin (LIPITOR) 40 MG Tab    levothyroxine (SYNTHROID) 88 MCG Tab    liothyronine (CYTOMEL) 5 MCG Tab    pregabalin (LYRICA) 75 MG Cap    acetaminophen (TYLENOL) 500 MG Tab    Methocarbamol 1000 MG  "Tab     No current facility-administered medications for this visit.       Medication Allergy:  Allergies   Allergen Reactions    Pregabalin Unspecified     PATIENT STATES SHE FELT LIKE SHE WAS ON A BAD HIGH TRIP    Trazodone Swelling     Pt stated that she had swelling on her hands and feet's    Crestor [Rosuvastatin Calcium]      Myalgia     Rosuvastatin      Myalgia           Physical examination:   Vitals:    05/08/25 1442   BP: 118/60   BP Location: Left arm   Patient Position: Sitting   BP Cuff Size: Adult   Pulse: (!) 57   Temp: 36.6 °C (97.9 °F)   TempSrc: Temporal   SpO2: 98%   Weight: 62.9 kg (138 lb 10.7 oz)   Height: 1.575 m (5' 2\")       Heart     Normal cephalic atraumatic.  There is full range of movement around the neck in all directions without restrictions or discrete pain evoked triggers.  No lower extremity edema.      Neurological  Exam:      Johnathon Cognitive Assessment (MOCA) Version 7.1    Years of Education:     TOTAL SCORE: 20/30  (to be scanned into the MEDIA section in the E.M.R.)    Mental status: Awake, alert and person, place, time, and situation. Normal attention and concentration.  Did not appear/act combative,irritable,anxious,paranoid/delusional or aggressive to or with me.    Speech and language: Speech is fluent without errors, clear, intact to repetition, and intact to naming.     Follows 3 step motor commands in sequence without significant delay and correctly.    Cranial nerve exam:  II: Pupils are equally round and reactive to light. Visual fields are intact by confrontation.  III, IV, VI: EOMI, no diplopia, no ptosis.  V: Sensation to light touch is normal over V1-3 distributions bilaterally.  .  VII: Facial movements are symmetrical. There is no facial droop. .  VIII: Hearing intact to soft speech and finger rub bilaterally  IX: Palate elevates symmetrically, uvula is midline. Dysarthria is not present.  XI: Shoulder shrug are symmetrical and strong.   XII: Tongue " protrudes midline.      Motor exam:  Muscle tone is normal in all 4 limbs. and No abnormal movements appreciated.    Muscle strength:    Neck Flexors/Extensors: 5/5       Right  Left  Deltoid   5/5  5/5      Biceps   5/5  5/5  Triceps              5/5  5/5   Wrist extensors 5/5  5/5  Wrist flexors  5/5  5/5     5/5  5/5  Interossei  5/5  5/5  Thenar (APB)  5/5  5/5   Hip flexors  5/5  5/5  Quadriceps  5/5  5/5    Hamstrings  5/5  5/5  Dorsiflexors  5/5  5/5  Plantarflexors  5/5  5/5  Toe extension  5/5  5/5      Reflexes:       Right  Left  Biceps   2/4  2/4  Triceps              2/4  2/4  Brachioradialis 2/4  2/4  Knee jerk  2/4  2/4  Ankle jerk  2/4  2/4     Frontal release signs are absent    bilaterally toes are downgoing to plantar stimulation..    Coordination (finger-to-nose, heel/knee/shin, rapid alternating movements) was normal.     There was no ataxia, no tremors, and no dysmetria.     Station and gait > easily stands up from exam chair without retropulsion,veering,leaning,swaying (to either side).       Labs and Tests:  TSH  Order: [id number]   Status: Final result       Next appt: 05/28/2025 at 01:45 PM in Radiology ([facility])    Test Result Released: Yes (seen)    0 Result Notes            Component  Ref Range & Units (hover) 2 mo ago  (3/5/25) 8 mo ago  (8/30/24) 9 mo ago  (7/15/24) 1 yr ago  (11/14/23) 1 yr ago  (8/31/23) 1 yr ago  (6/6/23) 2 yr ago  (3/20/23)   TSH 0.626 0.666 0.499 0.430 R, CM 0.750 R, CM 1.420 R, CM 0.660 R, CM   Resulting Agency M M M M M M M             Specimen Collected: 03/05/25  2:45 PM Last Resulted: 03/05/25  6:59 PM      Comments    Add on            VITAMIN B12  Order: [id number]   Status: Final result       Next appt: 05/28/2025 at 01:45 PM in Radiology ([facility])    Test Result Released: Yes (not seen)    0 Result Notes        Component  Ref Range & Units (hover) 1 yr ago 11 yr ago 14 yr ago   Vitamin B12 -True Cobalamin  High    Resulting  Agency M M M             VITAMIN D,25 HYDROXY (DEFICIENCY)  Order: [id number]   Status: Final result       Next appt: 05/28/2025 at 01:45 PM in Radiology ([facility])       Dx: Vitamin D deficiency    Test Result Released: Yes (not seen)       Messages: Not Seen    1 Result Note       1 Patient Communication            Component  Ref Range & Units (hover) 8 mo ago 1 yr ago 3 yr ago 4 yr ago 7 yr ago 8 yr ago 14 yr ago   25-Hydroxy   Vitamin D 25 52 48 CM 60 CM 62 CM 34 CM 40 CM 49 R, CM   Comment: Adult Ranges:   <20 ng/mL - Deficiency  20-29 ng/mL - Insufficiency   ng/mL - Sufficiency  Electrochemiluminescence binding assay performed using Roche frida e  immunoassay analyzer.  The Elecsys Vitamin D total II assay is intended for  the quantitative determination of total 25 hydroxyvitamin D in human serum  and plasma. This assay is to be used as an aid in the assessment of vitamin  D sufficiency in adults.        NEUROIMAGING:         Impression/Plans/Recommendations:      Amnestic Mild Cognitive Impairment:      Extensive discussion with daughters today about the potential need for medication management.    There are no clinical features of an evolving psychosis nor subacute encephalopathic features (seizure type behavior, psychosis, behavioral or personality changes)  or other evidence of a rapidly evolving gait-balance/ parkinsonian disorder.    The characteristics are concerning for a Neurodegenerative condition such Alzheimer's Disease given the changes in memory and general forgetfulness that are slowly evolving or progressing over time.    Medication list reviewed and no significant culprits identified.    MOCA score of:   20/30    (see media section for specifics).      Plans:    1. Lifestyle factors discussed including the importance of blood pressure control (long term goal under 130/80, nutritional/diet considerations such as a low processed sugar (MIND type Diet).    2.   The topic of rather  newly approved anti amyloid medications (Leqembi and Kinsunla) reviewed today with the assumption or with the possibility that this clinical scenario could be related to Alzheimer's Disease and  in terms of the risks of ARIA (Hemorrhage and/or Edema) and the associated additional risk(s) of accelerated brain atrophy reported in patients exposed to anti amyloid monoclonal antibody related medications.    https://pmc.ncbi.nlm.nih.gov/articles/UEA45641070/    After the above discussion of these  anti amyloid monoclonal antibody medications, the patient and accompanying family member confirmed and stated that such risks are too significant (risky) and thus the decision was to  not pursue such medications.    3.  Discussed oral cognitive enhancing medication(s) today including Donepezil  (ie, the acetylcholinesterase inhibitor class)  and Memantine (ie, Namenda)  including the longer term goals of of using such  medications and their side effect profiles.     The decision after our discussion of the cognitive enhancing medications was to not proceed with such medications but we may consider adding Aricept again with heart rate (pulse) daily monitoring for 7 days in a row (on the 5 mg and then again on the 10 mg a day dose).    I also explained the present lack of clinical evidence based on the lack of clinical trial data for using Prevagen,Focus Factor,Neuriva or Neurivia Plus at this time.    4. Formal Neuropsychological testing with one of our PhD Neuropsychologists reviewed  to better clarify cognitive abilities and develop a baseline of cognitive function and at this time the decision was to pursue such testing.    5. Discussed blood biomarker testing for Amyloid Beta (42/40) and total P-Tau 217 levels.  I will give Sheyla information about this test today..    6.  Caregiver issues and local resources reviewed with daughters (by phone)  Palliative Care referral placed and to call her daughter (Mohsen Vanegas).  7.  Additional blood work to be done.    8. A Brain MRI will be done to evaluate brain tissue structure.      I have performed  a history and physical exam and a directed /focused  ROS today.    Total time spent today or this patient's care was 64 minutes  and included reviewing  the diagnostic workup to date (such as labs and imaging as well as interpreting such tests relevant to this patient's neurological condition),  reviewing/obtaining separately obtained history (from patient and/or accompanying ffamily member)  for today's neurological problem(s) ,counseling and educating the patient and her daughters  on issues related to cognition/memory and cognitive health factors and documenting  the clinical information in the EMR.    Follow up in about 6 months or so.        Maikol Polk MD  [facility] [employer] [employer].   [facility]

## 2025-05-09 NOTE — Clinical Note
REFERRAL APPROVAL NOTICE         Sent on May 9, 2025                   Sheyla Gauthier  [address]  [address]  [facility]  [place] [address]                   Dear Ms. Gauthier,    After a careful review of the medical information and benefit coverage, [facility] has processed your referral. See below for additional details.    If applicable, you must be actively enrolled with your insurance for coverage of the authorized service. If you have any questions regarding your coverage, please contact your insurance directly.    REFERRAL INFORMATION   Referral #:  [id number]  Referred-To Department    Referred-By Provider:  Behavioral Health    Maikol Polk M.D.   Behavioral Health Outpatient      [address] [facility] [address]  [place] [id number] [phone] [address]  [place] [id number] [phone]    Referral Start Date:  05/08/2025  Referral End Date:   05/08/2026             SCHEDULING  If you do not already have an appointment, please call [phone] to make an appointment.     MORE INFORMATION  If you do not already have a [employer] account, sign up at: [email or web address]  You can access your medical information, make appointments, see lab results, billing information, and more.  If you have questions regarding this referral, please contact  the [facility] Referrals department at:             [phone]. Monday - Friday 8:00AM - 5:00PM.     Sincerely,    [facility]

## 2025-05-09 NOTE — Clinical Note
REFERRAL APPROVAL NOTICE         Sent on May 9, 2025                   Sheyla Gauthier  [address]  [address]  [place] [facility] [place] [address]                   Dear Ms. Gauthier,    After a careful review of the medical information and benefit coverage, [facility] has processed your referral. See below for additional details.    If applicable, you must be actively enrolled with your insurance for coverage of the authorized service. If you have any questions regarding your coverage, please contact your insurance directly.    REFERRAL INFORMATION   Referral #:  [id number]  Referred-To Department    Referred-By Provider:  Palliative Medicine    Maikol Polk M.D.    Palliative Care      [address] [facility] [address]  [place] [id number] [phone] [address] [facility] [address]  [place] [address]  [phone]    Referral Start Date:  05/08/2025  Referral End Date:   05/08/2026             SCHEDULING  If you do not already have an appointment, please call [phone] to make an appointment.     MORE INFORMATION  If you do not already have a [email or web address] account, sign up at: [email or web address]  You can access your medical information, make appointments, see lab results, billing information, and more.  If you have questions regarding this referral, please contact  the [facility] Referrals department at:             [phone]. Monday - Friday 8:00AM - 5:00PM.     Sincerely,    [facility]

## 2025-05-12 ENCOUNTER — APPOINTMENT (OUTPATIENT)
Dept: LAB | Facility: MEDICAL CENTER | Age: 85
End: 2025-05-12
Payer: MEDICARE

## 2025-05-13 ENCOUNTER — HOSPITAL ENCOUNTER (OUTPATIENT)
Dept: LAB | Facility: MEDICAL CENTER | Age: 85
End: 2025-05-13
Attending: INTERNAL MEDICINE
Payer: MEDICARE

## 2025-05-13 ENCOUNTER — HOSPITAL ENCOUNTER (OUTPATIENT)
Facility: MEDICAL CENTER | Age: 85
End: 2025-05-13
Attending: INTERNAL MEDICINE
Payer: MEDICARE

## 2025-05-13 LAB — TSH SERPL DL<=0.005 MIU/L-ACNC: 1.89 UIU/ML (ref 0.38–5.33)

## 2025-05-13 PROCEDURE — 84439 ASSAY OF FREE THYROXINE: CPT

## 2025-05-13 PROCEDURE — 36415 COLL VENOUS BLD VENIPUNCTURE: CPT

## 2025-05-13 PROCEDURE — 84443 ASSAY THYROID STIM HORMONE: CPT

## 2025-05-15 LAB — T4 FREE SERPL-MCNC: 1.35 NG/DL (ref 0.93–1.7)

## 2025-05-22 ENCOUNTER — OFFICE VISIT (OUTPATIENT)
Dept: MEDICAL GROUP | Age: 85
End: 2025-05-22
Payer: MEDICARE

## 2025-05-22 VITALS
SYSTOLIC BLOOD PRESSURE: 120 MMHG | HEIGHT: 62 IN | WEIGHT: 133 LBS | TEMPERATURE: 98.8 F | DIASTOLIC BLOOD PRESSURE: 70 MMHG | OXYGEN SATURATION: 94 % | BODY MASS INDEX: 24.48 KG/M2 | HEART RATE: 70 BPM

## 2025-05-22 DIAGNOSIS — Z91.81: ICD-10-CM

## 2025-05-22 DIAGNOSIS — G31.84 MCI (MILD COGNITIVE IMPAIRMENT): ICD-10-CM

## 2025-05-22 DIAGNOSIS — G43.909 MIGRAINE SYNDROME: Primary | ICD-10-CM

## 2025-05-22 PROCEDURE — 99214 OFFICE O/P EST MOD 30 MIN: CPT | Performed by: FAMILY MEDICINE

## 2025-05-22 PROCEDURE — 3074F SYST BP LT 130 MM HG: CPT | Performed by: FAMILY MEDICINE

## 2025-05-22 PROCEDURE — 3078F DIAST BP <80 MM HG: CPT | Performed by: FAMILY MEDICINE

## 2025-05-22 RX ORDER — DONEPEZIL HYDROCHLORIDE 10 MG/1
10 TABLET, FILM COATED ORAL NIGHTLY
Qty: 90 TABLET | Refills: 2 | Status: SHIPPED | OUTPATIENT
Start: 2025-05-22 | End: 2025-05-22 | Stop reason: SDUPTHER

## 2025-05-22 RX ORDER — DONEPEZIL HYDROCHLORIDE 10 MG/1
10 TABLET, FILM COATED ORAL NIGHTLY
Qty: 90 TABLET | Refills: 2 | Status: SHIPPED | OUTPATIENT
Start: 2025-05-22

## 2025-05-22 ASSESSMENT — FIBROSIS 4 INDEX: FIB4 SCORE: 1.78

## 2025-05-22 NOTE — PROGRESS NOTES
This medical record contains text that has been entered with the assistance of computer voice recognition and dictation software.  Therefore, it may contain unintended errors in text, spelling, punctuation, or grammar      Verbal consent was acquired by the patient to use [employer] ambient listening note generation during this visit Yes       Chief Complaint   Patient presents with    Follow-Up     NEUROLOGIST VISIT     Medication Follow-up     BOTOX          Sheyla Gauthier is a 84 y.o. female here evaluation and management of: botox      HPI:     1. Migraine syndrome      2. MCI (mild cognitive impairment)      3. At high risk for falls per Raymundo fall risk assessment scale        History of Present Illness  The patient, an 84-year-old female, presents for evaluation of migraines, cognitive impairment, and myalgia.    Migraines  She reports that onabotulinumtoxinA injections have been effective in managing her migraines. She is currently seeking a referral to a neurologist for the continuation of this treatment. She has been receiving these injections at an aesthetic office and now wishes to transition to insurance coverage for the procedure.  - Alleviating Factors: OnabotulinumtoxinA injections have been effective.  - Timing: Currently seeking a referral to a neurologist for continuation.    Cognitive Impairment  The patient has been experiencing cognitive impairment and was previously prescribed donepezil, which she found beneficial. However, she has not been consistent in taking this medication. Her daughter, who was present during her neurology appointment on 05/08/2025, confirmed that they had a detailed discussion with the neurologist about her condition and the need for medication. The neurologist recommended initiating donepezil and memantine, but the daughter is unsure if her mother has been adhering to this regimen.  - Onset: Previously prescribed donepezil.  - Alleviating Factors: Donepezil was  found beneficial.  - Timing: Neurology appointment on 05/08/2025.    Myalgia  Earlier this week, the patient experienced myalgia, chills, and tremors, but these symptoms have since resolved. She does not have any migraines at present.  - Onset: Earlier this week.  - Duration: Symptoms have since resolved.  - Character: Myalgia, chills, and tremors.    She is requesting a referral for physical therapy to improve her balance and coordination. She has previously been referred for physical therapy but had to take a break due to the placement of a stimulator.           Results           Current medicines (including changes today)  Current Medications[1]  She  has a past medical history of Anxiety, Arthritis, Back pain, CAD (coronary artery disease) (10/2018), Cataract, Daytime sleepiness, Depression, Dyslipidemia, Frequent headaches, Herpes zoster, High cholesterol, Hypertension, MHA (microangiopathic hemolytic anemia), Migraine syndrome ( ), Mumps, Osteopathia, Osteoporosis, Painful joint, Post-menopause on HRT (hormone replacement therapy), Renal disorder, Sleep apnea, Snoring, Thyroid disease, Urinary incontinence, and White matter abnormality on MRI of brain.  She  has a past surgical history that includes cholecystectomy (1996); abdominal hysterectomy total (11/1986); pr breast reduction (1997); foot surgery (2002); pr breast augmentation with implant; mammoplasty augmentation; bunionectomy (Left, 2003); eye surgery; hysterectomy laparoscopy; arthroscopy, knee; laminotomy (01/28/2022); lumbar laminectomy diskectomy (N/A, 2/6/2023); hardware removal ortho (N/A, 2/6/2023); lumbar laminectomy diskectomy (N/A, 11/10/2023); and pr partial hip replacement (Left, 11/27/2023).  Social History[2]  Social History     Social History Narrative    Not on file     Family History   Problem Relation Age of Onset    Stroke Father     Arthritis Father     Hyperlipidemia Father     Hypertension Father     Cancer Sister         breast  "   Arthritis Sister     Arthritis Mother     Hypertension Mother     Hyperlipidemia Mother     Stroke Mother     Arthritis Brother     Cancer Brother     Hypertension Brother     Hyperlipidemia Brother     Heart Disease Brother         CABG for prox LAD in late 40s    Cancer Daughter         breast     Family Status   Relation Name Status    Fa      Sis  Alive    Mo          tia    Bro  Alive    Ayesha  (Not Specified)   No partnership data on file         ROS    The pertinent  ROS findings can be seen in the HPI above.     All other systems reviewed and are negative     Objective:     /70 (BP Location: Left arm, Patient Position: Sitting, BP Cuff Size: Adult)   Pulse 70   Temp 37.1 °C (98.8 °F) (Temporal)   Ht 1.575 m (5' 2\")   Wt 60.3 kg (133 lb)   SpO2 94%  Body mass index is 24.33 kg/m².      Physical Exam:    Constitutional: Alert, no distress.  Skin: No suspicious lesions  Eye: Equal, round and reactive, conjunctiva clear, lids normal.  ENMT: Lips without lesions, good dentition, oropharynx clear.  Neck: Trachea midline, no masses, no thyromegaly. No cervical or supraclavicular lymphadenopathy.  Respiratory: Unlabored respiratory effort, lungs clear to auscultation, no wheezes, no ronchi.  Cardiovascular: Normal S1, S2, no murmur, no edema  Abdomen: Soft, non-tender, no masses, no hepatosplenomegaly.      Assessment and Plan:   The following treatment plan was discussed    All recent labs and provider notes reviewed      Assessment & Plan  Migraines  Reports that Botox injections have been effective in managing migraines.  Diagnostic plan: Referral to the neurology department will be initiated for further evaluation and management, including potential continuation of Botox injections.  Treatment plan: Will be contacted within 7 days to schedule an appointment with a physician under Medicare.  Clinical decision making: Expected wait time for neurology appointment is a minimum of 4 " months.    Memory issues  Experiencing memory issues and was previously prescribed Aricept, which was beneficial.  Diagnostic plan: Neurology visit on 2025 indicated low memory exam score and suggested starting Aricept and Namenda.  Treatment plan: Prescription for Aricept will be reordered and sent to pharmacy. Advised to continue taking Aricept as it has been effective in the past.    Physical therapy  Referral for physical therapy will be initiated to help with balance and coordination exercises.  Treatment plan: Can receive PT at current living facility.  Clinical decision making: Previous PT referral may have ; new referral will be placed.     1. Migraine syndrome  - Referral to Neurology    2. MCI (mild cognitive impairment)  - donepezil (ARICEPT) 10 MG tablet; Take 1 Tablet by mouth every evening.  Dispense: 90 Tablet; Refill: 2    3. At high risk for falls per Raymundo fall risk assessment scale  - Referral to Physical Therapy             Instructed to Follow up in clinic or ER for worsening symptoms, difficulty breathing, lack of expected recovery, or should new symptoms or problems arise.    Followup: Return in about 6 months (around 2025) for Reevaluation, labs.                    [1]   Current Outpatient Medications   Medication Sig Dispense Refill    donepezil (ARICEPT) 10 MG tablet Take 1 Tablet by mouth every evening. 90 Tablet 2    cloNIDine (CATAPRES) 0.1 MG Tab Take 0.1 mg by mouth 2 times a day.      amLODIPine (NORVASC) 5 MG Tab Take 5 mg by mouth every day.      apixaban (ELIQUIS) 5mg Tab Take 1 Tablet by mouth 2 times a day.      citalopram (CELEXA) 20 MG Tab Take 20 mg by mouth every day.      chlorthalidone (HYGROTON) 25 MG Tab Take 25 mg by mouth every day.      escitalopram (LEXAPRO) 20 MG tablet Take 1 Tablet by mouth every day.      ezetimibe (ZETIA) 10 MG Tab Take 10 mg by mouth every day.      oxyCODONE-acetaminophen (PERCOCET) 5-325 MG Tab Take 1 Tablet by mouth every  6 hours as needed.      rosuvastatin (CRESTOR) 5 MG Tab Take 5 mg by mouth every day.      atorvastatin (LIPITOR) 40 MG Tab Take 1 Tablet by mouth every day.      levothyroxine (LEVOXYL) 50 MCG Tab Take 50 mcg by mouth every morning on an empty stomach.      losartan (COZAAR) 50 MG Tab Take 1 Tablet by mouth 2 times a day.      spironolactone (ALDACTONE) 25 MG Tab Take 25 mg by mouth every day.      HYDROcodone-acetaminophen (NORCO) 5-325 MG Tab per tablet Take 1-2 Tablets by mouth every 8 hours as needed for Moderate Pain or Severe Pain.      pregabalin (LYRICA) 75 MG Cap Take 1 Capsule by mouth 2 times a day.      donepezil (ARICEPT) 5 MG Tab Take 1 Tablet by mouth every evening. 90 Tablet 0    acetaminophen (TYLENOL) 500 MG Tab Take 2 Tablets by mouth in the morning, at noon, and at bedtime. (Patient taking differently: Take 1,000 mg by mouth every 6 hours as needed.) 30 Tablet 0    Methocarbamol 1000 MG Tab Take 1,000 mg by mouth in the morning, at noon, and at bedtime. 270 Tablet 0    atorvastatin (LIPITOR) 40 MG Tab Take 1 Tablet by mouth at bedtime. 90 Tablet 0    levothyroxine (SYNTHROID) 88 MCG Tab Take 1 Tablet by mouth every morning on an empty stomach. 90 Tablet 0    liothyronine (CYTOMEL) 5 MCG Tab Take 1 Tablet by mouth every morning. 90 Tablet 0     No current facility-administered medications for this visit.   [2]   Social History  Tobacco Use    Smoking status: Former     Current packs/day: 0.00     Types: Cigarettes     Quit date: 1979     Years since quittin.8    Smokeless tobacco: Never    Tobacco comments:     smoked off & on for 10 yrs   Vaping Use    Vaping status: Never Used   Substance Use Topics    Alcohol use: Yes     Comment: occassional    Drug use: No

## 2025-05-28 ENCOUNTER — HOSPITAL ENCOUNTER (OUTPATIENT)
Dept: RADIOLOGY | Facility: MEDICAL CENTER | Age: 85
End: 2025-05-28
Attending: FAMILY MEDICINE
Payer: MEDICARE

## 2025-05-28 DIAGNOSIS — Z78.0 POSTMENOPAUSAL: ICD-10-CM

## 2025-05-28 PROCEDURE — 77080 DXA BONE DENSITY AXIAL: CPT

## 2025-05-30 ENCOUNTER — RESULTS FOLLOW-UP (OUTPATIENT)
Dept: MEDICAL GROUP | Age: 85
End: 2025-05-30

## 2025-06-02 NOTE — Clinical Note
REFERRAL APPROVAL NOTICE         Sent on June 2, 2025                   Sheyla Gauthier  [address]  [address]  [address]   [place] [id number]                   Dear Ms. Gauthier,    After a careful review of the medical information and benefit coverage, [facility] has processed your referral. See below for additional details.    If applicable, you must be actively enrolled with your insurance for coverage of the authorized service. If you have any questions regarding your coverage, please contact your insurance directly.    REFERRAL INFORMATION   Referral #:  [id number]  Referred-To Department    Referred-By Provider:  Physical Therapy    Darius Morataya M.D.   Phys Therapy [facility]      [address] [place] [id number] [phone] [address]  [place] [id number] [phone]    Referral Start Date:  05/22/2025  Referral End Date:   05/22/2026             SCHEDULING  If you do not already have an appointment, please call [phone] to make an appointment.     MORE INFORMATION  If you do not already have a [facility] account, sign up at: [email or web address]  You can access your medical information, make appointments, see lab results, billing information, and more.  If you have questions regarding this referral, please contact  the [facility] Referrals department at:             [phone]. Monday - Friday 8:00AM - 5:00PM.     Sincerely,    [facility]

## 2025-06-05 ENCOUNTER — DOCUMENTATION (OUTPATIENT)
Dept: NEUROLOGY | Facility: MEDICAL CENTER | Age: 85
End: 2025-06-05
Payer: MEDICARE

## 2025-06-05 DIAGNOSIS — Z95.810 HISTORY OF CARDIAC DEFIBRILLATOR PLACEMENT: Primary | ICD-10-CM

## 2025-06-10 ENCOUNTER — TELEPHONE (OUTPATIENT)
Dept: HEALTH INFORMATION MANAGEMENT | Facility: OTHER | Age: 85
End: 2025-06-10
Payer: MEDICARE

## 2025-06-18 ENCOUNTER — OFFICE VISIT (OUTPATIENT)
Dept: MEDICAL GROUP | Age: 85
End: 2025-06-18
Payer: MEDICARE

## 2025-06-18 VITALS
OXYGEN SATURATION: 99 % | HEART RATE: 63 BPM | HEIGHT: 61 IN | SYSTOLIC BLOOD PRESSURE: 130 MMHG | WEIGHT: 130 LBS | DIASTOLIC BLOOD PRESSURE: 70 MMHG | TEMPERATURE: 97.9 F | BODY MASS INDEX: 24.55 KG/M2

## 2025-06-18 DIAGNOSIS — I10 PRIMARY HYPERTENSION: ICD-10-CM

## 2025-06-18 DIAGNOSIS — F32.4 MAJOR DEPRESSIVE DISORDER WITH SINGLE EPISODE, IN PARTIAL REMISSION (HCC): ICD-10-CM

## 2025-06-18 DIAGNOSIS — E78.2 MIXED HYPERLIPIDEMIA: ICD-10-CM

## 2025-06-18 DIAGNOSIS — E55.9 VITAMIN D DEFICIENCY: ICD-10-CM

## 2025-06-18 DIAGNOSIS — Z00.00 MEDICARE ANNUAL WELLNESS VISIT, INITIAL: ICD-10-CM

## 2025-06-18 DIAGNOSIS — G31.84 MCI (MILD COGNITIVE IMPAIRMENT): ICD-10-CM

## 2025-06-18 DIAGNOSIS — G62.9 NEUROPATHY: Primary | ICD-10-CM

## 2025-06-18 PROCEDURE — G0439 PPPS, SUBSEQ VISIT: HCPCS | Performed by: FAMILY MEDICINE

## 2025-06-18 PROCEDURE — 3078F DIAST BP <80 MM HG: CPT | Performed by: FAMILY MEDICINE

## 2025-06-18 PROCEDURE — 3075F SYST BP GE 130 - 139MM HG: CPT | Performed by: FAMILY MEDICINE

## 2025-06-18 RX ORDER — ERGOCALCIFEROL 1.25 MG/1
50000 CAPSULE ORAL
Qty: 7 CAPSULE | Refills: 0 | Status: SHIPPED | OUTPATIENT
Start: 2025-06-18

## 2025-06-18 RX ORDER — ATORVASTATIN CALCIUM 40 MG/1
40 TABLET, FILM COATED ORAL NIGHTLY
Qty: 100 TABLET | Refills: 3 | Status: SHIPPED | OUTPATIENT
Start: 2025-06-18 | End: 2026-07-23

## 2025-06-18 RX ORDER — LOSARTAN POTASSIUM 50 MG/1
50 TABLET ORAL 2 TIMES DAILY
Qty: 200 TABLET | Refills: 2 | Status: SHIPPED | OUTPATIENT
Start: 2025-06-18

## 2025-06-18 ASSESSMENT — PATIENT HEALTH QUESTIONNAIRE - PHQ9
CLINICAL INTERPRETATION OF PHQ2 SCORE: 2
SUM OF ALL RESPONSES TO PHQ QUESTIONS 1-9: 5
5. POOR APPETITE OR OVEREATING: 0 - NOT AT ALL

## 2025-06-18 ASSESSMENT — FIBROSIS 4 INDEX: FIB4 SCORE: 1.78

## 2025-06-18 ASSESSMENT — ENCOUNTER SYMPTOMS: GENERAL WELL-BEING: GOOD

## 2025-06-18 ASSESSMENT — ACTIVITIES OF DAILY LIVING (ADL): BATHING_REQUIRES_ASSISTANCE: 0

## 2025-06-18 NOTE — PROGRESS NOTES
HPI:    Sheyla Gauthier is a 84 y.o. here today for a Medicare Annual Wellness Visit.         Patient Active Problem List    Diagnosis Date Noted    Neuropathy 06/18/2025    At high risk for falls per Raymundo fall risk assessment scale 05/22/2025    Right foot pain 03/27/2025    Memory deficit 11/13/2024    MCI (mild cognitive impairment) 11/13/2024    Renal insufficiency 09/04/2024    Tremor 09/04/2024    History of left hip replacement 11/29/2023    Hypotension 11/28/2023    Left displaced femoral neck fracture (HCC) 11/23/2023    Acute deep vein thrombosis (DVT) of right lower extremity (HCC) 11/23/2023    S/P lumbar laminectomy 11/22/2023    Closed fracture of neck of left femur (HCC) 11/22/2023    Radiculopathy 11/13/2023    Spinal stenosis, lumbar region with neurogenic claudication 11/10/2023    Peripheral edema 10/13/2023    Chronic kidney disease, stage 3a 10/13/2023    Major depressive disorder with single episode, in partial remission (HCC) 08/30/2023    Lumbar stenosis with neurogenic claudication 02/06/2023    Decreased hearing of right ear 10/20/2022    Dizziness 08/03/2022    Stage 2 chronic kidney disease 04/20/2022    Arthritis 11/03/2021    Memory loss 08/20/2020    Conductive hearing loss of right ear 08/20/2020    Left wrist pain 12/19/2019    Myalgia 10/01/2019    Former smoker 07/02/2019    Other insomnia 06/14/2019    Coronary artery disease involving native coronary artery of native heart without angina pectoris 03/26/2019    Bladder prolapse, female, acquired 03/07/2018    JOHN (obstructive sleep apnea) 02/22/2018    Age related osteoporosis 07/26/2017    Major depressive disorder 06/29/2017    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/08/2016    Spinal stenosis of lumbar region 11/14/2016    Migraine syndrome 02/11/2013    Acquired hypothyroidism 03/08/2011    HTN (hypertension) 10/21/2009    Postmenopausal hormone therapy 10/21/2009    Mixed hyperlipidemia     A  (microangiopathic hemolytic anemia) (HCC)             Current supplements as per medication list.         Allergies: Pregabalin, Trazodone, Crestor [rosuvastatin calcium], and Rosuvastatin         Current social contact/activities: Physical Therapy as much as possible         She  reports that she quit smoking about 45 years ago. Her smoking use included cigarettes. She has never used smokeless tobacco. She reports current alcohol use. She reports that she does not use drugs.         Lives with at home: self    Any caregivers: yes-- seniors helpers    Is the caregiver paid or unpaid: yes-- medication management and run errands         ROS:     Gait: Uses no assistive device    Ostomy: No    Other tubes: No    Amputations: No    Chronic oxygen use: No    Last eye exam: 2023    Wears hearing aids: No    : Reports urinary leakage during the last 6 months that has not interfered at all with their daily activities or sleep.         Depression Screening  Little interest or pleasure in doing things?  1 - several days  Feeling down, depressed , or hopeless? 1 - several days  Trouble falling or staying asleep, or sleeping too much?  0 - not at all  Feeling tired or having little energy?  1 - several days  Poor appetite or overeating?  0 - not at all  Feeling bad about yourself - or that you are a failure or have let yourself or your family down? 0 - not at all  Trouble concentrating on things, such as reading the newspaper or watching television? 1 - several days  Moving or speaking so slowly that other people could have noticed.  Or the opposite - being so fidgety or restless that you have been moving around a lot more than usual?  1 - several days  Thoughts that you would be better off dead, or of hurting yourself?  0 - not at all  Patient Health Questionnaire Score: 5    If depressive symptoms identified deferred to follow up visit unless specifically addressed in assessment and plan.    Interpretation of PHQ-9 Total  Score   Score Severity   1-4 No Depression   5-9 Mild Depression   10-14 Moderate Depression   15-19 Moderately Severe Depression   20-27 Severe Depression    Screening for Cognitive Impairment  Do you or any of your friends or family members have any concern about your memory? Yes  Three Minute Recall (Village, Kitchen, Baby) 3/3    Edwin clock face with all 12 numbers and set the hands to show 10 minutes past 11.  Yes    Cognitive concerns identified deferred for follow up unless specifically addressed in assessment and plan.    Fall Risk Assessment  Has the patient had two or more falls in the last year or any fall with injury in the last year?  No    Safety Assessment  Do you always wear your seatbelt?  Yes  Any changes to home needed to function safely? No  Difficulty hearing.  No  Patient counseled about all safety risks that were identified.    Functional Assessment ADLs  Are there any barriers preventing you from cooking for yourself or meeting nutritional needs?  No.    Are there any barriers preventing you from driving safely or obtaining transportation?  No.    Are there any barriers preventing you from using a telephone or calling for help?  No    Are there any barriers preventing you from shopping?  No.    Are there any barriers preventing you from taking care of your own finances?  No    Are there any barriers preventing you from managing your medications?  Yes [employer] is coming in to help   Are there any barriers preventing you from showering, bathing or dressing yourself? No    Are there any barriers preventing you from doing housework or laundry? No    Are there any barriers preventing you from using the toilet?No    Are you currently engaging in any exercise or physical activity?  Yes.      Self-Assessment of Health  What is your perception of your health? Good    Do you sleep more than six hours a night? No    In the past 7 days, how much did pain keep you from doing your normal work? Some     Do you spend quality time with family or friends (virtually or in person)? Yes    Do you usually eat a heart healthy diet that constists of a variety of fruits, vegetables, whole grains and fiber? Yes    Do you eat foods high in fat and/or Fast Food more than three times per week? No    How concerned are you that your medical conditions are not being well managed? Not at all    Are you worried that in the next 2 months, you may not have stable housing that you own, rent, or stay in as part of a household? No        Advance Care Planning  Do you have an Advance Directive, Living Will, Durable Power of , or POLST? Yes  Advance Directive Living Will Durable Power of    is not on file - instructed patient to bring in a copy to scan into their chart      Health Maintenance Summary            Current Care Gaps       COVID-19 Vaccine (12 - 2024-25 season) Overdue since 4/14/2025      10/14/2024  Imm Admin: COVID-19, mRNA, LNP-S, PF, ying-sucrose, 30 mcg/0.3 mL    10/28/2023  Imm Admin: COVID-19, mRNA, LNP-S, PF, ying-sucrose, 30 mcg/0.3 mL    10/26/2022  Imm Admin: PFIZER BIVALENT SARS-COV-2 VACCINE (12+)    04/04/2022  Imm Admin: SARS-COV-2 (COVID-19) vaccine, UNSPECIFIED    04/04/2022  Imm Admin: PFIZER PURPLE CAP SARS-COV-2 VACCINATION (12+)     Only the first 5 history entries have been loaded, but more history exists.                    Upcoming       Annual Wellness Visit (Yearly) Next due on 6/18/2026 06/18/2025  Visit Dx: Medicare annual wellness visit, initial    10/18/2023  Visit Dx: Medicare annual wellness visit, initial    08/30/2023  Level of Service: ME ANNUAL WELLNESS VISIT-INCLUDES PPPS SUBSEQUE*    08/30/2023  Visit Dx: Medicare annual wellness visit, initial    08/20/2013  Done      Only the first 5 history entries have been loaded, but more history exists.              Bone Density Scan (Every 5 Years) Next due on 5/28/2030 05/28/2025  DS-BONE DENSITY STUDY (DEXA)     01/03/2020  DS-BONE DENSITY STUDY (DEXA)    07/18/2017  DS-BONE DENSITY STUDY (DEXA)    06/18/2010  DS-BONE DENSITY STUDY (DEXA)              IMM DTaP/Tdap/Td Vaccine (4 - Td or Tdap) Next due on 8/23/2034 08/23/2024  Imm Admin: Tdap Vaccine    07/07/2014  Imm Admin: Tdap Vaccine    07/06/2014  Imm Admin: Tdap Vaccine                      Completed or No Longer Recommended       Influenza Vaccine (Series Information) Completed      10/14/2024  Imm Admin: Influenza high-dose trivalent (PF)    10/29/2023  Imm Admin: Influenza, unspecified formulation    10/28/2023  Imm Admin: Influenza Vaccine Adult HD    10/20/2022  Imm Admin: Influenza Vaccine Adult HD    10/26/2021  Imm Admin: Influenza Vaccine Adult HD      Only the first 5 history entries have been loaded, but more history exists.              Zoster (Shingles) Vaccines (Series Information) Completed      02/13/2020  Imm Admin: Zoster Vaccine Recombinant (RZV) (SHINGRIX)    02/12/2020  Imm Admin: Zoster Vaccine Recombinant (RZV) (SHINGRIX)    09/25/2019  Imm Admin: Zoster Vaccine Recombinant (RZV) (SHINGRIX)    09/23/2019  Imm Admin: Zoster Vaccine Recombinant (RZV) (SHINGRIX)    10/11/2018  Imm Admin: Zoster Vaccine Recombinant (RZV) (SHINGRIX)      Only the first 5 history entries have been loaded, but more history exists.              Pneumococcal Vaccine: 50+ Years (Series Information) Completed      10/14/2024  Imm Admin: Pneumococcal Conjugate Vaccine (PCV20)    06/29/2017  Imm Admin: Pneumococcal polysaccharide vaccine (PPSV-23)    06/28/2017  Imm Admin: Pneumococcal polysaccharide vaccine (PPSV-23)    12/14/2015  Imm Admin: Pneumococcal Conjugate Vaccine (Prevnar/PCV-13)    12/13/2015  Imm Admin: Pneumococcal Conjugate Vaccine (Prevnar/PCV-13)      Only the first 5 history entries have been loaded, but more history exists.              Hepatitis A Vaccine (Hep A) (Series Information) Aged Out      No completion history exists for this topic.               Hepatitis B Vaccine (Hep B) (Series Information) Aged Out     No completion history exists for this topic.              HPV Vaccines (Series Information) Aged Out     No completion history exists for this topic.              Polio Vaccine (Inactivated Polio) (Series Information) Aged Out     No completion history exists for this topic.              Meningococcal Immunization (Series Information) Aged Out     No completion history exists for this topic.              Meningococcal B Vaccine (Series Information) Aged Out     No completion history exists for this topic.              Mammogram  Discontinued        Frequency changed to Never automatically (Topic No Longer Applies)    10/14/2024  MA-SCREENING MAMMO BILAT W/TOMOSYNTHESIS W/CAD    10/17/2022  [id number]    01/11/2021  [id number]    10/05/2015  MA-SCREEN MAMMO W/CAD-BILAT     Only the first 5 history entries have been loaded, but more history exists.            Cervical Cancer Screening  Discontinued        Frequency changed to Never automatically (Topic No Longer Applies)    02/03/2016  THINPREP PAP, REFLEX HPV ON ASC-US AND ABOVE    06/08/2010  PAP IG (IMAGE GUIDED)              Colorectal Cancer Screening  Discontinued        Frequency changed to Never automatically (Topic No Longer Applies)    08/19/2016  REFERRAL TO GI FOR COLONOSCOPY    01/12/2010  REFERRAL TO GI FOR COLONOSCOPY                            Patient Care Team:  Darius Morataya M.D. as PCP - General (Family Medicine)  Chavez Mitchell, PT (Inactive) as Physical Therapist  Juan Carlos Fuchs M.D. (Cardiovascular Disease (Cardiology))  Maryellen Cid M.D. (Inactive) (Otolaryngology)  [facility]  (DME Supplier)  Maame Rivers M.D. (Cardiovascular Disease (Cardiology))           Patient Care Team:  Darius Morataya M.D. as PCP - General (Family Medicine)  Chavez Mitchell, PT (Inactive) as Physical Therapist  Juan Carlos Fuchs M.D. (Cardiovascular Disease  (Cardiology))  Maryellen Cid M.D. (Inactive) (Otolaryngology)  [facility]  (DME Supplier)  Maame Rivers M.D. (Cardiovascular Disease (Cardiology))         Social History[1]         Family History   Problem Relation Age of Onset    Stroke Father     Arthritis Father     Hyperlipidemia Father     Hypertension Father     Cancer Sister         breast    Arthritis Sister     Arthritis Mother     Hypertension Mother     Hyperlipidemia Mother     Stroke Mother     Arthritis Brother     Cancer Brother     Hypertension Brother     Hyperlipidemia Brother     Heart Disease Brother         CABG for prox LAD in late 40s    Cancer Daughter         breast            Past Surgical History[2]         Whisper test - stand 3 feet  behind patient and whisper 3 numbers for each ear while covering other ear..    - Right 3/3, Left 3/3         Vision test - using Snellen eye chart test both eyes separately and together, with and without correction.     - Without correction:      OD  20/20, OS 20/20, OU 20/20    Rise and walk test - Have patient stand, walk 10 feet and return to chair.    -  10 seconds         Stay independent checklist:    Yes (2) No (0) I have fallen in the past year. 0    Yes (2) No (0) I use or have been advised to use a cane or    walker to get around safely. 2    Yes (1) No (0) Sometimes I feel unsteady when I am walking. 1    Yes (1) No (0) I steady myself by holding onto furniture    when walking at home. 1    Yes (1) No (0) I am worried about falling. 0    Yes (1) No (0) I need to push with my hands to stand up    from a chair. 1    Yes (1) No (0) I have some trouble stepping up onto a curb. 0    Yes (1) No (0) I often have to rush to the toilet. 0    Yes (1) No (0) I have lost some feeling in my feet. 1    Yes (1) No (0) I take medicine that sometimes makes me feel    light-headed or more tired than usual.0    Yes (1) No (0) I take medicine to help me sleep or improve    my mood. 0    Yes (1) No (0) I often  "feel sad or depressed. 1    Total Add up the number of points for each “yes” answer. 7         EXAM    /70 (BP Location: Left arm, Patient Position: Sitting, BP Cuff Size: Adult)   Pulse 63   Temp 36.6 °C (97.9 °F) (Temporal)   Ht 1.549 m (5' 1\")   Wt 59 kg (130 lb)   SpO2 99%  -          Hearing good.     Dentition good    Alert, oriented in no acute distress.    Eye contact is good, speech goal directed, affect calm           Health maintenance review: (catch them up on all care gaps - vaccines, mammogram, colon cancer screen)    Health Maintenance Summary            Current Care Gaps       COVID-19 Vaccine (12 - 2024-25 season) Overdue since 4/14/2025      10/14/2024  Imm Admin: COVID-19, mRNA, LNP-S, PF, ying-sucrose, 30 mcg/0.3 mL    10/28/2023  Imm Admin: COVID-19, mRNA, LNP-S, PF, ying-sucrose, 30 mcg/0.3 mL    10/26/2022  Imm Admin: PFIZER BIVALENT SARS-COV-2 VACCINE (12+)    04/04/2022  Imm Admin: SARS-COV-2 (COVID-19) vaccine, UNSPECIFIED    04/04/2022  Imm Admin: PFIZER PURPLE CAP SARS-COV-2 VACCINATION (12+)      Only the first 5 history entries have been loaded, but more history exists.                      Upcoming       Annual Wellness Visit (Yearly) Next due on 6/18/2026 06/18/2025  Visit Dx: Medicare annual wellness visit, initial    10/18/2023  Visit Dx: Medicare annual wellness visit, initial    08/30/2023  Level of Service: [place] ANNUAL WELLNESS VISIT-INCLUDES PPPS SUBSEQUE*    08/30/2023  Visit Dx: Medicare annual wellness visit, initial    08/20/2013  Done      Only the first 5 history entries have been loaded, but more history exists.              Bone Density Scan (Every 5 Years) Next due on 5/28/2030 05/28/2025  DS-BONE DENSITY STUDY (DEXA)    01/03/2020  DS-BONE DENSITY STUDY (DEXA)    07/18/2017  DS-BONE DENSITY STUDY (DEXA)    06/18/2010  DS-BONE DENSITY STUDY (DEXA)              IMM DTaP/Tdap/Td Vaccine (4 - Td or Tdap) Next due on 8/23/2034 08/23/2024  Imm Admin: " Tdap Vaccine    07/07/2014  Imm Admin: Tdap Vaccine    07/06/2014  Imm Admin: Tdap Vaccine                      Completed or No Longer Recommended       Influenza Vaccine (Series Information) Completed      10/14/2024  Imm Admin: Influenza high-dose trivalent (PF)    10/29/2023  Imm Admin: Influenza, unspecified formulation    10/28/2023  Imm Admin: Influenza Vaccine Adult HD    10/20/2022  Imm Admin: Influenza Vaccine Adult HD    10/26/2021  Imm Admin: Influenza Vaccine Adult HD      Only the first 5 history entries have been loaded, but more history exists.              Zoster (Shingles) Vaccines (Series Information) Completed      02/13/2020  Imm Admin: Zoster Vaccine Recombinant (RZV) (SHINGRIX)    02/12/2020  Imm Admin: Zoster Vaccine Recombinant (RZV) (SHINGRIX)    09/25/2019  Imm Admin: Zoster Vaccine Recombinant (RZV) (SHINGRIX)    09/23/2019  Imm Admin: Zoster Vaccine Recombinant (RZV) (SHINGRIX)    10/11/2018  Imm Admin: Zoster Vaccine Recombinant (RZV) (SHINGRIX)      Only the first 5 history entries have been loaded, but more history exists.              Pneumococcal Vaccine: 50+ Years (Series Information) Completed      10/14/2024  Imm Admin: Pneumococcal Conjugate Vaccine (PCV20)    06/29/2017  Imm Admin: Pneumococcal polysaccharide vaccine (PPSV-23)    06/28/2017  Imm Admin: Pneumococcal polysaccharide vaccine (PPSV-23)    12/14/2015  Imm Admin: Pneumococcal Conjugate Vaccine (Prevnar/PCV-13)    12/13/2015  Imm Admin: Pneumococcal Conjugate Vaccine (Prevnar/PCV-13)      Only the first 5 history entries have been loaded, but more history exists.              Hepatitis A Vaccine (Hep A) (Series Information) Aged Out      No completion history exists for this topic.              Hepatitis B Vaccine (Hep B) (Series Information) Aged Out     No completion history exists for this topic.              HPV Vaccines (Series Information) Aged Out     No completion history exists for this topic.               Polio Vaccine (Inactivated Polio) (Series Information) Aged Out     No completion history exists for this topic.              Meningococcal Immunization (Series Information) Aged Out     No completion history exists for this topic.              Meningococcal B Vaccine (Series Information) Aged Out     No completion history exists for this topic.              Mammogram  Discontinued        Frequency changed to Never automatically (Topic No Longer Applies)    10/14/2024  MA-SCREENING MAMMO BILAT W/TOMOSYNTHESIS W/CAD    10/17/2022  [id number]    01/11/2021  [id number]    10/05/2015  MA-SCREEN MAMMO W/CAD-BILAT     Only the first 5 history entries have been loaded, but more history exists.            Cervical Cancer Screening  Discontinued        Frequency changed to Never automatically (Topic No Longer Applies)    02/03/2016  THINPREP PAP, REFLEX HPV ON ASC-US AND ABOVE    06/08/2010  PAP IG (IMAGE GUIDED)              Colorectal Cancer Screening  Discontinued        Frequency changed to Never automatically (Topic No Longer Applies)    08/19/2016  REFERRAL TO GI FOR COLONOSCOPY    01/12/2010  REFERRAL TO GI FOR COLONOSCOPY                                 Medication review:    Current Medications[3]         If opioid/benzo/sedative on med list discussion of reduction/elimination - referral         At this point document if pt refuses to consider changes off of controlled substance          Vital sign review - unintentional >10 pound weight loss evaluation (BMI and Muscle Mass review)          Whisper test fail - audiology referral          Eye test fail - ophthalmology referral          Stay independent checklist - PT referral for a score of 4 or more          PHQ evaluation - consider visit for medication change/referral          Mini-cog evaluation - consider visit for further evaluation/referral          POLST on file- if not need to have on hand for patient to fill out - consider Palliative  referral etc.         MICHELLE screen -         Audit-C Questionnaire         1. How often did you have a drink containing alcohol in the past year?     Never (0 points)  * If you answered Never, score questions 2 and 3 below as zero.      Monthly or less (1 point)     2 to 4 times a month (2 points)     2 or 3 times per week (3 points)     4 or more times a week (4 points)         1         2. How many drinks did you have on a typical day when you were drinking in the past year?     1 - 2 (0 points)     3 - 4 (1point)     5 - 6 (2 points)     7 - 9 (3 points)     10 or more (4 points)         0         3. How often did you have 6 or more drinks on one occasion in the past year?     Never (0 points)     Less than monthly (1 point)     Monthly (2 points)     Weekly (3 points)     Daily or almost daily (4 points)         0         Total: 1         Scorin or more for men and 3 or more for women is an indication for further evaluation for hazardous drinking.         Chronic pain screen:              PEG: A Three-Item Scale Assessing Pain Intensity and Interference    1. What number best describes your pain on average in the past week?     0       1         2              3                     4                          5              6               7             8           9            10    No pain                                                                                                            Pain as bad as you can imagine         6         2. What number best describes how, during the past week, pain has interfered    with your enjoyment of life?    0       1         2              3                     4                          5              6               7             8           9            10    No pain                                                                                                            Pain as bad as you can imagine         6    3. What number best describes how, during the  "past week, pain has interfered    with your general activity?     0       1         2              3                     4                          5              6               7             8           9            10    No pain                                                                                                            Pain as bad as you can imagine         6         Scoring: average of 3 scales: 18    Serves as a baseline measurement for chronic pain issues and quality of life and can be used to prompt pain clinic referral.                   Tobacco use - consider visit for discussion if user - discuss alternatives and quitting         Food insecurity screen - (The Hunger Vital Sign)    Within the past 12 months were you worried whether food would run out before you got money to buy more?    Often true          sometimes true          never true    never         Within the past 12 months did the food you bought not last and you did not have money to buy more food?    Often true         sometimes  true          never true    never         Scoring: answering \"often true\" or \"sometimes true\" to either question is a positive screen for food insecurity and should prompt a social work/ referral if needed         Review of Rise and walk test - PT referral for a score over 13 seconds         Clasp arms behind back and head test - have the patient try and clasp hands behind back and then over head, failure to accomplish may indicate shoulder issues- PT/ortho/pmr referral               Assessment and Plan. The following treatment and monitoring plan is recommended:                  Services suggested: No services needed at this time    Health Care Screening: Age-appropriate preventive services recommended by USPTF and ACIP covered by Medicare were discussed today. Services ordered if indicated and agreed upon by the patient.    Referrals offered: Community-based lifestyle interventions to reduce " health risks and promote self-management and wellness, fall prevention, nutrition, physical activity, tobacco-use cessation, weight loss, and mental health services as per orders if indicated.         Discussion today about general wellness and lifestyle habits:                   Prevent falls and reduce trip hazards; Cautioned about securing or removing rugs.                  Have a working fire alarm and carbon monoxide detector;                  Engage in regular physical activity and social activities         1. Medicare annual wellness visit, initial    Completed    2. Neuropathy    Patient has been stable with current management  We will make no changes for now    3. Mixed hyperlipidemia  - atorvastatin (LIPITOR) 40 MG Tab; Take 1 Tablet by mouth every evening.  Dispense: 100 Tablet; Refill: 3    4. Vitamin D deficiency  - ergocalciferol (DRISDOL) 06077 UNIT capsule; Take 1 Capsule by mouth every 7 days.  Dispense: 7 Capsule; Refill: 0    5. Primary hypertension    Patient has been stable with current management  We will make no changes for now    - losartan (COZAAR) 50 MG Tab; Take 1 Tablet by mouth 2 times a day.  Dispense: 200 Tablet; Refill: 2    6. MCI (mild cognitive impairment)    Follow-up with neurology    7. Major depressive disorder with single episode, in partial remission (HCC)    Patient has been stable with current management  We will make no changes for now         Follow-up: Return in about 6 months (around 2025) for Reevaluation, labs.                                                                                                                                                                         [1]   Social History  Tobacco Use    Smoking status: Former     Current packs/day: 0.00     Types: Cigarettes     Quit date: 1979     Years since quittin.9    Smokeless tobacco: Never    Tobacco comments:     smoked off & on for 10 yrs   Vaping Use    Vaping status: Never Used    Substance Use Topics    Alcohol use: Yes     Comment: occassional    Drug use: No   [2]   Past Surgical History:  Procedure Laterality Date    PB PARTIAL HIP REPLACEMENT Left 11/27/2023    Procedure: LEFT TOTAL HIP ARTHROPLASTY;  Surgeon: Parveen Ko M.D.;  Location: SURGERY [facility];  Service: Orthopedics    LUMBAR LAMINECTOMY DISKECTOMY N/A 11/10/2023    Procedure: LUMBAR 5-SACRAL 1 REDO LAMINECTOMY;  Surgeon: Earnest Silva M.D.;  Location: SURGERY [facility];  Service: Neurosurgery    LUMBAR LAMINECTOMY DISKECTOMY N/A 2/6/2023    Procedure: POSTERIOR REMOVAL OF INTERSPINOUS DEVICE WITH L4-S1 LAMINECTOMY;  Surgeon: Mannie Connor M.D.;  Location: SURGERY [facility];  Service: Neurosurgery    HARDWARE REMOVAL ORTHO N/A 2/6/2023    Procedure: REMOVAL, HARDWARE;  Surgeon: Mannie Connor M.D.;  Location: SURGERY [facility];  Service: Neurosurgery    LAMINOTOMY  01/28/2022    L4-L5 and L5-S1 posterior fusion with instrumentation and laminotomy by Dr. Benitez.    BUNIONECTOMY Left 2003    FOOT SURGERY  2002    KY BREAST REDUCTION  1997    CHOLECYSTECTOMY  1996    ABDOMINAL HYSTERECTOMY TOTAL  11/1986    ARTHROSCOPY, KNEE      EYE SURGERY      cataracts    HYSTERECTOMY LAPAROSCOPY      MAMMOPLASTY AUGMENTATION      KY BREAST AUGMENTATION WITH IMPLANT     [3]   Current Outpatient Medications   Medication Sig Dispense Refill    losartan (COZAAR) 50 MG Tab Take 1 Tablet by mouth 2 times a day. 200 Tablet 2    ergocalciferol (DRISDOL) 40870 UNIT capsule Take 1 Capsule by mouth every 7 days. 7 Capsule 0    atorvastatin (LIPITOR) 40 MG Tab Take 1 Tablet by mouth every evening. 100 Tablet 3    donepezil (ARICEPT) 10 MG tablet Take 1 Tablet by mouth every evening. 90 Tablet 2    oxyCODONE-acetaminophen (PERCOCET) 5-325 MG Tab Take 1 Tablet by mouth every 6 hours as needed.      levothyroxine (LEVOXYL) 50 MCG Tab Take 50 mcg by mouth every morning on an empty stomach. (Patient taking differently: Take 75 mcg by mouth  every morning on an empty stomach.)      atorvastatin (LIPITOR) 40 MG Tab Take 1 Tablet by mouth at bedtime. 90 Tablet 0    cloNIDine (CATAPRES) 0.1 MG Tab Take 0.1 mg by mouth 2 times a day.      amLODIPine (NORVASC) 5 MG Tab Take 5 mg by mouth every day.      apixaban (ELIQUIS) 5mg Tab Take 1 Tablet by mouth 2 times a day.      citalopram (CELEXA) 20 MG Tab Take 20 mg by mouth every day.      chlorthalidone (HYGROTON) 25 MG Tab Take 25 mg by mouth every day.      escitalopram (LEXAPRO) 20 MG tablet Take 1 Tablet by mouth every day.      ezetimibe (ZETIA) 10 MG Tab Take 10 mg by mouth every day.      spironolactone (ALDACTONE) 25 MG Tab Take 25 mg by mouth every day.      pregabalin (LYRICA) 75 MG Cap Take 1 Capsule by mouth 2 times a day.      Methocarbamol 1000 MG Tab Take 1,000 mg by mouth in the morning, at noon, and at bedtime. 270 Tablet 0    liothyronine (CYTOMEL) 5 MCG Tab Take 1 Tablet by mouth every morning. 90 Tablet 0     No current facility-administered medications for this visit.

## 2025-06-23 ENCOUNTER — TELEPHONE (OUTPATIENT)
Dept: MEDICAL GROUP | Age: 85
End: 2025-06-23
Payer: MEDICARE

## 2025-06-23 DIAGNOSIS — Z91.81: Primary | ICD-10-CM

## 2025-06-23 NOTE — TELEPHONE ENCOUNTER
Patient sent [place] message stating that she is needing a referral for physical therapy.       Phone Number Called: [phone]     Call outcome: Spoke to patient regarding message below.    Message: Spoke with patients daughter Ade and she stated that she is needing the physical therapy referral as the patient is 84 years old and she needs to have physical activity. She also stated that the patient has chronic pain in her back and left leg. She stated that is living at [facility] and they have a physical therapy place there and it is called [facility]. She would like for the patient to go there as it is on site.

## 2025-06-26 NOTE — Clinical Note
REFERRAL APPROVAL NOTICE         Sent on June 26, 2025                   Sheyla Gauthier  [address]  [address]  [facility]  [place] [address]                   Dear Ms. Gauthier,    After a careful review of the medical information and benefit coverage, [facility] has processed your referral. See below for additional details.    If applicable, you must be actively enrolled with your insurance for coverage of the authorized service. If you have any questions regarding your coverage, please contact your insurance directly.    REFERRAL INFORMATION   Referral #:  [id number]  Referred-To Provider    Referred-By Provider:  Physical Therapy    Darius Morataya M.D.   [facility]      [address] [place] [address]  [phone] [address] [facility]  [place] [address]  [phone]    Referral Start Date:  06/24/2025  Referral End Date:   06/23/2026             SCHEDULING  If you do not already have an appointment, please call [phone] to make an appointment.     MORE INFORMATION  If you do not already have a [facility] account, sign up at: [email or web address]  You can access your medical information, make appointments, see lab results, billing information, and more.  If you have questions regarding this referral, please contact  the [facility] Referrals department at:             [phone]. Monday - Friday 8:00AM - 5:00PM.     Sincerely,    [facility]

## 2025-07-08 ENCOUNTER — OFFICE VISIT (OUTPATIENT)
Dept: MEDICAL GROUP | Age: 85
End: 2025-07-08
Payer: MEDICARE

## 2025-07-08 VITALS
DIASTOLIC BLOOD PRESSURE: 70 MMHG | HEART RATE: 77 BPM | HEIGHT: 61 IN | TEMPERATURE: 97.3 F | OXYGEN SATURATION: 100 % | BODY MASS INDEX: 23.98 KG/M2 | WEIGHT: 127 LBS | SYSTOLIC BLOOD PRESSURE: 110 MMHG

## 2025-07-08 DIAGNOSIS — D48.9 NEOPLASM OF UNCERTAIN BEHAVIOR: Primary | ICD-10-CM

## 2025-07-08 DIAGNOSIS — R10.13 DYSPEPSIA: ICD-10-CM

## 2025-07-08 PROCEDURE — 3078F DIAST BP <80 MM HG: CPT | Performed by: FAMILY MEDICINE

## 2025-07-08 PROCEDURE — 99214 OFFICE O/P EST MOD 30 MIN: CPT | Performed by: FAMILY MEDICINE

## 2025-07-08 PROCEDURE — 3074F SYST BP LT 130 MM HG: CPT | Performed by: FAMILY MEDICINE

## 2025-07-08 RX ORDER — OMEPRAZOLE 20 MG/1
20 CAPSULE, DELAYED RELEASE ORAL DAILY
Qty: 30 CAPSULE | Refills: 0 | Status: SHIPPED | OUTPATIENT
Start: 2025-07-08 | End: 2025-07-28

## 2025-07-08 ASSESSMENT — FIBROSIS 4 INDEX: FIB4 SCORE: 1.78

## 2025-07-08 NOTE — Clinical Note
REFERRAL APPROVAL NOTICE         Sent on July 8, 2025                   Sheyla Gauthier  [address]  [address]  [facility]  [place] [address]                   Dear Ms. Gauthier,    After a careful review of the medical information and benefit coverage, [facility] has processed your referral. See below for additional details.    If applicable, you must be actively enrolled with your insurance for coverage of the authorized service. If you have any questions regarding your coverage, please contact your insurance directly.    REFERRAL INFORMATION   Referral #:  [id number]  Referred-To Department    Referred-By Provider:  Dermatology    Darius Morataya M.D.   [facility]      [address] [place] [id number] [phone] [address]  [place] [id number] [phone]    Referral Start Date:  07/08/2025  Referral End Date:   07/08/2026             SCHEDULING  If you do not already have an appointment, please call [phone] to make an appointment.     MORE INFORMATION  If you do not already have a [employer] account, sign up at: [email or web address]  You can access your medical information, make appointments, see lab results, billing information, and more.  If you have questions regarding this referral, please contact  the [facility] Referrals department at:             [phone]. Monday - Friday 8:00AM - 5:00PM.     Sincerely,    [facility]

## 2025-07-08 NOTE — PROGRESS NOTES
This medical record contains text that has been entered with the assistance of computer voice recognition and dictation software.  Therefore, it may contain unintended errors in text, spelling, punctuation, or grammar      Verbal consent was acquired by the patient to use [employer] ambient listening note generation during this visit Yes       Chief Complaint   Patient presents with    Follow-Up     Skin tag removal on left ear noticed in the last 6 months that it is growing      GI Problem     Chronic stomach pain in her upper area with nausea and less appetite that is ongoing for 1 month          Sheyla Gauthier is a 84 y.o. female here evaluation and management of: mole      HPI:     1. Neoplasm of uncertain behavior      2. Dyspepsia        History of Present Illness  The patient is an 84-year-old female presenting for evaluation of a nevus.    Nevus Changes  The patient reports that the nevus has exhibited a slight increase in size and has developed a curly appearance.  - Onset: Not specified.  - Character: Slight increase in size and curly appearance.    Gastrointestinal Discomfort  Additionally, she describes experiencing gastrointestinal discomfort, which she characterizes as akin to a nervous stomach, accompanied by occasional nausea. These symptoms are suspected to be potential adverse effects of a recently initiated statin therapy, although there is some uncertainty regarding whether she has indeed commenced this medication. The gastrointestinal symptoms have been present for the past three weeks.  - Onset: Symptoms began three weeks ago.  - Character: Gastrointestinal discomfort described as a nervous stomach, accompanied by occasional nausea.  - Duration: Persistent for the past three weeks.  - Alleviating/Aggravating Factors: Suspected to be potential adverse effects of recently initiated statin therapy.           Results           Current medicines (including changes today)  Current  Medications[1]  She  has a past medical history of Anxiety, Arthritis, Back pain, CAD (coronary artery disease) (10/2018), Cataract, Daytime sleepiness, Depression, Dyslipidemia, Frequent headaches, Herpes zoster, High cholesterol, Hypertension, MHA (microangiopathic hemolytic anemia), Migraine syndrome ( ), Mumps, Osteopathia, Osteoporosis, Painful joint, Post-menopause on HRT (hormone replacement therapy), Renal disorder, Sleep apnea, Snoring, Thyroid disease, Urinary incontinence, and White matter abnormality on MRI of brain.  She  has a past surgical history that includes cholecystectomy (); abdominal hysterectomy total (1986); pr breast reduction (); foot surgery (); pr breast augmentation with implant; mammoplasty augmentation; bunionectomy (Left, ); eye surgery; hysterectomy laparoscopy; arthroscopy, knee; laminotomy (2022); lumbar laminectomy diskectomy (N/A, 2023); hardware removal ortho (N/A, 2023); lumbar laminectomy diskectomy (N/A, 11/10/2023); and pr partial hip replacement (Left, 2023).  Social History[2]  Social History     Social History Narrative    Not on file     Family History   Problem Relation Age of Onset    Stroke Father     Arthritis Father     Hyperlipidemia Father     Hypertension Father     Cancer Sister         breast    Arthritis Sister     Arthritis Mother     Hypertension Mother     Hyperlipidemia Mother     Stroke Mother     Arthritis Brother     Cancer Brother     Hypertension Brother     Hyperlipidemia Brother     Heart Disease Brother         CABG for prox LAD in late 40s    Cancer Daughter         breast     Family Status   Relation Name Status    Fa      Sis  Alive    Mo          tia    Bro  Alive    Ayesha  (Not Specified)   No partnership data on file         ROS    The pertinent  ROS findings can be seen in the HPI above.     All other systems reviewed and are negative     Objective:     /70 (BP Location: Left arm,  "Patient Position: Sitting, BP Cuff Size: Adult)   Pulse 77   Temp 36.3 °C (97.3 °F) (Temporal)   Ht 1.549 m (5' 1\")   Wt 57.6 kg (127 lb)   SpO2 100%  Body mass index is 24 kg/m².      Physical Exam:    Constitutional: Alert, no distress.  Skin: No suspicious lesions  Eye: Equal, round and reactive, conjunctiva clear, lids normal.  ENMT: Lips without lesions, good dentition, oropharynx clear.  Neck: Trachea midline, no masses, no thyromegaly. No cervical or supraclavicular lymphadenopathy.  Respiratory: Unlabored respiratory effort, lungs clear to auscultation, no wheezes, no ronchi.  Cardiovascular: Normal S1, S2, no murmur, no edema  Abdomen: Soft, non-tender, no masses, no hepatosplenomegaly.      Assessment and Plan:   The following treatment plan was discussed    All recent labs and provider notes reviewed      Assessment & Plan  Mole  The mole has shown growth and changes in appearance.  Diagnostic plan: Physical examination confirms the need for excision. Dermatology referral initiated for further evaluation and management.      Stomach discomfort  Reports of stomach discomfort and nausea, potentially related to statin medication.  Diagnostic plan: Physical examination and patient history suggest trial discontinuation of statin for 2 weeks.  Treatment plan: Proton pump inhibitor prescribed to reduce stomach acid production and alleviate symptoms. Prescription sent to pharmacy.    Follow-up: Next scheduled visit to be determined based on dermatology evaluation and response to stomach discomfort management.     1. Neoplasm of uncertain behavior  - Consent for all Surgical, Special Diagnostic or Therapeutic Procedures  - Referral to Dermatology    2. Dyspepsia  - omeprazole (PRILOSEC) 20 MG delayed-release capsule; Take 1 Capsule by mouth every day.  Dispense: 30 Capsule; Refill: 0             Instructed to Follow up in clinic or ER for worsening symptoms, difficulty breathing, lack of expected recovery, or " should new symptoms or problems arise.    Followup: Return in about 6 months (around 1/8/2026) for Reevaluation, labs.                    [1]   Current Outpatient Medications   Medication Sig Dispense Refill    omeprazole (PRILOSEC) 20 MG delayed-release capsule Take 1 Capsule by mouth every day. 30 Capsule 0    losartan (COZAAR) 50 MG Tab Take 1 Tablet by mouth 2 times a day. 200 Tablet 2    ergocalciferol (DRISDOL) 34536 UNIT capsule Take 1 Capsule by mouth every 7 days. 7 Capsule 0    atorvastatin (LIPITOR) 40 MG Tab Take 1 Tablet by mouth every evening. 100 Tablet 3    donepezil (ARICEPT) 10 MG tablet Take 1 Tablet by mouth every evening. 90 Tablet 2    cloNIDine (CATAPRES) 0.1 MG Tab Take 0.1 mg by mouth 2 times a day.      amLODIPine (NORVASC) 5 MG Tab Take 5 mg by mouth every day.      apixaban (ELIQUIS) 5mg Tab Take 1 Tablet by mouth 2 times a day.      citalopram (CELEXA) 20 MG Tab Take 20 mg by mouth every day.      chlorthalidone (HYGROTON) 25 MG Tab Take 25 mg by mouth every day.      escitalopram (LEXAPRO) 20 MG tablet Take 1 Tablet by mouth every day.      ezetimibe (ZETIA) 10 MG Tab Take 10 mg by mouth every day.      oxyCODONE-acetaminophen (PERCOCET) 5-325 MG Tab Take 1 Tablet by mouth every 6 hours as needed.      levothyroxine (LEVOXYL) 50 MCG Tab Take 50 mcg by mouth every morning on an empty stomach.      spironolactone (ALDACTONE) 25 MG Tab Take 25 mg by mouth every day.      pregabalin (LYRICA) 75 MG Cap Take 1 Capsule by mouth 2 times a day.      Methocarbamol 1000 MG Tab Take 1,000 mg by mouth in the morning, at noon, and at bedtime. 270 Tablet 0    liothyronine (CYTOMEL) 5 MCG Tab Take 1 Tablet by mouth every morning. 90 Tablet 0    atorvastatin (LIPITOR) 40 MG Tab Take 1 Tablet by mouth at bedtime. 90 Tablet 0     No current facility-administered medications for this visit.   [2]   Social History  Tobacco Use    Smoking status: Former     Current packs/day: 0.00     Types: Cigarettes      Quit date: 1979     Years since quittin.9    Smokeless tobacco: Never    Tobacco comments:     smoked off & on for 10 yrs   Vaping Use    Vaping status: Never Used   Substance Use Topics    Alcohol use: Yes     Comment: occassional    Drug use: No

## 2025-07-25 ENCOUNTER — OFFICE VISIT (OUTPATIENT)
Dept: DERMATOLOGY | Facility: IMAGING CENTER | Age: 85
End: 2025-07-25
Payer: MEDICARE

## 2025-07-25 DIAGNOSIS — L82.0 INFLAMED SEBORRHEIC KERATOSIS: ICD-10-CM

## 2025-07-25 DIAGNOSIS — L82.1 SEBORRHEIC KERATOSIS: ICD-10-CM

## 2025-07-25 DIAGNOSIS — D18.01 CHERRY ANGIOMA: ICD-10-CM

## 2025-07-25 PROCEDURE — 17110 DESTRUCTION B9 LES UP TO 14: CPT | Performed by: STUDENT IN AN ORGANIZED HEALTH CARE EDUCATION/TRAINING PROGRAM

## 2025-07-25 PROCEDURE — 99203 OFFICE O/P NEW LOW 30 MIN: CPT | Mod: 25 | Performed by: STUDENT IN AN ORGANIZED HEALTH CARE EDUCATION/TRAINING PROGRAM

## 2025-07-25 NOTE — PROGRESS NOTES
[facility] NOTE    Chief Complaint   Patient presents with    New Patient     Spot check         HPI:    Sheyla Gauthier is a 84 y.o. female here for evaluation of growth on posterior left ear. Patient states she noticed it a couple months ago.       No other symptomatic (itching, painful, burning) or changing lesions.       History of skin cancer: No  Family history of skin cancer:No      Review of Systems: No fevers, chill. Pertinent positives and negatives above.       Medications, Medical History, Surgical History, Family History & Allergies:  Reviewed in the chart, relevant history noted above.       PHYSICAL EXAM  Focused skin exam of face, neck, back, chest, abdomen, axilla, arms, and hands    - tan to brown stuck-on waxy papules and plaques on hairline, trunk  - tan to erythematous scaly stuck on papule on the L ear, L abdomen  - scattered 2-3mm red papules on trunk, extremities      ASSESSMENT & PLAN    # Inflamed seborrheic keratosis  - reassured benign, but given lesion(s) symptomatic, treatment with cryotherapy discussed and patient amenable.  CRYOTHERAPY:  Risks (including, but not limited to: hypo or hyperpigmentation, redness, blister, blood blister, recurrence, need for further treatment, infection, scar) and benefits of cryotherapy discussed. Patient verbally agreed to proceed with treatment. Cryotherapy freeze thaw cycles of 5-10 seconds were applied.  - LN2 x 2 lesion(s).  Patient tolerated procedure well. Aftercare instructions given.      # Seborrheic keratosis  # Cherry angioma  - reassure, no treatment needed.           Return in about 4 weeks (around 8/22/2025) for Spot check. L ear if not improved with cryo        Laurence Calloway MD  [facility]

## 2025-07-27 DIAGNOSIS — R10.13 DYSPEPSIA: ICD-10-CM

## 2025-07-28 RX ORDER — OMEPRAZOLE 20 MG/1
20 CAPSULE, DELAYED RELEASE ORAL DAILY
Qty: 30 CAPSULE | Refills: 0 | Status: SHIPPED | OUTPATIENT
Start: 2025-07-28

## 2025-07-28 NOTE — TELEPHONE ENCOUNTER
Received request via: Pharmacy    Was the patient seen in the last year in this department? Yes    Does the patient have an active prescription (recently filled or refills available) for medication(s) requested? No    Pharmacy Name: [id number] - [place], [place] - [address]  AT [facility]     Does the patient have group home Plus and need 100-day supply? (This applies to ALL medications) Patient does not have SCP

## 2025-07-29 ENCOUNTER — PATIENT MESSAGE (OUTPATIENT)
Dept: MEDICAL GROUP | Age: 85
End: 2025-07-29
Payer: MEDICARE

## 2025-07-30 ENCOUNTER — APPOINTMENT (OUTPATIENT)
Dept: RADIOLOGY | Facility: MEDICAL CENTER | Age: 85
End: 2025-07-30
Attending: EMERGENCY MEDICINE
Payer: MEDICARE

## 2025-07-30 ENCOUNTER — HOSPITAL ENCOUNTER (EMERGENCY)
Facility: MEDICAL CENTER | Age: 85
End: 2025-07-30
Attending: EMERGENCY MEDICINE
Payer: MEDICARE

## 2025-07-30 ASSESSMENT — HEART SCORE
ECG: NORMAL
AGE: 65+
RISK FACTORS: 1-2 RISK FACTORS
TROPONIN: LESS THAN OR EQUAL TO NORMAL LIMIT
HISTORY: SLIGHTLY SUSPICIOUS
HEART SCORE: 3

## 2025-07-30 ASSESSMENT — FIBROSIS 4 INDEX: FIB4 SCORE: 1.78

## 2025-07-30 NOTE — ED NOTES
PIV removed for discharge. ERP aware of blood pressure upon dc. Written and verbal instructions provided to pt. Pt instructed to follow up with PCP and cardiology. Pt instructed to return to emergency department for new or worsening symptoms. Pt verbalized understanding of discharge instructions. Pt ambulatory upon discharge with all belongings and personal walker.

## 2025-07-30 NOTE — ED PROVIDER NOTES
ER Provider Note    Scribed for Dr. Kavon Pa M.D. by Diann Richmond. 7/30/2025  9:53 AM    Primary Care Provider: Darius Morataya M.D.    CHIEF COMPLAINT  Chief Complaint   Patient presents with    Shortness of Breath     Pt reports SOB that started at 6am with chest pressure.        EXTERNAL RECORDS REVIEWED  Outpatient labs & studies Patient had an ECHO done in October 2023. EF of 60%.    HPI/ROS  LIMITATION TO HISTORY   Select: : None    OUTSIDE HISTORIAN(S):  EMS brought in patient and gave report as seen below    Sheyla Gauthier is a 84 y.o. female who presents to the ED via EMS for evaluation of difficulty breathing, onset almost 4 hours ago at 6 AM. She is coming from a nursing home home.  She reports shortness of breath and chest pressure right now, but notes she had chest pain at onset. Her chest pain has diminished since onset. Denies any recent travel, leg swelling, or blood clots. She describes feeling anxious with her current symptoms and her lifestyle in general. Patient has a history of hypertension.    PAST MEDICAL HISTORY  Past Medical History[1]    SURGICAL HISTORY  Past Surgical History[2]    FAMILY HISTORY  Family History   Problem Relation Age of Onset    Stroke Father     Arthritis Father     Hyperlipidemia Father     Hypertension Father     Cancer Sister         breast    Arthritis Sister     Arthritis Mother     Hypertension Mother     Hyperlipidemia Mother     Stroke Mother     Arthritis Brother     Cancer Brother     Hypertension Brother     Hyperlipidemia Brother     Heart Disease Brother         CABG for prox LAD in late 40s    Cancer Daughter         breast       SOCIAL HISTORY   reports that she quit smoking about 46 years ago. Her smoking use included cigarettes. She has never used smokeless tobacco. She reports current alcohol use. She reports that she does not use drugs.    CURRENT MEDICATIONS  Discharge Medication List as of 7/30/2025 11:39 AM        CONTINUE these  medications which have NOT CHANGED    Details   omeprazole (PRILOSEC) 20 MG delayed-release capsule TAKE 1 CAPSULE BY MOUTH EVERY DAY, Disp-30 Capsule, R-0, Normal      losartan (COZAAR) 50 MG Tab Take 1 Tablet by mouth 2 times a day., Disp-200 Tablet, R-2, Normal      ergocalciferol (DRISDOL) 24399 UNIT capsule Take 1 Capsule by mouth every 7 days., Disp-7 Capsule, R-0, Normal      !! atorvastatin (LIPITOR) 40 MG Tab Take 1 Tablet by mouth every evening., Disp-100 Tablet, R-3, Normal      donepezil (ARICEPT) 10 MG tablet Take 1 Tablet by mouth every evening., Disp-90 Tablet, R-2, Normal      cloNIDine (CATAPRES) 0.1 MG Tab Take 0.1 mg by mouth 2 times a day., Historical Med      amLODIPine (NORVASC) 5 MG Tab Take 5 mg by mouth every day., Historical Med      apixaban (ELIQUIS) 5mg Tab Take 1 Tablet by mouth 2 times a day., Historical Med      citalopram (CELEXA) 20 MG Tab Take 20 mg by mouth every day., Historical Med      chlorthalidone (HYGROTON) 25 MG Tab Take 25 mg by mouth every day., Historical Med      escitalopram (LEXAPRO) 20 MG tablet Take 1 Tablet by mouth every day., Historical Med      ezetimibe (ZETIA) 10 MG Tab Take 10 mg by mouth every day., Historical Med      oxyCODONE-acetaminophen (PERCOCET) 5-325 MG Tab Take 1 Tablet by mouth every 6 hours as needed., Historical Med      levothyroxine (LEVOXYL) 50 MCG Tab Take 50 mcg by mouth every morning on an empty stomach., Historical Med      spironolactone (ALDACTONE) 25 MG Tab Take 25 mg by mouth every day., Historical Med      pregabalin (LYRICA) 75 MG Cap Take 1 Capsule by mouth 2 times a day., Historical Med      Methocarbamol 1000 MG Tab Take 1,000 mg by mouth in the morning, at noon, and at bedtime., Disp-270 Tablet, R-0, Normal      !! atorvastatin (LIPITOR) 40 MG Tab Take 1 Tablet by mouth at bedtime., Disp-90 Tablet, R-0, Normal      liothyronine (CYTOMEL) 5 MCG Tab Take 1 Tablet by mouth every morning., Disp-90 Tablet, R-0, Normal       !! -  "Potential duplicate medications found. Please discuss with provider.          ALLERGIES  Pregabalin, Trazodone, Crestor [rosuvastatin calcium], and Rosuvastatin    PHYSICAL EXAM  BP (!) 207/93   Pulse (!) 54   Temp 36.6 °C (97.8 °F) (Temporal)   Resp 18   Ht 1.575 m (5' 2\")   Wt 54.4 kg (120 lb)   LMP 11/01/1986   SpO2 96%   BMI 21.95 kg/m²   Constitutional: Alert in no apparent distress.  HENT: No signs of trauma, Bilateral external ears normal, Nose normal.   Eyes: Pupils are equal and reactive, Conjunctiva normal, Non-icteric.   Neck: Normal range of motion, No tenderness, Supple,   Cardiovascular: Bradycardic rate and rhythm, no murmurs.   Thorax & Lungs: Normal breath sounds, No respiratory distress, No wheezing, No chest tenderness.   Abdomen: Bowel sounds normal, Soft, No tenderness,   Skin: Warm, Dry, No erythema, No rash.   Back: No bony tenderness, No CVA tenderness.   Extremities: No edema, No tenderness, No cyanosis.  Neurologic: Alert, Grossly non-focal.    Psychiatric: Affect normal     DIAGNOSTIC STUDIES & PROCEDURES    Labs:   Labs Reviewed   CBC WITH DIFFERENTIAL - Abnormal; Notable for the following components:       Result Value    WBC 3.3 (*)     Neutrophils-Polys 75.30 (*)     Lymphocytes 15.90 (*)     Lymphs (Absolute) 0.52 (*)     All other components within normal limits   COMP METABOLIC PANEL - Abnormal; Notable for the following components:    Potassium 3.5 (*)     AST(SGOT) 307 (*)     ALT(SGPT) 149 (*)     Alkaline Phosphatase 158 (*)     All other components within normal limits   PROBRAIN NATRIURETIC PEPTIDE, NT - Abnormal; Notable for the following components:    NT-proBNP 582 (*)     All other components within normal limits   TROPONIN   ESTIMATED GFR   TROPONIN     All labs reviewed by me.    EKG Interpretation:  Interpreted by me  Sinus bradycardia 53, No ST elevation or depression, No T wave inversion.     Radiology:   The attending Emergency Physician has independently " interpreted the diagnostic imaging associated with this visit and is awaiting the final reading from the radiologist, which will be displayed below.    Preliminary interpretation is a follows: No pneumothorax.   Radiologist interpretation: See below.    DX-CHEST-PORTABLE (1 VIEW)   Final Result      No acute cardiopulmonary abnormality.           COURSE & MEDICAL DECISION MAKING    ED Observation Status? No; Patient does not meet criteria for ED Observation.     CHEST PAIN:   HEART Score for Major Cardiac Events  HEART Score     History: Slightly suspicious  ECG: Normal  Age: 65+  Risk Factors: 1-2 risk factors  Troponin: Less than or equal to normal limit    Heart Score: 3    Total Score   0-3 Points = Low Score, risk of MACE 0.9-1.7%.  4-6 Points = Moderate Score, risk of MACE 12-16.6%  7-10 Points = High Score, risk of MACE 50-65%      INITIAL ASSESSMENT AND PLAN  Care Narrative:       9:53 AM - Patient seen and evaluated at bedside presenting with shortness of breath and chest pressure. Discussed plan of care, including obtaining a cardiac workup. Patient agrees to plan of care. Ordered EKG, Troponin, pBNP, CMP, CBC w/ diff, DX-Chest,  to evaluate.    10:31 AM - Repeat trop.     11:32 AM - I reevaluated the patient at bedside. The patient informs me they feel improved. I discussed the patient's diagnostic study results which show negative troponin. I discussed plan for discharge and follow up as outlined below. I will refer her to cardiology. The patient is stable for discharge at this time and will return for any new or worsening symptoms. Patient verbalizes understanding and support with my plan for discharge.       ADDITIONAL PROBLEM LIST AND DISPOSITION  Patient with chest pain and shortness of breath.  Ultimately chest x-ray negative.  Heart score is low.  No significant lecture derangements.  May be anxiety related.  I will follow-up with cardiology.  Given strict return precautions and follow-up.                DISPOSITION AND DISCUSSIONS  I have discussed management of the patient with the following physicians and [facility]'s: None    Discussion of management with other [facility] or appropriate source(s): None     Escalation of care considered, and ultimately not performed: acute inpatient care management, however at this time, the patient is most appropriate for outpatient management.    Barriers to care at this time, including but not limited to: None.     Decision tools and prescription drugs considered including, but not limited to: Low heart score.    The patient will return for new or worsening symptoms and is stable at the time of discharge.    The patient is referred to a primary physician for blood pressure management, diabetic screening, and for all other preventative health concerns.    DISPOSITION:  Patient will be discharged home in stable condition.    FOLLOW UP:  Darius Morataya M.D.  [address] [facility]  [place] [id number] [phone]    In 2 days      Raj Castaneda M.D.  [address]  [address]  [place] [address]  [phone]    In 1 week        OUTPATIENT MEDICATIONS:  Discharge Medication List as of 7/30/2025 11:39 AM          FINAL IMPRESSION   1. Dyspnea, unspecified type    2. Chest pain, unspecified type         I, Diann Richmond ([place]), am scribing for, and in the presence of, Kavon Pa M.D..    Electronically signed by: Diann Richmond ([place]), 7/30/2025    IKavon M.D. personally performed the services described in this documentation, as scribed by Diann Richmond in my presence, and it is both accurate and complete.    The note accurately reflects work and decisions made by me.  Kavon Pa M.D.  7/30/2025  1:46 PM         [1]   Past Medical History:  Diagnosis Date    Anxiety     Arthritis     osteoarthritis    Back pain     CAD (coronary artery disease) 10/2018    Abnormal CTCS. PCI/FARZAD (Russell 2.5 x 2mm) to the mid LAD. Cardic stent    Cataract     alex IOL    Daytime  sleepiness     Depression     Dyslipidemia     Frequent headaches     Herpes zoster     High cholesterol     Hypertension     MHA (microangiopathic hemolytic anemia)     Migraine syndrome      Mumps     as a child    Osteopathia     Osteoporosis     Painful joint     Post-menopause on HRT (hormone replacement therapy)     Renal disorder     chronic kidney disease stage 3    Sleep apnea     no longer uses cpap    Snoring     Thyroid disease     hypothyroid    Urinary incontinence     White matter abnormality on MRI of brain    [2]   Past Surgical History:  Procedure Laterality Date    PB PARTIAL HIP REPLACEMENT Left 11/27/2023    Procedure: LEFT TOTAL HIP ARTHROPLASTY;  Surgeon: Parveen Ko M.D.;  Location: SURGERY [facility];  Service: Orthopedics    LUMBAR LAMINECTOMY DISKECTOMY N/A 11/10/2023    Procedure: LUMBAR 5-SACRAL 1 REDO LAMINECTOMY;  Surgeon: Earnest Silva M.D.;  Location: SURGERY [facility];  Service: Neurosurgery    LUMBAR LAMINECTOMY DISKECTOMY N/A 2/6/2023    Procedure: POSTERIOR REMOVAL OF INTERSPINOUS DEVICE WITH L4-S1 LAMINECTOMY;  Surgeon: Mannie Connor M.D.;  Location: SURGERY [facility];  Service: Neurosurgery    HARDWARE REMOVAL ORTHO N/A 2/6/2023    Procedure: REMOVAL, HARDWARE;  Surgeon: Mannie Connor M.D.;  Location: SURGERY [facility];  Service: Neurosurgery    LAMINOTOMY  01/28/2022    L4-L5 and L5-S1 posterior fusion with instrumentation and laminotomy by Dr. Benitez.    BUNIONECTOMY Left 2003    FOOT SURGERY  2002    NM BREAST REDUCTION  1997    CHOLECYSTECTOMY  1996    ABDOMINAL HYSTERECTOMY TOTAL  11/1986    ARTHROSCOPY, KNEE      EYE SURGERY      cataracts    HYSTERECTOMY LAPAROSCOPY      MAMMOPLASTY AUGMENTATION      NM BREAST AUGMENTATION WITH IMPLANT

## 2025-07-30 NOTE — ED NOTES
Pt ambulated to restroom with personal walker. Reattached to cardiac monitoring. Call light within reach.

## 2025-07-30 NOTE — ED TRIAGE NOTES
Chief Complaint   Patient presents with    Shortness of Breath     Pt reports SOB that started at 6am with chest pressure.     Aox4, GCS 15.     BIB EMS frp, [facility] halfway home for chest pressure and Sob. Pt reports episode of the same 1wk prior that resolved on its own. States this AM the SOB was worse and she felt as though she could not take a deep breath. -thinners    Hx: htn    No interventions by EMS pta.

## 2025-08-01 ENCOUNTER — PATIENT MESSAGE (OUTPATIENT)
Dept: MEDICAL GROUP | Age: 85
End: 2025-08-01
Payer: MEDICARE

## 2025-08-01 DIAGNOSIS — G62.9 NEUROPATHY: Primary | ICD-10-CM

## 2025-08-01 RX ORDER — PREGABALIN 75 MG/1
75 CAPSULE ORAL 2 TIMES DAILY
Qty: 60 CAPSULE | Refills: 2 | Status: SHIPPED | OUTPATIENT
Start: 2025-08-01 | End: 2025-10-30

## 2025-08-13 DIAGNOSIS — E55.9 VITAMIN D DEFICIENCY: ICD-10-CM

## 2025-08-14 ENCOUNTER — OFFICE VISIT (OUTPATIENT)
Dept: MEDICAL GROUP | Age: 85
End: 2025-08-14
Payer: MEDICARE

## 2025-08-14 ENCOUNTER — HOSPITAL ENCOUNTER (OUTPATIENT)
Dept: LAB | Facility: MEDICAL CENTER | Age: 85
End: 2025-08-14
Attending: INTERNAL MEDICINE
Payer: MEDICARE

## 2025-08-14 VITALS
SYSTOLIC BLOOD PRESSURE: 140 MMHG | WEIGHT: 120 LBS | TEMPERATURE: 98.9 F | OXYGEN SATURATION: 97 % | DIASTOLIC BLOOD PRESSURE: 80 MMHG | HEART RATE: 65 BPM | HEIGHT: 62 IN | BODY MASS INDEX: 22.08 KG/M2

## 2025-08-14 DIAGNOSIS — E55.9 VITAMIN D DEFICIENCY: ICD-10-CM

## 2025-08-14 DIAGNOSIS — F32.4 MAJOR DEPRESSIVE DISORDER WITH SINGLE EPISODE, IN PARTIAL REMISSION (HCC): Primary | ICD-10-CM

## 2025-08-14 DIAGNOSIS — M48.062 LUMBAR STENOSIS WITH NEUROGENIC CLAUDICATION: ICD-10-CM

## 2025-08-14 DIAGNOSIS — F41.9 ANXIETY: ICD-10-CM

## 2025-08-14 LAB
T4 FREE SERPL-MCNC: 1.41 NG/DL (ref 0.93–1.7)
TSH SERPL-ACNC: 1.46 UIU/ML (ref 0.38–5.33)

## 2025-08-14 PROCEDURE — 3079F DIAST BP 80-89 MM HG: CPT | Performed by: FAMILY MEDICINE

## 2025-08-14 PROCEDURE — 3077F SYST BP >= 140 MM HG: CPT | Performed by: FAMILY MEDICINE

## 2025-08-14 PROCEDURE — 36415 COLL VENOUS BLD VENIPUNCTURE: CPT

## 2025-08-14 PROCEDURE — 84439 ASSAY OF FREE THYROXINE: CPT

## 2025-08-14 PROCEDURE — 99214 OFFICE O/P EST MOD 30 MIN: CPT | Performed by: FAMILY MEDICINE

## 2025-08-14 PROCEDURE — 84443 ASSAY THYROID STIM HORMONE: CPT

## 2025-08-14 RX ORDER — ESCITALOPRAM OXALATE 20 MG/1
TABLET ORAL
Qty: 90 TABLET | Refills: 2 | Status: SHIPPED | OUTPATIENT
Start: 2025-08-14

## 2025-08-14 RX ORDER — ALPRAZOLAM 0.25 MG
0.25 TABLET ORAL NIGHTLY PRN
Qty: 30 TABLET | Refills: 0 | Status: SHIPPED | OUTPATIENT
Start: 2025-08-14 | End: 2025-09-13

## 2025-08-14 RX ORDER — ERGOCALCIFEROL 1.25 MG/1
50000 CAPSULE, LIQUID FILLED ORAL
Qty: 7 CAPSULE | Refills: 5 | Status: SHIPPED | OUTPATIENT
Start: 2025-08-14

## 2025-08-14 RX ORDER — PREGABALIN 25 MG/1
25 CAPSULE ORAL 2 TIMES DAILY
Qty: 180 CAPSULE | Refills: 0 | Status: SHIPPED | OUTPATIENT
Start: 2025-08-14 | End: 2025-11-12

## 2025-08-14 RX ORDER — ERGOCALCIFEROL 1.25 MG/1
50000 CAPSULE, LIQUID FILLED ORAL
Qty: 7 CAPSULE | Refills: 5 | Status: SHIPPED | OUTPATIENT
Start: 2025-08-14 | End: 2025-08-14 | Stop reason: SDUPTHER

## 2025-08-14 ASSESSMENT — FIBROSIS 4 INDEX: FIB4 SCORE: 10.33

## 2025-08-21 ENCOUNTER — TELEPHONE (OUTPATIENT)
Dept: HEALTH INFORMATION MANAGEMENT | Facility: OTHER | Age: 85
End: 2025-08-21
Payer: MEDICARE

## (undated) DEVICE — LACTATED RINGERS INJ. 500 ML - (24EA/CA)

## (undated) DEVICE — HEADREST PRONEVIEW LARGE - (10/CA)

## (undated) DEVICE — MIXER BONE CEMENT REVOLUTION - W/FEMORAL PRESSURIZER (6/CA)

## (undated) DEVICE — MIDAS LUBRICATOR DIFFUSER PACK (4EA/CA)

## (undated) DEVICE — GLOVE BIOGEL SZ 6.5 SURGICAL PF LTX (50PR/BX 4BX/CA)

## (undated) DEVICE — BLADE SAGITTAL SAW DUAL CUT 75.0 X 25.0MM (1/EA)

## (undated) DEVICE — GOWN WARMING STANDARD FLEX - (30/CA)

## (undated) DEVICE — TUBING CLEARLINK DUO-VENT - C-FLO (48EA/CA)

## (undated) DEVICE — TOWEL STOP TIMEOUT SAFETY FLAG (40EA/CA)

## (undated) DEVICE — KIT SURGIFLO W/OUT THROMBIN - (6EA/CA)

## (undated) DEVICE — SUCTION INSTRUMENT YANKAUER BULBOUS TIP W/O VENT (50EA/CA)

## (undated) DEVICE — SPONGE GAUZESTER 4 X 4 4PLY - (128PK/CA)

## (undated) DEVICE — SUTURE 5 ETHIBOND V-37 (12PK/BX)

## (undated) DEVICE — TUBING C&T SET FLYING LEADS DRAIN TUBING (10EA/BX)

## (undated) DEVICE — CHLORAPREP 26 ML APPLICATOR - ORANGE TINT(25/CA)

## (undated) DEVICE — CATHETER IV 14 GA X 2 ---SURG.& SDS ONLY---(200EA/CA)

## (undated) DEVICE — LENS/HOOD FOR SPACESUIT - (32/PK) PEEL AWAY FACE

## (undated) DEVICE — LACTATED RINGERS INJ 1000 ML - (14EA/CA 60CA/PF)

## (undated) DEVICE — GLOVE BIOGEL SZ 8.5 SURGICAL PF LTX - (50PR/BX 4BX/CA)

## (undated) DEVICE — KIT HIP PREP IM ENCHANCE TOTAL (5EA/BX)

## (undated) DEVICE — SET EPIDURAL BURRON NON-SAFETY (12EA/CA)

## (undated) DEVICE — STAPLER SKIN DISP - (6/BX 10BX/CA) VISISTAT

## (undated) DEVICE — SUTURE 1 STRATAFIX SYMMETRIC PDS PLUS CT-1 45CM (12EA/BX)

## (undated) DEVICE — ARMREST CRADLE FOAM - (2PR/PK 12PR/CA)

## (undated) DEVICE — GLOVE BIOGEL PI INDICATOR SZ 8.0 SURGICAL PF LF -(50/BX 4BX/CA)

## (undated) DEVICE — DRESSING POST OP BORDER 4IN X 12 IN (25EA/CA)

## (undated) DEVICE — GLOVE BIOGEL PI INDICATOR SZ 6.0 SURGICAL PF LF -(200PR/CA)

## (undated) DEVICE — STAPLER 35MM SKIN WIDE ROTATING HEAD (6EA/BX)

## (undated) DEVICE — INTRAOP NEURO IN OR 1:1 PER 15 MIN

## (undated) DEVICE — TOOL MR8 14CM MATCH HD SYM-TRI 3MM DIAMETER (1/EA)

## (undated) DEVICE — SEALER BIPOLAR 2.3 AQUAMANTYS

## (undated) DEVICE — SET LEADWIRE 5 LEAD BEDSIDE DISPOSABLE ECG (1SET OF 5/EA)

## (undated) DEVICE — ELECTRODE DUAL RETURN W/ CORD - (50/PK)

## (undated) DEVICE — SET EXTENSION WITH 2 PORTS (48EA/CA) ***PART #2C8610 IS A SUBSTITUTE*****

## (undated) DEVICE — DRAPE IOBAN II INCISE 23X17 - (10EA/BX 4BX/CA)

## (undated) DEVICE — BLADE SURGICAL CLIPPER - (50EA/CA)

## (undated) DEVICE — SODIUM CHL IRRIGATION 0.9% 1000ML (12EA/CA)

## (undated) DEVICE — SYSTEM PREVEAN CUSTOMIZABLE 90CM/150ML CANISTER INCIS

## (undated) DEVICE — DRAPE 36X28IN RAD CARM BND BG - (25/CA) O

## (undated) DEVICE — SUTURE GENERAL

## (undated) DEVICE — PACK JACKSON TABLE KIT W/OUT - HR (6EA/CA)

## (undated) DEVICE — SPONGE GAUZESTER. 2X2 4-PL - (2/PK 50PK/BX 30BX/CS)

## (undated) DEVICE — GLOVE BIOGEL INDICATOR SZ 6.5 SURGICAL PF LTX - (50PR/BX 4BX/CA)

## (undated) DEVICE — COVER MAYO STAND X-LG - (22EA/CA)

## (undated) DEVICE — GLOVE PROTEXIS W/NEU-THERA SZ 8.5 (50PR/BX)

## (undated) DEVICE — DERMABOND ADVANCED - (12EA/BX)

## (undated) DEVICE — SYRINGE DISP. 60 CC LL - (30/BX, 12BX/CA)**WHEN THESE ARE GONE ORDER #500206**

## (undated) DEVICE — KIT EVACUATER 3 SPRING PVC LF 1/8 DRAIN SIZE (10EA/CA)"

## (undated) DEVICE — CANISTER SUCTION 3000ML MECHANICAL FILTER AUTO SHUTOFF MEDI-VAC NONSTERILE LF DISP  (40EA/CA)

## (undated) DEVICE — TIP INTPLS HFLO ML ORFC BTRY - (12/CS)  FOR SURGILAV

## (undated) DEVICE — SLEEVE, VASO, THIGH, MED

## (undated) DEVICE — NEEDLE NON-SAFETY HYPO 21 GA X 1 1/2 IN HYPO (100/BX)

## (undated) DEVICE — GLOVE BIOGEL PI ORTHO SZ 7.5 PF LF (40PR/BX)

## (undated) DEVICE — GLOVE BIOGEL PI ORTHO SZ 6 SURGICAL PF LF (40PR/BX)

## (undated) DEVICE — SODIUM CHL. IRRIGATION 0.9% 3000ML (4EA/CA 65CA/PF)

## (undated) DEVICE — GLOVE SIZE 6.5  SURGEON ACCELERATOR FREE GREEN (50PR/BX)

## (undated) DEVICE — BAG SPONGE COUNT 10.25 X 32 - BLUE (250/CA)

## (undated) DEVICE — SUTURE 2-0 VICRYL PLUS CT-1 - 8 X 18 INCH(12/BX)

## (undated) DEVICE — DRAIN JACKSON PRATT 10FR - (10/CS)

## (undated) DEVICE — SLEEVE VASO CALF MED - (10PR/CA)

## (undated) DEVICE — TOOL DISSECTING BIT MR8 OD3MM L14CM (1EA)

## (undated) DEVICE — TUBE E-T HI-LO CUFF 7.0MM (10EA/PK)

## (undated) DEVICE — SENSOR OXIMETER ADULT SPO2 RD SET (20EA/BX)

## (undated) DEVICE — NEEDLE 20 GA X 1 INCH - NON SAFTEY (100/BX)

## (undated) DEVICE — IMPLANT FLEXIBLE DRILL 35MM (1EA)

## (undated) DEVICE — STOCKINETTE IMPERVIOUS 12X48 - STERILELF (10/CA)"

## (undated) DEVICE — COVER LIGHT HANDLE ALC PLUS DISP (18EA/BX)

## (undated) DEVICE — SUTURE 3-0 MONOCRYL CT-1 ABS - 36IN (36/BX)

## (undated) DEVICE — LIGHT SOURCE MIS 12FT

## (undated) DEVICE — HANDPIECE 10FT INTPLS SCT PLS IRRIGATION HAND CONTROL SET (6/PK)

## (undated) DEVICE — DRAPE SURG STERI-DRAPE 7X11OD - (40EA/CA)

## (undated) DEVICE — DRESSING POST OP BORDER 4INX8IN AG (25/CA)

## (undated) DEVICE — WAX BONE ALKYLENE OXIDE HEMOSTASIS OSTENE 3.5GM (10EA/CA)

## (undated) DEVICE — GOWN SURGEONS X-LARGE - DISP. (30/CA)

## (undated) DEVICE — SUTURE 1 VICRYL PLUS CT-1 - 18 INCH (12/BX)

## (undated) DEVICE — RESERVOIR SUCTION 100 CC - SILICONE (20EA/CA)

## (undated) DEVICE — PACK TOTAL HIP - (1/CA)

## (undated) DEVICE — BRUSH FMCNL TIP IRR STRL - (12/PKG) FOR SURGILAV

## (undated) DEVICE — SYRINGE NON SAFETY 10 CC 20 GA X 1-1/2 IN (100/BX 4BX/CA)

## (undated) DEVICE — Device

## (undated) DEVICE — DRAPE LAPAROTOMY T SHEET - (12EA/CA)

## (undated) DEVICE — DRAPE STRLE REG TOWEL 18X24 - (10/BX 4BX/CA)"

## (undated) DEVICE — CLOSURE SKIN STRIP 1/2 X 4 IN - (STERI STRIP) (50/BX 4BX/CA)

## (undated) DEVICE — DRAPE U SPLIT IMP 54 X 76 - (24/CA)

## (undated) DEVICE — PACK NEURO - (2EA/CA)

## (undated) DEVICE — GLOVE BIOGEL PI INDICATOR SZ 8.5 SURGICAL PF LF - (50PR/BX 4BX/CA)

## (undated) DEVICE — SUTURE 1 VICRYL PLUS CTX - 8 X 18 INCH (12/BX)

## (undated) DEVICE — DRESSING AQUACEL AG ADVANTAGE 3.5 X 10" (10EA/BX)"

## (undated) DEVICE — DRAPE LARGE 3 QUARTER - (20/CA)

## (undated) DEVICE — DRAPE MICROSCOPE ARMATEC 120IN X 46IN (10EA/CA)

## (undated) DEVICE — BOVIE  BLADE 6 EXTENDED - (50/PK)